# Patient Record
Sex: FEMALE | Race: WHITE | NOT HISPANIC OR LATINO | Employment: UNEMPLOYED | URBAN - METROPOLITAN AREA
[De-identification: names, ages, dates, MRNs, and addresses within clinical notes are randomized per-mention and may not be internally consistent; named-entity substitution may affect disease eponyms.]

---

## 2017-03-22 DIAGNOSIS — Z01.812 PRE-OPERATIVE LABORATORY EXAMINATION: ICD-10-CM

## 2017-03-22 LAB
ANION GAP SERPL CALC-SCNC: 9 MMOL/L (ref 0–11.9)
BUN SERPL-MCNC: 15 MG/DL (ref 8–22)
CALCIUM SERPL-MCNC: 9.8 MG/DL (ref 8.4–10.2)
CHLORIDE SERPL-SCNC: 103 MMOL/L (ref 96–112)
CO2 SERPL-SCNC: 24 MMOL/L (ref 20–33)
CREAT SERPL-MCNC: 0.73 MG/DL (ref 0.5–1.4)
GFR SERPL CREATININE-BSD FRML MDRD: >60 ML/MIN/1.73 M 2
GLUCOSE SERPL-MCNC: 99 MG/DL (ref 65–99)
POTASSIUM SERPL-SCNC: 3.7 MMOL/L (ref 3.6–5.5)
SODIUM SERPL-SCNC: 136 MMOL/L (ref 135–145)

## 2017-03-22 PROCEDURE — 36415 COLL VENOUS BLD VENIPUNCTURE: CPT

## 2017-03-22 PROCEDURE — 80048 BASIC METABOLIC PNL TOTAL CA: CPT

## 2017-03-22 RX ORDER — EPLERENONE 50 MG/1
TABLET, FILM COATED ORAL DAILY
COMMUNITY
End: 2019-11-12

## 2017-03-22 RX ORDER — AMLODIPINE BESYLATE 10 MG/1
10 TABLET ORAL DAILY
COMMUNITY
End: 2019-11-12 | Stop reason: SDUPTHER

## 2017-03-22 RX ORDER — VALSARTAN 320 MG/1
320 TABLET ORAL DAILY
COMMUNITY
End: 2019-11-12 | Stop reason: SDUPTHER

## 2017-03-31 ENCOUNTER — HOSPITAL ENCOUNTER (OUTPATIENT)
Facility: MEDICAL CENTER | Age: 57
End: 2017-03-31
Attending: ORTHOPAEDIC SURGERY | Admitting: ORTHOPAEDIC SURGERY
Payer: OTHER MISCELLANEOUS

## 2017-03-31 VITALS
RESPIRATION RATE: 16 BRPM | BODY MASS INDEX: 28.31 KG/M2 | WEIGHT: 180.78 LBS | DIASTOLIC BLOOD PRESSURE: 91 MMHG | HEART RATE: 91 BPM | TEMPERATURE: 97.9 F | SYSTOLIC BLOOD PRESSURE: 152 MMHG | OXYGEN SATURATION: 95 %

## 2017-03-31 PROBLEM — M67.441 GANGLION OF RIGHT HAND: Status: ACTIVE | Noted: 2017-03-31

## 2017-03-31 LAB — PATHOLOGY CONSULT NOTE: NORMAL

## 2017-03-31 PROCEDURE — 160002 HCHG RECOVERY MINUTES (STAT): Performed by: ORTHOPAEDIC SURGERY

## 2017-03-31 PROCEDURE — 110382 HCHG SHELL REV 271: Performed by: ORTHOPAEDIC SURGERY

## 2017-03-31 PROCEDURE — A4606 OXYGEN PROBE USED W OXIMETER: HCPCS | Performed by: ORTHOPAEDIC SURGERY

## 2017-03-31 PROCEDURE — 501838 HCHG SUTURE GENERAL: Performed by: ORTHOPAEDIC SURGERY

## 2017-03-31 PROCEDURE — 500126 HCHG BOVIE, NEEDLE TIP: Performed by: ORTHOPAEDIC SURGERY

## 2017-03-31 PROCEDURE — 160009 HCHG ANES TIME/MIN: Performed by: ORTHOPAEDIC SURGERY

## 2017-03-31 PROCEDURE — 500881 HCHG PACK, EXTREMITY: Performed by: ORTHOPAEDIC SURGERY

## 2017-03-31 PROCEDURE — 500053 HCHG BANDAGE, ELASTIC 4: Performed by: ORTHOPAEDIC SURGERY

## 2017-03-31 PROCEDURE — 700101 HCHG RX REV CODE 250

## 2017-03-31 PROCEDURE — 160046 HCHG PACU - 1ST 60 MINS PHASE II: Performed by: ORTHOPAEDIC SURGERY

## 2017-03-31 PROCEDURE — 160039 HCHG SURGERY MINUTES - EA ADDL 1 MIN LEVEL 3: Performed by: ORTHOPAEDIC SURGERY

## 2017-03-31 PROCEDURE — 160047 HCHG PACU  - EA ADDL 30 MINS PHASE II: Performed by: ORTHOPAEDIC SURGERY

## 2017-03-31 PROCEDURE — 88304 TISSUE EXAM BY PATHOLOGIST: CPT

## 2017-03-31 PROCEDURE — 500433 HCHG DRESSING, KLING 4': Performed by: ORTHOPAEDIC SURGERY

## 2017-03-31 PROCEDURE — 700111 HCHG RX REV CODE 636 W/ 250 OVERRIDE (IP): Performed by: ORTHOPAEDIC SURGERY

## 2017-03-31 PROCEDURE — A9270 NON-COVERED ITEM OR SERVICE: HCPCS | Performed by: ORTHOPAEDIC SURGERY

## 2017-03-31 PROCEDURE — 700102 HCHG RX REV CODE 250 W/ 637 OVERRIDE(OP): Performed by: ORTHOPAEDIC SURGERY

## 2017-03-31 PROCEDURE — 160025 RECOVERY II MINUTES (STATS): Performed by: ORTHOPAEDIC SURGERY

## 2017-03-31 PROCEDURE — 160048 HCHG OR STATISTICAL LEVEL 1-5: Performed by: ORTHOPAEDIC SURGERY

## 2017-03-31 PROCEDURE — 700111 HCHG RX REV CODE 636 W/ 250 OVERRIDE (IP)

## 2017-03-31 PROCEDURE — 160028 HCHG SURGERY MINUTES - 1ST 30 MINS LEVEL 3: Performed by: ORTHOPAEDIC SURGERY

## 2017-03-31 PROCEDURE — 160035 HCHG PACU - 1ST 60 MINS PHASE I: Performed by: ORTHOPAEDIC SURGERY

## 2017-03-31 PROCEDURE — 502576 HCHG PACK, HAND: Performed by: ORTHOPAEDIC SURGERY

## 2017-03-31 PROCEDURE — 110371 HCHG SHELL REV 272: Performed by: ORTHOPAEDIC SURGERY

## 2017-03-31 RX ORDER — SODIUM CHLORIDE, SODIUM LACTATE, POTASSIUM CHLORIDE, CALCIUM CHLORIDE 600; 310; 30; 20 MG/100ML; MG/100ML; MG/100ML; MG/100ML
1000 INJECTION, SOLUTION INTRAVENOUS
Status: COMPLETED | OUTPATIENT
Start: 2017-03-31 | End: 2017-03-31

## 2017-03-31 RX ORDER — OXYCODONE HYDROCHLORIDE 5 MG/1
2.5 TABLET ORAL
Status: DISCONTINUED | OUTPATIENT
Start: 2017-03-31 | End: 2017-03-31 | Stop reason: HOSPADM

## 2017-03-31 RX ORDER — BUPIVACAINE HYDROCHLORIDE 2.5 MG/ML
INJECTION, SOLUTION EPIDURAL; INFILTRATION; INTRACAUDAL
Status: DISCONTINUED | OUTPATIENT
Start: 2017-03-31 | End: 2017-03-31 | Stop reason: HOSPADM

## 2017-03-31 RX ORDER — HALOPERIDOL 5 MG/ML
1 INJECTION INTRAMUSCULAR EVERY 6 HOURS PRN
Status: DISCONTINUED | OUTPATIENT
Start: 2017-03-31 | End: 2017-03-31 | Stop reason: HOSPADM

## 2017-03-31 RX ORDER — ONDANSETRON 2 MG/ML
4 INJECTION INTRAMUSCULAR; INTRAVENOUS EVERY 4 HOURS PRN
Status: DISCONTINUED | OUTPATIENT
Start: 2017-03-31 | End: 2017-03-31 | Stop reason: HOSPADM

## 2017-03-31 RX ORDER — DIPHENHYDRAMINE HYDROCHLORIDE 50 MG/ML
25 INJECTION INTRAMUSCULAR; INTRAVENOUS EVERY 6 HOURS PRN
Status: DISCONTINUED | OUTPATIENT
Start: 2017-03-31 | End: 2017-03-31 | Stop reason: HOSPADM

## 2017-03-31 RX ORDER — DEXAMETHASONE SODIUM PHOSPHATE 4 MG/ML
4 INJECTION, SOLUTION INTRA-ARTICULAR; INTRALESIONAL; INTRAMUSCULAR; INTRAVENOUS; SOFT TISSUE
Status: DISCONTINUED | OUTPATIENT
Start: 2017-03-31 | End: 2017-03-31 | Stop reason: HOSPADM

## 2017-03-31 RX ADMIN — SODIUM CHLORIDE, SODIUM LACTATE, POTASSIUM CHLORIDE, CALCIUM CHLORIDE 1000 ML: 600; 310; 30; 20 INJECTION, SOLUTION INTRAVENOUS at 12:07

## 2017-03-31 ASSESSMENT — PAIN SCALES - GENERAL
PAINLEVEL_OUTOF10: 0
PAINLEVEL_OUTOF10: 3
PAINLEVEL_OUTOF10: 2
PAINLEVEL_OUTOF10: 0

## 2017-03-31 NOTE — IP AVS SNAPSHOT
Home Care Instructions                                                                                                                Name:Denisse Abel  Medical Record Number:[id number]  CSN: [id number]    YOB: 1960   Age: 56 y.o.  Sex: female  HT:  WT: 82 kg (180 lb 12.4 oz)          Admit Date: 3/31/2017     Discharge Date:   Today's Date: 3/31/2017  Attending Doctor:  Jimmy Atkins M.D.                  Allergies:  Pcn and Sulfa drugs                Discharge Instructions         ACTIVITY: Rest and take it easy for the first 24 hours.  A responsible adult is recommended to remain with you during that time.  It is normal to feel sleepy.  We encourage you to not do anything that requires balance, judgment or coordination.    MILD FLU-LIKE SYMPTOMS ARE NORMAL. YOU MAY EXPERIENCE GENERALIZED MUSCLE ACHES, THROAT IRRITATION, HEADACHE AND/OR SOME NAUSEA.    FOR 24 HOURS DO NOT:  Drive, operate machinery or run household appliances.  Drink beer or alcoholic beverages.   Make important decisions or sign legal documents.    SPECIAL INSTRUCTIONS: Elevate right upper extremity above heart level.     DIET: To avoid nausea, slowly advance diet as tolerated, avoiding spicy or greasy foods for the first day.  Add more substantial food to your diet according to your physician's instructions.  INCREASE FLUIDS AND FIBER TO AVOID CONSTIPATION.    SURGICAL DRESSING/BATHING: May shower with wound uncovered post op day 3.    FOLLOW-UP APPOINTMENT:  A follow-up appointment should be arranged with your doctor in [phone]; call to schedule.    You should CALL YOUR PHYSICIAN if you develop:  Fever greater than 101 degrees F.  Pain not relieved by medication, or persistent nausea or vomiting.  Excessive bleeding (blood soaking through dressing) or unexpected drainage from the wound.  Extreme redness or swelling around the incision site, drainage of pus or foul smelling drainage.  Inability to urinate or empty your bladder  within 8 hours.  Problems with breathing or chest pain.    You should call 911 if you develop problems with breathing or chest pain.  If you are unable to contact your doctor or surgical center, you should go to the nearest emergency room or urgent care center.  Dr Atkins's telephone #: [phone]    If any questions arise, call your doctor.  If your doctor is not available, please feel free to call the Surgical Center at [phone].  The Center is open Monday through Friday from 7AM to 7PM.  You can also call the HEALTH HOTLINE open 24 hours/day, 7 days/week and speak to a nurse at [phone], or toll free at [phone].    A registered nurse may call you a few days after your surgery to see how you are doing after your procedure.    MEDICATIONS: Resume taking daily medication.  Take prescribed pain medication with food.  If no medication is prescribed, you may take non-aspirin pain medication if needed.  PAIN MEDICATION CAN BE VERY CONSTIPATING.  Take a stool softener or laxative such as senokot, pericolace, or milk of magnesia if needed.    Prescription given prior to surgery.  Last pain medication given at __________.    If your physician has prescribed pain medication that includes Acetaminophen (Tylenol), do not take additional Acetaminophen (Tylenol) while taking the prescribed medication.    Depression / Suicide Risk    As you are discharged from this [facility] facility, it is important to learn how to keep safe from harming yourself.    Recognize the warning signs:  · Abrupt changes in personality, positive or negative- including increase in energy   · Giving away possessions  · Change in eating patterns- significant weight changes-  positive or negative  · Change in sleeping patterns- unable to sleep or sleeping all the time   · Unwillingness or inability to communicate  · Depression  · Unusual sadness, discouragement and loneliness  · Talk of wanting to die  · Neglect of personal  appearance   · Rebelliousness- reckless behavior  · Withdrawal from people/activities they love  · Confusion- inability to concentrate     If you or a loved one observes any of these behaviors or has concerns about self-harm, here's what you can do:  · Talk about it- your feelings and reasons for harming yourself  · Remove any means that you might use to hurt yourself (examples: pills, rope, extension cords, firearm)  · Get professional help from the community (Mental Health, Substance Abuse, psychological counseling)  · Do not be alone:Call your Safe Contact- someone whom you trust who will be there for you.  · Call your local CRISIS HOTLINE 169-9473 or 745-761-3463  · Call your local Children's Mobile Crisis Response Team [place] (693) 478-1054 or www.MEETiiN  · Call the toll free National Suicide Prevention Hotlines   · National Suicide Prevention Lifeline 696-099-IRAV (2802)  · [employer] Line Network 800-SUICIDE (912-0494)       Medication List      CONTINUE taking these medications        Instructions    amlodipine 10 MG Tabs   Commonly known as:  NORVASC    Take 10 mg by mouth every day.   Dose:  10 mg       Eplerenone 50 MG Tabs    Take  by mouth every day.       fluticasone 50 MCG/ACT nasal spray   Commonly known as:  FLONASE    Spray 1 Spray in nose every day. Each Nostril   Dose:  1 Spray       valsartan 320 MG tablet   Commonly known as:  DIOVAN    Take 320 mg by mouth every day.   Dose:  320 mg               Medication Information     Above and/or attached are the medications your physician expects you to take upon discharge. Review all of your home medications and newly ordered medications with your doctor and/or pharmacist. Follow medication instructions as directed by your doctor and/or pharmacist. Please keep your medication list with you and share with your physician. Update the information when medications are discontinued, doses are changed, or new medications (including  over-the-counter products) are added; and carry medication information at all times in the event of emergency situations.        Resources     Quit Smoking / Tobacco Use:    I understand the use of any tobacco products increases my chance of suffering from future heart disease or stroke and could cause other illnesses which may shorten my life. Quitting the use of tobacco products is the single most important thing I can do to improve my health. For further information on smoking / tobacco cessation call a Toll Free Quit Line at 1-475.613.1808 (*National [employer] [place]) or 1-503.743.6256 (American Lung Association) or you can access the web based program at www.lungusa.org.    [place] Tobacco Users Help Line:  (154) 872-3164       Toll Free: 1-496.546.2492  Quit Tobacco Program [facility] (680)595-8204    Crisis Hotline:    [place] Crisis Hotline:  0-064-XQKALXI or 1-173.372.1177    [place] Crisis Hotline:    1-959.954.9224 or 967-746-2725    Discharge Survey:   Thank you for choosing [facility]. We hope we did everything we could to make your hospital stay a pleasant one. You may be receiving a survey and we would appreciate your time and participation in answering the questions. Your input is very valuable to us in our efforts to improve our service to our patients and their families.            Signatures     My signature on this form indicates that:    1. I acknowledge receipt and understanding of these Home Care Instruction.  2. My questions regarding this information have been answered to my satisfaction.  3. I have formulated a plan with my discharge nurse to obtain my prescribed medications for home.    __________________________________      __________________________________                   Patient Signature                                 Guardian/Responsible Adult Signature      __________________________________                 __________       ________                        Nurse Signature                                               Date                 Time

## 2017-03-31 NOTE — OR NURSING
1415 patient to stage 2  Patient settled in recliner chair post short ambulation from [facility] - pt dressed with assist by CNA. Dressing CDI to L hand with minimal pain and no nausea. Instructed pt to elevate L hand above heart level; demonstrated understanding.   1420 Instructed patient to have responsible adult x 24 hours; pt states she has several friends that are available and pt states verbal understanding no driving x 24 hours or while on pain medication.   1455 D/Benson to care of family post uneventful stay in PACU 2.

## 2017-03-31 NOTE — OR NURSING
1322 Patient to recovery via cart. Placed on monitors. Awake and alert. Denies any pain at this time.  1334 Patients friend notified of patients status.  1344 Patient resting comfortably.  1410 Patient meets criteria for stage 2.  1415 Report to April SILVIA.

## 2017-03-31 NOTE — DISCHARGE INSTRUCTIONS
ACTIVITY: Rest and take it easy for the first 24 hours.  A responsible adult is recommended to remain with you during that time.  It is normal to feel sleepy.  We encourage you to not do anything that requires balance, judgment or coordination.    MILD FLU-LIKE SYMPTOMS ARE NORMAL. YOU MAY EXPERIENCE GENERALIZED MUSCLE ACHES, THROAT IRRITATION, HEADACHE AND/OR SOME NAUSEA.    FOR 24 HOURS DO NOT:  Drive, operate machinery or run household appliances.  Drink beer or alcoholic beverages.   Make important decisions or sign legal documents.    SPECIAL INSTRUCTIONS: Elevate right upper extremity above heart level.     DIET: To avoid nausea, slowly advance diet as tolerated, avoiding spicy or greasy foods for the first day.  Add more substantial food to your diet according to your physician's instructions.  INCREASE FLUIDS AND FIBER TO AVOID CONSTIPATION.    SURGICAL DRESSING/BATHING: May shower with wound uncovered post op day 3.    FOLLOW-UP APPOINTMENT:  A follow-up appointment should be arranged with your doctor in [phone]; call to schedule.    You should CALL YOUR PHYSICIAN if you develop:  Fever greater than 101 degrees F.  Pain not relieved by medication, or persistent nausea or vomiting.  Excessive bleeding (blood soaking through dressing) or unexpected drainage from the wound.  Extreme redness or swelling around the incision site, drainage of pus or foul smelling drainage.  Inability to urinate or empty your bladder within 8 hours.  Problems with breathing or chest pain.    You should call [phone] if you develop problems with breathing or chest pain.  If you are unable to contact your doctor or surgical center, you should go to the nearest emergency room or urgent care center.  Dr Atkins's telephone #: [phone]    If any questions arise, call your doctor.  If your doctor is not available, please feel free to call the Surgical Center at [phone].  The Center is open Monday through Friday from 7AM to 7PM.  You  can also call the HEALTH HOTLINE open 24 hours/day, 7 days/week and speak to a nurse at [phone], or toll free at [phone].    A registered nurse may call you a few days after your surgery to see how you are doing after your procedure.    MEDICATIONS: Resume taking daily medication.  Take prescribed pain medication with food.  If no medication is prescribed, you may take non-aspirin pain medication if needed.  PAIN MEDICATION CAN BE VERY CONSTIPATING.  Take a stool softener or laxative such as senokot, pericolace, or milk of magnesia if needed.    Prescription given prior to surgery.  Last pain medication given at __________.    If your physician has prescribed pain medication that includes Acetaminophen (Tylenol), do not take additional Acetaminophen (Tylenol) while taking the prescribed medication.    Depression / Suicide Risk    As you are discharged from this [facility] facility, it is important to learn how to keep safe from harming yourself.    Recognize the warning signs:  · Abrupt changes in personality, positive or negative- including increase in energy   · Giving away possessions  · Change in eating patterns- significant weight changes-  positive or negative  · Change in sleeping patterns- unable to sleep or sleeping all the time   · Unwillingness or inability to communicate  · Depression  · Unusual sadness, discouragement and loneliness  · Talk of wanting to die  · Neglect of personal appearance   · Rebelliousness- reckless behavior  · Withdrawal from people/activities they love  · Confusion- inability to concentrate     If you or a loved one observes any of these behaviors or has concerns about self-harm, here's what you can do:  · Talk about it- your feelings and reasons for harming yourself  · Remove any means that you might use to hurt yourself (examples: pills, rope, extension cords, firearm)  · Get professional help from the community (Mental Health, Substance Abuse, psychological  counseling)  · Do not be alone:Call your Safe Contact- someone whom you trust who will be there for you.  · Call your local CRISIS HOTLINE 329-0499 or 616-578-7053  · Call your local Children's Mobile Crisis Response Team [place] (053) 558-1932 or www.[employer]  · Call the toll free National Suicide Prevention Hotlines   · National Suicide Prevention Lifeline 045-446-CGXF (5261)  · National Hope Line Network 800-SUICIDE (094-2355)

## 2017-03-31 NOTE — IP AVS SNAPSHOT
3/31/2017          Denisse Abel  [address] [place] [address]    Dear Denisse Short:    [facility] wants to ensure your discharge home is safe and you or your loved ones have had all your questions answered regarding your care after you leave the hospital.    You may receive a telephone call within two days of your discharge.  This call is to make certain you understand your discharge instructions as well as ensure we provided you with the best care possible during your stay with us.     The call will only last approximately 3-5 minutes and will be done by a nurse.    Once again, we want to ensure your discharge home is safe and that you have a clear understanding of any next steps in your care.  If you have any questions or concerns, please do not hesitate to contact us, we are here for you.  Thank you for choosing [facility] for your healthcare needs.    Sincerely,    Vic Teixeira    [facility]

## 2017-04-01 NOTE — OP REPORT
DATE OF SERVICE:  03/31/2017    PREOPERATIVE DIAGNOSIS:  Right trigger thumb and mass, right hand.    POSTOPERATIVE DIAGNOSIS:  Right trigger thumb and mass, right hand.    OPERATION PERFORMED:  Release, right trigger thumb; excision of mass, right   hand.    SURGEON:  Sandhya Atkins MD    ANESTHESIA:  General.    ANESTHESIOLOGIST:  Alvino Bojorquez MD    SUMMARY:  Under satisfactory general anesthesia, the patient's right arm was   prepped and draped in sterile fashion.  Tourniquet was applied and inflated   after exsanguination at 250 mmHg.  The trigger thumb was approached first with   an incision over the A1 pulley.  Careful dissection to the pulley was   performed to avoid injuring the digital nerves.  The pulley was released   entirely.  The wound was irrigated and injected with 0.5% Marcaine plain and   closed with 3-0 nylon interrupted mattress sutures.  Next, attention was   turned to the mass on the ulnar side of the palm.  An incision over this mass   was performed.  Dissection down revealed relatively typical ganglion cyst   coming from the tendon sheath.  This was removed in its entirety without   damage to the tendon and without rupture of the cyst.  The cyst will be sent   to the lab for pathology.  The wound was injected and irrigated and closed   with 3-0 nylon interrupted mattress sutures.  Dressings were applied and the   patient was returned to the recovery room in good condition.  There were no   complications.       ____________________________________     SANDHYA ATKINS MD    NBY / NTS    DD:  03/31/2017 13:24:04  DT:  03/31/2017 17:06:37    D#:  [id number]  Job#:  [id number]

## 2019-10-22 ENCOUNTER — TELEPHONE (OUTPATIENT)
Dept: SCHEDULING | Facility: IMAGING CENTER | Age: 59
End: 2019-10-22

## 2019-11-12 ENCOUNTER — OFFICE VISIT (OUTPATIENT)
Dept: MEDICAL GROUP | Facility: PHYSICIAN GROUP | Age: 59
End: 2019-11-12
Payer: COMMERCIAL

## 2019-11-12 VITALS
WEIGHT: 187.6 LBS | OXYGEN SATURATION: 94 % | SYSTOLIC BLOOD PRESSURE: 152 MMHG | DIASTOLIC BLOOD PRESSURE: 70 MMHG | RESPIRATION RATE: 16 BRPM | HEIGHT: 66 IN | BODY MASS INDEX: 30.15 KG/M2 | TEMPERATURE: 98.2 F | HEART RATE: 102 BPM

## 2019-11-12 DIAGNOSIS — E66.09 CLASS 1 OBESITY DUE TO EXCESS CALORIES WITHOUT SERIOUS COMORBIDITY WITH BODY MASS INDEX (BMI) OF 30.0 TO 30.9 IN ADULT: ICD-10-CM

## 2019-11-12 DIAGNOSIS — Z12.39 SCREENING FOR MALIGNANT NEOPLASM OF BREAST: ICD-10-CM

## 2019-11-12 DIAGNOSIS — Z12.11 COLON CANCER SCREENING: ICD-10-CM

## 2019-11-12 DIAGNOSIS — I10 HTN (HYPERTENSION), BENIGN: ICD-10-CM

## 2019-11-12 DIAGNOSIS — E78.5 DYSLIPIDEMIA: ICD-10-CM

## 2019-11-12 DIAGNOSIS — Z12.4 CERVICAL CANCER SCREENING: ICD-10-CM

## 2019-11-12 PROBLEM — E66.9 OBESITY: Status: ACTIVE | Noted: 2019-11-12

## 2019-11-12 PROCEDURE — 99204 OFFICE O/P NEW MOD 45 MIN: CPT | Performed by: FAMILY MEDICINE

## 2019-11-12 RX ORDER — HYDROCHLOROTHIAZIDE 25 MG/1
25 TABLET ORAL DAILY
COMMUNITY
End: 2019-11-12 | Stop reason: SDUPTHER

## 2019-11-12 RX ORDER — VALSARTAN 320 MG/1
320 TABLET ORAL DAILY
Qty: 90 TAB | Refills: 0 | Status: SHIPPED | OUTPATIENT
Start: 2019-11-12 | End: 2020-01-30 | Stop reason: SDUPTHER

## 2019-11-12 RX ORDER — HYDROCHLOROTHIAZIDE 25 MG/1
25 TABLET ORAL DAILY
Qty: 90 TAB | Refills: 0 | Status: SHIPPED | OUTPATIENT
Start: 2019-11-12 | End: 2020-01-30 | Stop reason: SDUPTHER

## 2019-11-12 RX ORDER — AMLODIPINE BESYLATE 10 MG/1
10 TABLET ORAL DAILY
Qty: 90 TAB | Refills: 0 | Status: SHIPPED | OUTPATIENT
Start: 2019-11-12 | End: 2020-01-22 | Stop reason: SDUPTHER

## 2019-11-12 ASSESSMENT — PATIENT HEALTH QUESTIONNAIRE - PHQ9: CLINICAL INTERPRETATION OF PHQ2 SCORE: 0

## 2019-11-12 NOTE — ASSESSMENT & PLAN NOTE
This is a chronic problem. The patient has a BMI of 30. Patient is aware of this problem. Lifestyle modification has not been embraced. The patient's diet is improving and eating healthier. The patient is active - she likes to walk daily. Formal exercise regimen is not present.

## 2019-11-12 NOTE — ASSESSMENT & PLAN NOTE
"This is a chronic problem. The patient has a hx of HLD. She is trying to reinforce lifestyle change. Last blood work was done 2017 and everything \"stable\" at the time.   "

## 2019-11-12 NOTE — PROGRESS NOTES
"Subjective:     CC:  Diagnoses of HTN (hypertension), benign, Class 1 obesity due to excess calories without serious comorbidity with body mass index (BMI) of 30.0 to 30.9 in adult, Dyslipidemia, Colon cancer screening, Screening for malignant neoplasm of breast, and Cervical cancer screening were pertinent to this visit.    HISTORY OF THE PRESENT ILLNESS: Patient is a 59 y.o. female. This pleasant patient is here today to establish care and discuss the following problems.   Prior PCP: Dr. Erica Suarez     HTN (Hypertension), Benign  Elevated. Not monitoring BP at home. Currently taking amlodipine 10mg, HCTZ 25mg qday, and valsartan 320mg qday as directed. Denies lightheadedness, vision changes, headache, palpitations or leg swelling.      Obesity  This is a chronic problem. The patient has a BMI of 30. Patient is aware of this problem. Lifestyle modification has not been embraced. The patient's diet is improving and eating healthier. The patient is active - she likes to walk daily. Formal exercise regimen is not present.     Dyslipidemia  This is a chronic problem. The patient has a hx of HLD. She is trying to reinforce lifestyle change. Last blood work was done 2017 and everything \"stable\" at the time.       Allergies: Pcn [penicillins]; Sulfa drugs; and Sulfasalazine    Current Outpatient Medications Ordered in Epic   Medication Sig Dispense Refill   • amLODIPine (NORVASC) 10 MG Tab Take 1 Tab by mouth every day. 90 Tab 0   • valsartan (DIOVAN) 320 MG tablet Take 1 Tab by mouth every day. 90 Tab 0   • hydroCHLOROthiazide (HYDRODIURIL) 25 MG Tab Take 1 Tab by mouth every day. 90 Tab 0   • fluticasone (FLONASE) 50 MCG/ACT nasal spray Spray 1 Spray in nose every day. Each Nostril 1 Bottle 0     No current Epic-ordered facility-administered medications on file.        Past Medical History:   Diagnosis Date   • HLD (hyperlipidemia)    • HTN (hypertension)    • Hypertension    • Obesity        Past Surgical History: " "  Procedure Laterality Date   • TRIGGER FINGER RELEASE Right 3/31/2017    Procedure: TRIGGER FINGER RELEASE;  Surgeon: Jimmy Atkins M.D.;  Location: SURGERY [facility];  Service:    • GANGLION EXCISION Right 3/31/2017    Procedure: GANGLION EXCISION - HAND CYST;  Surgeon: Jimmy Atkins M.D.;  Location: SURGERY [facility];  Service:    • PELVISCOPY  6/5/08    Performed by NATALIYA ARCEO at SURGERY SAME DAY [facility]   • OVARIAN CYSTECTOMY  6/5/08    Performed by NATALIYA ARCEO at SURGERY SAME DAY [facility]   • CYSTOSCOPY  6/5/08    Performed by NATALIYA ARCEO at SURGERY SAME DAY [facility]   • HYSTERECTOMY LAPAROSCOPY  2003   • KNEE ARTHROSCOPY         Social History     Tobacco Use   • Smoking status: Never Smoker   • Smokeless tobacco: Never Used   Substance Use Topics   • Alcohol use: Yes     Alcohol/week: 3.6 oz     Types: 6 Glasses of wine per week   • Drug use: Yes     Types: Marijuana     Comment: thc oil for pain        Social History     Patient does not qualify to have social determinant information on file (likely too young).   Social History Narrative   • Not on file       Family History   Problem Relation Age of Onset   • Diabetes Mother    • Heart Disease Mother    • Stroke Mother    • Diabetes Father        Health Maintenance: Completed    ROS:   Gen: no fevers/chills  Eyes: no changes in vision  ENT: no sore throat, no hearing loss, no bloody nose  Pulm: no sob, no cough  CV: no chest pain, no palpitations  GI: no nausea/vomiting, no diarrhea  : no dysuria  Skin: no rash  Neuro: no headaches, no numbness/tingling  Heme/Lymph: no easy bruising      Objective:     Exam: /70 (BP Location: Left arm, Patient Position: Sitting, BP Cuff Size: Adult)   Pulse (!) 102   Temp 36.8 °C (98.2 °F) (Temporal)   Resp 16   Ht 1.676 m (5' 6\")   Wt 85.1 kg (187 lb 9.6 oz)   SpO2 94%  Body mass index is 30.28 kg/m².    General: Normal appearing. No distress.  HENT: " Normocephalic. Ears normal shape and contour, canals are clear bilaterally, tympanic membranes are benign, nasal mucosa benign, oropharynx is without erythema, edema or exudates.   Eyes: Conjunctiva clear lids without ptosis, pupils equal and reactive to light accommodation.  Neck: Supple without JVD. Thyroid is not enlarged.  Pulmonary: Clear to ausculation.  Normal effort. No rales, ronchi, or wheezing.  Cardiovascular: Regular rate and rhythm without murmur. Radial pulses are intact and equal bilaterally.  Abdomen: Soft, nontender, nondistended. Normal bowel sounds. Liver and spleen are not palpable  Neurologic: Moves all extremities  Lymph: No cervical or supraclavicular lymph nodes are palpable  Skin: Warm and dry.  No obvious lesions.  Musculoskeletal: Normal gait. No extremity cyanosis, clubbing, or edema.  Psych: Normal mood and affect. Alert and oriented x3.     Assessment & Plan:   59 y.o. female with the following -    1. HTN (hypertension), benign  This is a chronic condition, uncontrolled.  The patient has a long-standing history of hypertension currently taking amlodipine 10 mg daily, hydrochlorothiazide 25 mg daily and valsartan 320 mg daily.  Her blood pressure in the office today is 152/70.  Repeat blood pressure was 150/70.  I discussed JNC 8 guidelines with the patient today and assuming that she does not have any CKD and or diabetes her goal blood pressure would be less than 150/90.  At this time I I would like to abstain from making changes to her regimen.  Instead, I would like her to log her blood pressure at home and if blood pressure still not at goal then I would have to make changes to her current regimen.  I will consider going up on hydrochlorothiazide from 25 to 50 mg daily.  In the meantime, I have evaluated for possible secondary causes of hypertension including JOSÉ MIGUEL.  She denies any symptomatology concerning for this diagnosis.  We have gone over her BMI see #2.  I will establish  baseline lab work today.  - Comp Metabolic Panel; Future  - CBC WITHOUT DIFFERENTIAL; Future  - HEMOGLOBIN A1C; Future  - Lipid Profile; Future  - MICROALBUMIN CREAT RATIO URINE; Future  - amLODIPine (NORVASC) 10 MG Tab; Take 1 Tab by mouth every day.  Dispense: 90 Tab; Refill: 0  - valsartan (DIOVAN) 320 MG tablet; Take 1 Tab by mouth every day.  Dispense: 90 Tab; Refill: 0  - hydroCHLOROthiazide (HYDRODIURIL) 25 MG Tab; Take 1 Tab by mouth every day.  Dispense: 90 Tab; Refill: 0    2. Class 1 obesity due to excess calories without serious comorbidity with body mass index (BMI) of 30.0 to 30.9 in adult  This is a chronic condition, unchanged.  The patient is noted to have a BMI of 30.  I explained to her the meaning of this number and she mentions that she has been working hard to lose her weight.  She is to be part of weight watchers which worked really great for her in the past.  She would like to set it as a goal to join weight watchers again and to continue to lose weight.  In the meantime I have reinforced lifestyle change.  I plan on establishing baseline blood work today.  - Comp Metabolic Panel; Future  - CBC WITHOUT DIFFERENTIAL; Future  - HEMOGLOBIN A1C; Future  - Lipid Profile; Future  - MICROALBUMIN CREAT RATIO URINE; Future    3. Dyslipidemia  This is a chronic condition, stable.  The patient has a history of dyslipidemia with blood work most recently checked 2 years ago.  She is not taking any medication at this time and is just on lifestyle modification.  I plan on getting a lipid panel and calculate her ASCVD score in consideration of possibly starting statin therapy.    4. Colon cancer screening  - REFERRAL TO GI FOR COLONOSCOPY    5. Screening for malignant neoplasm of breast  - MA-SCREENING MAMMO BILAT W/TOMOSYNTHESIS W/CAD; Future    6. Cervical cancer screening  - REFERRAL TO GYNECOLOGY      Return in about 6 weeks (around 12/24/2019) for HTN.    Please note that this dictation was created  using voice recognition software. I have made every reasonable attempt to correct obvious errors, but I expect that there are errors of grammar and possibly content that I did not discover before finalizing the note.

## 2019-11-12 NOTE — ASSESSMENT & PLAN NOTE
Elevated. Not monitoring BP at home. Currently taking amlodipine 10mg, HCTZ 25mg qday, and valsartan 320mg qday as directed. Denies lightheadedness, vision changes, headache, palpitations or leg swelling.

## 2020-01-22 DIAGNOSIS — I10 HTN (HYPERTENSION), BENIGN: ICD-10-CM

## 2020-01-23 RX ORDER — AMLODIPINE BESYLATE 10 MG/1
10 TABLET ORAL DAILY
Qty: 90 TAB | Refills: 0 | Status: SHIPPED | OUTPATIENT
Start: 2020-01-23 | End: 2020-01-30 | Stop reason: SDUPTHER

## 2020-01-27 ENCOUNTER — HOSPITAL ENCOUNTER (OUTPATIENT)
Dept: LAB | Facility: MEDICAL CENTER | Age: 60
End: 2020-01-27
Attending: FAMILY MEDICINE
Payer: COMMERCIAL

## 2020-01-27 DIAGNOSIS — I10 HTN (HYPERTENSION), BENIGN: ICD-10-CM

## 2020-01-27 DIAGNOSIS — E66.09 CLASS 1 OBESITY DUE TO EXCESS CALORIES WITHOUT SERIOUS COMORBIDITY WITH BODY MASS INDEX (BMI) OF 30.0 TO 30.9 IN ADULT: ICD-10-CM

## 2020-01-27 LAB
ALBUMIN SERPL BCP-MCNC: 4.6 G/DL (ref 3.2–4.9)
ALBUMIN/GLOB SERPL: 1.1 G/DL
ALP SERPL-CCNC: 137 U/L (ref 30–99)
ALT SERPL-CCNC: 58 U/L (ref 2–50)
ANION GAP SERPL CALC-SCNC: 10 MMOL/L (ref 0–11.9)
AST SERPL-CCNC: 60 U/L (ref 12–45)
BILIRUB SERPL-MCNC: 0.6 MG/DL (ref 0.1–1.5)
BUN SERPL-MCNC: 27 MG/DL (ref 8–22)
CALCIUM SERPL-MCNC: 10 MG/DL (ref 8.5–10.5)
CHLORIDE SERPL-SCNC: 99 MMOL/L (ref 96–112)
CHOLEST SERPL-MCNC: 273 MG/DL (ref 100–199)
CO2 SERPL-SCNC: 25 MMOL/L (ref 20–33)
CREAT SERPL-MCNC: 1.07 MG/DL (ref 0.5–1.4)
CREAT UR-MCNC: 140.5 MG/DL
ERYTHROCYTE [DISTWIDTH] IN BLOOD BY AUTOMATED COUNT: 45.2 FL (ref 35.9–50)
GLOBULIN SER CALC-MCNC: 4.1 G/DL (ref 1.9–3.5)
GLUCOSE SERPL-MCNC: 134 MG/DL (ref 65–99)
HCT VFR BLD AUTO: 39.4 % (ref 37–47)
HDLC SERPL-MCNC: 56 MG/DL
HGB BLD-MCNC: 13 G/DL (ref 12–16)
LDLC SERPL CALC-MCNC: 196 MG/DL
MCH RBC QN AUTO: 30.4 PG (ref 27–33)
MCHC RBC AUTO-ENTMCNC: 33 G/DL (ref 33.6–35)
MCV RBC AUTO: 92.3 FL (ref 81.4–97.8)
MICROALBUMIN UR-MCNC: 2.1 MG/DL
MICROALBUMIN/CREAT UR: 15 MG/G (ref 0–30)
PLATELET # BLD AUTO: 158 K/UL (ref 164–446)
PMV BLD AUTO: 11.4 FL (ref 9–12.9)
POTASSIUM SERPL-SCNC: 3.9 MMOL/L (ref 3.6–5.5)
PROT SERPL-MCNC: 8.7 G/DL (ref 6–8.2)
RBC # BLD AUTO: 4.27 M/UL (ref 4.2–5.4)
SODIUM SERPL-SCNC: 134 MMOL/L (ref 135–145)
TRIGL SERPL-MCNC: 105 MG/DL (ref 0–149)
WBC # BLD AUTO: 7.7 K/UL (ref 4.8–10.8)

## 2020-01-27 PROCEDURE — 80061 LIPID PANEL: CPT

## 2020-01-27 PROCEDURE — 80053 COMPREHEN METABOLIC PANEL: CPT

## 2020-01-27 PROCEDURE — 82043 UR ALBUMIN QUANTITATIVE: CPT

## 2020-01-27 PROCEDURE — 83036 HEMOGLOBIN GLYCOSYLATED A1C: CPT

## 2020-01-27 PROCEDURE — 85027 COMPLETE CBC AUTOMATED: CPT

## 2020-01-27 PROCEDURE — 82570 ASSAY OF URINE CREATININE: CPT

## 2020-01-27 PROCEDURE — 36415 COLL VENOUS BLD VENIPUNCTURE: CPT

## 2020-01-28 LAB
EST. AVERAGE GLUCOSE BLD GHB EST-MCNC: 137 MG/DL
HBA1C MFR BLD: 6.4 % (ref 0–5.6)

## 2020-01-30 ENCOUNTER — OFFICE VISIT (OUTPATIENT)
Dept: MEDICAL GROUP | Facility: PHYSICIAN GROUP | Age: 60
End: 2020-01-30
Payer: COMMERCIAL

## 2020-01-30 VITALS
BODY MASS INDEX: 30.6 KG/M2 | HEIGHT: 66 IN | HEART RATE: 104 BPM | WEIGHT: 190.4 LBS | DIASTOLIC BLOOD PRESSURE: 60 MMHG | RESPIRATION RATE: 16 BRPM | SYSTOLIC BLOOD PRESSURE: 154 MMHG | OXYGEN SATURATION: 94 % | TEMPERATURE: 100.1 F

## 2020-01-30 DIAGNOSIS — E78.5 DYSLIPIDEMIA: ICD-10-CM

## 2020-01-30 DIAGNOSIS — E66.09 CLASS 1 OBESITY DUE TO EXCESS CALORIES WITHOUT SERIOUS COMORBIDITY WITH BODY MASS INDEX (BMI) OF 30.0 TO 30.9 IN ADULT: ICD-10-CM

## 2020-01-30 DIAGNOSIS — I10 HTN (HYPERTENSION), BENIGN: ICD-10-CM

## 2020-01-30 DIAGNOSIS — R73.03 PREDIABETES: ICD-10-CM

## 2020-01-30 DIAGNOSIS — R74.8 ALKALINE PHOSPHATASE ELEVATION: ICD-10-CM

## 2020-01-30 DIAGNOSIS — R74.01 TRANSAMINITIS: ICD-10-CM

## 2020-01-30 PROCEDURE — 99214 OFFICE O/P EST MOD 30 MIN: CPT | Performed by: FAMILY MEDICINE

## 2020-01-30 RX ORDER — AMLODIPINE BESYLATE 10 MG/1
10 TABLET ORAL DAILY
Qty: 90 TAB | Refills: 0 | Status: SHIPPED | OUTPATIENT
Start: 2020-01-30 | End: 2020-04-21 | Stop reason: SDUPTHER

## 2020-01-30 RX ORDER — VALSARTAN 320 MG/1
320 TABLET ORAL DAILY
Qty: 90 TAB | Refills: 0 | Status: SHIPPED | OUTPATIENT
Start: 2020-01-30 | End: 2020-05-18 | Stop reason: SDUPTHER

## 2020-01-30 RX ORDER — HYDROCHLOROTHIAZIDE 50 MG/1
50 TABLET ORAL DAILY
Qty: 90 TAB | Refills: 0 | Status: SHIPPED | OUTPATIENT
Start: 2020-01-30 | End: 2020-05-18 | Stop reason: SDUPTHER

## 2020-01-30 NOTE — ASSESSMENT & PLAN NOTE
This is a chronic problem.  The patient completed blood work 3 days ago. She was found to have elevated cholesterol levels.   She is motivated to pursue lifestyle change.   The 10-year ASCVD risk score (Fabriciokatelin MEYER Jr., et al., 2013) is: 8.7%

## 2020-01-30 NOTE — ASSESSMENT & PLAN NOTE
This is a new problem.  She completed an A1c 3 days ago and was found to be 6.4.  She had no prior knowledge of prediabetes in the past.  She is amenable to reinforcing lifestyle modification and perhaps receiving assistance from a dietitian.

## 2020-01-30 NOTE — ASSESSMENT & PLAN NOTE
Stable. Monitoring BP at home. Currently taking amlodipine 10mg, HCTZ 25mg, and valsartan 320mg as directed. Denies lightheadedness, vision changes, headache, palpitations or leg swelling.

## 2020-01-30 NOTE — PROGRESS NOTES
Subjective:     Chief Complaint   Patient presents with   • Hypertension Follow-up       HPI:   Denisse presents today to discuss the following.    Dyslipidemia  This is a chronic problem.  The patient completed blood work 3 days ago. She was found to have elevated cholesterol levels.   She is motivated to pursue lifestyle change.   The 10-year ASCVD risk score (Fabricio MEYER Jr., et al., 2013) is: 8.7%      HTN (Hypertension), Benign  Stable. Monitoring BP at home. Currently taking amlodipine 10mg, HCTZ 25mg, and valsartan 320mg as directed. Denies lightheadedness, vision changes, headache, palpitations or leg swelling.      Transaminitis  This is a new problem.  The patient completed blood work 3 days ago.  She was noted to have mild elevation of ALT and AST along with alkaline phosphatase of unknown etiology.  There is no prior history of this problem before.  The patient is otherwise asymptomatic.  At baseline, she does drink 3 glasses of wine every weekend.  Denies any history of alcohol abuse.      Prediabetes  This is a new problem.  She completed an A1c 3 days ago and was found to be 6.4.  She had no prior knowledge of prediabetes in the past.  She is amenable to reinforcing lifestyle modification and perhaps receiving assistance from a dietitian.      Past Medical History:   Diagnosis Date   • HLD (hyperlipidemia)    • HTN (hypertension)    • Hypertension    • Obesity        Social History     Tobacco Use   • Smoking status: Never Smoker   • Smokeless tobacco: Never Used   Substance Use Topics   • Alcohol use: Yes     Alcohol/week: 3.6 oz     Types: 6 Glasses of wine per week   • Drug use: Yes     Types: Marijuana     Comment: thc oil for pain        Current Outpatient Medications Ordered in Epic   Medication Sig Dispense Refill   • hydroCHLOROthiazide (HYDRODIURIL) 50 MG Tab Take 1 Tab by mouth every day. 90 Tab 0   • valsartan (DIOVAN) 320 MG tablet Take 1 Tab by mouth every day. 90 Tab 0   • amLODIPine (NORVASC)  "10 MG Tab Take 1 Tab by mouth every day. 90 Tab 0   • fluticasone (FLONASE) 50 MCG/ACT nasal spray Spray 1 Spray in nose every day. Each Nostril 1 Bottle 0     No current Epic-ordered facility-administered medications on file.        Allergies:  Pcn [penicillins]; Sulfa drugs; and Sulfasalazine    Health Maintenance: Completed    ROS:  Gen: no fevers/chills  Eyes: no changes in vision  ENT: no sore throat, no hearing loss, no bloody nose  Pulm: no sob, no cough  CV: no chest pain, no palpitations      Objective:       Exam:  /60 (BP Location: Left arm, Patient Position: Sitting, BP Cuff Size: Adult)   Pulse (!) 104   Temp 37.8 °C (100.1 °F) (Temporal)   Resp 16   Ht 1.676 m (5' 6\")   Wt 86.4 kg (190 lb 6.4 oz)   SpO2 94%   BMI 30.73 kg/m²  Body mass index is 30.73 kg/m².    Constitutional: Alert, no distress, well-groomed.  Skin: Warm, dry, good turgor, no rashes in visible areas.  Eye: Equal, round and reactive, conjunctiva clear, lids normal.  ENMT: Lips without lesions, good dentition, moist mucous membranes.  Neck: Trachea midline, no masses, no thyromegaly.  Respiratory: Unlabored respiratory effort, no cough.  MSK: Normal gait, moves all extremities.  Neuro: Grossly non-focal.   Psych: Alert and oriented x3, normal affect and mood.      Assessment & Plan:     59 y.o. female with the following -     1. Prediabetes  This is a new problem.  A1c was 6.4.  I explained the meaning of this value to the patient.  She will reinforce lifestyle modification.  We will repeat A1c in 3 to 6 months.    2. Transaminitis  3. Alkaline phosphatase elevation  This is a new problem.  The patient was found to have mild elevation of ALT and AST along with alkaline phosphatase.  No prior history of this before.  Will evaluate with further blood work and abdominal ultrasound.  - US-RUQ; Future  - HEPATITIS PANEL ACUTE(4 COMPONENTS); Future  - GAMMA GT (GGT); Future  - IRON/TOTAL IRON BIND; Future  - FERRITIN; Future  - " TRANSFERRIN; Future    4. Dyslipidemia  This is a chronic, worsening condition.  The patient was found to have worsening cholesterol values on lipid panel.  Her ASCVD score is 8%.  She would be a candidate for statin therapy.  However we will hold off right now in light of the fact that we are investigating her transaminitis.  I have reinforced lifestyle modification.    5. Class 1 obesity due to excess calories without serious comorbidity with body mass index (BMI) of 30.0 to 30.9 in adult  This is a chronic, stable condition.  The patient has a history of class I obesity.  She is amenable to reinforcing lifestyle modification.  I will refer to [facility] improvement program.  - REFERRAL TO [facility] IMPROVEMENT PROGRAMS (HIP) Services Requested: Weight Management Program, Registered Dietitian for Medical Nutrition Therapy; Reason for Visit: Overweight/Obesity; Future    6. HTN (hypertension), benign  There is a chronic, unchanged condition.  The patient has a history of hypertension that appears to be resistant.  Blood pressure in the office today is 154/60.  This has been consistently elevated.  As such, we will go up on hydrochlorothiazide from 25 to 50 mg daily.  If her blood pressure is still not at goal we would add a fourth agent, such as spironolactone.  - hydroCHLOROthiazide (HYDRODIURIL) 50 MG Tab; Take 1 Tab by mouth every day.  Dispense: 90 Tab; Refill: 0  - valsartan (DIOVAN) 320 MG tablet; Take 1 Tab by mouth every day.  Dispense: 90 Tab; Refill: 0  - amLODIPine (NORVASC) 10 MG Tab; Take 1 Tab by mouth every day.  Dispense: 90 Tab; Refill: 0      Return in about 3 months (around 4/30/2020) for FU on AST/ALT, .    Please note that this dictation was created using voice recognition software. I have made every reasonable attempt to correct obvious errors, but I expect that there are errors of grammar and possibly content that I did not discover before finalizing the note.

## 2020-01-30 NOTE — ASSESSMENT & PLAN NOTE
This is a new problem.  The patient completed blood work 3 days ago.  She was noted to have mild elevation of ALT and AST along with alkaline phosphatase of unknown etiology.  There is no prior history of this problem before.  The patient is otherwise asymptomatic.  At baseline, she does drink 3 glasses of wine every weekend.  Denies any history of alcohol abuse.

## 2020-04-21 DIAGNOSIS — I10 HTN (HYPERTENSION), BENIGN: ICD-10-CM

## 2020-04-21 RX ORDER — AMLODIPINE BESYLATE 10 MG/1
10 TABLET ORAL DAILY
Qty: 90 TAB | Refills: 0 | Status: SHIPPED | OUTPATIENT
Start: 2020-04-21 | End: 2020-07-15

## 2020-04-28 ENCOUNTER — APPOINTMENT (OUTPATIENT)
Dept: MEDICAL GROUP | Facility: PHYSICIAN GROUP | Age: 60
End: 2020-04-28
Payer: COMMERCIAL

## 2020-05-18 DIAGNOSIS — I10 HTN (HYPERTENSION), BENIGN: ICD-10-CM

## 2020-05-18 RX ORDER — HYDROCHLOROTHIAZIDE 50 MG/1
50 TABLET ORAL DAILY
Qty: 90 TAB | Refills: 0 | Status: SHIPPED | OUTPATIENT
Start: 2020-05-18 | End: 2020-08-17 | Stop reason: SDUPTHER

## 2020-05-18 RX ORDER — VALSARTAN 320 MG/1
320 TABLET ORAL DAILY
Qty: 90 TAB | Refills: 0 | Status: SHIPPED | OUTPATIENT
Start: 2020-05-18 | End: 2020-08-17 | Stop reason: SDUPTHER

## 2020-06-22 ENCOUNTER — APPOINTMENT (OUTPATIENT)
Dept: MEDICAL GROUP | Facility: PHYSICIAN GROUP | Age: 60
End: 2020-06-22
Payer: COMMERCIAL

## 2020-07-15 DIAGNOSIS — I10 HTN (HYPERTENSION), BENIGN: ICD-10-CM

## 2020-07-16 RX ORDER — AMLODIPINE BESYLATE 10 MG/1
TABLET ORAL
Qty: 90 TAB | Refills: 0 | Status: SHIPPED | OUTPATIENT
Start: 2020-07-16 | End: 2020-07-22 | Stop reason: SDUPTHER

## 2020-07-22 DIAGNOSIS — I10 HTN (HYPERTENSION), BENIGN: ICD-10-CM

## 2020-07-23 RX ORDER — AMLODIPINE BESYLATE 10 MG/1
10 TABLET ORAL
Qty: 90 TAB | Refills: 0 | Status: SHIPPED | OUTPATIENT
Start: 2020-07-23 | End: 2020-10-16 | Stop reason: SDUPTHER

## 2020-08-15 ENCOUNTER — PATIENT MESSAGE (OUTPATIENT)
Dept: MEDICAL GROUP | Facility: PHYSICIAN GROUP | Age: 60
End: 2020-08-15

## 2020-08-15 DIAGNOSIS — I10 HTN (HYPERTENSION), BENIGN: ICD-10-CM

## 2020-08-17 RX ORDER — VALSARTAN 320 MG/1
320 TABLET ORAL DAILY
Qty: 90 TAB | Refills: 3 | Status: SHIPPED | OUTPATIENT
Start: 2020-08-17 | End: 2020-11-10 | Stop reason: SDUPTHER

## 2020-08-17 RX ORDER — HYDROCHLOROTHIAZIDE 50 MG/1
50 TABLET ORAL DAILY
Qty: 90 TAB | Refills: 3 | Status: SHIPPED | OUTPATIENT
Start: 2020-08-17 | End: 2021-08-09

## 2020-08-19 ENCOUNTER — HOSPITAL ENCOUNTER (OUTPATIENT)
Dept: LAB | Facility: MEDICAL CENTER | Age: 60
End: 2020-08-19
Attending: FAMILY MEDICINE
Payer: COMMERCIAL

## 2020-08-19 DIAGNOSIS — R74.8 ALKALINE PHOSPHATASE ELEVATION: ICD-10-CM

## 2020-08-19 DIAGNOSIS — R74.01 TRANSAMINITIS: ICD-10-CM

## 2020-08-19 LAB
FERRITIN SERPL-MCNC: 40.2 NG/ML (ref 10–291)
GGT SERPL-CCNC: 744 U/L (ref 7–34)
HAV IGM SERPL QL IA: NORMAL
HBV CORE IGM SER QL: NORMAL
HBV SURFACE AG SER QL: NORMAL
HCV AB SER QL: NORMAL
IRON SATN MFR SERPL: 23 % (ref 15–55)
IRON SERPL-MCNC: 96 UG/DL (ref 40–170)
TIBC SERPL-MCNC: 421 UG/DL (ref 250–450)
TRANSFERRIN SERPL-MCNC: 352 MG/DL (ref 200–370)
UIBC SERPL-MCNC: 325 UG/DL (ref 110–370)

## 2020-08-19 PROCEDURE — 83550 IRON BINDING TEST: CPT

## 2020-08-19 PROCEDURE — 83540 ASSAY OF IRON: CPT

## 2020-08-19 PROCEDURE — 84466 ASSAY OF TRANSFERRIN: CPT

## 2020-08-19 PROCEDURE — 82728 ASSAY OF FERRITIN: CPT

## 2020-08-19 PROCEDURE — 36415 COLL VENOUS BLD VENIPUNCTURE: CPT

## 2020-08-19 PROCEDURE — 82977 ASSAY OF GGT: CPT

## 2020-08-19 PROCEDURE — 80074 ACUTE HEPATITIS PANEL: CPT

## 2020-08-21 ENCOUNTER — TELEMEDICINE (OUTPATIENT)
Dept: MEDICAL GROUP | Facility: PHYSICIAN GROUP | Age: 60
End: 2020-08-21
Payer: COMMERCIAL

## 2020-08-21 VITALS — BODY MASS INDEX: 30.53 KG/M2 | HEIGHT: 66 IN | WEIGHT: 190 LBS

## 2020-08-21 DIAGNOSIS — E66.01 CLASS 2 SEVERE OBESITY DUE TO EXCESS CALORIES WITH SERIOUS COMORBIDITY IN ADULT, UNSPECIFIED BMI (HCC): ICD-10-CM

## 2020-08-21 DIAGNOSIS — R74.8 ALKALINE PHOSPHATASE ELEVATION: ICD-10-CM

## 2020-08-21 DIAGNOSIS — R74.01 TRANSAMINITIS: ICD-10-CM

## 2020-08-21 DIAGNOSIS — I10 HTN (HYPERTENSION), BENIGN: ICD-10-CM

## 2020-08-21 PROCEDURE — 99214 OFFICE O/P EST MOD 30 MIN: CPT | Mod: 95,CR | Performed by: FAMILY MEDICINE

## 2020-08-21 ASSESSMENT — FIBROSIS 4 INDEX: FIB4 SCORE: 3.09

## 2020-08-21 NOTE — PROGRESS NOTES
Virtual Visit: Established Patient   This visit was conducted via [employer] using secure and encrypted videoconferencing technology. The patient was in a private location in the state of [place].    The patient's identity was confirmed and verbal consent was obtained for this virtual visit.    Subjective:   CC:   Chief Complaint   Patient presents with   • Follow-Up     labs        Denisse Able is a 60 y.o. female presenting for evaluation and management of:    #transaminitis  #elevated alkaline phophatase  This is a chronic problem  Patient found to have this  Unknown cause  Hepatitis negative  Iron levels all normal  GGT was elevated  US of liver pending    #HTN  uncontrolled. Monitoring BP at home. Currently taking amlodipine 10mg, HCTZ 50mg, and valsartan 320mg as directed. Checks BP at [facility] and it is 150s/40s. Denies lightheadedness, vision changes, headache, palpitations or leg swelling.    #Obesity  This is a chronic problem. The patient has a BMI of 30. Patient is aware of this problem. Lifestyle modification has not been embraced. The patient's diet is improving. The patient is active and walks daily.            ROS   Denies any recent fevers or chills. No nausea or vomiting. No chest pains or shortness of breath.     Allergies   Allergen Reactions   • Pcn [Penicillins]    • Sulfa Drugs    • Sulfasalazine        Current medicines (including changes today)  Current Outpatient Medications   Medication Sig Dispense Refill   • valsartan (DIOVAN) 320 MG tablet Take 1 Tab by mouth every day. 90 Tab 3   • hydroCHLOROthiazide (HYDRODIURIL) 50 MG Tab Take 1 Tab by mouth every day. 90 Tab 3   • amLODIPine (NORVASC) 10 MG Tab Take 1 Tab by mouth every day. 90 Tab 0   • fluticasone (FLONASE) 50 MCG/ACT nasal spray Spray 1 Spray in nose every day. Each Nostril 1 Bottle 0     No current facility-administered medications for this visit.        Patient Active Problem List    Diagnosis Date Noted   • Prediabetes  "01/30/2020   • Transaminitis 01/30/2020   • Alkaline phosphatase elevation 01/30/2020   • Obesity 11/12/2019   • Ganglion of right hand 03/31/2017   • Dyslipidemia 11/12/2010   • HTN (hypertension), benign 11/05/2009       Family History   Problem Relation Age of Onset   • Diabetes Mother    • Heart Disease Mother    • Stroke Mother    • Diabetes Father        She  has a past medical history of HLD (hyperlipidemia), HTN (hypertension), Hypertension, and Obesity.  She  has a past surgical history that includes pelviscopy (6/5/08); ovarian cystectomy (6/5/08); cystoscopy (6/5/08); hysterectomy laparoscopy (2003); trigger finger release (Right, 3/31/2017); ganglion excision (Right, 3/31/2017); and knee arthroscopy.       Objective:   Ht 1.676 m (5' 6\") Comment: pt reported  Wt 86.2 kg (190 lb) Comment: pt reported  BMI 30.67 kg/m²     Physical Exam:  Constitutional: Alert, no distress, well-groomed.  Skin: No rashes in visible areas.  Eye: Round. Conjunctiva clear, lids normal. No icterus.   ENMT: Lips pink without lesions, good dentition, moist mucous membranes. Phonation normal.  Neck: No masses, no thyromegaly. Moves freely without pain.  Respiratory: Unlabored respiratory effort, no cough or audible wheeze  Psych: Alert and oriented x3, normal affect and mood.       Assessment and Plan:   The following treatment plan was discussed:     1. HTN (hypertension), benign  This is an uncontrolled condition. The patient has resistant HTN with BP in the 150s systolic with 3 BP medications at maximum dose. I'm concerned about adding a 4th agent given that her diastolic is starting to come down. I will like to her to establish with a cardiologist to assist us with controlling her BP. She is amenable to plan. I have also reinforced weight loss.  Emergency room precautions given today.   - REFERRAL TO CARDIOLOGY General Card[facility]    2. Alkaline phosphatase elevation  3. Transaminitis  This is a chronic problem  Currently " investigating. Patient will schedule liver US.     4. Class 2 severe obesity due to excess calories with serious comorbidity in adult, unspecified BMI (HCC)  This is a chronic issue  I reinforced lifestyle change today.       Follow-up: Return in about 6 months (around 2/21/2021).

## 2020-10-16 DIAGNOSIS — I10 HTN (HYPERTENSION), BENIGN: ICD-10-CM

## 2020-10-16 RX ORDER — AMLODIPINE BESYLATE 10 MG/1
10 TABLET ORAL
Qty: 90 TAB | Refills: 0 | Status: SHIPPED | OUTPATIENT
Start: 2020-10-16 | End: 2021-01-13

## 2020-11-10 DIAGNOSIS — I10 HTN (HYPERTENSION), BENIGN: ICD-10-CM

## 2020-11-10 RX ORDER — VALSARTAN 320 MG/1
320 TABLET ORAL DAILY
Qty: 90 TAB | Refills: 3 | Status: SHIPPED | OUTPATIENT
Start: 2020-11-10 | End: 2022-01-31

## 2021-03-15 DIAGNOSIS — Z23 NEED FOR VACCINATION: ICD-10-CM

## 2021-07-20 ENCOUNTER — OFFICE VISIT (OUTPATIENT)
Dept: MEDICAL GROUP | Facility: PHYSICIAN GROUP | Age: 61
End: 2021-07-20
Payer: COMMERCIAL

## 2021-07-20 VITALS
TEMPERATURE: 99.3 F | BODY MASS INDEX: 31.47 KG/M2 | HEIGHT: 66 IN | DIASTOLIC BLOOD PRESSURE: 60 MMHG | OXYGEN SATURATION: 98 % | SYSTOLIC BLOOD PRESSURE: 150 MMHG | WEIGHT: 195.8 LBS | HEART RATE: 106 BPM | RESPIRATION RATE: 15 BRPM

## 2021-07-20 DIAGNOSIS — F43.0 ACUTE STRESS REACTION: ICD-10-CM

## 2021-07-20 DIAGNOSIS — I10 HTN (HYPERTENSION), BENIGN: ICD-10-CM

## 2021-07-20 PROCEDURE — 99214 OFFICE O/P EST MOD 30 MIN: CPT | Performed by: FAMILY MEDICINE

## 2021-07-20 ASSESSMENT — PATIENT HEALTH QUESTIONNAIRE - PHQ9: CLINICAL INTERPRETATION OF PHQ2 SCORE: 0

## 2021-07-20 ASSESSMENT — FIBROSIS 4 INDEX: FIB4 SCORE: 3.14

## 2021-07-20 NOTE — PROGRESS NOTES
"Subjective:     Chief Complaint   Patient presents with   • Paperwork       HPI:   Denisse presents today to discuss the following.    Acute stress reaction  This is a new problem.  The patient endorses severe stress.  Specifically this is work-related stress at [employer].  Unfortunately, she endorses harassment events at work and has required for her to be off work since 7/2/2021.  The problem is being addressed and investigated.  However she is requesting to be off work until everything is completed.  She is requesting to return to work on 9/3/21    HTN (Hypertension), Benign  Worsening due to stress. Monitoring BP at home. Currently taking HCTZ and valsartan and amlodipine as directed. Also taking baby aspirin. Denies lightheadedness, vision changes, headache, palpitations or leg swelling.        Past Medical History:   Diagnosis Date   • HLD (hyperlipidemia)    • HTN (hypertension)    • Hypertension    • Obesity        Current Outpatient Medications Ordered in Epic   Medication Sig Dispense Refill   • amLODIPine (NORVASC) 10 MG Tab TAKE 1 TABLET BY MOUTH EVERY DAY 90 Tab 3   • valsartan (DIOVAN) 320 MG tablet Take 1 Tab by mouth every day. 90 Tab 3   • hydroCHLOROthiazide (HYDRODIURIL) 50 MG Tab Take 1 Tab by mouth every day. 90 Tab 3   • fluticasone (FLONASE) 50 MCG/ACT nasal spray Spray 1 Spray in nose every day. Each Nostril 1 Bottle 0     No current Epic-ordered facility-administered medications on file.       Allergies:  Pcn [penicillins], Sulfa drugs, and Sulfasalazine    Health Maintenance: Completed    ROS:  Gen: no fevers/chills, no changes in weight  Eyes: no changes in vision  Pulm: no sob, no cough  CV: no chest pain, no palpitations  GI: no nausea/vomiting, no diarrhea      Objective:     Exam:  /60 (BP Location: Left arm, Patient Position: Sitting, BP Cuff Size: Adult)   Pulse (!) 106   Temp 37.4 °C (99.3 °F) (Temporal)   Resp 15   Ht 1.676 m (5' 6\")   Wt 88.8 kg (195 lb 12.8 oz)  "  SpO2 98%   BMI 31.60 kg/m²  Body mass index is 31.6 kg/m².        Constitutional: Alert, no distress, well-groomed.  Skin: Warm, dry, good turgor, no rashes in visible areas.  Eye: Equal, round and reactive, conjunctiva clear, lids normal.  ENMT: Lips without lesions, good dentition, moist mucous membranes.  Neck: Trachea midline, no masses, no thyromegaly.  Respiratory: Unlabored respiratory effort, no cough.  MSK: Normal gait, moves all extremities.  Neuro: Grossly non-focal.   Psych: Alert and oriented x3, normal affect and mood.        Assessment & Plan:     61 y.o. female with the following -     1. Acute stress reaction  This is a new problem.  The patient has been experiencing a lot of systemic stress related to her job.  She has been experiencing a lot of unfortunate events and pressure in the workplace which has required her to be off work since 7/2/2021.  She is requesting [facility] paperwork to be off until 9/3/2021 until these work-related issues get resolved.  I did offer her a trial of medication today to help her relieve her stress.  However, she declined.  I will reassess in 4 weeks.  She denies any SI/HI.  Paperwork will be filled out and scanned into the chart.    2. HTN (hypertension), benign  Chronic, worsening condition.  Blood pressure was elevated in the office today likely due to her stress.  I recommend that she continues for regimen continue to monitor blood pressure at home.  Return precautions recommended.  Reassessment in 4 weeks.    Return in about 4 weeks (around 8/17/2021).    Please note that this dictation was created using voice recognition software. I have made every reasonable attempt to correct obvious errors, but I expect that there are errors of grammar and possibly content that I did not discover before finalizing the note.

## 2021-07-20 NOTE — ASSESSMENT & PLAN NOTE
Worsening due to stress. Monitoring BP at home. Currently taking HCTZ and valsartan and amlodipine as directed. Also taking baby aspirin. Denies lightheadedness, vision changes, headache, palpitations or leg swelling.

## 2021-07-20 NOTE — ASSESSMENT & PLAN NOTE
This is a new problem.  The patient endorses severe stress.  Specifically this is work-related stress at [facility] [employer].  Unfortunately, she endorses harassment events at work and has required for her to be off work since 7/2/2021.  The problem is being addressed and investigated.  However she is requesting to be off work until everything is completed.  She is requesting to return to work on 9/3/21

## 2021-08-09 DIAGNOSIS — I10 HTN (HYPERTENSION), BENIGN: ICD-10-CM

## 2021-08-09 RX ORDER — HYDROCHLOROTHIAZIDE 50 MG/1
TABLET ORAL
Qty: 90 TABLET | Refills: 3 | Status: SHIPPED | OUTPATIENT
Start: 2021-08-09 | End: 2022-08-18

## 2021-08-17 ENCOUNTER — APPOINTMENT (OUTPATIENT)
Dept: MEDICAL GROUP | Facility: PHYSICIAN GROUP | Age: 61
End: 2021-08-17
Payer: COMMERCIAL

## 2021-08-23 ENCOUNTER — OFFICE VISIT (OUTPATIENT)
Dept: MEDICAL GROUP | Facility: PHYSICIAN GROUP | Age: 61
End: 2021-08-23
Payer: COMMERCIAL

## 2021-08-23 VITALS
DIASTOLIC BLOOD PRESSURE: 56 MMHG | TEMPERATURE: 99.7 F | OXYGEN SATURATION: 96 % | SYSTOLIC BLOOD PRESSURE: 144 MMHG | HEIGHT: 66 IN | BODY MASS INDEX: 31.15 KG/M2 | WEIGHT: 193.8 LBS | HEART RATE: 92 BPM

## 2021-08-23 DIAGNOSIS — I10 HTN (HYPERTENSION), BENIGN: ICD-10-CM

## 2021-08-23 DIAGNOSIS — F43.0 ACUTE STRESS REACTION: ICD-10-CM

## 2021-08-23 PROCEDURE — 99214 OFFICE O/P EST MOD 30 MIN: CPT | Performed by: FAMILY MEDICINE

## 2021-08-23 RX ORDER — IBUPROFEN 200 MG
200 TABLET ORAL EVERY 6 HOURS PRN
COMMUNITY
End: 2024-01-22

## 2021-08-23 ASSESSMENT — FIBROSIS 4 INDEX: FIB4 SCORE: 3.14

## 2021-08-23 NOTE — PROGRESS NOTES
Subjective:     Chief Complaint   Patient presents with   • Stress     follow up       HPI:   Denisse presents today to discuss the following.    Acute stress reaction  Chronic issue  The patient endorses severe stress.  Specifically this is work-related stress at [facility] [employer].  Unfortunately, she endorses harassment events at work and has required for her to be off work since 7/2/2021. She is due to return to work 9/6/21. She would like to return to work but would like to be off up to one time every 2 weeks on an intermittent leave basis.      HTN (Hypertension), Benign  Stable. Monitoring BP at home. Currently taking HCTZ and amlodipine and valsartan as directed. Also taking baby aspirin. Denies lightheadedness, vision changes, headache, palpitations or leg swelling.        Past Medical History:   Diagnosis Date   • HLD (hyperlipidemia)    • HTN (hypertension)    • Hypertension    • Obesity        Current Outpatient Medications Ordered in Epic   Medication Sig Dispense Refill   • ibuprofen (MOTRIN) 200 MG Tab Take 200 mg by mouth every 6 hours as needed.     • hydroCHLOROthiazide (HYDRODIURIL) 50 MG Tab TAKE 1 TABLET BY MOUTH EVERY DAY 90 tablet 3   • amLODIPine (NORVASC) 10 MG Tab TAKE 1 TABLET BY MOUTH EVERY DAY 90 Tab 3   • valsartan (DIOVAN) 320 MG tablet Take 1 Tab by mouth every day. 90 Tab 3   • fluticasone (FLONASE) 50 MCG/ACT nasal spray Spray 1 Spray in nose every day. Each Nostril 1 Bottle 0     No current Epic-ordered facility-administered medications on file.       Allergies:  Pcn [penicillins], Sulfa drugs, and Sulfasalazine    Health Maintenance: Completed    ROS:  Gen: no fevers/chills, no changes in weight  Eyes: no changes in vision  Pulm: no sob, no cough  CV: no chest pain, no palpitations  GI: no nausea/vomiting, no diarrhea      Objective:     Exam:  /56 (BP Location: Right arm, Patient Position: Sitting, BP Cuff Size: Adult)   Pulse 92   Temp 37.6 °C (99.7 °F) (Temporal)   Ht  "1.676 m (5' 6\")   Wt 87.9 kg (193 lb 12.8 oz)   SpO2 96%   BMI 31.28 kg/m²  Body mass index is 31.28 kg/m².      Constitutional: Alert, no distress, well-groomed.  Skin: Warm, dry, good turgor, no rashes in visible areas.  Eye: Equal, round and reactive, conjunctiva clear, lids normal.  ENMT: Lips without lesions, good dentition, moist mucous membranes.  Neck: Trachea midline, no masses, no thyromegaly.  Respiratory: Unlabored respiratory effort, no cough.  MSK: Normal gait, moves all extremities.  Neuro: Grossly non-focal.   Psych: Alert and oriented x3, normal affect and mood.        Assessment & Plan:     61 y.o. female with the following -     1. Acute stress reaction  This is a chronic condition. Patient endorses stability. Has had recurrent acute stress reaction in the workplace. I requested [facility] paperwork for her to be off since July. She is due to return to work September 6, 2021. She would like to return to work but is requesting intermittent leave up to once every 2 weeks. Via shared decision making I agreed to do this to help her relieve her stress. However, further problems related to her job will have to be addressed by occupational health. She verbalized understanding.    2. HTN (hypertension), benign  Chronic, stable condition. Well-controlled with current regimen at this time. It is also improving with her stress levels coming down.      Return in about 3 months (around 11/23/2021).    Please note that this dictation was created using voice recognition software. I have made every reasonable attempt to correct obvious errors, but I expect that there are errors of grammar and possibly content that I did not discover before finalizing the note.      "

## 2021-08-23 NOTE — ASSESSMENT & PLAN NOTE
Stable. Monitoring BP at home. Currently taking HCTZ and amlodipine and valsartan as directed. Also taking baby aspirin. Denies lightheadedness, vision changes, headache, palpitations or leg swelling.

## 2021-08-23 NOTE — ASSESSMENT & PLAN NOTE
Chronic issue  The patient endorses severe stress.  Specifically this is work-related stress at [facility].  Unfortunately, she endorses harassment events at work and has required for her to be off work since 7/2/2021. She is due to return to work 9/6/21. She would like to return to work but would like to be off up to one time every 2 weeks on an intermittent leave basis.

## 2021-10-21 ENCOUNTER — TELEPHONE (OUTPATIENT)
Dept: MEDICAL GROUP | Facility: PHYSICIAN GROUP | Age: 61
End: 2021-10-21

## 2021-10-21 NOTE — TELEPHONE ENCOUNTER
Phone Number Called: [phone]    Call outcome: Left detailed message for patient. Informed to call back Nurse Elke at [phone] with any additional questions.    Message: Need for updated blood pressure reading.

## 2022-01-04 DIAGNOSIS — I10 HTN (HYPERTENSION), BENIGN: ICD-10-CM

## 2022-01-04 RX ORDER — AMLODIPINE BESYLATE 10 MG/1
TABLET ORAL
Qty: 90 TABLET | Refills: 3 | Status: SHIPPED | OUTPATIENT
Start: 2022-01-04 | End: 2023-01-16

## 2022-12-05 ENCOUNTER — PATIENT MESSAGE (OUTPATIENT)
Dept: HEALTH INFORMATION MANAGEMENT | Facility: OTHER | Age: 62
End: 2022-12-05

## 2023-03-09 DIAGNOSIS — I10 HTN (HYPERTENSION), BENIGN: ICD-10-CM

## 2023-03-09 RX ORDER — AMLODIPINE BESYLATE 10 MG/1
10 TABLET ORAL
Qty: 90 TABLET | Refills: 0 | Status: SHIPPED | OUTPATIENT
Start: 2023-03-09 | End: 2023-06-15

## 2023-03-24 DIAGNOSIS — I10 HTN (HYPERTENSION), BENIGN: ICD-10-CM

## 2023-03-24 RX ORDER — VALSARTAN 320 MG/1
TABLET ORAL
Qty: 30 TABLET | Refills: 0 | Status: SHIPPED | OUTPATIENT
Start: 2023-03-24 | End: 2023-04-25 | Stop reason: SDUPTHER

## 2023-03-24 NOTE — TELEPHONE ENCOUNTER
Received request via: Pharmacy    Was the patient seen in the last year in this department? No  [facility] SEEN 08/23/2021    Does the patient have an active prescription (recently filled or refills available) for medication(s) requested? No    Does the patient have correction Plus and need 100 day supply (blood pressure, diabetes and cholesterol meds only)? Patient does not have SCP

## 2023-05-16 ENCOUNTER — OFFICE VISIT (OUTPATIENT)
Dept: MEDICAL GROUP | Facility: PHYSICIAN GROUP | Age: 63
End: 2023-05-16
Payer: COMMERCIAL

## 2023-05-16 VITALS
BODY MASS INDEX: 24.42 KG/M2 | SYSTOLIC BLOOD PRESSURE: 92 MMHG | HEART RATE: 85 BPM | OXYGEN SATURATION: 100 % | DIASTOLIC BLOOD PRESSURE: 42 MMHG | TEMPERATURE: 98.3 F | WEIGHT: 155.6 LBS | HEIGHT: 67 IN

## 2023-05-16 DIAGNOSIS — Z12.11 SCREENING FOR COLORECTAL CANCER: ICD-10-CM

## 2023-05-16 DIAGNOSIS — Z12.12 SCREENING FOR COLORECTAL CANCER: ICD-10-CM

## 2023-05-16 DIAGNOSIS — Z00.00 WELL WOMAN EXAM (NO GYNECOLOGICAL EXAM): ICD-10-CM

## 2023-05-16 DIAGNOSIS — I10 HTN (HYPERTENSION), BENIGN: ICD-10-CM

## 2023-05-16 PROCEDURE — 3074F SYST BP LT 130 MM HG: CPT | Performed by: FAMILY MEDICINE

## 2023-05-16 PROCEDURE — 3078F DIAST BP <80 MM HG: CPT | Performed by: FAMILY MEDICINE

## 2023-05-16 PROCEDURE — 99214 OFFICE O/P EST MOD 30 MIN: CPT | Performed by: FAMILY MEDICINE

## 2023-05-16 RX ORDER — METHOCARBAMOL 500 MG/1
500 TABLET, FILM COATED ORAL 2 TIMES DAILY PRN
COMMUNITY
Start: 2023-04-16 | End: 2024-01-12

## 2023-05-16 ASSESSMENT — PATIENT HEALTH QUESTIONNAIRE - PHQ9: CLINICAL INTERPRETATION OF PHQ2 SCORE: 0

## 2023-05-16 NOTE — ASSESSMENT & PLAN NOTE
Chronic issue  On valsaratn 320mg, HCTZ 50, amlodipine 10mg daily   Low BP today  Patient has lost significant BP   Today it is 92/42

## 2023-05-16 NOTE — PROGRESS NOTES
"Subjective:     Chief Complaint   Patient presents with    Medication Management       HPI:   Denisse presents today to discuss the following.    HTN (Hypertension), Benign  Chronic issue  On valsaratn 320mg, HCTZ 50, amlodipine 10mg daily   Low BP today  Patient has lost significant BP   Today it is 92/42    Past Medical History:   Diagnosis Date    HLD (hyperlipidemia)     HTN (hypertension)     Hypertension     Obesity        Current Outpatient Medications Ordered in Epic   Medication Sig Dispense Refill    methocarbamol (ROBAXIN) 500 MG Tab Take 500 mg by mouth 2 times a day as needed.      valsartan (DIOVAN) 320 MG tablet Take 1 Tablet by mouth every day. 90 Tablet 1    amLODIPine (NORVASC) 10 MG Tab Take 1 Tablet by mouth every day. 90 Tablet 0    hydroCHLOROthiazide (HYDRODIURIL) 50 MG Tab TAKE 1 TABLET BY MOUTH EVERY DAY 90 Tablet 3    ibuprofen (MOTRIN) 200 MG Tab Take 200 mg by mouth every 6 hours as needed.      fluticasone (FLONASE) 50 MCG/ACT nasal spray Spray 1 Spray in nose every day. Each Nostril 1 Bottle 0     No current Epic-ordered facility-administered medications on file.       Allergies:  Pcn [penicillins], Sulfa drugs, and Sulfasalazine    Health Maintenance: Completed    ROS:  Gen: no fevers/chills, no changes in weight  Eyes: no changes in vision  Pulm: no sob, no cough  CV: no chest pain, no palpitations  GI: no nausea/vomiting, no diarrhea      Objective:     Exam:  BP 92/42 (BP Location: Left arm, Patient Position: Sitting, BP Cuff Size: Adult)   Pulse 85   Temp 36.8 °C (98.3 °F) (Temporal)   Ht 1.702 m (5' 7\")   Wt 70.6 kg (155 lb 9.6 oz)   SpO2 100%   BMI 24.37 kg/m²  Body mass index is 24.37 kg/m².    Constitutional: Alert, no distress, well-groomed.  Skin: Warm, dry, good turgor, no rashes in visible areas.  Eye: Equal, round and reactive, conjunctiva clear, lids normal.  ENMT: Lips without lesions, good dentition, moist mucous membranes.  Neck: Trachea midline, no masses, no " thyromegaly.  Respiratory: Unlabored respiratory effort, no cough.  MSK: Normal gait, moves all extremities.  Neuro: Grossly non-focal.   Psych: Alert and oriented x3, normal affect and mood.        Assessment & Plan:     62 y.o. female with the following -     1. HTN (hypertension), benign  Chronic, tightly controlled.  At this time I recommend discontinuing amlodipine with cautious blood pressure monitoring at home.  Continue with losartan and hydrochlorothiazide.  Return precautions recommended.    2. Well woman exam (no gynecological exam)  - CBC WITHOUT DIFFERENTIAL; Future  - Lipid Profile; Future  - HEMOGLOBIN A1C; Future  - Comp Metabolic Panel; Future    3. Screening for colorectal cancer  - COLOGUARD (FIT DNA)      Return in about 6 months (around 11/16/2023).          Please note that this dictation was created using voice recognition software. I have made every reasonable attempt to correct obvious errors, but I expect that there are errors of grammar and possibly content that I did not discover before finalizing the note.

## 2023-08-16 DIAGNOSIS — I10 HTN (HYPERTENSION), BENIGN: ICD-10-CM

## 2023-08-17 RX ORDER — HYDROCHLOROTHIAZIDE 50 MG/1
TABLET ORAL
Qty: 90 TABLET | Refills: 0 | Status: SHIPPED | OUTPATIENT
Start: 2023-08-17 | End: 2023-11-20 | Stop reason: SDUPTHER

## 2023-08-30 ENCOUNTER — DOCUMENTATION (OUTPATIENT)
Dept: HEALTH INFORMATION MANAGEMENT | Facility: OTHER | Age: 63
End: 2023-08-30
Payer: COMMERCIAL

## 2023-11-20 DIAGNOSIS — I10 HTN (HYPERTENSION), BENIGN: ICD-10-CM

## 2023-11-20 RX ORDER — HYDROCHLOROTHIAZIDE 50 MG/1
50 TABLET ORAL
Qty: 90 TABLET | Refills: 0 | Status: SHIPPED | OUTPATIENT
Start: 2023-11-20 | End: 2024-01-12

## 2024-01-03 ENCOUNTER — HOSPITAL ENCOUNTER (OUTPATIENT)
Dept: LAB | Facility: MEDICAL CENTER | Age: 64
End: 2024-01-03
Attending: FAMILY MEDICINE
Payer: COMMERCIAL

## 2024-01-03 DIAGNOSIS — Z00.00 WELL WOMAN EXAM (NO GYNECOLOGICAL EXAM): ICD-10-CM

## 2024-01-03 LAB
ALBUMIN SERPL BCP-MCNC: 3.4 G/DL (ref 3.2–4.9)
ALBUMIN/GLOB SERPL: 0.8 G/DL
ALP SERPL-CCNC: 130 U/L (ref 30–99)
ALT SERPL-CCNC: 11 U/L (ref 2–50)
ANION GAP SERPL CALC-SCNC: 15 MMOL/L (ref 7–16)
AST SERPL-CCNC: 24 U/L (ref 12–45)
BILIRUB SERPL-MCNC: 1.1 MG/DL (ref 0.1–1.5)
BUN SERPL-MCNC: 48 MG/DL (ref 8–22)
CALCIUM ALBUM COR SERPL-MCNC: 10 MG/DL (ref 8.5–10.5)
CALCIUM SERPL-MCNC: 9.5 MG/DL (ref 8.5–10.5)
CHLORIDE SERPL-SCNC: 90 MMOL/L (ref 96–112)
CHOLEST SERPL-MCNC: 119 MG/DL (ref 100–199)
CO2 SERPL-SCNC: 21 MMOL/L (ref 20–33)
CREAT SERPL-MCNC: 2.03 MG/DL (ref 0.5–1.4)
FASTING STATUS PATIENT QL REPORTED: NORMAL
GFR SERPLBLD CREATININE-BSD FMLA CKD-EPI: 27 ML/MIN/1.73 M 2
GLOBULIN SER CALC-MCNC: 4.2 G/DL (ref 1.9–3.5)
GLUCOSE SERPL-MCNC: 144 MG/DL (ref 65–99)
HDLC SERPL-MCNC: 33 MG/DL
LDLC SERPL CALC-MCNC: 76 MG/DL
POTASSIUM SERPL-SCNC: 2.8 MMOL/L (ref 3.6–5.5)
PROT SERPL-MCNC: 7.6 G/DL (ref 6–8.2)
SODIUM SERPL-SCNC: 126 MMOL/L (ref 135–145)
TRIGL SERPL-MCNC: 50 MG/DL (ref 0–149)

## 2024-01-03 PROCEDURE — 36415 COLL VENOUS BLD VENIPUNCTURE: CPT

## 2024-01-03 PROCEDURE — 80061 LIPID PANEL: CPT

## 2024-01-03 PROCEDURE — 83036 HEMOGLOBIN GLYCOSYLATED A1C: CPT

## 2024-01-03 PROCEDURE — 80053 COMPREHEN METABOLIC PANEL: CPT

## 2024-01-05 LAB — TEST NAME 95000: NORMAL

## 2024-01-11 SDOH — ECONOMIC STABILITY: HOUSING INSECURITY: IN THE LAST 12 MONTHS, HOW MANY PLACES HAVE YOU LIVED?: 1

## 2024-01-11 SDOH — ECONOMIC STABILITY: INCOME INSECURITY: HOW HARD IS IT FOR YOU TO PAY FOR THE VERY BASICS LIKE FOOD, HOUSING, MEDICAL CARE, AND HEATING?: NOT HARD AT ALL

## 2024-01-11 SDOH — ECONOMIC STABILITY: INCOME INSECURITY: IN THE LAST 12 MONTHS, WAS THERE A TIME WHEN YOU WERE NOT ABLE TO PAY THE MORTGAGE OR RENT ON TIME?: NO

## 2024-01-11 SDOH — ECONOMIC STABILITY: TRANSPORTATION INSECURITY
IN THE PAST 12 MONTHS, HAS THE LACK OF TRANSPORTATION KEPT YOU FROM MEDICAL APPOINTMENTS OR FROM GETTING MEDICATIONS?: NO

## 2024-01-11 SDOH — ECONOMIC STABILITY: HOUSING INSECURITY
IN THE LAST 12 MONTHS, WAS THERE A TIME WHEN YOU DID NOT HAVE A STEADY PLACE TO SLEEP OR SLEPT IN A SHELTER (INCLUDING NOW)?: NO

## 2024-01-11 SDOH — ECONOMIC STABILITY: FOOD INSECURITY: WITHIN THE PAST 12 MONTHS, THE FOOD YOU BOUGHT JUST DIDN'T LAST AND YOU DIDN'T HAVE MONEY TO GET MORE.: NEVER TRUE

## 2024-01-11 SDOH — HEALTH STABILITY: PHYSICAL HEALTH: ON AVERAGE, HOW MANY MINUTES DO YOU ENGAGE IN EXERCISE AT THIS LEVEL?: 30 MIN

## 2024-01-11 SDOH — ECONOMIC STABILITY: TRANSPORTATION INSECURITY
IN THE PAST 12 MONTHS, HAS LACK OF TRANSPORTATION KEPT YOU FROM MEETINGS, WORK, OR FROM GETTING THINGS NEEDED FOR DAILY LIVING?: NO

## 2024-01-11 SDOH — ECONOMIC STABILITY: TRANSPORTATION INSECURITY
IN THE PAST 12 MONTHS, HAS LACK OF RELIABLE TRANSPORTATION KEPT YOU FROM MEDICAL APPOINTMENTS, MEETINGS, WORK OR FROM GETTING THINGS NEEDED FOR DAILY LIVING?: NO

## 2024-01-11 SDOH — ECONOMIC STABILITY: FOOD INSECURITY: WITHIN THE PAST 12 MONTHS, YOU WORRIED THAT YOUR FOOD WOULD RUN OUT BEFORE YOU GOT MONEY TO BUY MORE.: NEVER TRUE

## 2024-01-11 SDOH — HEALTH STABILITY: PHYSICAL HEALTH: ON AVERAGE, HOW MANY DAYS PER WEEK DO YOU ENGAGE IN MODERATE TO STRENUOUS EXERCISE (LIKE A BRISK WALK)?: 5 DAYS

## 2024-01-11 SDOH — HEALTH STABILITY: MENTAL HEALTH
STRESS IS WHEN SOMEONE FEELS TENSE, NERVOUS, ANXIOUS, OR CAN'T SLEEP AT NIGHT BECAUSE THEIR MIND IS TROUBLED. HOW STRESSED ARE YOU?: VERY MUCH

## 2024-01-11 ASSESSMENT — SOCIAL DETERMINANTS OF HEALTH (SDOH)
HOW OFTEN DO YOU GET TOGETHER WITH FRIENDS OR RELATIVES?: ONCE A WEEK
DO YOU BELONG TO ANY CLUBS OR ORGANIZATIONS SUCH AS CHURCH GROUPS UNIONS, FRATERNAL OR ATHLETIC GROUPS, OR SCHOOL GROUPS?: NO
IN A TYPICAL WEEK, HOW MANY TIMES DO YOU TALK ON THE PHONE WITH FAMILY, FRIENDS, OR NEIGHBORS?: MORE THAN THREE TIMES A WEEK
WITHIN THE PAST 12 MONTHS, YOU WORRIED THAT YOUR FOOD WOULD RUN OUT BEFORE YOU GOT THE MONEY TO BUY MORE: NEVER TRUE
HOW HARD IS IT FOR YOU TO PAY FOR THE VERY BASICS LIKE FOOD, HOUSING, MEDICAL CARE, AND HEATING?: NOT HARD AT ALL
HOW OFTEN DO YOU ATTENT MEETINGS OF THE CLUB OR ORGANIZATION YOU BELONG TO?: NEVER
HOW OFTEN DO YOU HAVE A DRINK CONTAINING ALCOHOL: NEVER
DO YOU BELONG TO ANY CLUBS OR ORGANIZATIONS SUCH AS CHURCH GROUPS UNIONS, FRATERNAL OR ATHLETIC GROUPS, OR SCHOOL GROUPS?: NO
HOW OFTEN DO YOU GET TOGETHER WITH FRIENDS OR RELATIVES?: ONCE A WEEK
HOW MANY DRINKS CONTAINING ALCOHOL DO YOU HAVE ON A TYPICAL DAY WHEN YOU ARE DRINKING: PATIENT DECLINED
HOW OFTEN DO YOU ATTEND CHURCH OR RELIGIOUS SERVICES?: NEVER
HOW OFTEN DO YOU ATTEND CHURCH OR RELIGIOUS SERVICES?: NEVER
IN A TYPICAL WEEK, HOW MANY TIMES DO YOU TALK ON THE PHONE WITH FAMILY, FRIENDS, OR NEIGHBORS?: MORE THAN THREE TIMES A WEEK
HOW OFTEN DO YOU ATTENT MEETINGS OF THE CLUB OR ORGANIZATION YOU BELONG TO?: NEVER
HOW OFTEN DO YOU HAVE SIX OR MORE DRINKS ON ONE OCCASION: NEVER

## 2024-01-11 ASSESSMENT — LIFESTYLE VARIABLES
HOW MANY STANDARD DRINKS CONTAINING ALCOHOL DO YOU HAVE ON A TYPICAL DAY: PATIENT DECLINED
HOW OFTEN DO YOU HAVE SIX OR MORE DRINKS ON ONE OCCASION: NEVER
AUDIT-C TOTAL SCORE: -1
HOW OFTEN DO YOU HAVE A DRINK CONTAINING ALCOHOL: NEVER
SKIP TO QUESTIONS 9-10: 0

## 2024-01-12 ENCOUNTER — OFFICE VISIT (OUTPATIENT)
Dept: MEDICAL GROUP | Facility: MEDICAL CENTER | Age: 64
End: 2024-01-12
Payer: COMMERCIAL

## 2024-01-12 VITALS
HEIGHT: 67 IN | TEMPERATURE: 98.5 F | DIASTOLIC BLOOD PRESSURE: 44 MMHG | WEIGHT: 149.69 LBS | SYSTOLIC BLOOD PRESSURE: 110 MMHG | BODY MASS INDEX: 23.49 KG/M2 | HEART RATE: 99 BPM | OXYGEN SATURATION: 100 %

## 2024-01-12 DIAGNOSIS — N18.32 STAGE 3B CHRONIC KIDNEY DISEASE: ICD-10-CM

## 2024-01-12 DIAGNOSIS — I10 HTN (HYPERTENSION), BENIGN: ICD-10-CM

## 2024-01-12 DIAGNOSIS — Z78.0 POSTMENOPAUSAL STATUS (AGE-RELATED) (NATURAL): ICD-10-CM

## 2024-01-12 DIAGNOSIS — N95.1 MENOPAUSAL STATE: ICD-10-CM

## 2024-01-12 DIAGNOSIS — R01.1 HEART MURMUR: ICD-10-CM

## 2024-01-12 DIAGNOSIS — Z00.00 PE (PHYSICAL EXAM), ANNUAL: ICD-10-CM

## 2024-01-12 DIAGNOSIS — R60.0 BILATERAL LOWER EXTREMITY EDEMA: ICD-10-CM

## 2024-01-12 DIAGNOSIS — Z12.31 ENCOUNTER FOR SCREENING MAMMOGRAM FOR BREAST CANCER: ICD-10-CM

## 2024-01-12 DIAGNOSIS — M54.16 LUMBAR RADICULOPATHY: ICD-10-CM

## 2024-01-12 DIAGNOSIS — F33.9 RECURRENT MAJOR DEPRESSIVE DISORDER, REMISSION STATUS UNSPECIFIED (HCC): ICD-10-CM

## 2024-01-12 DIAGNOSIS — Z11.4 SCREENING FOR HIV (HUMAN IMMUNODEFICIENCY VIRUS): ICD-10-CM

## 2024-01-12 DIAGNOSIS — Z12.11 SCREEN FOR COLON CANCER: ICD-10-CM

## 2024-01-12 PROBLEM — M47.816 LUMBAR SPONDYLOSIS: Status: ACTIVE | Noted: 2023-03-15

## 2024-01-12 PROCEDURE — 99214 OFFICE O/P EST MOD 30 MIN: CPT | Performed by: NURSE PRACTITIONER

## 2024-01-12 PROCEDURE — 3078F DIAST BP <80 MM HG: CPT | Performed by: NURSE PRACTITIONER

## 2024-01-12 PROCEDURE — 3074F SYST BP LT 130 MM HG: CPT | Performed by: NURSE PRACTITIONER

## 2024-01-12 RX ORDER — TRAMADOL HYDROCHLORIDE 50 MG/1
50 TABLET ORAL EVERY 6 HOURS PRN
COMMUNITY
Start: 2023-11-21 | End: 2024-01-22

## 2024-01-12 RX ORDER — CITALOPRAM HYDROBROMIDE 10 MG/1
10 TABLET ORAL DAILY
Qty: 30 TABLET | Refills: 1 | Status: SHIPPED | OUTPATIENT
Start: 2024-01-12 | End: 2024-02-06

## 2024-01-12 ASSESSMENT — PATIENT HEALTH QUESTIONNAIRE - PHQ9
5. POOR APPETITE OR OVEREATING: 3 - NEARLY EVERY DAY
CLINICAL INTERPRETATION OF PHQ2 SCORE: 4
SUM OF ALL RESPONSES TO PHQ QUESTIONS 1-9: 17

## 2024-01-12 NOTE — ASSESSMENT & PLAN NOTE
New to me. Not taking any medications at this time. Previously on Amlodipine, Valsaratan and HCTZ. /44. Admits to poor appetite. She is staying well hydrated.    Latest Reference Range & Units 01/03/24 11:20   Sodium 135 - 145 mmol/L 126 (L)   Potassium 3.6 - 5.5 mmol/L 2.8 (L)   Chloride 96 - 112 mmol/L 90 (L)   Co2 20 - 33 mmol/L 21   Anion Gap 7.0 - 16.0  15.0   Glucose 65 - 99 mg/dL 144 (H)   Bun 8 - 22 mg/dL 48 (H)   Creatinine 0.50 - 1.40 mg/dL 2.03 (H)   GFR (CKD-EPI) >60 mL/min/1.73 m 2 27 !   (L): Data is abnormally low  (H): Data is abnormally high  !: Data is abnormal

## 2024-01-12 NOTE — ASSESSMENT & PLAN NOTE
Latest Reference Range & Units 01/03/24 11:20   GFR (CKD-EPI) >60 mL/min/1.73 m 2 27 !   !: Data is abnormal  New to me. New problem. Pt reports severe fatigue.

## 2024-01-12 NOTE — PROGRESS NOTES
Chief Complaint   Patient presents with    [facility]     Lost of weight lost of hair,        HISTORY OF PRESENT ILLNESS: Patient is a 63 y.o. female, established patient who presents today to discuss medical problems as listed below:    Health Maintenance:  COMPLETED       Allergies: Pcn [penicillins], Sulfa drugs, and Sulfasalazine    Current Outpatient Medications   Medication Sig Dispense Refill    citalopram (CELEXA) 10 MG tablet Take 1 Tablet by mouth every day. 30 Tablet 1    traMADol (ULTRAM) 50 MG Tab Take 50 mg by mouth every 6 hours as needed for Moderate Pain or Severe Pain.      ibuprofen (MOTRIN) 200 MG Tab Take 200 mg by mouth every 6 hours as needed.      fluticasone (FLONASE) 50 MCG/ACT nasal spray Spray 1 Spray in nose every day. Each Nostril 1 Bottle 0     No current facility-administered medications for this visit.       Allergies, past medical history, past surgical history, family history, social history reviewed and updated.    Review of Systems:     - Constitutional: fatigue/generalized weakness. Negative for fever, chills, unexpected weight change    - HEENT: Negative for headaches, vision changes, hearing changes, ear pain, ear discharge, rhinorrhea, sinus congestion, sore throat, and neck pain.      - Respiratory: Negative for cough, sputum production, chest congestion, dyspnea, wheezing, and crackles.      - Cardiovascular: bilateral lower extremity edema. Negative for chest pain, palpitations, orthopnea, and   - Gastrointestinal: Negative for heartburn, nausea, vomiting, abdominal pain, hematochezia, melena, diarrhea, constipation, and greasy/foul-smelling stools.     - Genitourinary: Negative for dysuria, polyuria, hematuria, pyuria, urinary urgency, and urinary incontinence.    - Musculoskeletal: Negative for myalgias, back pain, and joint pain.     - Skin: Negative for rash, itching, cyanotic skin color change.     - Neurological: Negative for dizziness, tingling, tremors, focal  "sensory deficit, focal weakness and headaches.     - Endo/Heme/Allergies: Does not bruise/bleed easily.     - Psychiatric/Behavioral: depression. Negative for  suicidal/homicidal ideation and memory loss.      All other systems reviewed and are negative    Exam:    /44   Pulse 99   Temp 36.9 °C (98.5 °F) (Temporal)   Ht 1.702 m (5' 7\")   Wt 67.9 kg (149 lb 11.1 oz)   SpO2 100%   BMI 23.45 kg/m²  Body mass index is 23.45 kg/m².    Physical Exam:  Constitutional: Well-developed and well-nourished. Not diaphoretic. No distress.   Skin: Skin is warm and dry. No rash noted.  Head: Atraumatic without lesions.  Eyes: Conjunctivae and extraocular motions are normal. Pupils are equal, round, and reactive to light. No scleral icterus.   Ears:  External ears unremarkable. Tympanic membranes clear and intact.  Nose: Nares patent. Septum midline. Turbinates without erythema nor edema. No discharge.   Mouth/Throat: Dentition is normal. Tongue normal. Oropharynx is clear and moist. Posterior pharynx without erythema or exudates.  Neck: Supple, trachea midline. Normal range of motion. No thyromegaly present. No lymphadenopathy--cervical or supraclavicular.  Cardiovascular: loud prominent murmur, b/l lower extremity swelling. . Regular rate and rhythm, S1 and S2   Chest: Effort normal. Clear to auscultation throughout. No adventitious sounds. No CVA tenderness.  Abdomen: Soft, non tender, and without distention. Active bowel sounds in all four quadrants. No rebound, guarding, masses or HSM.  : Negative for dysuria, polyuria, hematuria, pyuria, urinary urgency, and urinary incontinence.  Extremities: No cyanosis, clubbing, erythema, nor edema. Distal pulses intact and symmetric.   Musculoskeletal: All major joints AROM full in all directions without pain.  Neurological: Alert and oriented x 3. DTRs 2+/3 and symmetric. No cranial nerve deficit. 5/5 myotomes. Sensation intact. Negative Rhomberg.  Psychiatric:  Behavior, " mood, and affect are appropriate.  MA/nursing note and vitals reviewed.    LABS: 1/2024  results reviewed and discussed with the patient, questions answered.    Assessment/Plan:  HTN (Hypertension), Benign  New to me. Not taking any medications at this time. Previously on Amlodipine, Valsaratan and HCTZ. /44. Admits to poor appetite. She is staying well hydrated.    Latest Reference Range & Units 01/03/24 11:20   Sodium 135 - 145 mmol/L 126 (L)   Potassium 3.6 - 5.5 mmol/L 2.8 (L)   Chloride 96 - 112 mmol/L 90 (L)   Co2 20 - 33 mmol/L 21   Anion Gap 7.0 - 16.0  15.0   Glucose 65 - 99 mg/dL 144 (H)   Bun 8 - 22 mg/dL 48 (H)   Creatinine 0.50 - 1.40 mg/dL 2.03 (H)   GFR (CKD-EPI) >60 mL/min/1.73 m 2 27 !   (L): Data is abnormally low  (H): Data is abnormally high  !: Data is abnormal    Stage 3b chronic kidney disease (HCC)   Latest Reference Range & Units 01/03/24 11:20   GFR (CKD-EPI) >60 mL/min/1.73 m 2 27 !   !: Data is abnormal  New to me. New problem. Pt reports severe fatigue.     Lumbar radiculopathy  New to me. Chronic problem. Following with neurosurgery, Dr. Bryson. And seeing their pain management.   Pending to be scheduled for ablation next week.     Recurrent major depressive disorder (HCC)  New to me.  New problem.  This is a chronic intermittent problem for patient.  She admits to feeling down, however denies SI.  Support system and friend who is in town.  [place]  In the past took Prozac, did not work for her.  She is interested in an alternative.    Heart murmur  New to me.  New problem.  This is a chronic problem for patient.  Heart murmur since childhood.  She denies CP or dyspnea.  She does admit to bilateral extremity swelling.  Also history of CKD with current GFR 27.    1. HTN (hypertension), benign  Stable. Pt to f/u with cadiology    2. Stage 3b chronic kidney disease (HCC)  - Referral to Nephrology    3. Lumbar radiculopathy  Stable. Following with  pain management and neurosurgery    4. PE (physical exam), annual  - Referral to Nephrology  - CBC WITHOUT DIFFERENTIAL; Future  - Comp Metabolic Panel; Future  - Lipid Profile; Future  - TSH; Future  - T3 FREE; Future  - HEMOGLOBIN A1C; Future  - FREE THYROXINE; Future  - VITAMIN D,25 HYDROXY (DEFICIENCY); Future  - VITAMIN B12; Future    5. Postmenopausal status (age-related) (natural)  - DS-BONE DENSITY STUDY (DEXA); Future    6. Menopausal state  - DS-BONE DENSITY STUDY (DEXA); Future    7. Screening for HIV (human immunodeficiency virus)  - HIV AG/AB COMBO ASSAY SCREENING; Future    8. Encounter for screening mammogram for breast cancer  - MA-SCREENING MAMMO BILAT W/TOMOSYNTHESIS W/CAD; Future    9. Heart murmur  - EC-ECHOCARDIOGRAM COMPLETE W/O CONT; Future  - REFERRAL TO CARDIOLOGY    10. Recurrent major depressive disorder, remission status unspecified (HCC)  Uncontrolled, stable.  Trial of Celexa.  - citalopram (CELEXA) 10 MG tablet; Take 1 Tablet by mouth every day.  Dispense: 30 Tablet; Refill: 1    11. Screen for colon cancer  - COLOGUARD (FIT DNA)      Discussed with patient possible alternative diagnoses, patient is to take all medications as prescribed.      If symptoms persist FU w/PCP, if symptoms worsen go to emergency room.      If experiencing any side effects from prescribed medications report to the office immediately or go to emergency room.     Reviewed indication, dosage, usage and potential adverse effects of prescribed medications.      Reviewed risks and benefits of treatment plan. Patient verbalizes understanding of all instruction and verbally agrees to plan.     Discussed plan with the patient, and patient agrees to the above.      I personally reviewed prior external notes and test results pertinent to today's visit.      No follow-ups on file. 3-4 wks

## 2024-01-12 NOTE — ASSESSMENT & PLAN NOTE
New to me.  New problem.  This is a chronic problem for patient.  Heart murmur since childhood.  She denies CP or dyspnea.  She does admit to bilateral extremity swelling.  Also history of CKD with current GFR 27.

## 2024-01-12 NOTE — ASSESSMENT & PLAN NOTE
New to me. Chronic problem. Following with neurosurgery, Dr. Bryson. And seeing their pain management.   Pending to be scheduled for ablation next week.

## 2024-01-12 NOTE — ASSESSMENT & PLAN NOTE
New to me.  New problem.  This is a chronic intermittent problem for patient.  She admits to feeling down, however denies SI.  Support system and friend who is in town.  [place]  In the past took Prozac, did not work for her.  She is interested in an alternative.

## 2024-01-17 ENCOUNTER — HOSPITAL ENCOUNTER (OUTPATIENT)
Dept: LAB | Facility: MEDICAL CENTER | Age: 64
End: 2024-01-17
Attending: NURSE PRACTITIONER
Payer: COMMERCIAL

## 2024-01-17 DIAGNOSIS — Z11.4 SCREENING FOR HIV (HUMAN IMMUNODEFICIENCY VIRUS): ICD-10-CM

## 2024-01-17 DIAGNOSIS — Z00.00 PE (PHYSICAL EXAM), ANNUAL: ICD-10-CM

## 2024-01-17 LAB
25(OH)D3 SERPL-MCNC: 32 NG/ML (ref 30–100)
ALBUMIN SERPL BCP-MCNC: 3.3 G/DL (ref 3.2–4.9)
ALBUMIN/GLOB SERPL: 0.8 G/DL
ALP SERPL-CCNC: 123 U/L (ref 30–99)
ALT SERPL-CCNC: 15 U/L (ref 2–50)
ANION GAP SERPL CALC-SCNC: 14 MMOL/L (ref 7–16)
AST SERPL-CCNC: 23 U/L (ref 12–45)
BILIRUB SERPL-MCNC: 1 MG/DL (ref 0.1–1.5)
BUN SERPL-MCNC: 47 MG/DL (ref 8–22)
CALCIUM ALBUM COR SERPL-MCNC: 9.9 MG/DL (ref 8.5–10.5)
CALCIUM SERPL-MCNC: 9.3 MG/DL (ref 8.5–10.5)
CHLORIDE SERPL-SCNC: 96 MMOL/L (ref 96–112)
CHOLEST SERPL-MCNC: 119 MG/DL (ref 100–199)
CO2 SERPL-SCNC: 21 MMOL/L (ref 20–33)
CREAT SERPL-MCNC: 2.22 MG/DL (ref 0.5–1.4)
FASTING STATUS PATIENT QL REPORTED: NORMAL
GFR SERPLBLD CREATININE-BSD FMLA CKD-EPI: 24 ML/MIN/1.73 M 2
GLOBULIN SER CALC-MCNC: 3.9 G/DL (ref 1.9–3.5)
GLUCOSE SERPL-MCNC: 129 MG/DL (ref 65–99)
HDLC SERPL-MCNC: 29 MG/DL
HIV 1+2 AB+HIV1 P24 AG SERPL QL IA: NORMAL
LDLC SERPL CALC-MCNC: 80 MG/DL
POTASSIUM SERPL-SCNC: 3.3 MMOL/L (ref 3.6–5.5)
PROT SERPL-MCNC: 7.2 G/DL (ref 6–8.2)
SODIUM SERPL-SCNC: 131 MMOL/L (ref 135–145)
T3FREE SERPL-MCNC: 1.9 PG/ML (ref 2–4.4)
T4 FREE SERPL-MCNC: 1.18 NG/DL (ref 0.93–1.7)
TRIGL SERPL-MCNC: 52 MG/DL (ref 0–149)
TSH SERPL DL<=0.005 MIU/L-ACNC: 2.05 UIU/ML (ref 0.38–5.33)
VIT B12 SERPL-MCNC: 1849 PG/ML (ref 211–911)

## 2024-01-17 PROCEDURE — 85027 COMPLETE CBC AUTOMATED: CPT

## 2024-01-17 PROCEDURE — 80061 LIPID PANEL: CPT

## 2024-01-17 PROCEDURE — 84481 FREE ASSAY (FT-3): CPT

## 2024-01-17 PROCEDURE — 84443 ASSAY THYROID STIM HORMONE: CPT

## 2024-01-17 PROCEDURE — 82607 VITAMIN B-12: CPT

## 2024-01-17 PROCEDURE — 36415 COLL VENOUS BLD VENIPUNCTURE: CPT

## 2024-01-17 PROCEDURE — 80053 COMPREHEN METABOLIC PANEL: CPT

## 2024-01-17 PROCEDURE — 83036 HEMOGLOBIN GLYCOSYLATED A1C: CPT

## 2024-01-17 PROCEDURE — 84439 ASSAY OF FREE THYROXINE: CPT

## 2024-01-17 PROCEDURE — 87389 HIV-1 AG W/HIV-1&-2 AB AG IA: CPT

## 2024-01-17 PROCEDURE — 82306 VITAMIN D 25 HYDROXY: CPT

## 2024-01-22 ENCOUNTER — APPOINTMENT (OUTPATIENT)
Dept: CARDIOLOGY | Facility: MEDICAL CENTER | Age: 64
DRG: 811 | End: 2024-01-22
Attending: HOSPITALIST
Payer: MEDICARE

## 2024-01-22 ENCOUNTER — APPOINTMENT (OUTPATIENT)
Dept: RADIOLOGY | Facility: MEDICAL CENTER | Age: 64
DRG: 811 | End: 2024-01-22
Attending: EMERGENCY MEDICINE
Payer: MEDICARE

## 2024-01-22 ENCOUNTER — HOSPITAL ENCOUNTER (INPATIENT)
Facility: MEDICAL CENTER | Age: 64
LOS: 5 days | DRG: 811 | End: 2024-01-27
Attending: EMERGENCY MEDICINE | Admitting: HOSPITALIST
Payer: MEDICARE

## 2024-01-22 DIAGNOSIS — K74.60 CIRRHOSIS OF LIVER WITH ASCITES, UNSPECIFIED HEPATIC CIRRHOSIS TYPE (HCC): ICD-10-CM

## 2024-01-22 DIAGNOSIS — F33.9 RECURRENT MAJOR DEPRESSIVE DISORDER, REMISSION STATUS UNSPECIFIED (HCC): ICD-10-CM

## 2024-01-22 DIAGNOSIS — M47.816 LUMBAR SPONDYLOSIS: ICD-10-CM

## 2024-01-22 DIAGNOSIS — E87.1 HYPONATREMIA: ICD-10-CM

## 2024-01-22 DIAGNOSIS — R79.89 TROPONIN I ABOVE REFERENCE RANGE: ICD-10-CM

## 2024-01-22 DIAGNOSIS — E87.6 HYPOKALEMIA: ICD-10-CM

## 2024-01-22 DIAGNOSIS — E66.01 CLASS 2 SEVERE OBESITY DUE TO EXCESS CALORIES WITH SERIOUS COMORBIDITY IN ADULT, UNSPECIFIED BMI (HCC): ICD-10-CM

## 2024-01-22 DIAGNOSIS — E78.5 DYSLIPIDEMIA: ICD-10-CM

## 2024-01-22 DIAGNOSIS — F10.11 HISTORY OF ALCOHOL ABUSE: ICD-10-CM

## 2024-01-22 DIAGNOSIS — I10 HTN (HYPERTENSION), BENIGN: ICD-10-CM

## 2024-01-22 DIAGNOSIS — I50.41 ACUTE COMBINED SYSTOLIC AND DIASTOLIC HEART FAILURE (HCC): ICD-10-CM

## 2024-01-22 DIAGNOSIS — M54.16 LUMBAR RADICULOPATHY: ICD-10-CM

## 2024-01-22 DIAGNOSIS — M54.17 LUMBOSACRAL RADICULOPATHY: Primary | ICD-10-CM

## 2024-01-22 DIAGNOSIS — R79.89 ELEVATED BRAIN NATRIURETIC PEPTIDE (BNP) LEVEL: ICD-10-CM

## 2024-01-22 DIAGNOSIS — D50.9 MICROCYTIC ANEMIA: ICD-10-CM

## 2024-01-22 DIAGNOSIS — N18.32 STAGE 3B CHRONIC KIDNEY DISEASE: ICD-10-CM

## 2024-01-22 DIAGNOSIS — R74.8 ALKALINE PHOSPHATASE ELEVATION: ICD-10-CM

## 2024-01-22 DIAGNOSIS — K21.9 GASTROESOPHAGEAL REFLUX DISEASE WITHOUT ESOPHAGITIS: ICD-10-CM

## 2024-01-22 DIAGNOSIS — R73.03 PREDIABETES: ICD-10-CM

## 2024-01-22 DIAGNOSIS — R74.01 TRANSAMINITIS: ICD-10-CM

## 2024-01-22 DIAGNOSIS — I50.9 NEW ONSET OF CONGESTIVE HEART FAILURE (HCC): ICD-10-CM

## 2024-01-22 DIAGNOSIS — F43.0 ACUTE STRESS REACTION: ICD-10-CM

## 2024-01-22 DIAGNOSIS — D64.9 ANEMIA, UNSPECIFIED TYPE: ICD-10-CM

## 2024-01-22 DIAGNOSIS — R18.8 CIRRHOSIS OF LIVER WITH ASCITES, UNSPECIFIED HEPATIC CIRRHOSIS TYPE (HCC): ICD-10-CM

## 2024-01-22 DIAGNOSIS — R01.1 HEART MURMUR: ICD-10-CM

## 2024-01-22 DIAGNOSIS — M67.441 GANGLION OF RIGHT HAND: ICD-10-CM

## 2024-01-22 PROBLEM — I50.43 ACUTE ON CHRONIC COMBINED SYSTOLIC AND DIASTOLIC HEART FAILURE (HCC): Status: ACTIVE | Noted: 2024-01-22

## 2024-01-22 LAB
ABO + RH BLD: NORMAL
ABO GROUP BLD: NORMAL
ALBUMIN SERPL BCP-MCNC: 3.1 G/DL (ref 3.2–4.9)
ALBUMIN/GLOB SERPL: 0.8 G/DL
ALP SERPL-CCNC: 119 U/L (ref 30–99)
ALT SERPL-CCNC: 7 U/L (ref 2–50)
ANION GAP SERPL CALC-SCNC: 17 MMOL/L (ref 7–16)
ANISOCYTOSIS BLD QL SMEAR: ABNORMAL
APTT PPP: 34 SEC (ref 24.7–36)
AST SERPL-CCNC: 19 U/L (ref 12–45)
BARCODED ABORH UBTYP: 5100
BARCODED PRD CODE UBPRD: NORMAL
BARCODED UNIT NUM UBUNT: NORMAL
BASOPHILS # BLD AUTO: 0.4 % (ref 0–1.8)
BASOPHILS # BLD: 0.03 K/UL (ref 0–0.12)
BILIRUB SERPL-MCNC: 1.2 MG/DL (ref 0.1–1.5)
BLD GP AB SCN SERPL QL: NORMAL
BUN SERPL-MCNC: 29 MG/DL (ref 8–22)
CALCIUM ALBUM COR SERPL-MCNC: 9.8 MG/DL (ref 8.5–10.5)
CALCIUM SERPL-MCNC: 9.1 MG/DL (ref 8.4–10.2)
CHLORIDE SERPL-SCNC: 95 MMOL/L (ref 96–112)
CO2 SERPL-SCNC: 22 MMOL/L (ref 20–33)
COMPONENT R 8504R: NORMAL
CREAT SERPL-MCNC: 1.31 MG/DL (ref 0.5–1.4)
EOSINOPHIL # BLD AUTO: 0.03 K/UL (ref 0–0.51)
EOSINOPHIL NFR BLD: 0.4 % (ref 0–6.9)
ERYTHROCYTE [DISTWIDTH] IN BLOOD BY AUTOMATED COUNT: 44.2 FL (ref 35.9–50)
FERRITIN SERPL-MCNC: 7.7 NG/ML (ref 10–291)
GFR SERPLBLD CREATININE-BSD FMLA CKD-EPI: 45 ML/MIN/1.73 M 2
GLOBULIN SER CALC-MCNC: 3.8 G/DL (ref 1.9–3.5)
GLUCOSE SERPL-MCNC: 121 MG/DL (ref 65–99)
HCT VFR BLD AUTO: 14.6 % (ref 37–47)
HGB BLD-MCNC: 3.9 G/DL (ref 12–16)
HGB BLD-MCNC: 5.2 G/DL (ref 12–16)
HYPOCHROMIA BLD QL SMEAR: ABNORMAL
IMM GRANULOCYTES # BLD AUTO: 0.03 K/UL (ref 0–0.11)
IMM GRANULOCYTES NFR BLD AUTO: 0.4 % (ref 0–0.9)
INR PPP: 1.43 (ref 0.87–1.13)
IRON SATN MFR SERPL: 5 % (ref 15–55)
IRON SERPL-MCNC: 15 UG/DL (ref 40–170)
LIPASE SERPL-CCNC: 33 U/L (ref 11–82)
LV EJECT FRACT  99904: 40
LYMPHOCYTES # BLD AUTO: 1.59 K/UL (ref 1–4.8)
LYMPHOCYTES NFR BLD: 20.2 % (ref 22–41)
MAGNESIUM SERPL-MCNC: 1.8 MG/DL (ref 1.5–2.5)
MCH RBC QN AUTO: 16 PG (ref 27–33)
MCHC RBC AUTO-ENTMCNC: 26.7 G/DL (ref 32.2–35.5)
MCV RBC AUTO: 60.1 FL (ref 81.4–97.8)
MONOCYTES # BLD AUTO: 0.42 K/UL (ref 0–0.85)
MONOCYTES NFR BLD AUTO: 5.3 % (ref 0–13.4)
NEUTROPHILS # BLD AUTO: 5.76 K/UL (ref 1.82–7.42)
NEUTROPHILS NFR BLD: 73.3 % (ref 44–72)
NRBC # BLD AUTO: 0 K/UL
NRBC BLD-RTO: 0 /100 WBC (ref 0–0.2)
NT-PROBNP SERPL IA-MCNC: 5713 PG/ML (ref 0–125)
PHOSPHATE SERPL-MCNC: 3 MG/DL (ref 2.5–4.5)
PLATELET # BLD AUTO: 264 K/UL (ref 164–446)
PLATELET BLD QL SMEAR: NORMAL
PMV BLD AUTO: 9.7 FL (ref 9–12.9)
POLYCHROMASIA BLD QL SMEAR: NORMAL
POTASSIUM SERPL-SCNC: 3 MMOL/L (ref 3.6–5.5)
PRODUCT TYPE UPROD: NORMAL
PROT SERPL-MCNC: 6.9 G/DL (ref 6–8.2)
PROTHROMBIN TIME: 18.2 SEC (ref 12–14.6)
RBC # BLD AUTO: 2.43 M/UL (ref 4.2–5.4)
RBC BLD AUTO: PRESENT
RH BLD: NORMAL
SMUDGE CELLS BLD QL SMEAR: NORMAL
SODIUM SERPL-SCNC: 134 MMOL/L (ref 135–145)
TIBC SERPL-MCNC: 311 UG/DL (ref 250–450)
TROPONIN T SERPL-MCNC: 40 NG/L (ref 6–19)
UIBC SERPL-MCNC: 296 UG/DL (ref 110–370)
UNIT STATUS USTAT: NORMAL
VARIANT LYMPHS BLD QL SMEAR: NORMAL
WBC # BLD AUTO: 7.9 K/UL (ref 4.8–10.8)

## 2024-01-22 PROCEDURE — 30233N1 TRANSFUSION OF NONAUTOLOGOUS RED BLOOD CELLS INTO PERIPHERAL VEIN, PERCUTANEOUS APPROACH: ICD-10-PCS | Performed by: EMERGENCY MEDICINE

## 2024-01-22 PROCEDURE — 36415 COLL VENOUS BLD VENIPUNCTURE: CPT

## 2024-01-22 PROCEDURE — 80053 COMPREHEN METABOLIC PANEL: CPT

## 2024-01-22 PROCEDURE — 700117 HCHG RX CONTRAST REV CODE 255: Performed by: EMERGENCY MEDICINE

## 2024-01-22 PROCEDURE — 85025 COMPLETE CBC W/AUTO DIFF WBC: CPT

## 2024-01-22 PROCEDURE — 93005 ELECTROCARDIOGRAM TRACING: CPT | Performed by: HOSPITALIST

## 2024-01-22 PROCEDURE — 84484 ASSAY OF TROPONIN QUANT: CPT

## 2024-01-22 PROCEDURE — 700111 HCHG RX REV CODE 636 W/ 250 OVERRIDE (IP): Mod: JZ | Performed by: HOSPITALIST

## 2024-01-22 PROCEDURE — 86923 COMPATIBILITY TEST ELECTRIC: CPT

## 2024-01-22 PROCEDURE — 85018 HEMOGLOBIN: CPT

## 2024-01-22 PROCEDURE — 83735 ASSAY OF MAGNESIUM: CPT

## 2024-01-22 PROCEDURE — 85610 PROTHROMBIN TIME: CPT

## 2024-01-22 PROCEDURE — 770020 HCHG ROOM/CARE - TELE (206)

## 2024-01-22 PROCEDURE — 700105 HCHG RX REV CODE 258: Performed by: HOSPITALIST

## 2024-01-22 PROCEDURE — 86900 BLOOD TYPING SEROLOGIC ABO: CPT

## 2024-01-22 PROCEDURE — 96366 THER/PROPH/DIAG IV INF ADDON: CPT

## 2024-01-22 PROCEDURE — 85730 THROMBOPLASTIN TIME PARTIAL: CPT

## 2024-01-22 PROCEDURE — 86850 RBC ANTIBODY SCREEN: CPT

## 2024-01-22 PROCEDURE — 71045 X-RAY EXAM CHEST 1 VIEW: CPT

## 2024-01-22 PROCEDURE — 74177 CT ABD & PELVIS W/CONTRAST: CPT

## 2024-01-22 PROCEDURE — 99223 1ST HOSP IP/OBS HIGH 75: CPT | Performed by: HOSPITALIST

## 2024-01-22 PROCEDURE — 96365 THER/PROPH/DIAG IV INF INIT: CPT

## 2024-01-22 PROCEDURE — 36430 TRANSFUSION BLD/BLD COMPNT: CPT

## 2024-01-22 PROCEDURE — 93306 TTE W/DOPPLER COMPLETE: CPT

## 2024-01-22 PROCEDURE — 96375 TX/PRO/DX INJ NEW DRUG ADDON: CPT

## 2024-01-22 PROCEDURE — 96376 TX/PRO/DX INJ SAME DRUG ADON: CPT

## 2024-01-22 PROCEDURE — 82728 ASSAY OF FERRITIN: CPT

## 2024-01-22 PROCEDURE — 83880 ASSAY OF NATRIURETIC PEPTIDE: CPT

## 2024-01-22 PROCEDURE — 84100 ASSAY OF PHOSPHORUS: CPT

## 2024-01-22 PROCEDURE — 86901 BLOOD TYPING SEROLOGIC RH(D): CPT

## 2024-01-22 PROCEDURE — 94760 N-INVAS EAR/PLS OXIMETRY 1: CPT

## 2024-01-22 PROCEDURE — P9016 RBC LEUKOCYTES REDUCED: HCPCS

## 2024-01-22 PROCEDURE — 83540 ASSAY OF IRON: CPT

## 2024-01-22 PROCEDURE — 83550 IRON BINDING TEST: CPT

## 2024-01-22 PROCEDURE — 99285 EMERGENCY DEPT VISIT HI MDM: CPT

## 2024-01-22 PROCEDURE — C9113 INJ PANTOPRAZOLE SODIUM, VIA: HCPCS | Performed by: HOSPITALIST

## 2024-01-22 PROCEDURE — 93306 TTE W/DOPPLER COMPLETE: CPT | Mod: 26 | Performed by: INTERNAL MEDICINE

## 2024-01-22 PROCEDURE — 700111 HCHG RX REV CODE 636 W/ 250 OVERRIDE (IP): Performed by: EMERGENCY MEDICINE

## 2024-01-22 PROCEDURE — 83690 ASSAY OF LIPASE: CPT

## 2024-01-22 RX ORDER — FUROSEMIDE 10 MG/ML
40 INJECTION INTRAMUSCULAR; INTRAVENOUS ONCE
Status: COMPLETED | OUTPATIENT
Start: 2024-01-22 | End: 2024-01-22

## 2024-01-22 RX ORDER — POLYETHYLENE GLYCOL 3350 17 G/17G
1 POWDER, FOR SOLUTION ORAL
Status: DISCONTINUED | OUTPATIENT
Start: 2024-01-22 | End: 2024-01-27 | Stop reason: HOSPADM

## 2024-01-22 RX ORDER — ACETAMINOPHEN 500 MG
500-1000 TABLET ORAL EVERY 6 HOURS PRN
COMMUNITY

## 2024-01-22 RX ORDER — FUROSEMIDE 10 MG/ML
40 INJECTION INTRAMUSCULAR; INTRAVENOUS
Status: DISCONTINUED | OUTPATIENT
Start: 2024-01-22 | End: 2024-01-27 | Stop reason: HOSPADM

## 2024-01-22 RX ORDER — PANTOPRAZOLE SODIUM 40 MG/10ML
40 INJECTION, POWDER, LYOPHILIZED, FOR SOLUTION INTRAVENOUS 2 TIMES DAILY
Status: DISCONTINUED | OUTPATIENT
Start: 2024-01-22 | End: 2024-01-25

## 2024-01-22 RX ORDER — AMOXICILLIN 250 MG
2 CAPSULE ORAL 2 TIMES DAILY
Status: DISCONTINUED | OUTPATIENT
Start: 2024-01-22 | End: 2024-01-27 | Stop reason: HOSPADM

## 2024-01-22 RX ORDER — SPIRONOLACTONE 25 MG/1
25 TABLET ORAL
Status: DISCONTINUED | OUTPATIENT
Start: 2024-01-22 | End: 2024-01-26

## 2024-01-22 RX ORDER — ONDANSETRON 4 MG/1
4 TABLET, ORALLY DISINTEGRATING ORAL EVERY 4 HOURS PRN
Status: DISCONTINUED | OUTPATIENT
Start: 2024-01-22 | End: 2024-01-27 | Stop reason: HOSPADM

## 2024-01-22 RX ORDER — ONDANSETRON 2 MG/ML
4 INJECTION INTRAMUSCULAR; INTRAVENOUS EVERY 4 HOURS PRN
Status: DISCONTINUED | OUTPATIENT
Start: 2024-01-22 | End: 2024-01-27 | Stop reason: HOSPADM

## 2024-01-22 RX ORDER — ACETAMINOPHEN 325 MG/1
650 TABLET ORAL EVERY 6 HOURS PRN
Status: DISCONTINUED | OUTPATIENT
Start: 2024-01-22 | End: 2024-01-27 | Stop reason: HOSPADM

## 2024-01-22 RX ORDER — ECHINACEA PURPUREA EXTRACT 125 MG
1 TABLET ORAL DAILY
COMMUNITY
End: 2024-02-23

## 2024-01-22 RX ORDER — POTASSIUM CHLORIDE 7.45 MG/ML
10 INJECTION INTRAVENOUS
Status: COMPLETED | OUTPATIENT
Start: 2024-01-22 | End: 2024-01-22

## 2024-01-22 RX ORDER — BISACODYL 10 MG
10 SUPPOSITORY, RECTAL RECTAL
Status: DISCONTINUED | OUTPATIENT
Start: 2024-01-22 | End: 2024-01-27 | Stop reason: HOSPADM

## 2024-01-22 RX ORDER — CITALOPRAM 20 MG/1
10 TABLET ORAL DAILY
Status: DISCONTINUED | OUTPATIENT
Start: 2024-01-22 | End: 2024-01-22

## 2024-01-22 RX ADMIN — POTASSIUM CHLORIDE 10 MEQ: 7.46 INJECTION, SOLUTION INTRAVENOUS at 13:56

## 2024-01-22 RX ADMIN — SODIUM CHLORIDE 250 MG: 9 INJECTION, SOLUTION INTRAVENOUS at 21:44

## 2024-01-22 RX ADMIN — PANTOPRAZOLE SODIUM 40 MG: 40 INJECTION, POWDER, FOR SOLUTION INTRAVENOUS at 17:44

## 2024-01-22 RX ADMIN — POTASSIUM CHLORIDE 10 MEQ: 7.46 INJECTION, SOLUTION INTRAVENOUS at 16:23

## 2024-01-22 RX ADMIN — POTASSIUM CHLORIDE 10 MEQ: 7.46 INJECTION, SOLUTION INTRAVENOUS at 17:20

## 2024-01-22 RX ADMIN — FUROSEMIDE 40 MG: 10 INJECTION, SOLUTION INTRAMUSCULAR; INTRAVENOUS at 13:55

## 2024-01-22 RX ADMIN — IOHEXOL 100 ML: 350 INJECTION, SOLUTION INTRAVENOUS at 13:15

## 2024-01-22 RX ADMIN — POTASSIUM CHLORIDE 10 MEQ: 7.46 INJECTION, SOLUTION INTRAVENOUS at 15:02

## 2024-01-22 ASSESSMENT — ENCOUNTER SYMPTOMS
BRUISES/BLEEDS EASILY: 0
HEMOPTYSIS: 0
DIZZINESS: 1
NERVOUS/ANXIOUS: 0
VOMITING: 0
COUGH: 0
SEIZURES: 0
HALLUCINATIONS: 0
ABDOMINAL PAIN: 0
CHILLS: 0
EYE PAIN: 0
ORTHOPNEA: 1
SORE THROAT: 0
SPUTUM PRODUCTION: 0
SPEECH CHANGE: 0
FEVER: 0
EYE REDNESS: 0
FOCAL WEAKNESS: 0
BLOOD IN STOOL: 0
MYALGIAS: 0
EYE DISCHARGE: 0
DIARRHEA: 0
STRIDOR: 0
FLANK PAIN: 0
LOSS OF CONSCIOUSNESS: 0
PND: 1
SHORTNESS OF BREATH: 1
PALPITATIONS: 0

## 2024-01-22 ASSESSMENT — COGNITIVE AND FUNCTIONAL STATUS - GENERAL
DAILY ACTIVITIY SCORE: 24
CLIMB 3 TO 5 STEPS WITH RAILING: A LITTLE
SUGGESTED CMS G CODE MODIFIER DAILY ACTIVITY: CH
MOBILITY SCORE: 22
MOVING TO AND FROM BED TO CHAIR: A LITTLE
SUGGESTED CMS G CODE MODIFIER MOBILITY: CJ

## 2024-01-22 ASSESSMENT — LIFESTYLE VARIABLES
TOTAL SCORE: 0
HAVE PEOPLE ANNOYED YOU BY CRITICIZING YOUR DRINKING: NO
TOTAL SCORE: 0
HOW MANY TIMES IN THE PAST YEAR HAVE YOU HAD 5 OR MORE DRINKS IN A DAY: 0
EVER HAD A DRINK FIRST THING IN THE MORNING TO STEADY YOUR NERVES TO GET RID OF A HANGOVER: NO
EVER FELT BAD OR GUILTY ABOUT YOUR DRINKING: NO
CONSUMPTION TOTAL: NEGATIVE
HAVE YOU EVER FELT YOU SHOULD CUT DOWN ON YOUR DRINKING: NO
TOTAL SCORE: 0
ALCOHOL_USE: NO
ON A TYPICAL DAY WHEN YOU DRINK ALCOHOL HOW MANY DRINKS DO YOU HAVE: 0
AVERAGE NUMBER OF DAYS PER WEEK YOU HAVE A DRINK CONTAINING ALCOHOL: 0

## 2024-01-22 ASSESSMENT — PATIENT HEALTH QUESTIONNAIRE - PHQ9
2. FEELING DOWN, DEPRESSED, IRRITABLE, OR HOPELESS: NOT AT ALL
1. LITTLE INTEREST OR PLEASURE IN DOING THINGS: NOT AT ALL
SUM OF ALL RESPONSES TO PHQ9 QUESTIONS 1 AND 2: 0

## 2024-01-22 ASSESSMENT — PAIN DESCRIPTION - PAIN TYPE
TYPE: ACUTE PAIN
TYPE: ACUTE PAIN

## 2024-01-22 ASSESSMENT — FIBROSIS 4 INDEX: FIB4 SCORE: 1.77

## 2024-01-22 NOTE — ED NOTES
1st unit of PRBC infusing as ordered.    During the first 30 minutes of infusion no reactions report or noted, continue the blood infusion.    2nd IV potassium infusing and ice pack placed to the RUE due to burning pain from the IV potassium.  Decreased the rate of potassium to 80 ml/hr for comfort as well.

## 2024-01-22 NOTE — H&P
"Hospital Medicine History & Physical Note    Date of Service  1/22/2024    Primary Care Physician  ROBBY Gregory    Consultants  None     Code Status  Full Code    Chief Complaint  Chief Complaint   Patient presents with    Leg Swelling     BLE  Started one month ago    Abnormal Labs     States \"kidneys are really bad\"     History of Presenting Illness  Denisse Abel is a 63 y.o. female with a past medical history of alcohol dependence that stopped 3 months ago who presented 1/22/2024 with progressively worsening generalized weakness, shortness of breath and lower extremity swelling.  Patient reports unintentional weight loss of 30 pounds over the past 8 months that was followed by 20 pound water weight gain over the past 1 month.  She denies having any melena or blood in the stool.     I discussed the plan of care with emergency department physician, the patient and patient friend present at bedside in the emergency room    Review of Systems  Review of Systems   Constitutional:  Positive for malaise/fatigue. Negative for chills and fever.   HENT:  Negative for congestion and sore throat.    Eyes:  Negative for pain, discharge and redness.   Respiratory:  Positive for shortness of breath. Negative for cough, hemoptysis, sputum production and stridor.    Cardiovascular:  Positive for orthopnea, leg swelling and PND. Negative for chest pain and palpitations.   Gastrointestinal:  Negative for abdominal pain, blood in stool, diarrhea and vomiting.   Genitourinary:  Negative for flank pain, hematuria and urgency.   Musculoskeletal:  Negative for myalgias.   Skin:  Negative for rash.   Neurological:  Positive for dizziness. Negative for speech change, focal weakness, seizures and loss of consciousness.   Endo/Heme/Allergies:  Does not bruise/bleed easily.   Psychiatric/Behavioral:  Negative for hallucinations and suicidal ideas. The patient is not nervous/anxious.      Past Medical History   has a past " medical history of HLD (hyperlipidemia), HTN (hypertension), Hypertension, and Obesity.    Surgical History   has a past surgical history that includes pelviscopy (6/5/08); ovarian cystectomy (6/5/08); cystoscopy (6/5/08); hysterectomy laparoscopy (2003); trigger finger release (Right, 3/31/2017); ganglion excision (Right, 3/31/2017); and knee arthroscopy.     Family History  family history includes Diabetes in her father and mother; Heart Disease in her mother; Stroke in her mother.      Social History   reports that she has never smoked. She has never used smokeless tobacco. She reports that she does not currently use alcohol after a past usage of about 3.6 oz of alcohol per week. She reports that she does not currently use drugs after having used the following drugs: Marijuana.    Allergies  Allergies   Allergen Reactions    Pcn [Penicillins] Rash     Pt reports that it was a childhood allergie received body rash     Sulfa Drugs Rash     Pt reports that it was a childhood allergie received body rash     Sulfasalazine      Pt reports that she does not think she has an allergie to this medication      Medications  Prior to Admission Medications   Prescriptions Last Dose Informant Patient Reported? Taking?   citalopram (CELEXA) 10 MG tablet   No No   Sig: Take 1 Tablet by mouth every day.   fluticasone (FLONASE) 50 MCG/ACT nasal spray   No No   Sig: Spray 1 Spray in nose every day. Each Nostril   ibuprofen (MOTRIN) 200 MG Tab   Yes No   Sig: Take 200 mg by mouth every 6 hours as needed.   traMADol (ULTRAM) 50 MG Tab   Yes No   Sig: Take 50 mg by mouth every 6 hours as needed for Moderate Pain or Severe Pain.      Facility-Administered Medications: None     Physical Exam  Temp:  [36.3 °C (97.3 °F)-36.8 °C (98.3 °F)] 36.3 °C (97.3 °F)  Pulse:  [81-85] 81  Resp:  [14-23] 16  BP: (120-136)/(49-76) 124/76  SpO2:  [96 %-100 %] 100 %  Blood Pressure : 136/73   Temperature: 36.3 °C (97.4 °F)   Pulse: 82   Respiration: 17    Pulse Oximetry: 99 %     Physical Exam  Vitals and nursing note reviewed.   Constitutional:       General: She is not in acute distress.     Appearance: Normal appearance.   HENT:      Head: Normocephalic and atraumatic.      Right Ear: External ear normal.      Left Ear: External ear normal.      Nose: Nose normal.      Mouth/Throat:      Mouth: Mucous membranes are moist.   Eyes:      General:         Right eye: No discharge.         Left eye: No discharge.      Extraocular Movements: Extraocular movements intact.      Pupils: Pupils are equal, round, and reactive to light.   Cardiovascular:      Rate and Rhythm: Regular rhythm. Tachycardia present.      Heart sounds:      No friction rub. No gallop.   Pulmonary:      Effort: Pulmonary effort is normal.      Breath sounds: No stridor. Decreased breath sounds present. No wheezing.      Comments: Bilateral basal crepitations   Abdominal:      General: Abdomen is flat. There is no distension.      Tenderness: There is no abdominal tenderness.   Musculoskeletal:         General: Swelling present. No deformity. Normal range of motion.      Cervical back: Normal range of motion and neck supple.      Right lower leg: Edema present.      Left lower leg: Edema present.   Skin:     General: Skin is warm and dry.      Capillary Refill: Capillary refill takes 2 to 3 seconds.      Coloration: Skin is jaundiced and pale.   Neurological:      General: No focal deficit present.      Mental Status: She is alert and oriented to person, place, and time.   Psychiatric:         Mood and Affect: Mood normal.         Behavior: Behavior normal.       Laboratory:  Recent Labs     01/22/24  1137   WBC 7.9   RBC 2.43*   HEMOGLOBIN 3.9*   HEMATOCRIT 14.6*   MCV 60.1*   MCH 16.0*   MCHC 26.7*   RDW 44.2   PLATELETCT 264   MPV 9.7     Recent Labs     01/22/24  1137   SODIUM 134*   POTASSIUM 3.0*   CHLORIDE 95*   CO2 22   GLUCOSE 121*   BUN 29*   CREATININE 1.31   CALCIUM 9.1     Recent Labs      01/22/24  1137   ALTSGPT 7   ASTSGOT 19   ALKPHOSPHAT 119*   TBILIRUBIN 1.2   LIPASE 33   GLUCOSE 121*     Recent Labs     01/22/24  1137   APTT 34.0   INR 1.43*     Recent Labs     01/22/24  1137   NTPROBNP 5713*         Recent Labs     01/22/24  1137   TROPONINT 40*     Imaging:  EC-ECHOCARDIOGRAM COMPLETE W/O CONT   Final Result      CT-ABDOMEN-PELVIS WITH   Final Result      1.  Cirrhotic appearance of the liver.      2.  Splenomegaly with dilated portal vein, recanalization of the umbilical vein and mesenteric varices consistent with portal hypertension.      3.  Moderate to large volume of ascites.      4.  Gallstones.      5.  Atherosclerotic vascular calcification. There are small bilateral pleural effusions with bibasilar atelectasis.      6.  Mild diffuse bowel wall thickening likely related to presence of portal hypertension.      7.  Anasarca.      DX-CHEST-PORTABLE (1 VIEW)   Final Result      1.  Cardiomegaly.      2.  Linear atelectasis within the left lung base.      3.  Minimal bilateral pleural effusion.        Assessment/Plan:  Justification for Admission Status  I anticipate this patient will require at least two midnights for appropriate medical management, necessitating inpatient admission because the patient has acute heart failure secondary to severe anemia.  Will require blood transfusion, intravenous diuretics.    Patient will need a  bed on medical service.  The patient has acute heart failure.    * Acute combined systolic and diastolic heart failure (HCC)- (present on admission)  Assessment & Plan  Likely secondary to untreated chronic anemia  I will start Intravenous diuretics.  Log daily strict input and output  Log daily standing weights.  I will order a follow up BMP to watch renal function, & electrolytes.  Replace electrolytes as needed.  I will check an echocardiography.    Severe anemia requiring blood transfusions anemia- (present on admission)  Assessment & Plan  Presenting  with a very low hemoglobin of 3.9  Likely slow GI bleeding.  I will check occult blood in stool.  Insert to wide pore peripheral IV accesses    I will order intravenous fluids   I will order H&H to be checked every 8 hours  Transfuse for Hb <7   We will check iron studies [ferritin, iron saturation], consider parenteral iron replacement according to results.    I will check CT scan of the abdomen.  Will likely need GI consult to rule out varices.    Liver cirrhosis (HCC)- (present on admission)  Assessment & Plan  Likely related to alcohol.  I will start Lasix and spironolactone.  I will check a CT scan of the abdomen.  Will likely need GI consultation for upper endoscopy to rule out varices.    Elevated brain natriuretic peptide (BNP) level- (present on admission)  Assessment & Plan  Likely secondary to heart failure.  I will check an echocardiography.    Troponin I above reference range- (present on admission)  Assessment & Plan  In the indeterminate range  Denies chest pain.  Troponin elevation is likely secondary to demand ischemia from severe anemia.  I will check an EKG  I will place on continuous cardiac monitoring    I will check an echocardiography   I will trend troponin levels  Stat EKG, troponin for chest pain or worsening shortness of breath     Hypokalemia- (present on admission)  Assessment & Plan  Replacing, checking magnesium    Continue to monitor replace as needed     Hyponatremia- (present on admission)  Assessment & Plan  Hypervolemic hyponatremia  I expect Na to improve with diuretics.     Stage 3b chronic kidney disease (HCC)- (present on admission)  Assessment & Plan  Avoid/minimize nephrotoxins as much as possible, renally dose medications, monitor inputs and outputs       VTE prophylaxis: SCDs/TEDs

## 2024-01-22 NOTE — ED PROVIDER NOTES
"ED Provider Note    CHIEF COMPLAINT  Chief Complaint   Patient presents with    Leg Swelling     BLE  Started one month ago    Abnormal Labs     States \"kidneys are really bad\"       EXTERNAL RECORDS REVIEWED  Outpatient labs & studies BMPs 2021.07, 1/3/2024 2.03, 1/17/2024 2.22    HPI/ROS  LIMITATION TO HISTORY   Select: : None  OUTSIDE HISTORIAN(S):  Friend      Denisse Abel is a 63 y.o. female who presents to the emergency department through triage with her friend for abdominal pain and distention, lower extremity swelling, shortness of breath.  Patient states that her \"kidneys are bad\" and this has been monitored over the last couple of months.  History of alcohol use but quit drinking in November with notable kidney dysfunction.  Also describes a 60 pound unintentional weight loss over the last year, but over the past few weeks has gained 20 of those pounds back in what appears to be swelling or fluid.    Denies any hematemesis.  Denies black or bloody stools.  She does have some tendency for constipation but has had diarrhea lately.  Denies fever or chills.    No chest pain but shortness of breath, exertional dyspnea.  No orthopnea.  Extreme fatigue.    Patient states she has an appointment for new evaluation with nephrology on Wednesday.    PAST MEDICAL HISTORY   has a past medical history of HLD (hyperlipidemia), HTN (hypertension), Hypertension, and Obesity.    SURGICAL HISTORY   has a past surgical history that includes pelviscopy (6/5/08); ovarian cystectomy (6/5/08); cystoscopy (6/5/08); hysterectomy laparoscopy (2003); trigger finger release (Right, 3/31/2017); ganglion excision (Right, 3/31/2017); and knee arthroscopy.    FAMILY HISTORY  Family History   Problem Relation Age of Onset    Diabetes Mother     Heart Disease Mother     Stroke Mother     Diabetes Father        SOCIAL HISTORY  Social History     Tobacco Use    Smoking status: Never    Smokeless tobacco: Never   Vaping Use    Vaping " "Use: Never used   Substance and Sexual Activity    Alcohol use: Not Currently     Alcohol/week: 3.6 oz     Types: 6 Glasses of wine per week     Comment: denies    Drug use: Not Currently     Types: Marijuana     Comment: denies    Sexual activity: Not Currently       CURRENT MEDICATIONS  Home Medications    **Home medications have not yet been reviewed for this encounter**         ALLERGIES  Allergies   Allergen Reactions    Pcn [Penicillins]     Sulfa Drugs     Sulfasalazine        PHYSICAL EXAM  VITAL SIGNS: /73   Pulse 82   Temp 36.3 °C (97.4 °F) (Temporal)   Resp 17   Ht 1.702 m (5' 7\")   Wt 67.9 kg (149 lb 11.1 oz)   SpO2 99%   BMI 23.45 kg/m²    Pulse ox interpretation: I interpret this pulse ox as normal.  Constitutional: Alert in no apparent distress.  Ill-appearing  HENT: Normocephalic, atraumatic. Bilateral external ears normal, Nose normal.  Dry mucous membranes.    Eyes: Pupils are equal and reactive, Conjunctiva normal.   Neck: Normal range of motion, Supple  Lymphatic: No lymphadenopathy noted.   Cardiovascular: Regular rate and rhythm, pansystolic murmurs. Distal pulses intact.  3+ pitting peripheral edema.  Thorax & Lungs: Diminished bilaterally, poor inspiratory effort without wheezes, rales or rhonchi.  No increased work of breathing or retractions.  Abdomen: Softly distended, ascitic.  Generally tender to palpation without guarding or peritonitis.  Rectal: Normal HERBER, brown stool, trace guaiac positive  Skin: Very pale.  Dry.  Musculoskeletal: Good range of motion in all major joints.   Neurologic: Alert and orient x 4.  Speech clear and cohesive  Psychiatric: Affect normal, Judgment normal, Mood normal.       DIAGNOSTIC STUDIES / PROCEDURES    LABS  Results for orders placed or performed during the hospital encounter of 01/22/24   CBC w/ Differential   Result Value Ref Range    WBC 7.9 4.8 - 10.8 K/uL    RBC 2.43 (L) 4.20 - 5.40 M/uL    Hemoglobin 3.9 (LL) 12.0 - 16.0 g/dL    " Hematocrit 14.6 (LL) 37.0 - 47.0 %    MCV 60.1 (L) 81.4 - 97.8 fL    MCH 16.0 (L) 27.0 - 33.0 pg    MCHC 26.7 (L) 32.2 - 35.5 g/dL    RDW 44.2 35.9 - 50.0 fL    Platelet Count 264 164 - 446 K/uL    MPV 9.7 9.0 - 12.9 fL    Neutrophils-Polys 73.30 (H) 44.00 - 72.00 %    Lymphocytes 20.20 (L) 22.00 - 41.00 %    Monocytes 5.30 0.00 - 13.40 %    Eosinophils 0.40 0.00 - 6.90 %    Basophils 0.40 0.00 - 1.80 %    Immature Granulocytes 0.40 0.00 - 0.90 %    Nucleated RBC 0.00 0.00 - 0.20 /100 WBC    Neutrophils (Absolute) 5.76 1.82 - 7.42 K/uL    Lymphs (Absolute) 1.59 1.00 - 4.80 K/uL    Monos (Absolute) 0.42 0.00 - 0.85 K/uL    Eos (Absolute) 0.03 0.00 - 0.51 K/uL    Baso (Absolute) 0.03 0.00 - 0.12 K/uL    Immature Granulocytes (abs) 0.03 0.00 - 0.11 K/uL    NRBC (Absolute) 0.00 K/uL    Hypochromia 3+ (A)     Anisocytosis 2+ (A)    Complete Metabolic Panel (CMP)   Result Value Ref Range    Sodium 134 (L) 135 - 145 mmol/L    Potassium 3.0 (L) 3.6 - 5.5 mmol/L    Chloride 95 (L) 96 - 112 mmol/L    Co2 22 20 - 33 mmol/L    Anion Gap 17.0 (H) 7.0 - 16.0    Glucose 121 (H) 65 - 99 mg/dL    Bun 29 (H) 8 - 22 mg/dL    Creatinine 1.31 0.50 - 1.40 mg/dL    Calcium 9.1 8.4 - 10.2 mg/dL    Correct Calcium 9.8 8.5 - 10.5 mg/dL    AST(SGOT) 19 12 - 45 U/L    ALT(SGPT) 7 2 - 50 U/L    Alkaline Phosphatase 119 (H) 30 - 99 U/L    Total Bilirubin 1.2 0.1 - 1.5 mg/dL    Albumin 3.1 (L) 3.2 - 4.9 g/dL    Total Protein 6.9 6.0 - 8.2 g/dL    Globulin 3.8 (H) 1.9 - 3.5 g/dL    A-G Ratio 0.8 g/dL   proBrain Natriuretic Peptide, NT   Result Value Ref Range    NT-proBNP 5713 (H) 0 - 125 pg/mL   Troponin - STAT Once   Result Value Ref Range    Troponin T 40 (H) 6 - 19 ng/L   Lipase   Result Value Ref Range    Lipase 33 11 - 82 U/L   Magnesium   Result Value Ref Range    Magnesium 1.8 1.5 - 2.5 mg/dL   Phosphorus   Result Value Ref Range    Phosphorus 3.0 2.5 - 4.5 mg/dL   APTT   Result Value Ref Range    APTT 34.0 24.7 - 36.0 sec   PT/INR    Result Value Ref Range    PT 18.2 (H) 12.0 - 14.6 sec    INR 1.43 (H) 0.87 - 1.13   ESTIMATED GFR   Result Value Ref Range    GFR (CKD-EPI) 45 (A) >60 mL/min/1.73 m 2   MORPHOLOGY   Result Value Ref Range    RBC Morphology Present     Polychromia 1+     Smudge Cells Few     Reactive Lymphocytes Moderate    PLATELET ESTIMATE   Result Value Ref Range    Plt Estimation Normal    COD (ADULT)   Result Value Ref Range    ABO Grouping Only O     Rh Grouping Only POS     Antibody Screen-Cod NEG     Component R       R99                 Red Cells, LR       [id number]   selected     01/22/24   13:18      Product Type R99     Dispense Status selected     Unit Number (Barcoded) [id number]     Product Code (Barcoded) W5256H45     Blood Type (Barcoded) 5100     Component R       R99                 Red Cells, LR       [id number]   issued       01/22/24   14:29      Product Type R99     Dispense Status issued     Unit Number (Barcoded) [id number]     Product Code (Barcoded) G3195U18     Blood Type (Barcoded) 5100    ABO Rh Confirm   Result Value Ref Range    ABO Rh Confirm O POS      RADIOLOGY  I have independently interpreted the diagnostic imaging associated with this visit and am waiting the final reading from the radiologist.   My preliminary interpretation is as follows:   Chest x-ray: Pulmonary vascular congestion, edema    Radiologist interpretation:   CT-ABDOMEN-PELVIS WITH   Final Result      1.  Cirrhotic appearance of the liver.      2.  Splenomegaly with dilated portal vein, recanalization of the umbilical vein and mesenteric varices consistent with portal hypertension.      3.  Moderate to large volume of ascites.      4.  Gallstones.      5.  Atherosclerotic vascular calcification. There are small bilateral pleural effusions with bibasilar atelectasis.      6.  Mild diffuse bowel wall thickening likely related to presence of portal hypertension.      7.  Anasarca.      DX-CHEST-PORTABLE (1 VIEW)   Final  Result      1.  Cardiomegaly.      2.  Linear atelectasis within the left lung base.      3.  Minimal bilateral pleural effusion.      EC-ECHOCARDIOGRAM COMPLETE W/O CONT    (Results Pending)         COURSE & MEDICAL DECISION MAKING    ED Observation Status? No; Patient does not meet criteria for ED Observation.     INITIAL ASSESSMENT, COURSE AND PLAN  Care Narrative:   Patient seen evaluated bedside.  Ill-appearing, pale, edematous with distended and ascitic abdomen.  No peritonitis.  Hemodynamically stable without fever, tachycardia.  Add labs, x-ray.    No leukocytosis, left shift or bandemia.  Significant microcytic anemia with MCV 6 0, hemoglobin 3.9, hematocrit 14.6.  No thrombocytopenia.  Clinically dehydrated.  Chemistry with sodium 134, potassium 3.0, chloride 95, glucose 121, BUN 29, creatinine 1.31.  Alk phos is only slightly elevated at 119, normal AST, ALT and total bilirubin.  Normal lipase.  Troponin is indeterminate at 40, BNP 5713, coagulopathic with INR at 1.43.  1 unit packed red blood cells added as well as COPD.    Chest x-ray with cardiomegaly, pleural effusion.    Add CT for history of weight loss, new heart failure, edema with normal liver function despite history of alcohol abuse.    Patient be hospitalized for further evaluation, echocardiogram, treatment and diuresis.  First dose Lasix in the emergency department as well.    ADDITIONAL PROBLEM LIST  Alcohol dependence -none since November 2023    DISPOSITION AND DISCUSSIONS  I have discussed management of the patient with the following physicians and BURT's:    12:40 PM Dr. Torres is aware of the patient and agreeable to hospitalization.    CRITICAL CARE  The very real possibilty of a deterioration of this patient's condition required the highest level of my preparedness for sudden, emergent intervention.  I provided critical care services, which included medication orders, frequent reevaluations of the patient's condition and response to  treatment, ordering and reviewing test results, and discussing the case with various consultants.  The critical care time associated with the care of the patient was 35 minutes. Review chart for interventions. This time is exclusive of any other billable procedures.       FINAL DIAGNOSIS  1. Microcytic anemia    2. Hypokalemia    3. New onset of congestive heart failure (HCC)    4. History of alcohol abuse           Electronically signed by: Norma Reyes D.O., 1/22/2024 12:26 PM

## 2024-01-22 NOTE — ED NOTES
Pt to RM 7A  family at [facility] placed by ED tech  bld drawn, sent to lab pt in NAD

## 2024-01-22 NOTE — ED NOTES
Initial encounter with patient.    Patient assessed and updated with his plan of care.  1st IV potassium infusing as ordered.

## 2024-01-22 NOTE — DISCHARGE PLANNING
ER CM met with pt  and friend Lyssa at bedside. She is AOX4. Very pleasant. She lives alone in a 1 story home. 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 She is having family come from [facility] added them to contact Joshua. PCP GEORGETTE Moreno.  [facility]  Care Transition Team Assessment    Information Source  Orientation Level: Oriented X4  Information Given By: Patient  Informant's Name: Denisse/Lyssa  Who is responsible for making decisions for patient? : Patient         Elopement Risk  Legal Hold: No  Ambulatory or Self Mobile in Wheelchair: No-Not an Elopement Risk    Interdisciplinary Discharge Planning  Primary Care Physician: GEORGETTE Moreno  Lives with - Patient's Self Care Capacity: Alone and Able to Care For Self  Support Systems: Friends / Neighbors, Family Member(s)  Housing / Facility: 1 Story House  Do You Take your Prescribed Medications Regularly: Yes  Mobility Issues: No  Prior Services: None  Durable Medical Equipment: Not Applicable    Discharge Preparedness  What is your plan after discharge?: Home with help  What are your discharge supports?: Other (comment)  Prior Functional Level: Ambulatory, Independent with Activities of Daily Living, Independent with Medication Management    Functional Assesment  Prior Functional Level: Ambulatory, Independent with Activities of Daily Living, Independent with Medication Management    Finances  Prescription Coverage: Yes                     Domestic Abuse  Have you ever been the victim of abuse or violence?: No              Anticipated Discharge Information  Discharge Disposition: Discharged to home/self care (01)

## 2024-01-22 NOTE — ED TRIAGE NOTES
"Chief Complaint   Patient presents with    Leg Swelling     BLE  Started one month ago    Abnormal Labs     States \"kidneys are really bad\"     /73   Pulse 82   Temp 36.3 °C (97.4 °F) (Temporal)   Resp 17   Ht 1.702 m (5' 7\")   Wt 67.9 kg (149 lb 11.1 oz)   SpO2 99%   BMI 23.45 kg/m²     Pt to ED via WC w/ visitor for c/o increasing BLE leg swelling and abnormal labs.  Pt states has felt progressively more ill over the last couple of weeks, has a pending Nephrologist appointment.  Pt appears looking yellow, weak, abd swelling and BLE swelling.  Pt states is not able to stand r/t increasing weakness.    "

## 2024-01-23 LAB
ALBUMIN SERPL BCP-MCNC: 2.8 G/DL (ref 3.2–4.9)
ALBUMIN/GLOB SERPL: 0.8 G/DL
ALP SERPL-CCNC: 108 U/L (ref 30–99)
ALT SERPL-CCNC: 7 U/L (ref 2–50)
ANION GAP SERPL CALC-SCNC: 15 MMOL/L (ref 7–16)
AST SERPL-CCNC: 17 U/L (ref 12–45)
BILIRUB SERPL-MCNC: 2.7 MG/DL (ref 0.1–1.5)
BUN SERPL-MCNC: 26 MG/DL (ref 8–22)
CALCIUM ALBUM COR SERPL-MCNC: 9.4 MG/DL (ref 8.5–10.5)
CALCIUM SERPL-MCNC: 8.4 MG/DL (ref 8.4–10.2)
CHLORIDE SERPL-SCNC: 97 MMOL/L (ref 96–112)
CHOLEST SERPL-MCNC: 106 MG/DL (ref 100–199)
CO2 SERPL-SCNC: 22 MMOL/L (ref 20–33)
CREAT SERPL-MCNC: 1.22 MG/DL (ref 0.5–1.4)
EKG IMPRESSION: NORMAL
ERYTHROCYTE [DISTWIDTH] IN BLOOD BY AUTOMATED COUNT: 59.3 FL (ref 35.9–50)
GFR SERPLBLD CREATININE-BSD FMLA CKD-EPI: 49 ML/MIN/1.73 M 2
GLOBULIN SER CALC-MCNC: 3.3 G/DL (ref 1.9–3.5)
GLUCOSE SERPL-MCNC: 114 MG/DL (ref 65–99)
HCT VFR BLD AUTO: 21.7 % (ref 37–47)
HDLC SERPL-MCNC: 26 MG/DL
HGB BLD-MCNC: 6.5 G/DL (ref 12–16)
HGB BLD-MCNC: 7.5 G/DL (ref 12–16)
HGB BLD-MCNC: 8.1 G/DL (ref 12–16)
LDLC SERPL CALC-MCNC: 65 MG/DL
MAGNESIUM SERPL-MCNC: 1.6 MG/DL (ref 1.5–2.5)
MCH RBC QN AUTO: 19.9 PG (ref 27–33)
MCHC RBC AUTO-ENTMCNC: 30 G/DL (ref 32.2–35.5)
MCV RBC AUTO: 66.6 FL (ref 81.4–97.8)
PLATELET # BLD AUTO: 225 K/UL (ref 164–446)
PMV BLD AUTO: 9.7 FL (ref 9–12.9)
POTASSIUM SERPL-SCNC: 3.2 MMOL/L (ref 3.6–5.5)
PROT SERPL-MCNC: 6.1 G/DL (ref 6–8.2)
RBC # BLD AUTO: 3.26 M/UL (ref 4.2–5.4)
SODIUM SERPL-SCNC: 134 MMOL/L (ref 135–145)
TRIGL SERPL-MCNC: 74 MG/DL (ref 0–149)
TROPONIN T SERPL-MCNC: 40 NG/L (ref 6–19)
WBC # BLD AUTO: 9 K/UL (ref 4.8–10.8)

## 2024-01-23 PROCEDURE — 700111 HCHG RX REV CODE 636 W/ 250 OVERRIDE (IP): Performed by: HOSPITALIST

## 2024-01-23 PROCEDURE — 36430 TRANSFUSION BLD/BLD COMPNT: CPT

## 2024-01-23 PROCEDURE — 700105 HCHG RX REV CODE 258: Performed by: HOSPITALIST

## 2024-01-23 PROCEDURE — A9270 NON-COVERED ITEM OR SERVICE: HCPCS | Mod: JZ | Performed by: INTERNAL MEDICINE

## 2024-01-23 PROCEDURE — A9270 NON-COVERED ITEM OR SERVICE: HCPCS | Performed by: HOSPITALIST

## 2024-01-23 PROCEDURE — 700102 HCHG RX REV CODE 250 W/ 637 OVERRIDE(OP): Performed by: HOSPITALIST

## 2024-01-23 PROCEDURE — 770020 HCHG ROOM/CARE - TELE (206)

## 2024-01-23 PROCEDURE — 700102 HCHG RX REV CODE 250 W/ 637 OVERRIDE(OP): Mod: JZ | Performed by: INTERNAL MEDICINE

## 2024-01-23 PROCEDURE — 85027 COMPLETE CBC AUTOMATED: CPT

## 2024-01-23 PROCEDURE — 94760 N-INVAS EAR/PLS OXIMETRY 1: CPT

## 2024-01-23 PROCEDURE — 84484 ASSAY OF TROPONIN QUANT: CPT

## 2024-01-23 PROCEDURE — 83735 ASSAY OF MAGNESIUM: CPT

## 2024-01-23 PROCEDURE — 36415 COLL VENOUS BLD VENIPUNCTURE: CPT

## 2024-01-23 PROCEDURE — P9016 RBC LEUKOCYTES REDUCED: HCPCS

## 2024-01-23 PROCEDURE — 99232 SBSQ HOSP IP/OBS MODERATE 35: CPT | Performed by: INTERNAL MEDICINE

## 2024-01-23 PROCEDURE — 80053 COMPREHEN METABOLIC PANEL: CPT

## 2024-01-23 PROCEDURE — 86923 COMPATIBILITY TEST ELECTRIC: CPT

## 2024-01-23 PROCEDURE — C9113 INJ PANTOPRAZOLE SODIUM, VIA: HCPCS | Performed by: HOSPITALIST

## 2024-01-23 PROCEDURE — 80061 LIPID PANEL: CPT

## 2024-01-23 PROCEDURE — 700111 HCHG RX REV CODE 636 W/ 250 OVERRIDE (IP): Mod: JZ | Performed by: INTERNAL MEDICINE

## 2024-01-23 PROCEDURE — 93010 ELECTROCARDIOGRAM REPORT: CPT | Performed by: INTERNAL MEDICINE

## 2024-01-23 PROCEDURE — 85018 HEMOGLOBIN: CPT

## 2024-01-23 RX ORDER — MAGNESIUM SULFATE HEPTAHYDRATE 40 MG/ML
2 INJECTION, SOLUTION INTRAVENOUS ONCE
Status: COMPLETED | OUTPATIENT
Start: 2024-01-23 | End: 2024-01-23

## 2024-01-23 RX ORDER — POTASSIUM CHLORIDE 20 MEQ/1
40 TABLET, EXTENDED RELEASE ORAL ONCE
Status: COMPLETED | OUTPATIENT
Start: 2024-01-23 | End: 2024-01-23

## 2024-01-23 RX ORDER — CHOLECALCIFEROL (VITAMIN D3) 125 MCG
5 CAPSULE ORAL NIGHTLY
Status: DISCONTINUED | OUTPATIENT
Start: 2024-01-23 | End: 2024-01-27 | Stop reason: HOSPADM

## 2024-01-23 RX ADMIN — SODIUM CHLORIDE 250 MG: 9 INJECTION, SOLUTION INTRAVENOUS at 09:30

## 2024-01-23 RX ADMIN — PANTOPRAZOLE SODIUM 40 MG: 40 INJECTION, POWDER, FOR SOLUTION INTRAVENOUS at 18:02

## 2024-01-23 RX ADMIN — MAGNESIUM SULFATE HEPTAHYDRATE 2 G: 2 INJECTION, SOLUTION INTRAVENOUS at 13:17

## 2024-01-23 RX ADMIN — Medication 5 MG: at 23:44

## 2024-01-23 RX ADMIN — DOCUSATE SODIUM 50MG AND SENNOSIDES 8.6MG 2 TABLET: 8.6; 5 TABLET, FILM COATED ORAL at 06:02

## 2024-01-23 RX ADMIN — PANTOPRAZOLE SODIUM 40 MG: 40 INJECTION, POWDER, FOR SOLUTION INTRAVENOUS at 06:02

## 2024-01-23 RX ADMIN — SODIUM CHLORIDE 250 MG: 9 INJECTION, SOLUTION INTRAVENOUS at 18:05

## 2024-01-23 RX ADMIN — FUROSEMIDE 40 MG: 10 INJECTION, SOLUTION INTRAMUSCULAR; INTRAVENOUS at 15:49

## 2024-01-23 RX ADMIN — FUROSEMIDE 40 MG: 10 INJECTION, SOLUTION INTRAMUSCULAR; INTRAVENOUS at 09:32

## 2024-01-23 RX ADMIN — POTASSIUM CHLORIDE 40 MEQ: 1500 TABLET, EXTENDED RELEASE ORAL at 13:12

## 2024-01-23 ASSESSMENT — ENCOUNTER SYMPTOMS
SHORTNESS OF BREATH: 0
HEADACHES: 0
VOMITING: 0
ABDOMINAL PAIN: 0
CONSTIPATION: 0
BLURRED VISION: 0
WEAKNESS: 1
FEVER: 0
CHILLS: 0
MYALGIAS: 0
NERVOUS/ANXIOUS: 0
SPEECH CHANGE: 0
DEPRESSION: 0
PHOTOPHOBIA: 0
CLAUDICATION: 0
INSOMNIA: 0
DIZZINESS: 0
HEARTBURN: 0
COUGH: 0
SENSORY CHANGE: 0
DIARRHEA: 0

## 2024-01-23 ASSESSMENT — PAIN DESCRIPTION - PAIN TYPE: TYPE: ACUTE PAIN

## 2024-01-23 NOTE — PROGRESS NOTES
Hospital Medicine Daily Progress Note    Date of Service  1/23/2024    Chief Complaint  Denisse Abel is a 63 y.o. female admitted 1/22/2024 with swelling, weight gain    Hospital Course  Patient is a 63-year-old female with medical history of alcohol dependence that she stopped in November came in on January 22 with worsening weakness, shortness of breath lower extremity swelling.  She is lost 30 pounds in the last 8 months gained 20 pounds over the last month.  2D echocardiogram is consistent with both systolic and diastolic heart failure with an ejection fraction of 40% and diastolic dysfunction grade 2.  Patient is doing well with the diuretics and has required 3 units transfusion to get her hemoglobin from 3.9-7.5.  Potassium still low and require replacement.  I briefly discussed the patient's presentation with on-call Dr. Roque with GI and patient does not require inpatient consultation at this time but will have urgent outpatient follow-up following discharge from the hospital.    Interval Problem Update  1/23 patient states that she does feel markedly better since admission.  She has received 3 units of blood and now has color in her cheeks.  Potassium is slightly low at 3.2 and will be replaced.  Magnesium also 1.6 and will also be replaced.  Discussed briefly with GI and recommend outpatient follow-up no need for inpatient consultation at this time.  Patient with history of alcoholism and new diagnosis of cirrhosis on this hospital stay.    I have discussed this patient's plan of care and discharge plan at IDT rounds today with Case Management, Nursing, Nursing leadership, and other members of the IDT team.    Consultants/Specialty  none    Code Status  Full Code    Disposition  The patient is not medically cleared for discharge to home or a post-acute facility.  Anticipate discharge to: home with close outpatient follow-up    I have placed the appropriate orders for post-discharge needs.    Review  of Systems  Review of Systems   Constitutional:  Negative for chills and fever.   HENT:  Negative for congestion.    Eyes:  Negative for blurred vision and photophobia.   Respiratory:  Negative for cough and shortness of breath.    Cardiovascular:  Positive for leg swelling. Negative for chest pain and claudication.   Gastrointestinal:  Negative for abdominal pain, constipation, diarrhea, heartburn and vomiting.   Genitourinary:  Negative for dysuria and hematuria.   Musculoskeletal:  Negative for joint pain and myalgias.   Skin:  Negative for itching and rash.   Neurological:  Positive for weakness. Negative for dizziness, sensory change, speech change and headaches.   Psychiatric/Behavioral:  Negative for depression. The patient is not nervous/anxious and does not have insomnia.         Physical Exam  Temp:  [36.3 °C (97.3 °F)-37.4 °C (99.3 °F)] 36.7 °C (98.1 °F)  Pulse:  [77-86] 79  Resp:  [14-20] 17  BP: ()/(43-76) 113/51  SpO2:  [91 %-100 %] 91 %    Physical Exam  Vitals and nursing note reviewed.   Constitutional:       General: She is not in acute distress.     Appearance: Normal appearance. She is not ill-appearing.   HENT:      Head: Normocephalic and atraumatic.      Nose: Nose normal.   Cardiovascular:      Rate and Rhythm: Normal rate and regular rhythm.      Heart sounds: Murmur heard.   Pulmonary:      Effort: Pulmonary effort is normal.      Breath sounds: Normal breath sounds.   Abdominal:      General: Bowel sounds are normal. There is no distension.      Palpations: Abdomen is soft.   Musculoskeletal:         General: No swelling or tenderness.      Cervical back: Neck supple.      Right lower leg: No edema.      Left lower leg: No edema.   Skin:     General: Skin is warm and dry.   Neurological:      General: No focal deficit present.      Mental Status: She is alert and oriented to person, place, and time.   Psychiatric:         Mood and Affect: Mood normal.         Fluids    Intake/Output  Summary (Last 24 hours) at 1/23/2024 1420  Last data filed at 1/23/2024 1318  Gross per 24 hour   Intake 1412.17 ml   Output 2450 ml   Net -1037.83 ml       Laboratory  Recent Labs     01/22/24  1137 01/22/24  1829 01/23/24  0214 01/23/24  0953   WBC 7.9  --  9.0  --    RBC 2.43*  --  3.26*  --    HEMOGLOBIN 3.9* 5.2* 6.5* 7.5*   HEMATOCRIT 14.6*  --  21.7*  --    MCV 60.1*  --  66.6*  --    MCH 16.0*  --  19.9*  --    MCHC 26.7*  --  30.0*  --    RDW 44.2  --  59.3*  --    PLATELETCT 264  --  225  --    MPV 9.7  --  9.7  --      Recent Labs     01/22/24  1137 01/23/24  0214   SODIUM 134* 134*   POTASSIUM 3.0* 3.2*   CHLORIDE 95* 97   CO2 22 22   GLUCOSE 121* 114*   BUN 29* 26*   CREATININE 1.31 1.22   CALCIUM 9.1 8.4     Recent Labs     01/22/24  1137   APTT 34.0   INR 1.43*         Recent Labs     01/23/24  0214   TRIGLYCERIDE 74   HDL 26*   LDL 65       Imaging  EC-ECHOCARDIOGRAM COMPLETE W/O CONT   Final Result      CT-ABDOMEN-PELVIS WITH   Final Result      1.  Cirrhotic appearance of the liver.      2.  Splenomegaly with dilated portal vein, recanalization of the umbilical vein and mesenteric varices consistent with portal hypertension.      3.  Moderate to large volume of ascites.      4.  Gallstones.      5.  Atherosclerotic vascular calcification. There are small bilateral pleural effusions with bibasilar atelectasis.      6.  Mild diffuse bowel wall thickening likely related to presence of portal hypertension.      7.  Anasarca.      DX-CHEST-PORTABLE (1 VIEW)   Final Result      1.  Cardiomegaly.      2.  Linear atelectasis within the left lung base.      3.  Minimal bilateral pleural effusion.           Assessment/Plan  * Acute combined systolic and diastolic heart failure (HCC)- (present on admission)  Assessment & Plan  Likely secondary to untreated chronic anemia  Continue Lasix 40 mg IV bid, aldactone 25 mg qd  Log daily strict input and output  Log daily standing weights.  Echo shows EF 40%, Grade  II diastolic dysfunction, Mod AV stenosis      Liver cirrhosis (HCC)- (present on admission)  Assessment & Plan  Likely related to alcohol., new diagnosis  Continue Lasix and spironolactone.  No active bleeding, will need to establish care with [facility] - discussed briefly with Dr Roque and he will arrange office to call patient, no need for inpatient consultation at this time    Severe anemia requiring blood transfusions anemia- (present on admission)  Assessment & Plan  Presenting with a very low hemoglobin of 3.9  Likely slow GI bleeding vs severe iron deficient anemia  IV Iron initiated  S/p 3 units blood transfusion - currently hgb 7.5  Discussed with GI - outpatient follow up unless stability changes.      Elevated brain natriuretic peptide (BNP) level- (present on admission)  Assessment & Plan  Secondary to heart failure    Troponin I above reference range- (present on admission)  Assessment & Plan  No chest pain  Echo shows systolic and diastolic heart failure along with moderate AV stenosis.    Hypokalemia- (present on admission)  Assessment & Plan  Replace K and Mag      Hyponatremia- (present on admission)  Assessment & Plan  improved with diuretics.   134    Stage 3b chronic kidney disease (HCC)- (present on admission)  Assessment & Plan  Avoid/minimize nephrotoxins as much as possible, renally dose medications, monitor inputs and outputs          VTE prophylaxis:   SCDs/TEDs      I have performed a physical exam and reviewed and updated ROS and Plan today (1/23/2024). In review of yesterday's note (1/22/2024), there are no changes except as documented above.

## 2024-01-23 NOTE — ASSESSMENT & PLAN NOTE
Likely related to alcohol., new diagnosis  Continue Lasix and spironolactone.  No active bleeding, will need to establish care with [facility] - discussed briefly with Dr Roque and he will arrange office to call patient, no need for inpatient consultation at this time

## 2024-01-23 NOTE — CARE PLAN
Problem: Pain - Standard  Goal: Alleviation of pain or a reduction in pain to the patient’s comfort goal  Outcome: Progressing     Problem: Knowledge Deficit - Standard  Goal: Patient and family/care givers will demonstrate understanding of plan of care, disease process/condition, diagnostic tests and medications  Outcome: Progressing   The patient is Stable - Low risk of patient condition declining or worsening    Shift Goals  Clinical Goals: hgb checks, diuresis  Patient Goals: comfort, feel better    Progress made toward(s) clinical / shift goals:  updated pt on plan of care, electrolyte replacement. Monitor intake and output.     Patient is not progressing towards the following goals:

## 2024-01-23 NOTE — CARE PLAN
The patient is Watcher - Medium risk of patient condition declining or worsening    Shift Goals  Clinical Goals: Hemoglobin, Diuresis as tolerated  Patient Goals: Comfort    Progress made toward(s) clinical / shift goals:    Problem: Pain - Standard  Goal: Alleviation of pain or a reduction in pain to the patient’s comfort goal  Outcome: Progressing     Problem: Knowledge Deficit - Standard  Goal: Patient and family/care givers will demonstrate understanding of plan of care, disease process/condition, diagnostic tests and medications  Outcome: Progressing     Problem: Fall Risk  Goal: Patient will remain free from falls  Outcome: Progressing       Patient is not progressing towards the following goals:

## 2024-01-23 NOTE — PROGRESS NOTES
Telemetry Shift Summary     Rhythm: SR  Rate: 70s-80s  Measurements: 0.18/0.08/0.38  Ectopy (reported by Monitor Tech): rare PVC/PAC, rare Couplet     Normal Values  Rhythm: Sinus  HR:   Measurements: 0.12-0.20/0.06-0.10/0.30-0.52

## 2024-01-23 NOTE — HOSPITAL COURSE
Patient is a 63-year-old female with medical history of alcohol dependence that she stopped in November came in on January 22 with worsening weakness, shortness of breath lower extremity swelling.  She is lost 30 pounds in the last 8 months gained 20 pounds over the last month.  2D echocardiogram is consistent with both systolic and diastolic heart failure with an ejection fraction of 40% and diastolic dysfunction grade 2.  Patient is doing well with the diuretics and has required 3 units transfusion to get her hemoglobin from 3.9-7.5.  Potassium still low and require replacement.  I briefly discussed the patient's presentation with on-call Dr. Roque with GI and patient does not require inpatient consultation at this time but will have urgent outpatient follow-up following discharge from the hospital.

## 2024-01-23 NOTE — ASSESSMENT & PLAN NOTE
Presenting with a very low hemoglobin of 3.9  Likely severe iron deficient anemia  IV Iron initiated  S/p 3 units blood transfusion - currently hgb 7.0, transfuse another unit today, anticipate hgb >8.0 tomorrow.  Discussed with GI - outpatient follow up unless stability changes.

## 2024-01-23 NOTE — ASSESSMENT & PLAN NOTE
Likely secondary to untreated chronic anemia  Continue Lasix 40 mg IV bid, increase aldactone to 50 mg qd  Log daily strict input and output  Log daily standing weights.  Echo shows EF 40%, Grade II diastolic dysfunction, Mod AV stenosis

## 2024-01-23 NOTE — ED NOTES
Report called to Zofia VEGA.  Pending transfer to [phone] via [facility], portable cardiac monitor, auto bp and pulse after the Echo is completed.

## 2024-01-23 NOTE — PROGRESS NOTES
4 Eyes Skin Assessment Completed by Zofia RN and SILVIA Bolaños.    Head WDL  Ears WDL  Nose WDL  Mouth WDL  Neck WDL  Breast/Chest WDL  Shoulder Blades WDL  Spine WDL  (R) Arm/Elbow/Hand WDL  (L) Arm/Elbow/Hand WDL  Abdomen WDL  Groin WDL  Scrotum/Coccyx/Buttocks Redness and Blanching  (R) Leg Edema  (L) Leg Edema  (R) Heel/Foot/Toe WDL, dry, blanching dorsal big toe  (L) Heel/Foot/Toe WDL, dry    Generalized jaundice, swelling.      Devices In Places Tele Box      Interventions In Place Pillows    Possible Skin Injury No    Pictures Uploaded Into Epic N/A  Wound Consult Placed N/A  RN Wound Prevention Protocol Ordered No

## 2024-01-23 NOTE — PROGRESS NOTES
Pt arrived on unit, placed on tele and VS taken- stable at time. Pt with no complaints at time, water given and food tray delivered. Call light in place, bed alarm on. Skin check complete. Lab up to get Hgb recheck, continue to monitor closely.

## 2024-01-24 LAB
ANION GAP SERPL CALC-SCNC: 12 MMOL/L (ref 7–16)
BUN SERPL-MCNC: 19 MG/DL (ref 8–22)
CALCIUM SERPL-MCNC: 8.5 MG/DL (ref 8.4–10.2)
CHLORIDE SERPL-SCNC: 98 MMOL/L (ref 96–112)
CO2 SERPL-SCNC: 24 MMOL/L (ref 20–33)
CREAT SERPL-MCNC: 1.24 MG/DL (ref 0.5–1.4)
ERYTHROCYTE [DISTWIDTH] IN BLOOD BY AUTOMATED COUNT: 62.4 FL (ref 35.9–50)
GFR SERPLBLD CREATININE-BSD FMLA CKD-EPI: 48 ML/MIN/1.73 M 2
GLUCOSE SERPL-MCNC: 100 MG/DL (ref 65–99)
HCT VFR BLD AUTO: 26.9 % (ref 37–47)
HGB BLD-MCNC: 8 G/DL (ref 12–16)
HGB BLD-MCNC: 8.2 G/DL (ref 12–16)
HGB BLD-MCNC: 8.4 G/DL (ref 12–16)
MCH RBC QN AUTO: 21.1 PG (ref 27–33)
MCHC RBC AUTO-ENTMCNC: 30.5 G/DL (ref 32.2–35.5)
MCV RBC AUTO: 69.2 FL (ref 81.4–97.8)
PLATELET # BLD AUTO: 200 K/UL (ref 164–446)
PMV BLD AUTO: 9.7 FL (ref 9–12.9)
POTASSIUM SERPL-SCNC: 2.8 MMOL/L (ref 3.6–5.5)
RBC # BLD AUTO: 3.89 M/UL (ref 4.2–5.4)
SODIUM SERPL-SCNC: 134 MMOL/L (ref 135–145)
WBC # BLD AUTO: 7 K/UL (ref 4.8–10.8)

## 2024-01-24 PROCEDURE — 700102 HCHG RX REV CODE 250 W/ 637 OVERRIDE(OP): Performed by: HOSPITALIST

## 2024-01-24 PROCEDURE — 700105 HCHG RX REV CODE 258: Performed by: HOSPITALIST

## 2024-01-24 PROCEDURE — 97162 PT EVAL MOD COMPLEX 30 MIN: CPT

## 2024-01-24 PROCEDURE — 85018 HEMOGLOBIN: CPT

## 2024-01-24 PROCEDURE — A9270 NON-COVERED ITEM OR SERVICE: HCPCS | Performed by: HOSPITALIST

## 2024-01-24 PROCEDURE — 99232 SBSQ HOSP IP/OBS MODERATE 35: CPT | Performed by: INTERNAL MEDICINE

## 2024-01-24 PROCEDURE — 770020 HCHG ROOM/CARE - TELE (206)

## 2024-01-24 PROCEDURE — A9270 NON-COVERED ITEM OR SERVICE: HCPCS | Mod: JZ | Performed by: INTERNAL MEDICINE

## 2024-01-24 PROCEDURE — 80048 BASIC METABOLIC PNL TOTAL CA: CPT

## 2024-01-24 PROCEDURE — 85027 COMPLETE CBC AUTOMATED: CPT

## 2024-01-24 PROCEDURE — 36415 COLL VENOUS BLD VENIPUNCTURE: CPT

## 2024-01-24 PROCEDURE — 700111 HCHG RX REV CODE 636 W/ 250 OVERRIDE (IP): Performed by: INTERNAL MEDICINE

## 2024-01-24 PROCEDURE — 97166 OT EVAL MOD COMPLEX 45 MIN: CPT

## 2024-01-24 PROCEDURE — 94760 N-INVAS EAR/PLS OXIMETRY 1: CPT

## 2024-01-24 PROCEDURE — C9113 INJ PANTOPRAZOLE SODIUM, VIA: HCPCS | Performed by: HOSPITALIST

## 2024-01-24 PROCEDURE — 97535 SELF CARE MNGMENT TRAINING: CPT

## 2024-01-24 PROCEDURE — 700102 HCHG RX REV CODE 250 W/ 637 OVERRIDE(OP): Mod: JZ | Performed by: INTERNAL MEDICINE

## 2024-01-24 PROCEDURE — 700111 HCHG RX REV CODE 636 W/ 250 OVERRIDE (IP): Mod: JZ | Performed by: HOSPITALIST

## 2024-01-24 RX ORDER — POTASSIUM CHLORIDE 20 MEQ/1
40 TABLET, EXTENDED RELEASE ORAL ONCE
Status: COMPLETED | OUTPATIENT
Start: 2024-01-24 | End: 2024-01-24

## 2024-01-24 RX ORDER — POTASSIUM CHLORIDE 20 MEQ/1
40 TABLET, EXTENDED RELEASE ORAL 2 TIMES DAILY
Status: DISCONTINUED | OUTPATIENT
Start: 2024-01-24 | End: 2024-01-27 | Stop reason: HOSPADM

## 2024-01-24 RX ADMIN — POTASSIUM CHLORIDE 40 MEQ: 1500 TABLET, EXTENDED RELEASE ORAL at 16:41

## 2024-01-24 RX ADMIN — FUROSEMIDE 40 MG: 10 INJECTION, SOLUTION INTRAMUSCULAR; INTRAVENOUS at 16:40

## 2024-01-24 RX ADMIN — PANTOPRAZOLE SODIUM 40 MG: 40 INJECTION, POWDER, FOR SOLUTION INTRAVENOUS at 05:44

## 2024-01-24 RX ADMIN — Medication 5 MG: at 20:52

## 2024-01-24 RX ADMIN — SODIUM CHLORIDE 250 MG: 9 INJECTION, SOLUTION INTRAVENOUS at 08:08

## 2024-01-24 RX ADMIN — DOCUSATE SODIUM 50MG AND SENNOSIDES 8.6MG 2 TABLET: 8.6; 5 TABLET, FILM COATED ORAL at 16:41

## 2024-01-24 RX ADMIN — POTASSIUM CHLORIDE 40 MEQ: 1500 TABLET, EXTENDED RELEASE ORAL at 08:09

## 2024-01-24 RX ADMIN — POTASSIUM CHLORIDE 40 MEQ: 1500 TABLET, EXTENDED RELEASE ORAL at 06:17

## 2024-01-24 RX ADMIN — PANTOPRAZOLE SODIUM 40 MG: 40 INJECTION, POWDER, FOR SOLUTION INTRAVENOUS at 16:40

## 2024-01-24 ASSESSMENT — ENCOUNTER SYMPTOMS
INSOMNIA: 0
DEPRESSION: 0
CLAUDICATION: 0
DIZZINESS: 0
FEVER: 0
COUGH: 0
MYALGIAS: 0
SPEECH CHANGE: 0
NERVOUS/ANXIOUS: 0
SENSORY CHANGE: 0
PHOTOPHOBIA: 0
SHORTNESS OF BREATH: 0
HEARTBURN: 0
DIARRHEA: 0
CHILLS: 0
ABDOMINAL PAIN: 0
HEADACHES: 0
CONSTIPATION: 0
WEAKNESS: 1
BLURRED VISION: 0
VOMITING: 0

## 2024-01-24 ASSESSMENT — COGNITIVE AND FUNCTIONAL STATUS - GENERAL
DRESSING REGULAR LOWER BODY CLOTHING: A LOT
MOVING FROM LYING ON BACK TO SITTING ON SIDE OF FLAT BED: A LITTLE
MOBILITY SCORE: 15
DAILY ACTIVITIY SCORE: 15
SUGGESTED CMS G CODE MODIFIER MOBILITY: CK
HELP NEEDED FOR BATHING: A LOT
CLIMB 3 TO 5 STEPS WITH RAILING: A LOT
SUGGESTED CMS G CODE MODIFIER DAILY ACTIVITY: CK
STANDING UP FROM CHAIR USING ARMS: A LITTLE
TOILETING: A LOT
DRESSING REGULAR UPPER BODY CLOTHING: A LOT
WALKING IN HOSPITAL ROOM: A LITTLE
PERSONAL GROOMING: A LITTLE
MOVING TO AND FROM BED TO CHAIR: A LOT
TURNING FROM BACK TO SIDE WHILE IN FLAT BAD: A LOT

## 2024-01-24 ASSESSMENT — GAIT ASSESSMENTS
DISTANCE (FEET): 20
ASSISTIVE DEVICE: FRONT WHEEL WALKER
DEVIATION: SHUFFLED GAIT;BRADYKINETIC
GAIT LEVEL OF ASSIST: MINIMAL ASSIST
DISTANCE (FEET): 20

## 2024-01-24 ASSESSMENT — ACTIVITIES OF DAILY LIVING (ADL): TOILETING: INDEPENDENT

## 2024-01-24 ASSESSMENT — PAIN DESCRIPTION - PAIN TYPE: TYPE: ACUTE PAIN

## 2024-01-24 NOTE — CARE PLAN
The patient is Stable - Low risk of patient condition declining or worsening    Shift Goals  Clinical Goals: Monitor Hemoglobin  Patient Goals: Comfort    Progress made toward(s) clinical / shift goals:    Problem: Pain - Standard  Goal: Alleviation of pain or a reduction in pain to the patient’s comfort goal  Outcome: Progressing     Problem: Knowledge Deficit - Standard  Goal: Patient and family/care givers will demonstrate understanding of plan of care, disease process/condition, diagnostic tests and medications  Outcome: Progressing     Problem: Fall Risk  Goal: Patient will remain free from falls  Outcome: Progressing       Patient is not progressing towards the following goals:

## 2024-01-24 NOTE — THERAPY
"Occupational Therapy   Initial Evaluation     Patient Name: Denisse Abel  Age:  63 y.o., Sex:  female  Medical Record #: [id number]  Today's Date: 1/24/2024     Precautions  Precautions: Fall Risk    Assessment  Patient is 63 y.o. female with past medical history of alcohol dependence that stopped 3 months ago who presented 1/22/2024 with progressively worsening generalized weakness, shortness of breath and lower extremity swelling,  Acute combined systolic and diastolic heart failure, Severe anemia requiring blood transfusions, Liver cirrhosis.  Pt is currently limited by decreased functional mobility, activity tolerance, cognition, strength,  balance, and pain which are affecting her ability to complete ADLs/IADLs at baseline. See grid for details. Pt is NOT safe for home alone at this time, and family not coming in a timely manner to provide adequate support.  Pt will benefit from further inpt post acute therapy prior to home. Will continue to follow.       Plan    Occupational Therapy Initial Treatment Plan   Treatment Interventions: Self Care / Activities of Daily Living, Adaptive Equipment, Neuro Re-Education / Balance, Therapeutic Exercises, Therapeutic Activity  Treatment Frequency: 3 Times per Week  Duration: Until Therapy Goals Met    DC Equipment Recommendations: Unable to determine at this time  Discharge Recommendations: Recommend post-acute placement for additional occupational therapy services prior to discharge home     Subjective    \"I don't think I can do any rehab, I have a dog\"     Objective       01/24/24 1032   Prior Living Situation   Prior Services None   Housing / Facility 1 Story House   Steps Into Home 1   Bathroom Set up Walk In Shower   Equipment Owned Raised Toilet Seat Without Arms   Lives with - Patient's Self Care Capacity Alone and Able to Care For Self   Comments Pt reports that she has a cousin coming some time next week to stay with her for a bit   Prior Level of ADL Function "   Self Feeding Independent   Grooming / Hygiene Independent   Bathing Independent   Dressing Independent   Toileting Independent   Prior Level of IADL Function   Medication Management Independent   Laundry Independent   Kitchen Mobility Independent   Finances Independent   Home Management Independent   Shopping Independent   Prior Level Of Mobility Independent Without Device in Community   Driving / Transportation Driving Independent   Occupation (Pre-Hospital Vocational) Retired Due To Age  (reently retired)   Leisure Interests Pets   Comments Has a latge dog at home   Precautions   Precautions Fall Risk   Vitals   O2 Delivery Device None - Room Air   Pain 0 - 10 Group   Location Generalized   Therapist Pain Assessment Prior to Activity;During Activity;Nurse Notified  (requesting heat packs)   Cognition    Cognition / Consciousness X   Level of Consciousness Alert   Safety Awareness Impaired;Impulsive   Comments Pt able to follow commands, oriented to place and reason, however demos poor safety awareness and insight to her limitations.   Active ROM Upper Body   Active ROM Upper Body  WDL   Dominant Hand Right   Strength Upper Body   Upper Body Strength  X   Gross Strength Generalized Weakness, Equal Bilaterally.    Upper Body Muscle Tone   Upper Body Muscle Tone  WDL   Coordination Upper Body   Coordination WDL   Balance Assessment   Sitting Balance (Static) Fair +   Sitting Balance (Dynamic) Fair   Standing Balance (Static) Poor +   Standing Balance (Dynamic) Poor   Weight Shift Sitting Fair   Weight Shift Standing Poor   Comments with FWW   Bed Mobility    Supine to Sit Moderate Assist   ADL Assessment   Upper Body Dressing Moderate Assist   Lower Body Dressing Maximal Assist   Toileting Maximal Assist   Comments Pt was incont of stool prior to OT session, wearing brief.  Able to walk to BR and get on and off toilet, pt educated that she needs to use BR during the day and not purewik.   How much help from another  person does the patient currently need...   Putting on and taking off regular lower body clothing? 2   Bathing (including washing, rinsing, and drying)? 2   Toileting, which includes using a toilet, bedpan, or urinal? 2   Putting on and taking off regular upper body clothing? 2   Taking care of personal grooming such as brushing teeth? 3   Eating meals? 4   6 Clicks Daily Activity Score 15   Functional Mobility   Sit to Stand Minimal Assist   Bed, Chair, Wheelchair Transfer Minimal Assist   Toilet Transfers Minimal Assist   Transfer Method Stand Step   Mobility Esme with FWW   Distance (Feet) 20   # of Times Distance was Traveled 2   Visual Perception   Comments appears WFL   Edema / Skin Assessment   Comments BLE edema noted   Activity Tolerance   Sitting in Chair > 20 min   Sitting Edge of Bed 5 min   Standing 2 min, 3 min   Patient / Family Goals   Patient / Family Goal #1 home   Short Term Goals   Short Term Goal # 1 Pt will dress from seated with SBA in 5 visits   Short Term Goal # 2 Pt will toilet supervised in 5 visits   Short Term Goal # 3 PT will stand 10 min at sink to groom supervised in 5 visits   Education Group   Education Provided Role of Occupational Therapist;Activities of Daily Living   Role of Occupational Therapist Patient Response Patient;Acceptance;Explanation;Verbal Demonstration   ADL Patient Response Patient;Acceptance;Explanation;Verbal Demonstration

## 2024-01-24 NOTE — CARE PLAN
The patient is Stable - Low risk of patient condition declining or worsening    Shift Goals  Clinical Goals: Work with PT/OT, collect occult stool sample  Patient Goals: Rest    Progress made toward(s) clinical / shift goals:    Problem: Mobility  Goal: Patient's capacity to carry out activities will improve  Description: Target End Date:  Prior to discharge or change in level of care    1.  Assess for barriers to mobility/activity  2.  Implement activity per interdisciplinary team recommendations  3.  Target activity level identified and patient/family/caregiver aware of goal  4.  Provide assistive devices  5.  Instruct patient/caregiver on proper use of assistive/adaptive devices  6.  Schedule activities and rest periods to decrease effects of fatigue  7.  Encourage mobilization to extent of ability  8.  Maintain proper body alignment  9.  Provide adequate pain management to allow progressive mobilization  10. Implement pace maker precautions as needed  Outcome: Progressing     Problem: Self Care  Goal: Patient will have the ability to perform ADLs independently or with assistance (bathe, groom, dress, toilet and feed)  Description: Target End Date:  Prior to discharge or change in level of care    Document on ADL flowsheet    1.  Assess the capability and level of deficiency to perform ADLs  2.  Encourage family/care giver involvement  3.  Provide assistive devices  4.  Consider PT/OT evaluations  5.  Maintain support, give positive feedback, encourage self-care allowing extra time and verbal cuing as needed  6.  Avoid doing something for patients they can do themselves, but provide assistance as needed  7.  Assist in anticipating/planning individual needs  8.  Collaborate with Case Management and  to meet discharge needs  Outcome: Progressing

## 2024-01-24 NOTE — PROGRESS NOTES
Telemetry Strip     Strip printed: 1131  Measurements from am strip were as follows:  Rhythm: SR  HR: 79-83  Measurements: 0.20/ 0.08/ 0.40  Ectopy: o PVC, r trig  Psvt up to 189             Normal Values  Rhythm SR  HR Range    Measurements 0.12-0.20 / 0.06-0.10  / 0.30-0.52

## 2024-01-24 NOTE — PROGRESS NOTES
Hospital Medicine Daily Progress Note    Date of Service  1/24/2024    Chief Complaint  Denisse Abel is a 63 y.o. female admitted 1/22/2024 with swelling, weight gain    Hospital Course  Patient is a 63-year-old female with medical history of alcohol dependence that she stopped in November came in on January 22 with worsening weakness, shortness of breath lower extremity swelling.  She is lost 30 pounds in the last 8 months gained 20 pounds over the last month.  2D echocardiogram is consistent with both systolic and diastolic heart failure with an ejection fraction of 40% and diastolic dysfunction grade 2.  Patient is doing well with the diuretics and has required 3 units transfusion to get her hemoglobin from 3.9-7.5.  Potassium still low and require replacement.  I briefly discussed the patient's presentation with on-call Dr. Roque with GI and patient does not require inpatient consultation at this time but will have urgent outpatient follow-up following discharge from the hospital.    Interval Problem Update  1/23 patient states that she does feel markedly better since admission.  She has received 3 units of blood and now has color in her cheeks.  Potassium is slightly low at 3.2 and will be replaced.  Magnesium also 1.6 and will also be replaced.  Discussed briefly with GI and recommend outpatient follow-up no need for inpatient consultation at this time.  Patient with history of alcoholism and new diagnosis of cirrhosis on this hospital stay.  1/24 patient continues to show improvement daily.  Hemoglobin is up to 8.2.  Potassium was also significantly low at 2.8 despite replacement yesterday.  Will give 80 mEq today divided into 2 doses.  Lasix and Aldactone hold parameters have been adjusted.  Her lower extremity edema is continuing to improve.  She was seen by physical and Occupational Therapy earlier today and recommended skilled nursing for placement to continue to work on strengthening.    I have  discussed this patient's plan of care and discharge plan at IDT rounds today with Case Management, Nursing, Nursing leadership, and other members of the IDT team.    Consultants/Specialty  none    Code Status  Full Code    Disposition  The patient is not medically cleared for discharge to home or a post-acute facility.  Anticipate discharge to: skilled nursing facility    I have placed the appropriate orders for post-discharge needs.    Review of Systems  Review of Systems   Constitutional:  Negative for chills and fever.   HENT:  Negative for congestion.    Eyes:  Negative for blurred vision and photophobia.   Respiratory:  Negative for cough and shortness of breath.    Cardiovascular:  Positive for leg swelling. Negative for chest pain and claudication.   Gastrointestinal:  Negative for abdominal pain, constipation, diarrhea, heartburn and vomiting.   Genitourinary:  Negative for dysuria and hematuria.   Musculoskeletal:  Negative for joint pain and myalgias.   Skin:  Negative for itching and rash.   Neurological:  Positive for weakness. Negative for dizziness, sensory change, speech change and headaches.   Psychiatric/Behavioral:  Negative for depression. The patient is not nervous/anxious and does not have insomnia.         Physical Exam  Temp:  [36.5 °C (97.7 °F)-37.2 °C (98.9 °F)] 36.5 °C (97.7 °F)  Pulse:  [73-83] 73  Resp:  [16-18] 18  BP: ()/(48-66) 116/57  SpO2:  [91 %-95 %] 95 %    Physical Exam  Vitals and nursing note reviewed.   Constitutional:       General: She is not in acute distress.     Appearance: Normal appearance. She is not ill-appearing.   HENT:      Head: Normocephalic and atraumatic.      Nose: Nose normal.   Cardiovascular:      Rate and Rhythm: Normal rate and regular rhythm.      Heart sounds: Murmur heard.   Pulmonary:      Effort: Pulmonary effort is normal.      Breath sounds: Normal breath sounds.   Abdominal:      General: Bowel sounds are normal. There is no distension.       Palpations: Abdomen is soft.   Musculoskeletal:         General: No swelling or tenderness.      Cervical back: Neck supple.      Right lower leg: Edema (mild) present.      Left lower leg: Edema (mild) present.   Skin:     General: Skin is warm and dry.   Neurological:      General: No focal deficit present.      Mental Status: She is alert and oriented to person, place, and time.   Psychiatric:         Mood and Affect: Mood normal.         Fluids    Intake/Output Summary (Last 24 hours) at 1/24/2024 1318  Last data filed at 1/24/2024 0900  Gross per 24 hour   Intake 210 ml   Output 500 ml   Net -290 ml         Laboratory  Recent Labs     01/22/24  1137 01/22/24  1829 01/23/24  0214 01/23/24  0953 01/23/24  1806 01/24/24  0209 01/24/24  0957   WBC 7.9  --  9.0  --   --  7.0  --    RBC 2.43*  --  3.26*  --   --  3.89*  --    HEMOGLOBIN 3.9*   < > 6.5*   < > 8.1* 8.2* 8.0*   HEMATOCRIT 14.6*  --  21.7*  --   --  26.9*  --    MCV 60.1*  --  66.6*  --   --  69.2*  --    MCH 16.0*  --  19.9*  --   --  21.1*  --    MCHC 26.7*  --  30.0*  --   --  30.5*  --    RDW 44.2  --  59.3*  --   --  62.4*  --    PLATELETCT 264  --  225  --   --  200  --    MPV 9.7  --  9.7  --   --  9.7  --     < > = values in this interval not displayed.       Recent Labs     01/22/24  1137 01/23/24  0214 01/24/24  0209   SODIUM 134* 134* 134*   POTASSIUM 3.0* 3.2* 2.8*   CHLORIDE 95* 97 98   CO2 22 22 24   GLUCOSE 121* 114* 100*   BUN 29* 26* 19   CREATININE 1.31 1.22 1.24   CALCIUM 9.1 8.4 8.5       Recent Labs     01/22/24  1137   APTT 34.0   INR 1.43*           Recent Labs     01/23/24  0214   TRIGLYCERIDE 74   HDL 26*   LDL 65         Imaging  EC-ECHOCARDIOGRAM COMPLETE W/O CONT   Final Result      CT-ABDOMEN-PELVIS WITH   Final Result      1.  Cirrhotic appearance of the liver.      2.  Splenomegaly with dilated portal vein, recanalization of the umbilical vein and mesenteric varices consistent with portal hypertension.      3.  Moderate to  large volume of ascites.      4.  Gallstones.      5.  Atherosclerotic vascular calcification. There are small bilateral pleural effusions with bibasilar atelectasis.      6.  Mild diffuse bowel wall thickening likely related to presence of portal hypertension.      7.  Anasarca.      DX-CHEST-PORTABLE (1 VIEW)   Final Result      1.  Cardiomegaly.      2.  Linear atelectasis within the left lung base.      3.  Minimal bilateral pleural effusion.           Assessment/Plan  * Acute combined systolic and diastolic heart failure (HCC)- (present on admission)  Assessment & Plan  Likely secondary to untreated chronic anemia  Continue Lasix 40 mg IV bid, aldactone 25 mg qd  Log daily strict input and output  Log daily standing weights.  Echo shows EF 40%, Grade II diastolic dysfunction, Mod AV stenosis      Liver cirrhosis (HCC)- (present on admission)  Assessment & Plan  Likely related to alcohol., new diagnosis  Continue Lasix and spironolactone.  No active bleeding, will need to establish care with [facility] - discussed briefly with Dr Roque and he will arrange office to call patient, no need for inpatient consultation at this time    Severe anemia requiring blood transfusions anemia- (present on admission)  Assessment & Plan  Presenting with a very low hemoglobin of 3.9  Likely severe iron deficient anemia  IV Iron initiated  S/p 3 units blood transfusion - currently hgb 8.2  Discussed with GI - outpatient follow up unless stability changes.      Elevated brain natriuretic peptide (BNP) level- (present on admission)  Assessment & Plan  Secondary to heart failure    Troponin I above reference range- (present on admission)  Assessment & Plan  No chest pain  Echo shows systolic and diastolic heart failure along with moderate AV stenosis.    Hypokalemia- (present on admission)  Assessment & Plan  Replace K        Hyponatremia- (present on admission)  Assessment & Plan  improved with diuretics.   134    Stage 3b chronic kidney  disease (HCC)- (present on admission)  Assessment & Plan  Avoid/minimize nephrotoxins as much as possible, renally dose medications, monitor inputs and outputs          VTE prophylaxis:   SCDs/TEDs      I have performed a physical exam and reviewed and updated ROS and Plan today (1/24/2024). In review of yesterday's note (1/23/2024), there are no changes except as documented above.

## 2024-01-24 NOTE — DISCHARGE PLANNING
Case Management Discharge Planning    Admission Date: 1/22/2024  GMLOS: 3.8  ALOS: 2    6-Clicks ADL Score: 15  6-Clicks Mobility Score: 15  PT and/or OT Eval ordered: Yes  Post-acute Referrals Ordered: Yes  Post-acute Choice Obtained: No  Has referral(s) been sent to post-acute provider:  Yes      Anticipated Discharge Dispo: Discharge Disposition: D/T to SNF with Medicare cert in anticipation of skilled care (03)    DME Needed: No    Action(s) Taken:     RNCM attended IDT rounds and reviewed chart. PT/OT recommending post-acute placement. RNCM placed SNF order per protocol. Referral sent to local SNFs.    PASRR currently in manual review    Escalations Completed: None    Medically Clear: No    Next Steps: f/u with DPA regarding SNF acceptance    Barriers to Discharge: Medical clearance and Pending Placement

## 2024-01-24 NOTE — PROGRESS NOTES
Telemetry Shift Summary     Rhythm: SR  Rate: 60s-80s  Measurements: 0.16/0.08/0.42  Ectopy (reported by Monitor Tech): rare PVC/Couplet     Normal Values  Rhythm: Sinus  HR:   Measurements: 0.12-0.20/0.06-0.10/0.30-0.52

## 2024-01-24 NOTE — THERAPY
Physical Therapy   Initial Evaluation     Patient Name: Denisse Abel  Age:  63 y.o., Sex:  female  Medical Record #: [id number]  Today's Date: 1/24/2024     Precautions  Precautions: Fall Risk    Assessment  Patient is 63 y.o. female with a diagnosis of acute systolic and diastolic heart failure, anemia, liver cirrhosis. Pt presented to the ED with worsening weakness, shortness of breath lower extremity swelling. Pt with history of ETOH- has not been drinking since Nov 2023. Pt is presenting with weakness and impaired balance as well as poor insight and overall safety. Currently do not feel that pt will be able to DC home alone and care for herself, recommended SNF however pt is refusing at this time. Therefore recommend 24/7 assist at home upon DC, if pt is unable to arrange this then will benefit from SNF for further therapy to ensure safe DC home. See below for goals and POC.    Plan    Physical Therapy Initial Treatment Plan   Treatment Plan : Bed Mobility, Gait Training, Neuro Re-Education / Balance, Therapeutic Activities, Therapeutic Exercise, Stair Training  Treatment Frequency: 5 Times per Week  Duration: Until Therapy Goals Met    DC Equipment Recommendations: Unable to determine at this time  Discharge Recommendations: Recommend post-acute placement for additional physical therapy services prior to discharge home        01/24/24 1031   Prior Living Situation   Housing / Facility 1 Story House   Steps Into Home 1   Steps In Home 0   Bathroom Set up Walk In Shower   Equipment Owned Raised Toilet Seat Without Arms   Lives with - Patient's Self Care Capacity Alone and Able to Care For Self   Comments Pt states does not have anyone that can stay with her until her cousin comes for a visit next week. pt reports neighbors are watching her dog   Prior Level of Functional Mobility   Bed Mobility Independent   Transfer Status Independent   Ambulation Independent   Assistive Devices Used None   Stairs  Independent   Cognition    Level of Consciousness Alert   Safety Awareness Impaired;Impulsive   Comments Pt is oriented x4 but poor insight and overall safety.   Active ROM Lower Body    Active ROM Lower Body  WDL   Strength Lower Body   Lower Body Strength  X   Gross Strength Generalized Weakness, Equal Bilaterally   Balance Assessment   Sitting Balance (Static) Fair   Sitting Balance (Dynamic) Fair   Standing Balance (Static) Poor +   Standing Balance (Dynamic) Poor   Weight Shift Sitting Fair   Weight Shift Standing Fair   Comments stdg with FWW   Bed Mobility    Supine to Sit Moderate Assist   Gait Analysis   Gait Level Of Assist Minimal Assist   Assistive Device Front Wheel Walker   Distance (Feet) 20   # of Times Distance was Traveled 2   Deviation Shuffled Gait;Bradykinetic   Functional Mobility   Sit to Stand Minimal Assist   Bed, Chair, Wheelchair Transfer Minimal Assist   Toilet Transfers Minimal Assist   Transfer Method Stand Step  (with FWW)   Activity Tolerance   Sitting in Chair as tolerated   Standing 3-4 min   Short Term Goals    Short Term Goal # 1 Pt will be able to perform bed mobility and sup <> sit Jairo in 6 visits.   Short Term Goal # 2 Pt will be able to perform sit <>Stand and transfer Jairo in 6 visits.   Short Term Goal # 3 Pt will be able to ambulate 150 ft with FWW Jairo in 6 visits.

## 2024-01-25 LAB
ANION GAP SERPL CALC-SCNC: 12 MMOL/L (ref 7–16)
BUN SERPL-MCNC: 18 MG/DL (ref 8–22)
CALCIUM SERPL-MCNC: 8.8 MG/DL (ref 8.4–10.2)
CHLORIDE SERPL-SCNC: 97 MMOL/L (ref 96–112)
CO2 SERPL-SCNC: 25 MMOL/L (ref 20–33)
CREAT SERPL-MCNC: 1.2 MG/DL (ref 0.5–1.4)
ERYTHROCYTE [DISTWIDTH] IN BLOOD BY AUTOMATED COUNT: 64.8 FL (ref 35.9–50)
GFR SERPLBLD CREATININE-BSD FMLA CKD-EPI: 50 ML/MIN/1.73 M 2
GLUCOSE SERPL-MCNC: 107 MG/DL (ref 65–99)
HCT VFR BLD AUTO: 23.7 % (ref 37–47)
HGB BLD-MCNC: 7.1 G/DL (ref 12–16)
MCH RBC QN AUTO: 21 PG (ref 27–33)
MCHC RBC AUTO-ENTMCNC: 30 G/DL (ref 32.2–35.5)
MCV RBC AUTO: 70.1 FL (ref 81.4–97.8)
PLATELET # BLD AUTO: 196 K/UL (ref 164–446)
PMV BLD AUTO: 9.8 FL (ref 9–12.9)
POTASSIUM SERPL-SCNC: 3.4 MMOL/L (ref 3.6–5.5)
RBC # BLD AUTO: 3.38 M/UL (ref 4.2–5.4)
SODIUM SERPL-SCNC: 134 MMOL/L (ref 135–145)
WBC # BLD AUTO: 9.1 K/UL (ref 4.8–10.8)

## 2024-01-25 PROCEDURE — 36415 COLL VENOUS BLD VENIPUNCTURE: CPT

## 2024-01-25 PROCEDURE — A9270 NON-COVERED ITEM OR SERVICE: HCPCS | Performed by: HOSPITALIST

## 2024-01-25 PROCEDURE — 700102 HCHG RX REV CODE 250 W/ 637 OVERRIDE(OP): Performed by: HOSPITALIST

## 2024-01-25 PROCEDURE — 700102 HCHG RX REV CODE 250 W/ 637 OVERRIDE(OP): Performed by: INTERNAL MEDICINE

## 2024-01-25 PROCEDURE — 80048 BASIC METABOLIC PNL TOTAL CA: CPT

## 2024-01-25 PROCEDURE — 97110 THERAPEUTIC EXERCISES: CPT

## 2024-01-25 PROCEDURE — 97116 GAIT TRAINING THERAPY: CPT

## 2024-01-25 PROCEDURE — 99232 SBSQ HOSP IP/OBS MODERATE 35: CPT | Performed by: INTERNAL MEDICINE

## 2024-01-25 PROCEDURE — 700111 HCHG RX REV CODE 636 W/ 250 OVERRIDE (IP): Performed by: HOSPITALIST

## 2024-01-25 PROCEDURE — 770020 HCHG ROOM/CARE - TELE (206)

## 2024-01-25 PROCEDURE — A9270 NON-COVERED ITEM OR SERVICE: HCPCS | Performed by: INTERNAL MEDICINE

## 2024-01-25 PROCEDURE — C9113 INJ PANTOPRAZOLE SODIUM, VIA: HCPCS | Performed by: HOSPITALIST

## 2024-01-25 PROCEDURE — 94760 N-INVAS EAR/PLS OXIMETRY 1: CPT

## 2024-01-25 PROCEDURE — 700111 HCHG RX REV CODE 636 W/ 250 OVERRIDE (IP): Performed by: INTERNAL MEDICINE

## 2024-01-25 PROCEDURE — 85027 COMPLETE CBC AUTOMATED: CPT

## 2024-01-25 PROCEDURE — 97535 SELF CARE MNGMENT TRAINING: CPT

## 2024-01-25 RX ORDER — OMEPRAZOLE 20 MG/1
20 CAPSULE, DELAYED RELEASE ORAL 2 TIMES DAILY
Status: DISCONTINUED | OUTPATIENT
Start: 2024-01-25 | End: 2024-01-27 | Stop reason: HOSPADM

## 2024-01-25 RX ADMIN — OMEPRAZOLE 20 MG: 20 CAPSULE, DELAYED RELEASE ORAL at 17:16

## 2024-01-25 RX ADMIN — OMEPRAZOLE 20 MG: 20 CAPSULE, DELAYED RELEASE ORAL at 07:58

## 2024-01-25 RX ADMIN — PANTOPRAZOLE SODIUM 40 MG: 40 INJECTION, POWDER, FOR SOLUTION INTRAVENOUS at 06:18

## 2024-01-25 RX ADMIN — SPIRONOLACTONE 25 MG: 25 TABLET ORAL at 06:18

## 2024-01-25 RX ADMIN — Medication 5 MG: at 20:41

## 2024-01-25 RX ADMIN — POTASSIUM CHLORIDE 40 MEQ: 1500 TABLET, EXTENDED RELEASE ORAL at 17:08

## 2024-01-25 RX ADMIN — ACETAMINOPHEN 650 MG: 325 TABLET ORAL at 17:08

## 2024-01-25 RX ADMIN — POTASSIUM CHLORIDE 40 MEQ: 1500 TABLET, EXTENDED RELEASE ORAL at 06:18

## 2024-01-25 RX ADMIN — ACETAMINOPHEN 650 MG: 325 TABLET ORAL at 23:08

## 2024-01-25 RX ADMIN — FUROSEMIDE 40 MG: 10 INJECTION, SOLUTION INTRAMUSCULAR; INTRAVENOUS at 06:18

## 2024-01-25 RX ADMIN — ACETAMINOPHEN 650 MG: 325 TABLET ORAL at 02:40

## 2024-01-25 RX ADMIN — FUROSEMIDE 40 MG: 10 INJECTION, SOLUTION INTRAMUSCULAR; INTRAVENOUS at 17:07

## 2024-01-25 RX ADMIN — ACETAMINOPHEN 650 MG: 325 TABLET ORAL at 08:49

## 2024-01-25 ASSESSMENT — ENCOUNTER SYMPTOMS
DIARRHEA: 0
COUGH: 0
FEVER: 0
SPEECH CHANGE: 0
DEPRESSION: 0
HEADACHES: 0
PHOTOPHOBIA: 0
SENSORY CHANGE: 0
MYALGIAS: 0
CLAUDICATION: 0
HEARTBURN: 0
DIZZINESS: 0
NERVOUS/ANXIOUS: 0
SHORTNESS OF BREATH: 0
ABDOMINAL PAIN: 0
CONSTIPATION: 0
WEAKNESS: 1
INSOMNIA: 0
BLURRED VISION: 0
VOMITING: 0
CHILLS: 0

## 2024-01-25 ASSESSMENT — COGNITIVE AND FUNCTIONAL STATUS - GENERAL
SUGGESTED CMS G CODE MODIFIER MOBILITY: CK
DRESSING REGULAR UPPER BODY CLOTHING: A LITTLE
WALKING IN HOSPITAL ROOM: A LITTLE
DRESSING REGULAR LOWER BODY CLOTHING: A LITTLE
PERSONAL GROOMING: A LITTLE
HELP NEEDED FOR BATHING: A LITTLE
MOVING TO AND FROM BED TO CHAIR: A LITTLE
SUGGESTED CMS G CODE MODIFIER DAILY ACTIVITY: CK
TOILETING: A LITTLE
DAILY ACTIVITIY SCORE: 19
STANDING UP FROM CHAIR USING ARMS: A LITTLE
MOVING FROM LYING ON BACK TO SITTING ON SIDE OF FLAT BED: A LITTLE
CLIMB 3 TO 5 STEPS WITH RAILING: A LITTLE
MOBILITY SCORE: 18
TURNING FROM BACK TO SIDE WHILE IN FLAT BAD: A LITTLE

## 2024-01-25 ASSESSMENT — PAIN DESCRIPTION - PAIN TYPE
TYPE: ACUTE PAIN
TYPE: ACUTE PAIN

## 2024-01-25 ASSESSMENT — GAIT ASSESSMENTS
DISTANCE (FEET): 250
DEVIATION: BRADYKINETIC
ASSISTIVE DEVICE: 4 WHEEL WALKER
GAIT LEVEL OF ASSIST: MODIFIED INDEPENDENT

## 2024-01-25 ASSESSMENT — FIBROSIS 4 INDEX: FIB4 SCORE: 2.13

## 2024-01-25 NOTE — DOCUMENTATION QUERY
"                                                                         [facility]                                                                       Query Response Note      PATIENT:               MARLINE ROBLES  ACCT #:                  [id number]  MRN:                     [id number]  :                      1960  ADMIT DATE:       2024 10:58 AM  DISCH DATE:          RESPONDING  PROVIDER #:        [id number]           QUERY TEXT:    Pt has documented demand ischemia noted as ?rule out? or similar terminology such as suspected, probable, etc.     Please clarify status of this condition.       The patient's Clinical Indicators include:  H&P: \"Troponin I above reference range ... Denies chest pain. Troponin elevation is likely secondary to demand ischemia from severe anemia.\"   PN: \"Troponin I above reference range ... No chest pain. Echo shows systolic and diastolic heart failure along with moderate AV stenosis\"    Troponin: 40 () > 40 ()   EKG: \"Sinus rhythm\"    Risk Factors: anemia with hgb 3.9, troponin 40, CHF, abdominal pain, shortness of breath  Treatment: serial troponins, telemetry, EKG, ECHO    Thank You,  Pascale Ruelas RN  Clinical    Connect via [email or web address] or [email or web address]  Options provided:   -- Demand ischemia is ruled in   -- Demand ischemia is ruled out   -- Other explanation, (please specify other explanation)   -- Unable to determine      Query created by: Pascale Ruelas on 2024 9:52 AM    RESPONSE TEXT:    Demand ischemia is ruled out          Electronically signed by:  ALYSHA DANIELSON DO 2024 2:52 PM              "

## 2024-01-25 NOTE — THERAPY
Physical Therapy   Daily Treatment     Patient Name: Denisse Abel  Age:  63 y.o., Sex:  female  Medical Record #: [id number]  Today's Date: 1/25/2024     Precautions  Precautions: Fall Risk    Assessment    Pt seen for PT treatment, demonstrates improvement in mobility with FWW. Pt able to safely ambulate for functional home distances with FWW. Pt issued HEP. Per patient she will have assistance from friends and neighbors until family arrives. Goals for inpatient PT met. No further inpatient PT or post acute PT needs.     Plan    Treatment Plan Status:    Type of Treatment: Bed Mobility, Gait Training, Neuro Re-Education / Balance, Therapeutic Activities, Therapeutic Exercise, Stair Training  Treatment Frequency: 5 Times per Week  Treatment Duration: Until Therapy Goals Met    DC Equipment Recommendations: FWW  Discharge Recommendations: (P) Anticipate that the patient will have no further physical therapy needs after discharge from the hospital      Precautions   Precautions Fall Risk   Cognition    Level of Consciousness Alert   Comments pleasant cooperative and oriented to situation.   Strength Lower Body   Gross Strength Generalized Weakness, Equal Bilaterally   Comments pt agreeable to BLE standing exercise program   Other Treatments   Other Treatments Provided education on fall prevention, use of FWW, pacing, and exercise.   Balance   Sitting Balance (Static) Good   Sitting Balance (Dynamic) Good   Standing Balance (Static) Good   Standing Balance (Dynamic) Fair +   Weight Shift Sitting Good   Weight Shift Standing Fair   Comments w/FWW   Bed Mobility    Comments in recliner pre and post session.   Gait Analysis   Gait Level Of Assist Modified Independent   Assistive Device 4 Wheel Walker   Distance (Feet) 250   # of Times Distance was Traveled 1   Deviation Bradykinetic   Weight Bearing Status No restrictions.   Skilled Intervention Sequencing   Functional Mobility   Sit to Stand Modified Independent    Bed, Chair, Wheelchair Transfer Modified Independent   Toilet Transfers Modified Independent   How much difficulty does the patient currently have...   Turning over in bed (including adjusting bedclothes, sheets and blankets)? 3   Sitting down on and standing up from a chair with arms (e.g., wheelchair, bedside commode, etc.) 3   Moving from lying on back to sitting on the side of the bed? 3   How much help from another person does the patient currently need...   Moving to and from a bed to a chair (including a wheelchair)? 3   Need to walk in a hospital room? 3   Climbing 3-5 steps with a railing? 3   6 clicks Mobility Score 18   Activity Tolerance   Sitting in Chair functional   Sitting Edge of Bed functional   Standing functional   Short Term Goals    Short Term Goal # 1 Pt will be able to perform bed mobility and sup <> sit Jairo in 6 visits.   Goal Outcome # 1 Goal met   Short Term Goal # 2 Pt will be able to perform sit <>Stand and transfer Jairo in 6 visits.   Goal Outcome # 2 Goal met   Short Term Goal # 3 Pt will be able to ambulate 150 ft with FWW Jairo in 6 visits.   Goal Outcome # 3 Goal met   Education Group   Education Provided Role of Physical Therapist;Gait Training;Use of Assistive Device;Exercises - Standing   Role of Physical Therapist Patient Response Patient;Acceptance;Explanation;Verbal Demonstration   Gait Training Patient Response Patient;Acceptance;Explanation;Demonstration;Verbal Demonstration   Use of Assistive Device Patient Response Patient;Acceptance;Explanation;Verbal Demonstration   Exercise - Standing Patient Response Patient;Acceptance;Explanation;Handout;Action Demonstration;Verbal Demonstration   Physical Therapy Treatment Plan   Reason For Discharge Discharge Secondary to Goals Met   Anticipated Discharge Equipment and Recommendations   Discharge Recommendations Anticipate that the patient will have no further physical therapy needs after discharge from the hospital    Interdisciplinary Plan of Care Collaboration   IDT Collaboration with  Nursing;Occupational Therapist   Patient Position at End of Therapy Seated;Chair Alarm On;Call Light within Reach;Tray Table within Reach;Phone within Reach   Collaboration Comments RN updated.   Session Information   Date / Session Number  1/25-2 ( 2/5, DC)

## 2024-01-25 NOTE — PROGRESS NOTES
Hospital Medicine Daily Progress Note    Date of Service  1/25/2024    Chief Complaint  Denisse Abel is a 63 y.o. female admitted 1/22/2024 with swelling, weight gain    Hospital Course  Patient is a 63-year-old female with medical history of alcohol dependence that she stopped in November came in on January 22 with worsening weakness, shortness of breath lower extremity swelling.  She is lost 30 pounds in the last 8 months gained 20 pounds over the last month.  2D echocardiogram is consistent with both systolic and diastolic heart failure with an ejection fraction of 40% and diastolic dysfunction grade 2.  Patient is doing well with the diuretics and has required 3 units transfusion to get her hemoglobin from 3.9-7.5.  Potassium still low and require replacement.  I briefly discussed the patient's presentation with on-call Dr. Roque with GI and patient does not require inpatient consultation at this time but will have urgent outpatient follow-up following discharge from the hospital.    Interval Problem Update  1/23 patient states that she does feel markedly better since admission.  She has received 3 units of blood and now has color in her cheeks.  Potassium is slightly low at 3.2 and will be replaced.  Magnesium also 1.6 and will also be replaced.  Discussed briefly with GI and recommend outpatient follow-up no need for inpatient consultation at this time.  Patient with history of alcoholism and new diagnosis of cirrhosis on this hospital stay.  1/24 patient continues to show improvement daily.  Hemoglobin is up to 8.2.  Potassium was also significantly low at 2.8 despite replacement yesterday.  Will give 80 mEq today divided into 2 doses.  Lasix and Aldactone hold parameters have been adjusted.  Her lower extremity edema is continuing to improve.  She was seen by physical and Occupational Therapy earlier today and recommended skilled nursing for placement to continue to work on strengthening.  1/25 patient  continuing to show improvement and worked well with therapy yesterday initially they are recommending skilled nursing however this is continued to drop weight with diuresis and is ambulatory and is able to shower.  She will get a front wheel walker and she has friends and family coming and starting on Saturday to stay with her for the next few weeks.  As long as she has help around home she should be able to discharge home.  Placed home health order today.    I have discussed this patient's plan of care and discharge plan at IDT rounds today with Case Management, Nursing, Nursing leadership, and other members of the IDT team.    Consultants/Specialty  none    Code Status  Full Code    Disposition  The patient is not medically cleared for discharge to home or a post-acute facility.  Anticipate discharge to: home with organized home healthcare and close outpatient follow-up    I have placed the appropriate orders for post-discharge needs.    Review of Systems  Review of Systems   Constitutional:  Negative for chills and fever.   HENT:  Negative for congestion.    Eyes:  Negative for blurred vision and photophobia.   Respiratory:  Negative for cough and shortness of breath.    Cardiovascular:  Positive for leg swelling (improving). Negative for chest pain and claudication.   Gastrointestinal:  Negative for abdominal pain, constipation, diarrhea, heartburn and vomiting.   Genitourinary:  Negative for dysuria and hematuria.   Musculoskeletal:  Negative for joint pain and myalgias.   Skin:  Negative for itching and rash.   Neurological:  Positive for weakness. Negative for dizziness, sensory change, speech change and headaches.   Psychiatric/Behavioral:  Negative for depression. The patient is not nervous/anxious and does not have insomnia.         Physical Exam  Temp:  [36.3 °C (97.4 °F)-37.2 °C (99 °F)] 36.4 °C (97.6 °F)  Pulse:  [83-93] 87  Resp:  [16-18] 16  BP: (105-126)/(49-65) 113/55  SpO2:  [90 %-98 %] 98  %    Physical Exam  Vitals and nursing note reviewed.   Constitutional:       General: She is not in acute distress.     Appearance: Normal appearance. She is not ill-appearing.   HENT:      Head: Normocephalic and atraumatic.      Nose: Nose normal.   Cardiovascular:      Rate and Rhythm: Normal rate and regular rhythm.      Heart sounds: Murmur heard.   Pulmonary:      Effort: Pulmonary effort is normal.      Breath sounds: Normal breath sounds.   Abdominal:      General: Bowel sounds are normal. There is no distension.      Palpations: Abdomen is soft.   Musculoskeletal:         General: No swelling or tenderness.      Cervical back: Neck supple.      Right lower leg: Edema (mild) present.      Left lower leg: Edema (mild) present.   Skin:     General: Skin is warm and dry.   Neurological:      General: No focal deficit present.      Mental Status: She is alert and oriented to person, place, and time.   Psychiatric:         Mood and Affect: Mood normal.         Fluids    Intake/Output Summary (Last 24 hours) at 1/25/2024 1404  Last data filed at 1/25/2024 1400  Gross per 24 hour   Intake 690 ml   Output --   Net 690 ml         Laboratory  Recent Labs     01/23/24 0214 01/23/24  0953 01/24/24  0209 01/24/24  0957 01/24/24  1830 01/25/24  0059   WBC 9.0  --  7.0  --   --  9.1   RBC 3.26*  --  3.89*  --   --  3.38*   HEMOGLOBIN 6.5*   < > 8.2* 8.0* 8.4* 7.1*   HEMATOCRIT 21.7*  --  26.9*  --   --  23.7*   MCV 66.6*  --  69.2*  --   --  70.1*   MCH 19.9*  --  21.1*  --   --  21.0*   MCHC 30.0*  --  30.5*  --   --  30.0*   RDW 59.3*  --  62.4*  --   --  64.8*   PLATELETCT 225  --  200  --   --  196   MPV 9.7  --  9.7  --   --  9.8    < > = values in this interval not displayed.       Recent Labs     01/23/24  0214 01/24/24  0209 01/25/24  0059   SODIUM 134* 134* 134*   POTASSIUM 3.2* 2.8* 3.4*   CHLORIDE 97 98 97   CO2 22 24 25   GLUCOSE 114* 100* 107*   BUN 26* 19 18   CREATININE 1.22 1.24 1.20   CALCIUM 8.4 8.5 8.8                  Recent Labs     01/23/24  0214   TRIGLYCERIDE 74   HDL 26*   LDL 65         Imaging  EC-ECHOCARDIOGRAM COMPLETE W/O CONT   Final Result      CT-ABDOMEN-PELVIS WITH   Final Result      1.  Cirrhotic appearance of the liver.      2.  Splenomegaly with dilated portal vein, recanalization of the umbilical vein and mesenteric varices consistent with portal hypertension.      3.  Moderate to large volume of ascites.      4.  Gallstones.      5.  Atherosclerotic vascular calcification. There are small bilateral pleural effusions with bibasilar atelectasis.      6.  Mild diffuse bowel wall thickening likely related to presence of portal hypertension.      7.  Anasarca.      DX-CHEST-PORTABLE (1 VIEW)   Final Result      1.  Cardiomegaly.      2.  Linear atelectasis within the left lung base.      3.  Minimal bilateral pleural effusion.           Assessment/Plan  * Acute combined systolic and diastolic heart failure (HCC)- (present on admission)  Assessment & Plan  Likely secondary to untreated chronic anemia  Continue Lasix 40 mg IV bid, aldactone 25 mg qd  Log daily strict input and output  Log daily standing weights.  Echo shows EF 40%, Grade II diastolic dysfunction, Mod AV stenosis      Liver cirrhosis (HCC)- (present on admission)  Assessment & Plan  Likely related to alcohol., new diagnosis  Continue Lasix and spironolactone.  No active bleeding, will need to establish care with [facility] - discussed briefly with Dr Roque and he will arrange office to call patient, no need for inpatient consultation at this time    Severe anemia requiring blood transfusions anemia- (present on admission)  Assessment & Plan  Presenting with a very low hemoglobin of 3.9  Likely severe iron deficient anemia  IV Iron initiated  S/p 3 units blood transfusion - currently hgb 7.1 but doubt accuracy  Discussed with GI - outpatient follow up unless stability changes.      Elevated brain natriuretic peptide (BNP) level- (present on  admission)  Assessment & Plan  Secondary to heart failure    Troponin I above reference range- (present on admission)  Assessment & Plan  No chest pain  Echo shows systolic and diastolic heart failure along with moderate AV stenosis.    Hypokalemia- (present on admission)  Assessment & Plan  Replace K        Hyponatremia- (present on admission)  Assessment & Plan  improved with diuretics.   134    Stage 3b chronic kidney disease (HCC)- (present on admission)  Assessment & Plan  Avoid/minimize nephrotoxins as much as possible, renally dose medications, monitor inputs and outputs          VTE prophylaxis:   SCDs/TEDs      I have performed a physical exam and reviewed and updated ROS and Plan today (1/25/2024). In review of yesterday's note (1/24/2024), there are no changes except as documented above.

## 2024-01-25 NOTE — PROGRESS NOTES
Telemetry Shift Summary     Rhythm: SR   HR Range:73-86  Ectopy: rPVC   Measurements: 0.16/0.10/0.34    Normal Values  Measurements: 0.12- 0.20 / 0.08-0.10 / 0.30-0.52

## 2024-01-25 NOTE — THERAPY
"Occupational Therapy  Discharge      Patient Name: Denisse Abel  Age:  63 y.o., Sex:  female  Medical Record #: [id number]  Today's Date: 1/25/2024     Precautions  Precautions: Fall Risk    Assessment    Pt progressing nicely, appears home with supervision from friends/family would be appropriate.  Needs FWW for home, refuses recommendation for shower chair.  No further OT needs as pt declined need for further OT intervention.     Plan    Reason for Discharge From Therapy: Discharge Secondary to No Likely Benefit (Pt has progressed quickly, denied need for further OT intervention at this time)    DC Equipment Recommendations: Front-Wheel Walker  Discharge Recommendations: Anticipate that the patient will have no further occupational therapy needs after discharge from the hospital    Subjective    \"I think I'll be just fine. You have already taught me so much.\"     Objective       01/25/24 1115   Precautions   Precautions Fall Risk   Cognition    Comments Pt appears more alert today, still witld mildy impaired insight to limitations, but improved from yesterday.   Other Treatments   Other Treatments Provided Met with pt after seeing her walk in [place] with PT and FWW.  She is more steady today and sup with FWW.  Educated on possible need for shower chair at home and for assist from famiy/friends initially.  Pt declined need for shower chair stating that \"I was able to shower before I came in even though I was in worse shape than I am in now.  Pt reminded that even a folding chair could be used temporarily as a precuation.  Pt reports she has friends ready to come stay with her or assist until her cousin comes on Tuesday to come stay with her for a bit   Balance   Sitting Balance (Static) Good   Sitting Balance (Dynamic) Good   Standing Balance (Static) Fair +   Standing Balance (Dynamic) Fair   Weight Shift Sitting Good   Weight Shift Standing Fair   Comments with FWW   Activities of Daily Living   Comments " reports she showered with nursing yesterday and has been to [facility] supervised   How much help from another person does the patient currently need...   Putting on and taking off regular lower body clothing? 3   Bathing (including washing, rinsing, and drying)? 3   Toileting, which includes using a toilet, bedpan, or urinal? 3   Putting on and taking off regular upper body clothing? 3   Taking care of personal grooming such as brushing teeth? 3   Eating meals? 4   6 Clicks Daily Activity Score 19   Functional Mobility   Sit to Stand Standby Assist   Bed, Chair, Wheelchair Transfer Standby Assist   Toilet Transfers Standby Assist  (per report)   Transfer Method Stand Step   Mobility SBA with FWW   Comments observed to walk >100 ft in [place] with FWW during PT   Patient / Family Goals   Patient / Family Goal #1 home   Short Term Goals   Short Term Goal # 1 Pt will dress from seated with SBA in 5 visits   Goal Outcome # 1 Progressing as expected   Short Term Goal # 2 Pt will toilet supervised in 5 visits   Goal Outcome # 2 Goal met   Short Term Goal # 3 PT will stand 10 min at sink to groom supervised in 5 visits   Goal Outcome # 3 Progressing as expected   Education Group   Education Provided Adaptive Equipment   Adaptive Equipment Patient Response Patient;Acceptance;Explanation;Reinforcement Needed   Occupational Therapy Treatment Plan    O.T. Treatment Plan Modify Current Treatment Plan   Reason For Discharge Discharge Secondary to No Likely Benefit  (Pt has progressed quickly, denied need for further OT intervention at this time)

## 2024-01-25 NOTE — CARE PLAN
The patient is Stable - Low risk of patient condition declining or worsening    Shift Goals  Clinical Goals: Up in chair for 3 meals, ambulate to bathroom  Patient Goals: Shower  Family Goals: MORGAN    Progress made toward(s) clinical / shift goals:    Problem: Mobility  Goal: Patient's capacity to carry out activities will improve  Description: Target End Date:  Prior to discharge or change in level of care    1.  Assess for barriers to mobility/activity  2.  Implement activity per interdisciplinary team recommendations  3.  Target activity level identified and patient/family/caregiver aware of goal  4.  Provide assistive devices  5.  Instruct patient/caregiver on proper use of assistive/adaptive devices  6.  Schedule activities and rest periods to decrease effects of fatigue  7.  Encourage mobilization to extent of ability  8.  Maintain proper body alignment  9.  Provide adequate pain management to allow progressive mobilization  10. Implement pace maker precautions as needed  Outcome: Progressing   Pt was able to sit in chair for breakfast and lunch.    Problem: Self Care  Goal: Patient will have the ability to perform ADLs independently or with assistance (bathe, groom, dress, toilet and feed)  Description: Target End Date:  Prior to discharge or change in level of care    Document on ADL flowsheet    1.  Assess the capability and level of deficiency to perform ADLs  2.  Encourage family/care giver involvement  3.  Provide assistive devices  4.  Consider PT/OT evaluations  5.  Maintain support, give positive feedback, encourage self-care allowing extra time and verbal cuing as needed  6.  Avoid doing something for patients they can do themselves, but provide assistance as needed  7.  Assist in anticipating/planning individual needs  8.  Collaborate with Case Management and  to meet discharge needs  Outcome: Progressing   Pt was able to get to the bathroom and perform ADLs with little assistance.

## 2024-01-25 NOTE — CARE PLAN
The patient is Stable - Low risk of patient condition declining or worsening    Shift Goals  Clinical Goals: stool sample, AM labs, hemodynamically stability  Patient Goals: get better  Family Goals: MORGAN    Progress made toward(s) clinical / shift goals:  Pt reports no pain at this time, waiting for stool collection, repeat AM labs, pt is urinating multiple times and ambulates to the bathroom with FWW and staff member, bed alarm on, call light within reach    Patient is not progressing towards the following goals:      Problem: Pain - Standard  Goal: Alleviation of pain or a reduction in pain to the patient’s comfort goal  Outcome: Progressing     Problem: Knowledge Deficit - Standard  Goal: Patient and family/care givers will demonstrate understanding of plan of care, disease process/condition, diagnostic tests and medications  Outcome: Progressing     Problem: Fall Risk  Goal: Patient will remain free from falls  Outcome: Progressing     Problem: Hemodynamics  Goal: Patient's hemodynamics, fluid balance and neurologic status will be stable or improve  Outcome: Progressing

## 2024-01-25 NOTE — PROGRESS NOTES
Monitor Summary:    Rhythm:  SR   Rate Range:  83-95  Ectopy: R/oPVC/PAC    Measurements:  0.18/0.10/0.38  (Measured by monitor tech)

## 2024-01-26 LAB
ABO GROUP BLD: NORMAL
ANION GAP SERPL CALC-SCNC: 12 MMOL/L (ref 7–16)
BARCODED ABORH UBTYP: 5100
BARCODED PRD CODE UBPRD: NORMAL
BARCODED UNIT NUM UBUNT: NORMAL
BLD GP AB SCN SERPL QL: NORMAL
BUN SERPL-MCNC: 18 MG/DL (ref 8–22)
CALCIUM SERPL-MCNC: 8.8 MG/DL (ref 8.4–10.2)
CHLORIDE SERPL-SCNC: 98 MMOL/L (ref 96–112)
CO2 SERPL-SCNC: 24 MMOL/L (ref 20–33)
COMPONENT R 8504R: NORMAL
CREAT SERPL-MCNC: 1.36 MG/DL (ref 0.5–1.4)
ERYTHROCYTE [DISTWIDTH] IN BLOOD BY AUTOMATED COUNT: 67.4 FL (ref 35.9–50)
ERYTHROCYTE [DISTWIDTH] IN BLOOD BY AUTOMATED COUNT: 67.9 FL (ref 35.9–50)
GFR SERPLBLD CREATININE-BSD FMLA CKD-EPI: 43 ML/MIN/1.73 M 2
GLUCOSE SERPL-MCNC: 103 MG/DL (ref 65–99)
HCT VFR BLD AUTO: 23.6 % (ref 37–47)
HCT VFR BLD AUTO: 31.9 % (ref 37–47)
HGB BLD-MCNC: 7 G/DL (ref 12–16)
HGB BLD-MCNC: 9.9 G/DL (ref 12–16)
MCH RBC QN AUTO: 21.3 PG (ref 27–33)
MCH RBC QN AUTO: 23.1 PG (ref 27–33)
MCHC RBC AUTO-ENTMCNC: 29.7 G/DL (ref 32.2–35.5)
MCHC RBC AUTO-ENTMCNC: 31 G/DL (ref 32.2–35.5)
MCV RBC AUTO: 72 FL (ref 81.4–97.8)
MCV RBC AUTO: 74.5 FL (ref 81.4–97.8)
PLATELET # BLD AUTO: 161 K/UL (ref 164–446)
PLATELET # BLD AUTO: 168 K/UL (ref 164–446)
PMV BLD AUTO: 9.1 FL (ref 9–12.9)
PMV BLD AUTO: 9.7 FL (ref 9–12.9)
POTASSIUM SERPL-SCNC: 3.7 MMOL/L (ref 3.6–5.5)
PRODUCT TYPE UPROD: NORMAL
RBC # BLD AUTO: 3.28 M/UL (ref 4.2–5.4)
RBC # BLD AUTO: 4.28 M/UL (ref 4.2–5.4)
RH BLD: NORMAL
SODIUM SERPL-SCNC: 134 MMOL/L (ref 135–145)
UNIT STATUS USTAT: NORMAL
WBC # BLD AUTO: 8.7 K/UL (ref 4.8–10.8)
WBC # BLD AUTO: 9.1 K/UL (ref 4.8–10.8)

## 2024-01-26 PROCEDURE — 700102 HCHG RX REV CODE 250 W/ 637 OVERRIDE(OP): Performed by: INTERNAL MEDICINE

## 2024-01-26 PROCEDURE — 700102 HCHG RX REV CODE 250 W/ 637 OVERRIDE(OP): Performed by: HOSPITALIST

## 2024-01-26 PROCEDURE — 86901 BLOOD TYPING SEROLOGIC RH(D): CPT

## 2024-01-26 PROCEDURE — 86900 BLOOD TYPING SEROLOGIC ABO: CPT

## 2024-01-26 PROCEDURE — 700111 HCHG RX REV CODE 636 W/ 250 OVERRIDE (IP): Performed by: INTERNAL MEDICINE

## 2024-01-26 PROCEDURE — P9016 RBC LEUKOCYTES REDUCED: HCPCS

## 2024-01-26 PROCEDURE — 36430 TRANSFUSION BLD/BLD COMPNT: CPT

## 2024-01-26 PROCEDURE — 86923 COMPATIBILITY TEST ELECTRIC: CPT

## 2024-01-26 PROCEDURE — 94760 N-INVAS EAR/PLS OXIMETRY 1: CPT

## 2024-01-26 PROCEDURE — 80048 BASIC METABOLIC PNL TOTAL CA: CPT

## 2024-01-26 PROCEDURE — 770020 HCHG ROOM/CARE - TELE (206)

## 2024-01-26 PROCEDURE — A9270 NON-COVERED ITEM OR SERVICE: HCPCS | Performed by: HOSPITALIST

## 2024-01-26 PROCEDURE — 85027 COMPLETE CBC AUTOMATED: CPT

## 2024-01-26 PROCEDURE — 36415 COLL VENOUS BLD VENIPUNCTURE: CPT

## 2024-01-26 PROCEDURE — A9270 NON-COVERED ITEM OR SERVICE: HCPCS | Performed by: INTERNAL MEDICINE

## 2024-01-26 PROCEDURE — 86850 RBC ANTIBODY SCREEN: CPT

## 2024-01-26 PROCEDURE — 99232 SBSQ HOSP IP/OBS MODERATE 35: CPT | Performed by: INTERNAL MEDICINE

## 2024-01-26 RX ORDER — TRAMADOL HYDROCHLORIDE 50 MG/1
100 TABLET ORAL EVERY 6 HOURS PRN
Status: DISCONTINUED | OUTPATIENT
Start: 2024-01-26 | End: 2024-01-27 | Stop reason: HOSPADM

## 2024-01-26 RX ORDER — SPIRONOLACTONE 25 MG/1
25 TABLET ORAL ONCE
Status: COMPLETED | OUTPATIENT
Start: 2024-01-26 | End: 2024-01-26

## 2024-01-26 RX ORDER — SPIRONOLACTONE 50 MG/1
50 TABLET, FILM COATED ORAL
Status: DISCONTINUED | OUTPATIENT
Start: 2024-01-27 | End: 2024-01-27 | Stop reason: HOSPADM

## 2024-01-26 RX ORDER — OXYCODONE HYDROCHLORIDE 5 MG/1
5 TABLET ORAL EVERY 4 HOURS PRN
Status: DISCONTINUED | OUTPATIENT
Start: 2024-01-26 | End: 2024-01-27 | Stop reason: HOSPADM

## 2024-01-26 RX ORDER — TRAMADOL HYDROCHLORIDE 50 MG/1
50 TABLET ORAL EVERY 6 HOURS PRN
Status: DISCONTINUED | OUTPATIENT
Start: 2024-01-26 | End: 2024-01-27 | Stop reason: HOSPADM

## 2024-01-26 RX ADMIN — OMEPRAZOLE 20 MG: 20 CAPSULE, DELAYED RELEASE ORAL at 06:39

## 2024-01-26 RX ADMIN — OMEPRAZOLE 20 MG: 20 CAPSULE, DELAYED RELEASE ORAL at 16:32

## 2024-01-26 RX ADMIN — POTASSIUM CHLORIDE 40 MEQ: 1500 TABLET, EXTENDED RELEASE ORAL at 16:33

## 2024-01-26 RX ADMIN — Medication 5 MG: at 21:08

## 2024-01-26 RX ADMIN — FUROSEMIDE 40 MG: 10 INJECTION, SOLUTION INTRAMUSCULAR; INTRAVENOUS at 16:32

## 2024-01-26 RX ADMIN — TRAMADOL HYDROCHLORIDE 100 MG: 50 TABLET ORAL at 21:05

## 2024-01-26 RX ADMIN — POTASSIUM CHLORIDE 40 MEQ: 1500 TABLET, EXTENDED RELEASE ORAL at 06:39

## 2024-01-26 RX ADMIN — FUROSEMIDE 40 MG: 10 INJECTION, SOLUTION INTRAMUSCULAR; INTRAVENOUS at 06:39

## 2024-01-26 RX ADMIN — OXYCODONE 5 MG: 5 TABLET ORAL at 09:41

## 2024-01-26 RX ADMIN — SPIRONOLACTONE 25 MG: 25 TABLET ORAL at 06:39

## 2024-01-26 RX ADMIN — ACETAMINOPHEN 650 MG: 325 TABLET ORAL at 06:14

## 2024-01-26 RX ADMIN — SPIRONOLACTONE 25 MG: 25 TABLET ORAL at 14:58

## 2024-01-26 RX ADMIN — TRAMADOL HYDROCHLORIDE 100 MG: 50 TABLET ORAL at 14:58

## 2024-01-26 ASSESSMENT — ENCOUNTER SYMPTOMS
CLAUDICATION: 0
DIZZINESS: 0
HEARTBURN: 0
SHORTNESS OF BREATH: 0
HEADACHES: 0
CHILLS: 0
DEPRESSION: 0
NERVOUS/ANXIOUS: 0
CONSTIPATION: 0
INSOMNIA: 0
PHOTOPHOBIA: 0
BLURRED VISION: 0
ABDOMINAL PAIN: 0
COUGH: 0
DIARRHEA: 0
WEAKNESS: 1
FEVER: 0
VOMITING: 0
MYALGIAS: 0
BACK PAIN: 1
SENSORY CHANGE: 0
SPEECH CHANGE: 0

## 2024-01-26 ASSESSMENT — PAIN DESCRIPTION - PAIN TYPE
TYPE: ACUTE PAIN

## 2024-01-26 ASSESSMENT — FIBROSIS 4 INDEX: FIB4 SCORE: 2.49

## 2024-01-26 ASSESSMENT — PAIN SCALES - WONG BAKER: WONGBAKER_NUMERICALRESPONSE: DOESN'T HURT AT ALL

## 2024-01-26 NOTE — PROGRESS NOTES
Telemetry Shift Summary     Rhythm: SR   HR Range:86-94  Ectopy: r-fPVC rPAC r-oTRIG rBIG rCOUP  Measurements: 0.18/0.10/0.40    Normal Values  Measurements: 0.12- 0.20 / 0.08-0.10 / 0.30-0.52

## 2024-01-26 NOTE — DISCHARGE PLANNING
Case Management Discharge Planning    Admission Date: 1/22/2024  GMLOS: 3.8  ALOS: 4    6-Clicks ADL Score: 19  6-Clicks Mobility Score: 18      Anticipated Discharge Dispo: Discharge Disposition: D/T to home under [facility] care in anticipation of covered skilled care (06)    DME Needed: No    Action(s) Taken: RNCM attended IDT rounds and reviewed chart. PT/OT now anticipating no further PT/OT needs upon DC. Pt lives alone but family/friends from out-of-town planning to stay with her. Anticipate home with HH services. Pending HH order.    Escalations Completed: None    Medically Clear: No    Next Steps:  f/u with medical team to discuss DC needs and barriers    Barriers to Discharge: Medical clearance

## 2024-01-26 NOTE — CARE PLAN
The patient is Watcher - Medium risk of patient condition declining or worsening    Shift Goals  Clinical Goals: Monitor Hemoglobin; duireses  Patient Goals: Rest; comfort  Family Goals: MORGAN    Progress made toward(s) clinical / shift goals:  Last Hemoglobin 7.1 continue to monitor with AM labs. Continue diuresing with IV lasix.     Problem: Pain - Standard  Goal: Alleviation of pain or a reduction in pain to the patient’s comfort goal  Outcome: Progressing     Problem: Knowledge Deficit - Standard  Goal: Patient and family/care givers will demonstrate understanding of plan of care, disease process/condition, diagnostic tests and medications  Outcome: Progressing     Problem: Fall Risk  Goal: Patient will remain free from falls  Outcome: Progressing       Patient is not progressing towards the following goals:N/A

## 2024-01-26 NOTE — PROGRESS NOTES
Hospital Medicine Daily Progress Note    Date of Service  1/26/2024    Chief Complaint  Denisse Abel is a 63 y.o. female admitted 1/22/2024 with swelling, weight gain    Hospital Course  Patient is a 63-year-old female with medical history of alcohol dependence that she stopped in November came in on January 22 with worsening weakness, shortness of breath lower extremity swelling.  She is lost 30 pounds in the last 8 months gained 20 pounds over the last month.  2D echocardiogram is consistent with both systolic and diastolic heart failure with an ejection fraction of 40% and diastolic dysfunction grade 2.  Patient is doing well with the diuretics and has required 3 units transfusion to get her hemoglobin from 3.9-7.5.  Potassium still low and require replacement.  I briefly discussed the patient's presentation with on-call Dr. Roque with GI and patient does not require inpatient consultation at this time but will have urgent outpatient follow-up following discharge from the hospital.    Interval Problem Update  1/23 patient states that she does feel markedly better since admission.  She has received 3 units of blood and now has color in her cheeks.  Potassium is slightly low at 3.2 and will be replaced.  Magnesium also 1.6 and will also be replaced.  Discussed briefly with GI and recommend outpatient follow-up no need for inpatient consultation at this time.  Patient with history of alcoholism and new diagnosis of cirrhosis on this hospital stay.  1/24 patient continues to show improvement daily.  Hemoglobin is up to 8.2.  Potassium was also significantly low at 2.8 despite replacement yesterday.  Will give 80 mEq today divided into 2 doses.  Lasix and Aldactone hold parameters have been adjusted.  Her lower extremity edema is continuing to improve.  She was seen by physical and Occupational Therapy earlier today and recommended skilled nursing for placement to continue to work on strengthening.  1/25 patient  continuing to show improvement and worked well with therapy yesterday initially they are recommending skilled nursing however this is continued to drop weight with diuresis and is ambulatory and is able to shower.  She will get a front wheel walker and she has friends and family coming and starting on Saturday to stay with her for the next few weeks.  As long as she has help around home she should be able to discharge home.  Placed home health order today.  1/26 patient remains in good spirits but states that she did have a difficult night getting comfortable.  She is in the process of getting scheduled for procedure with Dr. Bryson for her back and should be able to proceed with that next week.  I recommended that she call the clinic and let them know that she is in the hospital but should be medically cleared for the procedure.  Hemoglobin 7.0 this morning that means the 7.1 yesterday was accurate.  I do not believe that she is bleeding but I do believe that it took her a few days to balance out the previous transfusions.  Will give her another unit of blood today and anticipate her hemoglobin to be greater than 8.0 tomorrow.  As long as her hemoglobin is appropriately adjusted with the transfusion anticipate that she should be able to discharge home with home health tomorrow.    I have discussed this patient's plan of care and discharge plan at IDT rounds today with Case Management, Nursing, Nursing leadership, and other members of the IDT team.    Consultants/Specialty  none    Code Status  Full Code    Disposition  The patient is not medically cleared for discharge to home or a post-acute facility.  Anticipate discharge to: home with organized home healthcare and close outpatient follow-up    I have placed the appropriate orders for post-discharge needs.    Review of Systems  Review of Systems   Constitutional:  Negative for chills and fever.   HENT:  Negative for congestion.    Eyes:  Negative for blurred  vision and photophobia.   Respiratory:  Negative for cough and shortness of breath.    Cardiovascular:  Positive for leg swelling (improving). Negative for chest pain and claudication.   Gastrointestinal:  Negative for abdominal pain, constipation, diarrhea, heartburn and vomiting.   Genitourinary:  Negative for dysuria and hematuria.   Musculoskeletal:  Positive for back pain. Negative for joint pain and myalgias.   Skin:  Negative for itching and rash.   Neurological:  Positive for weakness. Negative for dizziness, sensory change, speech change and headaches.   Psychiatric/Behavioral:  Negative for depression. The patient is not nervous/anxious and does not have insomnia.         Physical Exam  Temp:  [36.6 °C (97.9 °F)-37.1 °C (98.8 °F)] 36.9 °C (98.5 °F)  Pulse:  [79-93] 79  Resp:  [16-20] 18  BP: (103-128)/(51-67) 128/67  SpO2:  [93 %-99 %] 98 %    Physical Exam  Vitals and nursing note reviewed.   Constitutional:       General: She is not in acute distress.     Appearance: Normal appearance. She is not ill-appearing.   HENT:      Head: Normocephalic and atraumatic.      Nose: Nose normal.   Cardiovascular:      Rate and Rhythm: Normal rate and regular rhythm.      Heart sounds: Murmur heard.   Pulmonary:      Effort: Pulmonary effort is normal.      Breath sounds: Normal breath sounds.   Abdominal:      General: Bowel sounds are normal. There is no distension.      Palpations: Abdomen is soft.   Musculoskeletal:         General: No swelling or tenderness.      Cervical back: Neck supple.      Right lower leg: Edema (mild) present.      Left lower leg: Edema (mild) present.   Skin:     General: Skin is warm and dry.   Neurological:      General: No focal deficit present.      Mental Status: She is alert and oriented to person, place, and time.   Psychiatric:         Mood and Affect: Mood normal.         Fluids    Intake/Output Summary (Last 24 hours) at 1/26/2024 1336  Last data filed at 1/26/2024 1220  Gross  per 24 hour   Intake 856.67 ml   Output --   Net 856.67 ml         Laboratory  Recent Labs     01/24/24  0209 01/24/24  0957 01/24/24  1830 01/25/24 0059 01/26/24 0139   WBC 7.0  --   --  9.1 8.7   RBC 3.89*  --   --  3.38* 3.28*   HEMOGLOBIN 8.2*   < > 8.4* 7.1* 7.0*   HEMATOCRIT 26.9*  --   --  23.7* 23.6*   MCV 69.2*  --   --  70.1* 72.0*   MCH 21.1*  --   --  21.0* 21.3*   MCHC 30.5*  --   --  30.0* 29.7*   RDW 62.4*  --   --  64.8* 67.4*   PLATELETCT 200  --   --  196 168   MPV 9.7  --   --  9.8 9.7    < > = values in this interval not displayed.       Recent Labs     01/24/24  0209 01/25/24 0059 01/26/24 0139   SODIUM 134* 134* 134*   POTASSIUM 2.8* 3.4* 3.7   CHLORIDE 98 97 98   CO2 24 25 24   GLUCOSE 100* 107* 103*   BUN 19 18 18   CREATININE 1.24 1.20 1.36   CALCIUM 8.5 8.8 8.8                         Imaging  EC-ECHOCARDIOGRAM COMPLETE W/O CONT   Final Result      CT-ABDOMEN-PELVIS WITH   Final Result      1.  Cirrhotic appearance of the liver.      2.  Splenomegaly with dilated portal vein, recanalization of the umbilical vein and mesenteric varices consistent with portal hypertension.      3.  Moderate to large volume of ascites.      4.  Gallstones.      5.  Atherosclerotic vascular calcification. There are small bilateral pleural effusions with bibasilar atelectasis.      6.  Mild diffuse bowel wall thickening likely related to presence of portal hypertension.      7.  Anasarca.      DX-CHEST-PORTABLE (1 VIEW)   Final Result      1.  Cardiomegaly.      2.  Linear atelectasis within the left lung base.      3.  Minimal bilateral pleural effusion.           Assessment/Plan  * Acute combined systolic and diastolic heart failure (HCC)- (present on admission)  Assessment & Plan  Likely secondary to untreated chronic anemia  Continue Lasix 40 mg IV bid, increase aldactone to 50 mg qd  Log daily strict input and output  Log daily standing weights.  Echo shows EF 40%, Grade II diastolic dysfunction, Mod AV  stenosis      Liver cirrhosis (HCC)- (present on admission)  Assessment & Plan  Likely related to alcohol., new diagnosis  Continue Lasix and spironolactone.  No active bleeding, will need to establish care with [facility] - discussed briefly with Dr Roque and he will arrange office to call patient, no need for inpatient consultation at this time    Severe anemia requiring blood transfusions anemia- (present on admission)  Assessment & Plan  Presenting with a very low hemoglobin of 3.9  Likely severe iron deficient anemia  IV Iron initiated  S/p 3 units blood transfusion - currently hgb 7.0, transfuse another unit today, anticipate hgb >8.0 tomorrow.  Discussed with GI - outpatient follow up unless stability changes.      Elevated brain natriuretic peptide (BNP) level- (present on admission)  Assessment & Plan  Secondary to heart failure    Troponin I above reference range- (present on admission)  Assessment & Plan  No chest pain  Echo shows systolic and diastolic heart failure along with moderate AV stenosis.    Hypokalemia- (present on admission)  Assessment & Plan  Replace K        Hyponatremia- (present on admission)  Assessment & Plan  improved with diuretics.   134    Stage 3b chronic kidney disease (HCC)- (present on admission)  Assessment & Plan  Avoid/minimize nephrotoxins as much as possible, renally dose medications, monitor inputs and outputs          VTE prophylaxis:   SCDs/TEDs      I have performed a physical exam and reviewed and updated ROS and Plan today (1/26/2024). In review of yesterday's note (1/25/2024), there are no changes except as documented above.

## 2024-01-26 NOTE — CARE PLAN
The patient is Stable - Low risk of patient condition declining or worsening    Shift Goals  Clinical Goals: Blood transfusion  Patient Goals: Go home  Family Goals: MORGAN    Progress made toward(s) clinical / shift goals:    Problem: Psychosocial  Goal: Patient's ability to verbalize feelings about condition will improve  Description: Target End Date:  Prior to discharge or change in level of care    1.  Discuss coping with medical condition and its effects  2.  Encourage patient participation in care  3.  Encourage acknowledgement of body changes and accompanying emotions  4.  Perform depression screening  Outcome: Progressing     Problem: Mobility  Goal: Patient's capacity to carry out activities will improve  Description: Target End Date:  Prior to discharge or change in level of care    1.  Assess for barriers to mobility/activity  2.  Implement activity per interdisciplinary team recommendations  3.  Target activity level identified and patient/family/caregiver aware of goal  4.  Provide assistive devices  5.  Instruct patient/caregiver on proper use of assistive/adaptive devices  6.  Schedule activities and rest periods to decrease effects of fatigue  7.  Encourage mobilization to extent of ability  8.  Maintain proper body alignment  9.  Provide adequate pain management to allow progressive mobilization  10. Implement pace maker precautions as needed  Outcome: Progressing

## 2024-01-26 NOTE — PROGRESS NOTES
Telemetry Shift Summary    Rhythm SR/ST  HR Range   Ectopy o-fPVC, r-oBig, o-fTrig, rPAC  Measurements 0.16/0.08/0.38        Normal Values  Rhythm SR  HR Range    Measurements 0.12-0.20 / 0.06-0.10  / 0.30-0.52

## 2024-01-27 VITALS
TEMPERATURE: 98.9 F | BODY MASS INDEX: 23.77 KG/M2 | RESPIRATION RATE: 16 BRPM | WEIGHT: 151.46 LBS | DIASTOLIC BLOOD PRESSURE: 67 MMHG | HEART RATE: 81 BPM | OXYGEN SATURATION: 94 % | SYSTOLIC BLOOD PRESSURE: 123 MMHG | HEIGHT: 67 IN

## 2024-01-27 LAB
ANION GAP SERPL CALC-SCNC: 12 MMOL/L (ref 7–16)
BUN SERPL-MCNC: 17 MG/DL (ref 8–22)
CALCIUM SERPL-MCNC: 8.9 MG/DL (ref 8.4–10.2)
CHLORIDE SERPL-SCNC: 95 MMOL/L (ref 96–112)
CO2 SERPL-SCNC: 27 MMOL/L (ref 20–33)
CREAT SERPL-MCNC: 1.16 MG/DL (ref 0.5–1.4)
ERYTHROCYTE [DISTWIDTH] IN BLOOD BY AUTOMATED COUNT: 68 FL (ref 35.9–50)
GFR SERPLBLD CREATININE-BSD FMLA CKD-EPI: 52 ML/MIN/1.73 M 2
GLUCOSE SERPL-MCNC: 84 MG/DL (ref 65–99)
HCT VFR BLD AUTO: 29.4 % (ref 37–47)
HGB BLD-MCNC: 9.1 G/DL (ref 12–16)
MCH RBC QN AUTO: 23 PG (ref 27–33)
MCHC RBC AUTO-ENTMCNC: 31 G/DL (ref 32.2–35.5)
MCV RBC AUTO: 74.4 FL (ref 81.4–97.8)
PLATELET # BLD AUTO: 154 K/UL (ref 164–446)
PMV BLD AUTO: 10.2 FL (ref 9–12.9)
POTASSIUM SERPL-SCNC: 3.8 MMOL/L (ref 3.6–5.5)
RBC # BLD AUTO: 3.95 M/UL (ref 4.2–5.4)
SODIUM SERPL-SCNC: 134 MMOL/L (ref 135–145)
WBC # BLD AUTO: 6.8 K/UL (ref 4.8–10.8)

## 2024-01-27 PROCEDURE — 700102 HCHG RX REV CODE 250 W/ 637 OVERRIDE(OP): Performed by: INTERNAL MEDICINE

## 2024-01-27 PROCEDURE — 700102 HCHG RX REV CODE 250 W/ 637 OVERRIDE(OP): Performed by: HOSPITALIST

## 2024-01-27 PROCEDURE — 36415 COLL VENOUS BLD VENIPUNCTURE: CPT

## 2024-01-27 PROCEDURE — A9270 NON-COVERED ITEM OR SERVICE: HCPCS | Performed by: HOSPITALIST

## 2024-01-27 PROCEDURE — 80048 BASIC METABOLIC PNL TOTAL CA: CPT

## 2024-01-27 PROCEDURE — A9270 NON-COVERED ITEM OR SERVICE: HCPCS | Performed by: INTERNAL MEDICINE

## 2024-01-27 PROCEDURE — 94760 N-INVAS EAR/PLS OXIMETRY 1: CPT

## 2024-01-27 PROCEDURE — 99239 HOSP IP/OBS DSCHRG MGMT >30: CPT | Performed by: INTERNAL MEDICINE

## 2024-01-27 PROCEDURE — 85027 COMPLETE CBC AUTOMATED: CPT

## 2024-01-27 PROCEDURE — 700111 HCHG RX REV CODE 636 W/ 250 OVERRIDE (IP): Performed by: INTERNAL MEDICINE

## 2024-01-27 RX ORDER — SPIRONOLACTONE 50 MG/1
50 TABLET, FILM COATED ORAL DAILY
Qty: 30 TABLET | Refills: 3 | Status: ON HOLD | OUTPATIENT
Start: 2024-01-28 | End: 2024-03-04

## 2024-01-27 RX ORDER — POTASSIUM CHLORIDE 20 MEQ/1
TABLET, EXTENDED RELEASE ORAL
Qty: 42 TABLET | Refills: 0 | Status: SHIPPED | OUTPATIENT
Start: 2024-01-27 | End: 2024-02-09

## 2024-01-27 RX ORDER — OMEPRAZOLE 20 MG/1
20 CAPSULE, DELAYED RELEASE ORAL DAILY
Qty: 30 CAPSULE | Refills: 2 | Status: SHIPPED | OUTPATIENT
Start: 2024-01-27

## 2024-01-27 RX ORDER — FUROSEMIDE 40 MG/1
TABLET ORAL
Qty: 42 TABLET | Refills: 1 | Status: SHIPPED | OUTPATIENT
Start: 2024-01-27 | End: 2024-02-09

## 2024-01-27 RX ADMIN — OXYCODONE 5 MG: 5 TABLET ORAL at 02:31

## 2024-01-27 RX ADMIN — DOCUSATE SODIUM 50MG AND SENNOSIDES 8.6MG 2 TABLET: 8.6; 5 TABLET, FILM COATED ORAL at 05:06

## 2024-01-27 RX ADMIN — POTASSIUM CHLORIDE 40 MEQ: 1500 TABLET, EXTENDED RELEASE ORAL at 05:07

## 2024-01-27 RX ADMIN — OMEPRAZOLE 20 MG: 20 CAPSULE, DELAYED RELEASE ORAL at 05:06

## 2024-01-27 RX ADMIN — FUROSEMIDE 40 MG: 10 INJECTION, SOLUTION INTRAMUSCULAR; INTRAVENOUS at 05:06

## 2024-01-27 RX ADMIN — SPIRONOLACTONE 50 MG: 50 TABLET ORAL at 05:07

## 2024-01-27 RX ADMIN — TRAMADOL HYDROCHLORIDE 100 MG: 50 TABLET ORAL at 05:18

## 2024-01-27 ASSESSMENT — FIBROSIS 4 INDEX: FIB4 SCORE: 2.71

## 2024-01-27 NOTE — CARE PLAN
Problem: Pain - Standard  Goal: Alleviation of pain or a reduction in pain to the patient’s comfort goal  Outcome: Progressing  Note: Medicated as per MAR.     Problem: Fall Risk  Goal: Patient will remain free from falls  Outcome: Progressing  Note: Fall precautions measures in place and understood by the patient.   The patient is Stable - Low risk of patient condition declining or worsening    Shift Goals  Clinical Goals: pain control,monitor H&H,diuresis  Patient Goals: discharge,rest,pain control  Family Goals: MORGAN    Progress made toward(s) clinical / shift goals:  Educated on fall precaution,encouraged pancho of call light,updated on plan of care.All questions answered.Patient verbalized understanding.    Patient is not progressing towards the following goals:

## 2024-01-27 NOTE — PROGRESS NOTES
Telemetry Shift Summary     Rhythm SR  HR Range 75-77  Ectopy rPVC,rPAC  Measurements  .20/.08/.38  Per strip printed 0400     Normal Values  Rhythm SR  HR Range    Measurements 0.12-0.20 / 0.06-0.10  / 0.30-0.52

## 2024-01-27 NOTE — PROGRESS NOTES
Telemetry Shift Summary     Rhythm: SR   HR Range:80-91  Ectopy: fPVC r-fCOUP r-oTRIG fBIG rPAC 4 bts Vtach   Measurements: 0.16/0.08/0.38    Normal Values  Measurements: 0.12- 0.20 / 0.08-0.10 / 0.30-0.52

## 2024-01-27 NOTE — DISCHARGE PLANNING
Case Management Discharge Planning    Admission Date: 1/22/2024  GMLOS: 3.8  ALOS: 5    6-Clicks ADL Score: 19  6-Clicks Mobility Score: 18      Anticipated Discharge Dispo: Discharge Disposition: D/T to home under [facility] care in anticipation of covered skilled care (06)    DME Needed: Yes    DME Ordered: Yes    Action(s) Taken: Choice obtained and Referral(s) sent    Escalations Completed: None    Medically Clear: Yes    Course of Action  **0750  LSW spoke to patient on the phone. Discussed home health order placed by MD. Patient agreeable to home health and provided choice for [facility] (1) and [facility] (2). Discussed front wheel walker. Choice obtained for [facility].

## 2024-01-27 NOTE — FLOWSHEET NOTE
Called and left message with hospital  at [phone] for follow up post discharge @1140. Discharge instructions, medications and follow ups reviewed with patient and patient verbalized understanding. Patient left unit via wheelchair, all belongings and walker with patient.

## 2024-01-27 NOTE — DISCHARGE INSTRUCTIONS
Discharge Instructions    Discharged to home by car with relative. Discharged via wheelchair, hospital escort: Yes.  Special equipment needed: Walker    Be sure to schedule a follow-up appointment with your primary care doctor or any specialists as instructed.     Discharge Plan:   Influenza Vaccine Indication: Patient Refuses    I understand that a diet low in cholesterol, fat, and sodium is recommended for good health. Unless I have been given specific instructions below for another diet, I accept this instruction as my diet prescription.   Other diet:     Special Instructions:   HF Patient Discharge Instructions  Monitor your weight daily, and maintain a weight chart, to track your weight changes.   Activity as tolerated, unless your Doctor has ordered otherwise. Other activity order: .  Follow a low fat, low cholesterol, low salt diet unless instructed otherwise by your Doctor. Read the labels on the back of food products and track your intake of fat, cholesterol and salt.   Fluid Restriction No. If a Fluid Restriction has been ordered by your Doctor, measure fluids with a measuring cup to ensure that you are not exceeding the restriction.   No smoking.  Oxygen No. If your Doctor has ordered that you wear Oxygen at home, it is important to wear it as ordered.  Did you receive an explanation from staff on the importance of taking each of your medications and why it is necessary to stay on the medications the physician/care provider has ordered? Yes  Do you have any questions concerning how to manage your heart failure and what to do should you have any increased signs and symptoms after you go home? No  Do you feel like your heart failure care team involved you in the care treatment plan and allowed you to make decisions regarding your care while in the hospital and addressed any discharge needs you might have? Yes    See the educational handout provided at discharge for more information on monitoring your daily  weight, activity and diet. This also explains more about Heart Failure, symptoms of a flare-up and some of the tests that you have undergone.     Warning Signs of a Flare-Up include:  Swelling in the ankles or lower legs.  Shortness of breath, while at rest, or while doing normal activities.   Shortness of breath at night when in bed, or coughing in bed.   Requiring more pillows to sleep at night, or needing to sit up at night to sleep.  Feeling weak, dizzy or fatigued.     When to call your Doctor:  Call [facility] about questions regarding the discharge instructions you were given [phone].  (Discharge Unit Med/Tele)  Call your Primary Care Physician or Cardiologist if:   You experience any pain radiating to your jaw or neck.  You have any difficulty breathing.  You experience weight gain of 2 lbs in a day or 5 lbs in a week.   You feel any palpitations or irregular heartbeats.  You become dizzy or lose consciousness.   If you have had an angiogram or had a pacemaker or AICD placed, and experience:  Bleeding, drainage or swelling at the surgical / puncture site.  Fever greater than 100.0 F  Shock from internal defibrillator.  Cool and / or numb extremities.    Please access the [facility] My HF Guide/Heart Failure Interactive Workbook:   [email or web address]    -Is this patient being discharged with medication to prevent blood clots?  No    Is patient discharged on Warfarin / Coumadin?   No

## 2024-01-27 NOTE — DISCHARGE SUMMARY
"Discharge Summary    CHIEF COMPLAINT ON ADMISSION  Chief Complaint   Patient presents with    Leg Swelling     BLE  Started one month ago    Abnormal Labs     States \"kidneys are really bad\"       Reason for Admission  Leg Swelling, Shortness of Breath     Admission Date  1/22/2024    CODE STATUS  Full Code    HPI & HOSPITAL COURSE  Patient is a 63-year-old female with medical history of alcohol dependence that she stopped in November came in on January 22 with worsening weakness, shortness of breath lower extremity swelling.  She is lost 30 pounds in the last 8 months gained 20 pounds over the last month.  2D echocardiogram is consistent with both systolic and diastolic heart failure with an ejection fraction of 40% and diastolic dysfunction grade 2.  Patient is doing well with the diuretics and has required 4 units transfusion to get her hemoglobin  above 7.0.  Plan to continue diuresis potassium repleted to be supplemented in addition to the dose adjustment of Aldactone.  Her oxygenation is good and she is maintaining on room air.  She will also need heart failure follow-up renal heart and vascular due to the onset of heart failure.  Patient was seen by physical therapy and Occupational Therapy and initially with her excess weight she was recommended to go to skilled nursing however she continued to diuresis and improved and was able to get around with a front wheel walker assistance.      Therefore, she is discharged in good and stable condition to home with organized home healthcare and close outpatient follow-up.    The patient met 2-midnight criteria for an inpatient stay at the time of discharge.    Discharge Date  1/27/2024    FOLLOW UP ITEMS POST DISCHARGE  PCP, GI, cardiology    DISCHARGE DIAGNOSES  Principal Problem:    Acute combined systolic and diastolic heart failure (HCC) (POA: Yes)  Active Problems:    Stage 3b chronic kidney disease (HCC) (POA: Yes)    Hyponatremia (POA: Yes)    Hypokalemia (POA: " Yes)    Troponin I above reference range (POA: Yes)    Elevated brain natriuretic peptide (BNP) level (POA: Yes)    Severe anemia requiring blood transfusions anemia (POA: Yes)    Liver cirrhosis (HCC) (POA: Yes)  Resolved Problems:    * No resolved hospital problems. *      FOLLOW UP  Future Appointments   Date Time Provider Department Center   2/2/2024  2:40 PM ROBBY Gregory [facility]   3/7/2024 10:45 AM [facility] ECHO 2 ECSM MAYTE Fritz     ROBBY Gregory  [address]  [address]  [place] [address]  [phone]    Schedule an appointment as soon as possible for a visit      [facility]  [address] [facility] [address]  [phone]  Follow up      Augustine Roque D.O.  [address] [place] [id number] [phone]    Follow up in 2 week(s)      [facility]  [address] [place] [id number]  [phone]  Follow up in 2 week(s)        MEDICATIONS ON DISCHARGE     Medication List        START taking these medications        Instructions   furosemide 40 MG Tabs  Start taking on: January 27, 2024  Commonly known as: Lasix   Take 1 Tablet by mouth 2 times a day for 14 days, THEN 1 Tablet every day for 14 days.     omeprazole 20 MG delayed-release capsule  Commonly known as: PriLOSEC   Take 1 Capsule by mouth every day.  Dose: 20 mg     potassium chloride SA 20 MEQ Tbcr  Start taking on: January 27, 2024  Commonly known as: Kdur   Take 1 Tablet by mouth 2 times a day for 14 days, THEN 1 Tablet every day for 14 days.     spironolactone 50 MG Tabs  Start taking on: January 28, 2024  Commonly known as: Aldactone   Take 1 Tablet by mouth every day.  Dose: 50 mg            CONTINUE taking these medications        Instructions   acetaminophen 500 MG Tabs  Commonly known as: Tylenol   Take 500 mg by mouth every 6 hours as needed. Indications: Pain  Dose: 500 mg     citalopram 10 MG tablet  Commonly known as: CeleXA   Take 1  Tablet by mouth every day.  Dose: 10 mg     sodium chloride 0.65 % Soln  Commonly known as: Ocean   Administer 1 Spray into affected nostril(S) every day.  Dose: 1 Spray              Allergies  Allergies   Allergen Reactions    Pcn [Penicillins] Rash     Pt reports that it was a childhood allergie received body rash     Sulfa Drugs Rash     Pt reports that it was a childhood allergie received body rash     Sulfasalazine      Pt reports that she does not think she has an allergie to this medication        DIET  Orders Placed This Encounter   Procedures    Diet Order Diet: Low Fiber(GI Soft)     Standing Status:   Standing     Number of Occurrences:   1     Order Specific Question:   Diet:     Answer:   Low Fiber(GI Soft) [2]       ACTIVITY  As tolerated.  Weight bearing as tolerated    CONSULTATIONS  None    PROCEDURES  None    LABORATORY  Lab Results   Component Value Date    SODIUM 134 (L) 01/27/2024    POTASSIUM 3.8 01/27/2024    CHLORIDE 95 (L) 01/27/2024    CO2 27 01/27/2024    GLUCOSE 84 01/27/2024    BUN 17 01/27/2024    CREATININE 1.16 01/27/2024    CREATININE 0.8 04/16/2009        Lab Results   Component Value Date    WBC 6.8 01/27/2024    HEMOGLOBIN 9.1 (L) 01/27/2024    HEMATOCRIT 29.4 (L) 01/27/2024    PLATELETCT 154 (L) 01/27/2024        Total time of the discharge process exceeds 38 minutes.

## 2024-01-29 ENCOUNTER — PATIENT OUTREACH (OUTPATIENT)
Dept: SCHEDULING | Facility: IMAGING CENTER | Age: 64
End: 2024-01-29
Payer: COMMERCIAL

## 2024-01-31 ENCOUNTER — TELEPHONE (OUTPATIENT)
Dept: HEALTH INFORMATION MANAGEMENT | Facility: OTHER | Age: 64
End: 2024-01-31
Payer: COMMERCIAL

## 2024-02-02 ENCOUNTER — OFFICE VISIT (OUTPATIENT)
Dept: MEDICAL GROUP | Facility: MEDICAL CENTER | Age: 64
End: 2024-02-02
Payer: COMMERCIAL

## 2024-02-02 VITALS
BODY MASS INDEX: 18.39 KG/M2 | OXYGEN SATURATION: 97 % | WEIGHT: 117.2 LBS | HEART RATE: 93 BPM | DIASTOLIC BLOOD PRESSURE: 52 MMHG | TEMPERATURE: 97.8 F | HEIGHT: 67 IN | SYSTOLIC BLOOD PRESSURE: 110 MMHG

## 2024-02-02 DIAGNOSIS — F41.9 ANXIETY AND DEPRESSION: ICD-10-CM

## 2024-02-02 DIAGNOSIS — D63.1 ANEMIA DUE TO STAGE 3A CHRONIC KIDNEY DISEASE: ICD-10-CM

## 2024-02-02 DIAGNOSIS — F32.A ANXIETY AND DEPRESSION: ICD-10-CM

## 2024-02-02 DIAGNOSIS — Z09 HOSPITAL DISCHARGE FOLLOW-UP: ICD-10-CM

## 2024-02-02 DIAGNOSIS — I50.41 ACUTE COMBINED SYSTOLIC AND DIASTOLIC HEART FAILURE (HCC): ICD-10-CM

## 2024-02-02 DIAGNOSIS — N18.31 ANEMIA DUE TO STAGE 3A CHRONIC KIDNEY DISEASE: ICD-10-CM

## 2024-02-02 DIAGNOSIS — N18.32 STAGE 3B CHRONIC KIDNEY DISEASE: ICD-10-CM

## 2024-02-02 DIAGNOSIS — E87.1 HYPONATREMIA: ICD-10-CM

## 2024-02-02 PROBLEM — N18.9 ANEMIA DUE TO CHRONIC KIDNEY DISEASE: Status: ACTIVE | Noted: 2024-02-02

## 2024-02-02 PROBLEM — R73.03 PREDIABETES: Status: RESOLVED | Noted: 2020-01-30 | Resolved: 2024-02-02

## 2024-02-02 PROBLEM — E66.9 OBESITY: Status: RESOLVED | Noted: 2019-11-12 | Resolved: 2024-02-02

## 2024-02-02 PROCEDURE — 3078F DIAST BP <80 MM HG: CPT | Performed by: NURSE PRACTITIONER

## 2024-02-02 PROCEDURE — 99215 OFFICE O/P EST HI 40 MIN: CPT | Performed by: NURSE PRACTITIONER

## 2024-02-02 PROCEDURE — 3074F SYST BP LT 130 MM HG: CPT | Performed by: NURSE PRACTITIONER

## 2024-02-02 ASSESSMENT — FIBROSIS 4 INDEX: FIB4 SCORE: 2.71

## 2024-02-02 NOTE — ASSESSMENT & PLAN NOTE
Chronic condition.  Most recent echo 1/27/2024 with systolic and diastolic heart failure with preserved EF at 40%.  Patient is following with cardiology.  She is scheduled to see cardiology on 2/9/2024, Dr. Xiong.

## 2024-02-02 NOTE — ASSESSMENT & PLAN NOTE
Patient is here for hospital follow-up.  She went to [facility] on 1/22/2024 and states her 1/27/2024.  She was admitted for leg swelling abnormal labs.  History of EtOH which was stopped in November.  20 pound weight gain in 8 months.  Referred to echo was consistent with systolic and diastolic heart failure with preserved EF at 40.  She was transfused 4 units.  She was placed on Lasix and spironolactone with potassium supplementation.  Her hemoglobin has improved and at discharge was at 9.1.  Her potassium is at 134 her GFR was at 52.  Patient is scheduled to follow-up with , [facility] cardiovascular and heart health as well as home health.  She is feeling well today.  She is here with her friend.  She is following up with multiple specialist such as cardiology, nephrology, GI.  She would also like to see a therapist and needs a referral today to help dealing with health changes.

## 2024-02-02 NOTE — ASSESSMENT & PLAN NOTE
Latest Reference Range & Units 01/26/24 14:09 01/27/24 00:46   Hemoglobin 12.0 - 16.0 g/dL 9.9 (L) 9.1 (L)   Hematocrit 37.0 - 47.0 % 31.9 (L) 29.4 (L)   (L): Data is abnormally low  Significantly improved.  She denies shortness of breath or chest pain.  She is actually feeling pretty well today.

## 2024-02-02 NOTE — ASSESSMENT & PLAN NOTE
Latest Reference Range & Units 01/26/24 01:39 01/27/24 00:46   Sodium 135 - 145 mmol/L 134 (L) 134 (L)   (L): Data is abnormally low

## 2024-02-02 NOTE — ASSESSMENT & PLAN NOTE
Latest Reference Range & Units 01/25/24 00:59 01/26/24 01:39 01/27/24 00:46   GFR (CKD-EPI) >60 mL/min/1.73 m 2 50 ! 43 ! 52 !   !: Data is abnormal  Chronic condition.  Patient is set to follow-up with nephrology.

## 2024-02-03 NOTE — ASSESSMENT & PLAN NOTE
New problem.  This is a chronic intermittent problem for patient.  During hospitalization was started on Celexa 10 mg, somewhat helpful however she would like a referral to therapy to help deal with changes in health.  She denies SI.

## 2024-02-03 NOTE — PROGRESS NOTES
Chief Complaint   Patient presents with    Follow-Up     Depression; got out of the hospital last Sunday     Lab Results       HISTORY OF PRESENT ILLNESS: Patient is a 63 y.o. female, established patient who presents today to discuss medical problems as listed below:    Health Maintenance:  COMPLETED       Allergies: Pcn [penicillins], Sulfa drugs, and Sulfasalazine    Current Outpatient Medications   Medication Sig Dispense Refill    furosemide (LASIX) 40 MG Tab Take 1 Tablet by mouth 2 times a day for 14 days, THEN 1 Tablet every day for 14 days. 42 Tablet 1    omeprazole (PRILOSEC) 20 MG delayed-release capsule Take 1 Capsule by mouth every day. 30 Capsule 2    spironolactone (ALDACTONE) 50 MG Tab Take 1 Tablet by mouth every day. 30 Tablet 3    potassium chloride SA (KDUR) 20 MEQ Tab CR Take 1 Tablet by mouth 2 times a day for 14 days, THEN 1 Tablet every day for 14 days. 42 Tablet 0    acetaminophen (TYLENOL) 500 MG Tab Take 500 mg by mouth every 6 hours as needed. Indications: Pain      sodium chloride (OCEAN) 0.65 % Solution Administer 1 Spray into affected nostril(S) every day.      citalopram (CELEXA) 10 MG tablet Take 1 Tablet by mouth every day. 30 Tablet 1     No current facility-administered medications for this visit.       Allergies, past medical history, past surgical history, family history, social history reviewed and updated.    Review of Systems:     - Constitutional: Negative for fever, chills, unexpected weight change, and fatigue/generalized weakness.     - Respiratory: Negative for cough, sputum production, chest congestion, dyspnea, wheezing, and crackles.      - Cardiovascular: Negative for chest pain, palpitations, orthopnea, and bilateral lower extremity edema.     - Gastrointestinal: Negative for heartburn, nausea, vomiting, abdominal pain, hematochezia, melena, diarrhea, constipation, and greasy/foul-smelling stools.      - Endo/Heme/Allergies: Does not bruise/bleed easily.     -  "Psychiatric/Behavioral: Negative for depression, suicidal/homicidal ideation and memory loss.      All other systems reviewed and are negative    Exam:    /52 (BP Location: Left arm, Patient Position: Sitting, BP Cuff Size: Adult)   Pulse 93   Temp 36.6 °C (97.8 °F) (Temporal)   Ht 1.702 m (5' 7\")   Wt 53.2 kg (117 lb 3.2 oz) Comment: pt took weight this morning  SpO2 97%   BMI 18.36 kg/m²  Body mass index is 18.36 kg/m².    Physical Exam:  Constitutional: Well-developed and well-nourished. Not diaphoretic. No distress.   Cardiovascular: Regular rate and rhythm, S1 and S2 without murmur, rubs, or gallops.    Chest: Effort normal. Clear to auscultation throughout. No adventitious sounds. Neurological: Alert and oriented x 3.   Psychiatric:  Behavior, mood, and affect are appropriate.  MA/nursing note and vitals reviewed.    LABS, hospital records, echo, labs, hospital notes: 1/24  results reviewed and discussed with the patient, questions answered.    My total time spent caring for the patient on the day of the encounter was 40 minutes.   This does not include time spent on separately billable procedures/tests.     Assessment/Plan:  Hospital discharge follow-up  Patient is here for hospital follow-up.  She went to [facility] on 1/22/2024 and states her 1/27/2024.  She was admitted for leg swelling abnormal labs.  History of EtOH which was stopped in November.  20 pound weight gain in 8 months.  Referred to echo was consistent with systolic and diastolic heart failure with preserved EF at 40.  She was transfused 4 units.  She was placed on Lasix and spironolactone with potassium supplementation.  Her hemoglobin has improved and at discharge was at 9.1.  Her potassium is at 134 her GFR was at 52.  Patient is scheduled to follow-up with , [facility] and heart health as well as home health.  She is feeling well today.  She is here with her friend.  She is following up with multiple " specialist such as cardiology, nephrology, GI.  She would also like to see a therapist and needs a referral today to help dealing with health changes.    Stage 3b chronic kidney disease (HCC)   Latest Reference Range & Units 01/25/24 00:59 01/26/24 01:39 01/27/24 00:46   GFR (CKD-EPI) >60 mL/min/1.73 m 2 50 ! 43 ! 52 !   !: Data is abnormal  Chronic condition.  Patient is set to follow-up with nephrology.    Hyponatremia   Latest Reference Range & Units 01/26/24 01:39 01/27/24 00:46   Sodium 135 - 145 mmol/L 134 (L) 134 (L)   (L): Data is abnormally low    Anemia due to chronic kidney disease   Latest Reference Range & Units 01/26/24 14:09 01/27/24 00:46   Hemoglobin 12.0 - 16.0 g/dL 9.9 (L) 9.1 (L)   Hematocrit 37.0 - 47.0 % 31.9 (L) 29.4 (L)   (L): Data is abnormally low  Significantly improved.  She denies shortness of breath or chest pain.  She is actually feeling pretty well today.    Acute combined systolic and diastolic heart failure (HCC)  Chronic condition.  Most recent echo 1/27/2024 with systolic and diastolic heart failure with preserved EF at 40%.  Patient is following with cardiology.  She is scheduled to see cardiology on 2/9/2024, Dr. Xiong.    Anxiety and depression  New problem.  This is a chronic intermittent problem for patient.  During hospitalization was started on Celexa 10 mg, somewhat helpful however she would like a referral to therapy to help deal with changes in health.  She denies SI.    1. Hospital discharge follow-up  Thorough review of hospital records, notes, labs, imaging.  Findings were discussed with patient and her friend during this appointment.  All questions answered at this time.    2. Stage 3b chronic kidney disease (HCC)  Stable.  Patient will be following up with nephrology and referral was placed.  - Referral to Nephrology    3. Hyponatremia  Will be following closely in labs.    4. Anemia due to stage 3a chronic kidney disease (HCC)  Improved and stable.  Will be following  closely and labs.  - CBC WITHOUT DIFFERENTIAL; Future  - Comp Metabolic Panel; Future  - FERRITIN; Future  - FOLATE; Future  - VITAMIN B12; Future  - IRON/TOTAL IRON BIND; Future  - Referral to Gastroenterology    5. Acute combined systolic and diastolic heart failure (HCC)  Stable at this time.  Patient is following with cardiology.    6. Anxiety and depression  Uncontrolled, stable.  Continue Celexa.  Patient will be establishing with therapy.  - Referral to Psychology      Discussed with patient possible alternative diagnoses, patient is to take all medications as prescribed.      If symptoms persist FU w/PCP, if symptoms worsen go to emergency room.      If experiencing any side effects from prescribed medications report to the office immediately or go to emergency room.     Reviewed indication, dosage, usage and potential adverse effects of prescribed medications.      Reviewed risks and benefits of treatment plan. Patient verbalizes understanding of all instruction and verbally agrees to plan.     Discussed plan with the patient, and patient agrees to the above.      I personally reviewed prior external notes and test results pertinent to today's visit.      No follow-ups on file. 4 mos

## 2024-02-04 DIAGNOSIS — F33.9 RECURRENT MAJOR DEPRESSIVE DISORDER, REMISSION STATUS UNSPECIFIED (HCC): ICD-10-CM

## 2024-02-05 NOTE — TELEPHONE ENCOUNTER
Received request via: Pharmacy    Was the patient seen in the last year in this department? Yes    Does the patient have an active prescription (recently filled or refills available) for medication(s) requested? No    [facility]    Does the patient have intermediate Plus and need 100 day supply (blood pressure, diabetes and cholesterol meds only)? Patient does not have SCP      Pharmacy comment: REQUEST FOR 90 DAYS PRESCRIPTION. DX Code Needed.

## 2024-02-06 RX ORDER — CITALOPRAM HYDROBROMIDE 10 MG/1
10 TABLET ORAL DAILY
Qty: 90 TABLET | Refills: 1 | Status: SHIPPED | OUTPATIENT
Start: 2024-02-06 | End: 2024-02-09

## 2024-02-09 ENCOUNTER — OFFICE VISIT (OUTPATIENT)
Dept: CARDIOLOGY | Facility: MEDICAL CENTER | Age: 64
End: 2024-02-09
Attending: INTERNAL MEDICINE
Payer: COMMERCIAL

## 2024-02-09 VITALS
HEIGHT: 67 IN | BODY MASS INDEX: 16.98 KG/M2 | RESPIRATION RATE: 14 BRPM | SYSTOLIC BLOOD PRESSURE: 110 MMHG | DIASTOLIC BLOOD PRESSURE: 58 MMHG | WEIGHT: 108.2 LBS | HEART RATE: 109 BPM | OXYGEN SATURATION: 98 %

## 2024-02-09 DIAGNOSIS — R06.09 DYSPNEA ON EXERTION: ICD-10-CM

## 2024-02-09 DIAGNOSIS — K74.60 HEPATIC CIRRHOSIS, UNSPECIFIED HEPATIC CIRRHOSIS TYPE, UNSPECIFIED WHETHER ASCITES PRESENT (HCC): ICD-10-CM

## 2024-02-09 DIAGNOSIS — Z79.899 HIGH RISK MEDICATION USE: ICD-10-CM

## 2024-02-09 DIAGNOSIS — F10.20 ETOHISM (HCC): ICD-10-CM

## 2024-02-09 DIAGNOSIS — I35.0 NONRHEUMATIC AORTIC VALVE STENOSIS: ICD-10-CM

## 2024-02-09 DIAGNOSIS — I51.89 LEFT VENTRICULAR SYSTOLIC DYSFUNCTION, NYHA CLASS 3: ICD-10-CM

## 2024-02-09 DIAGNOSIS — I50.20 ACC/AHA STAGE C SYSTOLIC HEART FAILURE (HCC): ICD-10-CM

## 2024-02-09 DIAGNOSIS — N18.31 STAGE 3A CHRONIC KIDNEY DISEASE: ICD-10-CM

## 2024-02-09 PROCEDURE — 99214 OFFICE O/P EST MOD 30 MIN: CPT | Performed by: INTERNAL MEDICINE

## 2024-02-09 RX ORDER — EMPAGLIFLOZIN 10 MG/1
10 TABLET, FILM COATED ORAL DAILY
Qty: 90 TABLET | Refills: 4 | Status: SHIPPED | OUTPATIENT
Start: 2024-02-09

## 2024-02-09 RX ORDER — BISOPROLOL FUMARATE 5 MG/1
5 TABLET, FILM COATED ORAL DAILY
Qty: 30 TABLET | Refills: 11 | Status: SHIPPED | OUTPATIENT
Start: 2024-02-09

## 2024-02-09 RX ORDER — SACUBITRIL AND VALSARTAN 24; 26 MG/1; MG/1
1 TABLET, FILM COATED ORAL 2 TIMES DAILY
Qty: 60 TABLET | Refills: 11 | Status: SHIPPED | OUTPATIENT
Start: 2024-02-09 | End: 2024-03-28

## 2024-02-09 RX ORDER — TRAMADOL HYDROCHLORIDE 50 MG/1
50 TABLET ORAL EVERY 8 HOURS PRN
COMMUNITY
Start: 2024-02-06

## 2024-02-09 ASSESSMENT — ENCOUNTER SYMPTOMS
BLURRED VISION: 0
FALLS: 0
SHORTNESS OF BREATH: 1
SENSORY CHANGE: 0
MEMORY LOSS: 0
MYALGIAS: 0
ABDOMINAL PAIN: 0
PALPITATIONS: 0
BRUISES/BLEEDS EASILY: 0
HEADACHES: 0
COUGH: 0
FEVER: 0
DIAPHORESIS: 0
DIZZINESS: 0
DEPRESSION: 0
DOUBLE VISION: 0

## 2024-02-09 ASSESSMENT — 6 MINUTE WALK TEST (6MWT): TOTAL DISTANCE WALKED (METERS): 300

## 2024-02-09 ASSESSMENT — MINNESOTA LIVING WITH HEART FAILURE QUESTIONNAIRE (MLHF)
DIFFICULTY WORKING TO EARN A LIVING: 0
HAVING TO SIT OR LIE DOWN DURING THE DAY: 5
DIFFICULTY GOING AWAY FROM HOME: 5
MAKING YOU STAY IN A HOSPITAL: 0
COSTING YOU MONEY FOR MEDICAL CARE: 0
WORKING AROUND THE HOUSE OR YARD DIFFICULT: 5
MAKING YOU FEEL DEPRESSED: 4
MAKING YOU SHORT OF BREATH: 4
SWELLING IN ANKLES OR LEGS: 5
FEELING LIKE A BURDEN TO FAMILY AND FRIENDS: 0
EATING LESS FOODS YOU LIKE: 5
DIFFICULTY WITH SEXUAL ACTIVITIES: 0
DIFFICULTY SOCIALIZING WITH FAMILY OR FRIENDS: 5
WALKING ABOUT OR CLIMBING STAIRS DIFFICULT: 5
GIVING YOU SIDE EFFECTS FROM TREATMENTS: 0
DIFFICULTY TO CONCENTRATE OR REMEMBERING THINGS: 4
TOTAL_SCORE: 66
DIFFICULTY SLEEPING WELL AT NIGHT: 5
MAKING YOU WORRY: 2
DIFFICULTY WITH RECREATIONAL PASTIMES, SPORTS, HOBBIES: 5
LOSS OF SELF CONTROL IN YOUR LIFE: 2
TIRED, FATIGUED OR LOW ON ENERGY: 5

## 2024-02-09 ASSESSMENT — FIBROSIS 4 INDEX: FIB4 SCORE: 2.71

## 2024-02-09 NOTE — PROGRESS NOTES
Chief Complaint   Patient presents with    New Patient     Dx: Acute combined systolic and diastolic heart failure (HCC)       Subjective     Denisse Abel is a 63 y.o. female who presents today for cardiac care and evaluation in the heart failure clinic because of recent hospitalization due to heart failure exacerbation. The diagnosis of HF was made in 01/2024. It was presumed to be non-ischemic in nature. The left ventricular systolic function was documented to be at 40%. Patient was diuresed in the hospital and discharged home with guidelines directed medical therapy. She does have hx of ETOH.    LVEF of 40 % with global hypokinesis. No significant valvular disease. I have independently interpreted and reviewed echocardiogram's actual images.     Patient was able to complete 300 m during his 6 minute walk test. her O2 saturation at baseline was 99% and at the end of the test, the O2 saturation was 98%. she reported 5 level of dyspnea on Kylah scale.    I have personally interpreted EKG today with patient, there is no evidence of acute coronary syndrome, no evidence of prior infarct, normal MD and QT interval, no significant conduction disease. Sinus rhythm.    I have independently interpreted and reviewed blood tests results with patient in clinic which shows LDL level of 65, triglycerides level of 74, GFR of 52, NT pro BNP of 5713, K of 3.8.    Cirrhosis seen on CT.      Past Medical History:   Diagnosis Date    HLD (hyperlipidemia)     HTN (hypertension)     Hypertension     Obesity      Past Surgical History:   Procedure Laterality Date    TRIGGER FINGER RELEASE Right 3/31/2017    Procedure: TRIGGER FINGER RELEASE;  Surgeon: Jimmy Atkins M.D.;  Location: SURGERY [facility];  Service:     GANGLION EXCISION Right 3/31/2017    Procedure: GANGLION EXCISION - HAND CYST;  Surgeon: Jimmy Atkins M.D.;  Location: SURGERY [facility];  Service:     PELVISCOPY  6/5/08    Performed by MARIELOS  NATALIYA ALCAZAR at SURGERY [facility] DAY [facility]    OVARIAN CYSTECTOMY  6/5/08    Performed by NATALIYA ARCEO at SURGERY [facility] DAY [facility]S    CYSTOSCOPY  6/5/08    Performed by NATALIYA ARCEO at SURGERY [facility] DAY [facility]    HYSTERECTOMY LAPAROSCOPY  2003    KNEE ARTHROSCOPY       Family History   Problem Relation Age of Onset    Diabetes Mother     Heart Disease Mother     Stroke Mother     Diabetes Father      Social History     Socioeconomic History    Marital status: Single     Spouse name: Not on file    Number of children: Not on file    Years of education: Not on file    Highest education level: Master's degree (e.g., MA, MS, Monica, MEd, MSW, SUMAYA)   Occupational History    Not on file   Tobacco Use    Smoking status: Never    Smokeless tobacco: Never   Vaping Use    Vaping Use: Never used   Substance and Sexual Activity    Alcohol use: Not Currently     Alcohol/week: 3.6 oz     Types: 6 Glasses of wine per week     Comment: denies    Drug use: Not Currently     Types: Marijuana     Comment: denies    Sexual activity: Not Currently   Other Topics Concern    Not on file   Social History Narrative    Not on file     Social Determinants of Health     Financial Resource Strain: Low Risk  (1/11/2024)    Overall Financial Resource Strain (CARDIA)     Difficulty of Paying Living Expenses: Not hard at all   Food Insecurity: No Food Insecurity (1/11/2024)    Hunger Vital Sign     Worried About Running Out of Food in the Last Year: Never true     Ran Out of Food in the Last Year: Never true   Transportation Needs: No Transportation Needs (1/11/2024)    PRAPARE - Transportation     Lack of Transportation (Medical): No     Lack of Transportation (Non-Medical): No   Physical Activity: Sufficiently Active (1/11/2024)    Exercise Vital Sign     Days of Exercise per Week: 5 days     Minutes of Exercise per Session: 30 min   Stress: Stress Concern Present (1/11/2024)    [employer] -  Occupational Stress Questionnaire     Feeling of Stress : Very much   Social Connections: Socially Isolated (1/11/2024)    Social Connection and Isolation Panel [NHANES]     Frequency of Communication with Friends and Family: More than three times a week     Frequency of Social Gatherings with Friends and Family: Once a week     Attends Mu-ism Services: Never     Active Member of Clubs or Organizations: No     Attends Club or Organization Meetings: Never     Marital Status:    Intimate Partner Violence: Not on file   Housing Stability: Low Risk  (1/11/2024)    Housing Stability Vital Sign     Unable to Pay for Housing in the Last Year: No     Number of Places Lived in the Last Year: 1     Unstable Housing in the Last Year: No     Allergies   Allergen Reactions    Pcn [Penicillins] Rash     Pt reports that it was a childhood allergie received body rash     Sulfa Drugs Rash     Pt reports that it was a childhood allergie received body rash     Sulfasalazine      Pt reports that she does not think she has an allergie to this medication      Outpatient Encounter Medications as of 2/9/2024   Medication Sig Dispense Refill    traMADol (ULTRAM) 50 MG Tab       sacubitril-valsartan (ENTRESTO) 24-26 MG Tab Take 1 Tablet by mouth 2 times a day. 60 Tablet 11    Empagliflozin (JARDIANCE) 10 MG Tab tablet Take 1 Tablet by mouth every day. 90 Tablet 4    bisoprolol (ZEBETA) 5 MG Tab Take 1 Tablet by mouth every day. 30 Tablet 11    omeprazole (PRILOSEC) 20 MG delayed-release capsule Take 1 Capsule by mouth every day. 30 Capsule 2    spironolactone (ALDACTONE) 50 MG Tab Take 1 Tablet by mouth every day. 30 Tablet 3    acetaminophen (TYLENOL) 500 MG Tab Take 500 mg by mouth every 6 hours as needed. Indications: Pain      sodium chloride (OCEAN) 0.65 % Solution Administer 1 Spray into affected nostril(S) every day.      [DISCONTINUED] citalopram (CELEXA) 10 MG tablet TAKE 1 TABLET BY MOUTH EVERY DAY (Patient not taking:  "Reported on 2/9/2024) 90 Tablet 1    [DISCONTINUED] furosemide (LASIX) 40 MG Tab Take 1 Tablet by mouth 2 times a day for 14 days, THEN 1 Tablet every day for 14 days. 42 Tablet 1    [DISCONTINUED] potassium chloride SA (KDUR) 20 MEQ Tab CR Take 1 Tablet by mouth 2 times a day for 14 days, THEN 1 Tablet every day for 14 days. 42 Tablet 0     No facility-administered encounter medications on file as of 2/9/2024.     Review of Systems   Constitutional:  Negative for diaphoresis and fever.   HENT:  Negative for nosebleeds.    Eyes:  Negative for blurred vision and double vision.   Respiratory:  Positive for shortness of breath. Negative for cough.    Cardiovascular:  Negative for chest pain and palpitations.   Gastrointestinal:  Negative for abdominal pain.   Genitourinary:  Negative for dysuria and frequency.   Musculoskeletal:  Negative for falls and myalgias.   Skin:  Negative for rash.   Neurological:  Negative for dizziness, sensory change and headaches.   Endo/Heme/Allergies:  Does not bruise/bleed easily.   Psychiatric/Behavioral:  Negative for depression and memory loss.               Objective     /58 (BP Location: Left arm, Patient Position: Sitting, BP Cuff Size: Adult)   Pulse (!) 109   Resp 14   Ht 1.702 m (5' 7\")   Wt 49.1 kg (108 lb 3.2 oz)   SpO2 98%   BMI 16.95 kg/m²     Physical Exam  Vitals and nursing note reviewed.   Constitutional:       General: She is not in acute distress.     Appearance: She is not diaphoretic.   HENT:      Head: Normocephalic and atraumatic.      Right Ear: External ear normal.      Left Ear: External ear normal.      Nose: No congestion or rhinorrhea.   Eyes:      General:         Right eye: No discharge.         Left eye: No discharge.   Neck:      Thyroid: No thyromegaly.      Vascular: No JVD.   Cardiovascular:      Rate and Rhythm: Normal rate and regular rhythm.      Pulses: Normal pulses.   Pulmonary:      Effort: No respiratory distress.   Abdominal:      " General: There is no distension.      Tenderness: There is no abdominal tenderness.   Musculoskeletal:         General: No swelling or tenderness.      Right lower leg: No edema.      Left lower leg: No edema.   Skin:     General: Skin is warm and dry.   Neurological:      Mental Status: She is alert and oriented to person, place, and time.      Cranial Nerves: No cranial nerve deficit.   Psychiatric:         Behavior: Behavior normal.                Assessment & Plan     1. ACC/AHA stage C systolic heart failure (HCC)  CT-CTA HEART W/3D IMAGE    sacubitril-valsartan (ENTRESTO) 24-26 MG Tab    Empagliflozin (JARDIANCE) 10 MG Tab tablet    bisoprolol (ZEBETA) 5 MG Tab    EC-ECHOCARDIOGRAM COMPLETE W/O CONT    Referral to Cardiac Rehab    REFERRAL TO CARDIOLOGY - HEART FAILURE NURSING EDUCATION      2. Left ventricular systolic dysfunction, NYHA class 3  CT-CTA HEART W/3D IMAGE    sacubitril-valsartan (ENTRESTO) 24-26 MG Tab    Empagliflozin (JARDIANCE) 10 MG Tab tablet    bisoprolol (ZEBETA) 5 MG Tab    EC-ECHOCARDIOGRAM COMPLETE W/O CONT    Referral to Cardiac Rehab    REFERRAL TO CARDIOLOGY - HEART FAILURE NURSING EDUCATION      3. Dyspnea on exertion  CT-CTA HEART W/3D IMAGE    sacubitril-valsartan (ENTRESTO) 24-26 MG Tab    bisoprolol (ZEBETA) 5 MG Tab    EC-ECHOCARDIOGRAM COMPLETE W/O CONT    Referral to Cardiac Rehab    REFERRAL TO CARDIOLOGY - HEART FAILURE NURSING EDUCATION    Basic Metabolic Panel    proBrain Natriuretic Peptide, NT      4. High risk medication use        5. Stage 3a chronic kidney disease (HCC)  Empagliflozin (JARDIANCE) 10 MG Tab tablet      6. ETOHism (HCC)        7. Hepatic cirrhosis, unspecified hepatic cirrhosis type, unspecified whether ascites present (HCC)        8. Nonrheumatic aortic valve stenosis            Medical Decision Making: Today's Assessment/Status/Plan:   Dilated Cardiomyopathy:  Chronically illed condition which requires ongoing close monitoring and treatment to  improve survival rate along with decreasing risk of clinical decompensation sudden cardiac death and hospitalization.    Today, based on physical examination findings, patient is euvolemic. No JVD, lungs are clear to auscultation, no pitting edema in bilateral lower extremities, no ascites.     Dry weight is 108 lbs.    Based on the overall clinical history and profile, patient is a good candidate for Entresto therapy (with superiority over ACE-I therapy in terms of survival benefits and prevention of future hospitalization).  Will start Entresto therapy at 24/26 mg po bid.    Based on recent data on SGLT2 and heart failure with reduced ejection fraction, patient will be benefited from Jardiance 10 mg p.o. once a day for further reduction in mortality and hospitalization with absolute risk reduction of 5.2%.  Therefore, I will start patient on Jardiance 10 mg p.o. once a day.  Risks and benefits were explained to patient and patient has agreed to proceed.    Will start Bisoprolol 5 mg daily.    Aldactone antagonist with Spironolactone 50 mg daily.    No indication for ICD.    No more ETOH advised.    Ischemic work up with coronary CTA.     Of note, during the care of this patient, I spent a significant amount of time explaining the nature of the disease process, reviewing all possible imaging studies, blood test results to patient.  The overall care of this patient require a higher level of care than usual due to the multiple comorbidities along with ongoing issues of congestive heart failure that put this patient at risk for sudden cardiac death, increased mortality, and hospitalization.  This patient also requires close monitoring with at least monthly blood test to monitor renal function and electrolytes due to the ongoing dynamic changes of medical therapy titration protocol to ensure optimal benefits for the overall care of this patient.         German Xiong M.D.

## 2024-02-16 ENCOUNTER — PATIENT MESSAGE (OUTPATIENT)
Dept: HEALTH INFORMATION MANAGEMENT | Facility: OTHER | Age: 64
End: 2024-02-16

## 2024-02-22 ENCOUNTER — APPOINTMENT (OUTPATIENT)
Dept: CARDIOLOGY | Facility: MEDICAL CENTER | Age: 64
End: 2024-02-22
Attending: INTERNAL MEDICINE
Payer: MEDICARE

## 2024-02-23 ENCOUNTER — ANESTHESIA EVENT (OUTPATIENT)
Dept: SURGERY | Facility: MEDICAL CENTER | Age: 64
DRG: 853 | End: 2024-02-23
Payer: MEDICARE

## 2024-02-23 ENCOUNTER — APPOINTMENT (OUTPATIENT)
Dept: RADIOLOGY | Facility: MEDICAL CENTER | Age: 64
DRG: 853 | End: 2024-02-23
Attending: EMERGENCY MEDICINE
Payer: MEDICARE

## 2024-02-23 ENCOUNTER — HOSPITAL ENCOUNTER (INPATIENT)
Facility: MEDICAL CENTER | Age: 64
LOS: 10 days | DRG: 853 | End: 2024-03-04
Attending: EMERGENCY MEDICINE | Admitting: HOSPITALIST
Payer: MEDICARE

## 2024-02-23 ENCOUNTER — ANESTHESIA (OUTPATIENT)
Dept: SURGERY | Facility: MEDICAL CENTER | Age: 64
DRG: 853 | End: 2024-02-23
Payer: MEDICARE

## 2024-02-23 DIAGNOSIS — E87.1 HYPONATREMIA: ICD-10-CM

## 2024-02-23 DIAGNOSIS — R06.02 SHORTNESS OF BREATH: ICD-10-CM

## 2024-02-23 DIAGNOSIS — F41.9 ANXIETY AND DEPRESSION: ICD-10-CM

## 2024-02-23 DIAGNOSIS — R79.89 ELEVATED BRAIN NATRIURETIC PEPTIDE (BNP) LEVEL: ICD-10-CM

## 2024-02-23 DIAGNOSIS — K74.60 CIRRHOSIS OF LIVER WITH ASCITES, UNSPECIFIED HEPATIC CIRRHOSIS TYPE (HCC): ICD-10-CM

## 2024-02-23 DIAGNOSIS — K26.5 DUODENAL ULCER PERFORATION (HCC): ICD-10-CM

## 2024-02-23 DIAGNOSIS — F32.A ANXIETY AND DEPRESSION: ICD-10-CM

## 2024-02-23 DIAGNOSIS — R11.2 NAUSEA AND VOMITING, UNSPECIFIED VOMITING TYPE: ICD-10-CM

## 2024-02-23 DIAGNOSIS — N17.9 ACUTE RENAL FAILURE, UNSPECIFIED ACUTE RENAL FAILURE TYPE (HCC): ICD-10-CM

## 2024-02-23 DIAGNOSIS — E86.0 DEHYDRATION: ICD-10-CM

## 2024-02-23 DIAGNOSIS — E87.29 INCREASED ANION GAP METABOLIC ACIDOSIS: ICD-10-CM

## 2024-02-23 DIAGNOSIS — K26.5 PERFORATED DUODENAL ULCER (HCC): ICD-10-CM

## 2024-02-23 DIAGNOSIS — R10.84 GENERALIZED ABDOMINAL PAIN: ICD-10-CM

## 2024-02-23 DIAGNOSIS — R65.21 SEPTIC SHOCK (HCC): ICD-10-CM

## 2024-02-23 DIAGNOSIS — A41.9 SEPTIC SHOCK (HCC): ICD-10-CM

## 2024-02-23 DIAGNOSIS — R18.8 CIRRHOSIS OF LIVER WITH ASCITES, UNSPECIFIED HEPATIC CIRRHOSIS TYPE (HCC): ICD-10-CM

## 2024-02-23 DIAGNOSIS — E43 SEVERE PROTEIN-CALORIE MALNUTRITION (HCC): ICD-10-CM

## 2024-02-23 DIAGNOSIS — R19.8 PERFORATED VISCUS: ICD-10-CM

## 2024-02-23 PROBLEM — E87.20 METABOLIC ACIDOSIS: Status: ACTIVE | Noted: 2024-02-23

## 2024-02-23 PROBLEM — R10.0 ACUTE ABDOMEN: Status: ACTIVE | Noted: 2024-02-23

## 2024-02-23 PROBLEM — N18.9 ACUTE ON CHRONIC KIDNEY FAILURE (HCC): Status: ACTIVE | Noted: 2024-02-23

## 2024-02-23 LAB
ALBUMIN SERPL BCP-MCNC: 2.7 G/DL (ref 3.2–4.9)
ALBUMIN SERPL BCP-MCNC: 3.1 G/DL (ref 3.2–4.9)
ALBUMIN/GLOB SERPL: 0.6 G/DL
ALBUMIN/GLOB SERPL: 0.6 G/DL
ALP SERPL-CCNC: 149 U/L (ref 30–99)
ALP SERPL-CCNC: 176 U/L (ref 30–99)
ALT SERPL-CCNC: 7 U/L (ref 2–50)
ALT SERPL-CCNC: <5 U/L (ref 2–50)
ANION GAP SERPL CALC-SCNC: 14 MMOL/L (ref 7–16)
ANION GAP SERPL CALC-SCNC: 21 MMOL/L (ref 7–16)
APPEARANCE UR: ABNORMAL
APTT PPP: 33.6 SEC (ref 24.7–36)
AST SERPL-CCNC: 11 U/L (ref 12–45)
AST SERPL-CCNC: 21 U/L (ref 12–45)
BACTERIA #/AREA URNS HPF: ABNORMAL /HPF
BASOPHILS # BLD AUTO: 0.2 % (ref 0–1.8)
BASOPHILS # BLD: 0.03 K/UL (ref 0–0.12)
BILIRUB SERPL-MCNC: 1.6 MG/DL (ref 0.1–1.5)
BILIRUB SERPL-MCNC: 2.2 MG/DL (ref 0.1–1.5)
BILIRUB UR QL STRIP.AUTO: ABNORMAL
BUN SERPL-MCNC: 69 MG/DL (ref 8–22)
BUN SERPL-MCNC: 73 MG/DL (ref 8–22)
CALCIUM ALBUM COR SERPL-MCNC: 10.3 MG/DL (ref 8.5–10.5)
CALCIUM ALBUM COR SERPL-MCNC: 11.2 MG/DL (ref 8.5–10.5)
CALCIUM SERPL-MCNC: 10.5 MG/DL (ref 8.4–10.2)
CALCIUM SERPL-MCNC: 9.3 MG/DL (ref 8.4–10.2)
CAOX CRY #/AREA URNS HPF: ABNORMAL /HPF
CHLORIDE SERPL-SCNC: 80 MMOL/L (ref 96–112)
CHLORIDE SERPL-SCNC: 89 MMOL/L (ref 96–112)
CO2 SERPL-SCNC: 18 MMOL/L (ref 20–33)
CO2 SERPL-SCNC: 20 MMOL/L (ref 20–33)
COLOR UR: YELLOW
CORTIS SERPL-MCNC: 69.6 UG/DL (ref 0–23)
CREAT SERPL-MCNC: 1.93 MG/DL (ref 0.5–1.4)
CREAT SERPL-MCNC: 2.18 MG/DL (ref 0.5–1.4)
CRP SERPL HS-MCNC: 39.04 MG/DL (ref 0–0.75)
EOSINOPHIL # BLD AUTO: 0 K/UL (ref 0–0.51)
EOSINOPHIL NFR BLD: 0 % (ref 0–6.9)
EPI CELLS #/AREA URNS HPF: ABNORMAL /HPF
ERYTHROCYTE [DISTWIDTH] IN BLOOD BY AUTOMATED COUNT: 83.4 FL (ref 35.9–50)
GFR SERPLBLD CREATININE-BSD FMLA CKD-EPI: 24 ML/MIN/1.73 M 2
GFR SERPLBLD CREATININE-BSD FMLA CKD-EPI: 28 ML/MIN/1.73 M 2
GLOBULIN SER CALC-MCNC: 4.2 G/DL (ref 1.9–3.5)
GLOBULIN SER CALC-MCNC: 5.4 G/DL (ref 1.9–3.5)
GLUCOSE SERPL-MCNC: 123 MG/DL (ref 65–99)
GLUCOSE SERPL-MCNC: 130 MG/DL (ref 65–99)
GLUCOSE UR STRIP.AUTO-MCNC: NEGATIVE MG/DL
GRAN CASTS #/AREA URNS LPF: ABNORMAL /LPF
HCT VFR BLD AUTO: 42 % (ref 37–47)
HGB BLD-MCNC: 14 G/DL (ref 12–16)
HYALINE CASTS #/AREA URNS LPF: ABNORMAL /LPF
IMM GRANULOCYTES # BLD AUTO: 0.09 K/UL (ref 0–0.11)
IMM GRANULOCYTES NFR BLD AUTO: 0.5 % (ref 0–0.9)
INR PPP: 1.36 (ref 0.87–1.13)
KETONES UR STRIP.AUTO-MCNC: ABNORMAL MG/DL
LACTATE SERPL-SCNC: 1.7 MMOL/L (ref 0.5–2)
LACTATE SERPL-SCNC: 4.1 MMOL/L (ref 0.5–2)
LEUKOCYTE ESTERASE UR QL STRIP.AUTO: NEGATIVE
LIPASE SERPL-CCNC: 16 U/L (ref 11–82)
LYMPHOCYTES # BLD AUTO: 0.76 K/UL (ref 1–4.8)
LYMPHOCYTES NFR BLD: 3.9 % (ref 22–41)
MAGNESIUM SERPL-MCNC: 1.8 MG/DL (ref 1.5–2.5)
MAGNESIUM SERPL-MCNC: 2 MG/DL (ref 1.5–2.5)
MCH RBC QN AUTO: 27.5 PG (ref 27–33)
MCHC RBC AUTO-ENTMCNC: 33.3 G/DL (ref 32.2–35.5)
MCV RBC AUTO: 82.4 FL (ref 81.4–97.8)
MICRO URNS: ABNORMAL
MONOCYTES # BLD AUTO: 0.86 K/UL (ref 0–0.85)
MONOCYTES NFR BLD AUTO: 4.5 % (ref 0–13.4)
NEUTROPHILS # BLD AUTO: 17.58 K/UL (ref 1.82–7.42)
NEUTROPHILS NFR BLD: 90.9 % (ref 44–72)
NITRITE UR QL STRIP.AUTO: NEGATIVE
NRBC # BLD AUTO: 0 K/UL
NRBC BLD-RTO: 0 /100 WBC (ref 0–0.2)
NT-PROBNP SERPL IA-MCNC: 2900 PG/ML (ref 0–125)
PH UR STRIP.AUTO: 5 [PH] (ref 5–8)
PHOSPHATE SERPL-MCNC: 6.1 MG/DL (ref 2.5–4.5)
PLATELET # BLD AUTO: 317 K/UL (ref 164–446)
POTASSIUM SERPL-SCNC: 4.9 MMOL/L (ref 3.6–5.5)
POTASSIUM SERPL-SCNC: 5.4 MMOL/L (ref 3.6–5.5)
PROCALCITONIN SERPL-MCNC: 7.58 NG/ML
PROT SERPL-MCNC: 6.9 G/DL (ref 6–8.2)
PROT SERPL-MCNC: 8.5 G/DL (ref 6–8.2)
PROT UR QL STRIP: 30 MG/DL
PROTHROMBIN TIME: 17.3 SEC (ref 12–14.6)
RBC # BLD AUTO: 5.1 M/UL (ref 4.2–5.4)
RBC # URNS HPF: ABNORMAL /HPF
RBC UR QL AUTO: ABNORMAL
SODIUM SERPL-SCNC: 119 MMOL/L (ref 135–145)
SODIUM SERPL-SCNC: 123 MMOL/L (ref 135–145)
SP GR UR STRIP.AUTO: >=1.03
TRANS CELLS URNS QL MICRO: ABNORMAL /HPF
UNIDENT CRYS URNS QL MICRO: ABNORMAL /HPF
WAXY CAST  1837: ABNORMAL /LPF
WBC # BLD AUTO: 19.3 K/UL (ref 4.8–10.8)
WBC #/AREA URNS HPF: ABNORMAL /HPF

## 2024-02-23 PROCEDURE — 160048 HCHG OR STATISTICAL LEVEL 1-5: Performed by: SURGERY

## 2024-02-23 PROCEDURE — 87040 BLOOD CULTURE FOR BACTERIA: CPT

## 2024-02-23 PROCEDURE — 160009 HCHG ANES TIME/MIN: Performed by: SURGERY

## 2024-02-23 PROCEDURE — 96376 TX/PRO/DX INJ SAME DRUG ADON: CPT

## 2024-02-23 PROCEDURE — 700111 HCHG RX REV CODE 636 W/ 250 OVERRIDE (IP): Mod: JZ | Performed by: STUDENT IN AN ORGANIZED HEALTH CARE EDUCATION/TRAINING PROGRAM

## 2024-02-23 PROCEDURE — 99291 CRITICAL CARE FIRST HOUR: CPT

## 2024-02-23 PROCEDURE — 85730 THROMBOPLASTIN TIME PARTIAL: CPT

## 2024-02-23 PROCEDURE — 85025 COMPLETE CBC W/AUTO DIFF WBC: CPT

## 2024-02-23 PROCEDURE — 700105 HCHG RX REV CODE 258: Performed by: INTERNAL MEDICINE

## 2024-02-23 PROCEDURE — 160028 HCHG SURGERY MINUTES - 1ST 30 MINS LEVEL 3: Performed by: SURGERY

## 2024-02-23 PROCEDURE — 110371 HCHG SHELL REV 272: Performed by: SURGERY

## 2024-02-23 PROCEDURE — 83880 ASSAY OF NATRIURETIC PEPTIDE: CPT

## 2024-02-23 PROCEDURE — 160035 HCHG PACU - 1ST 60 MINS PHASE I: Performed by: SURGERY

## 2024-02-23 PROCEDURE — 71045 X-RAY EXAM CHEST 1 VIEW: CPT

## 2024-02-23 PROCEDURE — 80053 COMPREHEN METABOLIC PANEL: CPT

## 2024-02-23 PROCEDURE — 84145 PROCALCITONIN (PCT): CPT

## 2024-02-23 PROCEDURE — 160036 HCHG PACU - EA ADDL 30 MINS PHASE I: Performed by: SURGERY

## 2024-02-23 PROCEDURE — 700105 HCHG RX REV CODE 258: Performed by: STUDENT IN AN ORGANIZED HEALTH CARE EDUCATION/TRAINING PROGRAM

## 2024-02-23 PROCEDURE — 83605 ASSAY OF LACTIC ACID: CPT | Mod: 91

## 2024-02-23 PROCEDURE — 85610 PROTHROMBIN TIME: CPT

## 2024-02-23 PROCEDURE — 36415 COLL VENOUS BLD VENIPUNCTURE: CPT

## 2024-02-23 PROCEDURE — 99291 CRITICAL CARE FIRST HOUR: CPT | Performed by: HOSPITALIST

## 2024-02-23 PROCEDURE — 99291 CRITICAL CARE FIRST HOUR: CPT | Performed by: INTERNAL MEDICINE

## 2024-02-23 PROCEDURE — 0DU947Z SUPPLEMENT DUODENUM WITH AUTOLOGOUS TISSUE SUBSTITUTE, PERCUTANEOUS ENDOSCOPIC APPROACH: ICD-10-PCS | Performed by: SURGERY

## 2024-02-23 PROCEDURE — 94760 N-INVAS EAR/PLS OXIMETRY 1: CPT

## 2024-02-23 PROCEDURE — 700105 HCHG RX REV CODE 258: Performed by: EMERGENCY MEDICINE

## 2024-02-23 PROCEDURE — 700111 HCHG RX REV CODE 636 W/ 250 OVERRIDE (IP): Mod: JZ | Performed by: HOSPITALIST

## 2024-02-23 PROCEDURE — 700111 HCHG RX REV CODE 636 W/ 250 OVERRIDE (IP): Performed by: STUDENT IN AN ORGANIZED HEALTH CARE EDUCATION/TRAINING PROGRAM

## 2024-02-23 PROCEDURE — 160002 HCHG RECOVERY MINUTES (STAT): Performed by: SURGERY

## 2024-02-23 PROCEDURE — 96375 TX/PRO/DX INJ NEW DRUG ADDON: CPT

## 2024-02-23 PROCEDURE — 83735 ASSAY OF MAGNESIUM: CPT | Mod: 91

## 2024-02-23 PROCEDURE — 700101 HCHG RX REV CODE 250: Performed by: STUDENT IN AN ORGANIZED HEALTH CARE EDUCATION/TRAINING PROGRAM

## 2024-02-23 PROCEDURE — 160039 HCHG SURGERY MINUTES - EA ADDL 1 MIN LEVEL 3: Performed by: SURGERY

## 2024-02-23 PROCEDURE — 86140 C-REACTIVE PROTEIN: CPT

## 2024-02-23 PROCEDURE — 700105 HCHG RX REV CODE 258: Performed by: HOSPITALIST

## 2024-02-23 PROCEDURE — 82533 TOTAL CORTISOL: CPT

## 2024-02-23 PROCEDURE — 81001 URINALYSIS AUTO W/SCOPE: CPT

## 2024-02-23 PROCEDURE — 84100 ASSAY OF PHOSPHORUS: CPT

## 2024-02-23 PROCEDURE — 83690 ASSAY OF LIPASE: CPT

## 2024-02-23 PROCEDURE — 96374 THER/PROPH/DIAG INJ IV PUSH: CPT

## 2024-02-23 PROCEDURE — 700111 HCHG RX REV CODE 636 W/ 250 OVERRIDE (IP): Mod: JZ | Performed by: EMERGENCY MEDICINE

## 2024-02-23 PROCEDURE — 87086 URINE CULTURE/COLONY COUNT: CPT

## 2024-02-23 PROCEDURE — 74176 CT ABD & PELVIS W/O CONTRAST: CPT

## 2024-02-23 PROCEDURE — 770022 HCHG ROOM/CARE - ICU (200)

## 2024-02-23 RX ORDER — MORPHINE SULFATE 4 MG/ML
2 INJECTION INTRAVENOUS ONCE
Status: COMPLETED | OUTPATIENT
Start: 2024-02-23 | End: 2024-02-23

## 2024-02-23 RX ORDER — ONDANSETRON 2 MG/ML
4 INJECTION INTRAMUSCULAR; INTRAVENOUS ONCE
Status: COMPLETED | OUTPATIENT
Start: 2024-02-23 | End: 2024-02-23

## 2024-02-23 RX ORDER — SODIUM CHLORIDE 9 MG/ML
INJECTION, SOLUTION INTRAVENOUS
Status: DISCONTINUED | OUTPATIENT
Start: 2024-02-23 | End: 2024-02-23 | Stop reason: SURG

## 2024-02-23 RX ORDER — HYDROMORPHONE HYDROCHLORIDE 1 MG/ML
0.4 INJECTION, SOLUTION INTRAMUSCULAR; INTRAVENOUS; SUBCUTANEOUS
Status: DISCONTINUED | OUTPATIENT
Start: 2024-02-23 | End: 2024-02-23 | Stop reason: HOSPADM

## 2024-02-23 RX ORDER — METRONIDAZOLE 500 MG/100ML
500 INJECTION, SOLUTION INTRAVENOUS EVERY 8 HOURS
Status: DISCONTINUED | OUTPATIENT
Start: 2024-02-23 | End: 2024-02-23

## 2024-02-23 RX ORDER — MORPHINE SULFATE 4 MG/ML
4 INJECTION INTRAVENOUS ONCE
Status: COMPLETED | OUTPATIENT
Start: 2024-02-23 | End: 2024-02-23

## 2024-02-23 RX ORDER — ONDANSETRON 2 MG/ML
4 INJECTION INTRAMUSCULAR; INTRAVENOUS EVERY 4 HOURS PRN
Status: CANCELLED | OUTPATIENT
Start: 2024-02-23

## 2024-02-23 RX ORDER — LIDOCAINE HYDROCHLORIDE 20 MG/ML
INJECTION, SOLUTION EPIDURAL; INFILTRATION; INTRACAUDAL; PERINEURAL PRN
Status: DISCONTINUED | OUTPATIENT
Start: 2024-02-23 | End: 2024-02-23 | Stop reason: SURG

## 2024-02-23 RX ORDER — HYDROMORPHONE HYDROCHLORIDE 1 MG/ML
0.2 INJECTION, SOLUTION INTRAMUSCULAR; INTRAVENOUS; SUBCUTANEOUS
Status: DISCONTINUED | OUTPATIENT
Start: 2024-02-23 | End: 2024-02-23 | Stop reason: HOSPADM

## 2024-02-23 RX ORDER — OXYCODONE HCL 5 MG/5 ML
5 SOLUTION, ORAL ORAL
Status: DISCONTINUED | OUTPATIENT
Start: 2024-02-23 | End: 2024-02-23 | Stop reason: HOSPADM

## 2024-02-23 RX ORDER — ONDANSETRON 2 MG/ML
4 INJECTION INTRAMUSCULAR; INTRAVENOUS
Status: DISCONTINUED | OUTPATIENT
Start: 2024-02-23 | End: 2024-02-23 | Stop reason: HOSPADM

## 2024-02-23 RX ORDER — ROCURONIUM BROMIDE 10 MG/ML
INJECTION, SOLUTION INTRAVENOUS PRN
Status: DISCONTINUED | OUTPATIENT
Start: 2024-02-23 | End: 2024-02-23 | Stop reason: SURG

## 2024-02-23 RX ORDER — SODIUM CHLORIDE, SODIUM LACTATE, POTASSIUM CHLORIDE, CALCIUM CHLORIDE 600; 310; 30; 20 MG/100ML; MG/100ML; MG/100ML; MG/100ML
INJECTION, SOLUTION INTRAVENOUS CONTINUOUS
Status: DISCONTINUED | OUTPATIENT
Start: 2024-02-23 | End: 2024-02-23 | Stop reason: HOSPADM

## 2024-02-23 RX ORDER — SODIUM CHLORIDE 9 MG/ML
INJECTION, SOLUTION INTRAVENOUS ONCE
Status: DISCONTINUED | OUTPATIENT
Start: 2024-02-23 | End: 2024-02-23 | Stop reason: HOSPADM

## 2024-02-23 RX ORDER — HALOPERIDOL 5 MG/ML
1 INJECTION INTRAMUSCULAR
Status: DISCONTINUED | OUTPATIENT
Start: 2024-02-23 | End: 2024-02-23 | Stop reason: HOSPADM

## 2024-02-23 RX ORDER — SODIUM CHLORIDE, SODIUM LACTATE, POTASSIUM CHLORIDE, AND CALCIUM CHLORIDE .6; .31; .03; .02 G/100ML; G/100ML; G/100ML; G/100ML
500 INJECTION, SOLUTION INTRAVENOUS
Status: CANCELLED | OUTPATIENT
Start: 2024-02-23

## 2024-02-23 RX ORDER — SODIUM CHLORIDE, SODIUM LACTATE, POTASSIUM CHLORIDE, CALCIUM CHLORIDE 600; 310; 30; 20 MG/100ML; MG/100ML; MG/100ML; MG/100ML
INJECTION, SOLUTION INTRAVENOUS CONTINUOUS
Status: DISCONTINUED | OUTPATIENT
Start: 2024-02-23 | End: 2024-02-25

## 2024-02-23 RX ORDER — ONDANSETRON 4 MG/1
4 TABLET, ORALLY DISINTEGRATING ORAL EVERY 4 HOURS PRN
Status: CANCELLED | OUTPATIENT
Start: 2024-02-23

## 2024-02-23 RX ORDER — DIPHENHYDRAMINE HYDROCHLORIDE 50 MG/ML
12.5 INJECTION INTRAMUSCULAR; INTRAVENOUS
Status: DISCONTINUED | OUTPATIENT
Start: 2024-02-23 | End: 2024-02-23 | Stop reason: HOSPADM

## 2024-02-23 RX ORDER — HYDROMORPHONE HYDROCHLORIDE 1 MG/ML
0.1 INJECTION, SOLUTION INTRAMUSCULAR; INTRAVENOUS; SUBCUTANEOUS
Status: DISCONTINUED | OUTPATIENT
Start: 2024-02-23 | End: 2024-02-23 | Stop reason: HOSPADM

## 2024-02-23 RX ORDER — OXYCODONE HCL 5 MG/5 ML
10 SOLUTION, ORAL ORAL
Status: DISCONTINUED | OUTPATIENT
Start: 2024-02-23 | End: 2024-02-23 | Stop reason: HOSPADM

## 2024-02-23 RX ORDER — PHENYLEPHRINE HYDROCHLORIDE 10 MG/ML
INJECTION, SOLUTION INTRAMUSCULAR; INTRAVENOUS; SUBCUTANEOUS PRN
Status: DISCONTINUED | OUTPATIENT
Start: 2024-02-23 | End: 2024-02-23 | Stop reason: SURG

## 2024-02-23 RX ORDER — ONDANSETRON 2 MG/ML
INJECTION INTRAMUSCULAR; INTRAVENOUS PRN
Status: DISCONTINUED | OUTPATIENT
Start: 2024-02-23 | End: 2024-02-23 | Stop reason: SURG

## 2024-02-23 RX ORDER — HYDRALAZINE HYDROCHLORIDE 20 MG/ML
5 INJECTION INTRAMUSCULAR; INTRAVENOUS
Status: DISCONTINUED | OUTPATIENT
Start: 2024-02-23 | End: 2024-02-23 | Stop reason: HOSPADM

## 2024-02-23 RX ORDER — EPHEDRINE SULFATE 50 MG/ML
5 INJECTION, SOLUTION INTRAVENOUS
Status: DISCONTINUED | OUTPATIENT
Start: 2024-02-23 | End: 2024-02-23 | Stop reason: HOSPADM

## 2024-02-23 RX ORDER — NOREPINEPHRINE BITARTRATE 0.03 MG/ML
0-1 INJECTION, SOLUTION INTRAVENOUS CONTINUOUS
Status: CANCELLED | OUTPATIENT
Start: 2024-02-23

## 2024-02-23 RX ORDER — SODIUM CHLORIDE, SODIUM LACTATE, POTASSIUM CHLORIDE, CALCIUM CHLORIDE 600; 310; 30; 20 MG/100ML; MG/100ML; MG/100ML; MG/100ML
INJECTION, SOLUTION INTRAVENOUS CONTINUOUS
Status: CANCELLED | OUTPATIENT
Start: 2024-02-23

## 2024-02-23 RX ORDER — SODIUM CHLORIDE, SODIUM LACTATE, POTASSIUM CHLORIDE, AND CALCIUM CHLORIDE .6; .31; .03; .02 G/100ML; G/100ML; G/100ML; G/100ML
500 INJECTION, SOLUTION INTRAVENOUS
Status: COMPLETED | OUTPATIENT
Start: 2024-02-23 | End: 2024-03-01

## 2024-02-23 RX ORDER — MIDAZOLAM HYDROCHLORIDE 1 MG/ML
INJECTION INTRAMUSCULAR; INTRAVENOUS PRN
Status: DISCONTINUED | OUTPATIENT
Start: 2024-02-23 | End: 2024-02-23 | Stop reason: SURG

## 2024-02-23 RX ORDER — METRONIDAZOLE 500 MG/100ML
500 INJECTION, SOLUTION INTRAVENOUS EVERY 12 HOURS
Status: DISCONTINUED | OUTPATIENT
Start: 2024-02-23 | End: 2024-02-28

## 2024-02-23 RX ORDER — SODIUM CHLORIDE, SODIUM LACTATE, POTASSIUM CHLORIDE, AND CALCIUM CHLORIDE .6; .31; .03; .02 G/100ML; G/100ML; G/100ML; G/100ML
30 INJECTION, SOLUTION INTRAVENOUS ONCE
Status: COMPLETED | OUTPATIENT
Start: 2024-02-23 | End: 2024-02-23

## 2024-02-23 RX ORDER — SODIUM CHLORIDE 9 MG/ML
INJECTION, SOLUTION INTRAVENOUS CONTINUOUS
Status: DISCONTINUED | OUTPATIENT
Start: 2024-02-23 | End: 2024-02-23

## 2024-02-23 RX ORDER — DEXAMETHASONE SODIUM PHOSPHATE 4 MG/ML
INJECTION, SOLUTION INTRA-ARTICULAR; INTRALESIONAL; INTRAMUSCULAR; INTRAVENOUS; SOFT TISSUE PRN
Status: DISCONTINUED | OUTPATIENT
Start: 2024-02-23 | End: 2024-02-23 | Stop reason: SURG

## 2024-02-23 RX ADMIN — MORPHINE SULFATE 2 MG: 4 INJECTION, SOLUTION INTRAMUSCULAR; INTRAVENOUS at 10:10

## 2024-02-23 RX ADMIN — PROPOFOL 50 MG: 10 INJECTION, EMULSION INTRAVENOUS at 13:32

## 2024-02-23 RX ADMIN — MIDAZOLAM HYDROCHLORIDE 2 MG: 1 INJECTION, SOLUTION INTRAMUSCULAR; INTRAVENOUS at 13:28

## 2024-02-23 RX ADMIN — SODIUM CHLORIDE, POTASSIUM CHLORIDE, SODIUM LACTATE AND CALCIUM CHLORIDE: 600; 310; 30; 20 INJECTION, SOLUTION INTRAVENOUS at 16:47

## 2024-02-23 RX ADMIN — DEXAMETHASONE SODIUM PHOSPHATE 4 MG: 4 INJECTION INTRA-ARTICULAR; INTRALESIONAL; INTRAMUSCULAR; INTRAVENOUS; SOFT TISSUE at 13:43

## 2024-02-23 RX ADMIN — FENTANYL CITRATE 25 MCG: 50 INJECTION, SOLUTION INTRAMUSCULAR; INTRAVENOUS at 15:20

## 2024-02-23 RX ADMIN — CEFTRIAXONE SODIUM 2000 MG: 2 INJECTION, POWDER, FOR SOLUTION INTRAMUSCULAR; INTRAVENOUS at 13:38

## 2024-02-23 RX ADMIN — FENTANYL CITRATE 50 MCG: 50 INJECTION, SOLUTION INTRAMUSCULAR; INTRAVENOUS at 20:26

## 2024-02-23 RX ADMIN — FENTANYL CITRATE 50 MCG: 50 INJECTION, SOLUTION INTRAMUSCULAR; INTRAVENOUS at 13:50

## 2024-02-23 RX ADMIN — LIDOCAINE HYDROCHLORIDE 20 MG: 20 INJECTION, SOLUTION EPIDURAL; INFILTRATION; INTRACAUDAL at 13:32

## 2024-02-23 RX ADMIN — FENTANYL CITRATE 75 MCG: 50 INJECTION, SOLUTION INTRAMUSCULAR; INTRAVENOUS at 13:32

## 2024-02-23 RX ADMIN — ONDANSETRON 4 MG: 2 INJECTION INTRAMUSCULAR; INTRAVENOUS at 10:10

## 2024-02-23 RX ADMIN — METRONIDAZOLE 500 MG: 5 INJECTION, SOLUTION INTRAVENOUS at 13:27

## 2024-02-23 RX ADMIN — PHENYLEPHRINE HYDROCHLORIDE 100 MCG: 10 INJECTION INTRAVENOUS at 13:39

## 2024-02-23 RX ADMIN — SODIUM CHLORIDE, POTASSIUM CHLORIDE, SODIUM LACTATE AND CALCIUM CHLORIDE 1497 ML: 600; 310; 30; 20 INJECTION, SOLUTION INTRAVENOUS at 12:15

## 2024-02-23 RX ADMIN — SODIUM CHLORIDE 1000 ML: 9 INJECTION, SOLUTION INTRAVENOUS at 10:08

## 2024-02-23 RX ADMIN — SODIUM CHLORIDE: 9 INJECTION, SOLUTION INTRAVENOUS at 13:27

## 2024-02-23 RX ADMIN — ONDANSETRON 4 MG: 2 INJECTION INTRAMUSCULAR; INTRAVENOUS at 14:28

## 2024-02-23 RX ADMIN — METRONIDAZOLE 500 MG: 5 INJECTION, SOLUTION INTRAVENOUS at 12:30

## 2024-02-23 RX ADMIN — ROCURONIUM BROMIDE 50 MG: 50 INJECTION, SOLUTION INTRAVENOUS at 13:32

## 2024-02-23 RX ADMIN — CEFTRIAXONE SODIUM 2000 MG: 2 INJECTION, POWDER, FOR SOLUTION INTRAMUSCULAR; INTRAVENOUS at 13:23

## 2024-02-23 RX ADMIN — SUGAMMADEX 200 MG: 100 INJECTION, SOLUTION INTRAVENOUS at 14:30

## 2024-02-23 RX ADMIN — MORPHINE SULFATE 4 MG: 4 INJECTION, SOLUTION INTRAMUSCULAR; INTRAVENOUS at 11:36

## 2024-02-23 RX ADMIN — ROCURONIUM BROMIDE 20 MG: 50 INJECTION, SOLUTION INTRAVENOUS at 14:07

## 2024-02-23 RX ADMIN — PHENYLEPHRINE HYDROCHLORIDE 0.3 MCG/KG/MIN: 10 INJECTION INTRAVENOUS at 13:30

## 2024-02-23 ASSESSMENT — PAIN DESCRIPTION - PAIN TYPE
TYPE: ACUTE PAIN
TYPE: SURGICAL PAIN
TYPE: ACUTE PAIN;SURGICAL PAIN
TYPE: SURGICAL PAIN
TYPE: SURGICAL PAIN
TYPE: ACUTE PAIN

## 2024-02-23 ASSESSMENT — ENCOUNTER SYMPTOMS
VOMITING: 1
MYALGIAS: 0
EYES NEGATIVE: 1
NAUSEA: 1
BRUISES/BLEEDS EASILY: 0
COUGH: 0
CONSTIPATION: 0
HEMOPTYSIS: 0
SORE THROAT: 0
BLOOD IN STOOL: 0
WEIGHT LOSS: 1
WHEEZING: 0
SHORTNESS OF BREATH: 0
DIZZINESS: 0
CHILLS: 0
MUSCULOSKELETAL NEGATIVE: 1
PALPITATIONS: 0
FOCAL WEAKNESS: 0
FEVER: 0
ABDOMINAL PAIN: 1
LOSS OF CONSCIOUSNESS: 0
RESPIRATORY NEGATIVE: 1
DIARRHEA: 0
BLURRED VISION: 0
DOUBLE VISION: 0
NERVOUS/ANXIOUS: 1
SEIZURES: 0
HEARTBURN: 0
HEADACHES: 1
CARDIOVASCULAR NEGATIVE: 1
DIAPHORESIS: 0

## 2024-02-23 ASSESSMENT — COGNITIVE AND FUNCTIONAL STATUS - GENERAL
MOVING TO AND FROM BED TO CHAIR: A LITTLE
SUGGESTED CMS G CODE MODIFIER DAILY ACTIVITY: CJ
STANDING UP FROM CHAIR USING ARMS: A LITTLE
DRESSING REGULAR UPPER BODY CLOTHING: A LITTLE
SUGGESTED CMS G CODE MODIFIER MOBILITY: CK
TURNING FROM BACK TO SIDE WHILE IN FLAT BAD: A LITTLE
TOILETING: A LITTLE
WALKING IN HOSPITAL ROOM: A LITTLE
CLIMB 3 TO 5 STEPS WITH RAILING: A LITTLE
HELP NEEDED FOR BATHING: A LITTLE
MOVING FROM LYING ON BACK TO SITTING ON SIDE OF FLAT BED: A LITTLE
MOBILITY SCORE: 18
DRESSING REGULAR LOWER BODY CLOTHING: A LITTLE
DAILY ACTIVITIY SCORE: 20

## 2024-02-23 ASSESSMENT — PATIENT HEALTH QUESTIONNAIRE - PHQ9
SUM OF ALL RESPONSES TO PHQ9 QUESTIONS 1 AND 2: 0
2. FEELING DOWN, DEPRESSED, IRRITABLE, OR HOPELESS: NOT AT ALL
1. LITTLE INTEREST OR PLEASURE IN DOING THINGS: NOT AT ALL

## 2024-02-23 ASSESSMENT — FIBROSIS 4 INDEX
FIB4 SCORE: 1.63
FIB4 SCORE: 2.71

## 2024-02-23 ASSESSMENT — VISUAL ACUITY: OU: 1

## 2024-02-23 ASSESSMENT — PAIN SCALES - GENERAL: PAIN_LEVEL: 6

## 2024-02-23 NOTE — ANESTHESIA TIME REPORT
Anesthesia Start and Stop Event Times       Date Time Event    2/23/2024 1243 Ready for Procedure     1327 Anesthesia Start     1448 Anesthesia Stop          Responsible Staff  02/23/24      Name Role Begin End    Alessandro Nesbitt D.O. Anesth 1327 1448          Overtime Reason:  no overtime (within assigned shift)    Comments:

## 2024-02-23 NOTE — OR NURSING
1444- Pt to pacu via bed with side rails up. VSS, respirations spontaneous and unlabored.  Lap sites x3 to mid abd with dermabond, all cdi.  NICKI to L abd with bloody drainage noted. NG tube to L nare, clamped.  1459- VSS.  Pt having trouble clearing secretions, oral suctioning performed.  Lungs clear, diminished at bases.  1313- Report to Angle VEGA.

## 2024-02-23 NOTE — CONSULTS
Pulmonary Consultation    Date of consult: 2/23/2024    Referring Physician  Milly Chen M.D.    Reason for Consultation  Acute abdoman to OR than post op ICU    History of Presenting Illness  63 y.o. female who presented 2/23/2024 with both systolic and diastolic heart failure with an ejection fraction of 40% and diastolic dysfunction grade 2, previous hx of alcohol dependence, CKD  CT abdoman showed perforated bowel  Taken to OR by Dr. Shaikh and found to have perforated duodenal ulcer 1 cm and repaired with a sharlene patch with drain placement  50 ml blood loss  Radial art line in place  Extubated    Code Status  Full Code    Review of Systems  ROS  Pt awake  Denies pain but quite lethargic recovering from  anesthesia      Past Medical History   has a past medical history of HLD (hyperlipidemia), HTN (hypertension), Hypertension, and Obesity.    Surgical History   has a past surgical history that includes pelviscopy (6/5/08); ovarian cystectomy (6/5/08); cystoscopy (6/5/08); hysterectomy laparoscopy (2003); trigger finger release (Right, 3/31/2017); ganglion excision (Right, 3/31/2017); and knee arthroscopy.    Family History  family history includes Diabetes in her father and mother; Heart Disease in her mother; Stroke in her mother.    Social History   reports that she has never smoked. She has never used smokeless tobacco. She reports that she does not currently use alcohol after a past usage of about 3.6 oz of alcohol per week. She reports that she does not currently use drugs after having used the following drugs: Marijuana.    Medications  Home Medications       Reviewed by Nuzhat Medina R.N. (Registered Nurse) on 02/23/24 at 1427  Med List Status: Complete     Medication Last Dose Status   acetaminophen (TYLENOL) 500 MG Tab FEW DAYS AGO Active   bisoprolol (ZEBETA) 5 MG Tab 2/21/2024 Active   cefTRIAXone (Rocephin) 2,000 mg in  mL IVPB  Active   dexamethasone (Decadron) injection  Active    Empagliflozin (JARDIANCE) 10 MG Tab tablet 2/21/2024 Active   fentaNYL (Sublimaze) injection  Active   lidocaine (Xylocaine) 1 % injection 0.5 mL  Active   lidocaine PF (Xylocaine-MPF) 2 % injection PF  Active   LR (Bolus) infusion 500 mL  Active   metroNIDAZOLE (Flagyl) IVPB 500 mg  Active   midazolam (Versed) injection  Active   NS infusion  Active   NS infusion  Active   omeprazole (PRILOSEC) 20 MG delayed-release capsule 2/22/2024 Active   phenylephrine (Flex-Synephrine) 40 mg in  mL infusion  Active   phenylephrine (Flex-Synephrine) injection  Active   propofol (DIPRIVAN) injection  Active   rocuronium (Zemuron) injection  Active   sacubitril-valsartan (ENTRESTO) 24-26 MG Tab 2/21/2024 Active   spironolactone (ALDACTONE) 50 MG Tab 2/21/2024 Active   traMADol (ULTRAM) 50 MG Tab 2/21/2024 Active                  Current Facility-Administered Medications   Medication Dose Route Frequency Provider Last Rate Last Admin    LR (Bolus) infusion 500 mL  500 mL Intravenous Once PRN Jocelyn Lloyd D.O.        metroNIDAZOLE (Flagyl) IVPB 500 mg  500 mg Intravenous Q12HRS Milly Chen M.D. 100 mL/hr at 02/23/24 1230 500 mg at 02/23/24 1327    cefTRIAXone (Rocephin) 2,000 mg in  mL IVPB  2,000 mg Intravenous Daily-1400 Milly Chen M.D. 200 mL/hr at 02/23/24 1323 2,000 mg at 02/23/24 1338    lactated ringers infusion   Intravenous Continuous Jabari Arteaga M.D.           Allergies  Allergies   Allergen Reactions    Pcn [Penicillins] Rash     Pt reports that it was a childhood allergie received body rash     Sulfa Drugs Rash     Pt reports that it was a childhood allergie received body rash        Vital Signs last 24 hours  Temp:  [36.1 °C (97 °F)-37.2 °C (99 °F)] 36.2 °C (97.2 °F)  Pulse:  [68-91] 76  Resp:  [13-24] 18  BP: ()/(45-60) 104/56  SpO2:  [94 %-100 %] 94 %    Physical Exam  Physical Exam  Constitutional:       Appearance: She is ill-appearing.   HENT:      Head: Normocephalic and  atraumatic.      Mouth/Throat:      Mouth: Mucous membranes are dry.   Eyes:      Extraocular Movements: Extraocular movements intact.      Pupils: Pupils are equal, round, and reactive to light.   Cardiovascular:      Rate and Rhythm: Normal rate.      Heart sounds: Murmur heard.   Abdominal:      Comments: Surgical dressing  Drain - serousanginous  Soft abdoman   Musculoskeletal:      Right lower leg: No edema.      Left lower leg: No edema.   Skin:     General: Skin is warm and dry.   Neurological:      General: No focal deficit present.      Mental Status: She is oriented to person, place, and time.         Fluids    Intake/Output Summary (Last 24 hours) at 2/23/2024 1634  Last data filed at 2/23/2024 1507  Gross per 24 hour   Intake 500 ml   Output 190 ml   Net 310 ml       Laboratory  Recent Results (from the past 48 hour(s))   CBC WITH DIFFERENTIAL    Collection Time: 02/23/24 10:05 AM   Result Value Ref Range    WBC 19.3 (H) 4.8 - 10.8 K/uL    RBC 5.10 4.20 - 5.40 M/uL    Hemoglobin 14.0 12.0 - 16.0 g/dL    Hematocrit 42.0 37.0 - 47.0 %    MCV 82.4 81.4 - 97.8 fL    MCH 27.5 27.0 - 33.0 pg    MCHC 33.3 32.2 - 35.5 g/dL    RDW 83.4 (H) 35.9 - 50.0 fL    Platelet Count 317 164 - 446 K/uL    Neutrophils-Polys 90.90 (H) 44.00 - 72.00 %    Lymphocytes 3.90 (L) 22.00 - 41.00 %    Monocytes 4.50 0.00 - 13.40 %    Eosinophils 0.00 0.00 - 6.90 %    Basophils 0.20 0.00 - 1.80 %    Immature Granulocytes 0.50 0.00 - 0.90 %    Nucleated RBC 0.00 0.00 - 0.20 /100 WBC    Neutrophils (Absolute) 17.58 (H) 1.82 - 7.42 K/uL    Lymphs (Absolute) 0.76 (L) 1.00 - 4.80 K/uL    Monos (Absolute) 0.86 (H) 0.00 - 0.85 K/uL    Eos (Absolute) 0.00 0.00 - 0.51 K/uL    Baso (Absolute) 0.03 0.00 - 0.12 K/uL    Immature Granulocytes (abs) 0.09 0.00 - 0.11 K/uL    NRBC (Absolute) 0.00 K/uL   COMP METABOLIC PANEL    Collection Time: 02/23/24 10:05 AM   Result Value Ref Range    Sodium 119 (LL) 135 - 145 mmol/L    Potassium 5.4 3.6 - 5.5  mmol/L    Chloride 80 (L) 96 - 112 mmol/L    Co2 18 (L) 20 - 33 mmol/L    Anion Gap 21.0 (H) 7.0 - 16.0    Glucose 130 (H) 65 - 99 mg/dL    Bun 73 (H) 8 - 22 mg/dL    Creatinine 2.18 (H) 0.50 - 1.40 mg/dL    Calcium 10.5 (H) 8.4 - 10.2 mg/dL    Correct Calcium 11.2 (H) 8.5 - 10.5 mg/dL    AST(SGOT) 21 12 - 45 U/L    ALT(SGPT) 7 2 - 50 U/L    Alkaline Phosphatase 176 (H) 30 - 99 U/L    Total Bilirubin 2.2 (H) 0.1 - 1.5 mg/dL    Albumin 3.1 (L) 3.2 - 4.9 g/dL    Total Protein 8.5 (H) 6.0 - 8.2 g/dL    Globulin 5.4 (H) 1.9 - 3.5 g/dL    A-G Ratio 0.6 g/dL   LIPASE    Collection Time: 02/23/24 10:05 AM   Result Value Ref Range    Lipase 16 11 - 82 U/L   proBrain Natriuretic Peptide, NT    Collection Time: 02/23/24 10:05 AM   Result Value Ref Range    NT-proBNP 2900 (H) 0 - 125 pg/mL   APTT    Collection Time: 02/23/24 10:05 AM   Result Value Ref Range    APTT 33.6 24.7 - 36.0 sec   PROTHROMBIN TIME (INR)    Collection Time: 02/23/24 10:05 AM   Result Value Ref Range    PT 17.3 (H) 12.0 - 14.6 sec    INR 1.36 (H) 0.87 - 1.13   ESTIMATED GFR    Collection Time: 02/23/24 10:05 AM   Result Value Ref Range    GFR (CKD-EPI) 24 (A) >60 mL/min/1.73 m 2   LACTIC ACID    Collection Time: 02/23/24 10:05 AM   Result Value Ref Range    Lactic Acid 4.1 (HH) 0.5 - 2.0 mmol/L   PROCALCITONIN    Collection Time: 02/23/24 10:05 AM   Result Value Ref Range    Procalcitonin 7.58 (H) <0.25 ng/mL   Magnesium    Collection Time: 02/23/24 10:05 AM   Result Value Ref Range    Magnesium 2.0 1.5 - 2.5 mg/dL   C Reactive Protein Quantitative (Non-Cardiac)    Collection Time: 02/23/24 10:05 AM   Result Value Ref Range    Stat C-Reactive Protein 39.04 (H) 0.00 - 0.75 mg/dL       Imaging  Free air under right diaphragm  No I or e    Assessment/Plan  Duodenal ulcer perforation (HCC)- (present on admission)  Assessment & Plan  S/p Man patch repair 2/23  Drain in place  Hemodynamically stable  Repeat lytes, lactic post op  IV fluid  NG to low  intermittent suction  To remain npo  Ceftriaxone and metronidazole      Metabolic acidosis- (present on admission)  Assessment & Plan  Due to acute on chronic kidney dz and also elevated lactic from shock when she presented with acute abdoman  Recheck labs    Acute on chronic kidney failure (HCC)- (present on admission)  Assessment & Plan  Creatinine now 2.18  Likely due to ATN from presentation of show with acute abdoman now with nl bp  Keep MAP  > 65 mm Hg and systolic > 100 mmhg  IV fluids    Anemia due to chronic kidney disease- (present on admission)  Assessment & Plan  Known hx  Monitor  No signs of bleeding    Hyponatremia- (present on admission)  Assessment & Plan  Low na and low cl  Assume acute due to hypovolemia when she presented today as was not able to take po  Last Na was 134 in 1/2024  As acute no concern for correction  Mental state intact      Acute combined systolic and diastolic heart failure (HCC)- (present on admission)  Assessment & Plan  Known hx  EF 40%  Currently appears euvolemic   Hold cardiac meds        Discussed patient condition and risk of morbidity and/or mortality with Charge nurse / hot rounds and Dr. cavanaugh .    The patient remains critically ill.  Additional critical care time = 31 minutes in directly providing and coordinating critical care and extensive data review.  No time overlap and excludes procedures.

## 2024-02-23 NOTE — ED PROVIDER NOTES
ER Provider Note    Scribed for Dr. Jocelyn Lloyd D.O. by Sarah Parekh. 2/23/2024  9:44 AM    Primary Care Provider: ROBBY Gregory    CHIEF COMPLAINT  Chief Complaint   Patient presents with    Abdominal Pain     crampy    N/V     X 2 days       EXTERNAL RECORDS REVIEWED  Outpatient Notes: The patient was seen by cardiology on 2/9/24 for heart failure. The patient was admitted on 1/22/24 for CHF.     HPI/ROS  LIMITATION TO HISTORY   Select: : None    OUTSIDE HISTORIAN(S):  Family Denisse Abel is a 63 y.o. female who presents to the ED for severe generalized abdominal pain onset 2 days ago. She notes her abdominal pain is worse with even minimal touch. She reports associated vomiting and abdominal distension which both started 2 days ago. She denies any fever or diarrhea. She states her last bowel movement was this morning and was small but baseline for her. The patient states she was here one month ago and admitted in the ICU for 6 days for CHF.  She complains of shortness of breath today as well but denies any chest pain.    PAST MEDICAL HISTORY  Past Medical History:   Diagnosis Date    HLD (hyperlipidemia)     HTN (hypertension)     Hypertension     Obesity        SURGICAL HISTORY  Past Surgical History:   Procedure Laterality Date    TRIGGER FINGER RELEASE Right 3/31/2017    Procedure: TRIGGER FINGER RELEASE;  Surgeon: Jimmy Atkins M.D.;  Location: SURGERY [facility];  Service:     GANGLION EXCISION Right 3/31/2017    Procedure: GANGLION EXCISION - HAND CYST;  Surgeon: Jimmy Atkins M.D.;  Location: SURGERY [facility];  Service:     PELVISCOPY  6/5/08    Performed by NATALIYA ARCEO at SURGERY SAME DAY [facility]    OVARIAN CYSTECTOMY  6/5/08    Performed by NATALIYA ARCEO at SURGERY SAME DAY [facility]    CYSTOSCOPY  6/5/08    Performed by NATALIYA ARCEO at SURGERY SAME DAY [facility]    HYSTERECTOMY LAPAROSCOPY  2003    KNEE ARTHROSCOPY         [facility]  "HISTORY  Family History   Problem Relation Age of Onset    Diabetes Mother     Heart Disease Mother     Stroke Mother     Diabetes Father        SOCIAL HISTORY   reports that she has never smoked. She has never used smokeless tobacco. She reports that she does not currently use alcohol after a past usage of about 3.6 oz of alcohol per week. She reports that she does not currently use drugs after having used the following drugs: Marijuana.    CURRENT MEDICATIONS  Previous Medications    ACETAMINOPHEN (TYLENOL) 500 MG TAB    Take 500 mg by mouth every 6 hours as needed. Indications: Pain    BISOPROLOL (ZEBETA) 5 MG TAB    Take 1 Tablet by mouth every day.    EMPAGLIFLOZIN (JARDIANCE) 10 MG TAB TABLET    Take 1 Tablet by mouth every day.    OMEPRAZOLE (PRILOSEC) 20 MG DELAYED-RELEASE CAPSULE    Take 1 Capsule by mouth every day.    SACUBITRIL-VALSARTAN (ENTRESTO) 24-26 MG TAB    Take 1 Tablet by mouth 2 times a day.    SODIUM CHLORIDE (OCEAN) 0.65 % SOLUTION    Administer 1 Spray into affected nostril(S) every day.    SPIRONOLACTONE (ALDACTONE) 50 MG TAB    Take 1 Tablet by mouth every day.    TRAMADOL (ULTRAM) 50 MG TAB           ALLERGIES  Pcn [penicillins], Sulfa drugs, and Sulfasalazine    PHYSICAL EXAM  BP (!) 88/60   Pulse 81   Temp 36.1 °C (97 °F) (Temporal)   Resp 16   Ht 1.702 m (5' 7\")   Wt 49.9 kg (110 lb 0.2 oz)   SpO2 98%   BMI 17.23 kg/m²   Constitutional: Very thin, cachetic, ill appearing, elderly female in severe distress  HENT: Normocephalic, oropharynx dry   Cardiovascular: Normal heart rate and Regular rhythm. No murmur  Thorax & Lungs: Mild respiratory distress, no rhonchi, wheezing or rales. diminished in bases  Abdomen: abdominal distension with tympanic bowel sounds and diffuse tenderness with intentional guarding, rebound, no masses.  Skin: Dry,  Sallow appearing, cool  Extremities: Peripheral pulses 4/4, No tenderness.  1+ bilateral ankle edema  Neurologic: Alert & oriented x 3, Normal " motor function, Normal sensory function.  Psychiatric: Affect tearful and somewhat anxious    DIAGNOSTIC STUDIES & PROCEDURES    Labs:   Results for orders placed or performed during the hospital encounter of 02/23/24   CBC WITH DIFFERENTIAL   Result Value Ref Range    WBC 19.3 (H) 4.8 - 10.8 K/uL    RBC 5.10 4.20 - 5.40 M/uL    Hemoglobin 14.0 12.0 - 16.0 g/dL    Hematocrit 42.0 37.0 - 47.0 %    MCV 82.4 81.4 - 97.8 fL    MCH 27.5 27.0 - 33.0 pg    MCHC 33.3 32.2 - 35.5 g/dL    RDW 83.4 (H) 35.9 - 50.0 fL    Platelet Count 317 164 - 446 K/uL    Neutrophils-Polys 90.90 (H) 44.00 - 72.00 %    Lymphocytes 3.90 (L) 22.00 - 41.00 %    Monocytes 4.50 0.00 - 13.40 %    Eosinophils 0.00 0.00 - 6.90 %    Basophils 0.20 0.00 - 1.80 %    Immature Granulocytes 0.50 0.00 - 0.90 %    Nucleated RBC 0.00 0.00 - 0.20 /100 WBC    Neutrophils (Absolute) 17.58 (H) 1.82 - 7.42 K/uL    Lymphs (Absolute) 0.76 (L) 1.00 - 4.80 K/uL    Monos (Absolute) 0.86 (H) 0.00 - 0.85 K/uL    Eos (Absolute) 0.00 0.00 - 0.51 K/uL    Baso (Absolute) 0.03 0.00 - 0.12 K/uL    Immature Granulocytes (abs) 0.09 0.00 - 0.11 K/uL    NRBC (Absolute) 0.00 K/uL   COMP METABOLIC PANEL   Result Value Ref Range    Sodium 119 (LL) 135 - 145 mmol/L    Potassium 5.4 3.6 - 5.5 mmol/L    Chloride 80 (L) 96 - 112 mmol/L    Co2 18 (L) 20 - 33 mmol/L    Anion Gap 21.0 (H) 7.0 - 16.0    Glucose 130 (H) 65 - 99 mg/dL    Bun 73 (H) 8 - 22 mg/dL    Creatinine 2.18 (H) 0.50 - 1.40 mg/dL    Calcium 10.5 (H) 8.4 - 10.2 mg/dL    Correct Calcium 11.2 (H) 8.5 - 10.5 mg/dL    AST(SGOT) 21 12 - 45 U/L    ALT(SGPT) 7 2 - 50 U/L    Alkaline Phosphatase 176 (H) 30 - 99 U/L    Total Bilirubin 2.2 (H) 0.1 - 1.5 mg/dL    Albumin 3.1 (L) 3.2 - 4.9 g/dL    Total Protein 8.5 (H) 6.0 - 8.2 g/dL    Globulin 5.4 (H) 1.9 - 3.5 g/dL    A-G Ratio 0.6 g/dL   LIPASE   Result Value Ref Range    Lipase 16 11 - 82 U/L   proBrain Natriuretic Peptide, NT   Result Value Ref Range    NT-proBNP 2900 (H) 0 - 125  pg/mL   APTT   Result Value Ref Range    APTT 33.6 24.7 - 36.0 sec   PROTHROMBIN TIME (INR)   Result Value Ref Range    PT 17.3 (H) 12.0 - 14.6 sec    INR 1.36 (H) 0.87 - 1.13   ESTIMATED GFR   Result Value Ref Range    GFR (CKD-EPI) 24 (A) >60 mL/min/1.73 m 2   LACTIC ACID   Result Value Ref Range    Lactic Acid 4.1 (HH) 0.5 - 2.0 mmol/L   PROCALCITONIN   Result Value Ref Range    Procalcitonin 7.58 (H) <0.25 ng/mL   Magnesium   Result Value Ref Range    Magnesium 2.0 1.5 - 2.5 mg/dL   C Reactive Protein Quantitative (Non-Cardiac)   Result Value Ref Range    Stat C-Reactive Protein 39.04 (H) 0.00 - 0.75 mg/dL      All labs reviewed by me.    Radiology:   The attending Emergency Physician has independently interpreted the diagnostic imaging associated with this visit and is awaiting the final reading from the radiologist, which will be displayed below.    Preliminary interpretation is a follows: Free air under the diaphragm.  Radiologist interpretation:    DX-CHEST-LIMITED (1 VIEW)   Final Result      1.  Free intraperitoneal air consistent with perforation of hollow viscus      2.  Findings were discussed with JUAN PABLO SCHWARTZ on 2/23/2024 11:42 AM.      CT-RENAL COLIC EVALUATION(A/P W/O)   Final Result      1.  Free intraperitoneal air present consistent with perforation of a hollow viscus      2.  Specific site of perforation is not evident on CT      3.  Cirrhosis with portal hypertension      4.  Moderate amount of ascites      5.  Cholelithiasis            6.  Findings were discussed with JUAN PABLO SCHWARTZ on 2/23/2024 11:45 AM.           COURSE & MEDICAL DECISION MAKING    ED Observation Status? No; Patient does not meet criteria for ED Observation.     INITIAL ASSESSMENT AND PLAN  Care Narrative:       9:44 AM - Patient seen and evaluated at bedside for abdominal pain. She will be treated with Zofran 4 mg and Morphine 4 mg. Discussed plan of care, including labs, imaging and . Patient agrees to plan of care.  Ordered CT-Abdomen-Pelvis with, APTT, INR, CBC with diff, CMP, Lipase, PNP and UA Culture to evaluate. Differential diagnoses include but are not limited to: perforation, bowel obstruction.    Patient's laboratories were grossly abnormal with an elevated white blood cell count of 19.3 with 90% neutrophils and 17% absolute neutrophils.  Her sodium is extremely low at 119, potassium 5.4, anion gap is 21 with glucose 130 her BUN and creatinine are newly elevated at 73 and 2.18.  Alk phos is elevated at 176 BNP is 2900 and her lactic acid is 4.1.  Sepsis protocol was initiated and patient was given appropriate fluid bolus.    11:13 AM - Per RN, the patient is having increased pain.  Patient will be treated with Morphine 4 mg. Canceled CT with contrast. Ordered for CT-Renal Colic without contrast.    11:22 AM - Ordered for Dx-chest.     11:41 AM I discussed the patient's case and the above findings with Dr. Amezcua (surgery) who will consult.    11:43 AM - I discussed the patient's case and the above findings with Dr. Chen (hospitalist) who will consult on the patient for hospitalization.    11:50 AM I discussed the patient's case and the above findings with Dr. Amezcua (surgery) who will take her to the OR.  He requested fluid resuscitation and IV antibiotics preoperatively.  I contacted pharmacy to discuss options for intra-abdominal peritonitis even though the patient is allergic to penicillin, they recommended Rocephin and Flagyl.    11:56 AM - The patient was seen at bedside. I updated her on the plan for surgery. Patient verbalizes understanding and agreement to this plan of care.     HYDRATION: Based on the patient's presentation of possible sepsis the patient was given IV fluids. IV Hydration was used because oral hydration was not adequate alone. Upon recheck following hydration, the patient was improved.    CRITICAL CARE  The very real possibilty of a deterioration of this patient's condition required the highest  level of my preparedness for sudden, emergent intervention.  I provided critical care services, which included medication orders, frequent reevaluations of the patient's condition and response to treatment, ordering and reviewing test results, and discussing the case with various consultants.  The critical care time associated with the care of the patient was 45 minutes. Review chart for interventions. This time is exclusive of any other billable procedures.     ADDITIONAL PROBLEM LIST AND DISPOSITION  Congestive heart failure               DISPOSITION AND DISCUSSIONS  I have discussed management of the patient with the following physicians and BURT's: Dr. Chen (hospitalist)    Discussion of management with other [facility] or appropriate source(s): Pharmacy regarding antibiotics.      DISPOSITION:  Patient will be hospitalized by Dr. Chen (hospitalist) in critical condition.    FINAL IMPRESSION   1. Hyponatremia    2. Shortness of breath    3. Generalized abdominal pain    4. Acute renal failure, unspecified acute renal failure type (HCC)    5. Dehydration    6. Nausea and vomiting, unspecified vomiting type    7. Increased anion gap metabolic acidosis    8. Elevated brain natriuretic peptide (BNP) level    9. Septic shock (HCC)    10. Perforated viscus         Sarah MENDEZ (Scribe), am scribing for, and in the presence of, Jocelyn Lloyd D.O..    Electronically signed by: Sarah Parekh (Gladisibe), 2/23/2024    Jocelyn MENDEZ D.O. personally performed the services described in this documentation, as scribed by Sarah Parekh in my presence, and it is both accurate and complete.    The note accurately reflects work and decisions made by me.  Jocelyn Lloyd D.O.  2/23/2024  1:34 PM

## 2024-02-23 NOTE — CONSULTS
CONSULT NOTE  02/23/24  Consulting Physician: Dr. Chen    PATIENT ID  Name:             Denisse Abel   YOB: 1960  Age:                 63 y.o.  female   MRN:               [id number]    CHIEF COMPLAINT:        Abdominal pain    HISTORY OF PRESENT ILLNESS:  This 63-year-old female presents to the hospital with acute onset abdominal pain.  She has been in the hospital several times recently including significant weight loss this readmission she was found to have new peritoneum and general surgery was consulted for management.    REVIEW OF SYSTEMS:   Constitutional: Denies unintended weight change, night sweats, fatigue  Eyes:   Denies eye pain, redness, discharge, vision changes  Ears/Nose/Throat/Mouth: Denies hearing changes, ear pain, nasal congestion, sore throat, dysphagia  Cardiovascular:  Denies Chest pain, shortness of breath, orthopnea, palpitations, claudication  Respiratory:  Denies cough, sputum, wheezing, dyspnea  Gastrointestinal/Hepatic:  Denies dysphagia, melena, jaundice, hematochezia, changing heartburn  Genitourinary:  Denies dysuria, increased frequency, hematuria, urgency  Musculoskeletal/Rheum: Denies changing arthralgias, myalgias, joint swelling, joint stiffness  Skin/Breast: Denies changing skin lesions, pruritis, nipple discharge, hair changes  Neurological: Denies weakness, numbness, paresthesia, syncope, dizziness  Pyschiatric: Denies acute depression, anxiety, insomnia, personality changes, delusions  Endocrine: Denies temperature intolerance, polydipsia, polyuria  Heme/Oncology/Lymph Nodes: Denies easy bruising, bleeding, lymphadenopathy  All other systems were reviewed and are negative                 Past Medical History:   Past Medical History:   Diagnosis Date    HLD (hyperlipidemia)     HTN (hypertension)     Hypertension     Obesity        Past Surgical History:  Past Surgical History:   Procedure Laterality Date    TRIGGER FINGER RELEASE Right  3/31/2017    Procedure: TRIGGER FINGER RELEASE;  Surgeon: Jimmy Atkins M.D.;  Location: SURGERY [facility];  Service:     GANGLION EXCISION Right 3/31/2017    Procedure: GANGLION EXCISION - HAND CYST;  Surgeon: Jimmy Atkins M.D.;  Location: SURGERY [facility] ER;  Service:     PELVISCOPY  6/5/08    Performed by NATALIYA ARCEO at SURGERY [facility]    OVARIAN CYSTECTOMY  6/5/08    Performed by NATALIYA ARCEO at [facility]ERY [facility]    CYSTOSCOPY  6/5/08    Performed by NATALIYA ARCEO at [facility]ERY [facility]    HYSTERECTOMY LAPAROSCOPY  2003    KNEE ARTHROSCOPY         Current Outpatient Medications:  No current facility-administered medications on file prior to encounter.     Current Outpatient Medications on File Prior to Encounter   Medication Sig Dispense Refill    omeprazole (PRILOSEC) 20 MG delayed-release capsule Take 1 Capsule by mouth every day. 30 Capsule 2    traMADol (ULTRAM) 50 MG Tab Take 50 mg by mouth every 8 hours as needed. Indications: Pain      sacubitril-valsartan (ENTRESTO) 24-26 MG Tab Take 1 Tablet by mouth 2 times a day. 60 Tablet 11    Empagliflozin (JARDIANCE) 10 MG Tab tablet Take 1 Tablet by mouth every day. 90 Tablet 4    bisoprolol (ZEBETA) 5 MG Tab Take 1 Tablet by mouth every day. 30 Tablet 11    spironolactone (ALDACTONE) 50 MG Tab Take 1 Tablet by mouth every day. 30 Tablet 3    acetaminophen (TYLENOL) 500 MG Tab Take 500-1,000 mg by mouth every 6 hours as needed. Indications: Pain         Current Inpatient Medications:   Current Facility-Administered Medications   Medication Last Admin    NS infusion Stopped at 02/23/24 1155    [MAR Hold] LR (Bolus) infusion 1,497 mL      [MAR Hold] LR (Bolus) infusion 500 mL      [MAR Hold] metroNIDAZOLE (Flagyl) IVPB 500 mg      [MAR Hold] cefTRIAXone (Rocephin) 2,000 mg in  mL IVPB         Medication Allergy/Sensitivities:  Allergies   Allergen Reactions    Pcn [Penicillins] Rash     Pt  reports that it was a childhood allergie received body rash     Sulfa Drugs Rash     Pt reports that it was a childhood allergie received body rash        Family History:  Family History   Problem Relation Age of Onset    Diabetes Mother     Heart Disease Mother     Stroke Mother     Diabetes Father      Denies family history of bleeding disorders or reactions to anesthesia    Social History:  PCP: ROBBY Gregory  Social History     Tobacco Use   Smoking Status Never   Smokeless Tobacco Never     Social History     Substance and Sexual Activity   Alcohol Use Not Currently    Alcohol/week: 3.6 oz    Types: 6 Glasses of wine per week    Comment: denies     Social History     Substance and Sexual Activity   Drug Use Not Currently    Types: Marijuana    Comment: denies       PHYSICAL EXAM:  Weight/BMI: Body mass index is 17.23 kg/m².  Vitals:    02/23/24 1112 02/23/24 1140 02/23/24 1151 02/23/24 1206   BP: 98/45  99/53 102/59   Pulse: 72 74 75 75   Resp: 17 16 14 (!) 24   Temp:       TempSrc:       SpO2: 96% 97% 97% 97%   Weight:       Height:         Oxygen Therapy:  Pulse Oximetry: 97 %, O2 Delivery Device: None - Room Air    Constitutional: Well developed, Well nourished, No acute distress, Non-toxic appearance.    HENMT: Normocephalic, Atraumatic, Bilateral external ears normal, Oropharynx moist mucous membranes, No oral exudates, Nose normal.  No thyromegaly.   Eyes: PERRLA, EOMI, Conjunctiva normal, No discharge.   Neck: Normal range of motion, No cervical tenderness, Supple, No stridor, no JVD.  Cardiovascular: Normal heart rate, Normal rhythm, No murmurs, No rubs, No gallops.   Extremites with intact distal pulses, no cyanosis, clubbing or edema.   Lungs:  Respiratory effort is normal. Normal breath sounds, breath sounds clear to auscultation bilaterally,  no rales, no rhonchi, no wheezing.   Abdomen: Diffuse peritonitis  Skin: Warm, Dry, No erythema, No rash, no induration or crepitus.         Neurologic: Alert & oriented x 3, Normal motor function, Normal sensory function, No focal deficits noted, cranial nerves II through XII are normal.  Psychiatric: Affect normal, Judgment normal, Mood normal.     LAB DATA REVIEWED:  Recent Results (from the past 24 hour(s))   CBC WITH DIFFERENTIAL    Collection Time: 02/23/24 10:05 AM   Result Value Ref Range    WBC 19.3 (H) 4.8 - 10.8 K/uL    RBC 5.10 4.20 - 5.40 M/uL    Hemoglobin 14.0 12.0 - 16.0 g/dL    Hematocrit 42.0 37.0 - 47.0 %    MCV 82.4 81.4 - 97.8 fL    MCH 27.5 27.0 - 33.0 pg    MCHC 33.3 32.2 - 35.5 g/dL    RDW 83.4 (H) 35.9 - 50.0 fL    Platelet Count 317 164 - 446 K/uL    Neutrophils-Polys 90.90 (H) 44.00 - 72.00 %    Lymphocytes 3.90 (L) 22.00 - 41.00 %    Monocytes 4.50 0.00 - 13.40 %    Eosinophils 0.00 0.00 - 6.90 %    Basophils 0.20 0.00 - 1.80 %    Immature Granulocytes 0.50 0.00 - 0.90 %    Nucleated RBC 0.00 0.00 - 0.20 /100 WBC    Neutrophils (Absolute) 17.58 (H) 1.82 - 7.42 K/uL    Lymphs (Absolute) 0.76 (L) 1.00 - 4.80 K/uL    Monos (Absolute) 0.86 (H) 0.00 - 0.85 K/uL    Eos (Absolute) 0.00 0.00 - 0.51 K/uL    Baso (Absolute) 0.03 0.00 - 0.12 K/uL    Immature Granulocytes (abs) 0.09 0.00 - 0.11 K/uL    NRBC (Absolute) 0.00 K/uL   COMP METABOLIC PANEL    Collection Time: 02/23/24 10:05 AM   Result Value Ref Range    Sodium 119 (LL) 135 - 145 mmol/L    Potassium 5.4 3.6 - 5.5 mmol/L    Chloride 80 (L) 96 - 112 mmol/L    Co2 18 (L) 20 - 33 mmol/L    Anion Gap 21.0 (H) 7.0 - 16.0    Glucose 130 (H) 65 - 99 mg/dL    Bun 73 (H) 8 - 22 mg/dL    Creatinine 2.18 (H) 0.50 - 1.40 mg/dL    Calcium 10.5 (H) 8.4 - 10.2 mg/dL    Correct Calcium 11.2 (H) 8.5 - 10.5 mg/dL    AST(SGOT) 21 12 - 45 U/L    ALT(SGPT) 7 2 - 50 U/L    Alkaline Phosphatase 176 (H) 30 - 99 U/L    Total Bilirubin 2.2 (H) 0.1 - 1.5 mg/dL    Albumin 3.1 (L) 3.2 - 4.9 g/dL    Total Protein 8.5 (H) 6.0 - 8.2 g/dL    Globulin 5.4 (H) 1.9 - 3.5 g/dL    A-G Ratio 0.6 g/dL   LIPASE     Collection Time: 02/23/24 10:05 AM   Result Value Ref Range    Lipase 16 11 - 82 U/L   proBrain Natriuretic Peptide, NT    Collection Time: 02/23/24 10:05 AM   Result Value Ref Range    NT-proBNP 2900 (H) 0 - 125 pg/mL   APTT    Collection Time: 02/23/24 10:05 AM   Result Value Ref Range    APTT 33.6 24.7 - 36.0 sec   PROTHROMBIN TIME (INR)    Collection Time: 02/23/24 10:05 AM   Result Value Ref Range    PT 17.3 (H) 12.0 - 14.6 sec    INR 1.36 (H) 0.87 - 1.13   ESTIMATED GFR    Collection Time: 02/23/24 10:05 AM   Result Value Ref Range    GFR (CKD-EPI) 24 (A) >60 mL/min/1.73 m 2   LACTIC ACID    Collection Time: 02/23/24 10:05 AM   Result Value Ref Range    Lactic Acid 4.1 (HH) 0.5 - 2.0 mmol/L   PROCALCITONIN    Collection Time: 02/23/24 10:05 AM   Result Value Ref Range    Procalcitonin 7.58 (H) <0.25 ng/mL   Magnesium    Collection Time: 02/23/24 10:05 AM   Result Value Ref Range    Magnesium 2.0 1.5 - 2.5 mg/dL       IMAGING:   Images Independently Reviewed   DX-CHEST-LIMITED (1 VIEW)   Final Result      1.  Free intraperitoneal air consistent with perforation of hollow viscus      2.  Findings were discussed with JUAN PABLO SCHWARTZ on 2/23/2024 11:42 AM.      CT-RENAL COLIC EVALUATION(A/P W/O)   Final Result      1.  Free intraperitoneal air present consistent with perforation of a hollow viscus      2.  Specific site of perforation is not evident on CT      3.  Cirrhosis with portal hypertension      4.  Moderate amount of ascites      5.  Cholelithiasis            6.  Findings were discussed with JUAN PABLO SCHWARTZ on 2/23/2024 11:45 AM.          ASSESSMENT/PLAN     63-year-old female with hollow viscus perforation.  Plan for diagnostic laparoscopy possible exploratory laparotomy other procedures as indicated.  Risks benefits and alternatives of this discussed extensively the patient understands and wishes to proceed.    Neel Amezcua MD  [facility]

## 2024-02-23 NOTE — ANESTHESIA POSTPROCEDURE EVALUATION
Patient: Denisse Abel    Procedure Summary       Date: 02/23/24 Room / Location:  OR  / SURGERY [facility]    Anesthesia Start: 1327 Anesthesia Stop: 1448    Procedure: LAPAROSCOPY, KIM'S PATCH (Abdomen) Diagnosis: (Perforated duodenal ulcer)    Surgeons: Neel Amezcua M.D. Responsible Provider: Alessandro Nesbitt D.O.    Anesthesia Type: general ASA Status: 4 - Emergent            Final Anesthesia Type: general  Last vitals  BP   Blood Pressure : 112/57    Temp   37.2 °C (99 °F)    Pulse   83   Resp   16    SpO2   94 %      Anesthesia Post Evaluation    Patient location during evaluation: PACU  Patient participation: complete - patient participated  Level of consciousness: awake and alert  Pain score: 6    Airway patency: patent  Anesthetic complications: no  Cardiovascular status: hemodynamically stable  Respiratory status: acceptable  Hydration status: euvolemic    PONV: none          There were no known notable events for this encounter.     Nurse Pain Score: 7 (NPRS)

## 2024-02-23 NOTE — ED NOTES
Pt  and friend aware of pending OR at 1300  today. States npo since yesterday, report to bethany morrissey over the phone. Await pharmacy approval for administration of same

## 2024-02-23 NOTE — ANESTHESIA PREPROCEDURE EVALUATION
Case: [id number] Date/Time: 02/23/24 1245    Procedure: LAPAROSCOPY, DIAGNOSTIC, POSSIBLE OPEN    Location: [facility] OR 01 / SURGERY [facility]    Surgeons: Neel Amezcua M.D.            Relevant Problems   CARDIAC   (positive) HTN (hypertension), benign   (positive) Heart murmur         (positive) Acute on chronic kidney failure (HCC)   (positive) Liver cirrhosis (HCC)   (positive) Stage 3b chronic kidney disease (HCC)       Physical Exam    Airway   Mallampati: II  TM distance: >3 FB  Neck ROM: full       Cardiovascular - normal exam  Rhythm: regular  Rate: normal  (-) murmur     Dental - normal exam           Pulmonary - normal exam  Breath sounds clear to auscultation     Abdominal    Neurological - normal exam                   Anesthesia Plan    ASA 4- EMERGENT   ASA physical status 4 criteria: sepsisASA physical status emergent criteria: acute peritonitis    Plan - general       Airway plan will be ETT          Induction: intravenous    Postoperative Plan: Postoperative administration of opioids is intended.    Pertinent diagnostic labs and testing reviewed    Informed Consent:    Anesthetic plan and risks discussed with patient.    Use of blood products discussed with: patient whom consented to blood products.

## 2024-02-23 NOTE — H&P
Hospital Medicine History & Physical Note    Date of Service  2/23/2024    Primary Care Physician  GAYATHRI Gregory.    Consultants  critical care and general surgery    Specialist Names: Dr. Amezcua of surgery, Dr. Santo of critical care    Code Status  Prior    Chief Complaint  Chief Complaint   Patient presents with    Abdominal Pain     crampy    N/V     X 2 days       History of Presenting Illness  Denisse Abel is a 63 y.o. female who presented 2/23/2024 with abdominal pain that been ongoing for about 48 hours.  She says 48 hours ago she started to have abdominal pain that was initially in the right lower quadrant.  The abdominal pain has intensified over time has become 10 out of 10 and its diffuse across the entire abdomen.  She has now developed nausea vomiting has become extremely dehydrated and at this point has not had any bowel movements.  Patient's physical exam reveals an acute abdomen with severe pain throughout the entire abdomen.  Patient's imaging studies reveal a intra-abdominal vesicle rupture with free air.  Source at this point is unidentified.  Patient will need at this point exploratory laparotomy and surgery has been made aware.  Patient is going to surgery immediately.  Dr. Amezcua at this point will be taken the patient to surgery.    Patient is critically ill.   The patient is with acute abdomen and hypotension  The vital organ system that is effected is the: Abdomen circulatory  If untreated there is a high chance of deterioration into: death   The critical care that I am providing today is: Acute surgical intervention and fluid resuscitation and pressor support  The critical care that has been undertaken is medically complex.   There has been no overlap in critical care time.   Critical care time not including procedures, no overlap: 45 minutes     I discussed the plan of care with patient, family, bedside RN, , and emergency room physician Dr. Jocelyn Davis and  surgeon Dr. Amezcua .    Review of Systems  Review of Systems   Constitutional:  Positive for weight loss. Negative for chills, diaphoresis and fever.        Loss of appetite, patient has not been able to eat for the past 48 hours   HENT: Negative.  Negative for hearing loss, nosebleeds and sore throat.    Eyes: Negative.  Negative for blurred vision and double vision.   Respiratory: Negative.  Negative for cough, hemoptysis, shortness of breath and wheezing.    Cardiovascular: Negative.  Negative for chest pain, palpitations and leg swelling.   Gastrointestinal:  Positive for abdominal pain, nausea and vomiting. Negative for blood in stool, constipation, diarrhea and heartburn.   Genitourinary: Negative.  Negative for dysuria, frequency, hematuria and urgency.   Musculoskeletal: Negative.  Negative for joint pain and myalgias.   Skin: Negative.  Negative for itching and rash.   Neurological:  Positive for headaches. Negative for dizziness, focal weakness, seizures and loss of consciousness.   Endo/Heme/Allergies: Negative.  Does not bruise/bleed easily.   Psychiatric/Behavioral:  Negative for suicidal ideas. The patient is nervous/anxious.    All other systems reviewed and are negative.      Past Medical History   has a past medical history of HLD (hyperlipidemia), HTN (hypertension), Hypertension, and Obesity.    Surgical History   has a past surgical history that includes pelviscopy (6/5/08); ovarian cystectomy (6/5/08); cystoscopy (6/5/08); hysterectomy laparoscopy (2003); trigger finger release (Right, 3/31/2017); ganglion excision (Right, 3/31/2017); and knee arthroscopy.     Family History  family history includes Diabetes in her father and mother; Heart Disease in her mother; Stroke in her mother.   Family history reviewed with patient. There is no family history that is pertinent to the chief complaint.     Social History   reports that she has never smoked. She has never used smokeless tobacco. She reports  that she does not currently use alcohol after a past usage of about 3.6 oz of alcohol per week. She reports that she does not currently use drugs after having used the following drugs: Marijuana.    Allergies  Allergies   Allergen Reactions    Pcn [Penicillins] Rash     Pt reports that it was a childhood allergie received body rash     Sulfa Drugs Rash     Pt reports that it was a childhood allergie received body rash        Medications  Prior to Admission Medications   Prescriptions Last Dose Informant Patient Reported? Taking?   Empagliflozin (JARDIANCE) 10 MG Tab tablet 2/21/2024 at [facility] Patient, Patient's Home Pharmacy No No   Sig: Take 1 Tablet by mouth every day.   acetaminophen (TYLENOL) 500 MG Tab FEW DAYS AGO at [facility] Patient, Patient's Home Pharmacy Yes No   Sig: Take 500-1,000 mg by mouth every 6 hours as needed. Indications: Pain   bisoprolol (ZEBETA) 5 MG Tab 2/21/2024 at [facility] Patient, Patient's Home Pharmacy No No   Sig: Take 1 Tablet by mouth every day.   omeprazole (PRILOSEC) 20 MG delayed-release capsule 2/22/2024 at [facility] Patient, Patient's Home Pharmacy No Yes   Sig: Take 1 Capsule by mouth every day.   sacubitril-valsartan (ENTRESTO) 24-26 MG Tab 2/21/2024 at [facility] Patient, Patient's Home Pharmacy No No   Sig: Take 1 Tablet by mouth 2 times a day.   spironolactone (ALDACTONE) 50 MG Tab 2/21/2024 at [facility] Patient, Patient's Home Pharmacy No No   Sig: Take 1 Tablet by mouth every day.   traMADol (ULTRAM) 50 MG Tab 2/21/2024 at [facility] Patient, Patient's Home Pharmacy Yes No   Sig: Take 50 mg by mouth every 8 hours as needed. Indications: Pain      Facility-Administered Medications: None       Physical Exam  Temp:  [36.1 °C (97 °F)] 36.1 °C (97 °F)  Pulse:  [68-81] 75  Resp:  [13-24] 24  BP: ()/(45-60) 102/59  SpO2:  [96 %-99 %] 97 %  Blood Pressure : 102/59   Temperature: 36.1 °C (97 °F)   Pulse: 75   Respiration: (!) 24   Pulse Oximetry: 97 %       Physical Exam  Vitals and nursing note reviewed. Exam  conducted with a chaperone present.   Constitutional:       General: She is awake.      Appearance: Normal appearance. She is well-developed, well-groomed and normal weight. She is toxic-appearing.   HENT:      Head: Normocephalic and atraumatic.      Jaw: There is normal jaw occlusion. No trismus.      Salivary Glands: Right salivary gland is not tender. Left salivary gland is not tender.      Right Ear: External ear normal.      Left Ear: External ear normal.      Mouth/Throat:      Mouth: Mucous membranes are moist.      Pharynx: Oropharynx is clear.   Eyes:      General: Lids are normal. Vision grossly intact.      Extraocular Movements: Extraocular movements intact.      Conjunctiva/sclera: Conjunctivae normal.      Right eye: Right conjunctiva is not injected. No exudate.     Left eye: Left conjunctiva is not injected. No exudate.     Pupils: Pupils are equal, round, and reactive to light.   Neck:      Thyroid: No thyroid mass.      Vascular: No hepatojugular reflux or JVD.      Trachea: No abnormal tracheal secretions or tracheal deviation.   Cardiovascular:      Rate and Rhythm: Normal rate and regular rhythm. Occasional Extrasystoles are present.     Pulses: Normal pulses.      Heart sounds: Normal heart sounds. No murmur heard.     No friction rub.   Pulmonary:      Effort: Pulmonary effort is normal.      Breath sounds: Examination of the right-lower field reveals decreased breath sounds. Examination of the left-lower field reveals decreased breath sounds. Decreased breath sounds present. No wheezing or rhonchi.   Abdominal:      General: Abdomen is flat. Bowel sounds are absent. There is distension.      Palpations: Abdomen is soft.      Tenderness: There is generalized abdominal tenderness. There is no right CVA tenderness or left CVA tenderness.      Hernia: No hernia is present.   Musculoskeletal:      Cervical back: Full passive range of motion without pain, normal range of motion and neck supple. No  rigidity. No muscular tenderness.      Right lower leg: No edema.      Left lower leg: No edema.   Lymphadenopathy:      Head:      Right side of head: No submental adenopathy.      Left side of head: No submental adenopathy.      Cervical:      Right cervical: No superficial cervical adenopathy.     Left cervical: No superficial cervical adenopathy.      Upper Body:      Right upper body: No supraclavicular adenopathy.      Left upper body: No supraclavicular adenopathy.   Skin:     General: Skin is warm and dry.      Capillary Refill: Capillary refill takes less than 2 seconds.      Coloration: Skin is not cyanotic or pale.      Findings: No abrasion or bruising.   Neurological:      General: No focal deficit present.      Mental Status: She is alert and oriented to person, place, and time. Mental status is at baseline.      GCS: GCS eye subscore is 4. GCS verbal subscore is 5. GCS motor subscore is 6.      Cranial Nerves: No cranial nerve deficit.      Sensory: No sensory deficit.      Motor: Motor function is intact.      Deep Tendon Reflexes:      Reflex Scores:       Tricep reflexes are 2+ on the right side and 2+ on the left side.       Bicep reflexes are 2+ on the right side and 2+ on the left side.       Brachioradialis reflexes are 2+ on the right side and 2+ on the left side.       Patellar reflexes are 2+ on the right side and 2+ on the left side.       Achilles reflexes are 2+ on the right side and 2+ on the left side.  Psychiatric:         Attention and Perception: Attention and perception normal.         Mood and Affect: Mood is anxious. Affect is tearful.         Speech: Speech normal.         Behavior: Behavior normal. Behavior is cooperative.         Thought Content: Thought content normal.         Cognition and Memory: Cognition and memory normal.         Judgment: Judgment normal.         Laboratory:  Recent Labs     02/23/24  1005   WBC 19.3*   RBC 5.10   HEMOGLOBIN 14.0   HEMATOCRIT 42.0   MCV  "82.4   MCH 27.5   MCHC 33.3   RDW 83.4*   PLATELETCT 317     Recent Labs     02/23/24  1005   SODIUM 119*   POTASSIUM 5.4   CHLORIDE 80*   CO2 18*   GLUCOSE 130*   BUN 73*   CREATININE 2.18*   CALCIUM 10.5*     Recent Labs     02/23/24  1005   ALTSGPT 7   ASTSGOT 21   ALKPHOSPHAT 176*   TBILIRUBIN 2.2*   LIPASE 16   GLUCOSE 130*     Recent Labs     02/23/24  1005   APTT 33.6   INR 1.36*     Recent Labs     02/23/24  1005   NTPROBNP 2900*         No results for input(s): \"TROPONINT\" in the last 72 hours.    Imaging:  DX-CHEST-LIMITED (1 VIEW)   Final Result      1.  Free intraperitoneal air consistent with perforation of hollow viscus      2.  Findings were discussed with JUAN PABLO SCHWARTZ on 2/23/2024 11:42 AM.      CT-RENAL COLIC EVALUATION(A/P W/O)   Final Result      1.  Free intraperitoneal air present consistent with perforation of a hollow viscus      2.  Specific site of perforation is not evident on CT      3.  Cirrhosis with portal hypertension      4.  Moderate amount of ascites      5.  Cholelithiasis            6.  Findings were discussed with JUAN PABLO SCHWARTZ on 2/23/2024 11:45 AM.          X-Ray:  I have personally reviewed the images and compared with prior images.  EKG:  I have personally reviewed the images and compared with prior images.    Assessment/Plan:  Justification for Admission Status  I anticipate this patient will require at least two midnights for appropriate medical management, necessitating inpatient admission because patient is septic with a perforated intra-abdominal viscus.  Patient will require at least 48 hours of inpatient management including surgery immediately, fluid resuscitation and blood pressure support and antibiotic management.    Patient will need a ICU bed on MEDICAL service .  The need is secondary to perforated intra-abdominal viscus with acute abdomen.    * Acute abdomen- (present on admission)  Assessment & Plan    Discussed with critical care Dr. Santo of patient comes " in with 48 hours of abdominal pain.  Patient's abdomen at this point tender throughout.  Patient's abdomen is distended  CT done which shows free intraperitoneal air consistent with perforation of hollow viscus.  Source is unidentified  Patient is septic because of the acute abdomen  Surgery consulted  Patient going to surgery immediately  Patient will be admitted to the ICU afterwards  Fluid resuscitation with lactated Ringer's at 100 mL/h  Initiate pressor support as needed to keep systolic blood pressure above 95 MAP above 60  Pain management with morphine.  Case discussed with critical care Dr. Santo      Metabolic acidosis  Assessment & Plan  Secondary to sepsis patient has metabolic acidosis  Continue to monitor bicarb levels and acid-base status.  Give fluid resuscitation  If necessary give bicarb    Acute on chronic kidney failure (HCC)  Assessment & Plan  Monitor renal functions  Avoid nephrotoxic medications  Give fluid resuscitation      Sepsis (HCC)  Assessment & Plan  This is Sepsis Present on admission  SIRS criteria identified on my evaluation include: Fever, with temperature greater than 100.9 deg F, Tachycardia, with heart rate greater than 90 BPM, and Leukocytosis, with WBC greater than 12,000  Clinical indicators of end organ dysfunction include Lactic Acid greater than 2  Source is perforated intra-abdominal viscus  Sepsis protocol initiated  Crystalloid Fluid Administration: Fluid resuscitation ordered per standard protocol - 30 mL/kg per current or ideal body weight  IV antibiotics as appropriate for source of sepsis  Reassessment: I have reassessed the patient's hemodynamic status    Liver cirrhosis (HCC)- (present on admission)  Assessment & Plan  Chronic liver cirrhosis nonalcoholic  Continue to monitor liver functions  Monitor fluid status  Hold spironolactone for now    Hyponatremia- (present on admission)  Assessment & Plan  Hyponatremia with a sodium 131  Give fluid resuscitation  monitor sodium levels    Acute combined systolic and diastolic heart failure (HCC)- (present on admission)  Assessment & Plan  For now hold diuretic and anything orally  Give fluid resuscitation with acute abdomen and hypotension  Monitor intake and output  Daily weights  If necessary obtain echocardiogram  Patient sees cardiologist Dr. Xiong    HTN (hypertension), benign- (present on admission)  Assessment & Plan  Optimize blood pressure management  As outpatient patient was on Entresto and bisoprolol, these will be held with hypotension  Give pressor support as needed to keep systolic blood pressure above 95    Anemia due to chronic kidney disease- (present on admission)  Assessment & Plan  Monitor H&H if drops below 7 or 21 transfuse  Currently not in transfusion range  Check iron panel    Heart murmur- (present on admission)  Assessment & Plan  Chronic and stable    Stage 3b chronic kidney disease (HCC)- (present on admission)  Assessment & Plan  Monitor renal functions avoid nephrotoxic medications    Dyslipidemia- (present on admission)  Assessment & Plan  Hold statin  Fasting lipid panel        VTE prophylaxis: SCDs/TEDs

## 2024-02-23 NOTE — ASSESSMENT & PLAN NOTE
Discussed with critical care Dr. Santo of patient comes in with 48 hours of abdominal pain.  Patient's abdomen at this point tender throughout.  Patient's abdomen is distended  CT done which shows free intraperitoneal air consistent with perforation of hollow viscus.  Source is unidentified  Patient is septic because of the acute abdomen  Surgery consulted  Patient going to surgery immediately  Patient will be admitted to the ICU afterwards  Fluid resuscitation with lactated Ringer's at 100 mL/h  Initiate pressor support as needed to keep systolic blood pressure above 95 MAP above 60  Pain management with morphine.  Case discussed with critical care Dr. Santo

## 2024-02-23 NOTE — ASSESSMENT & PLAN NOTE
This is Sepsis Present on admission. SIRS criteria identified on my evaluation include: Fever, with temperature greater than 100.9 deg F, Tachycardia, with heart rate greater than 90 BPM, and Leukocytosis, with WBC greater than 12,000. Clinical indicators of end organ dysfunction include Lactic Acid greater than 2. Source is perforated intra-abdominal viscus. Sepsis protocol initiated. Crystalloid Fluid Administration: Fluid resuscitation ordered per standard protocol - 30 mL/kg per current or ideal body weight. Continue Ceftriaxone and Flagyl for 1 more day.  Currently afebrile and hemodynamically stable.

## 2024-02-23 NOTE — ASSESSMENT & PLAN NOTE
Secondary to sepsis patient has metabolic acidosis  Continue to monitor bicarb levels and acid-base status.  Give fluid resuscitation  If necessary give bicarb

## 2024-02-23 NOTE — ED NOTES
Transport here for pt - lr bolus continues, only able to obtain first set of blood cultures prior to same. Pre op rn aware. Friend and pt belongings to pre op

## 2024-02-23 NOTE — ASSESSMENT & PLAN NOTE
Echocardiogram from 1/22/2024 showed severely dilated left ventricle, LVEF of about 40 to 45%, grade 2 diastolic dysfunction, severely dilated left atrium. Continue Entresto, spironolactone and bisoprolol. Follows Dr. Xiong. Recommend to continue outpatient Cardiology follow up

## 2024-02-23 NOTE — OR NURSING
1510: Report received from Marely VEGA. Pt awake, verbally responsive. Left nare NGT to LWS noted with no out put at this time. FC patent and draining scant amount of yellow urine. NICKI drain noted to left  flank with bloody output. Dressing c/d/I. Lap sites noted with dermabond, no drainage.     1520: Pt c/o pain 6/10, medicated per prn orders. Pt denies nausea.      1535: Pt stable on RA. States pain is tolerable. VSS.    1545: Meets criteria to transfer to floor.

## 2024-02-23 NOTE — ASSESSMENT & PLAN NOTE
Patient has nonalcoholic liver disease.  Patient has an appointment with GI  ([facility]) on April 10, 2024. Briefly discussed with GI, they will try to get her in earlier.  Monitor

## 2024-02-23 NOTE — ANESTHESIA PROCEDURE NOTES
Arterial Line    Performed by: Alessandro Nesbitt D.O.  Authorized by: Alessandro Nesbitt D.O.    Start Time:  2/23/2024 1:35 PM  End Time:  2/23/2024 1:40 PM  Localization: ultrasound guidance and surface landmarks    Patient Location:  OR  Indication: continuous blood pressure monitoring        Catheter Size:  20 G  Seldinger Technique?: Yes    Laterality:  Left  Site:  Radial artery  Line Secured:  Antimicrobial disc, tape and transparent dressing  Events: patient tolerated procedure well with no complications

## 2024-02-23 NOTE — OP REPORT
Operative Report    Date: 2/23/2024    Surgeon: Neel Amezcua M.D.     Assistant: None    Pre-operative Diagnosis: Perforated hollow viscus    Post-operative Diagnosis: Duodenal ulcer perforation measuring approximate 1 cm    Procedure: Diagnostic laparoscopy, laparoscopic Man patch    ASA Classification: IV.E    Indications: This is a 63 y.o. female who presented with symptoms of free air here for exploration.    The indications for a surgical assistant in this surgery were indicated due to complexity of the procedure. Their role included aiding in incision, retraction, holding devices including camera for laparoscopic procedure, and closure of the wound.      Findings: Perforated anterior duodenal ulcer in D1.  Man patch with drain placement    Wound Classification: Class IV, IV, Dirty or Infected. Infection present at the time of surgery (PATOS)..    Procedure in detail: The patient was seen and examined in the preoperative holding area.  The risks benefits and alternatives of the procedure were discussed with the patient who wished to proceed with the procedure as described.  The patient was transferred to the operating room placed in supine position and all pressure points were properly padded.  General endotracheal anesthesia was induced and preoperative antibiotics were given per SCIP protocol.  Patient's abdomen was prepped with ChloraPrep and draped in the normal sterile fashion.  A timeout was performed confirming correct patient, correct procedure, and that all necessary equipment was in the room.      We began the procedure by performing a 5 mm Optiview access abdominal cavity in the left periumbilical area 5 mm Optiview port was used to access the abdominal cavity    Was achieved and maintained at 50 mL mercury carbon oxide throughout the entirety of the case under direct visualization a 5 mm left upper quadrant and a 5 mm and 12 mm right upper quadrant port were placed.  We began by  irrigating the entirety of the abdomen approximate 2 L of normal saline there was purulent material and bile throughout the entirety abdominal cavity mostly in the left and right upper quadrants.  We examined the entirety of the stomach and found this to be without diagnostic abnormality and observe the anesthesiologist placed an NG tube into the stomach itself which was then placed to suction.  We then examined the duodenum and found approximate 1 cm ulcer in D1 just past the pyloric channel.  We placed 3 separate 2-0 silk sutures across the opening and placed a tongue of omentum over this and secured those in place.  We then again examined the area confirm hemostasis and placed a 19 Malagasy Maicol drain through the left upper quadrant 5 mm port and secured this to the skin with a nylon suture.  We then released pneumoperitoneum remove the ports and closed using 4-0 Monocryl placed Dermabond over the wounds.    The patient was awakened from general anesthetic, and was taken to the recovery room in critical condition    Sponge and needle counts were correct at the end of the case.     Specimen: none    EBL: 50mL    Dispo: Critical, extubated, to PACU    Neel Amezcua M.D.  [facility]  [phone]

## 2024-02-23 NOTE — ASSESSMENT & PLAN NOTE
Patient was on Entresto, bisoprolol, and Spironolactone (50 mg) prior to admission, which were held due to low blood pressure. Entresto was resumed on 2/27/2024. Bisoprolol and Spironolactone (25 mg) resumed 2/28/2024. Monitor closely

## 2024-02-23 NOTE — ANESTHESIA PROCEDURE NOTES
Airway    Date/Time: 2/23/2024 1:33 PM    Performed by: Alessandro Nesbitt D.O.  Authorized by: Alessandro Nesbitt D.O.    Location:  OR  Urgency:  Elective  Difficult Airway: No    Indications for Airway Management:  Anesthesia      Spontaneous Ventilation: absent    Sedation Level:  Deep  Preoxygenated: Yes    Patient Position:  Sniffing  Mask Difficulty Assessment:  2 - vent by mask + OA or adjuvant +/- NMBA  Final Airway Type:  Endotracheal airway  Final Endotracheal Airway:  ETT  Cuffed: Yes    Technique Used for Successful ETT Placement:  Direct laryngoscopy  Devices/Methods Used in Placement:  Cricoid pressure    Insertion Site:  Oral  Blade Type:  Caery  Laryngoscope Blade/Videolaryngoscope Blade Size:  3  ETT Size (mm):  7.0  Measured from:  Teeth  ETT to Teeth (cm):  22  Placement Verified by: auscultation and capnometry    Cormack-Lehane Classification:  Grade IIa - partial view of glottis  Number of Attempts at Approach:  1

## 2024-02-23 NOTE — ED NOTES
Orders recvd and reviewed with pt. Saline lock with blood draw. Aware of need for urine spec as well as npo. Hot provided as well as med for pain. Call light in reach

## 2024-02-23 NOTE — OR NURSING
Pt allergies and NPO status verified, home meds reviewed. Belongings secured. Pt verbalizes understanding of the pain scale, expected course of stay, and plan of care.  Surgical site verified with pt.  IV access assessed, patent.  Sequentials placed on legs.  Attempting to obtain 2nd set of blood cultures now.

## 2024-02-24 PROBLEM — E87.20 METABOLIC ACIDOSIS: Status: RESOLVED | Noted: 2024-02-23 | Resolved: 2024-02-24

## 2024-02-24 LAB
ANION GAP SERPL CALC-SCNC: 13 MMOL/L (ref 7–16)
BUN SERPL-MCNC: 67 MG/DL (ref 8–22)
CALCIUM SERPL-MCNC: 9.2 MG/DL (ref 8.4–10.2)
CHLORIDE SERPL-SCNC: 90 MMOL/L (ref 96–112)
CO2 SERPL-SCNC: 20 MMOL/L (ref 20–33)
CREAT SERPL-MCNC: 1.72 MG/DL (ref 0.5–1.4)
GFR SERPLBLD CREATININE-BSD FMLA CKD-EPI: 32 ML/MIN/1.73 M 2
GLUCOSE SERPL-MCNC: 126 MG/DL (ref 65–99)
POTASSIUM SERPL-SCNC: 4.7 MMOL/L (ref 3.6–5.5)
SODIUM SERPL-SCNC: 123 MMOL/L (ref 135–145)

## 2024-02-24 PROCEDURE — 700105 HCHG RX REV CODE 258: Performed by: INTERNAL MEDICINE

## 2024-02-24 PROCEDURE — 770020 HCHG ROOM/CARE - TELE (206)

## 2024-02-24 PROCEDURE — 700111 HCHG RX REV CODE 636 W/ 250 OVERRIDE (IP): Mod: JZ | Performed by: HOSPITALIST

## 2024-02-24 PROCEDURE — 80048 BASIC METABOLIC PNL TOTAL CA: CPT

## 2024-02-24 PROCEDURE — 94760 N-INVAS EAR/PLS OXIMETRY 1: CPT

## 2024-02-24 PROCEDURE — 700105 HCHG RX REV CODE 258: Performed by: HOSPITALIST

## 2024-02-24 PROCEDURE — 700111 HCHG RX REV CODE 636 W/ 250 OVERRIDE (IP): Mod: JZ | Performed by: INTERNAL MEDICINE

## 2024-02-24 PROCEDURE — 99232 SBSQ HOSP IP/OBS MODERATE 35: CPT | Performed by: INTERNAL MEDICINE

## 2024-02-24 RX ORDER — HYDROMORPHONE HYDROCHLORIDE 1 MG/ML
1 INJECTION, SOLUTION INTRAMUSCULAR; INTRAVENOUS; SUBCUTANEOUS
Status: DISCONTINUED | OUTPATIENT
Start: 2024-02-24 | End: 2024-02-25

## 2024-02-24 RX ORDER — HYDROMORPHONE HYDROCHLORIDE 1 MG/ML
0.5 INJECTION, SOLUTION INTRAMUSCULAR; INTRAVENOUS; SUBCUTANEOUS
Status: DISCONTINUED | OUTPATIENT
Start: 2024-02-24 | End: 2024-02-24

## 2024-02-24 RX ADMIN — HYDROMORPHONE HYDROCHLORIDE 0.5 MG: 1 INJECTION, SOLUTION INTRAMUSCULAR; INTRAVENOUS; SUBCUTANEOUS at 18:31

## 2024-02-24 RX ADMIN — HYDROMORPHONE HYDROCHLORIDE 0.5 MG: 1 INJECTION, SOLUTION INTRAMUSCULAR; INTRAVENOUS; SUBCUTANEOUS at 13:42

## 2024-02-24 RX ADMIN — CEFTRIAXONE SODIUM 2000 MG: 2 INJECTION, POWDER, FOR SOLUTION INTRAMUSCULAR; INTRAVENOUS at 13:49

## 2024-02-24 RX ADMIN — FENTANYL CITRATE 50 MCG: 50 INJECTION, SOLUTION INTRAMUSCULAR; INTRAVENOUS at 01:18

## 2024-02-24 RX ADMIN — METRONIDAZOLE 500 MG: 5 INJECTION, SOLUTION INTRAVENOUS at 05:35

## 2024-02-24 RX ADMIN — HYDROMORPHONE HYDROCHLORIDE 0.5 MG: 1 INJECTION, SOLUTION INTRAMUSCULAR; INTRAVENOUS; SUBCUTANEOUS at 16:18

## 2024-02-24 RX ADMIN — HYDROMORPHONE HYDROCHLORIDE 0.5 MG: 1 INJECTION, SOLUTION INTRAMUSCULAR; INTRAVENOUS; SUBCUTANEOUS at 08:37

## 2024-02-24 RX ADMIN — METRONIDAZOLE 500 MG: 5 INJECTION, SOLUTION INTRAVENOUS at 17:10

## 2024-02-24 RX ADMIN — FENTANYL CITRATE 50 MCG: 50 INJECTION, SOLUTION INTRAMUSCULAR; INTRAVENOUS at 05:41

## 2024-02-24 RX ADMIN — SODIUM CHLORIDE, POTASSIUM CHLORIDE, SODIUM LACTATE AND CALCIUM CHLORIDE: 600; 310; 30; 20 INJECTION, SOLUTION INTRAVENOUS at 16:33

## 2024-02-24 RX ADMIN — SODIUM CHLORIDE, POTASSIUM CHLORIDE, SODIUM LACTATE AND CALCIUM CHLORIDE: 600; 310; 30; 20 INJECTION, SOLUTION INTRAVENOUS at 11:18

## 2024-02-24 RX ADMIN — HYDROMORPHONE HYDROCHLORIDE 1 MG: 1 INJECTION, SOLUTION INTRAMUSCULAR; INTRAVENOUS; SUBCUTANEOUS at 20:27

## 2024-02-24 ASSESSMENT — PAIN DESCRIPTION - PAIN TYPE
TYPE: ACUTE PAIN

## 2024-02-24 NOTE — PROGRESS NOTES
12-hour chart check complete.    Monitor Summary  Rhythm: SR  Rate: 78-84  Ectopy: rPAC, rPVC  Measurements: 0.16/0.08/0.36

## 2024-02-24 NOTE — ASSESSMENT & PLAN NOTE
Due to acute on chronic kidney dz and also elevated lactic from shock when she presented with acute abdoman  Recheck labs

## 2024-02-24 NOTE — PROGRESS NOTES
12-hour chart check complete.    Monitor Summary  Rhythm: SR  Rate: 80s  Ectopy: none  Measurements: 0.18/0.08/0.36

## 2024-02-24 NOTE — PROGRESS NOTES
"Surgical Progress Note:    POD# 1  S/P laparoscopic sharlene patch for perforated ulcer.     Some pain with movement, otherwise no complaints.    PE:  /50   Pulse 88   Temp 36.8 °C (98.3 °F) (Temporal)   Resp 14   Ht 1.702 m (5' 7\")   Wt 48.7 kg (107 lb 5.8 oz)   SpO2 97%   BMI 16.82 kg/m²     I/O:   Intake/Output Summary (Last 24 hours) at 2/24/2024 1446  Last data filed at 2/24/2024 1400  Gross per 24 hour   Intake 3723.95 ml   Output 1215 ml   Net 2508.95 ml     NAD  RRR  CTAB  Soft, NT, ND, incisions c/d/I, NICKI s/s      Labs:  Recent Labs     02/23/24  1005   WBC 19.3*   RBC 5.10   HEMOGLOBIN 14.0   HEMATOCRIT 42.0   MCV 82.4   MCH 27.5   RDW 83.4*   PLATELETCT 317   NEUTSPOLYS 90.90*   LYMPHOCYTES 3.90*   MONOCYTES 4.50   EOSINOPHILS 0.00   BASOPHILS 0.20     Recent Labs     02/23/24  1005 02/23/24  1641 02/24/24  0402   SODIUM 119* 123* 123*   POTASSIUM 5.4 4.9 4.7   CHLORIDE 80* 89* 90*   CO2 18* 20 20   GLUCOSE 130* 123* 126*   BUN 73* 69* 67*         A/P: POD# 1  S/P laparoscopic sharlene patch for perforated ulcer.  - doing well  - continue NG, NPO  - contrast study in 48h, if no extravasation anticipate starting PO diet then  - will follow    Kaila Kwon M.D.  [facility]  [phone]        "

## 2024-02-24 NOTE — DIETARY
"Nutrition services: Day 1 of admit.  Denisse Abel is a 63 y.o. female with admitting DX of abdominal pain   Consult received for MST score of 2 and BMI < 19   Principal Problem:    Acute abdomen (POA: Yes)  Active Problems:    HTN (hypertension), benign (POA: Yes)    Dyslipidemia (POA: Yes)    Stage 3b chronic kidney disease (HCC) (POA: Yes)    Heart murmur (POA: Yes)    Acute combined systolic and diastolic heart failure (HCC) (POA: Yes)    Hyponatremia (POA: Yes)    Liver cirrhosis (HCC) (POA: Yes)    Anemia due to chronic kidney disease (POA: Yes)    Sepsis (HCC) (POA: Unknown)    Acute on chronic kidney failure (HCC) (POA: Yes)    Duodenal ulcer perforation (HCC) (POA: Yes)  Resolved Problems:    Metabolic acidosis (POA: Yes)     RD met with pt at bedside. Pt states she has unintentionally lost 80 lb in about a year related to stress/anxiety. Pt reports losing her job and dog as recent major stressors. Pt states \"I have no desire to eat\". RD provided nutrition education regarding high calorie nutrition therapy to promote wt gain. Pt encouraged to consume liquid calories, frequent meals/snacks, and calorically dense food items. Pt agrees to discuss behavioral health resources with .   RD obtained permission to conduct a Nutrition Focused Physical Exam. Severe muscle wasting of dorsal hand, temples, and acromion regions. Severe subcutaneous fat loss of upper arm, buccal, and orbital regions.   RD has discussed this case with intensivist, RN, attending surgeons, and .     Assessment:  Height: 170.2 cm (5' 7\")  Weight: 48.7 kg (107 lb 5.8 oz) via bed scale   Body mass index is 16.82 kg/m²., BMI classification: underweight   Diet/Intake: NPO day 2    Evaluation:   Procedures during hospitalization: laparoscopy with dx of Duodenal ulcer perforation   Labs: sodium 123, chloride 90, GFR 32, alk phos 149, albumin 2.7, phos 6.1  Medications:Rocephin bolus, Flagyl, LR infusion " (continuous)  Home supplements: pt denies  GI: LBM 2/23/24 per flowsheet, N/V upon admit per MD note   Wt hx: records indicate severe rapid wt loss of 20 kg (29%) over 1 month.   Wt Readings from Last 10 Encounters:   02/23/24 48.7 kg (107 lb 5.8 oz)   02/09/24 49.1 kg (108 lb 3.2 oz)   02/02/24 53.2 kg (117 lb 3.2 oz)   01/27/24 68.7 kg (151 lb 7.3 oz)   01/12/24 67.9 kg (149 lb 11.1 oz)   05/16/23 70.6 kg (155 lb 9.6 oz)   08/23/21 87.9 kg (193 lb 12.8 oz)   07/20/21 88.8 kg (195 lb 12.8 oz)   08/21/20 86.2 kg (190 lb)   01/30/20 86.4 kg (190 lb 6.4 oz)       Malnutrition Risk: Pt meets ASPEN criteria for severe malnutrition in the context of chronic disease / illness.   Severe malnutrition related to CKD / Cirrhosis as evidenced by severe muscle wasting (deltoids, interosseous, temporalis), severe subcutaneous fat loss (biceps/triceps, buccal, and orbital fat pads), and severe wt loss of 29% over 1 month.     Recommendations/Plan:  Diet advancement as medically appropriate, pt is day 3 with no nutrition   Consider short term TPN / PPN if enteral nutrition is not feasible (Surgeon and Intensivist aware)  Social Work consult to discuss behavioral health resources. RD recommends referral to [facility] outpatient services  Monitor weight, prevent weight loss  Monitor for refeeding once nutrition is started   Consider evaluation to rule out any underlying malignancy that could potentially contribute to the rapid, severe wt loss (intensivist aware)   Nutrition rep will see patient once appropriate for ongoing meal and snack preferences.     RD following

## 2024-02-24 NOTE — PROGRESS NOTES
Pt brought to unit from PACU    Gtts none    Vitals VSS     4 Eyes Skin Assessment Completed by SILVIA Lopes and SILVIA Hnutley.    Head WDL  Ears WDL  Nose WDL  Mouth WDL  Neck WDL  Breast/Chest WDL  Shoulder Blades WDL  Spine WDL  (R) Arm/Elbow/Hand WDL  (L) Arm/Elbow/Hand WDL  Abdomen WDL  Groin WDL  Scrotum/Coccyx/Buttocks Redness and Blanching  (R) Leg WDL  (L) Leg WDL  (R) Heel/Foot/Toe WDL  (L) Heel/Foot/Toe WDL          Devices In Places ECG, Blood Pressure Cuff, Pulse Ox, Arterial Line, SCD's, and OG/NG      Interventions In Place Sacral Mepilex, TAP System, and Low Air Loss Mattress    Possible Skin Injury No    Pictures Uploaded Into Epic N/A  Wound Consult Placed N/A  RN Wound Prevention Protocol Ordered No

## 2024-02-24 NOTE — PROGRESS NOTES
"ICU Progress Note    Date of consult: 2/23/2024    Date of Admission: 2/23/24    Chief Complaint:  Chief Complaint   Patient presents with    Abdominal Pain     crampy    N/V     X 2 days     HPI:   From Dr. Santo's note: \"63 y.o. female who presented 2/23/2024 with both systolic and diastolic heart failure with an ejection fraction of 40% and diastolic dysfunction grade 2, previous hx of alcohol dependence, CKD  CT abdoman showed perforated bowel  Taken to OR by Dr. Shaikh and found to have perforated duodenal ulcer 1 cm and repaired with a sharlene patch with drain placement  50 ml blood loss  Radial art line in place  Extubated \"    Hospital Course:    24h events: Stable overnight. A&Ox4. Mild abdominal pain. No significant events. Not passing flatulence yet.   Tubes, Lines, Drains: Radial art line  Drips: LR at 65/hr  Nutrition: NPO  Notable lab trends: Na 123, creat improving, BNP 2900  Imaging: reviewed   Tmax: afebrile  ProCal: 7.58 on 2/23  Antibiotics: Rocephin and Flagyl (2/23 - )   Cultures: 2/23 blood and urine cultures NGTD  I/O: +2.5 L since admit; 395 UOP  PPX: SCDs  BM regimen: MiraLAX, senna-docusate, milk magnesia, bisacodyl  Last BM: prior      Code Status  Full Code    Review of Systems  Review of Systems   All other systems reviewed and are negative.       Medications  Home Medications       Reviewed by Nuzhat Medina R.N. (Registered Nurse) on 02/23/24 at 1427  Med List Status: Complete     Medication Last Dose Status   acetaminophen (TYLENOL) 500 MG Tab FEW DAYS AGO Active   bisoprolol (ZEBETA) 5 MG Tab 2/21/2024 Active   cefTRIAXone (Rocephin) 2,000 mg in  mL IVPB  Active   dexamethasone (Decadron) injection  Active   Empagliflozin (JARDIANCE) 10 MG Tab tablet 2/21/2024 Active   fentaNYL (Sublimaze) injection  Active   lidocaine (Xylocaine) 1 % injection 0.5 mL  Active   lidocaine PF (Xylocaine-MPF) 2 % injection PF  Active   LR (Bolus) infusion 500 mL  Active   metroNIDAZOLE (Flagyl) " IVPB 500 mg  Active   midazolam (Versed) injection  Active   NS infusion  Active   NS infusion  Active   omeprazole (PRILOSEC) 20 MG delayed-release capsule 2/22/2024 Active   phenylephrine (Flex-Synephrine) 40 mg in  mL infusion  Active   phenylephrine (Flex-Synephrine) injection  Active   propofol (DIPRIVAN) injection  Active   rocuronium (Zemuron) injection  Active   sacubitril-valsartan (ENTRESTO) 24-26 MG Tab 2/21/2024 Active   spironolactone (ALDACTONE) 50 MG Tab 2/21/2024 Active   traMADol (ULTRAM) 50 MG Tab 2/21/2024 Active                  Current Facility-Administered Medications   Medication Dose Route Frequency Provider Last Rate Last Admin    LR (Bolus) infusion 500 mL  500 mL Intravenous Once PRN Jocelyn Lloyd D.O.        metroNIDAZOLE (Flagyl) IVPB 500 mg  500 mg Intravenous Q12HRS Milly Chen M.D.   Stopped at 02/24/24 0635    cefTRIAXone (Rocephin) 2,000 mg in  mL IVPB  2,000 mg Intravenous Daily-1400 Milly Chen M.D.   Infusion Ended Prior to Shift at 02/23/24 1353    lactated ringers infusion   Intravenous Continuous Jabari Arteaga M.D. 100 mL/hr at 02/23/24 1647 New Bag at 02/23/24 1647    fentaNYL (Sublimaze) injection 50 mcg  50 mcg Intravenous Q HOUR PRN Milly Chen M.D.   50 mcg at 02/24/24 0541       Allergies  Allergies   Allergen Reactions    Pcn [Penicillins] Rash     Pt reports that it was a childhood allergie received body rash     Sulfa Drugs Rash     Pt reports that it was a childhood allergie received body rash        Vital Signs last 24 hours  Temp:  [36.1 °C (97 °F)-37.2 °C (99 °F)] 36.6 °C (97.9 °F)  Pulse:  [68-97] 89  Resp:  [10-24] 12  BP: ()/(45-92) 94/54  SpO2:  [91 %-100 %] 94 %    Physical Exam   Physical Exam  Vitals reviewed. Exam conducted with a chaperone present.   Constitutional:       General: She is not in acute distress.     Appearance: She is underweight. She is ill-appearing. She is not toxic-appearing or diaphoretic.   HENT:       Head: Normocephalic and atraumatic.      Mouth/Throat:      Mouth: Mucous membranes are dry.   Eyes:      General:         Right eye: No discharge.         Left eye: No discharge.      Extraocular Movements: Extraocular movements intact.      Pupils: Pupils are equal, round, and reactive to light.   Cardiovascular:      Rate and Rhythm: Normal rate.      Heart sounds: Murmur heard.   Pulmonary:      Effort: Pulmonary effort is normal.      Breath sounds: Normal breath sounds.   Abdominal:      General: Abdomen is flat. There is no distension.      Tenderness: There is abdominal tenderness. There is no guarding.      Comments: Surgical dressing  Drain - serousanginous  Soft abdoman   Musculoskeletal:      Right lower leg: No edema.      Left lower leg: No edema.   Skin:     General: Skin is warm and dry.   Neurological:      General: No focal deficit present.      Mental Status: She is alert and oriented to person, place, and time.   Psychiatric:         Behavior: Behavior normal.         Fluids    Intake/Output Summary (Last 24 hours) at 2/24/2024 0724  Last data filed at 2/24/2024 0600  Gross per 24 hour   Intake 3265.28 ml   Output 770 ml   Net 2495.28 ml       Laboratory  Recent Results (from the past 48 hour(s))   CBC WITH DIFFERENTIAL    Collection Time: 02/23/24 10:05 AM   Result Value Ref Range    WBC 19.3 (H) 4.8 - 10.8 K/uL    RBC 5.10 4.20 - 5.40 M/uL    Hemoglobin 14.0 12.0 - 16.0 g/dL    Hematocrit 42.0 37.0 - 47.0 %    MCV 82.4 81.4 - 97.8 fL    MCH 27.5 27.0 - 33.0 pg    MCHC 33.3 32.2 - 35.5 g/dL    RDW 83.4 (H) 35.9 - 50.0 fL    Platelet Count 317 164 - 446 K/uL    Neutrophils-Polys 90.90 (H) 44.00 - 72.00 %    Lymphocytes 3.90 (L) 22.00 - 41.00 %    Monocytes 4.50 0.00 - 13.40 %    Eosinophils 0.00 0.00 - 6.90 %    Basophils 0.20 0.00 - 1.80 %    Immature Granulocytes 0.50 0.00 - 0.90 %    Nucleated RBC 0.00 0.00 - 0.20 /100 WBC    Neutrophils (Absolute) 17.58 (H) 1.82 - 7.42 K/uL    Lymphs  (Absolute) 0.76 (L) 1.00 - 4.80 K/uL    Monos (Absolute) 0.86 (H) 0.00 - 0.85 K/uL    Eos (Absolute) 0.00 0.00 - 0.51 K/uL    Baso (Absolute) 0.03 0.00 - 0.12 K/uL    Immature Granulocytes (abs) 0.09 0.00 - 0.11 K/uL    NRBC (Absolute) 0.00 K/uL   COMP METABOLIC PANEL    Collection Time: 02/23/24 10:05 AM   Result Value Ref Range    Sodium 119 (LL) 135 - 145 mmol/L    Potassium 5.4 3.6 - 5.5 mmol/L    Chloride 80 (L) 96 - 112 mmol/L    Co2 18 (L) 20 - 33 mmol/L    Anion Gap 21.0 (H) 7.0 - 16.0    Glucose 130 (H) 65 - 99 mg/dL    Bun 73 (H) 8 - 22 mg/dL    Creatinine 2.18 (H) 0.50 - 1.40 mg/dL    Calcium 10.5 (H) 8.4 - 10.2 mg/dL    Correct Calcium 11.2 (H) 8.5 - 10.5 mg/dL    AST(SGOT) 21 12 - 45 U/L    ALT(SGPT) 7 2 - 50 U/L    Alkaline Phosphatase 176 (H) 30 - 99 U/L    Total Bilirubin 2.2 (H) 0.1 - 1.5 mg/dL    Albumin 3.1 (L) 3.2 - 4.9 g/dL    Total Protein 8.5 (H) 6.0 - 8.2 g/dL    Globulin 5.4 (H) 1.9 - 3.5 g/dL    A-G Ratio 0.6 g/dL   LIPASE    Collection Time: 02/23/24 10:05 AM   Result Value Ref Range    Lipase 16 11 - 82 U/L   proBrain Natriuretic Peptide, NT    Collection Time: 02/23/24 10:05 AM   Result Value Ref Range    NT-proBNP 2900 (H) 0 - 125 pg/mL   APTT    Collection Time: 02/23/24 10:05 AM   Result Value Ref Range    APTT 33.6 24.7 - 36.0 sec   PROTHROMBIN TIME (INR)    Collection Time: 02/23/24 10:05 AM   Result Value Ref Range    PT 17.3 (H) 12.0 - 14.6 sec    INR 1.36 (H) 0.87 - 1.13   ESTIMATED GFR    Collection Time: 02/23/24 10:05 AM   Result Value Ref Range    GFR (CKD-EPI) 24 (A) >60 mL/min/1.73 m 2   LACTIC ACID    Collection Time: 02/23/24 10:05 AM   Result Value Ref Range    Lactic Acid 4.1 (HH) 0.5 - 2.0 mmol/L   PROCALCITONIN    Collection Time: 02/23/24 10:05 AM   Result Value Ref Range    Procalcitonin 7.58 (H) <0.25 ng/mL   Cortisol    Collection Time: 02/23/24 10:05 AM   Result Value Ref Range    Cortisol 69.6 (H) 0.0 - 23.0 ug/dL   Magnesium    Collection Time: 02/23/24 10:05  AM   Result Value Ref Range    Magnesium 2.0 1.5 - 2.5 mg/dL   C Reactive Protein Quantitative (Non-Cardiac)    Collection Time: 02/23/24 10:05 AM   Result Value Ref Range    Stat C-Reactive Protein 39.04 (H) 0.00 - 0.75 mg/dL   Comp Metabolic Panel    Collection Time: 02/23/24  4:41 PM   Result Value Ref Range    Sodium 123 (L) 135 - 145 mmol/L    Potassium 4.9 3.6 - 5.5 mmol/L    Chloride 89 (L) 96 - 112 mmol/L    Co2 20 20 - 33 mmol/L    Anion Gap 14.0 7.0 - 16.0    Glucose 123 (H) 65 - 99 mg/dL    Bun 69 (H) 8 - 22 mg/dL    Creatinine 1.93 (H) 0.50 - 1.40 mg/dL    Calcium 9.3 8.4 - 10.2 mg/dL    Correct Calcium 10.3 8.5 - 10.5 mg/dL    AST(SGOT) 11 (L) 12 - 45 U/L    ALT(SGPT) <5 2 - 50 U/L    Alkaline Phosphatase 149 (H) 30 - 99 U/L    Total Bilirubin 1.6 (H) 0.1 - 1.5 mg/dL    Albumin 2.7 (L) 3.2 - 4.9 g/dL    Total Protein 6.9 6.0 - 8.2 g/dL    Globulin 4.2 (H) 1.9 - 3.5 g/dL    A-G Ratio 0.6 g/dL   MAGNESIUM    Collection Time: 02/23/24  4:41 PM   Result Value Ref Range    Magnesium 1.8 1.5 - 2.5 mg/dL   PHOSPHORUS    Collection Time: 02/23/24  4:41 PM   Result Value Ref Range    Phosphorus 6.1 (H) 2.5 - 4.5 mg/dL   ESTIMATED GFR    Collection Time: 02/23/24  4:41 PM   Result Value Ref Range    GFR (CKD-EPI) 28 (A) >60 mL/min/1.73 m 2   Lactic Acid    Collection Time: 02/23/24  5:01 PM   Result Value Ref Range    Lactic Acid 1.7 0.5 - 2.0 mmol/L   URINALYSIS CULTURE, IF INDICATED    Collection Time: 02/23/24  5:05 PM    Specimen: Urine, Clean Catch   Result Value Ref Range    Color Yellow     Character Hazy (A)     Specific Gravity >=1.030 <1.035    Ph 5.0 5.0 - 8.0    Glucose Negative Negative mg/dL    Ketones Trace (A) Negative mg/dL    Protein 30 (A) Negative mg/dL    Bilirubin Small (A) Negative    Nitrite Negative Negative    Leukocyte Esterase Negative Negative    Occult Blood Small (A) Negative    Micro Urine Req Microscopic    URINE MICROSCOPIC (W/UA)    Collection Time: 02/23/24  5:05 PM   Result  Value Ref Range    WBC 2-5 /hpf    RBC 5-10 (A) /hpf    Bacteria Few (A) None /hpf    Epithelial Cells Few Few /hpf    Trans Epithelial Cells Few /hpf    Urine Crystals Mod Amorphous /hpf    Ca Oxalate Crystal Few /hpf    Hyaline Cast 6-10 (A) /lpf    Granular Casts 3-5 (A) /lpf    Waxy Cast 0-2 (A) /lpf   BASIC METABOLIC PANEL    Collection Time: 02/24/24  4:02 AM   Result Value Ref Range    Sodium 123 (L) 135 - 145 mmol/L    Potassium 4.7 3.6 - 5.5 mmol/L    Chloride 90 (L) 96 - 112 mmol/L    Co2 20 20 - 33 mmol/L    Glucose 126 (H) 65 - 99 mg/dL    Bun 67 (H) 8 - 22 mg/dL    Creatinine 1.72 (H) 0.50 - 1.40 mg/dL    Calcium 9.2 8.4 - 10.2 mg/dL    Anion Gap 13.0 7.0 - 16.0   ESTIMATED GFR    Collection Time: 02/24/24  4:02 AM   Result Value Ref Range    GFR (CKD-EPI) 32 (A) >60 mL/min/1.73 m 2       Imaging  Free air under right diaphragm  No I or e    Assessment/Plan  ICU problem list:  #POD#1 perforated duodenal ulcer repair  # ANDERS on CKD-prerenal versus ATN  #CHF with EF 40% and diastolic dysfunction   # Anemia of chronic disease  # Chronic alcoholic cirrhosis with portal hypertension  # Chronic hyponatremia secondary to cirrhosis  # History of hyperlipidemia  # History of hypertension  # Malnourished/adult failure to thrive-BMI 16.8        Duodenal ulcer perforation (HCC)- (present on admission)  Assessment & Plan  S/p Man patch repair 2/23  Drain in place  Hemodynamically stable  IV fluid  NG to low intermittent suction  To remain npo  Ceftriaxone and metronidazole - PCN allergy noted  Surgery following      Acute on chronic kidney failure (HCC)- (present on admission)  Assessment & Plan  Creatinine improving  Likely due to ATN from presentation of show with acute abdoman now with nl bp  Keep MAP  > 65 mm Hg and systolic > 100 mmhg  -avoid nephrotoxins  -monitor electrolytes  -monitor UOP      Anemia due to chronic kidney disease- (present on admission)  Assessment & Plan  Known hx  No signs of  bleeding  Monitor  Transfuse for Hgb <7 or active bleeding      Liver cirrhosis (HCC)- (present on admission)  Assessment & Plan  Resume home Aldactone when appropriate    Hyponatremia- (present on admission)  Assessment & Plan  Low na and low cl  Assume acute due to hypovolemia when she presented today as was not able to take po  Last Na was 134 in 1/2024  As acute no concern for correction  Mental state intact  Monitor; manage conservatively       Acute combined systolic and diastolic heart failure (HCC)- (present on admission)  Assessment & Plan  Known hx  EF 40%  Currently appears euvolemic   Resume home medications when appropriate    HTN (hypertension), benign- (present on admission)  Assessment & Plan  Resume home medication when appropriate        Remove art line and sorto.  Diet orders per surgery  Pain control adjusted to dilaudid for longer-acting       Disposition: Appropriate for downgrade to med-tele    __________  Romie Haas, DO  Pulmonary and Critical Care Medicine  [facility]    Please note that this dictation was created using voice recognition software. The accuracy of the dictation is limited to the abilities of the software. I have made every reasonable attempt to correct obvious errors, but I expect that there are errors of grammar and possibly content that I did not discover before finalizing the note.

## 2024-02-24 NOTE — ASSESSMENT & PLAN NOTE
Low na and low cl  Assume acute due to hypovolemia when she presented today as was not able to take po  Last Na was 134 in 1/2024  As acute no concern for correction  Mental state intact  Monitor; manage conservatively

## 2024-02-24 NOTE — ASSESSMENT & PLAN NOTE
S/p Man patch repair 2/23  Drain in place  Hemodynamically stable  IV fluid  NG to low intermittent suction  To remain npo  Ceftriaxone and metronidazole - PCN allergy noted  Surgery following

## 2024-02-24 NOTE — CARE PLAN
The patient is Stable - Low risk of patient condition declining or worsening    Shift Goals  Clinical Goals: Pain management, pt comfort, monitor surgical site, monitor drain output, monitor VS  Patient Goals: Rest, comfort, go home and get better  Family Goals: MORGAN    Progress made toward(s) clinical / shift goals:    Problem: Pain - Standard  Goal: Alleviation of pain or a reduction in pain to the patient’s comfort goal  Outcome: Progressing     Problem: Fall Risk  Goal: Patient will remain free from falls  Outcome: Progressing     Problem: Hemodynamics  Goal: Patient's hemodynamics, fluid balance and neurologic status will be stable or improve  Outcome: Progressing     Problem: Fluid Volume  Goal: Fluid volume balance will be maintained  Outcome: Progressing     Problem: Urinary - Renal Perfusion  Goal: Ability to achieve and maintain adequate renal perfusion and functioning will improve  Outcome: Progressing     Problem: Respiratory  Goal: Patient will achieve/maintain optimum respiratory ventilation and gas exchange  Outcome: Progressing     Problem: Physical Regulation  Goal: Diagnostic test results will improve  Outcome: Progressing     Problem: Knowledge Deficit - Standard  Goal: Patient and family/care givers will demonstrate understanding of plan of care, disease process/condition, diagnostic tests and medications  Outcome: Progressing       Patient is not progressing towards the following goals:

## 2024-02-25 PROBLEM — E43 SEVERE PROTEIN-CALORIE MALNUTRITION (HCC): Status: ACTIVE | Noted: 2024-02-25

## 2024-02-25 PROBLEM — K26.5 PERFORATED DUODENAL ULCER (HCC): Status: RESOLVED | Noted: 2024-02-23 | Resolved: 2024-02-25

## 2024-02-25 PROBLEM — K26.5 PERFORATED DUODENAL ULCER (HCC): Status: ACTIVE | Noted: 2024-02-23

## 2024-02-25 LAB
ANION GAP SERPL CALC-SCNC: 13 MMOL/L (ref 7–16)
BACTERIA UR CULT: NORMAL
BUN SERPL-MCNC: 56 MG/DL (ref 8–22)
CALCIUM SERPL-MCNC: 9.9 MG/DL (ref 8.4–10.2)
CHLORIDE SERPL-SCNC: 94 MMOL/L (ref 96–112)
CO2 SERPL-SCNC: 21 MMOL/L (ref 20–33)
CREAT SERPL-MCNC: 1.53 MG/DL (ref 0.5–1.4)
GFR SERPLBLD CREATININE-BSD FMLA CKD-EPI: 37 ML/MIN/1.73 M 2
GLUCOSE SERPL-MCNC: 104 MG/DL (ref 65–99)
POTASSIUM SERPL-SCNC: 4.4 MMOL/L (ref 3.6–5.5)
SIGNIFICANT IND 70042: NORMAL
SITE SITE: NORMAL
SODIUM SERPL-SCNC: 128 MMOL/L (ref 135–145)
SOURCE SOURCE: NORMAL

## 2024-02-25 PROCEDURE — 700105 HCHG RX REV CODE 258: Performed by: HOSPITALIST

## 2024-02-25 PROCEDURE — 700111 HCHG RX REV CODE 636 W/ 250 OVERRIDE (IP): Mod: JZ | Performed by: INTERNAL MEDICINE

## 2024-02-25 PROCEDURE — 36415 COLL VENOUS BLD VENIPUNCTURE: CPT

## 2024-02-25 PROCEDURE — 99233 SBSQ HOSP IP/OBS HIGH 50: CPT | Performed by: INTERNAL MEDICINE

## 2024-02-25 PROCEDURE — 700111 HCHG RX REV CODE 636 W/ 250 OVERRIDE (IP): Mod: JZ | Performed by: HOSPITALIST

## 2024-02-25 PROCEDURE — 700105 HCHG RX REV CODE 258: Performed by: INTERNAL MEDICINE

## 2024-02-25 PROCEDURE — 770020 HCHG ROOM/CARE - TELE (206)

## 2024-02-25 PROCEDURE — 80048 BASIC METABOLIC PNL TOTAL CA: CPT

## 2024-02-25 PROCEDURE — 94760 N-INVAS EAR/PLS OXIMETRY 1: CPT

## 2024-02-25 RX ORDER — HYDROMORPHONE HYDROCHLORIDE 1 MG/ML
1 INJECTION, SOLUTION INTRAMUSCULAR; INTRAVENOUS; SUBCUTANEOUS
Status: DISCONTINUED | OUTPATIENT
Start: 2024-02-25 | End: 2024-02-29

## 2024-02-25 RX ORDER — SODIUM CHLORIDE 9 MG/ML
INJECTION, SOLUTION INTRAVENOUS CONTINUOUS
Status: DISCONTINUED | OUTPATIENT
Start: 2024-02-25 | End: 2024-02-28

## 2024-02-25 RX ORDER — HYDROMORPHONE HYDROCHLORIDE 1 MG/ML
0.5 INJECTION, SOLUTION INTRAMUSCULAR; INTRAVENOUS; SUBCUTANEOUS
Status: DISCONTINUED | OUTPATIENT
Start: 2024-02-25 | End: 2024-02-29

## 2024-02-25 RX ADMIN — SODIUM CHLORIDE: 9 INJECTION, SOLUTION INTRAVENOUS at 23:15

## 2024-02-25 RX ADMIN — HYDROMORPHONE HYDROCHLORIDE 1 MG: 1 INJECTION, SOLUTION INTRAMUSCULAR; INTRAVENOUS; SUBCUTANEOUS at 12:09

## 2024-02-25 RX ADMIN — METRONIDAZOLE 500 MG: 5 INJECTION, SOLUTION INTRAVENOUS at 05:05

## 2024-02-25 RX ADMIN — METRONIDAZOLE 500 MG: 5 INJECTION, SOLUTION INTRAVENOUS at 17:17

## 2024-02-25 RX ADMIN — HYDROMORPHONE HYDROCHLORIDE 1 MG: 1 INJECTION, SOLUTION INTRAMUSCULAR; INTRAVENOUS; SUBCUTANEOUS at 15:33

## 2024-02-25 RX ADMIN — HYDROMORPHONE HYDROCHLORIDE 1 MG: 1 INJECTION, SOLUTION INTRAMUSCULAR; INTRAVENOUS; SUBCUTANEOUS at 08:13

## 2024-02-25 RX ADMIN — SODIUM CHLORIDE: 9 INJECTION, SOLUTION INTRAVENOUS at 14:26

## 2024-02-25 RX ADMIN — CEFTRIAXONE SODIUM 2000 MG: 2 INJECTION, POWDER, FOR SOLUTION INTRAMUSCULAR; INTRAVENOUS at 13:42

## 2024-02-25 RX ADMIN — HYDROMORPHONE HYDROCHLORIDE 1 MG: 1 INJECTION, SOLUTION INTRAMUSCULAR; INTRAVENOUS; SUBCUTANEOUS at 03:56

## 2024-02-25 RX ADMIN — HYDROMORPHONE HYDROCHLORIDE 1 MG: 1 INJECTION, SOLUTION INTRAMUSCULAR; INTRAVENOUS; SUBCUTANEOUS at 20:03

## 2024-02-25 RX ADMIN — HYDROMORPHONE HYDROCHLORIDE 0.5 MG: 1 INJECTION, SOLUTION INTRAMUSCULAR; INTRAVENOUS; SUBCUTANEOUS at 23:11

## 2024-02-25 RX ADMIN — SODIUM CHLORIDE, POTASSIUM CHLORIDE, SODIUM LACTATE AND CALCIUM CHLORIDE: 600; 310; 30; 20 INJECTION, SOLUTION INTRAVENOUS at 06:09

## 2024-02-25 ASSESSMENT — PATIENT HEALTH QUESTIONNAIRE - PHQ9
1. LITTLE INTEREST OR PLEASURE IN DOING THINGS: NOT AT ALL
SUM OF ALL RESPONSES TO PHQ9 QUESTIONS 1 AND 2: 0
2. FEELING DOWN, DEPRESSED, IRRITABLE, OR HOPELESS: NOT AT ALL

## 2024-02-25 ASSESSMENT — COGNITIVE AND FUNCTIONAL STATUS - GENERAL
MOVING FROM LYING ON BACK TO SITTING ON SIDE OF FLAT BED: A LITTLE
MOBILITY SCORE: 19
WALKING IN HOSPITAL ROOM: A LITTLE
DRESSING REGULAR UPPER BODY CLOTHING: A LITTLE
SUGGESTED CMS G CODE MODIFIER MOBILITY: CK
MOVING TO AND FROM BED TO CHAIR: A LITTLE
STANDING UP FROM CHAIR USING ARMS: A LITTLE
TOILETING: A LITTLE
DAILY ACTIVITIY SCORE: 20
DRESSING REGULAR LOWER BODY CLOTHING: A LITTLE
HELP NEEDED FOR BATHING: A LITTLE
CLIMB 3 TO 5 STEPS WITH RAILING: A LITTLE
SUGGESTED CMS G CODE MODIFIER DAILY ACTIVITY: CJ

## 2024-02-25 ASSESSMENT — ENCOUNTER SYMPTOMS
NERVOUS/ANXIOUS: 1
ABDOMINAL PAIN: 1
WEIGHT LOSS: 1
WEAKNESS: 1
VOMITING: 0
SHORTNESS OF BREATH: 0
NAUSEA: 0

## 2024-02-25 ASSESSMENT — PAIN DESCRIPTION - PAIN TYPE
TYPE: ACUTE PAIN
TYPE: SURGICAL PAIN
TYPE: ACUTE PAIN

## 2024-02-25 ASSESSMENT — FIBROSIS 4 INDEX: FIB4 SCORE: 1.06

## 2024-02-25 NOTE — CARE PLAN
The patient is Stable - Low risk of patient condition declining or worsening    Shift Goals  Clinical Goals: Pt pain will be tolerable AEB pt being able to sleep at least 5 hours overnight or state pain is 4/10 or less.  Patient Goals: MORGAN  Family Goals: MORGAN    Progress made toward(s) clinical / shift goals:  Pt pain tolerable with PRN doses of dilaudid. Pt only received 2 doses overnight. Pt was able to sleep 5 hours

## 2024-02-25 NOTE — PROGRESS NOTES
"Surgical Progress Note:    POD# 2  S/P laparoscopic sharlene patch for perforated ulcer.     No acute events. Pain better.    PE:  /58   Pulse 86   Temp 36.5 °C (97.7 °F) (Oral)   Resp 18   Ht 1.702 m (5' 7\")   Wt 50.7 kg (111 lb 12.4 oz)   SpO2 96%   BMI 17.51 kg/m²     I/O:   Intake/Output Summary (Last 24 hours) at 2/25/2024 0754  Last data filed at 2/25/2024 0628  Gross per 24 hour   Intake 1299.94 ml   Output 1195 ml   Net 104.94 ml       A/P: POD# 2  S/P laparoscopic sharlene patch for perforated ulcer.   - plan UGI tomorrow - ordered    Kaila Kwon M.D.  [facility]  [phone]        "

## 2024-02-25 NOTE — PROGRESS NOTES
4 Eyes Skin Assessment Completed by Maxwell RN and SILVIA Reveles.    Head WDL  Ears WDL  Nose Redness and Blanching L nare NG tube insertion site  Mouth WDL  Neck WDL  Breast/Chest WDL  Shoulder Blades WDL  Spine WDL  (R) Arm/Elbow/Hand Bruising  (L) Arm/Elbow/Hand Bruising  Abdomen Incision NICKI to LLQ plus 3 lap sites to abdomen  Groin WDL  Scrotum/Coccyx/Buttocks Redness and Blanching  (R) Leg WDL  (L) Leg WDL  (R) Heel/Foot/Toe WDL  (L) Heel/Foot/Toe WDL          Devices In Places Tele Box, Pulse Ox, and OG/NG      Interventions In Place Sacral Mepilex and Pillows    Possible Skin Injury No    Pictures Uploaded Into Epic N/A  Wound Consult Placed N/A  RN Wound Prevention Protocol Ordered No

## 2024-02-25 NOTE — PROGRESS NOTES
Assumed care of pt at 1915. Received bedside report from Day RN. Pt resting in bed with NG tube to left nare to intermittent low suction with yellow output. Incisions to abdomen CDI. NICKI to LLQ with serosanguinous drainage, intact. Pt ambulated to bathroom standby with FWW. Pain rated at a 6/10 to abdomen. Informed Dr. Chen that pt was still experiencing pain post pain medication. New orders received, pt given pain medication. Informed pt that due to having an NG tube she should try to keep her head elevated to 30 degrees and above due to risk of aspiration.Pt refused stating it was too painful but was willing to keep her head to 20 degrees and above. Discussed plan for the night including managing pain and monitoring drain output. No other needs at this time, call light in reach, bed in lowest position.

## 2024-02-25 NOTE — PROGRESS NOTES
Telemetry Shift Summary     Rhythm: SR  HR: 72-86  Ectopy: r PVC, r PAC    Measurements: 0.16/0.08/0.36    Normal Values  Rhythm: SR  HR:   Measurements: 0.12-0.20/0.08-0.10/0.30-0.52

## 2024-02-25 NOTE — CARE PLAN
The patient is Stable - Low risk of patient condition declining or worsening    Shift Goals  Clinical Goals: pain control, bed bath,  Patient Goals: shower and get tube out ttomorrow  Family Goals: MORGAN    Progress made toward(s) clinical / shift goals:      Patient is not progressing towards the following goals:      Problem: Pain - Standard  Goal: Alleviation of pain or a reduction in pain to the patient’s comfort goal  Outcome: Progressing  Flowsheets (Taken 2/25/2024 0813)  Pain Rating Scale (NPRS): 5  Note: Pain managed with medication per MAR       Problem: Knowledge Deficit - Standard  Goal: Patient and family/care givers will demonstrate understanding of plan of care, disease process/condition, diagnostic tests and medications  Outcome: Progressing  Note: NPO until upper GI series exam tomorrow. NGT in place to low, int suction     Problem: Skin Integrity  Goal: Skin integrity is maintained or improved  Outcome: Progressing

## 2024-02-25 NOTE — PROGRESS NOTES
Hospital Medicine Daily Progress Note    Date of Service  2/25/2024    Chief Complaint  Denisse Abel is a 63 y.o. female admitted 2/23/2024 with acute abdomen and sepsis due to perforated duodenal ulcer she was taken to the OR immediately and underwent repair of the duodenal ulcer.    Hospital Course  No notes on file    Interval Problem Update  Patient still has NG tube, abdominal pain.  She states abdominal pain better than when she came in, she is aware of the care plan    I have discussed this patient's plan of care and discharge plan at IDT rounds today with Case Management, Nursing, Nursing leadership, and other members of the IDT team.    Consultants/Specialty  critical care and general surgery    Code Status  Full Code    Disposition  The patient is not medically cleared for discharge to home or a post-acute facility.  Anticipate discharge to: skilled nursing facility    I have placed the appropriate orders for post-discharge needs.    Review of Systems  Review of Systems   Constitutional:  Positive for weight loss.   Respiratory:  Negative for shortness of breath.    Cardiovascular:  Negative for chest pain.   Gastrointestinal:  Positive for abdominal pain. Negative for nausea and vomiting.   Neurological:  Positive for weakness (Patient think she will need to go to subacute rehab she does not feel safe being home alone).   Psychiatric/Behavioral:  The patient is nervous/anxious.         Physical Exam  Temp:  [36.4 °C (97.5 °F)-37 °C (98.6 °F)] 36.4 °C (97.5 °F)  Pulse:  [79-89] 83  Resp:  [14-40] 18  BP: (106-121)/(47-58) 106/57  SpO2:  [95 %-98 %] 97 %    Physical Exam  Vitals and nursing note reviewed.   Constitutional:       Comments: Very thin, almost cachectic with muscle wasting   HENT:      Head: Normocephalic and atraumatic.      Nose:      Comments: NG tube     Mouth/Throat:      Mouth: Mucous membranes are moist.   Eyes:      Extraocular Movements: Extraocular movements intact.       Pupils: Pupils are equal, round, and reactive to light.   Cardiovascular:      Rate and Rhythm: Normal rate and regular rhythm.   Pulmonary:      Effort: Pulmonary effort is normal.      Breath sounds: Normal breath sounds.   Abdominal:      General: Abdomen is flat. There is no distension.      Palpations: Abdomen is soft.      Tenderness: There is abdominal tenderness.   Musculoskeletal:      Right lower leg: No edema.      Left lower leg: No edema.      Comments: Atrophy   Skin:     Comments: Skin loose   Neurological:      General: No focal deficit present.      Mental Status: She is alert and oriented to person, place, and time.      Cranial Nerves: No cranial nerve deficit.   Psychiatric:      Comments: Somewhat anxious         Fluids    Intake/Output Summary (Last 24 hours) at 2/25/2024 1348  Last data filed at 2/25/2024 0815  Gross per 24 hour   Intake 841.27 ml   Output 810 ml   Net 31.27 ml       Laboratory  Recent Labs     02/23/24  1005   WBC 19.3*   RBC 5.10   HEMOGLOBIN 14.0   HEMATOCRIT 42.0   MCV 82.4   MCH 27.5   MCHC 33.3   RDW 83.4*   PLATELETCT 317     Recent Labs     02/23/24  1641 02/24/24  0402 02/25/24  0512   SODIUM 123* 123* 128*   POTASSIUM 4.9 4.7 4.4   CHLORIDE 89* 90* 94*   CO2 20 20 21   GLUCOSE 123* 126* 104*   BUN 69* 67* 56*   CREATININE 1.93* 1.72* 1.53*   CALCIUM 9.3 9.2 9.9     Recent Labs     02/23/24  1005   APTT 33.6   INR 1.36*               Imaging  DX-CHEST-LIMITED (1 VIEW)   Final Result      1.  Free intraperitoneal air consistent with perforation of hollow viscus      2.  Findings were discussed with JUAN PABLO SCHWARTZ on 2/23/2024 11:42 AM.      CT-RENAL COLIC EVALUATION(A/P W/O)   Final Result      1.  Free intraperitoneal air present consistent with perforation of a hollow viscus      2.  Specific site of perforation is not evident on CT      3.  Cirrhosis with portal hypertension      4.  Moderate amount of ascites      5.  Cholelithiasis            6.  Findings were  discussed with JUAN PABLO SCHWARTZ on 2/23/2024 11:45 AM.           Assessment/Plan  Severe protein-calorie malnutrition (HCC)- (present on admission)  Assessment & Plan  Patient has lost 80 pounds in the last year, she attributes this due to stress and poor appetite  Initiate diet as soon as safe, recommending supplements as well    Acute on chronic kidney failure (HCC)- (present on admission)  Assessment & Plan  Monitor renal functions  Avoid nephrotoxic medications  Continue fluids      Sepsis (HCC)  Assessment & Plan  This is Sepsis Present on admission  SIRS criteria identified on my evaluation include: Fever, with temperature greater than 100.9 deg F, Tachycardia, with heart rate greater than 90 BPM, and Leukocytosis, with WBC greater than 12,000  Clinical indicators of end organ dysfunction include Lactic Acid greater than 2  Source is perforated intra-abdominal viscus  Sepsis protocol initiated  Crystalloid Fluid Administration: Fluid resuscitation ordered per standard protocol - 30 mL/kg per current or ideal body weight  IV antibiotics as appropriate for source of sepsis  Reassessment: I have reassessed the patient's hemodynamic status    Anemia due to chronic kidney disease- (present on admission)  Assessment & Plan  Monitor H&H if drops below 7 or 21 transfuse  Currently not in transfusion range  Check iron panel    Liver cirrhosis (HCC)- (present on admission)  Assessment & Plan  Chronic liver cirrhosis nonalcoholic  Continue to monitor liver functions  Monitor fluid status  Hold spironolactone for now    Hyponatremia- (present on admission)  Assessment & Plan  Hyponatremia with a sodium 131, worsening, now improving, change IV fluids to normal saline, cortisol normal  Give fluid resuscitation monitor sodium levels    Acute combined systolic and diastolic heart failure (HCC)- (present on admission)  Assessment & Plan  For now hold diuretic and anything orally  Give fluid resuscitation with acute abdomen and  hypotension  Monitor intake and output  Daily weights  If necessary obtain echocardiogram  Patient sees cardiologist Dr. Xiong    Heart murmur- (present on admission)  Assessment & Plan  Chronic and stable    Stage 3b chronic kidney disease (HCC)- (present on admission)  Assessment & Plan  Monitor renal functions avoid nephrotoxic medications    Dyslipidemia- (present on admission)  Assessment & Plan  Hold statin  Fasting lipid panel    HTN (hypertension), benign- (present on admission)  Assessment & Plan  Optimize blood pressure management  As outpatient patient was on Entresto and bisoprolol, these will be held with hypotension  Give pressor support as needed to keep systolic blood pressure above 95         VTE prophylaxis:   SCDs/TEDs      I have performed a physical exam and reviewed and updated ROS and Plan today (2/25/2024). In review of yesterday's note (2/24/2024), there are no changes except as documented above.

## 2024-02-25 NOTE — CARE PLAN
The patient is Stable - Low risk of patient condition declining or worsening    Shift Goals  Clinical Goals: pain management, monitor drain output, monitor surgical incision  Patient Goals: MORGAN  Family Goals: MORGAN    Progress made toward(s) clinical / shift goals:  Ambulate. Reduced nausea  Problem: Pain - Standard  Goal: Alleviation of pain or a reduction in pain to the patient’s comfort goal  Outcome: Progressing     Problem: Fall Risk  Goal: Patient will remain free from falls  Outcome: Progressing     Problem: Hemodynamics  Goal: Patient's hemodynamics, fluid balance and neurologic status will be stable or improve  Outcome: Progressing     Problem: Fluid Volume  Goal: Fluid volume balance will be maintained  Outcome: Progressing     Problem: Urinary - Renal Perfusion  Goal: Ability to achieve and maintain adequate renal perfusion and functioning will improve  Outcome: Progressing     Problem: Respiratory  Goal: Patient will achieve/maintain optimum respiratory ventilation and gas exchange  Outcome: Progressing     Problem: Mechanical Ventilation  Goal: Safe management of artificial airway and ventilation  Outcome: Progressing  Goal: Successful weaning off mechanical ventilator, spontaneously maintains adequate gas exchange  Outcome: Progressing  Goal: Patient will be able to express needs and understand communication  Outcome: Progressing     Problem: Physical Regulation  Goal: Diagnostic test results will improve  Outcome: Progressing  Goal: Signs and symptoms of infection will decrease  Outcome: Progressing     Problem: Knowledge Deficit - Standard  Goal: Patient and family/care givers will demonstrate understanding of plan of care, disease process/condition, diagnostic tests and medications  Outcome: Progressing     Problem: Skin Integrity  Goal: Skin integrity is maintained or improved  Outcome: Progressing       Patient is not progressing towards the following goals:

## 2024-02-25 NOTE — ASSESSMENT & PLAN NOTE
Patient attributes weight loss to stress and poor appetite.  Likely had abdominal pain from gastric ulcer.  Continue to advance diet as tolerated, and nutritional supplements

## 2024-02-26 ENCOUNTER — APPOINTMENT (OUTPATIENT)
Dept: RADIOLOGY | Facility: MEDICAL CENTER | Age: 64
DRG: 853 | End: 2024-02-26
Attending: SURGERY
Payer: MEDICARE

## 2024-02-26 LAB
ANION GAP SERPL CALC-SCNC: 13 MMOL/L (ref 7–16)
BASOPHILS # BLD AUTO: 0 % (ref 0–1.8)
BASOPHILS # BLD: 0 K/UL (ref 0–0.12)
BUN SERPL-MCNC: 39 MG/DL (ref 8–22)
CALCIUM SERPL-MCNC: 9.4 MG/DL (ref 8.4–10.2)
CHLORIDE SERPL-SCNC: 100 MMOL/L (ref 96–112)
CO2 SERPL-SCNC: 21 MMOL/L (ref 20–33)
CREAT SERPL-MCNC: 1.01 MG/DL (ref 0.5–1.4)
EOSINOPHIL # BLD AUTO: 0.02 K/UL (ref 0–0.51)
EOSINOPHIL NFR BLD: 0.2 % (ref 0–6.9)
ERYTHROCYTE [DISTWIDTH] IN BLOOD BY AUTOMATED COUNT: 85.1 FL (ref 35.9–50)
GFR SERPLBLD CREATININE-BSD FMLA CKD-EPI: 62 ML/MIN/1.73 M 2
GLUCOSE SERPL-MCNC: 80 MG/DL (ref 65–99)
HCT VFR BLD AUTO: 31.1 % (ref 37–47)
HGB BLD-MCNC: 10 G/DL (ref 12–16)
IMM GRANULOCYTES # BLD AUTO: 0.04 K/UL (ref 0–0.11)
IMM GRANULOCYTES NFR BLD AUTO: 0.5 % (ref 0–0.9)
LYMPHOCYTES # BLD AUTO: 1 K/UL (ref 1–4.8)
LYMPHOCYTES NFR BLD: 12.3 % (ref 22–41)
MCH RBC QN AUTO: 27.9 PG (ref 27–33)
MCHC RBC AUTO-ENTMCNC: 32.2 G/DL (ref 32.2–35.5)
MCV RBC AUTO: 86.6 FL (ref 81.4–97.8)
MONOCYTES # BLD AUTO: 0.77 K/UL (ref 0–0.85)
MONOCYTES NFR BLD AUTO: 9.5 % (ref 0–13.4)
NEUTROPHILS # BLD AUTO: 6.31 K/UL (ref 1.82–7.42)
NEUTROPHILS NFR BLD: 77.5 % (ref 44–72)
NRBC # BLD AUTO: 0 K/UL
NRBC BLD-RTO: 0 /100 WBC (ref 0–0.2)
PLATELET # BLD AUTO: 218 K/UL (ref 164–446)
PMV BLD AUTO: 9.9 FL (ref 9–12.9)
POTASSIUM SERPL-SCNC: 3.9 MMOL/L (ref 3.6–5.5)
RBC # BLD AUTO: 3.59 M/UL (ref 4.2–5.4)
SODIUM SERPL-SCNC: 134 MMOL/L (ref 135–145)
WBC # BLD AUTO: 8.1 K/UL (ref 4.8–10.8)

## 2024-02-26 PROCEDURE — 700111 HCHG RX REV CODE 636 W/ 250 OVERRIDE (IP): Mod: JZ | Performed by: HOSPITALIST

## 2024-02-26 PROCEDURE — 80048 BASIC METABOLIC PNL TOTAL CA: CPT

## 2024-02-26 PROCEDURE — 700105 HCHG RX REV CODE 258: Performed by: INTERNAL MEDICINE

## 2024-02-26 PROCEDURE — 74240 X-RAY XM UPR GI TRC 1CNTRST: CPT

## 2024-02-26 PROCEDURE — 700105 HCHG RX REV CODE 258: Performed by: HOSPITALIST

## 2024-02-26 PROCEDURE — 99233 SBSQ HOSP IP/OBS HIGH 50: CPT | Performed by: INTERNAL MEDICINE

## 2024-02-26 PROCEDURE — 770020 HCHG ROOM/CARE - TELE (206)

## 2024-02-26 PROCEDURE — 700111 HCHG RX REV CODE 636 W/ 250 OVERRIDE (IP): Mod: JZ | Performed by: INTERNAL MEDICINE

## 2024-02-26 PROCEDURE — 85025 COMPLETE CBC W/AUTO DIFF WBC: CPT

## 2024-02-26 PROCEDURE — 36415 COLL VENOUS BLD VENIPUNCTURE: CPT

## 2024-02-26 PROCEDURE — 94760 N-INVAS EAR/PLS OXIMETRY 1: CPT

## 2024-02-26 PROCEDURE — 700117 HCHG RX CONTRAST REV CODE 255: Performed by: SURGERY

## 2024-02-26 RX ORDER — ONDANSETRON 2 MG/ML
4 INJECTION INTRAMUSCULAR; INTRAVENOUS EVERY 4 HOURS PRN
Status: DISCONTINUED | OUTPATIENT
Start: 2024-02-26 | End: 2024-03-04 | Stop reason: HOSPADM

## 2024-02-26 RX ADMIN — HYDROMORPHONE HYDROCHLORIDE 1 MG: 1 INJECTION, SOLUTION INTRAMUSCULAR; INTRAVENOUS; SUBCUTANEOUS at 16:03

## 2024-02-26 RX ADMIN — IOHEXOL 175 ML: 300 INJECTION, SOLUTION INTRAVENOUS at 12:15

## 2024-02-26 RX ADMIN — HYDROMORPHONE HYDROCHLORIDE 1 MG: 1 INJECTION, SOLUTION INTRAMUSCULAR; INTRAVENOUS; SUBCUTANEOUS at 09:17

## 2024-02-26 RX ADMIN — SODIUM CHLORIDE: 9 INJECTION, SOLUTION INTRAVENOUS at 08:24

## 2024-02-26 RX ADMIN — METRONIDAZOLE 500 MG: 5 INJECTION, SOLUTION INTRAVENOUS at 16:53

## 2024-02-26 RX ADMIN — SODIUM CHLORIDE: 9 INJECTION, SOLUTION INTRAVENOUS at 16:54

## 2024-02-26 RX ADMIN — HYDROMORPHONE HYDROCHLORIDE 1 MG: 1 INJECTION, SOLUTION INTRAMUSCULAR; INTRAVENOUS; SUBCUTANEOUS at 20:19

## 2024-02-26 RX ADMIN — CEFTRIAXONE SODIUM 2000 MG: 2 INJECTION, POWDER, FOR SOLUTION INTRAMUSCULAR; INTRAVENOUS at 13:13

## 2024-02-26 RX ADMIN — METRONIDAZOLE 500 MG: 5 INJECTION, SOLUTION INTRAVENOUS at 05:21

## 2024-02-26 RX ADMIN — HYDROMORPHONE HYDROCHLORIDE 1 MG: 1 INJECTION, SOLUTION INTRAMUSCULAR; INTRAVENOUS; SUBCUTANEOUS at 06:09

## 2024-02-26 ASSESSMENT — ENCOUNTER SYMPTOMS
VOMITING: 0
NAUSEA: 1
WEIGHT LOSS: 1
NERVOUS/ANXIOUS: 0
ABDOMINAL PAIN: 1
SHORTNESS OF BREATH: 0
WEAKNESS: 1

## 2024-02-26 ASSESSMENT — FIBROSIS 4 INDEX: FIB4 SCORE: 1.55

## 2024-02-26 ASSESSMENT — PAIN DESCRIPTION - PAIN TYPE
TYPE: ACUTE PAIN

## 2024-02-26 NOTE — PROGRESS NOTES
Telemetry shift summary:  Rhythm: SR, ST     HR Range: 90's to 100's      Measurements from strip printed at 02:02:42   IA: 0.16   QRS 0.08   QT 0.32     Ectopies (As per Telemetry Tech report) Rare to occasional PVC; rare PAC.

## 2024-02-26 NOTE — CARE PLAN
The patient is Watcher - Medium risk of patient condition declining or worsening    Shift Goals  Clinical Goals: IVF, NG to LIS, upper GI study  Patient Goals: pain control  Family Goals: No family present    Progress made toward(s) clinical / shift goals:    Problem: Fluid Volume  Goal: Fluid volume balance will be maintained  Outcome: Progressing- continuous IVF, started on clears. NG dced     Problem: Knowledge Deficit - Standard  Goal: Patient and family/care givers will demonstrate understanding of plan of care, disease process/condition, diagnostic tests and medications  Outcome: Progressing- updated//educated on POC

## 2024-02-26 NOTE — PROGRESS NOTES
Bedside report received from Anushka RN and assumed Pt's cares. Call light within reach. Safety measures in place.

## 2024-02-26 NOTE — PROGRESS NOTES
OVERNIGHT HOSPITALIST:    Paged by nursing Staff.  Patient is complaining of nausea, she has NG tube in place, total output overnight was approximately 500 mL.  Patient has a schedule upper GI series today.  Will need to closely monitor.

## 2024-02-26 NOTE — PROGRESS NOTES
Telemetry Shift Summary     Rhythm: SR  Rate: 84-96  Measurements: .16/.10/.36  Ectopy (reported by Monitor Tech): r-o PVC     Normal Values  Rhythm: Sinus  HR:   Measurements: 0.12-0.20/0.06-0.10/0.30-0.52

## 2024-02-26 NOTE — PROGRESS NOTES
Charge Nurse Rounding Note    Bedside rounding completed to address quality of care and overall patient experience.    Patient Satisfaction addressed including staff responsiveness. Patient/family are aware of the POC and any questions answered. Thorough safety education completed including use of call light prior to all mobility throughout the entirety of the hospital stay.     Patient/family aware of time of next Hourly Round.    No further questions/concerns currently.     Additional Notes: NA

## 2024-02-26 NOTE — CARE PLAN
The patient is Stable - Low risk of patient condition declining or worsening    Shift Goals  Clinical Goals: Pain management. Minitor NGT and NICKI drain output  Patient Goals: Pain management  Family Goals: No family present    Progress made toward(s) clinical / shift goals:      Patient is not progressing towards the following goals:      Problem: Pain - Standard  Goal: Alleviation of pain or a reduction in pain to the patient’s comfort goal  Outcome: Not Progressing  Note: Pt still c/o abd pain rated up to 7/10. Dilaudid Iv has been given every 3 hrs as requested by Pt        Problem: Fall Risk  Goal: Patient will remain free from falls  Outcome: Progressing     Problem: Hemodynamics  Goal: Patient's hemodynamics, fluid balance and neurologic status will be stable or improve  Outcome: Progressing     Problem: Fluid Volume  Goal: Fluid volume balance will be maintained  Outcome: Progressing     Problem: Urinary - Renal Perfusion  Goal: Ability to achieve and maintain adequate renal perfusion and functioning will improve  Outcome: Progressing     Problem: Respiratory  Goal: Patient will achieve/maintain optimum respiratory ventilation and gas exchange  Outcome: Progressing     Problem: Physical Regulation  Goal: Diagnostic test results will improve  Outcome: Progressing  Goal: Signs and symptoms of infection will decrease  Outcome: Progressing     Problem: Knowledge Deficit - Standard  Goal: Patient and family/care givers will demonstrate understanding of plan of care, disease process/condition, diagnostic tests and medications  Outcome: Progressing     Problem: Skin Integrity  Goal: Skin integrity is maintained or improved  Outcome: Progressing

## 2024-02-27 ENCOUNTER — TELEPHONE (OUTPATIENT)
Dept: MEDICAL GROUP | Facility: MEDICAL CENTER | Age: 64
End: 2024-02-27
Payer: COMMERCIAL

## 2024-02-27 LAB
ANION GAP SERPL CALC-SCNC: 10 MMOL/L (ref 7–16)
BUN SERPL-MCNC: 26 MG/DL (ref 8–22)
CALCIUM SERPL-MCNC: 9.1 MG/DL (ref 8.4–10.2)
CHLORIDE SERPL-SCNC: 101 MMOL/L (ref 96–112)
CO2 SERPL-SCNC: 24 MMOL/L (ref 20–33)
CREAT SERPL-MCNC: 0.83 MG/DL (ref 0.5–1.4)
GFR SERPLBLD CREATININE-BSD FMLA CKD-EPI: 78 ML/MIN/1.73 M 2
GLUCOSE SERPL-MCNC: 114 MG/DL (ref 65–99)
POTASSIUM SERPL-SCNC: 4 MMOL/L (ref 3.6–5.5)
SODIUM SERPL-SCNC: 135 MMOL/L (ref 135–145)

## 2024-02-27 PROCEDURE — 700111 HCHG RX REV CODE 636 W/ 250 OVERRIDE (IP): Mod: JZ | Performed by: INTERNAL MEDICINE

## 2024-02-27 PROCEDURE — 700102 HCHG RX REV CODE 250 W/ 637 OVERRIDE(OP): Performed by: INTERNAL MEDICINE

## 2024-02-27 PROCEDURE — 99232 SBSQ HOSP IP/OBS MODERATE 35: CPT | Performed by: INTERNAL MEDICINE

## 2024-02-27 PROCEDURE — 97535 SELF CARE MNGMENT TRAINING: CPT

## 2024-02-27 PROCEDURE — A9270 NON-COVERED ITEM OR SERVICE: HCPCS | Performed by: INTERNAL MEDICINE

## 2024-02-27 PROCEDURE — 97166 OT EVAL MOD COMPLEX 45 MIN: CPT

## 2024-02-27 PROCEDURE — 700111 HCHG RX REV CODE 636 W/ 250 OVERRIDE (IP): Mod: JZ | Performed by: HOSPITALIST

## 2024-02-27 PROCEDURE — 80048 BASIC METABOLIC PNL TOTAL CA: CPT

## 2024-02-27 PROCEDURE — 97165 OT EVAL LOW COMPLEX 30 MIN: CPT

## 2024-02-27 PROCEDURE — 770020 HCHG ROOM/CARE - TELE (206)

## 2024-02-27 PROCEDURE — 36415 COLL VENOUS BLD VENIPUNCTURE: CPT

## 2024-02-27 PROCEDURE — 700105 HCHG RX REV CODE 258: Performed by: INTERNAL MEDICINE

## 2024-02-27 PROCEDURE — 94760 N-INVAS EAR/PLS OXIMETRY 1: CPT

## 2024-02-27 PROCEDURE — 700105 HCHG RX REV CODE 258: Performed by: HOSPITALIST

## 2024-02-27 RX ORDER — SODIUM CHLORIDE, SODIUM LACTATE, POTASSIUM CHLORIDE, CALCIUM CHLORIDE 600; 310; 30; 20 MG/100ML; MG/100ML; MG/100ML; MG/100ML
INJECTION, SOLUTION INTRAVENOUS CONTINUOUS
Status: DISCONTINUED | OUTPATIENT
Start: 2024-02-27 | End: 2024-02-29

## 2024-02-27 RX ADMIN — ONDANSETRON 4 MG: 2 INJECTION INTRAMUSCULAR; INTRAVENOUS at 04:49

## 2024-02-27 RX ADMIN — CEFTRIAXONE SODIUM 2000 MG: 2 INJECTION, POWDER, FOR SOLUTION INTRAMUSCULAR; INTRAVENOUS at 16:49

## 2024-02-27 RX ADMIN — METRONIDAZOLE 500 MG: 5 INJECTION, SOLUTION INTRAVENOUS at 04:31

## 2024-02-27 RX ADMIN — HYDROMORPHONE HYDROCHLORIDE 1 MG: 1 INJECTION, SOLUTION INTRAMUSCULAR; INTRAVENOUS; SUBCUTANEOUS at 04:29

## 2024-02-27 RX ADMIN — SODIUM CHLORIDE, POTASSIUM CHLORIDE, SODIUM LACTATE AND CALCIUM CHLORIDE: 600; 310; 30; 20 INJECTION, SOLUTION INTRAVENOUS at 17:46

## 2024-02-27 RX ADMIN — HYDROMORPHONE HYDROCHLORIDE 1 MG: 1 INJECTION, SOLUTION INTRAMUSCULAR; INTRAVENOUS; SUBCUTANEOUS at 19:39

## 2024-02-27 RX ADMIN — METRONIDAZOLE 500 MG: 5 INJECTION, SOLUTION INTRAVENOUS at 17:46

## 2024-02-27 RX ADMIN — HYDROMORPHONE HYDROCHLORIDE 1 MG: 1 INJECTION, SOLUTION INTRAMUSCULAR; INTRAVENOUS; SUBCUTANEOUS at 22:27

## 2024-02-27 RX ADMIN — HYDROMORPHONE HYDROCHLORIDE 0.5 MG: 1 INJECTION, SOLUTION INTRAMUSCULAR; INTRAVENOUS; SUBCUTANEOUS at 00:53

## 2024-02-27 RX ADMIN — CEFTRIAXONE SODIUM 2000 MG: 2 INJECTION, POWDER, FOR SOLUTION INTRAMUSCULAR; INTRAVENOUS at 13:41

## 2024-02-27 RX ADMIN — SACUBITRIL AND VALSARTAN 1 TABLET: 24; 26 TABLET, FILM COATED ORAL at 17:47

## 2024-02-27 RX ADMIN — HYDROMORPHONE HYDROCHLORIDE 0.5 MG: 1 INJECTION, SOLUTION INTRAMUSCULAR; INTRAVENOUS; SUBCUTANEOUS at 09:12

## 2024-02-27 ASSESSMENT — COGNITIVE AND FUNCTIONAL STATUS - GENERAL
TOILETING: A LITTLE
DRESSING REGULAR UPPER BODY CLOTHING: A LITTLE
HELP NEEDED FOR BATHING: A LITTLE
SUGGESTED CMS G CODE MODIFIER DAILY ACTIVITY: CJ
DRESSING REGULAR LOWER BODY CLOTHING: A LITTLE
DAILY ACTIVITIY SCORE: 20

## 2024-02-27 ASSESSMENT — PAIN DESCRIPTION - PAIN TYPE
TYPE: ACUTE PAIN
TYPE: ACUTE PAIN
TYPE: ACUTE PAIN;SURGICAL PAIN

## 2024-02-27 ASSESSMENT — ENCOUNTER SYMPTOMS
PSYCHIATRIC NEGATIVE: 1
RESPIRATORY NEGATIVE: 1
NEUROLOGICAL NEGATIVE: 1
CARDIOVASCULAR NEGATIVE: 1
EYES NEGATIVE: 1
ABDOMINAL PAIN: 1
MYALGIAS: 1

## 2024-02-27 ASSESSMENT — ACTIVITIES OF DAILY LIVING (ADL): TOILETING: INDEPENDENT

## 2024-02-27 ASSESSMENT — FIBROSIS 4 INDEX: FIB4 SCORE: 1.55

## 2024-02-27 ASSESSMENT — GAIT ASSESSMENTS: DISTANCE (FEET): 15

## 2024-02-27 NOTE — PROGRESS NOTES
Telemetry Shift Summary     Rhythm: SR/ST  HR:   Ectopy: rPVC  Measurements: 0.14/0.08/0.34       Normal Values  Rhythm: SR  HR:   Measurements: 0.12-0.20 / 0.04-0.10 / 0.30-0.52

## 2024-02-27 NOTE — CARE PLAN
The patient is Watcher - Medium risk of patient condition declining or worsening    Shift Goals  Clinical Goals: pain mgt, tolerate diet  Patient Goals: pain control, rest  Family Goals: belinda    Progress made toward(s) clinical / shift goals:    Problem: Pain - Standard  Goal: Alleviation of pain or a reduction in pain to the patient’s comfort goal  Description: Target End Date:  Prior to discharge or change in level of care    Document on Vitals flowsheet    1.  Document pain using the appropriate pain scale per order or unit policy  2.  Educate and implement non-pharmacologic comfort measures (i.e. relaxation, distraction, massage, cold/heat therapy, etc.)  3.  Pain management medications as ordered  4.  Reassess pain after pain med administration per policy  5.  If opiods administered assess patient's response to pain medication is appropriate per POSS sedation scale  6.  Follow pain management plan developed in collaboration with patient and interdisciplinary team (including palliative care or pain specialists if applicable)  Outcome: Progressing  Note: Pt reports relief with current pain medication     Problem: Fall Risk  Goal: Patient will remain free from falls  Description: Target End Date:  Prior to discharge or change in level of care    Document interventions on the Doss Gavin Fall Risk Assessment    1.  Assess for fall risk factors  2.  Implement fall precautions  Outcome: Progressing  Note: High fall risk precautions in place     Problem: Skin Integrity  Goal: Skin integrity is maintained or improved  Description: Target End Date:  Prior to discharge or change in level of care    Document interventions on Skin Risk/John flowsheet groups and corresponding LDA    1.  Assess and monitor skin integrity, appearance and/or temperature  2.  Assess risk factors for impaired skin integrity and/or pressures ulcers  3.  Implement precautions to protect skin integrity in collaboration with interdisciplinary  team  4.  Implement pressure ulcer prevention protocol if at risk for skin breakdown  5.  Confirm wound care consult if at risk for skin breakdown  6.  Ensure patient use of pressure relieving devices  (Low air loss bed, waffle overlay, heel protectors, ROHO cushion, etc)  Outcome: Progressing  Note: Lap sites intact.       Patient is not progressing towards the following goals:

## 2024-02-27 NOTE — PROGRESS NOTES
"Ms. Denisse Abel is a 63 y.o. female who presented to the hospital with abdominal pain and was found to have a duodenal ulcer perforation. Now s/p lap sharlene patch repair 2/23/2024    S: pain improved. Tolerating a liquid diet.     O:  /63   Pulse 98   Temp 37.1 °C (98.7 °F) (Temporal)   Resp 18   Ht 1.702 m (5' 7\")   Wt 52.1 kg (114 lb 13.8 oz)   SpO2 97%   BMI 17.99 kg/m²     GEN: awake, alert oriented  CV: RRR, brisk cap refill on hands  RESP: no labored breathing  ABD: soft, appropriately tender, drain with serous output    Recent Labs     02/26/24  0250   WBC 8.1   RBC 3.59*   HEMOGLOBIN 10.0*   HEMATOCRIT 31.1*   MCV 86.6   MCH 27.9   RDW 85.1*   PLATELETCT 218   MPV 9.9   NEUTSPOLYS 77.50*   LYMPHOCYTES 12.30*   MONOCYTES 9.50   EOSINOPHILS 0.20   BASOPHILS 0.00     Recent Labs     02/25/24  0512 02/26/24  0250 02/27/24  0311   SODIUM 128* 134* 135   POTASSIUM 4.4 3.9 4.0   CHLORIDE 94* 100 101   CO2 21 21 24   GLUCOSE 104* 80 114*   BUN 56* 39* 26*     INR   Date Value Ref Range Status   02/23/2024 1.36 (H) 0.87 - 1.13 Final     Comment:     Reference range:  INR - Non-therapeutic Reference Range: 0.87-1.13  INR - Therapeutic Reference Range: 2.0-4.0         Imaging:    A/P:   Ms. Denisse Abel is a 63 y.o. female who presented to the hospital with abdominal pain and was found to have a duodenal ulcer perforation. Now s/p lap sharlene patch repair 2/23/2024    UGI without leak yesterday. Tolerating liquids without pain, change in drain output, or bump in WSC count. GI soft diet today. Potential home tomorrow. Replace drain output with LR to prevent dehydration. Plan for drain removal prior to discharge tomorrow.    Toni Jay MD  Thoracic & General Surgeon  [facility]  [phone]      "

## 2024-02-27 NOTE — PROGRESS NOTES
Hospital Medicine Daily Progress Note    Date of Service  2/26/2024    Chief Complaint  Denisse Abel is a 63 y.o. female admitted 2/23/2024 with acute abdomen and sepsis due to perforated duodenal ulcer she was taken to the OR immediately and underwent repair of the duodenal ulcer.    Hospital Course  No notes on file    Interval Problem Update  Nausea overnight no vomiting, dark bile out from NGT  Subsequent UGI w/o leak  Per Surgeon (Pierre) OK to DC NGT and start clears    I have discussed this patient's plan of care and discharge plan at IDT rounds today with Case Management, Nursing, Nursing leadership, and other members of the IDT team.    Consultants/Specialty  critical care and general surgery    Code Status  Full Code    Disposition  The patient is not medically cleared for discharge to home or a post-acute facility.  Anticipate discharge to: skilled nursing facility    I have placed the appropriate orders for post-discharge needs.    Review of Systems  Review of Systems   Constitutional:  Positive for weight loss.   Respiratory:  Negative for shortness of breath.    Cardiovascular:  Negative for chest pain.   Gastrointestinal:  Positive for abdominal pain and nausea. Negative for vomiting.   Neurological:  Positive for weakness.   Psychiatric/Behavioral:  The patient is not nervous/anxious.         Physical Exam  Temp:  [36.4 °C (97.5 °F)-37 °C (98.6 °F)] 36.6 °C (97.8 °F)  Pulse:  [] 97  Resp:  [12-17] 16  BP: (106-123)/(48-72) 123/72  SpO2:  [93 %-99 %] 99 %    Physical Exam  Vitals and nursing note reviewed.   Constitutional:       Comments: Very thin, almost cachectic with muscle wasting   HENT:      Head: Normocephalic and atraumatic.      Nose:      Comments: NG tube     Mouth/Throat:      Mouth: Mucous membranes are moist.   Eyes:      Extraocular Movements: Extraocular movements intact.      Pupils: Pupils are equal, round, and reactive to light.   Cardiovascular:      Rate and Rhythm:  Normal rate and regular rhythm.   Pulmonary:      Effort: Pulmonary effort is normal.      Breath sounds: Normal breath sounds.   Abdominal:      General: Abdomen is flat. There is no distension.      Palpations: Abdomen is soft.      Tenderness: There is abdominal tenderness.   Musculoskeletal:      Right lower leg: No edema.      Left lower leg: No edema.      Comments: Atrophy   Skin:     Comments: Skin loose   Neurological:      General: No focal deficit present.      Mental Status: She is alert and oriented to person, place, and time.      Cranial Nerves: No cranial nerve deficit.   Psychiatric:      Comments: More calm today         Fluids    Intake/Output Summary (Last 24 hours) at 2/26/2024 1726  Last data filed at 2/26/2024 1320  Gross per 24 hour   Intake 0 ml   Output 3050 ml   Net -3050 ml       Laboratory  Recent Labs     02/26/24  0250   WBC 8.1   RBC 3.59*   HEMOGLOBIN 10.0*   HEMATOCRIT 31.1*   MCV 86.6   MCH 27.9   MCHC 32.2   RDW 85.1*   PLATELETCT 218   MPV 9.9     Recent Labs     02/24/24  0402 02/25/24  0512 02/26/24  0250   SODIUM 123* 128* 134*   POTASSIUM 4.7 4.4 3.9   CHLORIDE 90* 94* 100   CO2 20 21 21   GLUCOSE 126* 104* 80   BUN 67* 56* 39*   CREATININE 1.72* 1.53* 1.01   CALCIUM 9.2 9.9 9.4                     Imaging  DX-UPPER GI-SERIES WITH KUB   Final Result      No evidence of contrast leak from the surgical repair site.      DX-CHEST-LIMITED (1 VIEW)   Final Result      1.  Free intraperitoneal air consistent with perforation of hollow viscus      2.  Findings were discussed with JUAN PABLO SCHWARTZ on 2/23/2024 11:42 AM.      CT-RENAL COLIC EVALUATION(A/P W/O)   Final Result      1.  Free intraperitoneal air present consistent with perforation of a hollow viscus      2.  Specific site of perforation is not evident on CT      3.  Cirrhosis with portal hypertension      4.  Moderate amount of ascites      5.  Cholelithiasis            6.  Findings were discussed with JUAN PABLO SCHWARTZ on  2/23/2024 11:45 AM.           Assessment/Plan  Severe protein-calorie malnutrition (HCC)- (present on admission)  Assessment & Plan  Patient has lost 80 pounds in the last year, she attributes this due to stress and poor appetite  Initiate diet as soon as safe, recommending supplements as well  Advance diet as soon as possible    Duodenal ulcer perforation (HCC)- (present on admission)  Assessment & Plan  S/p Exlaparoscopy and repair  No leak  Dc NGT  Start clears    Acute on chronic kidney failure (HCC)- (present on admission)  Assessment & Plan  Monitor renal functions  Avoid nephrotoxic medications  resolved      Sepsis (HCC)  Assessment & Plan  This is Sepsis Present on admission  SIRS criteria identified on my evaluation include: Fever, with temperature greater than 100.9 deg F, Tachycardia, with heart rate greater than 90 BPM, and Leukocytosis, with WBC greater than 12,000  Clinical indicators of end organ dysfunction include Lactic Acid greater than 2  Source is perforated intra-abdominal viscus  Sepsis protocol initiated  Crystalloid Fluid Administration: Fluid resuscitation ordered per standard protocol - 30 mL/kg per current or ideal body weight  IV antibiotics as appropriate for source of sepsis  Reassessment: I have reassessed the patient's hemodynamic status    Anemia due to chronic kidney disease- (present on admission)  Assessment & Plan  Monitor H&H if drops below 7 or 21 transfuse  Currently not in transfusion range  Iron low with low saturation and  normal IBC  Start PO supplement QOD at DC    Liver cirrhosis (HCC)- (present on admission)  Assessment & Plan  Chronic liver cirrhosis nonalcoholic  Continue to monitor liver functions  Monitor fluid status  Hold spironolactone for now    Hyponatremia- (present on admission)  Assessment & Plan  Hyponatremia with a sodium 131, improved after worsening    Acute combined systolic and diastolic heart failure (HCC)- (present on admission)  Assessment &  Plan  Monitor intake and output  Daily weights  If necessary obtain echocardiogram  Patient sees cardiologist Dr. Xiong    Heart murmur  Assessment & Plan  Chronic and stable    Stage 3b chronic kidney disease (HCC)- (present on admission)  Assessment & Plan  Monitor renal functions avoid nephrotoxic medications    Dyslipidemia  Assessment & Plan  Hold statin  Fasting lipid panel    HTN (hypertension), benign- (present on admission)  Assessment & Plan  Optimize blood pressure management  As outpatient patient was on Entresto and bisoprolol, these will be held with hypotension  Give pressor support as needed to keep systolic blood pressure above 95         VTE prophylaxis:   SCDs/TEDs      I have performed a physical exam and reviewed and updated ROS and Plan today (2/26/2024). In review of yesterday's note (2/25/2024), there are no changes except as documented above.

## 2024-02-27 NOTE — ASSESSMENT & PLAN NOTE
CT renal colic on 2/23/2024 showed free intraperitoneal air consistent with perforation of a hollow viscus.  General surgery were consulted.  Underwent diagnostic laparoscopy with laparoscopic Man patch on 2/23/2024.  Drain was placed.  NG tube was placed.  UGI from 2/26/2024 showed no evidence of leak.

## 2024-02-27 NOTE — THERAPY
Occupational Therapy   Initial Evaluation     Patient Name: Denisse Abel  Age:  63 y.o., Sex:  female  Medical Record #: [id number]  Today's Date: 2/27/2024     Precautions  Precautions: Fall Risk    Assessment  Patient is 63 y.o. female presented w/ abdominal pain and found to have a duodenal ulcer perforation.  S/P repair of duodenal ulcer 2/26/24.  Pt lives alone in a [facility] in [place], [place].  Cousin will be visiting from out of state to assist as needed.  Friends in the area are available to assist on a limited basis.  PLOF Mod I to Indep for ADL's, transfers and ambulation w/out a device.  Therapist reviewed environmental/safety awareness, fall precautions, AE/DME, ADL's and transfers.    Plan    Occupational Therapy Initial Treatment Plan   Treatment Interventions: Self Care / Activities of Daily Living, Adaptive Equipment, Neuro Re-Education / Balance, Therapeutic Exercises, Therapeutic Activity  Treatment Frequency: 3 Times per Week  Duration: Until Therapy Goals Met    DC Equipment Recommendations: Tub / Shower Seat  Discharge Recommendations: Recommend home health for continued occupational therapy services     Subjective    Pt was alert and cooperative w/ tx.     Objective       02/27/24 0930    Services   Is patient using  services for this encounter? No   Initial Contact Note    Initial Contact Note Order Received and Verified, Occupational Therapy Evaluation in Progress with Full Report to Follow.   Prior Living Situation   Prior Services None   Housing / Facility 1 Story House   Steps Into Home 1   Steps In Home 0   Bathroom Set up Walk In Shower   Equipment Owned Front-Wheel Walker;Raised Toilet Seat Without Arms   Lives with - Patient's Self Care Capacity Alone and Able to Care For Self   Comments Pt lives alone in a [facility] in [place], [place].  Cousin will be visiting from out of state to assist as needed.  Friends in the area are available to assist on a limited basis.  PLOF Mod I to Indep  for ADL's, transfers and ambulation w/out a device.   Prior Level of ADL Function   Self Feeding Independent   Grooming / Hygiene Independent   Bathing Independent   Dressing Independent   Toileting Independent   Prior Level of IADL Function   Medication Management Independent   Laundry Independent   Kitchen Mobility Independent   Finances Independent   Home Management Independent   Shopping Independent   Prior Level Of Mobility Independent Without Device in Community;Independent With Steps in Community;Independent Without Device in Home;Independent With Steps in Home   Driving / Transportation Driving Independent   Occupation (Pre-Hospital Vocational) Retired Due To Age   Precautions   Precautions Fall Risk   Pain   Intervention Rest;Repositioned   Pain 0 - 10 Group   Location Abdomen   Location Orientation Right   Comfort Goal Comfort with Movement;Perform Activity   Therapist Pain Assessment Prior to Activity;Post Activity Pain Same as Prior to Activity;Nurse Notified;4   Non Verbal Descriptors   Non Verbal Scale  Calm;Unlabored Breathing   Cognition    Level of Consciousness Alert   Passive ROM Upper Body   Passive ROM Upper Body WDL   Active ROM Upper Body   Active ROM Upper Body  WDL   Dominant Hand Right   Strength Upper Body   Upper Body Strength  X   Gross Strength Generalized Weakness, Equal Bilaterally.    Upper Body Muscle Tone   Upper Body Muscle Tone  WDL   Coordination Upper Body   Coordination WDL   Balance Assessment   Sitting Balance (Static) Good   Sitting Balance (Dynamic) Good   Standing Balance (Static) Fair +   Standing Balance (Dynamic) Fair   Weight Shift Sitting Good   Weight Shift Standing Fair   Comments OOB FWW   Bed Mobility    Supine to Sit Contact Guard Assist   Sit to Supine Contact Guard Assist   Scooting Minimal Assist   ADL Assessment   Upper Body Dressing Minimal Assist   Lower Body Dressing Minimal Assist   Toileting Supervision   How much help from another person does the  patient currently need...   Putting on and taking off regular lower body clothing? 3   Bathing (including washing, rinsing, and drying)? 3   Toileting, which includes using a toilet, bedpan, or urinal? 3   Putting on and taking off regular upper body clothing? 3   Taking care of personal grooming such as brushing teeth? 4   Eating meals? 4   6 Clicks Daily Activity Score 20   Functional Mobility   Sit to Stand Standby Assist   Bed, Chair, Wheelchair Transfer Standby Assist   Toilet Transfers Contact Guard Assist   Transfer Method Stand Step   Mobility SBA FWW   Distance (Feet) 15   # of Times Distance was Traveled 2   Edema / Skin Assessment   Edema / Skin  Not Assessed   Short Term Goals   Short Term Goal # 1 Mod I bed mobility (to/from supine and EOB sitting)   Short Term Goal # 2 Sup for LB clothing management via AE/DME PRN   Short Term Goal # 3 Mod I UB clothing management   Short Term Goal # 4 Mod I toilet transfer via DME   Occupational Therapy Initial Treatment Plan    Treatment Interventions Self Care / Activities of Daily Living;Adaptive Equipment;Neuro Re-Education / Balance;Therapeutic Exercises;Therapeutic Activity   Treatment Frequency 3 Times per Week   Duration Until Therapy Goals Met   Problem List   Problem List Decreased Active Daily Living Skills;Decreased Upper Extremity Strength Right;Decreased Upper Extremity Strength Left;Decreased Functional Mobility;Decreased Activity Tolerance;Safety Awareness Deficits / Cognition;Impaired Postural Control / Balance   Anticipated Discharge Equipment and Recommendations   DC Equipment Recommendations Tub / Shower Seat   Discharge Recommendations Recommend home health for continued occupational therapy services

## 2024-02-27 NOTE — CARE PLAN
The patient is Stable - Low risk of patient condition declining or worsening    Shift Goals  Clinical Goals: Pain management, monitor NICKI drain output, PT/OT.  Patient Goals: Pain management.  Family Goals: belinda    Progress made toward(s) clinical / shift goals:    Problem: Pain - Standard  Goal: Alleviation of pain or a reduction in pain to the patient’s comfort goal  Outcome: Progressing     Problem: Fall Risk  Goal: Patient will remain free from falls  Outcome: Progressing  Note: Patient calls appropriately.     Problem: Knowledge Deficit - Standard  Goal: Patient and family/care givers will demonstrate understanding of plan of care, disease process/condition, diagnostic tests and medications  Outcome: Progressing  Note: Discussed POC and discharge planning.        Patient is not progressing towards the following goals:      Problem: Fluid Volume  Goal: Fluid volume balance will be maintained  Outcome: Not Progressing  Note: Large amount of output from NICKI drain. Surgery recommending replace with LR. Awaiting orders.

## 2024-02-27 NOTE — DIETARY
Nutrition Update:    Day 4 of admit.  Denisse Abel is a 63 y.o. female with admitting DX of Acute abdomen [R10.0].  Patient being followed to optimize nutrition.    Per surgeon's note, pt w/ duodenal ulcer perforation. Now s/p lap sharlene patch repair on 2/23/2024     Current Diet: clear liquids ordered 2/26    No documented PO intake. Plan is to advance diet to low fiber today and possible D/C tomorrow.     Per RD's note on 2/24, there is concern for possible refeeding. Pt may benefit from order for phos/mag.     Problem: Nutritional:  Goal: Achieve adequate nutritional intake  Description: Diet advanced beyond clears. Patient will consume >50% of meals  Outcome: progressing    RD following.

## 2024-02-27 NOTE — PROGRESS NOTES
Telemetry Shift Summary     Rhythm SR/ST  HR Range   Ectopy rPAC/PVC  Measurements .12/.07/.32  Per strip printed 0400     Normal Values  Rhythm SR  HR Range    Measurements 0.12-0.20 / 0.06-0.10  / 0.30-0.52

## 2024-02-27 NOTE — TELEPHONE ENCOUNTER
[facility] form faxed back at [phone]    Confirmation received   Scanned to pt chart

## 2024-02-27 NOTE — DISCHARGE PLANNING
Case Management Discharge Planning    Admission Date: 2/23/2024  GMLOS: 5.1  ALOS: 4    6-Clicks ADL Score: 20  6-Clicks Mobility Score: 19      Anticipated Discharge Dispo: Discharge Disposition: D/T to home under HHA care in anticipation of covered skilled care (06)  Discharge Address: [id number] [place] [address] [address] [place] [address]    DME Needed: No    Action(s) Taken: DC Assessment Complete (See below) and Choice obtained    Escalations Completed: None    Medically Clear: No    Barriers to Discharge: Medical clearance and Pending PT Evaluation    Course of Action  **2783  LSW spoke to patient at bedside. Introduced self and role. Confirmed facesheet information. Patient reports she lives alone. Has local friends for support. Per patient request, LSW adding friend, Brenda, to patient's emergency contacts. Patient reports PCP is Josh. She owns a FWW and uses it as needed. Does not use oxygen at home. LSW discussed PT/OT has been ordered for her. Patient is agreeable to whatever recommendation PT/OT reflects: home health v. SNF v. Rehab. LSW obtained home health choice ([facility]) as patient reports she does not feel she is at her baseline. LSW following. Pending PT/OT evaluations. Choice form faxed to [facility].     Care Transition Team Assessment    Information Source  Orientation Level: Oriented X4  Information Given By: Patient  Who is responsible for making decisions for patient? : Patient    Readmission Evaluation  Is this a readmission?: No    Elopement Risk  Legal Hold: No  Ambulatory or Self Mobile in Wheelchair: Yes  Disoriented: No  Psychiatric Symptoms: None  History of Wandering: No  Elopement this Admit: No  Vocalizing Wanting to Leave: No  Displays Behaviors, Body Language Wanting to Leave: No-Not at Risk for Elopement  Elopement Risk: Not at Risk for Elopement    Interdisciplinary Discharge Planning  Patient or legal guardian wants to designate a caregiver: No    Discharge Preparedness  What is your plan after  discharge?: Home health care  What are your discharge supports?: Other (comment) (Friend)  Prior Functional Level: Independent with Activities of Daily Living, Independent with Medication Management, Uses Walker    Functional Assesment  Prior Functional Level: Independent with Activities of Daily Living, Independent with Medication Management, Uses Walker    Finances  Financial Barriers to Discharge: No  Prescription Coverage: Yes    Vision / Hearing Impairment  Vision Impairment : Yes  Right Eye Vision: Wears Glasses  Left Eye Vision: Wears Glasses  Hearing Impairment : No    Advance Directive  Advance Directive?: None    Domestic Abuse  Have you ever been the victim of abuse or violence?: No  Physical Abuse or Sexual Abuse: No  Verbal Abuse or Emotional Abuse: No  Possible Abuse/Neglect Reported to:: Not Applicable    Psychological Assessment  History of Substance Abuse: None  History of Psychiatric Problems: No    Discharge Risks or Barriers  Discharge risks or barriers?: No    Anticipated Discharge Information  Discharge Disposition: D/T to home under HHA care in anticipation of covered skilled care (06)  Discharge Address: [id number] [place] [address] [address] [place] [address]

## 2024-02-27 NOTE — THERAPY
Physical Therapy Contact Note    Patient Name: Denisse Abel  Age:  63 y.o., Sex:  female  Medical Record #: [id number]  Today's Date: 2/27/2024    Discussed missed therapy with MD       02/27/24 1105   Treatment Variance   Reason For Missed Therapy Non-Medical - Patient Refused   Interdisciplinary Plan of Care Collaboration   IDT Collaboration with  Physician   Collaboration Comments PT attempted x 2, pt declined both attempts. Hospitalist aware, Pt educated on need to assess mobility to assist with discharge needs. Pt states she plans on returning home with assistance from friends. Pt to try again tomorrow or later in afternoon.   Session Information   Date / Session Number  2/27 Refused x 2 ( needs eval )

## 2024-02-28 PROBLEM — N18.9 ACUTE ON CHRONIC KIDNEY FAILURE (HCC): Status: RESOLVED | Noted: 2024-02-23 | Resolved: 2024-02-28

## 2024-02-28 PROBLEM — A41.9 SEPSIS (HCC): Status: RESOLVED | Noted: 2024-02-23 | Resolved: 2024-02-28

## 2024-02-28 PROBLEM — K26.5 DUODENAL ULCER PERFORATION (HCC): Status: RESOLVED | Noted: 2024-02-23 | Resolved: 2024-02-28

## 2024-02-28 PROBLEM — N17.9 ACUTE ON CHRONIC KIDNEY FAILURE (HCC): Status: RESOLVED | Noted: 2024-02-23 | Resolved: 2024-02-28

## 2024-02-28 PROBLEM — E87.1 HYPONATREMIA: Status: RESOLVED | Noted: 2024-01-22 | Resolved: 2024-02-28

## 2024-02-28 LAB
ALBUMIN SERPL BCP-MCNC: 2.3 G/DL (ref 3.2–4.9)
ALBUMIN/GLOB SERPL: 0.7 G/DL
ALP SERPL-CCNC: 97 U/L (ref 30–99)
ALT SERPL-CCNC: <5 U/L (ref 2–50)
ANION GAP SERPL CALC-SCNC: 9 MMOL/L (ref 7–16)
AST SERPL-CCNC: 11 U/L (ref 12–45)
BACTERIA BLD CULT: NORMAL
BACTERIA BLD CULT: NORMAL
BILIRUB SERPL-MCNC: 0.8 MG/DL (ref 0.1–1.5)
BUN SERPL-MCNC: 18 MG/DL (ref 8–22)
CALCIUM ALBUM COR SERPL-MCNC: 10.5 MG/DL (ref 8.5–10.5)
CALCIUM SERPL-MCNC: 9.1 MG/DL (ref 8.4–10.2)
CHLORIDE SERPL-SCNC: 99 MMOL/L (ref 96–112)
CO2 SERPL-SCNC: 24 MMOL/L (ref 20–33)
CREAT SERPL-MCNC: 0.79 MG/DL (ref 0.5–1.4)
ERYTHROCYTE [DISTWIDTH] IN BLOOD BY AUTOMATED COUNT: 83.4 FL (ref 35.9–50)
GFR SERPLBLD CREATININE-BSD FMLA CKD-EPI: 83 ML/MIN/1.73 M 2
GLOBULIN SER CALC-MCNC: 3.5 G/DL (ref 1.9–3.5)
GLUCOSE SERPL-MCNC: 119 MG/DL (ref 65–99)
HCT VFR BLD AUTO: 35.1 % (ref 37–47)
HGB BLD-MCNC: 11 G/DL (ref 12–16)
MAGNESIUM SERPL-MCNC: 1.4 MG/DL (ref 1.5–2.5)
MCH RBC QN AUTO: 27 PG (ref 27–33)
MCHC RBC AUTO-ENTMCNC: 31.3 G/DL (ref 32.2–35.5)
MCV RBC AUTO: 86.2 FL (ref 81.4–97.8)
PHOSPHATE SERPL-MCNC: 2.1 MG/DL (ref 2.5–4.5)
PLATELET # BLD AUTO: 189 K/UL (ref 164–446)
PMV BLD AUTO: 9.8 FL (ref 9–12.9)
POTASSIUM SERPL-SCNC: 4.1 MMOL/L (ref 3.6–5.5)
PROT SERPL-MCNC: 5.8 G/DL (ref 6–8.2)
RBC # BLD AUTO: 4.07 M/UL (ref 4.2–5.4)
SIGNIFICANT IND 70042: NORMAL
SIGNIFICANT IND 70042: NORMAL
SITE SITE: NORMAL
SITE SITE: NORMAL
SODIUM SERPL-SCNC: 132 MMOL/L (ref 135–145)
SOURCE SOURCE: NORMAL
SOURCE SOURCE: NORMAL
WBC # BLD AUTO: 9.1 K/UL (ref 4.8–10.8)

## 2024-02-28 PROCEDURE — 84100 ASSAY OF PHOSPHORUS: CPT

## 2024-02-28 PROCEDURE — 700102 HCHG RX REV CODE 250 W/ 637 OVERRIDE(OP): Performed by: INTERNAL MEDICINE

## 2024-02-28 PROCEDURE — 94760 N-INVAS EAR/PLS OXIMETRY 1: CPT

## 2024-02-28 PROCEDURE — 700111 HCHG RX REV CODE 636 W/ 250 OVERRIDE (IP): Mod: JZ | Performed by: INTERNAL MEDICINE

## 2024-02-28 PROCEDURE — 700105 HCHG RX REV CODE 258: Performed by: INTERNAL MEDICINE

## 2024-02-28 PROCEDURE — 83735 ASSAY OF MAGNESIUM: CPT

## 2024-02-28 PROCEDURE — 36415 COLL VENOUS BLD VENIPUNCTURE: CPT

## 2024-02-28 PROCEDURE — 85027 COMPLETE CBC AUTOMATED: CPT

## 2024-02-28 PROCEDURE — 97602 WOUND(S) CARE NON-SELECTIVE: CPT

## 2024-02-28 PROCEDURE — 99232 SBSQ HOSP IP/OBS MODERATE 35: CPT | Performed by: INTERNAL MEDICINE

## 2024-02-28 PROCEDURE — 80053 COMPREHEN METABOLIC PANEL: CPT

## 2024-02-28 PROCEDURE — 770020 HCHG ROOM/CARE - TELE (206)

## 2024-02-28 PROCEDURE — A9270 NON-COVERED ITEM OR SERVICE: HCPCS | Performed by: INTERNAL MEDICINE

## 2024-02-28 PROCEDURE — 700111 HCHG RX REV CODE 636 W/ 250 OVERRIDE (IP): Mod: JZ | Performed by: HOSPITALIST

## 2024-02-28 RX ORDER — SPIRONOLACTONE 25 MG/1
25 TABLET ORAL
Status: DISCONTINUED | OUTPATIENT
Start: 2024-02-28 | End: 2024-03-04 | Stop reason: HOSPADM

## 2024-02-28 RX ORDER — BISOPROLOL FUMARATE 5 MG/1
5 TABLET, FILM COATED ORAL
Status: DISCONTINUED | OUTPATIENT
Start: 2024-02-28 | End: 2024-03-04 | Stop reason: HOSPADM

## 2024-02-28 RX ORDER — SPIRONOLACTONE 25 MG/1
25 TABLET ORAL DAILY
Qty: 30 TABLET | Refills: 3 | Status: CANCELLED | OUTPATIENT
Start: 2024-02-28

## 2024-02-28 RX ORDER — MAGNESIUM SULFATE HEPTAHYDRATE 40 MG/ML
2 INJECTION, SOLUTION INTRAVENOUS ONCE
Status: COMPLETED | OUTPATIENT
Start: 2024-02-28 | End: 2024-02-28

## 2024-02-28 RX ORDER — METRONIDAZOLE 500 MG/1
500 TABLET ORAL EVERY 12 HOURS
Qty: 1 TABLET | Refills: 0 | Status: COMPLETED | OUTPATIENT
Start: 2024-02-28 | End: 2024-02-28

## 2024-02-28 RX ADMIN — BISOPROLOL FUMARATE 5 MG: 5 TABLET ORAL at 09:08

## 2024-02-28 RX ADMIN — DIBASIC SODIUM PHOSPHATE, MONOBASIC POTASSIUM PHOSPHATE AND MONOBASIC SODIUM PHOSPHATE 250 MG: 852; 155; 130 TABLET ORAL at 23:19

## 2024-02-28 RX ADMIN — SACUBITRIL AND VALSARTAN 1 TABLET: 24; 26 TABLET, FILM COATED ORAL at 06:02

## 2024-02-28 RX ADMIN — MAGNESIUM SULFATE HEPTAHYDRATE 2 G: 2 INJECTION, SOLUTION INTRAVENOUS at 12:54

## 2024-02-28 RX ADMIN — SODIUM CHLORIDE, POTASSIUM CHLORIDE, SODIUM LACTATE AND CALCIUM CHLORIDE: 600; 310; 30; 20 INJECTION, SOLUTION INTRAVENOUS at 23:22

## 2024-02-28 RX ADMIN — SODIUM CHLORIDE, POTASSIUM CHLORIDE, SODIUM LACTATE AND CALCIUM CHLORIDE: 600; 310; 30; 20 INJECTION, SOLUTION INTRAVENOUS at 06:07

## 2024-02-28 RX ADMIN — DIBASIC SODIUM PHOSPHATE, MONOBASIC POTASSIUM PHOSPHATE AND MONOBASIC SODIUM PHOSPHATE 250 MG: 852; 155; 130 TABLET ORAL at 17:10

## 2024-02-28 RX ADMIN — HYDROMORPHONE HYDROCHLORIDE 0.5 MG: 1 INJECTION, SOLUTION INTRAMUSCULAR; INTRAVENOUS; SUBCUTANEOUS at 21:45

## 2024-02-28 RX ADMIN — SACUBITRIL AND VALSARTAN 1 TABLET: 24; 26 TABLET, FILM COATED ORAL at 17:10

## 2024-02-28 RX ADMIN — DIBASIC SODIUM PHOSPHATE, MONOBASIC POTASSIUM PHOSPHATE AND MONOBASIC SODIUM PHOSPHATE 250 MG: 852; 155; 130 TABLET ORAL at 12:50

## 2024-02-28 RX ADMIN — METRONIDAZOLE 500 MG: 5 INJECTION, SOLUTION INTRAVENOUS at 06:13

## 2024-02-28 RX ADMIN — SPIRONOLACTONE 25 MG: 25 TABLET ORAL at 09:08

## 2024-02-28 RX ADMIN — METRONIDAZOLE 500 MG: 500 TABLET ORAL at 17:10

## 2024-02-28 ASSESSMENT — PAIN DESCRIPTION - PAIN TYPE: TYPE: ACUTE PAIN

## 2024-02-28 ASSESSMENT — COGNITIVE AND FUNCTIONAL STATUS - GENERAL
STANDING UP FROM CHAIR USING ARMS: A LITTLE
DRESSING REGULAR LOWER BODY CLOTHING: A LITTLE
SUGGESTED CMS G CODE MODIFIER DAILY ACTIVITY: CJ
TURNING FROM BACK TO SIDE WHILE IN FLAT BAD: A LITTLE
CLIMB 3 TO 5 STEPS WITH RAILING: A LITTLE
DRESSING REGULAR UPPER BODY CLOTHING: A LITTLE
TOILETING: A LITTLE
MOVING TO AND FROM BED TO CHAIR: A LITTLE
HELP NEEDED FOR BATHING: A LITTLE
DAILY ACTIVITIY SCORE: 20

## 2024-02-28 ASSESSMENT — ENCOUNTER SYMPTOMS
NEUROLOGICAL NEGATIVE: 1
RESPIRATORY NEGATIVE: 1
ABDOMINAL PAIN: 1
CARDIOVASCULAR NEGATIVE: 1
EYES NEGATIVE: 1
PSYCHIATRIC NEGATIVE: 1
MYALGIAS: 1

## 2024-02-28 ASSESSMENT — FIBROSIS 4 INDEX: FIB4 SCORE: 1.78

## 2024-02-28 NOTE — DISCHARGE SUMMARY
Discharge Summary    CHIEF COMPLAINT ON ADMISSION  Chief Complaint   Patient presents with    Abdominal Pain     crampy    N/V     X 2 days       Reason for Admission  N/V; Abdominal Pain     Admission Date  2/23/2024    CODE STATUS  Full Code    HPI & HOSPITAL COURSE  Denisse Abel is a 63 y.o. female admitted 2/23/2024 with 48 hours of worsening abdominal pain, nausea, and vomiting.  He has a history of combined systolic/diastolic heart failure, CKD, hypertension, dyslipidemia, prior history of alcohol dependence, liver cirrhosis.     In the ER, WBCs were 19.3, sodium is 119, lactic acid was 4.1.  CT renal colic on 2/23/2024 showed free intraperitoneal air consistent with perforation of a hollow viscus.  General surgery were consulted.  Underwent diagnostic laparoscopy with laparoscopic Man patch on 2/23/2024.  Drain was placed.  NG tube was placed, and removed on 2/27/2024. UGI from 2/26/2024 showed no evidence of leak.    Entresto was resumed on 2/27/2024.  Patient underwent CT abdomen pelvis with contrast on 3/1/2023.  No abscess was seen, she has anasarca and findings consistent with hepatocellular disease. Ceftriaxone and Flagyl were started due to low-grade temperature and elevated WBCs. Blood cultures have been negative.  WBCs have improved. Patient is to continue Augmentin and Flagyl for 3 days.    Patient is tolerating a GI soft diet. On Entresto, Bisoprolol, Spironolactone. Drain has significant output, likely due to ascites.  PT/OT have been following.     Drain was removed today prior to discharge. I briefly talked to GI about patient's newly diagnosed cirrhosis.  Patient has an appointment with [facility] on 4/10/2024, GI will try to get her in earlier.    Therefore, she is discharged in fair and stable condition to home with close outpatient follow-up.      Discharge Date  3/4/2024    FOLLOW UP ITEMS POST DISCHARGE  General Surgery and Primary Care physician    DISCHARGE DIAGNOSES  Principal  Problem (Resolved):    Perforated duodenal ulcer (HCC) (POA: Yes)  Active Problems:    Liver cirrhosis (HCC) (POA: Yes)    HTN (hypertension), benign (POA: Yes)    Acute combined systolic and diastolic heart failure (HCC) (POA: Yes)    Stage 3b chronic kidney disease (HCC) (POA: Yes)    Anemia due to chronic kidney disease (POA: Yes)    Severe protein-calorie malnutrition (HCC) (POA: Yes)  Resolved Problems:    Sepsis (HCC) (POA: Unknown)    Duodenal ulcer perforation (HCC) (POA: Yes)    Metabolic acidosis (POA: Yes)    Hyponatremia (POA: Yes)    Acute on chronic kidney failure (HCC) (POA: Yes)      FOLLOW UP  Future Appointments   Date Time Provider Department Center   2/28/2024 11:15 AM Fadi Najjar, M.D. NEPH [facility]   3/7/2024 10:45 AM [facility] ECHO 2 ECS MAYTE Fritz   3/12/2024  3:00 PM ROBBY Helms CARCB None   8/9/2024 10:15 AM [facility] ECHO 1 [facility] MAYTE Fritz     [address] [address]  [phone]          MEDICATIONS ON DISCHARGE     Medication List        START taking these medications        Instructions   ciprofloxacin 500 MG Tabs  Commonly known as: Cipro   Take 1 Tablet by mouth 2 times a day for 3 days.  Dose: 500 mg     famotidine 20 MG Tabs  Commonly known as: Pepcid   Take 1 Tablet by mouth 2 times a day.  Dose: 20 mg     * metroNIDAZOLE 500 MG Tabs  Commonly known as: Flagyl   Take 1 Tablet by mouth every 12 hours for 3 days.  Dose: 500 mg     * metroNIDAZOLE 500 MG Tabs  Commonly known as: Flagyl   Take 1 Tablet by mouth every 12 hours for 3 days.  Dose: 500 mg           * This list has 2 medication(s) that are the same as other medications prescribed for you. Read the directions carefully, and ask your doctor or other care provider to review them with you.                CHANGE how you take these medications        Instructions   spironolactone 25 MG Tabs  Start taking on: March 5, 2024  What changed:   medication strength  how much to  take  Commonly known as: Aldactone   Take 1 Tablet by mouth every day.  Dose: 25 mg            CONTINUE taking these medications        Instructions   acetaminophen 500 MG Tabs  Commonly known as: Tylenol   Take 500-1,000 mg by mouth every 6 hours as needed. Indications: Pain  Dose: 500-1,000 mg     bisoprolol 5 MG Tabs  Commonly known as: Zebeta   Take 1 Tablet by mouth every day.  Dose: 5 mg     Entresto 24-26 MG Tabs  Generic drug: sacubitril-valsartan   Take 1 Tablet by mouth 2 times a day.  Dose: 1 Tablet     Jardiance 10 MG Tabs tablet  Generic drug: Empagliflozin   Take 1 Tablet by mouth every day.  Dose: 10 mg     omeprazole 20 MG delayed-release capsule  Commonly known as: PriLOSEC   Take 1 Capsule by mouth every day.  Dose: 20 mg     traMADol 50 MG Tabs  Commonly known as: Ultram   Take 50 mg by mouth every 8 hours as needed. Indications: Pain  Dose: 50 mg              Allergies  Allergies   Allergen Reactions    Pcn [Penicillins] Rash     Pt reports that it was a childhood allergie received body rash     Sulfa Drugs Rash     Pt reports that it was a childhood allergie received body rash        DIET  Orders Placed This Encounter   Procedures    Diet Order Diet: Low Fiber(GI Soft)     Standing Status:   Standing     Number of Occurrences:   1     Order Specific Question:   Diet:     Answer:   Low Fiber(GI Soft) [2]       ACTIVITY  As tolerated.      CONSULTATIONS  General Surgery    PROCEDURES  Diagnostic laparoscopy with laparoscopic Man patch     LABORATORY  Lab Results   Component Value Date    SODIUM 125 (L) 03/04/2024    POTASSIUM 3.4 (L) 03/04/2024    CHLORIDE 92 (L) 03/04/2024    CO2 23 03/04/2024    GLUCOSE 97 03/04/2024    BUN 23 (H) 03/04/2024    CREATININE 1.06 03/04/2024    CREATININE 0.8 04/16/2009        Lab Results   Component Value Date    WBC 10.1 03/04/2024    HEMOGLOBIN 9.1 (L) 03/04/2024    HEMATOCRIT 26.9 (L) 03/04/2024    PLATELETCT 244 03/04/2024        Total time of the discharge  process exceeds 37 minutes.

## 2024-02-28 NOTE — PROGRESS NOTES
Telemetry Shift Summary    Rhythm SR/ST  HR Range   Ectopy r-f PVC  Measurements 0.16/0.08/0.34        Normal Values  Rhythm SR  HR Range    Measurements 0.12-0.20 / 0.06-0.10  / 0.30-0.52

## 2024-02-28 NOTE — DOCUMENTATION QUERY
"                                                                         [facility]                                                                       Query Response Note      PATIENT:               MARLINE ROBLES  ACCT #:                  [id number]  MRN:                     [id number]  :                      1960  ADMIT DATE:       2024 9:24 AM  DISCH DATE:          RESPONDING  PROVIDER #:        [id number]           QUERY TEXT:    The diagnosis of sepsis has been documented in medical record.     Please clarify the status of sepsis by providing SIRS criteria and sepsis-related organ dysfunction.      Sepsis - real or suspected infection plus 2 or more SIRS criteria + organ dysfunction related to sepsis    Temp <96.8 or >101  HR >90  RR >20  WBC <4,000 or > 12,000 or >10% bandemia    The patient's Clinical Indicators include:  62yo with dx of hyponatremia, acidosis, acute abdomen, alcoholic cirrhosis of liver     H&P \"SIRS criteria identified on my evaluation include: Fever, with temperature greater than 100.9 deg F, Tachycardia, with heart rate greater than 90BPM, and leukocytosis, with WBC greater than 12,000\"     Epic Nurse VS temp 97 - 97.3   Epic Nurse VS RR 13-24   Epic Nurse VS HR 68- 91     WBC 19.3    Risk factors: age, acute abdomen, CKD3b, alcoholic cirrhosis of liver, HTN  Treatments: antibiotics, surgical intervention, labwork, monitoring    Contact me with questions.     Thank you,  Luz Toure, YUKOP, CDI  [email or web address]  Options provided:   -- Sepsis exists, (provide SIRS criteria plus sepsis-related organ dysfunction)   -- Sepsis does not exist - amended documentation in the medical record and updated problem list   -- Other explanation, Please specify      Query created by: Luz Toure on 2024 7:17 AM    RESPONSE TEXT:    Sepsis exists - Resolved, was present on admission. Fever, tachycardia, leukocytosis          Electronically signed by:  " MAURA BENEDICT MD 2/28/2024 8:33 AM

## 2024-02-28 NOTE — DISCHARGE INSTRUCTIONS
Please follow up with outpatient General Surgery in 1- 2 weeks    Spironolactone was reduced to 25 mg daily due to low blood pressure. Please monitor and record your blood pressure and report the readings to your primary care physician within 2 to 3 days so that any adjustments can be made if needed.

## 2024-02-28 NOTE — DISCHARGE PLANNING
1318  Per LSW request  Agency/Facility Name: [facility]  Sent Referral per at:  1318 pm    1507  Agency/Facility Name: [facility]  Outcome: DPA received voicemail from Ty with admissions today @1438 pm. Per Ty can accept Pt. Per Ty Pt would have to close a workmen's comp in order for INS to pay for it.  LSW notified via Teams.

## 2024-02-28 NOTE — CARE PLAN
The patient is Stable - Low risk of patient condition declining or worsening    Shift Goals  Clinical Goals: Pain management, monitor NICKI drain output  Patient Goals: Pain management  Family Goals: MORGAN    Progress made toward(s) clinical / shift goals:    Problem: Pain - Standard  Goal: Alleviation of pain or a reduction in pain to the patient’s comfort goal  Outcome: Not Met  Flowsheets (Taken 2/28/2024 0216)  OB Pain Intervention: Medication - See MAR  Pain Rating Scale (NPRS): 7  Note: Patient still has significant abdominal pain.      Problem: Skin Integrity  Goal: Skin integrity is maintained or improved  Outcome: Not Met  Note: Wound on coccyx is not healing, appears to have opened. Wound consult placed.        Patient is not progressing towards the following goals:      Problem: Pain - Standard  Goal: Alleviation of pain or a reduction in pain to the patient’s comfort goal  Outcome: Not Met  Flowsheets (Taken 2/28/2024 0216)  OB Pain Intervention: Medication - See MAR  Pain Rating Scale (NPRS): 7  Note: Patient still has significant abdominal pain.      Problem: Skin Integrity  Goal: Skin integrity is maintained or improved  Outcome: Not Met  Note: Wound on coccyx is not healing, appears to have opened. Wound consult placed.

## 2024-02-28 NOTE — DISCHARGE PLANNING
PM&R referral from Dr. Rivera. Surgical debility. Spoke with Denisse about post acute options. Denisse indicated she was interested in skilled nursing either [facility] or [facility]. Denisse is not wanting to participate in therapy 3 hours a day 5 days a week. Reinforces 3 half hours cessions in the morning and 3 in the afternoon. Discussed length of stay 10 to 14 days. Denisse in not interested in a [facility] level of care. CM notified. No physiatry consult ordered per protocol

## 2024-02-28 NOTE — PROGRESS NOTES
Hospital Medicine Daily Progress Note    Date of Service  2/28/2024    Chief Complaint  Denisse Abel is a 63 y.o. female admitted 2/23/2024 with 48 hours of worsening abdominal pain, nausea, and vomiting.  He has a history of combined systolic/diastolic heart failure, CKD, hypertension, dyslipidemia, prior history of alcohol dependence, liver cirrhosis.    Hospital Course  In the ER, WBCs were 19.3, sodium is 119, lactic acid was 4.1.  CT renal colic on 2/23/2024 showed free intraperitoneal air consistent with perforation of a hollow viscus.  General surgery were consulted.  Underwent diagnostic laparoscopy with laparoscopic Man patch on 2/23/2024.  Drain was placed.  NG tube was placed.  UGI from 2/26/2024 showed no evidence of leak.    Entresto was resumed on 2/27/2024.     Interval Problem Update  Tolerating liquid diet. Complains of abdominal pian and weakness. PT/OT ordered. Home Bisoprolol resumed, home Spironolactone was resumed at lower dose (25 mg) due to soft BP.     Discussed with General Surgery, plan for drain removal and home discharge on 2/29/2023.    I have discussed this patient's plan of care and discharge plan at IDT rounds today with Case Management, Nursing, Nursing leadership, and other members of the IDT team.    Consultants/Specialty  general surgery    Code Status  Full Code    Disposition  The patient is not medically cleared for discharge to home or a post-acute facility.  Anticipate discharge to: home with close outpatient follow-up    I have placed the appropriate orders for post-discharge needs.    Review of Systems  Review of Systems   Constitutional:  Positive for malaise/fatigue.   HENT: Negative.     Eyes: Negative.    Respiratory: Negative.     Cardiovascular: Negative.    Gastrointestinal:  Positive for abdominal pain.   Musculoskeletal:  Positive for myalgias.   Skin: Negative.    Neurological: Negative.    Endo/Heme/Allergies: Negative.    Psychiatric/Behavioral:  Negative.          Physical Exam  Temp:  [36.8 °C (98.3 °F)-37.3 °C (99.1 °F)] 36.8 °C (98.3 °F)  Pulse:  [] 94  Resp:  [17-18] 18  BP: ()/(52-64) 102/57  SpO2:  [95 %-99 %] 99 %    Physical Exam  Constitutional:       Appearance: She is ill-appearing.   HENT:      Head: Normocephalic.      Nose: Nose normal.      Mouth/Throat:      Mouth: Mucous membranes are moist.   Eyes:      Pupils: Pupils are equal, round, and reactive to light.   Cardiovascular:      Rate and Rhythm: Normal rate.   Pulmonary:      Effort: Pulmonary effort is normal.   Abdominal:      Comments: Surgical drain present serosanguinous fluid   Musculoskeletal:      Right lower leg: No edema.      Left lower leg: No edema.   Skin:     General: Skin is warm.   Neurological:      Mental Status: She is alert and oriented to person, place, and time.         Fluids    Intake/Output Summary (Last 24 hours) at 2/28/2024 1527  Last data filed at 2/28/2024 1200  Gross per 24 hour   Intake 1125.03 ml   Output 1290 ml   Net -164.97 ml       Laboratory  Recent Labs     02/26/24  0250 02/28/24  0104   WBC 8.1 9.1   RBC 3.59* 4.07*   HEMOGLOBIN 10.0* 11.0*   HEMATOCRIT 31.1* 35.1*   MCV 86.6 86.2   MCH 27.9 27.0   MCHC 32.2 31.3*   RDW 85.1* 83.4*   PLATELETCT 218 189   MPV 9.9 9.8     Recent Labs     02/26/24  0250 02/27/24  0311 02/28/24  0104   SODIUM 134* 135 132*   POTASSIUM 3.9 4.0 4.1   CHLORIDE 100 101 99   CO2 21 24 24   GLUCOSE 80 114* 119*   BUN 39* 26* 18   CREATININE 1.01 0.83 0.79   CALCIUM 9.4 9.1 9.1                   Imaging  DX-UPPER GI-SERIES WITH KUB   Final Result      No evidence of contrast leak from the surgical repair site.      DX-CHEST-LIMITED (1 VIEW)   Final Result      1.  Free intraperitoneal air consistent with perforation of hollow viscus      2.  Findings were discussed with JUAN PABLO SCHWARTZ on 2/23/2024 11:42 AM.      CT-RENAL COLIC EVALUATION(A/P W/O)   Final Result      1.  Free intraperitoneal air present consistent  with perforation of a hollow viscus      2.  Specific site of perforation is not evident on CT      3.  Cirrhosis with portal hypertension      4.  Moderate amount of ascites      5.  Cholelithiasis            6.  Findings were discussed with JUAN PABLO SCHWARTZ on 2/23/2024 11:45 AM.           Assessment/Plan    Duodenal ulcer perforation (HCC)- (present on admission)  Assessment & Plan  CT renal colic on 2/23/2024 showed free intraperitoneal air consistent with perforation of a hollow viscus.  General surgery were consulted.  Underwent diagnostic laparoscopy with laparoscopic Man patch on 2/23/2024.  Drain was placed.  NG tube was placed.  UGI from 2/26/2024 showed no evidence of leak.    Acute combined systolic and diastolic heart failure (HCC)- (present on admission)  Assessment & Plan  Continue Entresto, spironolactone and bisoprolol. Follows Dr. Xiong. Recommend to continue outpatient Cardiology follow up    HTN (hypertension), benign- (present on admission)  Assessment & Plan  Patient was on Entresto, bisoprolol, and Spironolactone (50 mg) prior to admission, which were held due to low blood pressure. Entresto was resumed on 2/27/2024. Bisoprolol and Spironolactone (25 mg) resumed today. Monitor closely    Heart murmur  Assessment & Plan  Chronic and stable    Dyslipidemia  Assessment & Plan  Hold statin  Fasting lipid panel    Severe protein-calorie malnutrition (HCC)- (present on admission)  Assessment & Plan  Patient attributes weight loss to stress and poor appetite.  Likely had abdominal pain from gastric ulcer.  Continue to advance diet as tolerated, and nutritional supplements    Anemia due to chronic kidney disease- (present on admission)  Assessment & Plan  Hemoglobin is stable.  S/p surgery.  No signs of acute bleeding.    Liver cirrhosis (HCC)- (present on admission)  Assessment & Plan  Patient has nonalcoholic liver disease.  Monitor    Stage 3b chronic kidney disease (HCC)- (present on  admission)  Assessment & Plan  Creatinine has normalized.  Avoid nephrotoxins, monitor closely         VTE prophylaxis: SCDs

## 2024-02-28 NOTE — CARE PLAN
The patient is Stable - Low risk of patient condition declining or worsening    Shift Goals  Clinical Goals: monitor NICKI drain output, manage pain  Patient Goals: Rest  Family Goals: [place]    Progress made toward(s) clinical / shift goals:    Problem: Pain - Standard  Goal: Alleviation of pain or a reduction in pain to the patient’s comfort goal  Outcome: Progressing     Problem: Fall Risk  Goal: Patient will remain free from falls  Outcome: Progressing     Problem: Hemodynamics  Goal: Patient's hemodynamics, fluid balance and neurologic status will be stable or improve  Outcome: Progressing     Problem: Fluid Volume  Goal: Fluid volume balance will be maintained  Outcome: Progressing

## 2024-02-28 NOTE — PROGRESS NOTES
Telemetry Shift Summary    Rhythm SR  HR Range 87-95  Ectopy R PVC  Measurements 0.14/0.08/0.34      Normal Values  Rhythm SR  HR Range:   Measurements: 0.12-0.20/0.06-0.10/0.30-0.52    [FreeTextEntry1] : HPI: Patient is a 58 year old female with HTN, obesity, BOGDAN, Breast Ca presents for f/u visit to discuss her labs and evaluate her renal function.\par \par Patient reports lots of social issues but denies depression.\par She follows pulmonology, endocrinology(hx of Hashimoto thyroiditis).\par \par Today she reports that most recent labs obtained were significant for drop in eGFR to 30 ml/min after which her diuretics were discontinued by her PCP.   She was started on a trial of traimeterene-HCTZ. She says that every time she is placed on a an attempt to offload LE edema it results in drop in her eGFR. SHe is supposed to get a follow up labs on Dec 8 to see the effect of this change. \par \par Patient also has an appointment scheduled w/ vascular in mid Dec to r/o DVT b/l LE and help her w/ her possible lymphedema. Continues to complaints of chronic back pain in cervical and lumbar area, edema b/l lower Extemities. Denies dysuria, gross hematuria, SOB, CP, cough, expectoration, fever, chills, palpitation, syncope, urgency, hesitancy. \par \par No h/o chronic NSAID use or renal diseases in the family.\par \par ROS: All other systems were reviewed and are negative, except as per HPI.\par \par

## 2024-02-28 NOTE — PROGRESS NOTES
Hospital Medicine Daily Progress Note    Date of Service  2/27/2024    Chief Complaint  Denisse Abel is a 63 y.o. female admitted 2/23/2024 with 48 hours of worsening abdominal pain, nausea, and vomiting.  He has a history of combined systolic/diastolic heart failure, CKD, hypertension, dyslipidemia, prior history of alcohol dependence, liver cirrhosis.    Hospital Course  In the ER, WBCs were 19.3, sodium is 119, lactic acid was 4.1.  CT renal colic on 2/23/2024 showed free intraperitoneal air consistent with perforation of a hollow viscus.  General surgery were consulted.  Underwent diagnostic laparoscopy with laparoscopic Man patch on 2/23/2024.  Drain was placed.  NG tube was placed.  UGI from 2/26/2024 showed no evidence of leak.    Interval Problem Update  Tolerating liquid diet.    Plan for home discharge on 2/28/2023.    I have discussed this patient's plan of care and discharge plan at IDT rounds today with Case Management, Nursing, Nursing leadership, and other members of the IDT team.    Consultants/Specialty  general surgery    Code Status  Full Code    Disposition  The patient is not medically cleared for discharge to home or a post-acute facility.  Anticipate discharge to: home with close outpatient follow-up    I have placed the appropriate orders for post-discharge needs.    Review of Systems  Review of Systems   Constitutional:  Positive for malaise/fatigue.   HENT: Negative.     Eyes: Negative.    Respiratory: Negative.     Cardiovascular: Negative.    Gastrointestinal:  Positive for abdominal pain.   Musculoskeletal:  Positive for myalgias.   Skin: Negative.    Neurological: Negative.    Endo/Heme/Allergies: Negative.    Psychiatric/Behavioral: Negative.          Physical Exam  Temp:  [36.2 °C (97.2 °F)-37.1 °C (98.7 °F)] 36.2 °C (97.2 °F)  Pulse:  [] 88  Resp:  [16-18] 16  BP: (102-126)/(56-72) 105/63  SpO2:  [94 %-100 %] 100 %    Physical Exam  Constitutional:       Appearance:  She is ill-appearing.   HENT:      Head: Normocephalic.      Nose: Nose normal.      Mouth/Throat:      Mouth: Mucous membranes are moist.   Eyes:      Pupils: Pupils are equal, round, and reactive to light.   Cardiovascular:      Rate and Rhythm: Normal rate.   Pulmonary:      Effort: Pulmonary effort is normal.   Abdominal:      Comments: Surgical drain present serosanguinous fluid   Musculoskeletal:      Right lower leg: No edema.      Left lower leg: No edema.   Skin:     General: Skin is warm.   Neurological:      Mental Status: She is alert and oriented to person, place, and time.         Fluids    Intake/Output Summary (Last 24 hours) at 2/27/2024 1626  Last data filed at 2/27/2024 1533  Gross per 24 hour   Intake 320 ml   Output 1575 ml   Net -1255 ml       Laboratory  Recent Labs     02/26/24  0250   WBC 8.1   RBC 3.59*   HEMOGLOBIN 10.0*   HEMATOCRIT 31.1*   MCV 86.6   MCH 27.9   MCHC 32.2   RDW 85.1*   PLATELETCT 218   MPV 9.9     Recent Labs     02/25/24  0512 02/26/24  0250 02/27/24  0311   SODIUM 128* 134* 135   POTASSIUM 4.4 3.9 4.0   CHLORIDE 94* 100 101   CO2 21 21 24   GLUCOSE 104* 80 114*   BUN 56* 39* 26*   CREATININE 1.53* 1.01 0.83   CALCIUM 9.9 9.4 9.1                   Imaging  DX-UPPER GI-SERIES WITH KUB   Final Result      No evidence of contrast leak from the surgical repair site.      DX-CHEST-LIMITED (1 VIEW)   Final Result      1.  Free intraperitoneal air consistent with perforation of hollow viscus      2.  Findings were discussed with JUAN PABLO SCHWARTZ on 2/23/2024 11:42 AM.      CT-RENAL COLIC EVALUATION(A/P W/O)   Final Result      1.  Free intraperitoneal air present consistent with perforation of a hollow viscus      2.  Specific site of perforation is not evident on CT      3.  Cirrhosis with portal hypertension      4.  Moderate amount of ascites      5.  Cholelithiasis            6.  Findings were discussed with JUAN PABLO SCHWARTZ on 2/23/2024 11:45 AM.            Assessment/Plan  Duodenal ulcer perforation (HCC)- (present on admission)  Assessment & Plan  CT renal colic on 2/23/2024 showed free intraperitoneal air consistent with perforation of a hollow viscus.  General surgery were consulted.  Underwent diagnostic laparoscopy with laparoscopic Man patch on 2/23/2024.  Drain was placed.  NG tube was placed.  UGI from 2/26/2024 showed no evidence of leak.    Sepsis (HCC)  Assessment & Plan  This is Sepsis Present on admission. SIRS criteria identified on my evaluation include: Fever, with temperature greater than 100.9 deg F, Tachycardia, with heart rate greater than 90 BPM, and Leukocytosis, with WBC greater than 12,000. Clinical indicators of end organ dysfunction include Lactic Acid greater than 2. Source is perforated intra-abdominal viscus. Sepsis protocol initiated. Crystalloid Fluid Administration: Fluid resuscitation ordered per standard protocol - 30 mL/kg per current or ideal body weight. Continue Ceftriaxone and Flagyl for 1 more day.  Currently afebrile and hemodynamically stable.    Acute combined systolic and diastolic heart failure (HCC)- (present on admission)  Assessment & Plan  On Entresto, spironolactone and bisoprolol at home.  Follows Dr. Xiong.  Will resume Entresto.  Resume bisoprolol as tolerated.    HTN (hypertension), benign- (present on admission)  Assessment & Plan  Patient was on Entresto and bisoprolol prior to admission, held due to low blood pressure.  Resume as tolerated    Heart murmur  Assessment & Plan  Chronic and stable    Dyslipidemia  Assessment & Plan  Hold statin  Fasting lipid panel    Severe protein-calorie malnutrition (HCC)- (present on admission)  Assessment & Plan  Patient attributes weight loss to stress and poor appetite.  Likely had abdominal pain from gastric ulcer.  Continue to advance diet as tolerated, and nutritional supplements    Acute on chronic kidney failure (HCC)- (present on admission)  Assessment &  Plan  Creatinine normalized.  Monitor    Anemia due to chronic kidney disease- (present on admission)  Assessment & Plan  Hemoglobin is stable.  S/p surgery.  No signs of acute bleeding.    Liver cirrhosis (HCC)- (present on admission)  Assessment & Plan  Patient has nonalcoholic liver disease.  Monitor    Hyponatremia- (present on admission)  Assessment & Plan  Sodium is 135 today.  Monitor    Stage 3b chronic kidney disease (HCC)- (present on admission)  Assessment & Plan  Creatinine has normalized.  Avoid nephrotoxins, monitor closely         VTE prophylaxis: SCDs

## 2024-02-28 NOTE — PROGRESS NOTES
"Ms. Denisse Abel is a 63 y.o. female who presented to the hospital with abdominal pain and was found to have a duodenal ulcer perforation. Now s/p lap sharlene patch repair 2/23/2024    S: pain improved. Tolerating a diet. Has not ambulated. Not nauseated.     O:  /57   Pulse 94   Temp 36.8 °C (98.3 °F) (Oral)   Resp 18   Ht 1.702 m (5' 7\")   Wt 51.5 kg (113 lb 8.6 oz)   SpO2 99%   BMI 17.78 kg/m²     GEN: awake, alert oriented  CV: RRR, brisk cap refill on hands  RESP: no labored breathing  ABD: soft, appropriately tender, drain with serous output, incisions without erythema or drainage.    Recent Labs     02/26/24  0250 02/28/24  0104   WBC 8.1 9.1   RBC 3.59* 4.07*   HEMOGLOBIN 10.0* 11.0*   HEMATOCRIT 31.1* 35.1*   MCV 86.6 86.2   MCH 27.9 27.0   RDW 85.1* 83.4*   PLATELETCT 218 189   MPV 9.9 9.8   NEUTSPOLYS 77.50*  --    LYMPHOCYTES 12.30*  --    MONOCYTES 9.50  --    EOSINOPHILS 0.20  --    BASOPHILS 0.00  --      Recent Labs     02/26/24  0250 02/27/24  0311 02/28/24  0104   SODIUM 134* 135 132*   POTASSIUM 3.9 4.0 4.1   CHLORIDE 100 101 99   CO2 21 24 24   GLUCOSE 80 114* 119*   BUN 39* 26* 18     INR   Date Value Ref Range Status   02/23/2024 1.36 (H) 0.87 - 1.13 Final     Comment:     Reference range:  INR - Non-therapeutic Reference Range: 0.87-1.13  INR - Therapeutic Reference Range: 2.0-4.0         Imaging:    A/P:   Ms. Denisse Abel is a 63 y.o. female who presented to the hospital with abdominal pain and was found to have a duodenal ulcer perforation. Now s/p lap sharlene patch repair 2/23/2024    UGI without leak. Tolerating a diet without pain, change in drain output, or bump in WBC count. GI soft diet today. Potential home tomorrow. Replace drain output with LR to prevent dehydration. Plan for drain removal prior to discharge. Patient does not feel steady and feels weak. Plan for PT today and possible discharge tomorrow.    Toni Jay MD  Thoracic & General " Surgeon  [facility]  [phone]

## 2024-02-28 NOTE — DISCHARGE PLANNING
Case Management Discharge Planning    Admission Date: 2/23/2024  GMLOS: 5.1  ALOS: 5    6-Clicks ADL Score: 20  6-Clicks Mobility Score: 19      Anticipated Discharge Dispo: Discharge Disposition: D/T to home under HHA care in anticipation of covered skilled care (06)  Discharge Address: [address] [place] [address] [address] [place] [address]    Escalations Completed: None    Medically Clear: No    Next Steps: SW received update from MD that pt is requesting placement now, as her friends won't be able to help her at home anymore. PT pending re-evaluation. Pt might not meet SNF requirements for therapies. SW requested DPA to send blanket local SNF referrals, although pt's preferences are [facility].     Barriers to Discharge: Medical clearance and Pending Placement    Is the patient up for discharge tomorrow: Yes    Is transport arranged for discharge disposition: No

## 2024-02-29 LAB
ANION GAP SERPL CALC-SCNC: 10 MMOL/L (ref 7–16)
BUN SERPL-MCNC: 15 MG/DL (ref 8–22)
CALCIUM SERPL-MCNC: 8.4 MG/DL (ref 8.4–10.2)
CHLORIDE SERPL-SCNC: 95 MMOL/L (ref 96–112)
CO2 SERPL-SCNC: 23 MMOL/L (ref 20–33)
CREAT SERPL-MCNC: 0.65 MG/DL (ref 0.5–1.4)
ERYTHROCYTE [DISTWIDTH] IN BLOOD BY AUTOMATED COUNT: 79.8 FL (ref 35.9–50)
GFR SERPLBLD CREATININE-BSD FMLA CKD-EPI: 97 ML/MIN/1.73 M 2
GLUCOSE SERPL-MCNC: 113 MG/DL (ref 65–99)
HCT VFR BLD AUTO: 33.1 % (ref 37–47)
HGB BLD-MCNC: 10.9 G/DL (ref 12–16)
MAGNESIUM SERPL-MCNC: 1.9 MG/DL (ref 1.5–2.5)
MCH RBC QN AUTO: 27.7 PG (ref 27–33)
MCHC RBC AUTO-ENTMCNC: 32.9 G/DL (ref 32.2–35.5)
MCV RBC AUTO: 84.2 FL (ref 81.4–97.8)
PLATELET # BLD AUTO: 209 K/UL (ref 164–446)
PMV BLD AUTO: 9.9 FL (ref 9–12.9)
POTASSIUM SERPL-SCNC: 3.9 MMOL/L (ref 3.6–5.5)
RBC # BLD AUTO: 3.93 M/UL (ref 4.2–5.4)
SODIUM SERPL-SCNC: 128 MMOL/L (ref 135–145)
WBC # BLD AUTO: 12.1 K/UL (ref 4.8–10.8)

## 2024-02-29 PROCEDURE — A9270 NON-COVERED ITEM OR SERVICE: HCPCS | Performed by: INTERNAL MEDICINE

## 2024-02-29 PROCEDURE — 700105 HCHG RX REV CODE 258: Performed by: INTERNAL MEDICINE

## 2024-02-29 PROCEDURE — 700102 HCHG RX REV CODE 250 W/ 637 OVERRIDE(OP): Performed by: INTERNAL MEDICINE

## 2024-02-29 PROCEDURE — 770020 HCHG ROOM/CARE - TELE (206)

## 2024-02-29 PROCEDURE — 83735 ASSAY OF MAGNESIUM: CPT

## 2024-02-29 PROCEDURE — 36415 COLL VENOUS BLD VENIPUNCTURE: CPT

## 2024-02-29 PROCEDURE — 700111 HCHG RX REV CODE 636 W/ 250 OVERRIDE (IP): Mod: JZ | Performed by: INTERNAL MEDICINE

## 2024-02-29 PROCEDURE — 80048 BASIC METABOLIC PNL TOTAL CA: CPT

## 2024-02-29 PROCEDURE — 99232 SBSQ HOSP IP/OBS MODERATE 35: CPT | Performed by: INTERNAL MEDICINE

## 2024-02-29 PROCEDURE — 94760 N-INVAS EAR/PLS OXIMETRY 1: CPT

## 2024-02-29 PROCEDURE — 85027 COMPLETE CBC AUTOMATED: CPT

## 2024-02-29 RX ORDER — OXYCODONE HYDROCHLORIDE 5 MG/1
5 TABLET ORAL EVERY 4 HOURS PRN
Status: DISCONTINUED | OUTPATIENT
Start: 2024-02-29 | End: 2024-02-29

## 2024-02-29 RX ORDER — OXYCODONE HYDROCHLORIDE 5 MG/1
5 TABLET ORAL EVERY 4 HOURS PRN
Status: DISCONTINUED | OUTPATIENT
Start: 2024-02-29 | End: 2024-03-04 | Stop reason: HOSPADM

## 2024-02-29 RX ORDER — FAMOTIDINE 20 MG/1
20 TABLET, FILM COATED ORAL 2 TIMES DAILY
Status: DISCONTINUED | OUTPATIENT
Start: 2024-02-29 | End: 2024-03-04 | Stop reason: HOSPADM

## 2024-02-29 RX ORDER — HYDROMORPHONE HYDROCHLORIDE 1 MG/ML
1 INJECTION, SOLUTION INTRAMUSCULAR; INTRAVENOUS; SUBCUTANEOUS
Status: DISCONTINUED | OUTPATIENT
Start: 2024-02-29 | End: 2024-03-03

## 2024-02-29 RX ORDER — HYDROMORPHONE HYDROCHLORIDE 1 MG/ML
0.5 INJECTION, SOLUTION INTRAMUSCULAR; INTRAVENOUS; SUBCUTANEOUS
Status: DISCONTINUED | OUTPATIENT
Start: 2024-02-29 | End: 2024-03-03

## 2024-02-29 RX ADMIN — SODIUM CHLORIDE, POTASSIUM CHLORIDE, SODIUM LACTATE AND CALCIUM CHLORIDE: 600; 310; 30; 20 INJECTION, SOLUTION INTRAVENOUS at 12:02

## 2024-02-29 RX ADMIN — DIBASIC SODIUM PHOSPHATE, MONOBASIC POTASSIUM PHOSPHATE AND MONOBASIC SODIUM PHOSPHATE 250 MG: 852; 155; 130 TABLET ORAL at 17:15

## 2024-02-29 RX ADMIN — DIBASIC SODIUM PHOSPHATE, MONOBASIC POTASSIUM PHOSPHATE AND MONOBASIC SODIUM PHOSPHATE 250 MG: 852; 155; 130 TABLET ORAL at 11:40

## 2024-02-29 RX ADMIN — HYDROMORPHONE HYDROCHLORIDE 1 MG: 1 INJECTION, SOLUTION INTRAMUSCULAR; INTRAVENOUS; SUBCUTANEOUS at 03:28

## 2024-02-29 RX ADMIN — FAMOTIDINE 20 MG: 20 TABLET, FILM COATED ORAL at 17:15

## 2024-02-29 RX ADMIN — HYDROMORPHONE HYDROCHLORIDE 1 MG: 1 INJECTION, SOLUTION INTRAMUSCULAR; INTRAVENOUS; SUBCUTANEOUS at 15:38

## 2024-02-29 RX ADMIN — DIBASIC SODIUM PHOSPHATE, MONOBASIC POTASSIUM PHOSPHATE AND MONOBASIC SODIUM PHOSPHATE 250 MG: 852; 155; 130 TABLET ORAL at 04:44

## 2024-02-29 RX ADMIN — OXYCODONE HYDROCHLORIDE 5 MG: 5 TABLET ORAL at 22:50

## 2024-02-29 ASSESSMENT — COGNITIVE AND FUNCTIONAL STATUS - GENERAL
CLIMB 3 TO 5 STEPS WITH RAILING: A LITTLE
MOVING FROM LYING ON BACK TO SITTING ON SIDE OF FLAT BED: A LITTLE
EATING MEALS: A LITTLE
SUGGESTED CMS G CODE MODIFIER DAILY ACTIVITY: CK
STANDING UP FROM CHAIR USING ARMS: A LITTLE
DRESSING REGULAR UPPER BODY CLOTHING: A LITTLE
DAILY ACTIVITIY SCORE: 18
TURNING FROM BACK TO SIDE WHILE IN FLAT BAD: A LITTLE
MOVING TO AND FROM BED TO CHAIR: A LITTLE
HELP NEEDED FOR BATHING: A LITTLE
WALKING IN HOSPITAL ROOM: A LITTLE
SUGGESTED CMS G CODE MODIFIER MOBILITY: CK
TOILETING: A LITTLE
MOBILITY SCORE: 18
DRESSING REGULAR LOWER BODY CLOTHING: A LITTLE
PERSONAL GROOMING: A LITTLE

## 2024-02-29 ASSESSMENT — ENCOUNTER SYMPTOMS
MYALGIAS: 1
RESPIRATORY NEGATIVE: 1
CARDIOVASCULAR NEGATIVE: 1
NEUROLOGICAL NEGATIVE: 1
EYES NEGATIVE: 1
ABDOMINAL PAIN: 1
PSYCHIATRIC NEGATIVE: 1

## 2024-02-29 ASSESSMENT — FIBROSIS 4 INDEX: FIB4 SCORE: 1.61

## 2024-02-29 ASSESSMENT — PAIN DESCRIPTION - PAIN TYPE
TYPE: ACUTE PAIN
TYPE: ACUTE PAIN;SURGICAL PAIN
TYPE: ACUTE PAIN;SURGICAL PAIN

## 2024-02-29 NOTE — CARE PLAN
The patient is Watcher - Medium risk of patient condition declining or worsening    Shift Goals  Clinical Goals: Stable VS, monitor NICKI drain  Patient Goals: Pain Control, Rest  Family Goals: UT    Progress made toward(s) clinical / shift goals:    Problem: Communication  Goal: The ability to communicate needs accurately and effectively will improve  Description: Target End Date:  End of day 1    1.  Assess ability to communicate and understand  2.  Provide augmentative or alternative methods of communication devices  3.  Use /language line as appropriate  4.  Collaborate with Speech Therapy as needed  Outcome: Progressing  Note: Plan of Care discussed, all questions answered. Pt effectively communicates needs to staff.      Problem: Discharge Barriers/Planning  Goal: Patient's continuum of care needs are met  Description: Target End Date:  Prior to discharge or change in level of care    1.  Identify potential discharge barriers on admission and throughout hospitalization  2.  Collaborate with Case Management, , Clinical Educators, Navigators and others on the transitional care team to meet discharge needs  3.  Involve patient/family/caregivers in setting and prioritizing goals for hospitalization and discharge  4.  Ensure Flu vaccinations are addressed  5.  Inquire if patient is interested in the Meds to Bed program  6.  Ensure patient and family/caregiver are able to demonstrate use of equipment as prescribed  7.  Ensure patient and family/caregiver can verbalize understanding of patient education  8.  Explain discharge instructions and medication reconciliation to patient and family/caregiver  Outcome: Progressing  Note: CM actively working on placement. Pt still needs to work with PT. Drain output is high-discussed in rounds with Dr. Rivera.        Patient is not progressing towards the following goals:

## 2024-02-29 NOTE — PROGRESS NOTES
Pt BP soft-see flowsheet. Rechecked with pediatric cuff. Pt asymptomatic. Pt receiving IVF maintenance fluids at 83 ml/hr, IV patent. Dr. Rivera notified via voalte at 1322.

## 2024-02-29 NOTE — PROGRESS NOTES
Bedside report received from off going RN: Adriana, assumed care of patient.     Fall Risk Score: MODERATE RISK  Fall risk interventions in place: Provide patient/family education based on risk assessment, Educate patient/family to call staff for assistance when getting out of bed, Place fall precaution signage outside patient door, and Utilize bed/chair fall alarm  Bed type: Regular (John Score less than 17 interventions in place)  Patient on cardiac monitor: Yes  IVF/IV medications: Maintenance fluids-See MAR  Oxygen: Room Air  Bedside sitter: Not Applicable   Isolation: Not applicable

## 2024-02-29 NOTE — DIETARY
Nutrition Update:    Day 6 of admit.  Denisse Abel is a 63 y.o. female with admitting DX of Acute abdomen [R10.0].  Patient being followed to optimize nutrition.    Current Diet: low fiber (GI soft)    Diet advanced. PO fair at 25-50%. Ensure supplements added TID w/ meals.     Problem: Nutritional:  Goal: Achieve adequate nutritional intake  Description: Patient will consume >50% of meals  Outcome: progressing    RD following.

## 2024-02-29 NOTE — CARE PLAN
The patient is Watcher - Medium risk of patient condition declining or worsening    Shift Goals  Clinical Goals: monitor NICKI drain output, manage pain  Patient Goals: rest  Family Goals: UT    Progress made toward(s) clinical / shift goals:  pt. BP low, but has been low.  Pt. Reported pain this evening well controlled after pain meds administered.  Pt. Able to go to and from bathroom with some assistance pt. Is weak.      Problem: Pain - Standard  Goal: Alleviation of pain or a reduction in pain to the patient’s comfort goal  Outcome: Progressing     Problem: Fall Risk  Goal: Patient will remain free from falls  Outcome: Progressing     Problem: Hemodynamics  Goal: Patient's hemodynamics, fluid balance and neurologic status will be stable or improve  Outcome: Progressing     Problem: Fluid Volume  Goal: Fluid volume balance will be maintained  Outcome: Progressing     Problem: Urinary - Renal Perfusion  Goal: Ability to achieve and maintain adequate renal perfusion and functioning will improve  Outcome: Progressing     Problem: Respiratory  Goal: Patient will achieve/maintain optimum respiratory ventilation and gas exchange  Outcome: Progressing     Problem: Physical Regulation  Goal: Diagnostic test results will improve  Outcome: Progressing  Goal: Signs and symptoms of infection will decrease  Outcome: Progressing     Problem: Knowledge Deficit - Standard  Goal: Patient and family/care givers will demonstrate understanding of plan of care, disease process/condition, diagnostic tests and medications  Outcome: Progressing     Problem: Skin Integrity  Goal: Skin integrity is maintained or improved  Outcome: Progressing

## 2024-02-29 NOTE — PROGRESS NOTES
Telemetry Summary     Rhythm: SR  Rate: 68-83  Ectopy: n/a  Measurements: .14/.08/.40    Normal Values   Rhythm: SR  HR Range:   Measurement: 0.12-0.20/0.06-0.10/0.30-0.52

## 2024-02-29 NOTE — DISCHARGE PLANNING
Case Management Discharge Planning    Admission Date: 2/23/2024  GMLOS: 5.1  ALOS: 6    6-Clicks ADL Score: 18  6-Clicks Mobility Score: 18      Anticipated Discharge Dispo: Discharge Disposition: D/T to home under A care in anticipation of covered skilled care (06)  Discharge Address: [id number] [place] [address] [address] [place] [address]    DME Needed: No    Action(s) Taken: OTHER  This RN CM talked to patient and daughter at bedside and discussed that patient has an open workmens comp case on file. Updated patient and daughter that the patient needs to close workmens comp case for patient to be accepted at [facility].      1145 rounded back with patient and she has started the process of closing workmens comp. Patient was given a number to call [facility] [phone] will call [facility] to give number to confirm information.     1159 This RN CM called admissions to update that patient is working on closing her workmen's comp case. Attempted to call and leave VM unable to leave message. Will call back later today.    1444: called back admissions unable to reach admissions but left VM with call back information.     Escalations Completed: None    Medically Clear: No    Next Steps: follow up with patient and [facility] after workmens comp case is closed    Barriers to Discharge: Medical clearance and Pending Placement    Is the patient up for discharge tomorrow: No

## 2024-02-29 NOTE — WOUND TEAM
[facility]  Inpatient Services  Wound and Skin Care Brief Evaluation    Admission Date: 2/23/2024     Last order of IP CONSULT TO WOUND CARE was found on 2/28/2024 from Hospital Encounter on 2/23/2024     HPI, PMH, SH: Reviewed    Chief Complaint   Patient presents with    Abdominal Pain     crampy    N/V     X 2 days     Diagnosis: Acute abdomen [R10.0]    Unit where seen by Wound Team: 3302/02     Wound consult placed regarding sacrum. Chart and images reviewed. This discussed with bedside SILVIA Dodd. This clinician in to assess patient. Patient pleasant and agreeable. She turned self to R side. Assessed skin and applied sacral offloading drsg. There is redness over coccyx that has pain with palpation, but it blanches. Non-selectively debrided with Moist warm washcloth.   Discussed with pt need to move self to try to prevent a pressure injury and that the area that has pain is at a greater risk. She stated understanding.     No pressure injuries or advanced wound care needs identified. Wound consult completed. No further follow up unless indicated and consulted.     Wound 02/27/24 Sacrum red/pink blanching painful tissue (Active)   Date First Assessed/Time First Assessed: 02/27/24 2330   Location: Sacrum  Wound Description (Comments): red/pink blanching painful tissue      Assessments 2/28/2024  2:00 PM   Wound Image     Site Assessment Red;Pink;Painful   Periwound Assessment Intact   Margins Undefined edges   Closure Open to air   Drainage Amount None   Treatments Cleansed   Wound Cleansing Soap and Water   Periwound Protectant Not Applicable   Dressing Status Intact   Dressing Changed New   Dressing Cleansing/Solutions Not Applicable   Dressing Options Offloading Dressing - Sacral   Dressing Change/Treatment Frequency Every 72 hrs, and As Needed   NEXT Dressing Change/Treatment Date 03/02/24   NEXT Weekly Photo (Inpatient Only) 03/06/24   Wound Team Following Not following   Non-staged Wound  Description Not applicable       PREVENTATIVE INTERVENTIONS:    Q shift John - performed per nursing policy  Q shift pressure point assessments - performed per nursing policy    Surface/Positioning  Standard/trauma mattress - Currently in Place  Reposition q 2 hours - Currently in Place    Offloading/Redistribution  Sacral offloading dressing (Silicone dressing) - Applied this Visit      Containment/Moisture Prevention    Dri-juan pad - Currently in Place  bedpan - Currently in Place

## 2024-03-01 ENCOUNTER — APPOINTMENT (OUTPATIENT)
Dept: RADIOLOGY | Facility: MEDICAL CENTER | Age: 64
DRG: 853 | End: 2024-03-01
Attending: INTERNAL MEDICINE
Payer: MEDICARE

## 2024-03-01 LAB
ALBUMIN SERPL BCP-MCNC: 1.9 G/DL (ref 3.2–4.9)
ALBUMIN/GLOB SERPL: 0.6 G/DL
ALP SERPL-CCNC: 112 U/L (ref 30–99)
ALT SERPL-CCNC: <5 U/L (ref 2–50)
ANION GAP SERPL CALC-SCNC: 10 MMOL/L (ref 7–16)
APPEARANCE UR: ABNORMAL
AST SERPL-CCNC: 22 U/L (ref 12–45)
BACTERIA #/AREA URNS HPF: ABNORMAL /HPF
BILIRUB SERPL-MCNC: 0.7 MG/DL (ref 0.1–1.5)
BILIRUB UR QL STRIP.AUTO: ABNORMAL
BUN SERPL-MCNC: 17 MG/DL (ref 8–22)
CALCIUM ALBUM COR SERPL-MCNC: 9.6 MG/DL (ref 8.5–10.5)
CALCIUM SERPL-MCNC: 7.9 MG/DL (ref 8.4–10.2)
CHLORIDE SERPL-SCNC: 93 MMOL/L (ref 96–112)
CO2 SERPL-SCNC: 21 MMOL/L (ref 20–33)
COLOR UR: ABNORMAL
CREAT SERPL-MCNC: 0.76 MG/DL (ref 0.5–1.4)
EPI CELLS #/AREA URNS HPF: ABNORMAL /HPF
ERYTHROCYTE [DISTWIDTH] IN BLOOD BY AUTOMATED COUNT: 81.4 FL (ref 35.9–50)
GFR SERPLBLD CREATININE-BSD FMLA CKD-EPI: 87 ML/MIN/1.73 M 2
GLOBULIN SER CALC-MCNC: 3.2 G/DL (ref 1.9–3.5)
GLUCOSE SERPL-MCNC: 118 MG/DL (ref 65–99)
GLUCOSE UR STRIP.AUTO-MCNC: 250 MG/DL
GRAN CASTS #/AREA URNS LPF: ABNORMAL /LPF
HCT VFR BLD AUTO: 32.6 % (ref 37–47)
HGB BLD-MCNC: 10.5 G/DL (ref 12–16)
HYALINE CASTS #/AREA URNS LPF: ABNORMAL /LPF
KETONES UR STRIP.AUTO-MCNC: NEGATIVE MG/DL
LACTATE SERPL-SCNC: 1.6 MMOL/L (ref 0.5–2)
LACTATE SERPL-SCNC: 2.9 MMOL/L (ref 0.5–2)
LACTATE SERPL-SCNC: 3 MMOL/L (ref 0.5–2)
LEUKOCYTE ESTERASE UR QL STRIP.AUTO: ABNORMAL
MCH RBC QN AUTO: 27.6 PG (ref 27–33)
MCHC RBC AUTO-ENTMCNC: 32.2 G/DL (ref 32.2–35.5)
MCV RBC AUTO: 85.6 FL (ref 81.4–97.8)
MICRO URNS: ABNORMAL
NITRITE UR QL STRIP.AUTO: NEGATIVE
PH UR STRIP.AUTO: 5.5 [PH] (ref 5–8)
PLATELET # BLD AUTO: 177 K/UL (ref 164–446)
PMV BLD AUTO: 10.1 FL (ref 9–12.9)
POTASSIUM SERPL-SCNC: 4.6 MMOL/L (ref 3.6–5.5)
PROT SERPL-MCNC: 5.1 G/DL (ref 6–8.2)
PROT UR QL STRIP: NEGATIVE MG/DL
RBC # BLD AUTO: 3.81 M/UL (ref 4.2–5.4)
RBC # URNS HPF: ABNORMAL /HPF
RBC UR QL AUTO: NEGATIVE
SODIUM SERPL-SCNC: 124 MMOL/L (ref 135–145)
SP GR UR STRIP.AUTO: 1.02
WBC # BLD AUTO: 14 K/UL (ref 4.8–10.8)
WBC #/AREA URNS HPF: ABNORMAL /HPF

## 2024-03-01 PROCEDURE — 99233 SBSQ HOSP IP/OBS HIGH 50: CPT | Performed by: INTERNAL MEDICINE

## 2024-03-01 PROCEDURE — 97535 SELF CARE MNGMENT TRAINING: CPT

## 2024-03-01 PROCEDURE — 85027 COMPLETE CBC AUTOMATED: CPT

## 2024-03-01 PROCEDURE — 74177 CT ABD & PELVIS W/CONTRAST: CPT

## 2024-03-01 PROCEDURE — 700105 HCHG RX REV CODE 258: Performed by: INTERNAL MEDICINE

## 2024-03-01 PROCEDURE — 700102 HCHG RX REV CODE 250 W/ 637 OVERRIDE(OP): Performed by: INTERNAL MEDICINE

## 2024-03-01 PROCEDURE — 87040 BLOOD CULTURE FOR BACTERIA: CPT

## 2024-03-01 PROCEDURE — 700111 HCHG RX REV CODE 636 W/ 250 OVERRIDE (IP): Mod: JZ | Performed by: INTERNAL MEDICINE

## 2024-03-01 PROCEDURE — 36415 COLL VENOUS BLD VENIPUNCTURE: CPT

## 2024-03-01 PROCEDURE — 770020 HCHG ROOM/CARE - TELE (206)

## 2024-03-01 PROCEDURE — A9270 NON-COVERED ITEM OR SERVICE: HCPCS | Performed by: INTERNAL MEDICINE

## 2024-03-01 PROCEDURE — 71045 X-RAY EXAM CHEST 1 VIEW: CPT

## 2024-03-01 PROCEDURE — 700117 HCHG RX CONTRAST REV CODE 255: Performed by: INTERNAL MEDICINE

## 2024-03-01 PROCEDURE — 81001 URINALYSIS AUTO W/SCOPE: CPT

## 2024-03-01 PROCEDURE — 80053 COMPREHEN METABOLIC PANEL: CPT

## 2024-03-01 PROCEDURE — 700105 HCHG RX REV CODE 258: Performed by: EMERGENCY MEDICINE

## 2024-03-01 PROCEDURE — 97163 PT EVAL HIGH COMPLEX 45 MIN: CPT

## 2024-03-01 PROCEDURE — 94760 N-INVAS EAR/PLS OXIMETRY 1: CPT

## 2024-03-01 PROCEDURE — 83605 ASSAY OF LACTIC ACID: CPT | Mod: 91

## 2024-03-01 PROCEDURE — 87086 URINE CULTURE/COLONY COUNT: CPT

## 2024-03-01 RX ORDER — METRONIDAZOLE 500 MG/100ML
500 INJECTION, SOLUTION INTRAVENOUS EVERY 12 HOURS
Status: DISCONTINUED | OUTPATIENT
Start: 2024-03-01 | End: 2024-03-02

## 2024-03-01 RX ORDER — SODIUM CHLORIDE 9 MG/ML
INJECTION, SOLUTION INTRAVENOUS CONTINUOUS
Status: DISCONTINUED | OUTPATIENT
Start: 2024-03-01 | End: 2024-03-02

## 2024-03-01 RX ORDER — SODIUM CHLORIDE 9 MG/ML
1000 INJECTION, SOLUTION INTRAVENOUS ONCE
Status: COMPLETED | OUTPATIENT
Start: 2024-03-01 | End: 2024-03-01

## 2024-03-01 RX ADMIN — OXYCODONE HYDROCHLORIDE 5 MG: 5 TABLET ORAL at 18:45

## 2024-03-01 RX ADMIN — IOHEXOL 75 ML: 350 INJECTION, SOLUTION INTRAVENOUS at 14:24

## 2024-03-01 RX ADMIN — FAMOTIDINE 20 MG: 20 TABLET, FILM COATED ORAL at 05:22

## 2024-03-01 RX ADMIN — IOHEXOL 25 ML: 240 INJECTION, SOLUTION INTRATHECAL; INTRAVASCULAR; INTRAVENOUS; ORAL at 14:24

## 2024-03-01 RX ADMIN — DIBASIC SODIUM PHOSPHATE, MONOBASIC POTASSIUM PHOSPHATE AND MONOBASIC SODIUM PHOSPHATE 250 MG: 852; 155; 130 TABLET ORAL at 05:22

## 2024-03-01 RX ADMIN — HYDROMORPHONE HYDROCHLORIDE 1 MG: 1 INJECTION, SOLUTION INTRAMUSCULAR; INTRAVENOUS; SUBCUTANEOUS at 20:46

## 2024-03-01 RX ADMIN — FAMOTIDINE 20 MG: 20 TABLET, FILM COATED ORAL at 17:49

## 2024-03-01 RX ADMIN — SODIUM CHLORIDE, POTASSIUM CHLORIDE, SODIUM LACTATE AND CALCIUM CHLORIDE 500 ML: 600; 310; 30; 20 INJECTION, SOLUTION INTRAVENOUS at 04:41

## 2024-03-01 RX ADMIN — METRONIDAZOLE 500 MG: 5 INJECTION, SOLUTION INTRAVENOUS at 17:48

## 2024-03-01 RX ADMIN — DIBASIC SODIUM PHOSPHATE, MONOBASIC POTASSIUM PHOSPHATE AND MONOBASIC SODIUM PHOSPHATE 250 MG: 852; 155; 130 TABLET ORAL at 11:19

## 2024-03-01 RX ADMIN — SODIUM CHLORIDE: 9 INJECTION, SOLUTION INTRAVENOUS at 13:29

## 2024-03-01 RX ADMIN — SODIUM CHLORIDE 1000 ML: 9 INJECTION, SOLUTION INTRAVENOUS at 12:26

## 2024-03-01 RX ADMIN — DIBASIC SODIUM PHOSPHATE, MONOBASIC POTASSIUM PHOSPHATE AND MONOBASIC SODIUM PHOSPHATE 250 MG: 852; 155; 130 TABLET ORAL at 17:49

## 2024-03-01 RX ADMIN — OXYCODONE HYDROCHLORIDE 5 MG: 5 TABLET ORAL at 23:21

## 2024-03-01 RX ADMIN — OXYCODONE HYDROCHLORIDE 5 MG: 5 TABLET ORAL at 03:01

## 2024-03-01 RX ADMIN — CEFTRIAXONE SODIUM 2000 MG: 2 INJECTION, POWDER, FOR SOLUTION INTRAMUSCULAR; INTRAVENOUS at 13:30

## 2024-03-01 RX ADMIN — DIBASIC SODIUM PHOSPHATE, MONOBASIC POTASSIUM PHOSPHATE AND MONOBASIC SODIUM PHOSPHATE 250 MG: 852; 155; 130 TABLET ORAL at 23:22

## 2024-03-01 RX ADMIN — DIBASIC SODIUM PHOSPHATE, MONOBASIC POTASSIUM PHOSPHATE AND MONOBASIC SODIUM PHOSPHATE 250 MG: 852; 155; 130 TABLET ORAL at 00:26

## 2024-03-01 RX ADMIN — SACUBITRIL AND VALSARTAN 1 TABLET: 24; 26 TABLET, FILM COATED ORAL at 17:49

## 2024-03-01 ASSESSMENT — COGNITIVE AND FUNCTIONAL STATUS - GENERAL
SUGGESTED CMS G CODE MODIFIER MOBILITY: CK
MOVING FROM LYING ON BACK TO SITTING ON SIDE OF FLAT BED: A LITTLE
DRESSING REGULAR UPPER BODY CLOTHING: A LITTLE
SUGGESTED CMS G CODE MODIFIER DAILY ACTIVITY: CK
CLIMB 3 TO 5 STEPS WITH RAILING: A LITTLE
TURNING FROM BACK TO SIDE WHILE IN FLAT BAD: A LITTLE
MOBILITY SCORE: 18
WALKING IN HOSPITAL ROOM: A LITTLE
MOVING TO AND FROM BED TO CHAIR: A LITTLE
DAILY ACTIVITIY SCORE: 19
DRESSING REGULAR LOWER BODY CLOTHING: A LITTLE
TOILETING: A LITTLE
HELP NEEDED FOR BATHING: A LITTLE
STANDING UP FROM CHAIR USING ARMS: A LITTLE
PERSONAL GROOMING: A LITTLE

## 2024-03-01 ASSESSMENT — GAIT ASSESSMENTS
ASSISTIVE DEVICE: FRONT WHEEL WALKER
DEVIATION: BRADYKINETIC;DECREASED HEEL STRIKE;DECREASED TOE OFF
GAIT LEVEL OF ASSIST: SUPERVISED
DISTANCE (FEET): 120

## 2024-03-01 ASSESSMENT — ENCOUNTER SYMPTOMS
MYALGIAS: 1
EYES NEGATIVE: 1
NEUROLOGICAL NEGATIVE: 1
CARDIOVASCULAR NEGATIVE: 1
RESPIRATORY NEGATIVE: 1
PSYCHIATRIC NEGATIVE: 1
ABDOMINAL PAIN: 1

## 2024-03-01 ASSESSMENT — PAIN DESCRIPTION - PAIN TYPE
TYPE: ACUTE PAIN

## 2024-03-01 ASSESSMENT — FIBROSIS 4 INDEX: FIB4 SCORE: 3.81

## 2024-03-01 NOTE — PROGRESS NOTES
Telemetry Summary     Rhythm: SR-ST  Rate:   Ectopy: r-oPVC, r-oPAC  Measurements: .16/.08/.40    Normal Values   Rhythm: SR  HR Range:   Measurement: 0.12-0.20/0.06-0.10/0.30-0.52

## 2024-03-01 NOTE — THERAPY
Occupational Therapy  Daily Treatment     Patient Name: Denisse Abel  Age:  63 y.o., Sex:  female  Medical Record #: [id number]  Today's Date: 3/1/2024     Precautions  Precautions: (P) Fall Risk  Comments: (P) NICKI drain    Assessment    OT tx emphasis on bed mobility, ADL's, transfers and FWW - see notes below for details.  Therapist reviewed environmental/safety awareness, fall precautions, AE/DME, ADL's and transfers.    Plan    Treatment Plan Status: (P) Continue Current Treatment Plan  Type of Treatment: Self Care / Activities of Daily Living, Adaptive Equipment, Neuro Re-Education / Balance, Therapeutic Exercises, Therapeutic Activity  Treatment Frequency: 3 Times per Week  Treatment Duration: Until Therapy Goals Met    DC Equipment Recommendations: (P) Unable to determine at this time  Discharge Recommendations: (P) Recommend post-acute placement for additional occupational therapy services prior to discharge home    Subjective    Pt was alert and cooperative w/ tx.     Objective       03/01/24 1041    Services   Is patient using  services for this encounter? No   Precautions   Precautions Fall Risk   Comments NICKI drain   Pain   Intervention Declines   Non Verbal Descriptors   Non Verbal Scale  Calm;Unlabored Breathing   Cognition    Cognition / Consciousness WDL   Level of Consciousness Alert   Balance   Sitting Balance (Static) Good   Sitting Balance (Dynamic) Good   Standing Balance (Static) Fair +   Standing Balance (Dynamic) Fair   Weight Shift Sitting Good   Weight Shift Standing Fair   Bed Mobility    Supine to Sit Supervised   Sit to Supine Supervised   Activities of Daily Living   Grooming Standby Assist;Standing   Upper Body Dressing Minimal Assist   Lower Body Dressing Minimal Assist   Toileting Supervision   How much help from another person does the patient currently need...   Putting on and taking off regular lower body clothing? 3   Bathing (including washing, rinsing,  and drying)? 3   Toileting, which includes using a toilet, bedpan, or urinal? 3   Putting on and taking off regular upper body clothing? 3   Taking care of personal grooming such as brushing teeth? 3   Eating meals? 4   6 Clicks Daily Activity Score 19   Functional Mobility   Sit to Stand Standby Assist   Bed, Chair, Wheelchair Transfer Standby Assist   Toilet Transfers Standby Assist   Transfer Method Stand Step   Mobility SBA FWW   Short Term Goals   Short Term Goal # 1 Mod I bed mobility (to/from supine and EOB sitting)   Goal Outcome # 1 Progressing as expected   Short Term Goal # 2 Sup for LB clothing management via AE/DME PRN   Goal Outcome # 2 Progressing as expected   Short Term Goal # 3 Mod I UB clothing management   Goal Outcome # 3 Progressing as expected   Short Term Goal # 4 Mod I toilet transfer via DME   Goal Outcome # 4 Progressing as expected   Occupational Therapy Treatment Plan    O.T. Treatment Plan Continue Current Treatment Plan   Anticipated Discharge Equipment and Recommendations   DC Equipment Recommendations Unable to determine at this time   Discharge Recommendations Recommend post-acute placement for additional occupational therapy services prior to discharge home

## 2024-03-01 NOTE — PROGRESS NOTES
Hospital Medicine Daily Progress Note    Date of Service  3/1/2024    Chief Complaint  Denisse Abel is a 63 y.o. female admitted 2/23/2024 with 48 hours of worsening abdominal pain, nausea, and vomiting.  He has a history of combined systolic/diastolic heart failure, CKD, hypertension, dyslipidemia, prior history of alcohol dependence, liver cirrhosis.    Hospital Course  In the ER, WBCs were 19.3, sodium is 119, lactic acid was 4.1.  CT renal colic on 2/23/2024 showed free intraperitoneal air consistent with perforation of a hollow viscus.  General surgery were consulted.  Underwent diagnostic laparoscopy with laparoscopic Man patch on 2/23/2024.  Drain was placed.  NG tube was placed.  UGI from 2/26/2024 showed no evidence of leak.    Entresto was resumed on 2/27/2024.     Interval Problem Update  Drain still has significant output. Evaluated by Dr. Amezcua today. WBC are increasing, temp is 99.1, sodium is 124. CT abdomen and pelvis with contrast ordered. NS IVF added.  Lactic acid, blood cultures, urine cultures, chest x-ray ordered.  Fluid bolus given.  Ceftriaxone and Flagyl empirically started. Tolerating GI Soft diet. PT/OT ordered.      I have discussed this patient's plan of care and discharge plan at IDT rounds today with Case Management, Nursing, Nursing leadership, and other members of the IDT team.    Consultants/Specialty  general surgery    Code Status  Full Code    Disposition  The patient is not medically cleared for discharge to home or a post-acute facility.      I have placed the appropriate orders for post-discharge needs.    Review of Systems  Review of Systems   Constitutional:  Positive for malaise/fatigue.   HENT: Negative.     Eyes: Negative.    Respiratory: Negative.     Cardiovascular: Negative.    Gastrointestinal:  Positive for abdominal pain.        Abdominal pain has improved.    Musculoskeletal:  Positive for myalgias.   Skin: Negative.    Neurological: Negative.     Endo/Heme/Allergies: Negative.    Psychiatric/Behavioral: Negative.          Physical Exam  Temp:  [36.9 °C (98.5 °F)-37.9 °C (100.2 °F)] 37.3 °C (99.1 °F)  Pulse:  [79-89] 82  Resp:  [14-18] 18  BP: ()/(41-60) 90/44  SpO2:  [95 %-99 %] 99 %    Physical Exam  Constitutional:       Appearance: She is ill-appearing.   HENT:      Head: Normocephalic.      Nose: Nose normal.      Mouth/Throat:      Mouth: Mucous membranes are moist.   Eyes:      Pupils: Pupils are equal, round, and reactive to light.   Cardiovascular:      Rate and Rhythm: Normal rate.   Pulmonary:      Effort: Pulmonary effort is normal.   Abdominal:      Comments: Surgical drain present serosanguinous fluid   Musculoskeletal:      Right lower leg: No edema.      Left lower leg: No edema.   Skin:     General: Skin is warm.   Neurological:      Mental Status: She is alert and oriented to person, place, and time.         Fluids    Intake/Output Summary (Last 24 hours) at 3/1/2024 1204  Last data filed at 3/1/2024 1105  Gross per 24 hour   Intake 60 ml   Output 1705 ml   Net -1645 ml       Laboratory  Recent Labs     02/28/24  0104 02/29/24  0042 03/01/24  0030   WBC 9.1 12.1* 14.0*   RBC 4.07* 3.93* 3.81*   HEMOGLOBIN 11.0* 10.9* 10.5*   HEMATOCRIT 35.1* 33.1* 32.6*   MCV 86.2 84.2 85.6   MCH 27.0 27.7 27.6   MCHC 31.3* 32.9 32.2   RDW 83.4* 79.8* 81.4*   PLATELETCT 189 209 177   MPV 9.8 9.9 10.1     Recent Labs     02/28/24  0104 02/29/24  0042 03/01/24  0030   SODIUM 132* 128* 124*   POTASSIUM 4.1 3.9 4.6   CHLORIDE 99 95* 93*   CO2 24 23 21   GLUCOSE 119* 113* 118*   BUN 18 15 17   CREATININE 0.79 0.65 0.76   CALCIUM 9.1 8.4 7.9*                   Imaging  DX-UPPER GI-SERIES WITH KUB   Final Result      No evidence of contrast leak from the surgical repair site.      DX-CHEST-LIMITED (1 VIEW)   Final Result      1.  Free intraperitoneal air consistent with perforation of hollow viscus      2.  Findings were discussed with JUAN PABLO SCHWARTZ on  2/23/2024 11:42 AM.      CT-RENAL COLIC EVALUATION(A/P W/O)   Final Result      1.  Free intraperitoneal air present consistent with perforation of a hollow viscus      2.  Specific site of perforation is not evident on CT      3.  Cirrhosis with portal hypertension      4.  Moderate amount of ascites      5.  Cholelithiasis            6.  Findings were discussed with JUAN PABLO SCHWARTZ on 2/23/2024 11:45 AM.           Assessment/Plan    Duodenal ulcer perforation (HCC)- (present on admission)  Assessment & Plan  CT renal colic on 2/23/2024 showed free intraperitoneal air consistent with perforation of a hollow viscus.  S/P diagnostic laparoscopy with laparoscopic Man patch on 2/23/2024. Drain was placed, still having significant output. UGI from 2/26/2024 showed no evidence of leak. Increasing WBC, temp of 99.1, low BP. CT abdomen and pelvis ordered. Lactic acid, and infectious work up ordered. Ceftriaxone and Flagyl empirically started. General Surgery following.     Acute combined systolic and diastolic heart failure (HCC)- (present on admission)  Assessment & Plan  Continue Entresto, spironolactone and bisoprolol. Follows Dr. Xiong. Recommend to continue outpatient Cardiology follow up    HTN (hypertension), benign- (present on admission)  Assessment & Plan  Patient was on Entresto, bisoprolol, and Spironolactone (50 mg) prior to admission, which were held due to low blood pressure. Entresto was resumed on 2/27/2024. Bisoprolol and Spironolactone (25 mg) resumed 2/28/2024. Monitor closely    Heart murmur  Assessment & Plan  Chronic and stable    Severe protein-calorie malnutrition (HCC)- (present on admission)  Assessment & Plan  Patient attributes weight loss to stress and poor appetite.  Likely had abdominal pain from gastric ulcer.  Continue to advance diet as tolerated, and nutritional supplements    Anemia due to chronic kidney disease- (present on admission)  Assessment & Plan  Hemoglobin is stable.  S/p  surgery.  No signs of acute bleeding.    Liver cirrhosis (HCC)- (present on admission)  Assessment & Plan  Patient has nonalcoholic liver disease.  Monitor    Stage 3b chronic kidney disease (HCC)- (present on admission)  Assessment & Plan  Creatinine has normalized.  Avoid nephrotoxins, monitor closely         VTE prophylaxis: SCDs

## 2024-03-01 NOTE — CARE PLAN
The patient is Watcher - Medium risk of patient condition declining or worsening    Shift Goals  Clinical Goals: Monitor output from NICKI drain, Remove drain with sutures, Discharge to SNF  Patient Goals: Discharge, Talk to case management about insurance  Family Goals: UT    Progress made toward(s) clinical / shift goals:    Problem: Fluid Volume  Goal: Fluid volume balance will be maintained  Outcome: Progressing  Note: Fluid bolus and continuous IVF.      Problem: Knowledge Deficit - Standard  Goal: Patient and family/care givers will demonstrate understanding of plan of care, disease process/condition, diagnostic tests and medications  Outcome: Progressing  Note: Discussed POC.     Problem: Discharge Barriers/Planning  Goal: Patient's continuum of care needs are met  Outcome: Progressing  Note: Was evaluated by PT with recs to SNF, CM advised acceptance to [facility]       Patient is not progressing towards the following goals:      Problem: Hemodynamics  Goal: Patient's hemodynamics, fluid balance and neurologic status will be stable or improve  Outcome: Not Progressing  Note: Increase in WBC, BP hypotensive.

## 2024-03-01 NOTE — PROGRESS NOTES
Messaged provider at 0401 about pt. Low BP, elevated temp and WBC.  Asked provider to add tylenol to MAR and to see if he wanted to check a lactic on the pt.  No new orders yet. RN hung LR bolus for pt. Will recheck BP after bolus is complete.  Pt. Is not symptomatic from the low BP at this time.  Axox4.

## 2024-03-01 NOTE — PROGRESS NOTES
Hospital Medicine Daily Progress Note    Date of Service  2/29/2024    Chief Complaint  Denisse Abel is a 63 y.o. female admitted 2/23/2024 with 48 hours of worsening abdominal pain, nausea, and vomiting.  He has a history of combined systolic/diastolic heart failure, CKD, hypertension, dyslipidemia, prior history of alcohol dependence, liver cirrhosis.    Hospital Course  In the ER, WBCs were 19.3, sodium is 119, lactic acid was 4.1.  CT renal colic on 2/23/2024 showed free intraperitoneal air consistent with perforation of a hollow viscus.  General surgery were consulted.  Underwent diagnostic laparoscopy with laparoscopic Man patch on 2/23/2024.  Drain was placed.  NG tube was placed.  UGI from 2/26/2024 showed no evidence of leak.    Entresto was resumed on 2/27/2024.     Interval Problem Update  Tolerating liquid diet.  Abdominal pain is improving.  Blood pressure is stable on bisoprolol, Entresto, and spironolactone at low-dose.  PT/OT ordered.  Drain still has significant output.    I have discussed this patient's plan of care and discharge plan at IDT rounds today with Case Management, Nursing, Nursing leadership, and other members of the IDT team.    Consultants/Specialty  general surgery    Code Status  Full Code    Disposition  The patient is not medically cleared for discharge to home or a post-acute facility.      I have placed the appropriate orders for post-discharge needs.    Review of Systems  Review of Systems   Constitutional:  Positive for malaise/fatigue.   HENT: Negative.     Eyes: Negative.    Respiratory: Negative.     Cardiovascular: Negative.    Gastrointestinal:  Positive for abdominal pain.        Abdominal pain has improved.    Musculoskeletal:  Positive for myalgias.   Skin: Negative.    Neurological: Negative.    Endo/Heme/Allergies: Negative.    Psychiatric/Behavioral: Negative.          Physical Exam  Temp:  [36.7 °C (98.1 °F)-37.1 °C (98.7 °F)] 37.1 °C (98.7 °F)  Pulse:   [73-96] 89  Resp:  [16-18] 18  BP: ()/(47-59) 101/50  SpO2:  [96 %-98 %] 96 %    Physical Exam  Constitutional:       Appearance: She is ill-appearing.   HENT:      Head: Normocephalic.      Nose: Nose normal.      Mouth/Throat:      Mouth: Mucous membranes are moist.   Eyes:      Pupils: Pupils are equal, round, and reactive to light.   Cardiovascular:      Rate and Rhythm: Normal rate.   Pulmonary:      Effort: Pulmonary effort is normal.   Abdominal:      Comments: Surgical drain present serosanguinous fluid   Musculoskeletal:      Right lower leg: No edema.      Left lower leg: No edema.   Skin:     General: Skin is warm.   Neurological:      Mental Status: She is alert and oriented to person, place, and time.         Fluids    Intake/Output Summary (Last 24 hours) at 2/29/2024 1611  Last data filed at 2/29/2024 1500  Gross per 24 hour   Intake 120 ml   Output 1765 ml   Net -1645 ml       Laboratory  Recent Labs     02/28/24  0104 02/29/24  0042   WBC 9.1 12.1*   RBC 4.07* 3.93*   HEMOGLOBIN 11.0* 10.9*   HEMATOCRIT 35.1* 33.1*   MCV 86.2 84.2   MCH 27.0 27.7   MCHC 31.3* 32.9   RDW 83.4* 79.8*   PLATELETCT 189 209   MPV 9.8 9.9     Recent Labs     02/27/24  0311 02/28/24  0104 02/29/24  0042   SODIUM 135 132* 128*   POTASSIUM 4.0 4.1 3.9   CHLORIDE 101 99 95*   CO2 24 24 23   GLUCOSE 114* 119* 113*   BUN 26* 18 15   CREATININE 0.83 0.79 0.65   CALCIUM 9.1 9.1 8.4                   Imaging  DX-UPPER GI-SERIES WITH KUB   Final Result      No evidence of contrast leak from the surgical repair site.      DX-CHEST-LIMITED (1 VIEW)   Final Result      1.  Free intraperitoneal air consistent with perforation of hollow viscus      2.  Findings were discussed with JUAN PABLO SCHWARTZ on 2/23/2024 11:42 AM.      CT-RENAL COLIC EVALUATION(A/P W/O)   Final Result      1.  Free intraperitoneal air present consistent with perforation of a hollow viscus      2.  Specific site of perforation is not evident on CT      3.   Cirrhosis with portal hypertension      4.  Moderate amount of ascites      5.  Cholelithiasis            6.  Findings were discussed with JUAN PABLO SCHWARTZ on 2/23/2024 11:45 AM.           Assessment/Plan  Duodenal ulcer perforation (HCC)- (present on admission)  Assessment & Plan  CT renal colic on 2/23/2024 showed free intraperitoneal air consistent with perforation of a hollow viscus.  General surgery were consulted.  Underwent diagnostic laparoscopy with laparoscopic Man patch on 2/23/2024.  Drain was placed.  NG tube was placed.  UGI from 2/26/2024 showed no evidence of leak.    Acute combined systolic and diastolic heart failure (HCC)- (present on admission)  Assessment & Plan  Continue Entresto, spironolactone and bisoprolol. Follows Dr. Xiong. Recommend to continue outpatient Cardiology follow up    HTN (hypertension), benign- (present on admission)  Assessment & Plan  Patient was on Entresto, bisoprolol, and Spironolactone (50 mg) prior to admission, which were held due to low blood pressure. Entresto was resumed on 2/27/2024. Bisoprolol and Spironolactone (25 mg) resumed 2/28/2024. Monitor closely    Heart murmur  Assessment & Plan  Chronic and stable    Severe protein-calorie malnutrition (HCC)- (present on admission)  Assessment & Plan  Patient attributes weight loss to stress and poor appetite.  Likely had abdominal pain from gastric ulcer.  Continue to advance diet as tolerated, and nutritional supplements    Anemia due to chronic kidney disease- (present on admission)  Assessment & Plan  Hemoglobin is stable.  S/p surgery.  No signs of acute bleeding.    Liver cirrhosis (HCC)- (present on admission)  Assessment & Plan  Patient has nonalcoholic liver disease.  Monitor    Stage 3b chronic kidney disease (HCC)- (present on admission)  Assessment & Plan  Creatinine has normalized.  Avoid nephrotoxins, monitor closely         VTE prophylaxis: SCDs

## 2024-03-01 NOTE — PROGRESS NOTES
Telemetry Summary     Rhythm: Afib  Rate: 66-90  Ectopy: fPVC,fBig, oTrig, fPVC, rCoup  Measurements: -/.08/-    Normal Values   Rhythm: SR  HR Range:   Measurement: 0.12-0.20/0.06-0.10/0.30-0.52

## 2024-03-01 NOTE — PROGRESS NOTES
"       S: Denisse Abel  is a 63 y.o.  female admitted for post duodenal ulcer status post laparoscopic repair.  Doing well no evidence of leak on upper GI.      O:  /49   Pulse 79   Temp 36.9 °C (98.5 °F) (Oral)   Resp 14   Ht 1.702 m (5' 7\")   Wt 52.3 kg (115 lb 4.8 oz)   SpO2 95%   Intake/Output                               02/28/24 0700 - 02/29/24 0659 02/29/24 0700 - 03/01/24 0659 03/01/24 0700 - 03/02/24 0659     3573-2928 5923-2416 Total 3474-4187 3222-2176 Total 6527-0285 3902-8198 Total                    Intake    P.O.  --  -- --  120  -- 120  --  -- --    P.O. -- -- -- 120 -- 120 -- -- --    I.V.  1125  -- 1125  --  -- --  --  -- --    Volume (mL) (lactated ringers infusion) 1125 -- 1125 -- -- -- -- -- --    Total Intake 1125 -- 1125 120 -- 120 -- -- --       Output    Urine  --  -- --  --  -- --  --  -- --    Number of Times Voided 5 x 3 x 8 x 3 x 1 x 4 x -- -- --    Drains  700  775 1475  790  885 1675  --  -- --    Output (mL) (Closed/Suction Drain 1 Left LLQ 19 Fr.)   -- -- --    Stool  --  -- --  --  -- --  --  -- --    Number of Times Stooled 1 x 1 x 2 x 1 x -- 1 x -- -- --    Total Output   -- -- --       Net I/O     425 -545 -350 -670 -925 -2985 -- -- --          Recent Labs     02/28/24  0104 02/29/24  0042 03/01/24  0030   SODIUM 132* 128* 124*   POTASSIUM 4.1 3.9 4.6   CHLORIDE 99 95* 93*   CO2 24 23 21   GLUCOSE 119* 113* 118*   BUN 18 15 17   CREATININE 0.79 0.65 0.76   CALCIUM 9.1 8.4 7.9*     Recent Labs     02/28/24  0104 02/29/24  0042 03/01/24  0030   WBC 9.1 12.1* 14.0*   RBC 4.07* 3.93* 3.81*   HEMOGLOBIN 11.0* 10.9* 10.5*   HEMATOCRIT 35.1* 33.1* 32.6*   MCV 86.2 84.2 85.6   MCH 27.0 27.7 27.6   MCHC 31.3* 32.9 32.2   RDW 83.4* 79.8* 81.4*   PLATELETCT 189 209 177   MPV 9.8 9.9 10.1       Alert and Oriented x3, No Acute Distress  Normal Respiratory Effort  Abdomen soft, appropriately tender  Incisions/Bandages " clean/dry/intact  Extremities warm and well perfused    A/P:  Awaiting placement.  Increasing white count recommend CT scan with oral and IV contrast.  Will follow-up on results.    Neel Amezcua MD  [facility]

## 2024-03-01 NOTE — DISCHARGE PLANNING
Case Management Discharge Planning    Admission Date: 2/23/2024  GMLOS: 5.1  ALOS: 7    6-Clicks ADL Score: 18  6-Clicks Mobility Score: 18      Anticipated Discharge Dispo: Discharge Disposition: D/T to home under [facility] care in anticipation of covered skilled care (06)  Discharge Address: [id number] [place] [address] [address] [place] [address]    DME Needed: No    Action(s) Taken: Acceptance Received    This RN CM called [facility] and was unable to reach admissions left a VM with contact information. Patient had other pending acceptance for SNF. [facility] will accept patient with [facility]SP open and can accept NICKI drain. Updated MD and Bedside Rn during IDT rounds, patient is still having high output thru NICKI drain and is not medically cleared for discharge. Patient has been seen by PT and per therapist will be recommending SNF on discharge. Waiting for Therapy note and medical clearance.       Escalations Completed: None    Medically Clear: No    Next Steps:Follow up with medical team to discuss barriers and D/C plan.    Barriers to Discharge: Medical clearance    Is the patient up for discharge tomorrow: No

## 2024-03-01 NOTE — CARE PLAN
The patient is Watcher - Medium risk of patient condition declining or worsening    Shift Goals  Clinical Goals: monitor BP, roopa drain, manage pain  Patient Goals: manage pain, d'c tomorrow  Family Goals: UT    Progress made toward(s) clinical / shift goals:  drain output still high.  BP this evening soft but in the 100s.  Pain level well controlled.      Problem: Pain - Standard  Goal: Alleviation of pain or a reduction in pain to the patient’s comfort goal  Outcome: Progressing     Problem: Fall Risk  Goal: Patient will remain free from falls  Outcome: Progressing     Problem: Hemodynamics  Goal: Patient's hemodynamics, fluid balance and neurologic status will be stable or improve  Outcome: Progressing     Problem: Fluid Volume  Goal: Fluid volume balance will be maintained  Outcome: Progressing     Problem: Urinary - Renal Perfusion  Goal: Ability to achieve and maintain adequate renal perfusion and functioning will improve  Outcome: Progressing     Problem: Respiratory  Goal: Patient will achieve/maintain optimum respiratory ventilation and gas exchange  Outcome: Progressing     Problem: Physical Regulation  Goal: Diagnostic test results will improve  Outcome: Progressing  Goal: Signs and symptoms of infection will decrease  Outcome: Progressing     Problem: Knowledge Deficit - Standard  Goal: Patient and family/care givers will demonstrate understanding of plan of care, disease process/condition, diagnostic tests and medications  Outcome: Progressing     Problem: Skin Integrity  Goal: Skin integrity is maintained or improved  Outcome: Progressing     Problem: Communication  Goal: The ability to communicate needs accurately and effectively will improve  Outcome: Progressing     Problem: Discharge Barriers/Planning  Goal: Patient's continuum of care needs are met  Outcome: Progressing

## 2024-03-01 NOTE — THERAPY
Physical Therapy   Initial Evaluation     Patient Name: Denisse Abel  Age:  63 y.o., Sex:  female  Medical Record #: [id number]  Today's Date: 3/1/2024          Assessment  Patient is a 63 y.o. female  admitted 2/23/2024 with acute abdomen pain and sepsis due to perforated duodenal ulcer she was taken to the OR immediately and underwent repair of the duodenal ulcer on 3/23/24.   Pt currently presenting with muscle weakness, decreased activity tolerance, decreased standing balance which are affecting her ability to perform mobility and ADL's at her prior functional level . Pt will benefit from continued PT while in house as well as post acute PT prior to home alone.    Plan    Physical Therapy Initial Treatment Plan   Treatment Plan : (P) Bed Mobility, Gait Training, Manual Therapy, Neuro Re-Education / Balance, Self Care / Home Evaluation, Therapeutic Activities, Therapeutic Exercise, Stair Training  Treatment Frequency: (P) 3 Times per Week  Duration: (P) Until Therapy Goals Met    DC Equipment Recommendations: (P) Unable to determine at this time  Discharge Recommendations: (P) Recommend post-acute placement for additional physical therapy services prior to discharge home       Initial Contact Note    Initial Contact Note Order Received and Verified, Physical Therapy Evaluation in Progress with Full Report to Follow.   Pain 0 - 10 Group   Location Abdomen   Therapist Pain Assessment Post Activity Pain Same as Prior to Activity;Nurse Notified;3   Prior Living Situation   Prior Services None;Home-Independent   Housing / Facility 1 Story House   Steps Into Home 1   Bathroom Set up Walk In Shower   Equipment Owned Front-Wheel Walker;Raised Toilet Seat With Arms   Lives with - Patient's Self Care Capacity Alone and Able to Care For Self   Comments Pt resides alone,  family is not going to be able to assist.   Prior Level of Functional Mobility   Bed Mobility Independent   Transfer Status Independent   Ambulation  "Independent   Ambulation Distance community   Assistive Devices Used None  (intermittent use of FWW in home. ' in the morning\")   Stairs Independent   Cognition    Level of Consciousness Alert   Comments pleasant and cooperative, states she was \"solomon out of it\" last time PT checked on her.   Strength Upper Body   Gross Strength Generalized Weakness, Equal Bilaterally.    Strength Lower Body   Gross Strength Generalized Weakness, Equal Bilaterally   Comments decreased muscle bulk, decreased muscle endurance and decreased muscle power   Coordination Lower Body    Coordination Lower Body  WDL   Balance Assessment   Sitting Balance (Static) Good   Sitting Balance (Dynamic) Good   Standing Balance (Static) Fair +   Standing Balance (Dynamic) Fair   Weight Shift Sitting Good   Weight Shift Standing Fair   Comments w/FWW   Bed Mobility    Supine to Sit Supervised   Sit to Supine Supervised   Scooting Supervised   Gait Analysis   Gait Level Of Assist Supervised   Assistive Device Front Wheel Walker   Distance (Feet) 120   # of Times Distance was Traveled 2   Deviation Bradykinetic;Decreased Heel Strike;Decreased Toe Off  (fatigues.)   Level of Assist with Stairs   (NT)   Weight Bearing Status No restrictions.   Comments RN notified  NICKI full. Emptied prior to ambulation   Functional Mobility   Sit to Stand Standby Assist   Bed, Chair, Wheelchair Transfer Standby Assist   Toilet Transfers Standby Assist   Transfer Method Stand Step   Comments increased effort for sit to stand due to LE weakness.   6 Clicks Assessment - How much HELP from another person do you currently need... (If the patient hasn't done an activity recently, how much help from another person do you think he/she would need if he/she tried?)   Turning from your back to your side while in a flat bed without using bed rails? 3   Moving from lying on your back to sitting on the side of a flat bed without using bed rails? 3   Moving to and from a bed to a chair " (including a wheelchair)? 3   Standing up from a chair using your arms (e.g., wheelchair, or bedside chair)? 3   Walking in hospital room? 3   Climbing 3-5 steps with a railing? 3   6 clicks Mobility Score 18   Short Term Goals    Short Term Goal # 1 Pt will be able to perform bed mobility and sup <> sit Mod I in 6 visits.   Short Term Goal # 2 Pt will be able to perform sit <>Stand and transfer candelaria in 6 visits.   Short Term Goal # 3 Pt will be able to ambulate 300 ft with FWW Candelaria in 6 visits.   Education Group   Role of Physical Therapist Patient Response Patient;Acceptance;Explanation;Verbal Demonstration   Gait Training Patient Response Patient;Acceptance;Explanation;Demonstration;Verbal Demonstration   Use of Assistive Device Patient Response Patient;Acceptance;Explanation;Verbal Demonstration   Exercise - Standing Patient Response Patient;Acceptance;Explanation;Handout;Action Demonstration;Verbal Demonstration   Physical Therapy Initial Treatment Plan    Treatment Plan  Bed Mobility;Gait Training;Manual Therapy;Neuro Re-Education / Balance;Self Care / Home Evaluation;Therapeutic Activities;Therapeutic Exercise;Stair Training   Treatment Frequency 3 Times per Week   Duration Until Therapy Goals Met   Problem List    Problems Pain;Impaired Bed Mobility;Impaired Transfers;Functional Strength Deficit;Impaired Balance;Decreased Activity Tolerance   Anticipated Discharge Equipment and Recommendations   DC Equipment Recommendations Unable to determine at this time   Discharge Recommendations Recommend post-acute placement for additional physical therapy services prior to discharge home   Interdisciplinary Plan of Care Collaboration   IDT Collaboration with  Nursing;Occupational Therapist   Patient Position at End of Therapy Edge of Bed;Call Light within Reach;Tray Table within Reach;Phone within Reach   Collaboration Comments RN updated on findings and NICKI drain.   Session Information   Date / Session Number  3/1-1 (  1/3, 3/7)

## 2024-03-01 NOTE — PROGRESS NOTES
Telemetry Shift Monitor Summary:     Rhythm: SR  Rate: 70-79  Ectopy: R PAC, R PVC  Measurements: 0.14 / 0.08 / 0. 36

## 2024-03-02 LAB
ERYTHROCYTE [DISTWIDTH] IN BLOOD BY AUTOMATED COUNT: 79.8 FL (ref 35.9–50)
HCT VFR BLD AUTO: 30.9 % (ref 37–47)
HGB BLD-MCNC: 10.2 G/DL (ref 12–16)
LACTATE SERPL-SCNC: 1.2 MMOL/L (ref 0.5–2)
MCH RBC QN AUTO: 28.2 PG (ref 27–33)
MCHC RBC AUTO-ENTMCNC: 33 G/DL (ref 32.2–35.5)
MCV RBC AUTO: 85.4 FL (ref 81.4–97.8)
PLATELET # BLD AUTO: 209 K/UL (ref 164–446)
PMV BLD AUTO: 10.4 FL (ref 9–12.9)
RBC # BLD AUTO: 3.62 M/UL (ref 4.2–5.4)
WBC # BLD AUTO: 15.6 K/UL (ref 4.8–10.8)

## 2024-03-02 PROCEDURE — A9270 NON-COVERED ITEM OR SERVICE: HCPCS | Performed by: INTERNAL MEDICINE

## 2024-03-02 PROCEDURE — 83605 ASSAY OF LACTIC ACID: CPT

## 2024-03-02 PROCEDURE — 36415 COLL VENOUS BLD VENIPUNCTURE: CPT

## 2024-03-02 PROCEDURE — 700111 HCHG RX REV CODE 636 W/ 250 OVERRIDE (IP): Mod: JZ | Performed by: INTERNAL MEDICINE

## 2024-03-02 PROCEDURE — 94760 N-INVAS EAR/PLS OXIMETRY 1: CPT

## 2024-03-02 PROCEDURE — 700105 HCHG RX REV CODE 258: Performed by: INTERNAL MEDICINE

## 2024-03-02 PROCEDURE — 85027 COMPLETE CBC AUTOMATED: CPT

## 2024-03-02 PROCEDURE — 700102 HCHG RX REV CODE 250 W/ 637 OVERRIDE(OP): Performed by: INTERNAL MEDICINE

## 2024-03-02 PROCEDURE — 770020 HCHG ROOM/CARE - TELE (206)

## 2024-03-02 PROCEDURE — 99233 SBSQ HOSP IP/OBS HIGH 50: CPT | Performed by: INTERNAL MEDICINE

## 2024-03-02 RX ORDER — METRONIDAZOLE 500 MG/1
500 TABLET ORAL EVERY 12 HOURS
Status: DISCONTINUED | OUTPATIENT
Start: 2024-03-02 | End: 2024-03-04

## 2024-03-02 RX ORDER — CHOLECALCIFEROL (VITAMIN D3) 125 MCG
5 CAPSULE ORAL NIGHTLY
Status: DISCONTINUED | OUTPATIENT
Start: 2024-03-02 | End: 2024-03-04 | Stop reason: HOSPADM

## 2024-03-02 RX ADMIN — DIBASIC SODIUM PHOSPHATE, MONOBASIC POTASSIUM PHOSPHATE AND MONOBASIC SODIUM PHOSPHATE 250 MG: 852; 155; 130 TABLET ORAL at 23:57

## 2024-03-02 RX ADMIN — METRONIDAZOLE 500 MG: 500 TABLET ORAL at 17:58

## 2024-03-02 RX ADMIN — HYDROMORPHONE HYDROCHLORIDE 1 MG: 1 INJECTION, SOLUTION INTRAMUSCULAR; INTRAVENOUS; SUBCUTANEOUS at 20:36

## 2024-03-02 RX ADMIN — Medication 5 MG: at 23:58

## 2024-03-02 RX ADMIN — FAMOTIDINE 20 MG: 20 TABLET, FILM COATED ORAL at 04:54

## 2024-03-02 RX ADMIN — SACUBITRIL AND VALSARTAN 1 TABLET: 24; 26 TABLET, FILM COATED ORAL at 17:58

## 2024-03-02 RX ADMIN — DIBASIC SODIUM PHOSPHATE, MONOBASIC POTASSIUM PHOSPHATE AND MONOBASIC SODIUM PHOSPHATE 250 MG: 852; 155; 130 TABLET ORAL at 04:54

## 2024-03-02 RX ADMIN — SODIUM CHLORIDE: 9 INJECTION, SOLUTION INTRAVENOUS at 04:49

## 2024-03-02 RX ADMIN — DIBASIC SODIUM PHOSPHATE, MONOBASIC POTASSIUM PHOSPHATE AND MONOBASIC SODIUM PHOSPHATE 250 MG: 852; 155; 130 TABLET ORAL at 17:58

## 2024-03-02 RX ADMIN — METRONIDAZOLE 500 MG: 5 INJECTION, SOLUTION INTRAVENOUS at 05:22

## 2024-03-02 RX ADMIN — CEFTRIAXONE SODIUM 2000 MG: 2 INJECTION, POWDER, FOR SOLUTION INTRAMUSCULAR; INTRAVENOUS at 04:50

## 2024-03-02 RX ADMIN — BISOPROLOL FUMARATE 5 MG: 5 TABLET ORAL at 05:20

## 2024-03-02 RX ADMIN — OXYCODONE HYDROCHLORIDE 5 MG: 5 TABLET ORAL at 04:54

## 2024-03-02 RX ADMIN — SACUBITRIL AND VALSARTAN 1 TABLET: 24; 26 TABLET, FILM COATED ORAL at 05:20

## 2024-03-02 RX ADMIN — OXYCODONE HYDROCHLORIDE 5 MG: 5 TABLET ORAL at 23:57

## 2024-03-02 RX ADMIN — DIBASIC SODIUM PHOSPHATE, MONOBASIC POTASSIUM PHOSPHATE AND MONOBASIC SODIUM PHOSPHATE 250 MG: 852; 155; 130 TABLET ORAL at 12:25

## 2024-03-02 RX ADMIN — SPIRONOLACTONE 25 MG: 25 TABLET ORAL at 05:19

## 2024-03-02 RX ADMIN — FAMOTIDINE 20 MG: 20 TABLET, FILM COATED ORAL at 17:58

## 2024-03-02 ASSESSMENT — ENCOUNTER SYMPTOMS
ABDOMINAL PAIN: 1
NEUROLOGICAL NEGATIVE: 1
MYALGIAS: 1
RESPIRATORY NEGATIVE: 1
EYES NEGATIVE: 1
PSYCHIATRIC NEGATIVE: 1
CARDIOVASCULAR NEGATIVE: 1

## 2024-03-02 ASSESSMENT — PATIENT HEALTH QUESTIONNAIRE - PHQ9
1. LITTLE INTEREST OR PLEASURE IN DOING THINGS: NOT AT ALL
2. FEELING DOWN, DEPRESSED, IRRITABLE, OR HOPELESS: NOT AT ALL
SUM OF ALL RESPONSES TO PHQ9 QUESTIONS 1 AND 2: 0

## 2024-03-02 ASSESSMENT — PAIN DESCRIPTION - PAIN TYPE
TYPE: ACUTE PAIN
TYPE: ACUTE PAIN

## 2024-03-02 NOTE — PROGRESS NOTES
Telemetry Shift Summary    Rhythm SR/ST  HR Range   Ectopy r-PVC  Measurements 0.16/0.10/0.36        Normal Values  Rhythm SR  HR Range    Measurements 0.12-0.20 / 0.06-0.10  / 0.30-0.52

## 2024-03-02 NOTE — CARE PLAN
Problem: Fall Risk  Goal: Patient will remain free from falls  Outcome: Progressing  Note: Fall precaution measures in place     Problem: Skin Integrity  Goal: Skin integrity is maintained or improved  Outcome: Progressing   The patient is Stable - Low risk of patient condition declining or worsening    Shift Goals  Clinical Goals: Lactic acid,IV abx,NICKI drain  Patient Goals: pain control  Family Goals: UT    Progress made toward(s) clinical / shift goals:  Educated on fall prevention measures,encouraged use of call light,updated on plan of care    Patient is not progressing towards the following goals:

## 2024-03-02 NOTE — CARE PLAN
The patient is Stable - Low risk of patient condition declining or worsening    Shift Goals  Clinical Goals: monitor NICKI drain output, abx  Patient Goals: shower  Family Goals: UT    Progress made toward(s) clinical / shift goals:  Pt's WBC count being monitored for sign of infection. Pt's NICKI output 625mL since this shift's start. Pt has adequate food intake this shift, pt reports this is the most she's ate this admission. IV fluids discontinued. Pt able to perform & tolerate ADLs. Fall precautions in place. Pt verbalizes understanding of POC.     Problem: Communication  Goal: The ability to communicate needs accurately and effectively will improve  Outcome: Progressing     Problem: Fall Risk  Goal: Patient will remain free from falls  Outcome: Progressing     Problem: Fluid Volume  Goal: Fluid volume balance will be maintained  Outcome: Progressing       Patient is not progressing towards the following goals:

## 2024-03-02 NOTE — PROGRESS NOTES
Telemetry Shift Summary     Rhythm SR  HR Range 86-94  Ectopy rPAC,rPVC  Measurements  .16/.08/.36  Per strip printed 0400     Normal Values  Rhythm SR  HR Range    Measurements 0.12-0.20 / 0.06-0.10  / 0.30-0.52

## 2024-03-02 NOTE — PROGRESS NOTES
"       S: Denisse Abel  is a 63 y.o.  female admitted for post duodenal ulcer status post laparoscopic repair.  Doing well no evidence of leak on upper GI.      O:  /57   Pulse 86   Temp 37.1 °C (98.8 °F) (Oral)   Resp 18   Ht 1.702 m (5' 7\")   Wt 52.3 kg (115 lb 4.8 oz)   SpO2 98%   Intake/Output                               02/29/24 0700 - 03/01/24 0659 03/01/24 0700 - 03/02/24 0659 03/02/24 0700 - 03/03/24 0659     4982-7047 6453-7123 Total 1375-7458 6588-5099 Total 4752-8727 7478-3203 Total                    Intake    P.O.  120  -- 120  --  -- --  --  -- --    P.O. 120 -- 120 -- -- -- -- -- --    Total Intake 120 -- 120 -- -- -- -- -- --       Output    Urine  --  -- --  --  -- --  --  -- --    Number of Times Voided 3 x 1 x 4 x -- 3 x 3 x 1 x -- 1 x    Drains  790  1005 1795  820  600 1420  530  -- 530    Output (mL) (Closed/Suction Drain 1 Left LLQ 19 Fr.) 790 1005 1795  530 -- 530    Stool  --  -- --  --  -- --  --  -- --    Number of Times Stooled 1 x -- 1 x 2 x -- 2 x -- -- --    Total Output 790 1005 1795  530 -- 530       Net I/O     -670 -1005 -1675 -820 -600 -1420 -530 -- -530          Recent Labs     02/29/24  0042 03/01/24  0030   SODIUM 128* 124*   POTASSIUM 3.9 4.6   CHLORIDE 95* 93*   CO2 23 21   GLUCOSE 113* 118*   BUN 15 17   CREATININE 0.65 0.76   CALCIUM 8.4 7.9*     Recent Labs     02/29/24  0042 03/01/24  0030 03/02/24  0042   WBC 12.1* 14.0* 15.6*   RBC 3.93* 3.81* 3.62*   HEMOGLOBIN 10.9* 10.5* 10.2*   HEMATOCRIT 33.1* 32.6* 30.9*   MCV 84.2 85.6 85.4   MCH 27.7 27.6 28.2   MCHC 32.9 32.2 33.0   RDW 79.8* 81.4* 79.8*   PLATELETCT 209 177 209   MPV 9.9 10.1 10.4       Alert and Oriented x3, No Acute Distress  Normal Respiratory Effort  Abdomen soft, appropriately tender  Incisions/Bandages clean/dry/intact  Extremities warm and well perfused    A/P:  Awaiting placement.  CT without intra-abdominal cause of leukocytosis.  We will remove the drain when " she is DCed.  Plan for topical lidocaine and a horizontal mattress suture to ensure decrease chance of ascitic leak given her underlying liver pathology    Neel Amezcua MD  [facility]

## 2024-03-02 NOTE — PROGRESS NOTES
Hospital Medicine Daily Progress Note    Date of Service  3/2/2024    Chief Complaint  Denisse Abel is a 63 y.o. female admitted 2/23/2024 with 48 hours of worsening abdominal pain, nausea, and vomiting.  He has a history of combined systolic/diastolic heart failure, CKD, hypertension, dyslipidemia, prior history of alcohol dependence, liver cirrhosis.    Hospital Course  In the ER, WBCs were 19.3, sodium is 119, lactic acid was 4.1.  CT renal colic on 2/23/2024 showed free intraperitoneal air consistent with perforation of a hollow viscus.  General surgery were consulted.  Underwent diagnostic laparoscopy with laparoscopic Man patch on 2/23/2024.  Drain was placed.  NG tube was placed.  UGI from 2/26/2024 showed no evidence of leak.    Entresto was resumed on 2/27/2024.  Patient underwent CT abdomen pelvis with contrast on 3/1/2023.  No abscess was seen.  She has anasarca and findings consistent with hepatocellular disease.  Ceftriaxone and Flagyl were started due to low-grade temperature and elevated WBCs.    Interval Problem Update  CT abdomen and pelvis from 3/1/2024 showed no evidence of abscess.  She has findings consistent with hepatocellular disease.  Afebrile and hemodynamically stable.  Drain has significant output, likely due to ascites.  Blood cultures from 3/1/2024 pending. Evaluated by Dr. Amezcua today.  Tolerating GI soft diet.  PT/OT following.    I have discussed this patient's plan of care and discharge plan at IDT rounds today with Case Management, Nursing, Nursing leadership, and other members of the IDT team.    Consultants/Specialty  general surgery    Code Status  Full Code    Disposition  The patient is not medically cleared for discharge to home or a post-acute facility.  Anticipate discharge to: home with close outpatient follow-up    I have placed the appropriate orders for post-discharge needs.    Review of Systems  Review of Systems   Constitutional:  Positive for  malaise/fatigue.   HENT: Negative.     Eyes: Negative.    Respiratory: Negative.     Cardiovascular: Negative.    Gastrointestinal:  Positive for abdominal pain.        Abdominal pain has improved.    Musculoskeletal:  Positive for myalgias.   Skin: Negative.    Neurological: Negative.    Endo/Heme/Allergies: Negative.    Psychiatric/Behavioral: Negative.          Physical Exam  Temp:  [37.1 °C (98.8 °F)-37.7 °C (99.9 °F)] 37.1 °C (98.8 °F)  Pulse:  [86-94] 92  Resp:  [16-18] 17  BP: ()/(49-57) 99/50  SpO2:  [94 %-100 %] 100 %    Physical Exam  Constitutional:       Appearance: She is ill-appearing.   HENT:      Head: Normocephalic.      Nose: Nose normal.      Mouth/Throat:      Mouth: Mucous membranes are moist.   Eyes:      Pupils: Pupils are equal, round, and reactive to light.   Cardiovascular:      Rate and Rhythm: Normal rate.   Pulmonary:      Effort: Pulmonary effort is normal.   Abdominal:      Comments: Surgical drain present serosanguinous fluid   Musculoskeletal:      Right lower leg: No edema.      Left lower leg: No edema.   Skin:     General: Skin is warm.   Neurological:      Mental Status: She is alert and oriented to person, place, and time.         Fluids    Intake/Output Summary (Last 24 hours) at 3/2/2024 1644  Last data filed at 3/2/2024 1418  Gross per 24 hour   Intake 1619.77 ml   Output 1685 ml   Net -65.23 ml       Laboratory  Recent Labs     02/29/24  0042 03/01/24  0030 03/02/24  0042   WBC 12.1* 14.0* 15.6*   RBC 3.93* 3.81* 3.62*   HEMOGLOBIN 10.9* 10.5* 10.2*   HEMATOCRIT 33.1* 32.6* 30.9*   MCV 84.2 85.6 85.4   MCH 27.7 27.6 28.2   MCHC 32.9 32.2 33.0   RDW 79.8* 81.4* 79.8*   PLATELETCT 209 177 209   MPV 9.9 10.1 10.4     Recent Labs     02/29/24  0042 03/01/24  0030   SODIUM 128* 124*   POTASSIUM 3.9 4.6   CHLORIDE 95* 93*   CO2 23 21   GLUCOSE 113* 118*   BUN 15 17   CREATININE 0.65 0.76   CALCIUM 8.4 7.9*                   Imaging  CT-ABDOMEN-PELVIS WITH   Final Result       1.  Free fluid and free air within the intraperitoneal cavity decreased since previous examination.      2.  Upper abdomen surgical drain. No abscess identified.      3.  Anasarca.      4.  Diffuse mesenteric and retroperitoneal edema. Cannot exclude mesenteric inflammatory changes.      5.  No evidence of bowel obstruction.      6.  Findings consistent with hepatocellular disease and mild splenomegaly.      7.  Cholelithiasis.      8.  Bibasilar pleural fluid and atelectasis. Small pericardial effusion.      DX-CHEST-PORTABLE (1 VIEW)   Final Result      No evidence of acute cardiopulmonary process.      DX-UPPER GI-SERIES WITH KUB   Final Result      No evidence of contrast leak from the surgical repair site.      DX-CHEST-LIMITED (1 VIEW)   Final Result      1.  Free intraperitoneal air consistent with perforation of hollow viscus      2.  Findings were discussed with JUAN PABLO SCHWARTZ on 2/23/2024 11:42 AM.      CT-RENAL COLIC EVALUATION(A/P W/O)   Final Result      1.  Free intraperitoneal air present consistent with perforation of a hollow viscus      2.  Specific site of perforation is not evident on CT      3.  Cirrhosis with portal hypertension      4.  Moderate amount of ascites      5.  Cholelithiasis            6.  Findings were discussed with JUAN PABLO SCHWARTZ on 2/23/2024 11:45 AM.           Assessment/Plan    Duodenal ulcer perforation (HCC)- (present on admission)  Assessment & Plan  CT renal colic on 2/23/2024 showed free intraperitoneal air consistent with perforation of a hollow viscus.  S/P diagnostic laparoscopy with laparoscopic Man patch on 2/23/2024. Drain was placed, still having significant output. UGI from 2/26/2024 showed no evidence of leak.     Patient had increasing WBC, temp of 99.1, low BP on 3/1/2024. Lactic acid was elevated (3.0). Ceftriaxone and Flagyl empirically started. CT abdomen and pelvis from 3/1/2024 showed no signs of abscess.  General Surgery following. Lactic acid  improved from 3.0 -> 2.9 -> 1.6 -> 1.2    Acute combined systolic and diastolic heart failure (HCC)- (present on admission)  Assessment & Plan  Continue Entresto, spironolactone and bisoprolol. Follows Dr. Xiong. Recommend to continue outpatient Cardiology follow up    HTN (hypertension), benign- (present on admission)  Assessment & Plan  Patient was on Entresto, bisoprolol, and Spironolactone (50 mg) prior to admission, which were held due to low blood pressure. Entresto was resumed on 2/27/2024. Bisoprolol and Spironolactone (25 mg) resumed 2/28/2024. Monitor closely    Heart murmur  Assessment & Plan  Chronic and stable    Severe protein-calorie malnutrition (HCC)- (present on admission)  Assessment & Plan  Patient attributes weight loss to stress and poor appetite.  Likely had abdominal pain from gastric ulcer.  Continue to advance diet as tolerated, and nutritional supplements    Anemia due to chronic kidney disease- (present on admission)  Assessment & Plan  Hemoglobin is stable.  S/p surgery.  No signs of acute bleeding.    Liver cirrhosis (HCC)- (present on admission)  Assessment & Plan  Patient has nonalcoholic liver disease.  Patient has an appointment with [facility] in April 2024    Stage 3b chronic kidney disease (HCC)- (present on admission)  Assessment & Plan  Creatinine has normalized.  Avoid nephrotoxins, monitor closely         VTE prophylaxis: SCDs

## 2024-03-03 LAB
ALBUMIN SERPL BCP-MCNC: 2.1 G/DL (ref 3.2–4.9)
ALBUMIN/GLOB SERPL: 0.7 G/DL
ALP SERPL-CCNC: 131 U/L (ref 30–99)
ALT SERPL-CCNC: <5 U/L (ref 2–50)
ANION GAP SERPL CALC-SCNC: 11 MMOL/L (ref 7–16)
AST SERPL-CCNC: 15 U/L (ref 12–45)
BACTERIA UR CULT: NORMAL
BILIRUB SERPL-MCNC: 0.6 MG/DL (ref 0.1–1.5)
BUN SERPL-MCNC: 21 MG/DL (ref 8–22)
CALCIUM ALBUM COR SERPL-MCNC: 9.2 MG/DL (ref 8.5–10.5)
CALCIUM SERPL-MCNC: 7.7 MG/DL (ref 8.4–10.2)
CHLORIDE SERPL-SCNC: 93 MMOL/L (ref 96–112)
CO2 SERPL-SCNC: 20 MMOL/L (ref 20–33)
CREAT SERPL-MCNC: 1.09 MG/DL (ref 0.5–1.4)
GFR SERPLBLD CREATININE-BSD FMLA CKD-EPI: 56 ML/MIN/1.73 M 2
GLOBULIN SER CALC-MCNC: 3.2 G/DL (ref 1.9–3.5)
GLUCOSE SERPL-MCNC: 87 MG/DL (ref 65–99)
PHOSPHATE SERPL-MCNC: 4.7 MG/DL (ref 2.5–4.5)
POTASSIUM SERPL-SCNC: 3.6 MMOL/L (ref 3.6–5.5)
PROT SERPL-MCNC: 5.3 G/DL (ref 6–8.2)
SIGNIFICANT IND 70042: NORMAL
SITE SITE: NORMAL
SODIUM SERPL-SCNC: 124 MMOL/L (ref 135–145)
SOURCE SOURCE: NORMAL

## 2024-03-03 PROCEDURE — A9270 NON-COVERED ITEM OR SERVICE: HCPCS | Performed by: INTERNAL MEDICINE

## 2024-03-03 PROCEDURE — 700111 HCHG RX REV CODE 636 W/ 250 OVERRIDE (IP): Mod: JZ | Performed by: INTERNAL MEDICINE

## 2024-03-03 PROCEDURE — 36415 COLL VENOUS BLD VENIPUNCTURE: CPT

## 2024-03-03 PROCEDURE — 99233 SBSQ HOSP IP/OBS HIGH 50: CPT | Performed by: INTERNAL MEDICINE

## 2024-03-03 PROCEDURE — 80053 COMPREHEN METABOLIC PANEL: CPT

## 2024-03-03 PROCEDURE — 770020 HCHG ROOM/CARE - TELE (206)

## 2024-03-03 PROCEDURE — 94760 N-INVAS EAR/PLS OXIMETRY 1: CPT

## 2024-03-03 PROCEDURE — 700102 HCHG RX REV CODE 250 W/ 637 OVERRIDE(OP): Performed by: INTERNAL MEDICINE

## 2024-03-03 PROCEDURE — 700105 HCHG RX REV CODE 258: Performed by: INTERNAL MEDICINE

## 2024-03-03 PROCEDURE — 84100 ASSAY OF PHOSPHORUS: CPT

## 2024-03-03 RX ORDER — HYDROMORPHONE HYDROCHLORIDE 1 MG/ML
1 INJECTION, SOLUTION INTRAMUSCULAR; INTRAVENOUS; SUBCUTANEOUS EVERY 6 HOURS PRN
Status: DISCONTINUED | OUTPATIENT
Start: 2024-03-03 | End: 2024-03-04 | Stop reason: HOSPADM

## 2024-03-03 RX ORDER — HYDROMORPHONE HYDROCHLORIDE 1 MG/ML
0.5 INJECTION, SOLUTION INTRAMUSCULAR; INTRAVENOUS; SUBCUTANEOUS EVERY 6 HOURS PRN
Status: DISCONTINUED | OUTPATIENT
Start: 2024-03-03 | End: 2024-03-04 | Stop reason: HOSPADM

## 2024-03-03 RX ADMIN — METRONIDAZOLE 500 MG: 500 TABLET ORAL at 04:35

## 2024-03-03 RX ADMIN — BISOPROLOL FUMARATE 5 MG: 5 TABLET ORAL at 04:39

## 2024-03-03 RX ADMIN — CEFTRIAXONE SODIUM 2000 MG: 2 INJECTION, POWDER, FOR SOLUTION INTRAMUSCULAR; INTRAVENOUS at 04:33

## 2024-03-03 RX ADMIN — METRONIDAZOLE 500 MG: 500 TABLET ORAL at 17:35

## 2024-03-03 RX ADMIN — DIBASIC SODIUM PHOSPHATE, MONOBASIC POTASSIUM PHOSPHATE AND MONOBASIC SODIUM PHOSPHATE 250 MG: 852; 155; 130 TABLET ORAL at 04:35

## 2024-03-03 RX ADMIN — SPIRONOLACTONE 25 MG: 25 TABLET ORAL at 04:38

## 2024-03-03 RX ADMIN — SACUBITRIL AND VALSARTAN 1 TABLET: 24; 26 TABLET, FILM COATED ORAL at 04:39

## 2024-03-03 RX ADMIN — OXYCODONE HYDROCHLORIDE 5 MG: 5 TABLET ORAL at 20:08

## 2024-03-03 RX ADMIN — FAMOTIDINE 20 MG: 20 TABLET, FILM COATED ORAL at 17:35

## 2024-03-03 RX ADMIN — FAMOTIDINE 20 MG: 20 TABLET, FILM COATED ORAL at 04:35

## 2024-03-03 RX ADMIN — Medication 5 MG: at 20:07

## 2024-03-03 ASSESSMENT — ENCOUNTER SYMPTOMS
MYALGIAS: 1
ABDOMINAL PAIN: 1
CARDIOVASCULAR NEGATIVE: 1
EYES NEGATIVE: 1
RESPIRATORY NEGATIVE: 1
NEUROLOGICAL NEGATIVE: 1
PSYCHIATRIC NEGATIVE: 1

## 2024-03-03 ASSESSMENT — PAIN DESCRIPTION - PAIN TYPE
TYPE: ACUTE PAIN
TYPE: ACUTE PAIN

## 2024-03-03 ASSESSMENT — LIFESTYLE VARIABLES
TOTAL SCORE: 0
CONSUMPTION TOTAL: NEGATIVE
HAVE PEOPLE ANNOYED YOU BY CRITICIZING YOUR DRINKING: NO
HOW MANY TIMES IN THE PAST YEAR HAVE YOU HAD 5 OR MORE DRINKS IN A DAY: 0
HAVE YOU EVER FELT YOU SHOULD CUT DOWN ON YOUR DRINKING: NO
EVER FELT BAD OR GUILTY ABOUT YOUR DRINKING: NO
AVERAGE NUMBER OF DAYS PER WEEK YOU HAVE A DRINK CONTAINING ALCOHOL: 0
TOTAL SCORE: 0
TOTAL SCORE: 0
EVER HAD A DRINK FIRST THING IN THE MORNING TO STEADY YOUR NERVES TO GET RID OF A HANGOVER: NO
ALCOHOL_USE: NO
ON A TYPICAL DAY WHEN YOU DRINK ALCOHOL HOW MANY DRINKS DO YOU HAVE: 0

## 2024-03-03 ASSESSMENT — PATIENT HEALTH QUESTIONNAIRE - PHQ9
2. FEELING DOWN, DEPRESSED, IRRITABLE, OR HOPELESS: NOT AT ALL
SUM OF ALL RESPONSES TO PHQ9 QUESTIONS 1 AND 2: 0
1. LITTLE INTEREST OR PLEASURE IN DOING THINGS: NOT AT ALL

## 2024-03-03 NOTE — DISCHARGE PLANNING
Per rounds- Anticipating medical clearance tomorrow (3/4) to [facility]. RNCM has left voicemail for Nena of [facility] to arrange transport time. Waiting for call back.

## 2024-03-03 NOTE — CARE PLAN
Problem: Pain - Standard  Goal: Alleviation of pain or a reduction in pain to the patient’s comfort goal  Outcome: Progressing     Problem: Fall Risk  Goal: Patient will remain free from falls  Outcome: Progressing  Note: Fall precaution measures in place   The patient is Stable - Low risk of patient condition declining or worsening    Shift Goals  Clinical Goals: NICKI drain output,IV ABX,pain control  Patient Goals: rest  Family Goals: UT    Progress made toward(s) clinical / shift goals:  Educated on fall prevention measures,encouraged use of call light,updated on plan of care.Medicated as per MAR.    Patient is not progressing towards the following goals:

## 2024-03-03 NOTE — PROGRESS NOTES
"       S: Denisse Abel  is a 63 y.o.  female admitted for post duodenal ulcer status post laparoscopic repair.  Doing well no evidence of leak on upper GI.      O:  BP (!) 81/48   Pulse 92   Temp 36.9 °C (98.4 °F) (Oral)   Resp 16   Ht 1.702 m (5' 7\")   Wt 52.3 kg (115 lb 4.8 oz)   SpO2 98%   Intake/Output                               03/01/24 0700 - 03/02/24 0659 03/02/24 0700 - 03/03/24 0659 03/03/24 0700 - 03/04/24 0659     1621-7962 1811-3657 Total 3246-6991 4775-8191 Total 6859-5863 2051-2500 Total                    Intake    P.O.  --  -- --  120  -- 120  120  -- 120    P.O. -- -- -- 120 -- 120 120 -- 120    I.V.  --  -- --  1499.8  -- 1499.8  --  -- --    Volume (mL) (NS infusion) -- -- -- 1499.8 -- 1499.8 -- -- --    Total Intake -- -- -- 1619.8 -- 1619.8 120 -- 120       Output    Urine  --  -- --  --  -- --  --  -- --    Number of Times Voided -- 3 x 3 x 1 x 2 x 3 x -- -- --    Drains  820  600 1420  660  520 1180  240  -- 240    Output (mL) (Closed/Suction Drain 1 Left LLQ 19 Fr.)   240 -- 240    Stool  --  -- --  --  -- --  --  -- --    Number of Times Stooled 2 x -- 2 x 1 x -- 1 x 2 x -- 2 x    Total Output   240 -- 240       Net I/O     -820 -600 -1420 959.8 -520 439.8 -120 -- -120          Recent Labs     03/01/24  0030 03/03/24  0021   SODIUM 124* 124*   POTASSIUM 4.6 3.6   CHLORIDE 93* 93*   CO2 21 20   GLUCOSE 118* 87   BUN 17 21   CREATININE 0.76 1.09   CALCIUM 7.9* 7.7*     Recent Labs     03/01/24  0030 03/02/24  0042   WBC 14.0* 15.6*   RBC 3.81* 3.62*   HEMOGLOBIN 10.5* 10.2*   HEMATOCRIT 32.6* 30.9*   MCV 85.6 85.4   MCH 27.6 28.2   MCHC 32.2 33.0   RDW 81.4* 79.8*   PLATELETCT 177 209   MPV 10.1 10.4       Alert and Oriented x3, No Acute Distress  Normal Respiratory Effort  Abdomen soft, appropriately tender  Incisions/Bandages clean/dry/intact  Extremities warm and well perfused    A/P:  Awaiting placement.  CT without intra-abdominal " cause of leukocytosis.  We will remove the drain when she is DCed.  Plan for topical lidocaine and a horizontal mattress suture to ensure decrease chance of ascitic leak given her underlying liver pathology. Plan is DC in [place] Amezcua MD  [facility]

## 2024-03-03 NOTE — PROGRESS NOTES
Hospital Medicine Daily Progress Note    Date of Service  3/3/2024    Chief Complaint  Denisse Abel is a 63 y.o. female admitted 2/23/2024 with 48 hours of worsening abdominal pain, nausea, and vomiting.  He has a history of combined systolic/diastolic heart failure, CKD, hypertension, dyslipidemia, prior history of alcohol dependence, liver cirrhosis.    Hospital Course  In the ER, WBCs were 19.3, sodium was 119, lactic acid was 4.1.  CT renal colic on 2/23/2024 showed free intraperitoneal air consistent with perforation of a hollow viscus.  General surgery were consulted.  Underwent diagnostic laparoscopy with laparoscopic Man patch on 2/23/2024.  Drain was placed.  NG tube was placed.  UGI from 2/26/2024 showed no evidence of leak.    Entresto was resumed on 2/27/2024.  Patient underwent CT abdomen pelvis with contrast on 3/1/2023.  No abscess was seen, she has anasarca and findings consistent with hepatocellular disease. Ceftriaxone and Flagyl were started due to low-grade temperature and elevated WBCs.    Interval Problem Update  Afebrile, blood pressure is low. On Entresto, Bisoprolol, Spironolactone. Drain has significant output, likely due to ascites.  Blood cultures from 3/1/2024 have been negative so far. General Surgery following. Tolerating GI soft diet.  PT/OT following.     Briefly discussed with GI, patient has an appointment with [facility] on 4/10/2024, GI will try to get her in earlier.     I have discussed this patient's plan of care and discharge plan at IDT rounds today with Case Management, Nursing, Nursing leadership, and other members of the IDT team.    Consultants/Specialty  general surgery    Code Status  Full Code    Disposition  The patient is not medically cleared for discharge to home or a post-acute facility.  Anticipate discharge to: home with close outpatient follow-up    I have placed the appropriate orders for post-discharge needs.    Review of Systems  Review of Systems    Constitutional:  Positive for malaise/fatigue.   HENT: Negative.     Eyes: Negative.    Respiratory: Negative.     Cardiovascular: Negative.    Gastrointestinal:  Positive for abdominal pain.        Abdominal pain has improved.    Musculoskeletal:  Positive for myalgias.   Skin: Negative.    Neurological: Negative.    Endo/Heme/Allergies: Negative.    Psychiatric/Behavioral: Negative.          Physical Exam  Temp:  [36.4 °C (97.6 °F)-37.1 °C (98.8 °F)] 36.9 °C (98.5 °F)  Pulse:  [81-92] 81  Resp:  [16-17] 16  BP: ()/(44-61) 87/54  SpO2:  [92 %-100 %] 100 %    Physical Exam  Constitutional:       Appearance: She is ill-appearing.   HENT:      Head: Normocephalic.      Nose: Nose normal.      Mouth/Throat:      Mouth: Mucous membranes are moist.   Eyes:      Pupils: Pupils are equal, round, and reactive to light.   Cardiovascular:      Rate and Rhythm: Normal rate.   Pulmonary:      Effort: Pulmonary effort is normal.   Abdominal:      Comments: Surgical drain present with clear fluid. Ascites   Musculoskeletal:      Right lower leg: No edema.      Left lower leg: No edema.   Skin:     General: Skin is warm.   Neurological:      Mental Status: She is alert and oriented to person, place, and time.         Fluids    Intake/Output Summary (Last 24 hours) at 3/3/2024 1202  Last data filed at 3/3/2024 0916  Gross per 24 hour   Intake 1739.77 ml   Output 800 ml   Net 939.77 ml       Laboratory  Recent Labs     03/01/24  0030 03/02/24  0042   WBC 14.0* 15.6*   RBC 3.81* 3.62*   HEMOGLOBIN 10.5* 10.2*   HEMATOCRIT 32.6* 30.9*   MCV 85.6 85.4   MCH 27.6 28.2   MCHC 32.2 33.0   RDW 81.4* 79.8*   PLATELETCT 177 209   MPV 10.1 10.4     Recent Labs     03/01/24  0030 03/03/24  0021   SODIUM 124* 124*   POTASSIUM 4.6 3.6   CHLORIDE 93* 93*   CO2 21 20   GLUCOSE 118* 87   BUN 17 21   CREATININE 0.76 1.09   CALCIUM 7.9* 7.7*                   Imaging  CT-ABDOMEN-PELVIS WITH   Final Result      1.  Free fluid and free air  within the intraperitoneal cavity decreased since previous examination.      2.  Upper abdomen surgical drain. No abscess identified.      3.  Anasarca.      4.  Diffuse mesenteric and retroperitoneal edema. Cannot exclude mesenteric inflammatory changes.      5.  No evidence of bowel obstruction.      6.  Findings consistent with hepatocellular disease and mild splenomegaly.      7.  Cholelithiasis.      8.  Bibasilar pleural fluid and atelectasis. Small pericardial effusion.      DX-CHEST-PORTABLE (1 VIEW)   Final Result      No evidence of acute cardiopulmonary process.      DX-UPPER GI-SERIES WITH KUB   Final Result      No evidence of contrast leak from the surgical repair site.      DX-CHEST-LIMITED (1 VIEW)   Final Result      1.  Free intraperitoneal air consistent with perforation of hollow viscus      2.  Findings were discussed with JUAN PABLO SCHWARTZ on 2/23/2024 11:42 AM.      CT-RENAL COLIC EVALUATION(A/P W/O)   Final Result      1.  Free intraperitoneal air present consistent with perforation of a hollow viscus      2.  Specific site of perforation is not evident on CT      3.  Cirrhosis with portal hypertension      4.  Moderate amount of ascites      5.  Cholelithiasis            6.  Findings were discussed with JUAN PABLO SCHWARTZ on 2/23/2024 11:45 AM.           Assessment/Plan    Duodenal ulcer perforation (HCC)- (present on admission)  Assessment & Plan  CT renal colic on 2/23/2024 showed free intraperitoneal air consistent with perforation of a hollow viscus.  S/P diagnostic laparoscopy with laparoscopic Man patch on 2/23/2024. Drain was placed, still having significant output. UGI from 2/26/2024 showed no evidence of leak.     Patient had increasing WBC, temp of 99.1, low BP on 3/1/2024. Lactic acid was elevated (3.0). Ceftriaxone and Flagyl empirically started. CT abdomen and pelvis from 3/1/2024 showed no signs of abscess.  General Surgery following. Lactic acid improved from 3.0 -> 2.9 -> 1.6 ->  1.2    Acute combined systolic and diastolic heart failure (HCC)- (present on admission)  Assessment & Plan  Continue Entresto, spironolactone and bisoprolol. Follows Dr. Xiong. Recommend to continue outpatient Cardiology follow up    Liver cirrhosis (HCC)- (present on admission)  Assessment & Plan  Patient has nonalcoholic liver disease.  Patient has an appointment with GI  ([facility]) on April 10, 2024. Briefly discussed with GI, they will try to get her in earlier.  Monitor    HTN (hypertension), benign- (present on admission)  Assessment & Plan  Patient was on Entresto, bisoprolol, and Spironolactone (50 mg) prior to admission, which were held due to low blood pressure. Entresto was resumed on 2/27/2024. Bisoprolol and Spironolactone (25 mg) resumed 2/28/2024. Monitor closely    Severe protein-calorie malnutrition (HCC)- (present on admission)  Assessment & Plan  Patient attributes weight loss to stress and poor appetite.  Likely had abdominal pain from gastric ulcer.  Continue to advance diet as tolerated, and nutritional supplements    Anemia due to chronic kidney disease- (present on admission)  Assessment & Plan  Hemoglobin is stable.  S/p surgery.  No signs of acute bleeding.    Liver cirrhosis (HCC)- (present on admission)  Assessment & Plan  Patient has nonalcoholic liver disease.  Patient has an appointment with [facility] in April 2024. Sodium remains low (124)    Stage 3b chronic kidney disease (HCC)- (present on admission)  Assessment & Plan  Creatinine has normalized.  Avoid nephrotoxins, monitor closely    Heart murmur  Assessment & Plan  Chronic and stable     VTE prophylaxis: SCDs

## 2024-03-03 NOTE — PROGRESS NOTES
Telemetry Shift Summary    Rhythm SR/ST  HR Range 80s-100s  Ectopy rare PVC  Measurements 0.18/0.08/0.36        Normal Values  Rhythm SR  HR Range    Measurements 0.12-0.20 / 0.06-0.10  / 0.30-0.52

## 2024-03-03 NOTE — PROGRESS NOTES
Telemetry Shift Summary     Rhythm SR ST  HR Range   Ectopy rPVC,rPAC  Measurements  .16/.08/.38  Per strip printed 0400     Normal Values  Rhythm SR  HR Range    Measurements 0.12-0.20 / 0.06-0.10  / 0.30-0.52

## 2024-03-04 VITALS
TEMPERATURE: 98.2 F | HEIGHT: 67 IN | HEART RATE: 82 BPM | OXYGEN SATURATION: 100 % | BODY MASS INDEX: 18.1 KG/M2 | DIASTOLIC BLOOD PRESSURE: 52 MMHG | WEIGHT: 115.3 LBS | SYSTOLIC BLOOD PRESSURE: 94 MMHG | RESPIRATION RATE: 16 BRPM

## 2024-03-04 LAB
ALBUMIN SERPL BCP-MCNC: 1.9 G/DL (ref 3.2–4.9)
ALBUMIN/GLOB SERPL: 0.7 G/DL
ALP SERPL-CCNC: 124 U/L (ref 30–99)
ALT SERPL-CCNC: <5 U/L (ref 2–50)
ANION GAP SERPL CALC-SCNC: 10 MMOL/L (ref 7–16)
AST SERPL-CCNC: 14 U/L (ref 12–45)
BILIRUB SERPL-MCNC: 0.4 MG/DL (ref 0.1–1.5)
BUN SERPL-MCNC: 23 MG/DL (ref 8–22)
CALCIUM ALBUM COR SERPL-MCNC: 9.1 MG/DL (ref 8.5–10.5)
CALCIUM SERPL-MCNC: 7.4 MG/DL (ref 8.4–10.2)
CHLORIDE SERPL-SCNC: 92 MMOL/L (ref 96–112)
CO2 SERPL-SCNC: 23 MMOL/L (ref 20–33)
CREAT SERPL-MCNC: 1.06 MG/DL (ref 0.5–1.4)
EKG IMPRESSION: NORMAL
ERYTHROCYTE [DISTWIDTH] IN BLOOD BY AUTOMATED COUNT: 76.2 FL (ref 35.9–50)
GFR SERPLBLD CREATININE-BSD FMLA CKD-EPI: 58 ML/MIN/1.73 M 2
GLOBULIN SER CALC-MCNC: 2.7 G/DL (ref 1.9–3.5)
GLUCOSE SERPL-MCNC: 97 MG/DL (ref 65–99)
HCT VFR BLD AUTO: 26.9 % (ref 37–47)
HGB BLD-MCNC: 9.1 G/DL (ref 12–16)
MCH RBC QN AUTO: 28.3 PG (ref 27–33)
MCHC RBC AUTO-ENTMCNC: 33.8 G/DL (ref 32.2–35.5)
MCV RBC AUTO: 83.8 FL (ref 81.4–97.8)
PLATELET # BLD AUTO: 244 K/UL (ref 164–446)
PMV BLD AUTO: 10.5 FL (ref 9–12.9)
POTASSIUM SERPL-SCNC: 3.4 MMOL/L (ref 3.6–5.5)
PROT SERPL-MCNC: 4.6 G/DL (ref 6–8.2)
RBC # BLD AUTO: 3.21 M/UL (ref 4.2–5.4)
SODIUM SERPL-SCNC: 125 MMOL/L (ref 135–145)
WBC # BLD AUTO: 10.1 K/UL (ref 4.8–10.8)

## 2024-03-04 PROCEDURE — 80053 COMPREHEN METABOLIC PANEL: CPT

## 2024-03-04 PROCEDURE — 94760 N-INVAS EAR/PLS OXIMETRY 1: CPT

## 2024-03-04 PROCEDURE — 97535 SELF CARE MNGMENT TRAINING: CPT

## 2024-03-04 PROCEDURE — 700101 HCHG RX REV CODE 250: Performed by: SURGERY

## 2024-03-04 PROCEDURE — 99239 HOSP IP/OBS DSCHRG MGMT >30: CPT | Performed by: INTERNAL MEDICINE

## 2024-03-04 PROCEDURE — 700102 HCHG RX REV CODE 250 W/ 637 OVERRIDE(OP): Performed by: INTERNAL MEDICINE

## 2024-03-04 PROCEDURE — A9270 NON-COVERED ITEM OR SERVICE: HCPCS | Performed by: INTERNAL MEDICINE

## 2024-03-04 PROCEDURE — 700105 HCHG RX REV CODE 258: Performed by: INTERNAL MEDICINE

## 2024-03-04 PROCEDURE — 700111 HCHG RX REV CODE 636 W/ 250 OVERRIDE (IP): Mod: JZ | Performed by: INTERNAL MEDICINE

## 2024-03-04 PROCEDURE — 93010 ELECTROCARDIOGRAM REPORT: CPT | Performed by: INTERNAL MEDICINE

## 2024-03-04 PROCEDURE — 85027 COMPLETE CBC AUTOMATED: CPT

## 2024-03-04 PROCEDURE — 36415 COLL VENOUS BLD VENIPUNCTURE: CPT

## 2024-03-04 PROCEDURE — 93005 ELECTROCARDIOGRAM TRACING: CPT | Performed by: INTERNAL MEDICINE

## 2024-03-04 RX ORDER — SPIRONOLACTONE 25 MG/1
25 TABLET ORAL DAILY
Qty: 30 TABLET | Refills: 0 | Status: SHIPPED | OUTPATIENT
Start: 2024-03-05 | End: 2024-03-28

## 2024-03-04 RX ORDER — METRONIDAZOLE 500 MG/1
500 TABLET ORAL EVERY 12 HOURS
Qty: 6 TABLET | Refills: 0 | Status: ACTIVE | OUTPATIENT
Start: 2024-03-04 | End: 2024-03-07

## 2024-03-04 RX ORDER — AMOXICILLIN AND CLAVULANATE POTASSIUM 875; 125 MG/1; MG/1
1 TABLET, FILM COATED ORAL 2 TIMES DAILY
Qty: 6 TABLET | Refills: 0 | Status: ACTIVE | OUTPATIENT
Start: 2024-03-04 | End: 2024-03-07

## 2024-03-04 RX ORDER — CIPROFLOXACIN 500 MG/1
500 TABLET, FILM COATED ORAL 2 TIMES DAILY
Qty: 6 TABLET | Refills: 0 | Status: ACTIVE | OUTPATIENT
Start: 2024-03-04 | End: 2024-03-04

## 2024-03-04 RX ORDER — POTASSIUM CHLORIDE 7.45 MG/ML
10 INJECTION INTRAVENOUS
Status: DISCONTINUED | OUTPATIENT
Start: 2024-03-04 | End: 2024-03-04

## 2024-03-04 RX ORDER — LIDOCAINE 50 MG/G
OINTMENT TOPICAL ONCE
Status: DISCONTINUED | OUTPATIENT
Start: 2024-03-04 | End: 2024-03-04

## 2024-03-04 RX ORDER — AMOXICILLIN AND CLAVULANATE POTASSIUM 875; 125 MG/1; MG/1
1 TABLET, FILM COATED ORAL ONCE
Status: COMPLETED | OUTPATIENT
Start: 2024-03-04 | End: 2024-03-04

## 2024-03-04 RX ORDER — FAMOTIDINE 20 MG/1
20 TABLET, FILM COATED ORAL 2 TIMES DAILY
Status: SHIPPED
Start: 2024-03-04

## 2024-03-04 RX ORDER — POTASSIUM CHLORIDE 20 MEQ/1
40 TABLET, EXTENDED RELEASE ORAL ONCE
Status: COMPLETED | OUTPATIENT
Start: 2024-03-04 | End: 2024-03-04

## 2024-03-04 RX ORDER — METRONIDAZOLE 500 MG/1
500 TABLET ORAL EVERY 12 HOURS
Qty: 6 TABLET | Refills: 0 | Status: SHIPPED | OUTPATIENT
Start: 2024-03-04 | End: 2024-03-04

## 2024-03-04 RX ADMIN — METRONIDAZOLE 500 MG: 500 TABLET ORAL at 04:53

## 2024-03-04 RX ADMIN — LIDOCAINE HYDROCHLORIDE 20 ML: 10 INJECTION, SOLUTION INFILTRATION; PERINEURAL at 13:12

## 2024-03-04 RX ADMIN — FAMOTIDINE 20 MG: 20 TABLET, FILM COATED ORAL at 04:53

## 2024-03-04 RX ADMIN — POTASSIUM CHLORIDE 40 MEQ: 1500 TABLET, EXTENDED RELEASE ORAL at 14:35

## 2024-03-04 RX ADMIN — AMOXICILLIN AND CLAVULANATE POTASSIUM 1 TABLET: 875; 125 TABLET, FILM COATED ORAL at 13:12

## 2024-03-04 RX ADMIN — CEFTRIAXONE SODIUM 2000 MG: 2 INJECTION, POWDER, FOR SOLUTION INTRAMUSCULAR; INTRAVENOUS at 04:52

## 2024-03-04 ASSESSMENT — COGNITIVE AND FUNCTIONAL STATUS - GENERAL
SUGGESTED CMS G CODE MODIFIER DAILY ACTIVITY: CJ
DRESSING REGULAR LOWER BODY CLOTHING: A LITTLE
HELP NEEDED FOR BATHING: A LITTLE
TOILETING: A LITTLE
DAILY ACTIVITIY SCORE: 20
PERSONAL GROOMING: A LITTLE

## 2024-03-04 ASSESSMENT — PAIN DESCRIPTION - PAIN TYPE: TYPE: ACUTE PAIN

## 2024-03-04 ASSESSMENT — GAIT ASSESSMENTS: DISTANCE (FEET): 100

## 2024-03-04 NOTE — CARE PLAN
Problem: Pain - Standard  Goal: Alleviation of pain or a reduction in pain to the patient’s comfort goal  Outcome: Progressing     Problem: Fall Risk  Goal: Patient will remain free from falls  Outcome: Progressing  Note: Fall precaution measures in place   The patient is Stable - Low risk of patient condition declining or worsening    Shift Goals  Clinical Goals: pain control,Abx,monitor NICKI drain,WBC  Patient Goals: rest,discharge  Family Goals: UT    Progress made toward(s) clinical / shift goals:  Educated on fall prevention measures,encouraged use of call light,call light within reached,updated on plan of care.    Patient is not progressing towards the following goals:

## 2024-03-04 NOTE — CARE PLAN
The patient is Stable - Low risk of patient condition declining or worsening    Shift Goals  Clinical Goals: monitor WBC, NICKI drain, abx  Patient Goals: rest  Family Goals: UT    Progress made toward(s) clinical / shift goals:  POC is to transfer pt to [facility] tomorrow. Dr. Amezcua requests to be notified of discharge orders for NICKI removal. NICKI draining 320mL this shift. Fall precautions in place. Pt verbalizes understanding of POC.       Problem: Fall Risk  Goal: Patient will remain free from falls  Outcome: Progressing     Problem: Hemodynamics  Goal: Patient's hemodynamics, fluid balance and neurologic status will be stable or improve  Outcome: Progressing       Patient is not progressing towards the following goals:

## 2024-03-04 NOTE — DIETARY
Nutrition Update:    Day 10 of admit.  Denisse Abel is a 63 y.o. female with admitting DX of Acute abdomen [R10.0].  Patient being followed to optimize nutrition.    Current Diet: low fiber (GI soft)    Recorded PO % of most meals    Pt most likely to D/C today    Problem: Nutritional:  Goal: Achieve adequate nutritional intake  Description: Patient will consume >50% of meals  Outcome: met    RD following.

## 2024-03-04 NOTE — PROGRESS NOTES
Report received from Santiago; pt sitting up at edge of bed.      Morning assessment done about 0825 as she was finishing breakfast.  NICKI putting out serous fluid; lap stabs open to air with dermabond.      Johanna with case management says that [facility] has a bed and planning for 1630.  Reached out to surgeon about this discharge as plan to pull the drain prior to discharge; ordered lidocaine for this procedure.

## 2024-03-04 NOTE — DISCHARGE PLANNING
0855  Agency/Facility Name: [facility]  Spoke To: Henry  Outcome: Jonathan Figueroa received voicemail over the weekend Pt. Jonathan Figueroa can transport for 12 pm or 1630pm today. DPA said for 12 pm but will contact [facility] back after speaking with CM.  SILVIA BAUGH notified via Teams.    0910  Agency/Facility Name: [facility]  Spoke To: Martha  Outcome: DPA inquired about switching transport time to 1630 pm. Jonathan Thomas is able to switch Pt to 1630 pm pickup time.  RN CM notified via Teams.    9086  Agency/Facility Name: [facility]  Spoke To: Henry  Outcome: Jonathan Figueroa asked for Pt social and if Pt was on oxygen.  RN CM notified.

## 2024-03-04 NOTE — PROGRESS NOTES
Telemetry Shift Summary    Rhythm SR/ST  HR Range   Ectopy R-O PVC, R PAC  Measurements  0.16/0.08/0.38    Normal Values  Rhythm SR  HR Range   Measurements 0.12-0.20 / 0.06-0.10 / 0.30-0.52

## 2024-03-04 NOTE — PROGRESS NOTES
Telemetry Shift Summary     Rhythm SR  HR Range 81-83  Ectopy rPVC,rPAC  Measurements .18/.08/.38   Per strip printed 0400     Normal Values  Rhythm SR  HR Range    Measurements 0.12-0.20 / 0.06-0.10  / 0.30-0.52

## 2024-03-04 NOTE — DISCHARGE PLANNING
Case Management Discharge Planning    Admission Date: 2/23/2024  GMLOS: 5.1  ALOS: 10    6-Clicks ADL Score: 19  6-Clicks Mobility Score: 18      Anticipated Discharge Dispo: Discharge Disposition: D/T to SNF with Medicare cert in anticipation of skilled care (03)  Discharge Address: [id number] [place] [address] [address] [place] [address]    DME Needed: No    Action(s) Taken:     Per DPA, [facility] can accept pt today at 1200 or 1630. RNCM requested 1630 as pt needs to get NICKI drain removed prior to DC.  DPA confirmed transport to [facility] at 1630. Bedside RN and MD notified via Voalte.    COBRA placed in pt's chart, bedside RN aware.    Escalations Completed: None    Medically Clear: Yes    Next Steps: DC to [facility] SNF today at 1630 via [facility] transport    Barriers to Discharge: None

## 2024-03-04 NOTE — PROGRESS NOTES
"       S: Denisse Abel  is a 63 y.o.  female admitted for post duodenal ulcer status post laparoscopic repair.  Doing well no evidence of leak on upper GI.      O:  BP (!) 79/51   Pulse 97   Temp 36.9 °C (98.5 °F) (Oral)   Resp 16   Ht 1.702 m (5' 7\")   Wt 52.3 kg (115 lb 4.8 oz)   SpO2 98%   Intake/Output                               03/02/24 0700 - 03/03/24 0659 03/03/24 0700 - 03/04/24 0659 03/04/24 0700 - 03/05/24 0659     3596-8018 3193-3715 Total 6533-9378 5741-0229 Total 0686-3475 7450-7657 Total                    Intake    P.O.  120  -- 120  240  460 700  120  -- 120    P.O. 120 -- 120 240 460 700 120 -- 120    I.V.  1499.8  -- 1499.8  --  -- --  --  -- --    Volume (mL) (NS infusion) 1499.8 -- 1499.8 -- -- -- -- -- --    Total Intake 1619.8 -- 1619.8 240 460 700 120 -- 120       Output    Urine  --  -- --  --  350 350  --  -- --    Number of Times Voided 1 x 2 x 3 x -- 4 x 4 x -- -- --    Urine Void (mL) -- -- -- -- 350 350 -- -- --    Drains  660  520 1180  320  480 800  --  -- --    Output (mL) (Closed/Suction Drain 1 Left LLQ 19 Fr.)  320 480 800 -- -- --    Stool  --  -- --  --  -- --  --  -- --    Number of Times Stooled 1 x -- 1 x 3 x -- 3 x -- -- --    Total Output   -- -- --       Net I/O     959.8 -520 439.8 -80 -370 -450 120 -- 120          Recent Labs     03/03/24  0021 03/04/24  0045   SODIUM 124* 125*   POTASSIUM 3.6 3.4*   CHLORIDE 93* 92*   CO2 20 23   GLUCOSE 87 97   BUN 21 23*   CREATININE 1.09 1.06   CALCIUM 7.7* 7.4*     Recent Labs     03/02/24  0042 03/04/24  0045   WBC 15.6* 10.1   RBC 3.62* 3.21*   HEMOGLOBIN 10.2* 9.1*   HEMATOCRIT 30.9* 26.9*   MCV 85.4 83.8   MCH 28.2 28.3   MCHC 33.0 33.8   RDW 79.8* 76.2*   PLATELETCT 209 244   MPV 10.4 10.5       Alert and Oriented x3, No Acute Distress  Normal Respiratory Effort  Abdomen soft, appropriately tender  Incisions/Bandages clean/dry/intact  Extremities warm and well " perfused    A/P:  Drain removed today  Diet as tolerated  PPI  Completion of abx  F/u with Dr. Amezcua in 1 week        Jorge Espinoza MD  [facility]

## 2024-03-04 NOTE — THERAPY
Occupational Therapy  Daily Treatment     Patient Name: Denisse Abel  Age:  63 y.o., Sex:  female  Medical Record #: [id number]  Today's Date: 3/4/2024     Precautions  Precautions: (P) Fall Risk  Comments: (P) NICKI drain    Assessment    OT tx emphasis on ADL's, transfers and FWW - see notes below for details.  Therapist reviewed environmental/safety awareness, fall precautions, AE/DME, ADL's and transfers.    Plan    Treatment Plan Status: (P) Continue Current Treatment Plan  Type of Treatment: Self Care / Activities of Daily Living, Adaptive Equipment, Neuro Re-Education / Balance, Therapeutic Exercises, Therapeutic Activity  Treatment Frequency: 3 Times per Week  Treatment Duration: Until Therapy Goals Met    DC Equipment Recommendations: Unable to determine at this time  Discharge Recommendations: (P) Recommend post-acute placement for additional occupational therapy services prior to discharge home    Subjective    Pt was alert and cooperative w/ tx.     Objective       03/04/24 0750    Services   Is patient using  services for this encounter? No   Precautions   Precautions Fall Risk   Comments NICKI drain   Pain   Intervention Declines   Non Verbal Descriptors   Non Verbal Scale  Calm;Unlabored Breathing   Cognition    Cognition / Consciousness WDL   Level of Consciousness Alert   Balance   Sitting Balance (Static) Good   Sitting Balance (Dynamic) Good   Standing Balance (Static) Fair +   Standing Balance (Dynamic) Fair   Weight Shift Sitting Good   Weight Shift Standing Fair   Comments OOB FWW   Bed Mobility    Supine to Sit Modified Independent   Sit to Supine Modified Independent   Activities of Daily Living   Grooming Standby Assist;Standing   Upper Body Dressing Modified Independent   Lower Body Dressing Minimal Assist   Toileting Supervision   How much help from another person does the patient currently need...   Putting on and taking off regular lower body clothing? 3   Bathing  (including washing, rinsing, and drying)? 3   Toileting, which includes using a toilet, bedpan, or urinal? 3   Putting on and taking off regular upper body clothing? 4   Taking care of personal grooming such as brushing teeth? 3   Eating meals? 4   6 Clicks Daily Activity Score 20   Functional Mobility   Sit to Stand Supervised   Bed, Chair, Wheelchair Transfer Supervised   Toilet Transfers Supervised   Transfer Method Stand Step   Mobility Sup FWW   Distance (Feet) 100   # of Times Distance was Traveled 2   Short Term Goals   Short Term Goal # 1 Mod I bed mobility (to/from supine and EOB sitting)   Goal Outcome # 1 Goal met   Short Term Goal # 2 Sup for LB clothing management via AE/DME PRN   Goal Outcome # 2 Progressing as expected   Short Term Goal # 3 Mod I UB clothing management   Goal Outcome # 3 Goal met   Short Term Goal # 4 Mod I toilet transfer via DME   Goal Outcome # 4 Progressing as expected   Occupational Therapy Treatment Plan    O.T. Treatment Plan Continue Current Treatment Plan   Anticipated Discharge Equipment and Recommendations   Discharge Recommendations Recommend post-acute placement for additional occupational therapy services prior to discharge home

## 2024-03-05 ENCOUNTER — PATIENT OUTREACH (OUTPATIENT)
Dept: MEDICAL GROUP | Facility: MEDICAL CENTER | Age: 64
End: 2024-03-05
Payer: COMMERCIAL

## 2024-03-05 NOTE — PROGRESS NOTES
Encounter opened in error. Pt discharged to SNF: [facility]. Pt was scheduled for TCM visit upon discharge, despite dc'ing to [facility]. RN called pt to clarify and per pt, ok to cancel appt with pcp for tcm d/t being at SNF. Pt states she tested positive for covid today at [facility], but is otherwise starting to feel better, stating that she is primarily at SNF for PT reasons. Pt will call back to schedule appt with pcp [facility] and will contact office if any needs arise.

## 2024-03-05 NOTE — PROGRESS NOTES
Discharging Patient home per physician order.  Discharged with medical transport to [facility] at 1720.  Demonstrated understanding of discharge instructions, follow up appointments, home medications, prescriptions sent to [facility], and nursing care instructions for bp management with her diuretics and entrestro.  Ambulating with assistance, voiding without difficulty, pain well controlled, tolerating oral medications, oxygen saturation greater than 90% , tolerating diet.   Educational handouts given and discussed.  Verbalized understanding of discharge instructions and educational handouts.  All questions answered.  Belongings with patient at time of discharge. Pt was sent home with belongings on her lap.

## 2024-03-06 LAB
BACTERIA BLD CULT: NORMAL
BACTERIA BLD CULT: NORMAL
SIGNIFICANT IND 70042: NORMAL
SIGNIFICANT IND 70042: NORMAL
SITE SITE: NORMAL
SITE SITE: NORMAL
SOURCE SOURCE: NORMAL
SOURCE SOURCE: NORMAL

## 2024-03-07 ENCOUNTER — APPOINTMENT (OUTPATIENT)
Dept: CARDIOLOGY | Facility: MEDICAL CENTER | Age: 64
End: 2024-03-07
Attending: NURSE PRACTITIONER
Payer: COMMERCIAL

## 2024-03-07 ENCOUNTER — HOME HEALTH ADMISSION (OUTPATIENT)
Dept: HOME HEALTH SERVICES | Facility: HOME HEALTHCARE | Age: 64
End: 2024-03-07
Payer: MEDICARE

## 2024-03-08 DIAGNOSIS — K21.9 GASTROESOPHAGEAL REFLUX DISEASE WITHOUT ESOPHAGITIS: ICD-10-CM

## 2024-03-08 DIAGNOSIS — I51.89 LEFT VENTRICULAR SYSTOLIC DYSFUNCTION, NYHA CLASS 3: ICD-10-CM

## 2024-03-08 DIAGNOSIS — N18.31 STAGE 3A CHRONIC KIDNEY DISEASE: ICD-10-CM

## 2024-03-08 DIAGNOSIS — I50.20 ACC/AHA STAGE C SYSTOLIC HEART FAILURE (HCC): ICD-10-CM

## 2024-03-08 DIAGNOSIS — R06.09 DYSPNEA ON EXERTION: ICD-10-CM

## 2024-03-08 RX ORDER — SACUBITRIL AND VALSARTAN 24; 26 MG/1; MG/1
1 TABLET, FILM COATED ORAL 2 TIMES DAILY
Qty: 60 TABLET | Refills: 11 | OUTPATIENT
Start: 2024-03-08

## 2024-03-08 RX ORDER — OMEPRAZOLE 20 MG/1
20 CAPSULE, DELAYED RELEASE ORAL DAILY
Qty: 30 CAPSULE | Refills: 2 | OUTPATIENT
Start: 2024-03-08

## 2024-03-08 RX ORDER — EMPAGLIFLOZIN 10 MG/1
10 TABLET, FILM COATED ORAL DAILY
Qty: 90 TABLET | Refills: 4 | OUTPATIENT
Start: 2024-03-08

## 2024-03-08 NOTE — TELEPHONE ENCOUNTER
TT  Received request via: Patient    Was the patient seen in the last year in this department? Yes 2/9/24    Does the patient have an active prescription (recently filled or refills available) for medication(s) requested? Yes.    Pharmacy Name: [id number] - [place], [place] - [address] [[id number]]     Does the patient have snf Plus and need 100 day supply (blood pressure, diabetes and cholesterol meds only)? Patient does not have SCP    Thank you  Michelle GUPTA

## 2024-03-11 ENCOUNTER — APPOINTMENT (OUTPATIENT)
Dept: MEDICAL GROUP | Facility: MEDICAL CENTER | Age: 64
End: 2024-03-11
Payer: COMMERCIAL

## 2024-03-12 ENCOUNTER — APPOINTMENT (OUTPATIENT)
Dept: CARDIOLOGY | Facility: MEDICAL CENTER | Age: 64
End: 2024-03-12
Attending: NURSE PRACTITIONER
Payer: MEDICARE

## 2024-03-28 ENCOUNTER — TELEMEDICINE (OUTPATIENT)
Dept: CARDIOLOGY | Facility: MEDICAL CENTER | Age: 64
End: 2024-03-28
Attending: INTERNAL MEDICINE
Payer: COMMERCIAL

## 2024-03-28 VITALS — SYSTOLIC BLOOD PRESSURE: 115 MMHG | HEART RATE: 75 BPM | DIASTOLIC BLOOD PRESSURE: 75 MMHG

## 2024-03-28 DIAGNOSIS — N18.31 STAGE 3A CHRONIC KIDNEY DISEASE: ICD-10-CM

## 2024-03-28 DIAGNOSIS — I50.20 ACC/AHA STAGE C SYSTOLIC HEART FAILURE (HCC): ICD-10-CM

## 2024-03-28 DIAGNOSIS — Z79.899 HIGH RISK MEDICATION USE: ICD-10-CM

## 2024-03-28 DIAGNOSIS — I51.89 LEFT VENTRICULAR SYSTOLIC DYSFUNCTION, NYHA CLASS 3: ICD-10-CM

## 2024-03-28 DIAGNOSIS — K74.60 HEPATIC CIRRHOSIS, UNSPECIFIED HEPATIC CIRRHOSIS TYPE, UNSPECIFIED WHETHER ASCITES PRESENT (HCC): ICD-10-CM

## 2024-03-28 DIAGNOSIS — I35.0 NONRHEUMATIC AORTIC VALVE STENOSIS: ICD-10-CM

## 2024-03-28 DIAGNOSIS — F10.20 ETOHISM (HCC): ICD-10-CM

## 2024-03-28 DIAGNOSIS — R06.09 DYSPNEA ON EXERTION: ICD-10-CM

## 2024-03-28 RX ORDER — SACUBITRIL AND VALSARTAN 49; 51 MG/1; MG/1
1 TABLET, FILM COATED ORAL 2 TIMES DAILY
Qty: 60 TABLET | Refills: 11 | Status: SHIPPED | OUTPATIENT
Start: 2024-03-28

## 2024-03-28 RX ORDER — SPIRONOLACTONE 50 MG/1
50 TABLET, FILM COATED ORAL DAILY
Qty: 90 TABLET | Refills: 3 | Status: SHIPPED | OUTPATIENT
Start: 2024-03-28

## 2024-03-28 ASSESSMENT — ENCOUNTER SYMPTOMS
FEVER: 0
BRUISES/BLEEDS EASILY: 0
MYALGIAS: 0
DIZZINESS: 0
DIAPHORESIS: 0
SENSORY CHANGE: 0
HEADACHES: 0
FALLS: 0
SHORTNESS OF BREATH: 1
ABDOMINAL PAIN: 0
DOUBLE VISION: 0
BLURRED VISION: 0
PALPITATIONS: 0
DEPRESSION: 0
COUGH: 0
MEMORY LOSS: 0

## 2024-03-28 NOTE — PROGRESS NOTES
Chief Complaint   Patient presents with    Follow-Up     Dx: ACC/AHA stage C systolic heart failure (HCC)     This evaluation was conducted via Zoom using secure and encrypted videoconferencing technology. The patient was in their home in the [facility].    The patient's identity was confirmed and verbal consent was obtained for this virtual visit.      Subjective     Denisse Abel is a 63 y.o. female who presents today for cardiac care and evaluation in the heart failure clinic because of recent hospitalization due to heart failure exacerbation. The diagnosis of HF was made in 01/2024. It was presumed to be non-ischemic in nature. The left ventricular systolic function was documented to be at 40%. Patient was diuresed in the hospital and discharged home with guidelines directed medical therapy. She does have hx of ETOH.    LVEF of 40 % with global hypokinesis. No significant valvular disease. I have independently interpreted and reviewed echocardiogram's actual images.     Patient was able to complete 300 m during his 6 minute walk test. her O2 saturation at baseline was 99% and at the end of the test, the O2 saturation was 98%. she reported 5 level of dyspnea on Kylah scale.    I have personally interpreted EKG today with patient, there is no evidence of acute coronary syndrome, no evidence of prior infarct, normal WV and QT interval, no significant conduction disease. Sinus rhythm.    I have independently interpreted and reviewed blood tests results with patient in clinic which shows LDL level of 65, triglycerides level of 74, GFR of 58, NT pro BNP of 5713, K of 3.4.    Cirrhosis seen on CT.      Past Medical History:   Diagnosis Date    Acute combined systolic and diastolic heart failure (HCC) 01/22/2024    HLD (hyperlipidemia)     HTN (hypertension)     Hypertension     Obesity      Past Surgical History:   Procedure Laterality Date    [place] LAP,DIAGNOSTIC ABDOMEN  2/23/2024    Procedure: LAPAROSCOPY,  KIM'S PATCH;  Surgeon: Neel Amezcua M.D.;  Location: SURGERY [facility];  Service: General    TRIGGER FINGER RELEASE Right 3/31/2017    Procedure: TRIGGER FINGER RELEASE;  Surgeon: Jimmy Atkins M.D.;  Location: SURGERY [facility];  Service:     GANGLION EXCISION Right 3/31/2017    Procedure: GANGLION EXCISION - HAND CYST;  Surgeon: Jimmy Atkins M.D.;  Location: SURGERY [facility];  Service:     PELVISCOPY  6/5/08    Performed by NATALIYA ARCEO at SURGERY SAME DAY [facility] ORS    OVARIAN CYSTECTOMY  6/5/08    Performed by NATALIYA ARCEO at SURGERY SAME DAY [facility] ORS    CYSTOSCOPY  6/5/08    Performed by NATALIYA ARCEO at SURGERY SAME DAY [facility] ORS    HYSTERECTOMY LAPAROSCOPY  2003    KNEE ARTHROSCOPY       Family History   Problem Relation Age of Onset    Diabetes Mother     Heart Disease Mother     Stroke Mother     Diabetes Father      Social History     Socioeconomic History    Marital status: Single     Spouse name: Not on file    Number of children: Not on file    Years of education: Not on file    Highest education level: Master's degree (e.g., MA, MS, Monica, MEd, MSW, SUMAYA)   Occupational History    Not on file   Tobacco Use    Smoking status: Never    Smokeless tobacco: Never   Vaping Use    Vaping Use: Never used   Substance and Sexual Activity    Alcohol use: Not Currently     Alcohol/week: 3.6 oz     Types: 6 Glasses of wine per week     Comment: denies    Drug use: Not Currently     Types: Marijuana     Comment: denies    Sexual activity: Not Currently   Other Topics Concern    Not on file   Social History Narrative    Not on file     Social Determinants of Health     Financial Resource Strain: Low Risk  (1/11/2024)    Overall Financial Resource Strain (CARDIA)     Difficulty of Paying Living Expenses: Not hard at all   Food Insecurity: No Food Insecurity (1/11/2024)    Hunger Vital Sign     Worried About Running Out of Food in the Last Year: Never true     Ran Out  of Food in the Last Year: Never true   Transportation Needs: No Transportation Needs (1/11/2024)    PRAPARE - Transportation     Lack of Transportation (Medical): No     Lack of Transportation (Non-Medical): No   Physical Activity: Sufficiently Active (1/11/2024)    Exercise Vital Sign     Days of Exercise per Week: 5 days     Minutes of Exercise per Session: 30 min   Stress: Stress Concern Present (1/11/2024)    [employer] [place] [employer] - Occupational Stress Questionnaire     Feeling of Stress : Very much   Social Connections: Socially Isolated (1/11/2024)    Social Connection and Isolation Panel [NHANES]     Frequency of Communication with Friends and Family: More than three times a week     Frequency of Social Gatherings with Friends and Family: Once a week     Attends Yazidism Services: Never     Active Member of Clubs or Organizations: No     Attends Club or Organization Meetings: Never     Marital Status:    Intimate Partner Violence: Not on file   Housing Stability: Low Risk  (1/11/2024)    Housing Stability Vital Sign     Unable to Pay for Housing in the Last Year: No     Number of Places Lived in the Last Year: 1     Unstable Housing in the Last Year: No     Allergies   Allergen Reactions    Sulfa Drugs Rash     Pt reports that it was a childhood allergie received body rash      Outpatient Encounter Medications as of 3/28/2024   Medication Sig Dispense Refill    spironolactone (ALDACTONE) 50 MG Tab Take 1 Tablet by mouth every day. 90 Tablet 3    sacubitril-valsartan (ENTRESTO) 49-51 MG Tab Take 1 Tablet by mouth 2 times a day. 60 Tablet 11    famotidine (PEPCID) 20 MG Tab Take 1 Tablet by mouth 2 times a day.      traMADol (ULTRAM) 50 MG Tab Take 50 mg by mouth every 8 hours as needed. Indications: Pain      Empagliflozin (JARDIANCE) 10 MG Tab tablet Take 1 Tablet by mouth every day. 90 Tablet 4    bisoprolol (ZEBETA) 5 MG Tab Take 1 Tablet by mouth every day. 30 Tablet 11     omeprazole (PRILOSEC) 20 MG delayed-release capsule Take 1 Capsule by mouth every day. 30 Capsule 2    acetaminophen (TYLENOL) 500 MG Tab Take 500-1,000 mg by mouth every 6 hours as needed. Indications: Pain      [DISCONTINUED] spironolactone (ALDACTONE) 25 MG Tab Take 1 Tablet by mouth every day. 30 Tablet 0    [DISCONTINUED] sacubitril-valsartan (ENTRESTO) 24-26 MG Tab Take 1 Tablet by mouth 2 times a day. 60 Tablet 11     No facility-administered encounter medications on file as of 3/28/2024.     Review of Systems   Constitutional:  Negative for diaphoresis and fever.   HENT:  Negative for nosebleeds.    Eyes:  Negative for blurred vision and double vision.   Respiratory:  Positive for shortness of breath. Negative for cough.    Cardiovascular:  Negative for chest pain and palpitations.   Gastrointestinal:  Negative for abdominal pain.   Genitourinary:  Negative for dysuria and frequency.   Musculoskeletal:  Negative for falls and myalgias.   Skin:  Negative for rash.   Neurological:  Negative for dizziness, sensory change and headaches.   Endo/Heme/Allergies:  Does not bruise/bleed easily.   Psychiatric/Behavioral:  Negative for depression and memory loss.               Objective     /75 (BP Location: Left arm, Patient Position: Sitting, BP Cuff Size: Adult)   Pulse 75     Physical Exam  Vitals and nursing note reviewed.   Constitutional:       General: She is not in acute distress.     Appearance: She is not diaphoretic.   HENT:      Head: Normocephalic and atraumatic.      Right Ear: External ear normal.      Left Ear: External ear normal.      Nose: No congestion or rhinorrhea.   Eyes:      General:         Right eye: No discharge.         Left eye: No discharge.   Neck:      Thyroid: No thyromegaly.      Vascular: No JVD.   Cardiovascular:      Rate and Rhythm: Normal rate and regular rhythm.      Pulses: Normal pulses.   Pulmonary:      Effort: No respiratory distress.   Abdominal:      General:  There is no distension.      Tenderness: There is no abdominal tenderness.   Musculoskeletal:         General: No swelling or tenderness.      Right lower leg: No edema.      Left lower leg: No edema.   Skin:     General: Skin is warm and dry.   Neurological:      Mental Status: She is alert and oriented to person, place, and time.      Cranial Nerves: No cranial nerve deficit.   Psychiatric:         Behavior: Behavior normal.                Assessment & Plan     1. ACC/AHA stage C systolic heart failure (HCC)  spironolactone (ALDACTONE) 50 MG Tab    sacubitril-valsartan (ENTRESTO) 49-51 MG Tab      2. Left ventricular systolic dysfunction, NYHA class 3  spironolactone (ALDACTONE) 50 MG Tab    sacubitril-valsartan (ENTRESTO) 49-51 MG Tab      3. Stage 3a chronic kidney disease (HCC)  Basic Metabolic Panel      4. ETOHism (HCC)        5. Hepatic cirrhosis, unspecified hepatic cirrhosis type, unspecified whether ascites present (HCC)        6. Nonrheumatic aortic valve stenosis        7. Dyspnea on exertion  spironolactone (ALDACTONE) 50 MG Tab    sacubitril-valsartan (ENTRESTO) 49-51 MG Tab    proBrain Natriuretic Peptide, NT      8. High risk medication use  Basic Metabolic Panel            Medical Decision Making: Today's Assessment/Status/Plan:   Dilated Cardiomyopathy:  Chronically illed condition which requires ongoing close monitoring and treatment to improve survival rate along with decreasing risk of clinical decompensation sudden cardiac death and hospitalization.    Today, based on physical examination findings, patient is euvolemic. No JVD, lungs are clear to auscultation, no pitting edema in bilateral lower extremities, no ascites.     Dry weight is 115 lbs.    Based on the overall clinical history and profile, patient is a good candidate for Entresto therapy (with superiority over ACE-I therapy in terms of survival benefits and prevention of future hospitalization).  Will increase Entresto therapy to  49/51 mg po bid.    Based on recent data on SGLT2 and heart failure with reduced ejection fraction, patient will be benefited from Jardiance 10 mg p.o. once a day for further reduction in mortality and hospitalization with absolute risk reduction of 5.2%.  Therefore, I will start patient on Jardiance 10 mg p.o. once a day.  Risks and benefits were explained to patient and patient has agreed to proceed.    Will start Bisoprolol 5 mg daily.    Aldactone antagonist with Spironolactone 50 mg daily.    No indication for ICD.    No more ETOH advised.    Ischemic work up with coronary CTA.     Of note, during the care of this patient, I spent a significant amount of time explaining the nature of the disease process, reviewing all possible imaging studies, blood test results to patient.  The overall care of this patient require a higher level of care than usual due to the multiple comorbidities along with ongoing issues of congestive heart failure that put this patient at risk for sudden cardiac death, increased mortality, and hospitalization.  This patient also requires close monitoring with at least monthly blood test to monitor renal function and electrolytes due to the ongoing dynamic changes of medical therapy titration protocol to ensure optimal benefits for the overall care of this patient.     This visit encounter signifies the visit complexity inherent to evaluation and management associated with medical care services that serve as the continuing focal point for all needed health care services and/or with medical care services that are part of ongoing care related to this patient's single, serious condition, complex cardiac condition.      German Xiong M.D.

## 2024-04-02 ENCOUNTER — APPOINTMENT (OUTPATIENT)
Dept: MEDICAL GROUP | Facility: MEDICAL CENTER | Age: 64
End: 2024-04-02

## 2024-04-04 ENCOUNTER — DOCUMENTATION (OUTPATIENT)
Dept: CARDIOLOGY | Facility: MEDICAL CENTER | Age: 64
End: 2024-04-04

## 2024-04-04 NOTE — PROGRESS NOTES
This patient has been flagged through our echocardiogram surveillance with the following echocardiogram results:    Moderate Aortic Stenosis    Cardiologist: Dr. Xiong    Current Plan of Care for Structural Heart Disease: Added to surveillance tracking list    Next Echo date needed by: 1/22/2025    Next Follow up appointment needed by: 3/28/2025

## 2024-04-19 ENCOUNTER — TELEPHONE (OUTPATIENT)
Dept: HEALTH INFORMATION MANAGEMENT | Facility: OTHER | Age: 64
End: 2024-04-19

## 2024-04-22 ENCOUNTER — TELEPHONE (OUTPATIENT)
Dept: CARDIOLOGY | Facility: MEDICAL CENTER | Age: 64
End: 2024-04-22

## 2024-04-22 NOTE — LETTER
PROCEDURE/SURGERY CLEARANCE FORM      Encounter Date: 4/22/2024    Patient: Denisse Abel  YOB: 1960    CARDIOLOGIST:  German Xiong M.D.    REFERRING DOCTOR:  No ref. provider found    The above patient is cleared to have the following procedure/surgery: General Anesthesia for thermal destruction of intraosseous basivertebral nerve L3,L4, and L5 surgery                                            PROCEDURE/SURGERY CLEARANCE FORM    Date: 4/23/2024   Patient Name: Denisse Abel    Dear Surgeon or Proceduralist,      Thank you for your request for cardiac stratification of our mutual patient Denisse Abel 1960. We have reviewed their [facility] records; and to the best of our understanding this patient has not had stenting, ablation, cardiothoracic surgery or hospitalization for cardiovascular reasons in the past 6 months.  Denisse Abel has been seen within the past 18 months and is considered to have non-modifiable cardiac risk for this low-risk procedure/surgery. They may proceed from a cardiovascular standpoint and may hold their antiplatelet/anticoagulation as briefly as possible. Please have patient resume this medication when hemodynamically stable to do so.     Aspirin or Prasugrel   - hold 7 days prior to procedure/surgery, resume when hemodynamically stable      Clopidrogrel or Ticagrelor  - hold 7 days for all neurological procedures, hold 5 days prior to all other procedure/surgery,  resume when hemodynamically stable     Warfarin - hold 7 days for all neurological procedures, hold 5 days prior to all other procedure/surgery and coordinate with [facility] ([phone]) INR testing and dose management.      Pradaxa/Xarelto/Eliquis/Savesya - hold 1 day prior to procedure for low bleeding risk procedure, 2 days for high bleeding risk procedure, or consider holding 3 days or longer for patients with reduced kidney function (CrCl <30mL/min) or  spinal/cranial surgeries/procedures.      If they have a mechanical heart valve, please coordinate with [facility] ([phone]) the proper management of their anticoagulant in the periprocedural or perioperative period.      Some patients have higher risk for cardiovascular complications or holding medication. If our patient has had prior complications of holding antiplatelet or anticoagulants in the past and we have seen them after these events, we have addressed these concerns with the patient. They are at an unknown degree of increased risk for recurrent complication.  You may hold anticoagulation/antiplatelets for the procedure or surgery if the benefits of the procedure or surgery outweigh this nonmodifiable risk.      If Denisse Abel 1960 has new symptoms of heart failure decompensation, unstable arrythmia, or angina please reach out and we will assess the patient.      If you have other patient-specific concerns, please feel free to reach out to the patient's cardiologist directly at [phone].     Thank you,       [facility]

## 2024-04-22 NOTE — TELEPHONE ENCOUNTER
Last OV: 3/8/2024  Proposed Surgery: General Anesthesia for thermal destruction of intraosseous basivertebral nerve L3,L4, and L5 surgery   Surgery Date: TBD   Requesting Office Name: [facility]  Fax Number: [phone]  Preference of Location (default is surgery center unless specified by Cardiologist or [facility])  Prior Clearance Addressed: No      Anticoags/Antiplatelets: Other none   Outstanding Cardiac Imaging : Yes  Echo.   Clearance to provider to review  Stent, Cardiac Devices, or Catheterization: No  Ablation, TAVR/Valve (including open heart), Cardioversion: No  Recent Cardiac Hospitalization: Yes  Date:  01/22/2024            When: Hospitalized in the last 3 months. Forward to provider to review.   History (cardiac history):   Past Medical History:   Diagnosis Date    Acute combined systolic and diastolic heart failure (HCC) 01/22/2024    HLD (hyperlipidemia)     HTN (hypertension)     Hypertension     Obesity              Surgical Clearance Letter Sent: No Provider to advise.   **Scan clearance request letter into [facility].**

## 2024-05-01 ENCOUNTER — TELEPHONE (OUTPATIENT)
Dept: CARDIOLOGY | Facility: MEDICAL CENTER | Age: 64
End: 2024-05-01
Payer: MEDICARE

## 2024-05-01 NOTE — TELEPHONE ENCOUNTER
Spoke with patient in regards to her blood work that was order on her last visit. Patient stated that she will have labs done prior to her appointment.

## 2024-05-09 ENCOUNTER — HOSPITAL ENCOUNTER (OUTPATIENT)
Dept: LAB | Facility: MEDICAL CENTER | Age: 64
End: 2024-05-09
Attending: INTERNAL MEDICINE
Payer: MEDICARE

## 2024-05-09 DIAGNOSIS — Z79.899 HIGH RISK MEDICATION USE: ICD-10-CM

## 2024-05-09 DIAGNOSIS — N18.31 STAGE 3A CHRONIC KIDNEY DISEASE: ICD-10-CM

## 2024-05-09 DIAGNOSIS — R06.09 DYSPNEA ON EXERTION: ICD-10-CM

## 2024-05-09 LAB
ANION GAP SERPL CALC-SCNC: 16 MMOL/L (ref 7–16)
BUN SERPL-MCNC: 54 MG/DL (ref 8–22)
CALCIUM SERPL-MCNC: 9.8 MG/DL (ref 8.5–10.5)
CHLORIDE SERPL-SCNC: 95 MMOL/L (ref 96–112)
CO2 SERPL-SCNC: 18 MMOL/L (ref 20–33)
CREAT SERPL-MCNC: 1.61 MG/DL (ref 0.5–1.4)
GFR SERPLBLD CREATININE-BSD FMLA CKD-EPI: 35 ML/MIN/1.73 M 2
GLUCOSE SERPL-MCNC: 116 MG/DL (ref 65–99)
NT-PROBNP SERPL IA-MCNC: 1315 PG/ML (ref 0–125)
POTASSIUM SERPL-SCNC: 4.5 MMOL/L (ref 3.6–5.5)
SODIUM SERPL-SCNC: 129 MMOL/L (ref 135–145)
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
## 2024-05-21 ENCOUNTER — OFFICE VISIT (OUTPATIENT)
Dept: CARDIOLOGY | Facility: MEDICAL CENTER | Age: 64
End: 2024-05-21
Attending: INTERNAL MEDICINE
Payer: MEDICARE

## 2024-05-21 VITALS
HEART RATE: 68 BPM | HEIGHT: 67 IN | OXYGEN SATURATION: 100 % | RESPIRATION RATE: 14 BRPM | SYSTOLIC BLOOD PRESSURE: 82 MMHG | DIASTOLIC BLOOD PRESSURE: 60 MMHG | BODY MASS INDEX: 19.65 KG/M2 | WEIGHT: 125.2 LBS

## 2024-05-21 DIAGNOSIS — K74.60 HEPATIC CIRRHOSIS, UNSPECIFIED HEPATIC CIRRHOSIS TYPE, UNSPECIFIED WHETHER ASCITES PRESENT (HCC): ICD-10-CM

## 2024-05-21 DIAGNOSIS — I50.20 ACC/AHA STAGE C SYSTOLIC HEART FAILURE (HCC): ICD-10-CM

## 2024-05-21 DIAGNOSIS — I35.0 NONRHEUMATIC AORTIC VALVE STENOSIS: ICD-10-CM

## 2024-05-21 DIAGNOSIS — R06.09 DYSPNEA ON EXERTION: ICD-10-CM

## 2024-05-21 DIAGNOSIS — I35.0 MODERATE AORTIC STENOSIS: ICD-10-CM

## 2024-05-21 DIAGNOSIS — F10.20 ETOHISM (HCC): ICD-10-CM

## 2024-05-21 DIAGNOSIS — I51.89 LEFT VENTRICULAR SYSTOLIC DYSFUNCTION, NYHA CLASS 3: ICD-10-CM

## 2024-05-21 DIAGNOSIS — N18.31 STAGE 3A CHRONIC KIDNEY DISEASE: ICD-10-CM

## 2024-05-21 DIAGNOSIS — Z79.899 HIGH RISK MEDICATION USE: ICD-10-CM

## 2024-05-21 PROCEDURE — G2211 COMPLEX E/M VISIT ADD ON: HCPCS | Performed by: INTERNAL MEDICINE

## 2024-05-21 PROCEDURE — 3078F DIAST BP <80 MM HG: CPT | Performed by: INTERNAL MEDICINE

## 2024-05-21 PROCEDURE — 99214 OFFICE O/P EST MOD 30 MIN: CPT | Performed by: INTERNAL MEDICINE

## 2024-05-21 PROCEDURE — 3074F SYST BP LT 130 MM HG: CPT | Performed by: INTERNAL MEDICINE

## 2024-05-21 ASSESSMENT — ENCOUNTER SYMPTOMS
DIAPHORESIS: 0
PALPITATIONS: 0
DIZZINESS: 0
BLURRED VISION: 0
FALLS: 0
DOUBLE VISION: 0
SHORTNESS OF BREATH: 1
HEADACHES: 0
ABDOMINAL PAIN: 0
DEPRESSION: 0
COUGH: 0
MYALGIAS: 0
MEMORY LOSS: 0
BRUISES/BLEEDS EASILY: 0
FEVER: 0
SENSORY CHANGE: 0

## 2024-05-21 ASSESSMENT — FIBROSIS 4 INDEX: FIB4 SCORE: 1.76

## 2024-05-21 NOTE — PROGRESS NOTES
Chief Complaint   Patient presents with    Follow-Up     ACC/AHA stage C systolic heart failure (HCC)       Subjective     Denisse Abel is a 63 y.o. female who presents today for cardiac care and evaluation in the heart failure clinic because of recent hospitalization due to heart failure exacerbation. The diagnosis of HF was made in 01/2024. It was presumed to be non-ischemic in nature. The left ventricular systolic function was documented to be at 40%. Patient was diuresed in the hospital and discharged home with guidelines directed medical therapy. She does have hx of ETOH.    LVEF of 40 % with global hypokinesis. No significant valvular disease. I have independently interpreted and reviewed echocardiogram's actual images.     Patient was able to complete 300 m during his 6 minute walk test. her O2 saturation at baseline was 99% and at the end of the test, the O2 saturation was 98%. she reported 5 level of dyspnea on Kylah scale.    I have personally interpreted EKG today with patient, there is no evidence of acute coronary syndrome, no evidence of prior infarct, normal IA and QT interval, no significant conduction disease. Sinus rhythm.    I have independently interpreted and reviewed blood tests results with patient in clinic which shows LDL level of 65, triglycerides level of 74, GFR of 58, NT pro BNP of 5713, K of 3.4.    Cirrhosis seen on CT.      Past Medical History:   Diagnosis Date    Acute combined systolic and diastolic heart failure (HCC) 01/22/2024    HLD (hyperlipidemia)     HTN (hypertension)     Hypertension     Obesity      Past Surgical History:   Procedure Laterality Date    IA LAP,DIAGNOSTIC ABDOMEN  2/23/2024    Procedure: LAPAROSCOPY, KIM'S PATCH;  Surgeon: Neel Amezcua M.D.;  Location: SURGERY [facility];  Service: General    TRIGGER FINGER RELEASE Right 3/31/2017    Procedure: TRIGGER FINGER RELEASE;  Surgeon: Jimmy Atkins M.D.;  Location: [facility] ORS;   Service:     GANGLION EXCISION Right 3/31/2017    Procedure: GANGLION EXCISION - HAND CYST;  Surgeon: Jimmy Atkins M.D.;  Location: SURGERY [facility];  Service:     PELVISCOPY  6/5/08    Performed by NATALIYA ARCEO at SURGERY SAME DAY [facility]    OVARIAN CYSTECTOMY  6/5/08    Performed by NATALIYA ARCEO at SURGERY SAME DAY [facility]    CYSTOSCOPY  6/5/08    Performed by NATALIYA ARCEO at SURGERY SAME DAY [facility]    HYSTERECTOMY LAPAROSCOPY  2003    KNEE ARTHROSCOPY       Family History   Problem Relation Age of Onset    Diabetes Mother     Heart Disease Mother     Stroke Mother     Diabetes Father      Social History     Socioeconomic History    Marital status: Single     Spouse name: Not on file    Number of children: Not on file    Years of education: Not on file    Highest education level: Master's degree (e.g., MA, MS, Monica, MEd, MSW, SUMAYA)   Occupational History    Not on file   Tobacco Use    Smoking status: Never    Smokeless tobacco: Never   Vaping Use    Vaping status: Never Used   Substance and Sexual Activity    Alcohol use: Not Currently     Alcohol/week: 3.6 oz     Types: 6 Glasses of wine per week     Comment: denies    Drug use: Not Currently     Types: Marijuana     Comment: denies    Sexual activity: Not Currently   Other Topics Concern    Not on file   Social History Narrative    Not on file     Social Determinants of Health     Financial Resource Strain: Low Risk  (1/11/2024)    Overall Financial Resource Strain (CARDIA)     Difficulty of Paying Living Expenses: Not hard at all   Food Insecurity: No Food Insecurity (1/11/2024)    Hunger Vital Sign     Worried About Running Out of Food in the Last Year: Never true     Ran Out of Food in the Last Year: Never true   Transportation Needs: No Transportation Needs (1/11/2024)    PRAPARE - Transportation     Lack of Transportation (Medical): No     Lack of Transportation (Non-Medical): No   Physical Activity: Sufficiently  Active (1/11/2024)    Exercise Vital Sign     Days of Exercise per Week: 5 days     Minutes of Exercise per Session: 30 min   Stress: Stress Concern Present (1/11/2024)    [employer] - Occupational Stress Questionnaire     Feeling of Stress : Very much   Social Connections: Socially Isolated (1/11/2024)    Social Connection and Isolation Panel [NHANES]     Frequency of Communication with Friends and Family: More than three times a week     Frequency of Social Gatherings with Friends and Family: Once a week     Attends Denominational Services: Never     Active Member of Clubs or Organizations: No     Attends Club or Organization Meetings: Never     Marital Status:    Intimate Partner Violence: Not on file   Housing Stability: Low Risk  (1/11/2024)    Housing Stability Vital Sign     Unable to Pay for Housing in the Last Year: No     Number of Places Lived in the Last Year: 1     Unstable Housing in the Last Year: No     Allergies   Allergen Reactions    Pcn [Penicillins] Rash     Pt reports that it was a childhood allergie received body rash   Childhood allergy. Augmentin challenged 3/4/24 and tolerated.     Outpatient Encounter Medications as of 5/21/2024   Medication Sig Dispense Refill    spironolactone (ALDACTONE) 50 MG Tab Take 1 Tablet by mouth every day. 90 Tablet 3    sacubitril-valsartan (ENTRESTO) 49-51 MG Tab Take 1 Tablet by mouth 2 times a day. 60 Tablet 11    famotidine (PEPCID) 20 MG Tab Take 1 Tablet by mouth 2 times a day.      traMADol (ULTRAM) 50 MG Tab Take 50 mg by mouth every 8 hours as needed. Indications: Pain      Empagliflozin (JARDIANCE) 10 MG Tab tablet Take 1 Tablet by mouth every day. 90 Tablet 4    bisoprolol (ZEBETA) 5 MG Tab Take 1 Tablet by mouth every day. 30 Tablet 11    omeprazole (PRILOSEC) 20 MG delayed-release capsule Take 1 Capsule by mouth every day. 30 Capsule 2    acetaminophen (TYLENOL) 500 MG Tab Take 500-1,000 mg by mouth every 6 hours as  "needed. Indications: Pain       No facility-administered encounter medications on file as of 5/21/2024.     Review of Systems   Constitutional:  Negative for diaphoresis and fever.   HENT:  Negative for nosebleeds.    Eyes:  Negative for blurred vision and double vision.   Respiratory:  Positive for shortness of breath. Negative for cough.    Cardiovascular:  Positive for leg swelling. Negative for chest pain and palpitations.   Gastrointestinal:  Negative for abdominal pain.   Genitourinary:  Negative for dysuria and frequency.   Musculoskeletal:  Negative for falls and myalgias.   Skin:  Negative for rash.   Neurological:  Negative for dizziness, sensory change and headaches.   Endo/Heme/Allergies:  Does not bruise/bleed easily.   Psychiatric/Behavioral:  Negative for depression and memory loss.               Objective     BP (!) 82/60 (BP Location: Right arm, Patient Position: Sitting, BP Cuff Size: Adult)   Pulse 68   Resp 14   Ht 1.702 m (5' 7\")   Wt 56.8 kg (125 lb 3.2 oz)   SpO2 100%   BMI 19.61 kg/m²     Physical Exam  Vitals and nursing note reviewed.   Constitutional:       General: She is not in acute distress.     Appearance: She is not diaphoretic.   HENT:      Head: Normocephalic and atraumatic.      Right Ear: External ear normal.      Left Ear: External ear normal.      Nose: No congestion or rhinorrhea.   Eyes:      General:         Right eye: No discharge.         Left eye: No discharge.   Neck:      Thyroid: No thyromegaly.      Vascular: No JVD.   Cardiovascular:      Rate and Rhythm: Normal rate and regular rhythm.      Pulses: Normal pulses.      Heart sounds: Murmur heard.   Pulmonary:      Effort: No respiratory distress.   Abdominal:      General: There is no distension.      Tenderness: There is no abdominal tenderness.   Musculoskeletal:         General: No swelling or tenderness.      Right lower leg: Edema present.      Left lower leg: Edema present.   Skin:     General: Skin is " warm and dry.   Neurological:      Mental Status: She is alert and oriented to person, place, and time.      Cranial Nerves: No cranial nerve deficit.   Psychiatric:         Behavior: Behavior normal.                Assessment & Plan     1. ACC/AHA stage C systolic heart failure (HCC)  Basic Metabolic Panel    proBrain Natriuretic Peptide, NT      2. Left ventricular systolic dysfunction, NYHA class 3  Basic Metabolic Panel    proBrain Natriuretic Peptide, NT      3. Stage 3a chronic kidney disease        4. ETOHism (HCC)        5. Hepatic cirrhosis, unspecified hepatic cirrhosis type, unspecified whether ascites present (HCC)        6. Nonrheumatic aortic valve stenosis        7. Dyspnea on exertion  proBrain Natriuretic Peptide, NT      8. High risk medication use  Basic Metabolic Panel      9. Moderate aortic stenosis                Medical Decision Making: Today's Assessment/Status/Plan:   Dilated Cardiomyopathy:  Chronically illed condition which requires ongoing close monitoring and treatment to improve survival rate along with decreasing risk of clinical decompensation sudden cardiac death and hospitalization.    Today, based on physical examination findings, patient is euvolemic. No JVD, lungs are clear to auscultation, no pitting edema in bilateral lower extremities, no ascites.     Dry weight is 115 lbs.    Based on the overall clinical history and profile, patient is a good candidate for Entresto therapy (with superiority over ACE-I therapy in terms of survival benefits and prevention of future hospitalization).  Will increase Entresto therapy to 49/51 mg po bid.    Based on recent data on SGLT2 and heart failure with reduced ejection fraction, patient will be benefited from Jardiance 10 mg p.o. once a day for further reduction in mortality and hospitalization with absolute risk reduction of 5.2%.  Therefore, I will start patient on Jardiance 10 mg p.o. once a day.  Risks and benefits were explained to  patient and patient has agreed to proceed.    Will start Bisoprolol 5 mg daily.    Aldactone antagonist with Spironolactone 50 mg daily.    No indication for ICD.    No more ETOH advised.    HER BP IS LOW BUT SHE IS COMPETENTLY ASYMPTOMATIC. SHE PREFERS TO KEEP HER MEDICATIONS THE SAME.    Ischemic work up with coronary CTA.     Periodic echocardiogram to monitor aortic stenosis.    Of note, during the care of this patient, I spent a significant amount of time explaining the nature of the disease process, reviewing all possible imaging studies, blood test results to patient.  The overall care of this patient require a higher level of care than usual due to the multiple comorbidities along with ongoing issues of congestive heart failure that put this patient at risk for sudden cardiac death, increased mortality, and hospitalization.  This patient also requires close monitoring with at least monthly blood test to monitor renal function and electrolytes due to the ongoing dynamic changes of medical therapy titration protocol to ensure optimal benefits for the overall care of this patient.     This visit encounter signifies the visit complexity inherent to evaluation and management associated with medical care services that serve as the continuing focal point for all needed health care services and/or with medical care services that are part of ongoing care related to this patient's single, serious condition, complex cardiac condition.      German Xiong M.D.

## 2024-05-23 ENCOUNTER — OFFICE VISIT (OUTPATIENT)
Dept: MEDICAL GROUP | Facility: MEDICAL CENTER | Age: 64
End: 2024-05-23
Payer: MEDICARE

## 2024-05-23 VITALS
TEMPERATURE: 97 F | DIASTOLIC BLOOD PRESSURE: 42 MMHG | SYSTOLIC BLOOD PRESSURE: 88 MMHG | WEIGHT: 121 LBS | BODY MASS INDEX: 18.99 KG/M2 | OXYGEN SATURATION: 96 % | HEART RATE: 64 BPM | HEIGHT: 67 IN

## 2024-05-23 DIAGNOSIS — Z12.31 ENCOUNTER FOR SCREENING MAMMOGRAM FOR BREAST CANCER: ICD-10-CM

## 2024-05-23 DIAGNOSIS — I51.89 LEFT VENTRICULAR SYSTOLIC DYSFUNCTION, NYHA CLASS 3: ICD-10-CM

## 2024-05-23 DIAGNOSIS — I50.20 ACC/AHA STAGE C SYSTOLIC HEART FAILURE (HCC): ICD-10-CM

## 2024-05-23 DIAGNOSIS — M54.16 LUMBAR RADICULOPATHY: ICD-10-CM

## 2024-05-23 DIAGNOSIS — R06.09 DYSPNEA ON EXERTION: ICD-10-CM

## 2024-05-23 DIAGNOSIS — N18.32 STAGE 3B CHRONIC KIDNEY DISEASE: ICD-10-CM

## 2024-05-23 PROCEDURE — 3078F DIAST BP <80 MM HG: CPT | Performed by: PHYSICIAN ASSISTANT

## 2024-05-23 PROCEDURE — 3074F SYST BP LT 130 MM HG: CPT | Performed by: PHYSICIAN ASSISTANT

## 2024-05-23 PROCEDURE — 99215 OFFICE O/P EST HI 40 MIN: CPT | Performed by: PHYSICIAN ASSISTANT

## 2024-05-23 RX ORDER — SACUBITRIL AND VALSARTAN 49; 51 MG/1; MG/1
1 TABLET, FILM COATED ORAL DAILY
Qty: 30 TABLET | Refills: 2 | Status: SHIPPED | OUTPATIENT
Start: 2024-05-23

## 2024-05-23 RX ORDER — SPIRONOLACTONE 25 MG/1
25 TABLET ORAL DAILY
Qty: 30 TABLET | Refills: 3 | Status: SHIPPED | OUTPATIENT
Start: 2024-05-23 | End: 2024-05-23 | Stop reason: SDUPTHER

## 2024-05-23 RX ORDER — SPIRONOLACTONE 25 MG/1
25 TABLET ORAL DAILY
Qty: 90 TABLET | Refills: 3 | Status: SHIPPED | OUTPATIENT
Start: 2024-05-23

## 2024-05-23 RX ORDER — HYDROCODONE BITARTRATE AND ACETAMINOPHEN 5; 325 MG/1; MG/1
1 TABLET ORAL 2 TIMES DAILY PRN
COMMUNITY
Start: 2024-04-22

## 2024-05-23 RX ORDER — FUROSEMIDE 20 MG/1
20 TABLET ORAL DAILY
COMMUNITY
Start: 2024-04-09 | End: 2024-05-23

## 2024-05-23 ASSESSMENT — FIBROSIS 4 INDEX: FIB4 SCORE: 1.76

## 2024-05-23 NOTE — PROGRESS NOTES
Subjective:     History of Present Illness  The patient presents to the office to establish care.    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 Despite the absence of sepsis, she experienced a significant weight loss of 90 pounds. 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 In 09/2023, she began feeling unwell, necessitating hospitalization and subsequent weight loss of 90 pounds. Currently, she is under the care of a cardiologist, who informed her that she may require lifelong medication due to her cardiac condition. She is currently on multiple medications, including a diuretic due to a 50-pound fluid overload, spironolactone 50 mg, bisoprolol, Jardiance, and Entresto, the latter of which she takes twice daily. She reports no adverse effects from her blood pressure medications. She attributes her fatigue to her medications, which she takes postprandially in the morning. She is scheduled for a colostomy and endoscopy with Dr. Liang on 06/14/2024.    The patient is scheduled for a Intracept back procedure with Dr. Valenzuela, a neurosurgeon, this summer to address crushed discs in her lower back. She reports an improvement in her condition. She has discontinued tramadol due to its ineffectiveness. She is currently prescribed hydrocodone 5/325 mg at night.   She quit drinking on 11/06/2023. She has been single for nearly 20 years. She was  for 12 years twice.      Current medicines (including changes today)  Current Outpatient Medications   Medication Sig Dispense Refill    HYDROcodone-acetaminophen (NORCO) 5-325 MG Tab per tablet Take 1 Tablet by mouth 2 times a day as needed.      sacubitril-valsartan (ENTRESTO) 49-51 MG Tab Take 1 Tablet by mouth every day. 30 Tablet 2     "spironolactone (ALDACTONE) 25 MG Tab Take 1 Tablet by mouth every day. 90 Tablet 3    famotidine (PEPCID) 20 MG Tab Take 1 Tablet by mouth 2 times a day.      Empagliflozin (JARDIANCE) 10 MG Tab tablet Take 1 Tablet by mouth every day. 90 Tablet 4    bisoprolol (ZEBETA) 5 MG Tab Take 1 Tablet by mouth every day. 30 Tablet 11    omeprazole (PRILOSEC) 20 MG delayed-release capsule Take 1 Capsule by mouth every day. 30 Capsule 2     No current facility-administered medications for this visit.     She  has a past medical history of Acute combined systolic and diastolic heart failure (HCC) (01/22/2024), HLD (hyperlipidemia), HTN (hypertension), Hypertension, and Obesity.    ROS   No chest pain, no shortness of breath, no abdominal pain  Positive ROS as per HPI.  All other systems reviewed and are negative.     Objective:     BP (!) 88/42   Pulse 64   Temp 36.1 °C (97 °F) (Temporal)   Ht 1.702 m (5' 7\")   Wt 54.9 kg (121 lb)   SpO2 96%  Body mass index is 18.95 kg/m².   Physical Exam  A heart murmur is noted.  Constitutional: Alert, no distress.  Skin: Warm, dry, good turgor, no rashes in visible areas.  Eye: Equal, round and reactive, conjunctiva clear, lids normal.  ENMT: Lips without lesions, good dentition, oropharynx clear.  Neck: Trachea midline, no masses, no thyromegaly. No cervical or supraclavicular lymphadenopathy  Respiratory: Unlabored respiratory effort, lungs clear to auscultation, no wheezes, no ronchi.  Cardiovascular: Normal S1, S2, no murmur, no edema.  Abdomen: Soft, non-tender, no masses, no hepatosplenomegaly.  Psych: Alert and oriented x3, normal affect and mood.      Results  Laboratory Studies  Sodium level was 35.        Assessment and Plan:   The following treatment plan was discussed    Assessment & Plan  1. Chronic low back pain accompanied by lumbar radiculopathy.  The patient is advised to persist with her care under the supervision of Dr. Valenzuela's office. Additionally, she is to " continue taking hydrocodone as needed, twice daily. The potential addiction and sedative effects of hydrocodone have been discussed with the patient, which she receives from her neurosurgeon.    2. Stage 3b chronic kidney disease.  The patient is to continue her cardiology care. She was recently prescribed Jardiance 10 mg daily. She is to continue her Entresto regimen.    3. Stage C systolic heart failure accompanied by left ventricular systolic dysfunction.  The patient presents with hypotension today, which she reports as asymptomatic. She recently consulted her cardiologist, who noted similar blood pressure readings but did not modify meds. Upon reviewing the patient's lab results, she has been hyponatremic. Her BNP has been monitored by the cardiology department and is showing signs of improvement. However, all her electrolytes are abnormal, likely related to cardiometabolic dysfunction. This condition needs to be closely monitored. She is to continue her Entresto, Jardiance 10 mg, and Zebeta 5 mg regimen. Her spironolactone dosage will be reduced from 50 mg to 25 mg due to concerns about potential falls due to dizziness associated with low blood pressure. A follow-up appointment with the cardiology department is recommended in 1 month.    Follow-up  The patient is scheduled for a follow-up visit in 1 month.      ORDERS:  1. Lumbar radiculopathy      2. Stage 3b chronic kidney disease    - spironolactone (ALDACTONE) 25 MG Tab; Take 1 Tablet by mouth every day.  Dispense: 90 Tablet; Refill: 3    3. ACC/AHA stage C systolic heart failure (HCC)    - sacubitril-valsartan (ENTRESTO) 49-51 MG Tab; Take 1 Tablet by mouth every day.  Dispense: 30 Tablet; Refill: 2  - spironolactone (ALDACTONE) 25 MG Tab; Take 1 Tablet by mouth every day.  Dispense: 90 Tablet; Refill: 3    4. Left ventricular systolic dysfunction, NYHA class 3    - sacubitril-valsartan (ENTRESTO) 49-51 MG Tab; Take 1 Tablet by mouth every day.  Dispense:  30 Tablet; Refill: 2    5. Dyspnea on exertion    - sacubitril-valsartan (ENTRESTO) 49-51 MG Tab; Take 1 Tablet by mouth every day.  Dispense: 30 Tablet; Refill: 2    6. Encounter for screening mammogram for breast cancer    - MA-SCREENING MAMMO BILAT W/TOMOSYNTHESIS W/CAD; Future          Follow Up: 4 weeks    Please note that this dictation was created using voice recognition software. I have made every reasonable attempt to correct obvious errors, but I expect that there are errors of grammar and possibly content that I did not discover before finalizing the note.        My total time spent caring for the patient on the day of the encounter was 40 minutes.   This does not include time spent on separately billable procedures/tests.   Attestation      Verbal consent was acquired by the patient to use [employer] ambient listening note generation during this visit Yes

## 2024-05-24 ENCOUNTER — PATIENT MESSAGE (OUTPATIENT)
Dept: HEALTH INFORMATION MANAGEMENT | Facility: OTHER | Age: 64
End: 2024-05-24

## 2024-06-05 ENCOUNTER — APPOINTMENT (OUTPATIENT)
Dept: RADIOLOGY | Facility: MEDICAL CENTER | Age: 64
End: 2024-06-05
Attending: PHYSICIAN ASSISTANT
Payer: MEDICARE

## 2024-06-05 DIAGNOSIS — Z12.31 ENCOUNTER FOR SCREENING MAMMOGRAM FOR BREAST CANCER: ICD-10-CM

## 2024-06-05 PROCEDURE — 77063 BREAST TOMOSYNTHESIS BI: CPT

## 2024-06-06 ENCOUNTER — HOSPITAL ENCOUNTER (OUTPATIENT)
Dept: RADIOLOGY | Facility: MEDICAL CENTER | Age: 64
End: 2024-06-06
Payer: MEDICARE

## 2024-07-12 ENCOUNTER — HOSPITAL ENCOUNTER (OUTPATIENT)
Dept: LAB | Facility: MEDICAL CENTER | Age: 64
End: 2024-07-12
Attending: ANESTHESIOLOGY
Payer: MEDICARE

## 2024-07-12 LAB
ANION GAP SERPL CALC-SCNC: 14 MMOL/L (ref 7–16)
APPEARANCE UR: CLEAR
APTT PPP: 33.8 SEC (ref 24.7–36)
BACTERIA #/AREA URNS HPF: NEGATIVE /HPF
BASOPHILS # BLD AUTO: 1 % (ref 0–1.8)
BASOPHILS # BLD: 0.06 K/UL (ref 0–0.12)
BILIRUB UR QL STRIP.AUTO: NEGATIVE
BUN SERPL-MCNC: 48 MG/DL (ref 8–22)
CALCIUM SERPL-MCNC: 10.1 MG/DL (ref 8.5–10.5)
CHLORIDE SERPL-SCNC: 98 MMOL/L (ref 96–112)
CO2 SERPL-SCNC: 18 MMOL/L (ref 20–33)
COLOR UR: YELLOW
CREAT SERPL-MCNC: 1.36 MG/DL (ref 0.5–1.4)
EOSINOPHIL # BLD AUTO: 0.19 K/UL (ref 0–0.51)
EOSINOPHIL NFR BLD: 3.1 % (ref 0–6.9)
EPI CELLS #/AREA URNS HPF: NEGATIVE /HPF
ERYTHROCYTE [DISTWIDTH] IN BLOOD BY AUTOMATED COUNT: 44.4 FL (ref 35.9–50)
GFR SERPLBLD CREATININE-BSD FMLA CKD-EPI: 43 ML/MIN/1.73 M 2
GLUCOSE SERPL-MCNC: 102 MG/DL (ref 65–99)
GLUCOSE UR STRIP.AUTO-MCNC: NEGATIVE MG/DL
HCT VFR BLD AUTO: 22 % (ref 37–47)
HGB BLD-MCNC: 7.1 G/DL (ref 12–16)
HYALINE CASTS #/AREA URNS LPF: NORMAL /LPF
IMM GRANULOCYTES # BLD AUTO: 0.02 K/UL (ref 0–0.11)
IMM GRANULOCYTES NFR BLD AUTO: 0.3 % (ref 0–0.9)
INR PPP: 1.35 (ref 0.87–1.13)
KETONES UR STRIP.AUTO-MCNC: NEGATIVE MG/DL
LEUKOCYTE ESTERASE UR QL STRIP.AUTO: ABNORMAL
LYMPHOCYTES # BLD AUTO: 1.31 K/UL (ref 1–4.8)
LYMPHOCYTES NFR BLD: 21.7 % (ref 22–41)
MCH RBC QN AUTO: 27 PG (ref 27–33)
MCHC RBC AUTO-ENTMCNC: 32.3 G/DL (ref 32.2–35.5)
MCV RBC AUTO: 83.7 FL (ref 81.4–97.8)
MICRO URNS: ABNORMAL
MONOCYTES # BLD AUTO: 0.45 K/UL (ref 0–0.85)
MONOCYTES NFR BLD AUTO: 7.4 % (ref 0–13.4)
NEUTROPHILS # BLD AUTO: 4.02 K/UL (ref 1.82–7.42)
NEUTROPHILS NFR BLD: 66.5 % (ref 44–72)
NITRITE UR QL STRIP.AUTO: NEGATIVE
NRBC # BLD AUTO: 0 K/UL
NRBC BLD-RTO: 0 /100 WBC (ref 0–0.2)
PH UR STRIP.AUTO: 6 [PH] (ref 5–8)
PLATELET # BLD AUTO: 288 K/UL (ref 164–446)
PMV BLD AUTO: 10.5 FL (ref 9–12.9)
POTASSIUM SERPL-SCNC: 5.2 MMOL/L (ref 3.6–5.5)
PROT UR QL STRIP: NEGATIVE MG/DL
PROTHROMBIN TIME: 16.8 SEC (ref 12–14.6)
RBC # BLD AUTO: 2.63 M/UL (ref 4.2–5.4)
RBC # URNS HPF: NORMAL /HPF
RBC UR QL AUTO: NEGATIVE
SODIUM SERPL-SCNC: 130 MMOL/L (ref 135–145)
SP GR UR STRIP.AUTO: 1.02
UROBILINOGEN UR STRIP.AUTO-MCNC: 0.2 MG/DL
WBC # BLD AUTO: 6.1 K/UL (ref 4.8–10.8)
WBC #/AREA URNS HPF: NORMAL /HPF

## 2024-07-12 PROCEDURE — 85610 PROTHROMBIN TIME: CPT

## 2024-07-12 PROCEDURE — 85730 THROMBOPLASTIN TIME PARTIAL: CPT

## 2024-07-12 PROCEDURE — 36415 COLL VENOUS BLD VENIPUNCTURE: CPT

## 2024-07-12 PROCEDURE — 81001 URINALYSIS AUTO W/SCOPE: CPT

## 2024-07-12 PROCEDURE — 80048 BASIC METABOLIC PNL TOTAL CA: CPT

## 2024-07-12 PROCEDURE — 85025 COMPLETE CBC W/AUTO DIFF WBC: CPT

## 2024-07-19 ENCOUNTER — APPOINTMENT (OUTPATIENT)
Dept: RADIOLOGY | Facility: MEDICAL CENTER | Age: 64
End: 2024-07-19
Attending: NURSE PRACTITIONER
Payer: MEDICARE

## 2024-07-25 PROBLEM — K31.89 PORTAL HYPERTENSIVE GASTROPATHY (HCC): Status: ACTIVE | Noted: 2024-07-25

## 2024-07-25 PROBLEM — K76.6 PORTAL HYPERTENSIVE GASTROPATHY (HCC): Status: ACTIVE | Noted: 2024-07-25

## 2024-07-25 PROBLEM — F10.29: Status: ACTIVE | Noted: 2024-07-25

## 2024-07-25 PROBLEM — I85.00 ESOPHAGEAL VARICES DETERMINED BY ENDOSCOPY (HCC): Status: ACTIVE | Noted: 2024-07-25

## 2024-07-30 ENCOUNTER — OFFICE VISIT (OUTPATIENT)
Dept: MEDICAL GROUP | Facility: MEDICAL CENTER | Age: 64
End: 2024-07-30
Payer: MEDICARE

## 2024-07-30 VITALS
HEART RATE: 68 BPM | HEIGHT: 64 IN | RESPIRATION RATE: 16 BRPM | WEIGHT: 129 LBS | BODY MASS INDEX: 22.02 KG/M2 | SYSTOLIC BLOOD PRESSURE: 98 MMHG | OXYGEN SATURATION: 98 % | TEMPERATURE: 97.9 F | DIASTOLIC BLOOD PRESSURE: 56 MMHG

## 2024-07-30 DIAGNOSIS — K76.9 LIVER DISEASE: ICD-10-CM

## 2024-07-30 DIAGNOSIS — D64.9 ANEMIA, UNSPECIFIED TYPE: ICD-10-CM

## 2024-07-30 DIAGNOSIS — I85.00 ESOPHAGEAL VARICES WITHOUT BLEEDING, UNSPECIFIED ESOPHAGEAL VARICES TYPE (HCC): ICD-10-CM

## 2024-07-30 DIAGNOSIS — K21.9 GASTROESOPHAGEAL REFLUX DISEASE WITHOUT ESOPHAGITIS: ICD-10-CM

## 2024-07-30 DIAGNOSIS — R18.8 OTHER ASCITES: ICD-10-CM

## 2024-07-30 DIAGNOSIS — I51.89 LEFT VENTRICULAR SYSTOLIC DYSFUNCTION, NYHA CLASS 3: ICD-10-CM

## 2024-07-30 RX ORDER — CARVEDILOL 3.12 MG/1
3.12 TABLET ORAL DAILY
COMMUNITY
Start: 2024-06-28

## 2024-07-30 RX ORDER — SPIRONOLACTONE 50 MG/1
50 TABLET, FILM COATED ORAL DAILY
Qty: 30 TABLET | Refills: 3 | Status: SHIPPED | OUTPATIENT
Start: 2024-07-30

## 2024-07-30 RX ORDER — FUROSEMIDE 40 MG/1
40 TABLET ORAL DAILY
Qty: 30 TABLET | Refills: 3 | Status: SHIPPED | OUTPATIENT
Start: 2024-07-30

## 2024-07-30 RX ORDER — POTASSIUM CHLORIDE 750 MG/1
10 TABLET, EXTENDED RELEASE ORAL DAILY
Qty: 30 TABLET | Refills: 3 | Status: SHIPPED | OUTPATIENT
Start: 2024-07-30

## 2024-07-30 RX ORDER — FUROSEMIDE 20 MG/1
20 TABLET ORAL DAILY
COMMUNITY
Start: 2024-07-05 | End: 2024-07-30

## 2024-07-30 RX ORDER — OMEPRAZOLE 40 MG/1
40 CAPSULE, DELAYED RELEASE ORAL DAILY
Qty: 30 CAPSULE | Refills: 3 | Status: SHIPPED | OUTPATIENT
Start: 2024-07-30

## 2024-07-30 ASSESSMENT — FIBROSIS 4 INDEX: FIB4 SCORE: 1.512422837537893316

## 2024-08-08 DIAGNOSIS — R18.8 OTHER ASCITES: ICD-10-CM

## 2024-08-08 DIAGNOSIS — K76.9 LIVER DISEASE: ICD-10-CM

## 2024-08-08 DIAGNOSIS — I85.00 ESOPHAGEAL VARICES WITHOUT BLEEDING, UNSPECIFIED ESOPHAGEAL VARICES TYPE (HCC): ICD-10-CM

## 2024-08-08 NOTE — PROGRESS NOTES
I put in urgent referral to [facility] as patient likely need diagnostic and therapeutic paracentesis

## 2024-08-09 ENCOUNTER — HOSPITAL ENCOUNTER (OUTPATIENT)
Dept: CARDIOLOGY | Facility: MEDICAL CENTER | Age: 64
End: 2024-08-09
Attending: INTERNAL MEDICINE
Payer: MEDICARE

## 2024-08-09 DIAGNOSIS — I51.89 LEFT VENTRICULAR SYSTOLIC DYSFUNCTION, NYHA CLASS 3: ICD-10-CM

## 2024-08-09 DIAGNOSIS — I50.20 ACC/AHA STAGE C SYSTOLIC HEART FAILURE (HCC): ICD-10-CM

## 2024-08-09 DIAGNOSIS — R06.09 DYSPNEA ON EXERTION: ICD-10-CM

## 2024-08-09 PROCEDURE — 93306 TTE W/DOPPLER COMPLETE: CPT

## 2024-08-12 ENCOUNTER — HOSPITAL ENCOUNTER (OUTPATIENT)
Dept: LAB | Facility: MEDICAL CENTER | Age: 64
End: 2024-08-12
Attending: PHYSICIAN ASSISTANT
Payer: MEDICARE

## 2024-08-12 ENCOUNTER — DOCUMENTATION (OUTPATIENT)
Dept: CARDIOLOGY | Facility: MEDICAL CENTER | Age: 64
End: 2024-08-12

## 2024-08-12 ENCOUNTER — HOSPITAL ENCOUNTER (OUTPATIENT)
Dept: LAB | Facility: MEDICAL CENTER | Age: 64
End: 2024-08-12
Attending: INTERNAL MEDICINE
Payer: MEDICARE

## 2024-08-12 DIAGNOSIS — I51.89 LEFT VENTRICULAR SYSTOLIC DYSFUNCTION, NYHA CLASS 3: ICD-10-CM

## 2024-08-12 DIAGNOSIS — D64.9 ANEMIA, UNSPECIFIED TYPE: ICD-10-CM

## 2024-08-12 DIAGNOSIS — R06.09 DYSPNEA ON EXERTION: ICD-10-CM

## 2024-08-12 DIAGNOSIS — I50.20 ACC/AHA STAGE C SYSTOLIC HEART FAILURE (HCC): ICD-10-CM

## 2024-08-12 LAB
BASOPHILS # BLD AUTO: 1.2 % (ref 0–1.8)
BASOPHILS # BLD: 0.06 K/UL (ref 0–0.12)
EOSINOPHIL # BLD AUTO: 0.17 K/UL (ref 0–0.51)
EOSINOPHIL NFR BLD: 3.3 % (ref 0–6.9)
ERYTHROCYTE [DISTWIDTH] IN BLOOD BY AUTOMATED COUNT: 42.6 FL (ref 35.9–50)
FERRITIN SERPL-MCNC: 51.3 NG/ML (ref 10–291)
HCT VFR BLD AUTO: 21.9 % (ref 37–47)
HGB BLD-MCNC: 7.1 G/DL (ref 12–16)
IMM GRANULOCYTES # BLD AUTO: 0.02 K/UL (ref 0–0.11)
IMM GRANULOCYTES NFR BLD AUTO: 0.4 % (ref 0–0.9)
IRON SATN MFR SERPL: 7 % (ref 15–55)
IRON SERPL-MCNC: 23 UG/DL (ref 40–170)
LV EJECT FRACT  99904: 60
LV EJECT FRACT MOD 2C 99903: 67.02
LV EJECT FRACT MOD 4C 99902: 63.85
LV EJECT FRACT MOD BP 99901: 66.12
LYMPHOCYTES # BLD AUTO: 1.36 K/UL (ref 1–4.8)
LYMPHOCYTES NFR BLD: 26.7 % (ref 22–41)
MCH RBC QN AUTO: 25.2 PG (ref 27–33)
MCHC RBC AUTO-ENTMCNC: 32.4 G/DL (ref 32.2–35.5)
MCV RBC AUTO: 77.7 FL (ref 81.4–97.8)
MONOCYTES # BLD AUTO: 0.33 K/UL (ref 0–0.85)
MONOCYTES NFR BLD AUTO: 6.5 % (ref 0–13.4)
NEUTROPHILS # BLD AUTO: 3.16 K/UL (ref 1.82–7.42)
NEUTROPHILS NFR BLD: 61.9 % (ref 44–72)
NRBC # BLD AUTO: 0 K/UL
NRBC BLD-RTO: 0 /100 WBC (ref 0–0.2)
NT-PROBNP SERPL IA-MCNC: 310 PG/ML (ref 0–125)
PLATELET # BLD AUTO: 228 K/UL (ref 164–446)
PMV BLD AUTO: 12.7 FL (ref 9–12.9)
RBC # BLD AUTO: 2.82 M/UL (ref 4.2–5.4)
TIBC SERPL-MCNC: 338 UG/DL (ref 250–450)
UIBC SERPL-MCNC: 315 UG/DL (ref 110–370)
WBC # BLD AUTO: 5.1 K/UL (ref 4.8–10.8)

## 2024-08-12 PROCEDURE — 83540 ASSAY OF IRON: CPT

## 2024-08-12 PROCEDURE — 83550 IRON BINDING TEST: CPT

## 2024-08-12 PROCEDURE — 85025 COMPLETE CBC W/AUTO DIFF WBC: CPT

## 2024-08-12 PROCEDURE — 36415 COLL VENOUS BLD VENIPUNCTURE: CPT

## 2024-08-12 PROCEDURE — 83880 ASSAY OF NATRIURETIC PEPTIDE: CPT

## 2024-08-12 PROCEDURE — 93306 TTE W/DOPPLER COMPLETE: CPT | Mod: 26 | Performed by: INTERNAL MEDICINE

## 2024-08-12 PROCEDURE — 82728 ASSAY OF FERRITIN: CPT

## 2024-08-12 NOTE — RESULT ENCOUNTER NOTE
Dear Ling,    Can you please let Denisse Abel know that result is not entirely normal and I will see patient as SOONER than scheduled to discuss?    Thank you,  [place].

## 2024-08-12 NOTE — PROGRESS NOTES
This patient has been flagged through our echocardiogram surveillance with the following echocardiogram results:    Moderate-Severe Aortic Stenosis    Cardiologist: Dr. Xiong    Current Plan of Care for Structural Heart Disease: Added to surveillance tracking list    Next Echo date needed by: 8/9/2025    Next Follow up appointment needed by: 5/21/2024 (scheduled 11/22/2024 with Dr. Xiong)

## 2024-08-13 ENCOUNTER — TELEPHONE (OUTPATIENT)
Dept: CARDIOLOGY | Facility: MEDICAL CENTER | Age: 64
End: 2024-08-13
Payer: MEDICARE

## 2024-08-13 NOTE — TELEPHONE ENCOUNTER
Message  Received: Yesterday  German ToDIXIE R.N.  Dear Ling,    Can you please let Denissewhitney Short Colten know that result is not entirely normal and I will see patient as SOONER than scheduled to discuss?    Thank you,  [place].          EC-ECHOCARDIOGRAM COMPLETE W/O CONT  -----------------------------------------------------------------------------------    Called pt and informed her. Transferred pt to scheduling to make sooner appt.

## 2024-08-16 ENCOUNTER — HOSPITAL ENCOUNTER (OUTPATIENT)
Dept: RADIOLOGY | Facility: MEDICAL CENTER | Age: 64
End: 2024-08-16
Attending: PHYSICIAN ASSISTANT
Payer: MEDICARE

## 2024-08-16 DIAGNOSIS — I51.89 LEFT VENTRICULAR SYSTOLIC DYSFUNCTION, NYHA CLASS 3: ICD-10-CM

## 2024-08-16 DIAGNOSIS — K21.9 GASTROESOPHAGEAL REFLUX DISEASE WITHOUT ESOPHAGITIS: ICD-10-CM

## 2024-08-16 DIAGNOSIS — I85.00 ESOPHAGEAL VARICES WITHOUT BLEEDING, UNSPECIFIED ESOPHAGEAL VARICES TYPE (HCC): ICD-10-CM

## 2024-08-16 DIAGNOSIS — K76.9 LIVER DISEASE: ICD-10-CM

## 2024-08-16 DIAGNOSIS — R18.8 OTHER ASCITES: ICD-10-CM

## 2024-08-16 PROCEDURE — 74177 CT ABD & PELVIS W/CONTRAST: CPT

## 2024-08-16 PROCEDURE — 700117 HCHG RX CONTRAST REV CODE 255: Performed by: PHYSICIAN ASSISTANT

## 2024-08-16 RX ADMIN — IOHEXOL 100 ML: 350 INJECTION, SOLUTION INTRAVENOUS at 13:55

## 2024-08-20 ENCOUNTER — OFFICE VISIT (OUTPATIENT)
Dept: CARDIOLOGY | Facility: MEDICAL CENTER | Age: 64
End: 2024-08-20
Attending: INTERNAL MEDICINE
Payer: MEDICARE

## 2024-08-20 ENCOUNTER — TELEPHONE (OUTPATIENT)
Dept: CARDIOLOGY | Facility: MEDICAL CENTER | Age: 64
End: 2024-08-20

## 2024-08-20 VITALS
WEIGHT: 135.8 LBS | SYSTOLIC BLOOD PRESSURE: 88 MMHG | BODY MASS INDEX: 23.18 KG/M2 | HEART RATE: 74 BPM | HEIGHT: 64 IN | DIASTOLIC BLOOD PRESSURE: 40 MMHG | RESPIRATION RATE: 16 BRPM | OXYGEN SATURATION: 100 %

## 2024-08-20 DIAGNOSIS — R06.09 DYSPNEA ON EXERTION: ICD-10-CM

## 2024-08-20 DIAGNOSIS — Z79.899 HIGH RISK MEDICATION USE: ICD-10-CM

## 2024-08-20 DIAGNOSIS — I35.0 MODERATE AORTIC STENOSIS: ICD-10-CM

## 2024-08-20 DIAGNOSIS — I50.20 ACC/AHA STAGE C SYSTOLIC HEART FAILURE (HCC): ICD-10-CM

## 2024-08-20 DIAGNOSIS — I51.89 LEFT VENTRICULAR SYSTOLIC DYSFUNCTION, NYHA CLASS 3: ICD-10-CM

## 2024-08-20 DIAGNOSIS — F10.20 ETOHISM (HCC): ICD-10-CM

## 2024-08-20 DIAGNOSIS — N18.31 STAGE 3A CHRONIC KIDNEY DISEASE: ICD-10-CM

## 2024-08-20 DIAGNOSIS — K74.60 HEPATIC CIRRHOSIS, UNSPECIFIED HEPATIC CIRRHOSIS TYPE, UNSPECIFIED WHETHER ASCITES PRESENT (HCC): ICD-10-CM

## 2024-08-20 PROCEDURE — 99215 OFFICE O/P EST HI 40 MIN: CPT | Performed by: INTERNAL MEDICINE

## 2024-08-20 PROCEDURE — 99213 OFFICE O/P EST LOW 20 MIN: CPT | Performed by: INTERNAL MEDICINE

## 2024-08-20 PROCEDURE — G2211 COMPLEX E/M VISIT ADD ON: HCPCS | Performed by: INTERNAL MEDICINE

## 2024-08-20 PROCEDURE — 3078F DIAST BP <80 MM HG: CPT | Performed by: INTERNAL MEDICINE

## 2024-08-20 PROCEDURE — 3074F SYST BP LT 130 MM HG: CPT | Performed by: INTERNAL MEDICINE

## 2024-08-20 PROCEDURE — 99212 OFFICE O/P EST SF 10 MIN: CPT | Performed by: INTERNAL MEDICINE

## 2024-08-20 ASSESSMENT — MINNESOTA LIVING WITH HEART FAILURE QUESTIONNAIRE (MLHF)
GIVING YOU SIDE EFFECTS FROM TREATMENTS: 0
DIFFICULTY TO CONCENTRATE OR REMEMBERING THINGS: 0
FEELING LIKE A BURDEN TO FAMILY AND FRIENDS: 0
DIFFICULTY WITH RECREATIONAL PASTIMES, SPORTS, HOBBIES: 1
SWELLING IN ANKLES OR LEGS: 3
WORKING AROUND THE HOUSE OR YARD DIFFICULT: 1
EATING LESS FOODS YOU LIKE: 0
LOSS OF SELF CONTROL IN YOUR LIFE: 0
MAKING YOU FEEL DEPRESSED: 0
HAVING TO SIT OR LIE DOWN DURING THE DAY: 3
TIRED, FATIGUED OR LOW ON ENERGY: 4
DIFFICULTY GOING AWAY FROM HOME: 1
MAKING YOU SHORT OF BREATH: 0
WALKING ABOUT OR CLIMBING STAIRS DIFFICULT: 1
DIFFICULTY WITH SEXUAL ACTIVITIES: 0
MAKING YOU STAY IN A HOSPITAL: 0
TOTAL_SCORE: 16
MAKING YOU WORRY: 0
DIFFICULTY SOCIALIZING WITH FAMILY OR FRIENDS: 1
DIFFICULTY SLEEPING WELL AT NIGHT: 1
DIFFICULTY WORKING TO EARN A LIVING: 0
COSTING YOU MONEY FOR MEDICAL CARE: 0

## 2024-08-20 ASSESSMENT — ENCOUNTER SYMPTOMS
MYALGIAS: 0
ABDOMINAL PAIN: 0
PALPITATIONS: 0
DIAPHORESIS: 0
BRUISES/BLEEDS EASILY: 0
SHORTNESS OF BREATH: 1
DIZZINESS: 0
DOUBLE VISION: 0
FALLS: 0
BLURRED VISION: 0
MEMORY LOSS: 0
DEPRESSION: 0
COUGH: 0
FEVER: 0
HEADACHES: 0
SENSORY CHANGE: 0

## 2024-08-20 ASSESSMENT — FIBROSIS 4 INDEX: FIB4 SCORE: 1.91

## 2024-08-20 NOTE — PROGRESS NOTES
Chief Complaint   Patient presents with    Congestive Heart Failure     F/V Dx: ACC/AHA stage C systolic heart failure (HCC)     Subjective     Denisse Abel is a 64 y.o. female who presents today for cardiac care and evaluation in the heart failure clinic because of recent hospitalization due to heart failure exacerbation. The diagnosis of HF was made in 01/2024. It was presumed to be non-ischemic in nature. The left ventricular systolic function was documented to be at 40%. Patient was diuresed in the hospital and discharged home with guidelines directed medical therapy. She does have hx of ETOH.    05/2024 LVEF of 40 % with global hypokinesis. No significant valvular disease. I have independently interpreted and reviewed echocardiogram's actual images.     08/2024 LVEF of 60% with global hypokinesis. Moderate to severe aortic stenosis disease. I have independently interpreted and reviewed echocardiogram's actual images.     Patient was able to complete 300 m during his 6 minute walk test. her O2 saturation at baseline was 99% and at the end of the test, the O2 saturation was 98%. she reported 5 level of dyspnea on Kylah scale.    I have personally interpreted EKG today with patient, there is no evidence of acute coronary syndrome, no evidence of prior infarct, normal MA and QT interval, no significant conduction disease. Sinus rhythm.    I have independently interpreted and reviewed blood tests results with patient in clinic which shows LDL level of 65, triglycerides level of 74, GFR of 58, NT pro BNP of 5713, K of 3.4.    Cirrhosis seen on CT.      Past Medical History:   Diagnosis Date    Acute combined systolic and diastolic heart failure (HCC) 01/22/2024    HLD (hyperlipidemia)     HTN (hypertension)     Hypertension     Obesity      Past Surgical History:   Procedure Laterality Date    MA LAP,DIAGNOSTIC ABDOMEN  2/23/2024    Procedure: LAPAROSCOPY, KIM'S PATCH;  Surgeon: Neel Amezcua M.D.;   Location: SURGERY [facility];  Service: General    TRIGGER FINGER RELEASE Right 3/31/2017    Procedure: TRIGGER FINGER RELEASE;  Surgeon: Jimmy Atkins M.D.;  Location: SURGERY [facility];  Service:     GANGLION EXCISION Right 3/31/2017    Procedure: GANGLION EXCISION - HAND CYST;  Surgeon: Jimmy Atkins M.D.;  Location: SURGERY [facility];  Service:     PELVISCOPY  6/5/08    Performed by NATALIYA ARCEO at SURGERY SAME DAY [facility] ORS    OVARIAN CYSTECTOMY  6/5/08    Performed by NATALIYA ARCEO at SURGERY SAME DAY [facility] ORS    CYSTOSCOPY  6/5/08    Performed by NATALIYA ARCEO at SURGERY SAME DAY [facility] ORS    HYSTERECTOMY LAPAROSCOPY  2003    KNEE ARTHROSCOPY       Family History   Problem Relation Age of Onset    Diabetes Mother     Heart Disease Mother     Stroke Mother     Diabetes Father      Social History     Socioeconomic History    Marital status: Single     Spouse name: Not on file    Number of children: Not on file    Years of education: Not on file    Highest education level: Master's degree (e.g., MA, MS, Monica, MEd, MSW, SUMAYA)   Occupational History    Not on file   Tobacco Use    Smoking status: Never    Smokeless tobacco: Never   Vaping Use    Vaping status: Never Used   Substance and Sexual Activity    Alcohol use: Not Currently     Alcohol/week: 3.6 oz     Types: 6 Glasses of wine per week     Comment: denies    Drug use: Not Currently     Types: Marijuana     Comment: denies    Sexual activity: Not Currently   Other Topics Concern    Not on file   Social History Narrative    Not on file     Social Determinants of Health     Financial Resource Strain: Low Risk  (1/11/2024)    Overall Financial Resource Strain (CARDIA)     Difficulty of Paying Living Expenses: Not hard at all   Food Insecurity: No Food Insecurity (1/11/2024)    Hunger Vital Sign     Worried About Running Out of Food in the Last Year: Never true     Ran Out of Food in the Last Year: Never true    Transportation Needs: No Transportation Needs (1/11/2024)    PRAPARE - Transportation     Lack of Transportation (Medical): No     Lack of Transportation (Non-Medical): No   Physical Activity: Sufficiently Active (1/11/2024)    Exercise Vital Sign     Days of Exercise per Week: 5 days     Minutes of Exercise per Session: 30 min   Stress: Stress Concern Present (1/11/2024)    [employer] - Occupational Stress Questionnaire     Feeling of Stress : Very much   Social Connections: Socially Isolated (1/11/2024)    Social Connection and Isolation Panel [NHANES]     Frequency of Communication with Friends and Family: More than three times a week     Frequency of Social Gatherings with Friends and Family: Once a week     Attends Jehovah's witness Services: Never     Active Member of Clubs or Organizations: No     Attends Club or Organization Meetings: Never     Marital Status:    Intimate Partner Violence: Not on file   Housing Stability: Low Risk  (1/11/2024)    Housing Stability Vital Sign     Unable to Pay for Housing in the Last Year: No     Number of Places Lived in the Last Year: 1     Unstable Housing in the Last Year: No     Allergies   Allergen Reactions    Pcn [Penicillins] Rash     Pt reports that it was a childhood allergie received body rash   Childhood allergy. Augmentin challenged 3/4/24 and tolerated.     Outpatient Encounter Medications as of 8/20/2024   Medication Sig Dispense Refill    spironolactone (ALDACTONE) 50 MG Tab Take 1 Tablet by mouth every day. 30 Tablet 3    furosemide (LASIX) 40 MG Tab Take 1 Tablet by mouth every day. 30 Tablet 3    potassium chloride SA (K-DUR) 10 MEQ Tab CR Take 1 Tablet by mouth every day. 30 Tablet 3    omeprazole (PRILOSEC) 40 MG delayed-release capsule Take 1 Capsule by mouth every day. 30 Capsule 3    HYDROcodone-acetaminophen (NORCO) 5-325 MG Tab per tablet Take 1 Tablet by mouth 2 times a day as needed.      sacubitril-valsartan  "(ENTRESTO) 49-51 MG Tab Take 1 Tablet by mouth every day. 30 Tablet 2    famotidine (PEPCID) 20 MG Tab Take 1 Tablet by mouth 2 times a day.      Empagliflozin (JARDIANCE) 10 MG Tab tablet Take 1 Tablet by mouth every day. 90 Tablet 4    [DISCONTINUED] carvedilol (COREG) 3.125 MG Tab Take 3.125 mg by mouth every day.       No facility-administered encounter medications on file as of 8/20/2024.     Review of Systems   Constitutional:  Negative for diaphoresis and fever.   HENT:  Negative for nosebleeds.    Eyes:  Negative for blurred vision and double vision.   Respiratory:  Positive for shortness of breath. Negative for cough.    Cardiovascular:  Positive for leg swelling. Negative for chest pain and palpitations.   Gastrointestinal:  Negative for abdominal pain.   Genitourinary:  Negative for dysuria and frequency.   Musculoskeletal:  Negative for falls and myalgias.   Skin:  Negative for rash.   Neurological:  Negative for dizziness, sensory change and headaches.   Endo/Heme/Allergies:  Does not bruise/bleed easily.   Psychiatric/Behavioral:  Negative for depression and memory loss.               Objective     BP (!) 88/40 (BP Location: Left arm, Patient Position: Sitting, BP Cuff Size: Adult)   Pulse 74   Resp 16   Ht 1.626 m (5' 4.02\")   Wt 61.6 kg (135 lb 12.8 oz)   SpO2 100%   BMI 23.30 kg/m²     Physical Exam  Vitals and nursing note reviewed.   Constitutional:       General: She is not in acute distress.     Appearance: She is not diaphoretic.   HENT:      Head: Normocephalic and atraumatic.      Right Ear: External ear normal.      Left Ear: External ear normal.      Nose: No congestion or rhinorrhea.   Eyes:      General:         Right eye: No discharge.         Left eye: No discharge.   Neck:      Thyroid: No thyromegaly.      Vascular: No JVD.   Cardiovascular:      Rate and Rhythm: Normal rate and regular rhythm.      Pulses: Normal pulses.      Heart sounds: Murmur heard.   Pulmonary:      " Effort: No respiratory distress.   Abdominal:      General: There is distension.      Tenderness: There is no abdominal tenderness.   Musculoskeletal:         General: No swelling or tenderness.      Right lower leg: No edema.      Left lower leg: No edema.   Skin:     General: Skin is warm and dry.   Neurological:      Mental Status: She is alert and oriented to person, place, and time.      Cranial Nerves: No cranial nerve deficit.   Psychiatric:         Behavior: Behavior normal.                Assessment & Plan     1. ACC/AHA stage C systolic heart failure (HCC) normaliztion of LVEF        2. Left ventricular systolic dysfunction, NYHA class 3        3. Stage 3a chronic kidney disease        4. ETOHism (HCC)        5. Hepatic cirrhosis, unspecified hepatic cirrhosis type, unspecified whether ascites present (HCC)        6. Dyspnea on exertion        7. High risk medication use        8. Moderate aortic stenosis                  Medical Decision Making: Today's Assessment/Status/Plan:   Dilated Cardiomyopathy:  Chronically illed condition which requires ongoing close monitoring and treatment to improve survival rate along with decreasing risk of clinical decompensation sudden cardiac death and hospitalization.    Today, based on physical examination findings, patient is euvolemic. No JVD, lungs are clear to auscultation, no pitting edema in bilateral lower extremities, no ascites.     Dry weight is 135 lbs. Gained 20 lbs. Fluid retention most localized in the abdominal area. Will get paracentesis with GI / IR.    Based on the overall clinical history and profile, patient is a good candidate for Entresto therapy (with superiority over ACE-I therapy in terms of survival benefits and prevention of future hospitalization).  Will continue Entresto therapy to 49/51 mg po bid.    Based on recent data on SGLT2 and heart failure with reduced ejection fraction, patient will be benefited from Jardiance 10 mg p.o. once a day  for further reduction in mortality and hospitalization with absolute risk reduction of 5.2%.  Therefore, I will continue patient on Jardiance 10 mg p.o. once a day.  Risks and benefits were explained to patient and patient has agreed to proceed.    Will stop carvedilol due to low blood pressures.    Aldactone antagonist with Spironolactone 50 mg daily.    No indication for ICD.    No more ETOH advised.    Will need to further investigate the aortic valve stenosis.  Risks and benefits of transesophageal echocardiogram were explained to patient.  Patient showed good understanding.  Risks including esophageal perforation, death, bleeding, infection were clearly conveyed to patient.  Patient is willing to accept the risks and proceed with procedure.    Of note, during the care of this patient, I spent a significant amount of time explaining the nature of the disease process, reviewing all possible imaging studies, blood test results to patient.  The overall care of this patient require a higher level of care than usual due to the multiple comorbidities along with ongoing issues of congestive heart failure that put this patient at risk for sudden cardiac death, increased mortality, and hospitalization.  This patient also requires close monitoring with at least monthly blood test to monitor renal function and electrolytes due to the ongoing dynamic changes of medical therapy titration protocol to ensure optimal benefits for the overall care of this patient.     This visit encounter signifies the visit complexity inherent to evaluation and management associated with medical care services that serve as the continuing focal point for all needed health care services and/or with medical care services that are part of ongoing care related to this patient's single, serious condition, complex cardiac condition.      German Xiong M.D.

## 2024-08-21 NOTE — TELEPHONE ENCOUNTER
Patient is scheduled for a SEFERINO with anesthesia on 09- with Dr. Xiong. Patient has been instructed to check in at 12:00 pm for 2:00 procedure. Hold Jardiance for 4 days prior. Patient has been advised they will need a  home and with them after since they are sedated. Message sent to authorizations. Sent [employer] message to pt with instructions.  GILDA sent to To.

## 2024-08-22 ENCOUNTER — HOSPITAL ENCOUNTER (OUTPATIENT)
Dept: LAB | Facility: MEDICAL CENTER | Age: 64
End: 2024-08-22
Attending: INTERNAL MEDICINE
Payer: MEDICARE

## 2024-08-22 ENCOUNTER — APPOINTMENT (OUTPATIENT)
Dept: ADMISSIONS | Facility: MEDICAL CENTER | Age: 64
End: 2024-08-22
Attending: INTERNAL MEDICINE
Payer: MEDICARE

## 2024-08-22 LAB
ALBUMIN SERPL BCP-MCNC: 4.3 G/DL (ref 3.2–4.9)
ALBUMIN/GLOB SERPL: 1.2 G/DL
ALP SERPL-CCNC: 116 U/L (ref 30–99)
ALT SERPL-CCNC: 8 U/L (ref 2–50)
ANION GAP SERPL CALC-SCNC: 13 MMOL/L (ref 7–16)
AST SERPL-CCNC: 17 U/L (ref 12–45)
BILIRUB SERPL-MCNC: 0.3 MG/DL (ref 0.1–1.5)
BUN SERPL-MCNC: 65 MG/DL (ref 8–22)
CALCIUM ALBUM COR SERPL-MCNC: 10.1 MG/DL (ref 8.5–10.5)
CALCIUM SERPL-MCNC: 10.3 MG/DL (ref 8.5–10.5)
CHLORIDE SERPL-SCNC: 102 MMOL/L (ref 96–112)
CO2 SERPL-SCNC: 16 MMOL/L (ref 20–33)
CREAT SERPL-MCNC: 1.56 MG/DL (ref 0.5–1.4)
GFR SERPLBLD CREATININE-BSD FMLA CKD-EPI: 36 ML/MIN/1.73 M 2
GLOBULIN SER CALC-MCNC: 3.5 G/DL (ref 1.9–3.5)
GLUCOSE SERPL-MCNC: 103 MG/DL (ref 65–99)
INR PPP: 1.36 (ref 0.87–1.13)
POTASSIUM SERPL-SCNC: 4.8 MMOL/L (ref 3.6–5.5)
PROT SERPL-MCNC: 7.8 G/DL (ref 6–8.2)
PROTHROMBIN TIME: 17 SEC (ref 12–14.6)
SODIUM SERPL-SCNC: 131 MMOL/L (ref 135–145)

## 2024-08-22 PROCEDURE — 80053 COMPREHEN METABOLIC PANEL: CPT

## 2024-08-22 PROCEDURE — 82105 ALPHA-FETOPROTEIN SERUM: CPT

## 2024-08-22 PROCEDURE — 85610 PROTHROMBIN TIME: CPT

## 2024-08-22 PROCEDURE — 36415 COLL VENOUS BLD VENIPUNCTURE: CPT

## 2024-08-23 ENCOUNTER — APPOINTMENT (OUTPATIENT)
Dept: RADIOLOGY | Facility: MEDICAL CENTER | Age: 64
End: 2024-08-23
Attending: NURSE PRACTITIONER
Payer: MEDICARE

## 2024-08-23 DIAGNOSIS — N95.1 MENOPAUSAL STATE: ICD-10-CM

## 2024-08-23 DIAGNOSIS — Z78.0 POSTMENOPAUSAL STATUS (AGE-RELATED) (NATURAL): ICD-10-CM

## 2024-08-23 PROCEDURE — 77080 DXA BONE DENSITY AXIAL: CPT

## 2024-08-24 LAB — AFP-TM SERPL-MCNC: 2 NG/ML (ref 0–9)

## 2024-08-28 ENCOUNTER — PRE-ADMISSION TESTING (OUTPATIENT)
Dept: ADMISSIONS | Facility: MEDICAL CENTER | Age: 64
End: 2024-08-28
Attending: INTERNAL MEDICINE
Payer: MEDICARE

## 2024-08-28 RX ORDER — VITAMIN B COMPLEX
1000 TABLET ORAL DAILY
COMMUNITY

## 2024-08-28 RX ORDER — THIAMINE HCL 50 MG
100 TABLET ORAL DAILY
COMMUNITY

## 2024-09-04 ENCOUNTER — HOSPITAL ENCOUNTER (OUTPATIENT)
Dept: RADIOLOGY | Facility: MEDICAL CENTER | Age: 64
End: 2024-09-04
Attending: INTERNAL MEDICINE
Payer: MEDICARE

## 2024-09-04 DIAGNOSIS — K70.31 ALCOHOLIC CIRRHOSIS OF LIVER WITH ASCITES (HCC): ICD-10-CM

## 2024-09-04 PROCEDURE — 49083 ABD PARACENTESIS W/IMAGING: CPT

## 2024-09-04 NOTE — PROGRESS NOTES
US guided therapeutic paracentesis done by Stephani Ochoa NP;(no H&P required as this is a NON SEDATION procedure) LLQ access site; dressing CDI; 2000mL obtained and discarded. Pt tolerated the procedure well; pt hemodynamically stable pre/intra/post procedure. Patient provided with all appropriate education for procedure. Pt d/c home.

## 2024-09-06 ENCOUNTER — APPOINTMENT (OUTPATIENT)
Dept: CARDIOLOGY | Facility: MEDICAL CENTER | Age: 64
End: 2024-09-06
Attending: INTERNAL MEDICINE
Payer: MEDICARE

## 2024-09-06 ENCOUNTER — ANESTHESIA EVENT (OUTPATIENT)
Dept: CARDIOLOGY | Facility: MEDICAL CENTER | Age: 64
End: 2024-09-06
Payer: MEDICARE

## 2024-09-06 ENCOUNTER — ANESTHESIA (OUTPATIENT)
Dept: CARDIOLOGY | Facility: MEDICAL CENTER | Age: 64
End: 2024-09-06
Payer: MEDICARE

## 2024-09-06 ENCOUNTER — HOSPITAL ENCOUNTER (OUTPATIENT)
Facility: MEDICAL CENTER | Age: 64
End: 2024-09-06
Attending: INTERNAL MEDICINE | Admitting: INTERNAL MEDICINE
Payer: MEDICARE

## 2024-09-06 VITALS
WEIGHT: 124.78 LBS | SYSTOLIC BLOOD PRESSURE: 123 MMHG | OXYGEN SATURATION: 100 % | DIASTOLIC BLOOD PRESSURE: 67 MMHG | TEMPERATURE: 97.4 F | RESPIRATION RATE: 18 BRPM | HEIGHT: 66 IN | BODY MASS INDEX: 20.05 KG/M2 | HEART RATE: 79 BPM

## 2024-09-06 DIAGNOSIS — I35.0 MODERATE AORTIC STENOSIS: ICD-10-CM

## 2024-09-06 PROCEDURE — 160002 HCHG RECOVERY MINUTES (STAT)

## 2024-09-06 PROCEDURE — 160035 HCHG PACU - 1ST 60 MINS PHASE I

## 2024-09-06 PROCEDURE — 700111 HCHG RX REV CODE 636 W/ 250 OVERRIDE (IP): Performed by: ANESTHESIOLOGY

## 2024-09-06 PROCEDURE — 93312 ECHO TRANSESOPHAGEAL: CPT | Mod: 26 | Performed by: INTERNAL MEDICINE

## 2024-09-06 PROCEDURE — 93312 ECHO TRANSESOPHAGEAL: CPT

## 2024-09-06 PROCEDURE — 700105 HCHG RX REV CODE 258: Performed by: INTERNAL MEDICINE

## 2024-09-06 RX ORDER — EPHEDRINE SULFATE 50 MG/ML
5 INJECTION, SOLUTION INTRAVENOUS
Status: DISCONTINUED | OUTPATIENT
Start: 2024-09-06 | End: 2024-09-06 | Stop reason: HOSPADM

## 2024-09-06 RX ORDER — DIPHENHYDRAMINE HYDROCHLORIDE 50 MG/ML
12.5 INJECTION INTRAMUSCULAR; INTRAVENOUS
Status: DISCONTINUED | OUTPATIENT
Start: 2024-09-06 | End: 2024-09-06 | Stop reason: HOSPADM

## 2024-09-06 RX ORDER — SODIUM CHLORIDE, SODIUM LACTATE, POTASSIUM CHLORIDE, CALCIUM CHLORIDE 600; 310; 30; 20 MG/100ML; MG/100ML; MG/100ML; MG/100ML
INJECTION, SOLUTION INTRAVENOUS CONTINUOUS
Status: DISCONTINUED | OUTPATIENT
Start: 2024-09-06 | End: 2024-09-06 | Stop reason: HOSPADM

## 2024-09-06 RX ORDER — HALOPERIDOL 5 MG/ML
1 INJECTION INTRAMUSCULAR
Status: DISCONTINUED | OUTPATIENT
Start: 2024-09-06 | End: 2024-09-06 | Stop reason: HOSPADM

## 2024-09-06 RX ORDER — HYDRALAZINE HYDROCHLORIDE 20 MG/ML
5 INJECTION INTRAMUSCULAR; INTRAVENOUS
Status: DISCONTINUED | OUTPATIENT
Start: 2024-09-06 | End: 2024-09-06 | Stop reason: HOSPADM

## 2024-09-06 RX ORDER — METOPROLOL TARTRATE 1 MG/ML
1 INJECTION, SOLUTION INTRAVENOUS
Status: DISCONTINUED | OUTPATIENT
Start: 2024-09-06 | End: 2024-09-06 | Stop reason: HOSPADM

## 2024-09-06 RX ORDER — ONDANSETRON 2 MG/ML
4 INJECTION INTRAMUSCULAR; INTRAVENOUS
Status: DISCONTINUED | OUTPATIENT
Start: 2024-09-06 | End: 2024-09-06 | Stop reason: HOSPADM

## 2024-09-06 RX ORDER — ALBUTEROL SULFATE 5 MG/ML
2.5 SOLUTION RESPIRATORY (INHALATION)
Status: DISCONTINUED | OUTPATIENT
Start: 2024-09-06 | End: 2024-09-06 | Stop reason: HOSPADM

## 2024-09-06 RX ADMIN — SODIUM CHLORIDE, POTASSIUM CHLORIDE, SODIUM LACTATE AND CALCIUM CHLORIDE: 600; 310; 30; 20 INJECTION, SOLUTION INTRAVENOUS at 14:11

## 2024-09-06 RX ADMIN — PROPOFOL 50 MG: 10 INJECTION, EMULSION INTRAVENOUS at 14:14

## 2024-09-06 ASSESSMENT — PAIN SCALES - GENERAL: PAIN_LEVEL: 0

## 2024-09-06 ASSESSMENT — FIBROSIS 4 INDEX: FIB4 SCORE: 1.74

## 2024-09-06 NOTE — ANESTHESIA PREPROCEDURE EVALUATION
Date/Time: 09/06/24 1400    Scheduled providers: German Xiong M.D.; Jose E Nesbitt M.D.    Procedure: EC-SEFERINO W/O CONT    Diagnosis: Moderate aortic stenosis [I35.0]    Location: [facility] - Echocardiology [facility]            Relevant Problems   CARDIAC   (positive) Esophageal varices without bleeding (HCC)   (positive) HTN (hypertension), benign   (positive) Heart murmur      GI   (positive) Gastroesophageal reflux disease without esophagitis         (positive) Liver cirrhosis (HCC)   (positive) Liver disease   (positive) Stage 3b chronic kidney disease       Physical Exam    Airway   Mallampati: II  TM distance: >3 FB  Neck ROM: full       Cardiovascular - normal exam  Rhythm: regular  Rate: normal  (-) murmur     Dental - normal exam           Pulmonary - normal exam  Breath sounds clear to auscultation     Abdominal    Neurological - normal exam                   Anesthesia Plan    ASA 3   ASA physical status 3 criteria: other (comment)    Plan - general       Airway plan will be natural airway    (AS    40 mm gradient)      Induction: intravenous    Postoperative Plan: Postoperative administration of opioids is intended.    Pertinent diagnostic labs and testing reviewed    Informed Consent:    Anesthetic plan and risks discussed with patient.    Use of blood products discussed with: patient whom consented to blood products.

## 2024-09-06 NOTE — OR NURSING
1435 - Patient arrival to PPU after SEFERINO. Pt awake and alert. Assessed patient with Apurva VEGA.  VSS. Denies pain at this time. Educated patient on plan of care. All pt belonging present on [facility].   1446 - Tolerating oral intake.  1445 - updated patient's friend Stacie  1509 - Discharge instructions given; discussed diet, activity, medications, dressing care, follow up care, and worsening symptoms. Pt verbalized understanding and questions answered.  1520 - Pt's VSS; denies N/V; patient up to bathroom, denies discomfort. D/c orders received. All lines and monitors discontinued. IV dc'd. Patient states ready to d/c home. No prescriptions given. Pt taken down via wheelchair with RN, in stable condition to meet friend Stacie with all belongings present.

## 2024-09-06 NOTE — ANESTHESIA TIME REPORT
Anesthesia Start and Stop Event Times       Date Time Event    9/6/2024 1411 Anesthesia Start     1436 Anesthesia Stop          Responsible Staff  09/06/24      Name Role Begin End    Jose E Nesbitt M.D. Anesth 1411 1436          Overtime Reason:  no overtime (within assigned shift)    Comments:

## 2024-09-06 NOTE — DISCHARGE INSTRUCTIONS
What to Expect Post Anesthesia    Rest and take it easy for the first 24 hours.  A responsible adult is recommended to remain with you during that time.  It is normal to feel sleepy.  We encourage you to not do anything that requires balance, judgment or coordination.    FOR 24 HOURS DO NOT:  Drive, operate machinery or run household appliances.  Drink beer or alcoholic beverages.  Make important decisions or sign legal documents.    To avoid nausea, slowly advance diet as tolerated, avoiding spicy or greasy foods for the first day.  Add more substantial food to your diet according to your provider's instructions. INCREASE FLUIDS AND FIBER TO AVOID CONSTIPATION.    MILD FLU-LIKE SYMPTOMS ARE NORMAL.  YOU MAY EXPERIENCE GENERALIZED MUSCLE ACHES, THROAT IRRITATION, HEADACHE AND/OR SOME NAUSEA.    SEFERINO - TRANSESOPHAGEAL ECHOCARDIOGRAM  1. Don't drive or drink alcohol for 24 hours. The medication you received will make you too drowsy.  2. If you begin to vomit bloody material, or develop black or bloody stools, call your doctor as soon as possible.  3. If you have any neck, chest, abdominal pain or temp of 100 degrees, call your doctor.    Get help right away if:  You have discomfort in your chest.  You are dizzy or you feel faint.  You have trouble breathing or you are short of breath.  Your speech is slurred.  You have trouble moving an arm or leg on one side of your body.  Your fingers or toes turn cold or blue.  This information is not intended to replace advice given to you by your health care provider. Make sure you discuss any questions you have with your health care provider.

## 2024-09-06 NOTE — PROCEDURES
DOS: 9/6/2024    Procedure performed: Transesophageal echocardiogram.    Patient was brought to procedure room 3.    Risks and benefits of transesophageal echocardiogram were explained to patient.  Patient showed good understanding.  Risks including esophageal perforation, death, bleeding, infection were clearly conveyed to patient.  Patient is willing to accept the risks and proceed with procedure.    Anesthesia service was used for sedation process.    Indication: to assess for aortic stenosis.    Complication: none    Diagnosis: Moderate aortic valve stenosis. Aortic valve area traced from planimetry is 1.5 cm2. Trace aortic insufficiency. Normal left atrial appendage. No thrombus detected in the left atrial appendage.        Complications: none.      Electronically Signed by: German Xiong M.D.    9/6/2024  3:02 PM

## 2024-09-06 NOTE — ANESTHESIA POSTPROCEDURE EVALUATION
Patient: Denisse Abel    Procedure Summary       Date: 09/06/24 Room / Location: [facility] - Echocardiology [facility]    Anesthesia Start: 1411 Anesthesia Stop: 1436    Procedure: EC-SEFERINO W/O CONT Diagnosis: Moderate aortic stenosis    Scheduled Providers: German Xiong M.D.; Jose E Nesbitt M.D. Responsible Provider: Jose E Nesbitt M.D.    Anesthesia Type: general ASA Status: 3            Final Anesthesia Type: general  Last vitals  BP   Blood Pressure : 113/51    Temp   36.4 °C (97.6 °F)    Pulse   75   Resp   18    SpO2   100 %      Anesthesia Post Evaluation    Patient location during evaluation: PACU  Patient participation: complete - patient participated  Level of consciousness: awake and alert  Pain score: 0    Airway patency: patent  Anesthetic complications: no  Cardiovascular status: hemodynamically stable  Respiratory status: acceptable  Hydration status: euvolemic    PONV: none          No notable events documented.     Nurse Pain Score: 1 (NPRS)

## 2024-09-09 ENCOUNTER — HOSPITAL ENCOUNTER (OUTPATIENT)
Dept: LAB | Facility: MEDICAL CENTER | Age: 64
End: 2024-09-09
Attending: INTERNAL MEDICINE
Payer: MEDICARE

## 2024-09-09 LAB
ALBUMIN SERPL BCP-MCNC: 3.8 G/DL (ref 3.2–4.9)
ALBUMIN/GLOB SERPL: 1.2 G/DL
ALP SERPL-CCNC: 110 U/L (ref 30–99)
ALT SERPL-CCNC: 9 U/L (ref 2–50)
ANION GAP SERPL CALC-SCNC: 12 MMOL/L (ref 7–16)
AST SERPL-CCNC: 14 U/L (ref 12–45)
BILIRUB SERPL-MCNC: 0.4 MG/DL (ref 0.1–1.5)
BUN SERPL-MCNC: 57 MG/DL (ref 8–22)
CALCIUM ALBUM COR SERPL-MCNC: 9.8 MG/DL (ref 8.5–10.5)
CALCIUM SERPL-MCNC: 9.6 MG/DL (ref 8.5–10.5)
CHLORIDE SERPL-SCNC: 99 MMOL/L (ref 96–112)
CO2 SERPL-SCNC: 16 MMOL/L (ref 20–33)
CREAT SERPL-MCNC: 1.63 MG/DL (ref 0.5–1.4)
GFR SERPLBLD CREATININE-BSD FMLA CKD-EPI: 35 ML/MIN/1.73 M 2
GLOBULIN SER CALC-MCNC: 3.3 G/DL (ref 1.9–3.5)
GLUCOSE SERPL-MCNC: 120 MG/DL (ref 65–99)
POTASSIUM SERPL-SCNC: 5.5 MMOL/L (ref 3.6–5.5)
PROT SERPL-MCNC: 7.1 G/DL (ref 6–8.2)
SODIUM SERPL-SCNC: 127 MMOL/L (ref 135–145)

## 2024-09-09 PROCEDURE — 36415 COLL VENOUS BLD VENIPUNCTURE: CPT

## 2024-09-09 PROCEDURE — 80053 COMPREHEN METABOLIC PANEL: CPT

## 2024-09-17 ENCOUNTER — OFFICE VISIT (OUTPATIENT)
Dept: MEDICAL GROUP | Facility: MEDICAL CENTER | Age: 64
End: 2024-09-17
Payer: MEDICARE

## 2024-09-17 VITALS
HEART RATE: 78 BPM | OXYGEN SATURATION: 100 % | DIASTOLIC BLOOD PRESSURE: 42 MMHG | SYSTOLIC BLOOD PRESSURE: 82 MMHG | BODY MASS INDEX: 19.77 KG/M2 | WEIGHT: 123 LBS | TEMPERATURE: 98 F | HEIGHT: 66 IN

## 2024-09-17 DIAGNOSIS — I35.0 NONRHEUMATIC AORTIC VALVE STENOSIS: ICD-10-CM

## 2024-09-17 DIAGNOSIS — K70.31 ALCOHOLIC CIRRHOSIS OF LIVER WITH ASCITES (HCC): ICD-10-CM

## 2024-09-17 DIAGNOSIS — I50.20 ACC/AHA STAGE C SYSTOLIC HEART FAILURE (HCC): ICD-10-CM

## 2024-09-17 DIAGNOSIS — R18.8 OTHER ASCITES: ICD-10-CM

## 2024-09-17 DIAGNOSIS — D64.9 ANEMIA, UNSPECIFIED TYPE: ICD-10-CM

## 2024-09-17 DIAGNOSIS — N18.32 ANEMIA DUE TO STAGE 3B CHRONIC KIDNEY DISEASE: ICD-10-CM

## 2024-09-17 DIAGNOSIS — I95.9 HYPOTENSION, UNSPECIFIED HYPOTENSION TYPE: ICD-10-CM

## 2024-09-17 DIAGNOSIS — N18.32 STAGE 3B CHRONIC KIDNEY DISEASE: ICD-10-CM

## 2024-09-17 DIAGNOSIS — D63.1 ANEMIA DUE TO STAGE 3B CHRONIC KIDNEY DISEASE: ICD-10-CM

## 2024-09-17 PROCEDURE — 99214 OFFICE O/P EST MOD 30 MIN: CPT | Performed by: PHYSICIAN ASSISTANT

## 2024-09-17 PROCEDURE — G2211 COMPLEX E/M VISIT ADD ON: HCPCS | Performed by: PHYSICIAN ASSISTANT

## 2024-09-17 PROCEDURE — 3078F DIAST BP <80 MM HG: CPT | Performed by: PHYSICIAN ASSISTANT

## 2024-09-17 PROCEDURE — 3074F SYST BP LT 130 MM HG: CPT | Performed by: PHYSICIAN ASSISTANT

## 2024-09-17 ASSESSMENT — FIBROSIS 4 INDEX: FIB4 SCORE: 1.35

## 2024-09-17 NOTE — PROGRESS NOTES
Subjective:     History of Present Illness  The patient presents for evaluation of multiple medical concerns.    She expresses concern about her belly button, which she believes is a hernia. She describes it as occasionally protruding and then receding. She is considering another tap procedure. She had at least 2 pints of fluid removed previously.    She reports no issues with her breathing, and her lungs have consistently been clear.    She mentions frequent urination and low sodium levels.    She is worried about her blood pressure being so low. She had a CBC done 2 to 3 weeks ago.    She was informed that she does not require a blood transfusion, but rather an iron transfusion due to extremely low iron levels. Despite leaving two voicemails for the GI department, she has not received a response.    She had a Transesophageal Echocardiogram (SEFERINO) done, which indicated moderate stenosis. Her cardiologist, Dr. Phoenix Carter, did not discuss the results with her. She will see him in 12/2024.    She has an upcoming ultrasound of the liver scheduled for Thursday.    [place] and wants to know if she should get a flu shot.      Current medicines (including changes today)  Current Outpatient Medications   Medication Sig Dispense Refill    vitamin D3 (CHOLECALCIFEROL) 1000 Unit (25 mcg) Tab Take 1,000 Units by mouth every day.       STOP ALL VITAMINS AND SUPPLEMENTS 7 DAYS PRIOR TO YOUR PROCEDURE ON 9/6/24      thiamine (VITAMIN B-1) 50 MG Tab Take 100 mg by mouth every day.       STOP ALL VITAMINS AND SUPPLEMENTS 7 DAYS PRIOR TO YOUR PROCEDURE ON 9/6/24      spironolactone (ALDACTONE) 50 MG Tab Take 1 Tablet by mouth every day. (Patient taking differently: Take 40 mg by mouth every day.       HOLD THIS MED DAY OF PROCEDURE 9/6/24 FOR YOUR COMFORT) 30 Tablet 3    furosemide (LASIX) 40 MG Tab Take 1 Tablet by mouth every day. (Patient taking differently: Take 40 mg by mouth every day.       HOLD THIS MED DAY OF  "PROCEDURE 9/6/24 FOR YOUR COMFORT) 30 Tablet 3    potassium chloride SA (K-DUR) 10 MEQ Tab CR Take 1 Tablet by mouth every day. (Patient taking differently: Take 10 mEq by mouth every day.       MAY CONTINUE TO TAKE PRIOR TO YOUR PROCEDURE ON 9/6/24) 30 Tablet 3    omeprazole (PRILOSEC) 40 MG delayed-release capsule Take 1 Capsule by mouth every day. (Patient taking differently: Take 40 mg by mouth every day.       MAY CONTINUE TO TAKE PRIOR TO YOUR PROCEDURE ON 9/6/24) 30 Capsule 3    HYDROcodone-acetaminophen (NORCO) 5-325 MG Tab per tablet Take 1 Tablet by mouth 2 times a day as needed.       MAY CONTINUE TO TAKE PRIOR TO YOUR PROCEDURE ON 9/6/24      sacubitril-valsartan (ENTRESTO) 49-51 MG Tab Take 1 Tablet by mouth every day. (Patient taking differently: Take 1 Tablet by mouth every day.       HOLD THIS MEDICATION 24 HOURS PRIOR TO YOUR PROCEDURE ON 9/6/24) 30 Tablet 2    famotidine (PEPCID) 20 MG Tab Take 1 Tablet by mouth 2 times a day.      Empagliflozin (JARDIANCE) 10 MG Tab tablet Take 1 Tablet by mouth every day. (Patient taking differently: Take 10 mg by mouth every day.       HOLD 4 DAYS PRIOR TO PROCEDURE PER M.D. ORDERS TO PT.) 90 Tablet 4     No current facility-administered medications for this visit.     She  has a past medical history of Acute combined systolic and diastolic heart failure (HCC) (01/22/2024), HLD (hyperlipidemia), HTN (hypertension), Hypertension, and Obesity.    ROS   No chest pain, no shortness of breath, no abdominal pain  Positive ROS as per HPI.  All other systems reviewed and are negative.     Objective:     BP (!) 82/42   Pulse 78   Temp 36.7 °C (98 °F) (Temporal)   Ht 1.676 m (5' 6\")   Wt 55.8 kg (123 lb)   SpO2 100%  Body mass index is 19.85 kg/m².   Physical Exam    Constitutional: Alert, no distress.  Skin: Warm, dry, good turgor, no rashes in visible areas.  Eye: Equal, round and reactive, conjunctiva clear, lids normal.  ENMT: Lips without lesions, good " dentition, oropharynx clear.  Neck: Trachea midline, no masses, no thyromegaly. No cervical or supraclavicular lymphadenopathy  Respiratory: Unlabored respiratory effort, lungs clear to auscultation, no wheezes, no ronchi.  Cardiovascular: Normal S1, S2, no murmur, no edema.  Abdomen: Soft, non-tender, no masses, no hepatosplenomegaly.  Psych: Alert and oriented x3, normal affect and mood.      Results  Laboratory Studies  Iron level is at 7.1. GFR is at 35, BUN is 27, creatinine is not specified. Alpha-fetoprotein serum marker is low.    Imaging  SEFERINO report indicates moderate stenosis.        Assessment and Plan:   The following treatment plan was discussed    Assessment & Plan  1. Cirrhosis with ascites.  She has end-stage liver disease and underwent therapeutic thoracentesis performed by Dr. Anna from . The report is available for review. Although she may require further therapeutic interventions if ascites increases, she currently feels well.     2. History of anemia due to chronic kidney disease.  Her iron level is currently at 7.1. Arrangements will be made with the infusion center for iron dextran administration as per pharmacy protocol. The order was faxed over today at 4:15 PM.    3. Anemia of chronic disease.  Her iron level is currently at 7.1. Arrangements will be made with the infusion center for iron dextran administration as per pharmacy protocol. The order was faxed over today at 4:15 PM.    4. Alcoholic cirrhosis of the liver.  She has abstained from alcohol for over 6 months. She is advised to maintain her abstinence and continue her follow-ups with GI.    5. Hypotension.  She is under the care of Dr. Carter from cardiology and her carvedilol was discontinued. She is to continue with Entresto, Jardiance, furosemide, spironolactone, and potassium as prescribed by cardiology.    6. Healthcare management.  While continued participation in her medical management is offered, it is believed she would  benefit from seeing internal medicine/vascular Therefore, a referral to Dr. Michael Bloch, given his expertise in both vascular medicine and internal medicine, has been placed today.    () Today's E/M visit is associated with medical care services that serve as the continuing focal point for all needed health care services and/or with medical care services that are part of ongoing care related to a patient's single, serious condition, or a complex condition: This includes furnishing services to patients on an ongoing basis that result in care that is personalized to the patient. The services result in a comprehensive, longitudinal, and continuous relationship with the patient and involve delivery of team-based care that is accessible, coordinated with other practitioners and providers, and integrated with the broader health care landscape.       ORDERS:  1. Anemia, unspecified type    2. Nonrheumatic aortic valve stenosis    - Referral to establish with PCP    3. Stage 3b chronic kidney disease    - Referral to establish with PCP    4. ACC/AHA stage C systolic heart failure (HCC)    - Referral to establish with PCP    5. Other ascites    - Referral to establish with PCP    6. Alcoholic cirrhosis of liver with ascites (HCC)    - Referral to establish with PCP    7. Hypotension, unspecified hypotension type    - Referral to establish with PCP    8. Anemia due to stage 3b chronic kidney disease    - Referral to establish with PCP          Follow Up: 4 weeks    Please note that this dictation was created using voice recognition software. I have made every reasonable attempt to correct obvious errors, but I expect that there are errors of grammar and possibly content that I did not discover before finalizing the note.      Attestation      Verbal consent was acquired by the patient to use [employer] ambient listening note generation during this visit Yes

## 2024-09-19 ENCOUNTER — HOSPITAL ENCOUNTER (OUTPATIENT)
Dept: RADIOLOGY | Facility: MEDICAL CENTER | Age: 64
End: 2024-09-19
Attending: INTERNAL MEDICINE
Payer: MEDICARE

## 2024-09-19 DIAGNOSIS — K70.31 ALCOHOLIC CIRRHOSIS OF LIVER WITH ASCITES (HCC): ICD-10-CM

## 2024-09-19 PROCEDURE — 76700 US EXAM ABDOM COMPLETE: CPT

## 2024-09-27 RX ORDER — DIPHENHYDRAMINE HYDROCHLORIDE 50 MG/ML
50 INJECTION INTRAMUSCULAR; INTRAVENOUS PRN
OUTPATIENT
Start: 2024-09-28

## 2024-09-27 RX ORDER — METHYLPREDNISOLONE SODIUM SUCCINATE 125 MG/2ML
125 INJECTION, POWDER, LYOPHILIZED, FOR SOLUTION INTRAMUSCULAR; INTRAVENOUS PRN
OUTPATIENT
Start: 2024-09-28

## 2024-09-27 RX ORDER — EPINEPHRINE 1 MG/ML(1)
0.5 AMPUL (ML) INJECTION PRN
OUTPATIENT
Start: 2024-09-28

## 2024-09-27 RX ORDER — SODIUM CHLORIDE 9 MG/ML
INJECTION, SOLUTION INTRAVENOUS CONTINUOUS
OUTPATIENT
Start: 2024-09-28

## 2024-10-01 ENCOUNTER — HOSPITAL ENCOUNTER (OUTPATIENT)
Dept: LAB | Facility: MEDICAL CENTER | Age: 64
End: 2024-10-01
Attending: INTERNAL MEDICINE
Payer: MEDICARE

## 2024-10-01 ENCOUNTER — HOSPITAL ENCOUNTER (OUTPATIENT)
Dept: LAB | Facility: MEDICAL CENTER | Age: 64
End: 2024-10-01
Attending: PHYSICIAN ASSISTANT
Payer: MEDICARE

## 2024-10-01 DIAGNOSIS — D64.9 ANEMIA, UNSPECIFIED TYPE: ICD-10-CM

## 2024-10-01 DIAGNOSIS — N18.32 STAGE 3B CHRONIC KIDNEY DISEASE: ICD-10-CM

## 2024-10-01 DIAGNOSIS — R18.8 OTHER ASCITES: ICD-10-CM

## 2024-10-01 LAB
ALBUMIN SERPL BCP-MCNC: 3.9 G/DL (ref 3.2–4.9)
ALBUMIN/GLOB SERPL: 1.1 G/DL
ALP SERPL-CCNC: 108 U/L (ref 30–99)
ALT SERPL-CCNC: 6 U/L (ref 2–50)
ANION GAP SERPL CALC-SCNC: 11 MMOL/L (ref 7–16)
AST SERPL-CCNC: 17 U/L (ref 12–45)
BASOPHILS # BLD AUTO: 0.6 % (ref 0–1.8)
BASOPHILS # BLD: 0.03 K/UL (ref 0–0.12)
BILIRUB SERPL-MCNC: 0.4 MG/DL (ref 0.1–1.5)
BUN SERPL-MCNC: 69 MG/DL (ref 8–22)
CALCIUM ALBUM COR SERPL-MCNC: 10.1 MG/DL (ref 8.5–10.5)
CALCIUM SERPL-MCNC: 10 MG/DL (ref 8.5–10.5)
CHLORIDE SERPL-SCNC: 101 MMOL/L (ref 96–112)
CO2 SERPL-SCNC: 17 MMOL/L (ref 20–33)
CREAT SERPL-MCNC: 1.81 MG/DL (ref 0.5–1.4)
EOSINOPHIL # BLD AUTO: 0.17 K/UL (ref 0–0.51)
EOSINOPHIL NFR BLD: 3.3 % (ref 0–6.9)
ERYTHROCYTE [DISTWIDTH] IN BLOOD BY AUTOMATED COUNT: 44.7 FL (ref 35.9–50)
GFR SERPLBLD CREATININE-BSD FMLA CKD-EPI: 30 ML/MIN/1.73 M 2
GLOBULIN SER CALC-MCNC: 3.4 G/DL (ref 1.9–3.5)
GLUCOSE SERPL-MCNC: 110 MG/DL (ref 65–99)
HCT VFR BLD AUTO: 20.2 % (ref 37–47)
HGB BLD-MCNC: 6.3 G/DL (ref 12–16)
IMM GRANULOCYTES # BLD AUTO: 0.01 K/UL (ref 0–0.11)
IMM GRANULOCYTES NFR BLD AUTO: 0.2 % (ref 0–0.9)
LYMPHOCYTES # BLD AUTO: 1.26 K/UL (ref 1–4.8)
LYMPHOCYTES NFR BLD: 24.6 % (ref 22–41)
MCH RBC QN AUTO: 22.9 PG (ref 27–33)
MCHC RBC AUTO-ENTMCNC: 31.2 G/DL (ref 32.2–35.5)
MCV RBC AUTO: 73.5 FL (ref 81.4–97.8)
MONOCYTES # BLD AUTO: 0.38 K/UL (ref 0–0.85)
MONOCYTES NFR BLD AUTO: 7.4 % (ref 0–13.4)
NEUTROPHILS # BLD AUTO: 3.28 K/UL (ref 1.82–7.42)
NEUTROPHILS NFR BLD: 63.9 % (ref 44–72)
NRBC # BLD AUTO: 0 K/UL
NRBC BLD-RTO: 0 /100 WBC (ref 0–0.2)
PLATELET # BLD AUTO: 226 K/UL (ref 164–446)
PMV BLD AUTO: 11.9 FL (ref 9–12.9)
POTASSIUM SERPL-SCNC: 5.4 MMOL/L (ref 3.6–5.5)
PROT SERPL-MCNC: 7.3 G/DL (ref 6–8.2)
RBC # BLD AUTO: 2.75 M/UL (ref 4.2–5.4)
SODIUM SERPL-SCNC: 129 MMOL/L (ref 135–145)
WBC # BLD AUTO: 5.1 K/UL (ref 4.8–10.8)

## 2024-10-01 PROCEDURE — 80053 COMPREHEN METABOLIC PANEL: CPT

## 2024-10-01 PROCEDURE — 36415 COLL VENOUS BLD VENIPUNCTURE: CPT

## 2024-10-01 PROCEDURE — 85025 COMPLETE CBC W/AUTO DIFF WBC: CPT

## 2024-10-03 ENCOUNTER — PHARMACY VISIT (OUTPATIENT)
Dept: PHARMACY | Facility: MEDICAL CENTER | Age: 64
End: 2024-10-03
Payer: COMMERCIAL

## 2024-10-03 ENCOUNTER — APPOINTMENT (OUTPATIENT)
Dept: PHARMACY | Facility: MEDICAL CENTER | Age: 64
End: 2024-10-03
Payer: MEDICARE

## 2024-10-03 PROCEDURE — RXMED WILLOW AMBULATORY MEDICATION CHARGE: Performed by: INTERNAL MEDICINE

## 2024-10-03 RX ORDER — PNEUMOCOCCAL 20-VALENT CONJUGATE VACCINE 2.2; 2.2; 2.2; 2.2; 2.2; 2.2; 2.2; 2.2; 2.2; 2.2; 2.2; 2.2; 2.2; 2.2; 2.2; 2.2; 4.4; 2.2; 2.2; 2.2 UG/.5ML; UG/.5ML; UG/.5ML; UG/.5ML; UG/.5ML; UG/.5ML; UG/.5ML; UG/.5ML; UG/.5ML; UG/.5ML; UG/.5ML; UG/.5ML; UG/.5ML; UG/.5ML; UG/.5ML; UG/.5ML; UG/.5ML; UG/.5ML; UG/.5ML; UG/.5ML
0.5 INJECTION, SUSPENSION INTRAMUSCULAR
Qty: 0.5 ML | Refills: 0 | OUTPATIENT
Start: 2024-10-03

## 2024-10-03 RX ORDER — INFLUENZA A VIRUS A/VICTORIA/4897/2022 IVR-238 (H1N1) ANTIGEN (FORMALDEHYDE INACTIVATED), INFLUENZA A VIRUS A/CALIFORNIA/122/2022 SAN-022 (H3N2) ANTIGEN (FORMALDEHYDE INACTIVATED), AND INFLUENZA B VIRUS B/MICHIGAN/01/2021 ANTIGEN (FORMALDEHYDE INACTIVATED) 60; 60; 60 UG/.5ML; UG/.5ML; UG/.5ML
0.5 INJECTION, SUSPENSION INTRAMUSCULAR
Qty: 0.5 ML | Refills: 0 | Status: SHIPPED | OUTPATIENT
Start: 2024-10-03 | End: 2024-10-08 | Stop reason: SDUPTHER

## 2024-10-08 ENCOUNTER — PHARMACY VISIT (OUTPATIENT)
Dept: PHARMACY | Facility: MEDICAL CENTER | Age: 64
End: 2024-10-08
Payer: COMMERCIAL

## 2024-10-08 PROCEDURE — RXMED WILLOW AMBULATORY MEDICATION CHARGE: Performed by: INTERNAL MEDICINE

## 2024-10-08 RX ORDER — COVID-19 VACCINE, MRNA 0.04 MG/.418ML
0.3 INJECTION, SUSPENSION INTRAMUSCULAR
Qty: 0.3 ML | Refills: 0 | OUTPATIENT
Start: 2024-10-08

## 2024-10-08 RX ORDER — INFLUENZA A VIRUS A/VICTORIA/4897/2022 IVR-238 (H1N1) ANTIGEN (FORMALDEHYDE INACTIVATED), INFLUENZA A VIRUS A/CALIFORNIA/122/2022 SAN-022 (H3N2) ANTIGEN (FORMALDEHYDE INACTIVATED), AND INFLUENZA B VIRUS B/MICHIGAN/01/2021 ANTIGEN (FORMALDEHYDE INACTIVATED) 60; 60; 60 UG/.5ML; UG/.5ML; UG/.5ML
0.5 INJECTION, SUSPENSION INTRAMUSCULAR
Qty: 0.5 ML | Refills: 0 | OUTPATIENT
Start: 2024-10-08

## 2024-10-15 ENCOUNTER — OUTPATIENT INFUSION SERVICES (OUTPATIENT)
Dept: ONCOLOGY | Facility: MEDICAL CENTER | Age: 64
End: 2024-10-15
Attending: PHYSICIAN ASSISTANT
Payer: MEDICARE

## 2024-10-15 VITALS
HEIGHT: 67 IN | BODY MASS INDEX: 19.52 KG/M2 | WEIGHT: 124.34 LBS | SYSTOLIC BLOOD PRESSURE: 98 MMHG | RESPIRATION RATE: 16 BRPM | OXYGEN SATURATION: 100 % | HEART RATE: 89 BPM | TEMPERATURE: 98.4 F | DIASTOLIC BLOOD PRESSURE: 43 MMHG

## 2024-10-15 DIAGNOSIS — D63.1 ANEMIA DUE TO STAGE 3B CHRONIC KIDNEY DISEASE: ICD-10-CM

## 2024-10-15 DIAGNOSIS — N18.32 ANEMIA DUE TO STAGE 3B CHRONIC KIDNEY DISEASE: ICD-10-CM

## 2024-10-15 LAB
FERRITIN SERPL-MCNC: 24.1 NG/ML (ref 10–291)
IRON SATN MFR SERPL: 6 % (ref 15–55)
IRON SERPL-MCNC: 20 UG/DL (ref 40–170)
TIBC SERPL-MCNC: 352 UG/DL (ref 250–450)
UIBC SERPL-MCNC: 332 UG/DL (ref 110–370)

## 2024-10-15 PROCEDURE — 83550 IRON BINDING TEST: CPT

## 2024-10-15 PROCEDURE — 36415 COLL VENOUS BLD VENIPUNCTURE: CPT

## 2024-10-15 PROCEDURE — 82728 ASSAY OF FERRITIN: CPT

## 2024-10-15 PROCEDURE — 83540 ASSAY OF IRON: CPT

## 2024-10-15 RX ORDER — METHYLPREDNISOLONE SODIUM SUCCINATE 125 MG/2ML
125 INJECTION, POWDER, LYOPHILIZED, FOR SOLUTION INTRAMUSCULAR; INTRAVENOUS PRN
Status: CANCELLED | OUTPATIENT
Start: 2024-10-15

## 2024-10-15 RX ORDER — METHYLPREDNISOLONE SODIUM SUCCINATE 125 MG/2ML
125 INJECTION, POWDER, LYOPHILIZED, FOR SOLUTION INTRAMUSCULAR; INTRAVENOUS PRN
Status: DISCONTINUED | OUTPATIENT
Start: 2024-10-15 | End: 2024-10-15

## 2024-10-15 RX ORDER — EPINEPHRINE 1 MG/ML(1)
0.5 AMPUL (ML) INJECTION PRN
OUTPATIENT
Start: 2024-10-15

## 2024-10-15 RX ORDER — METHYLPREDNISOLONE SODIUM SUCCINATE 125 MG/2ML
125 INJECTION, POWDER, LYOPHILIZED, FOR SOLUTION INTRAMUSCULAR; INTRAVENOUS PRN
OUTPATIENT
Start: 2024-10-15

## 2024-10-15 RX ORDER — DIPHENHYDRAMINE HYDROCHLORIDE 50 MG/ML
50 INJECTION INTRAMUSCULAR; INTRAVENOUS PRN
OUTPATIENT
Start: 2024-10-15

## 2024-10-15 RX ORDER — SODIUM CHLORIDE 9 MG/ML
INJECTION, SOLUTION INTRAVENOUS CONTINUOUS
OUTPATIENT
Start: 2024-10-15

## 2024-10-15 ASSESSMENT — FIBROSIS 4 INDEX: FIB4 SCORE: 2.03

## 2024-10-17 ENCOUNTER — DOCUMENTATION (OUTPATIENT)
Dept: CARDIOLOGY | Facility: MEDICAL CENTER | Age: 64
End: 2024-10-17
Payer: MEDICARE

## 2024-11-04 DIAGNOSIS — R18.8 OTHER ASCITES: ICD-10-CM

## 2024-11-04 DIAGNOSIS — I51.89 LEFT VENTRICULAR SYSTOLIC DYSFUNCTION, NYHA CLASS 3: ICD-10-CM

## 2024-11-04 RX ORDER — FUROSEMIDE 40 MG/1
40 TABLET ORAL DAILY
Qty: 30 TABLET | Refills: 3 | Status: CANCELLED | OUTPATIENT
Start: 2024-11-04

## 2024-11-05 ENCOUNTER — APPOINTMENT (OUTPATIENT)
Dept: ADMISSIONS | Facility: MEDICAL CENTER | Age: 64
End: 2024-11-05
Attending: ANESTHESIOLOGY
Payer: MEDICARE

## 2024-11-05 ENCOUNTER — OUTPATIENT INFUSION SERVICES (OUTPATIENT)
Dept: ONCOLOGY | Facility: MEDICAL CENTER | Age: 64
End: 2024-11-05
Attending: PHYSICIAN ASSISTANT
Payer: MEDICARE

## 2024-11-05 ENCOUNTER — HOSPITAL ENCOUNTER (OUTPATIENT)
Facility: MEDICAL CENTER | Age: 64
DRG: 056 | End: 2024-11-05
Attending: ANESTHESIOLOGY | Admitting: ANESTHESIOLOGY
Payer: MEDICARE

## 2024-11-05 VITALS
HEIGHT: 67 IN | OXYGEN SATURATION: 100 % | SYSTOLIC BLOOD PRESSURE: 84 MMHG | WEIGHT: 124.78 LBS | HEART RATE: 65 BPM | TEMPERATURE: 97.5 F | RESPIRATION RATE: 16 BRPM | BODY MASS INDEX: 19.58 KG/M2 | DIASTOLIC BLOOD PRESSURE: 42 MMHG

## 2024-11-05 DIAGNOSIS — N18.32 ANEMIA DUE TO STAGE 3B CHRONIC KIDNEY DISEASE: ICD-10-CM

## 2024-11-05 DIAGNOSIS — D63.1 ANEMIA DUE TO STAGE 3B CHRONIC KIDNEY DISEASE: ICD-10-CM

## 2024-11-05 DIAGNOSIS — Z01.812 PRE-OPERATIVE LABORATORY EXAMINATION: ICD-10-CM

## 2024-11-05 PROCEDURE — 700105 HCHG RX REV CODE 258: Performed by: PHYSICIAN ASSISTANT

## 2024-11-05 PROCEDURE — 700111 HCHG RX REV CODE 636 W/ 250 OVERRIDE (IP): Mod: JZ,JG | Performed by: PHYSICIAN ASSISTANT

## 2024-11-05 PROCEDURE — 96365 THER/PROPH/DIAG IV INF INIT: CPT

## 2024-11-05 RX ORDER — METHYLPREDNISOLONE SODIUM SUCCINATE 125 MG/2ML
125 INJECTION, POWDER, LYOPHILIZED, FOR SOLUTION INTRAMUSCULAR; INTRAVENOUS PRN
Status: CANCELLED | OUTPATIENT
Start: 2024-11-05

## 2024-11-05 RX ORDER — SODIUM CHLORIDE 9 MG/ML
INJECTION, SOLUTION INTRAVENOUS CONTINUOUS
Status: CANCELLED | OUTPATIENT
Start: 2024-11-05

## 2024-11-05 RX ORDER — EPINEPHRINE 1 MG/ML(1)
0.5 AMPUL (ML) INJECTION PRN
Status: CANCELLED | OUTPATIENT
Start: 2024-11-05

## 2024-11-05 RX ORDER — DIPHENHYDRAMINE HYDROCHLORIDE 50 MG/ML
50 INJECTION INTRAMUSCULAR; INTRAVENOUS PRN
Status: CANCELLED | OUTPATIENT
Start: 2024-11-05

## 2024-11-05 RX ADMIN — SODIUM CHLORIDE 1000 MG: 9 INJECTION, SOLUTION INTRAVENOUS at 12:43

## 2024-11-05 ASSESSMENT — FIBROSIS 4 INDEX: FIB4 SCORE: 2.03

## 2024-11-05 NOTE — PROGRESS NOTES
Denisse presented to infusion center for iron dextran. Shantel comp drug info reviewed, printout provided and questions answered. Pt hypotensive, unable to get blood pressure reading after several attempts. Pt reports  BP 84/42 this AM after pain medication, diuretics. Pt denies dizziness, CP, light headedness, fatigue. Charge nurse consulted. POC and recent lab results reviewed.    PIV started in L FA, brisk blood return observed.    Iron Dextran started at 75 ml/hr per orders for 5 mins, paused for 10 mins with no s/s of adverse reaction. Remainder of infusion run over remainder of approx 1 hour with no s/s of adverse reaction.     PIV flushed and removed, gauze dressing placed. No further appts needed at this time. Left on foot to self care.

## 2024-11-07 ENCOUNTER — PRE-ADMISSION TESTING (OUTPATIENT)
Dept: ADMISSIONS | Facility: MEDICAL CENTER | Age: 64
End: 2024-11-07
Attending: ANESTHESIOLOGY
Payer: MEDICARE

## 2024-11-07 NOTE — PREPROCEDURE INSTRUCTIONS
PreAdmit Telephone Appointment: Reviewed the Preparing for your procedure handout with patient over the phone. Patient instructed per pharmacy guidelines regarding taking, holding or contacting provider for instructions on regularly prescribed medications before surgery.    Confirmed with patient where to check in morning of surgery. Handouts/Brochure/Video emailed to patient.    Labs, EKG and CXR per MD to be done on 11/20/24 at [facility].

## 2024-11-12 ENCOUNTER — PATIENT OUTREACH (OUTPATIENT)
Dept: HEALTH INFORMATION MANAGEMENT | Facility: OTHER | Age: 64
End: 2024-11-12
Payer: MEDICARE

## 2024-11-12 ENCOUNTER — PATIENT MESSAGE (OUTPATIENT)
Dept: HEALTH INFORMATION MANAGEMENT | Facility: OTHER | Age: 64
End: 2024-11-12

## 2024-11-12 DIAGNOSIS — I10 HTN (HYPERTENSION), BENIGN: ICD-10-CM

## 2024-11-13 ENCOUNTER — HOSPITAL ENCOUNTER (OUTPATIENT)
Dept: LAB | Facility: MEDICAL CENTER | Age: 64
End: 2024-11-13
Attending: INTERNAL MEDICINE
Payer: MEDICARE

## 2024-11-13 LAB
ANION GAP SERPL CALC-SCNC: 11 MMOL/L (ref 7–16)
BUN SERPL-MCNC: 63 MG/DL (ref 8–22)
CALCIUM SERPL-MCNC: 10.5 MG/DL (ref 8.5–10.5)
CHLORIDE SERPL-SCNC: 107 MMOL/L (ref 96–112)
CO2 SERPL-SCNC: 17 MMOL/L (ref 20–33)
CREAT SERPL-MCNC: 2.17 MG/DL (ref 0.5–1.4)
GFR SERPLBLD CREATININE-BSD FMLA CKD-EPI: 24 ML/MIN/1.73 M 2
GLUCOSE SERPL-MCNC: 113 MG/DL (ref 65–99)
POTASSIUM SERPL-SCNC: 6.3 MMOL/L (ref 3.6–5.5)
SODIUM SERPL-SCNC: 135 MMOL/L (ref 135–145)

## 2024-11-13 PROCEDURE — 80048 BASIC METABOLIC PNL TOTAL CA: CPT

## 2024-11-13 PROCEDURE — 36415 COLL VENOUS BLD VENIPUNCTURE: CPT

## 2024-11-14 ENCOUNTER — OFFICE VISIT (OUTPATIENT)
Dept: CARDIOLOGY | Facility: MEDICAL CENTER | Age: 64
End: 2024-11-14
Attending: INTERNAL MEDICINE
Payer: MEDICARE

## 2024-11-14 VITALS
WEIGHT: 127 LBS | OXYGEN SATURATION: 100 % | RESPIRATION RATE: 16 BRPM | HEART RATE: 85 BPM | HEIGHT: 67 IN | BODY MASS INDEX: 19.93 KG/M2 | SYSTOLIC BLOOD PRESSURE: 94 MMHG | DIASTOLIC BLOOD PRESSURE: 40 MMHG

## 2024-11-14 DIAGNOSIS — Z79.899 HIGH RISK MEDICATION USE: ICD-10-CM

## 2024-11-14 DIAGNOSIS — R06.09 DYSPNEA ON EXERTION: ICD-10-CM

## 2024-11-14 DIAGNOSIS — I51.89 LEFT VENTRICULAR SYSTOLIC DYSFUNCTION, NYHA CLASS 3: ICD-10-CM

## 2024-11-14 DIAGNOSIS — D64.9 ANEMIA, UNSPECIFIED TYPE: ICD-10-CM

## 2024-11-14 DIAGNOSIS — F10.20 ETOHISM (HCC): ICD-10-CM

## 2024-11-14 DIAGNOSIS — E87.5 HYPERKALEMIA: ICD-10-CM

## 2024-11-14 DIAGNOSIS — N18.4 CKD (CHRONIC KIDNEY DISEASE) STAGE 4, GFR 15-29 ML/MIN (HCC): ICD-10-CM

## 2024-11-14 DIAGNOSIS — I50.20 ACC/AHA STAGE C SYSTOLIC HEART FAILURE (HCC): ICD-10-CM

## 2024-11-14 DIAGNOSIS — I35.0 MODERATE AORTIC STENOSIS: ICD-10-CM

## 2024-11-14 DIAGNOSIS — K74.60 HEPATIC CIRRHOSIS, UNSPECIFIED HEPATIC CIRRHOSIS TYPE, UNSPECIFIED WHETHER ASCITES PRESENT (HCC): ICD-10-CM

## 2024-11-14 PROCEDURE — 99215 OFFICE O/P EST HI 40 MIN: CPT | Performed by: INTERNAL MEDICINE

## 2024-11-14 PROCEDURE — G2211 COMPLEX E/M VISIT ADD ON: HCPCS | Performed by: INTERNAL MEDICINE

## 2024-11-14 PROCEDURE — 99213 OFFICE O/P EST LOW 20 MIN: CPT | Performed by: INTERNAL MEDICINE

## 2024-11-14 RX ORDER — FLUTICASONE PROPIONATE 50 MCG
1 SPRAY, SUSPENSION (ML) NASAL PRN
COMMUNITY
End: 2024-11-27

## 2024-11-14 ASSESSMENT — ENCOUNTER SYMPTOMS
DIAPHORESIS: 0
FALLS: 0
SENSORY CHANGE: 0
SHORTNESS OF BREATH: 1
PALPITATIONS: 0
MYALGIAS: 0
DEPRESSION: 0
BRUISES/BLEEDS EASILY: 0
BLURRED VISION: 0
COUGH: 0
FEVER: 0
ABDOMINAL PAIN: 0
HEADACHES: 0
DOUBLE VISION: 0
MEMORY LOSS: 0
DIZZINESS: 0

## 2024-11-14 ASSESSMENT — MINNESOTA LIVING WITH HEART FAILURE QUESTIONNAIRE (MLHF)
DIFFICULTY SOCIALIZING WITH FAMILY OR FRIENDS: 0
DIFFICULTY SLEEPING WELL AT NIGHT: 0
MAKING YOU SHORT OF BREATH: 0
GIVING YOU SIDE EFFECTS FROM TREATMENTS: 0
COSTING YOU MONEY FOR MEDICAL CARE: 3
TOTAL_SCORE: 3
MAKING YOU FEEL DEPRESSED: 0
DIFFICULTY TO CONCENTRATE OR REMEMBERING THINGS: 0
MAKING YOU WORRY: 0
DIFFICULTY WITH RECREATIONAL PASTIMES, SPORTS, HOBBIES: 0
LOSS OF SELF CONTROL IN YOUR LIFE: 0
HAVING TO SIT OR LIE DOWN DURING THE DAY: 0
TIRED, FATIGUED OR LOW ON ENERGY: 0
FEELING LIKE A BURDEN TO FAMILY AND FRIENDS: 0
EATING LESS FOODS YOU LIKE: 0
WORKING AROUND THE HOUSE OR YARD DIFFICULT: 0
SWELLING IN ANKLES OR LEGS: 0
WALKING ABOUT OR CLIMBING STAIRS DIFFICULT: 0
DIFFICULTY GOING AWAY FROM HOME: 0
MAKING YOU STAY IN A HOSPITAL: 0
DIFFICULTY WORKING TO EARN A LIVING: 0
DIFFICULTY WITH SEXUAL ACTIVITIES: 0

## 2024-11-14 ASSESSMENT — FIBROSIS 4 INDEX: FIB4 SCORE: 2.03

## 2024-11-14 NOTE — PROGRESS NOTES
Chief Complaint   Patient presents with    Follow-Up     Dx: ACC/AHA stage C systolic heart failure (HCC) normaliztion of LVEF        Hypertension     Subjective     Denisse Abel is a 64 y.o. female who presents today for cardiac care and evaluation in the heart failure clinic because of recent hospitalization due to heart failure exacerbation. The diagnosis of HF was made in 01/2024. It was presumed to be non-ischemic in nature. The left ventricular systolic function was documented to be at 40%. Patient was diuresed in the hospital and discharged home with guidelines directed medical therapy. She does have hx of ETOH.    05/2024 LVEF of 40 % with global hypokinesis. No significant valvular disease. I have independently interpreted and reviewed echocardiogram's actual images.     08/2024 LVEF of 60% with global hypokinesis. Moderate to severe aortic stenosis disease. I have independently interpreted and reviewed echocardiogram's actual images.     09/2024 Moderate aortic valve stenosis. Aortic valve area traced from planimetry is 1.5 cm2. Trace aortic insufficiency. I have independently interpreted and reviewed echocardiogram's actual images.     Patient was able to complete 300 m during his 6 minute walk test. her O2 saturation at baseline was 99% and at the end of the test, the O2 saturation was 98%. she reported 5 level of dyspnea on Kylah scale.    I have personally interpreted EKG today with patient, there is no evidence of acute coronary syndrome, no evidence of prior infarct, normal OK and QT interval, no significant conduction disease. Sinus rhythm.    I have independently interpreted and reviewed blood tests results with patient in clinic which shows LDL level of 65, triglycerides level of 74, GFR of 24 down from 58, NT pro BNP of 310 down from 5713, K of 6.3.    Cirrhosis seen on CT.      Past Medical History:   Diagnosis Date    Acute combined systolic and diastolic heart failure (HCC) 01/22/2024     HLD (hyperlipidemia)     HTN (hypertension)     Hypertension     Hypotension     Obesity      Past Surgical History:   Procedure Laterality Date    CA LAP,DIAGNOSTIC ABDOMEN  2/23/2024    Procedure: LAPAROSCOPY, KIM'S PATCH;  Surgeon: Neel Amezcua M.D.;  Location: SURGERY [facility];  Service: General    TRIGGER FINGER RELEASE Right 3/31/2017    Procedure: TRIGGER FINGER RELEASE;  Surgeon: Jimmy Atkins M.D.;  Location: SURGERY [facility];  Service:     GANGLION EXCISION Right 3/31/2017    Procedure: GANGLION EXCISION - HAND CYST;  Surgeon: Jimmy Atkins M.D.;  Location: SURGERY [facility];  Service:     PELVISCOPY  6/5/08    Performed by NATALIYA ARCEO at SURGERY SAME DAY [facility] ORS    OVARIAN CYSTECTOMY  6/5/08    Performed by NATALIYA ARCEO at SURGERY SAME DAY [facility] ORS    CYSTOSCOPY  6/5/08    Performed by NATALIYA ARCEO at SURGERY SAME DAY [facility] ORS    HYSTERECTOMY LAPAROSCOPY  2003    KNEE ARTHROSCOPY       Family History   Problem Relation Age of Onset    Diabetes Mother     Heart Disease Mother     Stroke Mother     Diabetes Father      Social History     Socioeconomic History    Marital status: Single     Spouse name: Not on file    Number of children: Not on file    Years of education: Not on file    Highest education level: Master's degree (e.g., MA, MS, Monica, MEd, MSW, SUMAYA)   Occupational History    Not on file   Tobacco Use    Smoking status: Never    Smokeless tobacco: Never   Vaping Use    Vaping status: Never Used   Substance and Sexual Activity    Alcohol use: Not Currently     Alcohol/week: 3.6 oz     Comment: denies    Drug use: Not Currently     Types: Marijuana     Comment: denies    Sexual activity: Not Currently   Other Topics Concern    Not on file   Social History Narrative    Not on file     Social Drivers of Health     Financial Resource Strain: Low Risk  (1/11/2024)    Overall Financial Resource Strain (CARDIA)     Difficulty of Paying Living  Expenses: Not hard at all   Food Insecurity: No Food Insecurity (1/11/2024)    Hunger Vital Sign     Worried About Running Out of Food in the Last Year: Never true     Ran Out of Food in the Last Year: Never true   Transportation Needs: No Transportation Needs (1/11/2024)    PRAPARE - Transportation     Lack of Transportation (Medical): No     Lack of Transportation (Non-Medical): No   Physical Activity: Sufficiently Active (1/11/2024)    Exercise Vital Sign     Days of Exercise per Week: 5 days     Minutes of Exercise per Session: 30 min   Stress: Stress Concern Present (1/11/2024)    [employer] - Occupational Stress Questionnaire     Feeling of Stress : Very much   Social Connections: Socially Isolated (1/11/2024)    Social Connection and Isolation Panel [NHANES]     Frequency of Communication with Friends and Family: More than three times a week     Frequency of Social Gatherings with Friends and Family: Once a week     Attends Gnosticist Services: Never     Active Member of Clubs or Organizations: No     Attends Club or Organization Meetings: Never     Marital Status:    Intimate Partner Violence: Not on file   Housing Stability: Low Risk  (1/11/2024)    Housing Stability Vital Sign     Unable to Pay for Housing in the Last Year: No     Number of Places Lived in the Last Year: 1     Unstable Housing in the Last Year: No     Allergies   Allergen Reactions    Pcn [Penicillins] Rash     Pt reports that it was a childhood allergie received body rash   Childhood allergy. Augmentin challenged 3/4/24 and tolerated.     Outpatient Encounter Medications as of 11/14/2024   Medication Sig Dispense Refill    fluticasone (FLONASE) 50 MCG/ACT nasal spray Administer 1 Spray into affected nostril(S) as needed.      vitamin D3 (CHOLECALCIFEROL) 1000 Unit (25 mcg) Tab Take 1,000 Units by mouth every day.       STOP ALL VITAMINS AND SUPPLEMENTS 7 DAYS PRIOR TO YOUR PROCEDURE ON 12/3/24       thiamine (VITAMIN B-1) 50 MG Tab Take 100 mg by mouth every day. STOP ALL VITAMINS AND SUPPLEMENTS 7 DAYS PRIOR TO YOUR PROCEDURE ON 12/3/24      spironolactone (ALDACTONE) 50 MG Tab Take 1 Tablet by mouth every day. (Patient taking differently: Take 75 mg by mouth every day. HOLD THIS MED DAY OF QSBRGTLUQ23/3/24   FOR YOUR COMFORT) 30 Tablet 3    furosemide (LASIX) 40 MG Tab Take 1 Tablet by mouth every day. (Patient taking differently: Take 40 mg by mouth every day. HOLD THIS MED 24 hrs 12/3/24) 30 Tablet 3    omeprazole (PRILOSEC) 40 MG delayed-release capsule Take 1 Capsule by mouth every day. (Patient taking differently: Take 40 mg by mouth every day. MAY CONTINUE TO TAKE PRIOR TO YOUR PROCEDURE ON 12/3/24) 30 Capsule 3    HYDROcodone-acetaminophen (NORCO) 5-325 MG Tab per tablet Take 1 Tablet by mouth 2 times a day as needed. MAY CONTINUE TO TAKE PRIOR TO YOUR PROCEDURE ON 12/3/24      famotidine (PEPCID) 20 MG Tab Take 1 Tablet by mouth 2 times a day. (Patient taking differently: Take 20 mg by mouth as needed. MEDICATION INSTRUCTIONS FOR SURGERY/PROCEDURE SCHEDULED FOR 12/3/24   CONTINUE TAKING MED PRIOR TO SURGERY)      Empagliflozin (JARDIANCE) 10 MG Tab tablet Take 1 Tablet by mouth every day. (Patient taking differently: Take 10 mg by mouth every day.       HOLD 4 DAYS PRIOR TO PROCEDURE PER M.D. ORDERS TO PT.) 90 Tablet 4    [DISCONTINUED] influenza vaccine High-Dose (FLUZONE HIGH-DOSE) 0.5 ML Suspension Prefilled Syringe injection Inject 0.5 mL into the shoulder, thigh, or buttocks. 0.5 mL 0    [DISCONTINUED] COVID-19 mRNA Vac-Yarely,Pfizer, (COMIRNATY) 30 MCG/0.3ML Suspension Prefilled Syringe injection Inject 0.3 mL into the shoulder, thigh, or buttocks. 0.3 mL 0    [DISCONTINUED] pneumococcal 20-Holly Conj Vacc (PREVNAR 20) 0.5 ML Suspension Prefilled Syringe syringe Inject 0.5 mL into the shoulder, thigh, or buttocks. 0.5 mL 0    [DISCONTINUED] potassium chloride SA (K-DUR) 10 MEQ Tab CR Take 1 Tablet  "by mouth every day. (Patient taking differently: Take 10 mEq by mouth every day.       MAY CONTINUE TO TAKE PRIOR TO YOUR PROCEDURE ON 12/3/24) 30 Tablet 3    [DISCONTINUED] sacubitril-valsartan (ENTRESTO) 49-51 MG Tab Take 1 Tablet by mouth every day. (Patient taking differently: Take 1 Tablet by mouth every day.       HOLD THIS MEDICATION 24 HOURS PRIOR TO YOUR PROCEDURE ON 12/3/24) 30 Tablet 2     No facility-administered encounter medications on file as of 11/14/2024.     Review of Systems   Constitutional:  Negative for diaphoresis and fever.   HENT:  Negative for nosebleeds.    Eyes:  Negative for blurred vision and double vision.   Respiratory:  Positive for shortness of breath. Negative for cough.    Cardiovascular:  Positive for leg swelling. Negative for chest pain and palpitations.   Gastrointestinal:  Negative for abdominal pain.   Genitourinary:  Negative for dysuria and frequency.   Musculoskeletal:  Negative for falls and myalgias.   Skin:  Negative for rash.   Neurological:  Negative for dizziness, sensory change and headaches.   Endo/Heme/Allergies:  Does not bruise/bleed easily.   Psychiatric/Behavioral:  Negative for depression and memory loss.               Objective     BP 94/40 (BP Location: Left arm, Patient Position: Sitting, BP Cuff Size: Adult)   Pulse 85   Resp 16   Ht 1.702 m (5' 7\")   Wt 57.6 kg (127 lb)   SpO2 100%   BMI 19.89 kg/m²     Physical Exam  Vitals and nursing note reviewed.   Constitutional:       General: She is not in acute distress.     Appearance: She is not diaphoretic.   HENT:      Head: Normocephalic and atraumatic.      Right Ear: External ear normal.      Left Ear: External ear normal.      Nose: No congestion or rhinorrhea.   Eyes:      General:         Right eye: No discharge.         Left eye: No discharge.   Neck:      Thyroid: No thyromegaly.      Vascular: No JVD.   Cardiovascular:      Rate and Rhythm: Normal rate and regular rhythm.      Pulses: Normal " pulses.      Heart sounds: Murmur heard.   Pulmonary:      Effort: No respiratory distress.   Abdominal:      General: There is distension.      Tenderness: There is no abdominal tenderness.   Musculoskeletal:         General: No swelling or tenderness.      Right lower leg: No edema.      Left lower leg: No edema.   Skin:     General: Skin is warm and dry.   Neurological:      Mental Status: She is alert and oriented to person, place, and time.      Cranial Nerves: No cranial nerve deficit.   Psychiatric:         Behavior: Behavior normal.                Assessment & Plan     1. ACC/AHA stage C systolic heart failure (HCC) normaliztion of LVEF        2. Left ventricular systolic dysfunction, NYHA class 3        3. Hyperkalemia  EKG      4. ETOHism (HCC)        5. CKD (chronic kidney disease) stage 4, GFR 15-29 ml/min (HCC)  Referral to Nephrology      6. Hepatic cirrhosis, unspecified hepatic cirrhosis type, unspecified whether ascites present (HCC)        7. Moderate aortic stenosis        8. Dyspnea on exertion  proBrain Natriuretic Peptide, NT      9. High risk medication use  Basic Metabolic Panel      10. Anemia, unspecified type          Medical Decision Making: Today's Assessment/Status/Plan:   Dilated Cardiomyopathy:  Chronically illed condition which requires ongoing close monitoring and treatment to improve survival rate along with decreasing risk of clinical decompensation sudden cardiac death and hospitalization.    Today, based on physical examination findings, patient is euvolemic. No JVD, lungs are clear to auscultation, no pitting edema in bilateral lower extremities, no ascites.     Dry weight is 127 lbs.  Fluid retention most localized in the abdominal area. Will get paracentesis with GI / IR.    Renal function is worsening along with hyperkalemia, will stop Entresto    Will refer to nephrology.    Based on recent data on SGLT2 and heart failure with reduced ejection fraction, patient will be  benefited from Jardiance 10 mg p.o. once a day for further reduction in mortality and hospitalization with absolute risk reduction of 5.2%.  Therefore, I will continue patient on Jardiance 10 mg p.o. once a day.  Risks and benefits were explained to patient and patient has agreed to proceed.     Of note, Jardiance no longer has restriction on GFR.    We stopped carvedilol due to low blood pressures.    Aldactone antagonist with Spironolactone 50 mg daily. Will stop Potassium pill due to hyperkalemia.    No indication for ICD.    No more ETOH advised.    No intervention for moderate AS at this time. Continue to clinically monitor.    Of note, during the care of this patient, I spent a significant amount of time explaining the nature of the disease process, reviewing all possible imaging studies, blood test results to patient.  The overall care of this patient require a higher level of care than usual due to the multiple comorbidities along with ongoing issues of congestive heart failure that put this patient at risk for sudden cardiac death, increased mortality, and hospitalization.  This patient also requires close monitoring with at least monthly blood test to monitor renal function and electrolytes due to the ongoing dynamic changes of medical therapy titration protocol to ensure optimal benefits for the overall care of this patient.     This visit encounter signifies the visit complexity inherent to evaluation and management associated with medical care services that serve as the continuing focal point for all needed health care services and/or with medical care services that are part of ongoing care related to this patient's single, serious condition, complex cardiac condition.      German Xiong M.D.

## 2024-11-18 ENCOUNTER — HOSPITAL ENCOUNTER (OUTPATIENT)
Dept: RADIOLOGY | Facility: MEDICAL CENTER | Age: 64
End: 2024-11-18
Attending: ANESTHESIOLOGY
Payer: MEDICARE

## 2024-11-18 PROCEDURE — 71046 X-RAY EXAM CHEST 2 VIEWS: CPT

## 2024-11-19 NOTE — PROGRESS NOTES
"Pt declined PCM services, stating she is a \"good advocate\" for herself. Pt appreciates knowing this service is available if she decides she wants to utilize it.  "

## 2024-11-20 ENCOUNTER — PRE-ADMISSION TESTING (OUTPATIENT)
Dept: ADMISSIONS | Facility: MEDICAL CENTER | Age: 64
End: 2024-11-20
Attending: ANESTHESIOLOGY
Payer: MEDICARE

## 2024-11-20 DIAGNOSIS — Z01.811 PRE-OPERATIVE RESPIRATORY EXAMINATION: ICD-10-CM

## 2024-11-20 DIAGNOSIS — Z01.812 PRE-OPERATIVE LABORATORY EXAMINATION: ICD-10-CM

## 2024-11-20 DIAGNOSIS — Z01.810 PRE-OPERATIVE CARDIOVASCULAR EXAMINATION: ICD-10-CM

## 2024-11-20 LAB
ALBUMIN SERPL BCP-MCNC: 4.4 G/DL (ref 3.2–4.9)
ALBUMIN/GLOB SERPL: 1.3 G/DL
ALP SERPL-CCNC: 126 U/L (ref 30–99)
ALT SERPL-CCNC: 8 U/L (ref 2–50)
ANION GAP SERPL CALC-SCNC: 13 MMOL/L (ref 7–16)
ANISOCYTOSIS BLD QL SMEAR: ABNORMAL
APPEARANCE UR: CLEAR
APTT PPP: 32.2 SEC (ref 24.7–36)
AST SERPL-CCNC: 14 U/L (ref 12–45)
BASOPHILS # BLD AUTO: 0.6 % (ref 0–1.8)
BASOPHILS # BLD: 0.03 K/UL (ref 0–0.12)
BILIRUB SERPL-MCNC: 0.4 MG/DL (ref 0.1–1.5)
BILIRUB UR QL STRIP.AUTO: NEGATIVE
BUN SERPL-MCNC: 47 MG/DL (ref 8–22)
CALCIUM ALBUM COR SERPL-MCNC: 9.9 MG/DL (ref 8.5–10.5)
CALCIUM SERPL-MCNC: 10.2 MG/DL (ref 8.4–10.2)
CHLORIDE SERPL-SCNC: 102 MMOL/L (ref 96–112)
CO2 SERPL-SCNC: 20 MMOL/L (ref 20–33)
COLOR UR: NORMAL
CREAT SERPL-MCNC: 1.58 MG/DL (ref 0.5–1.4)
DACRYOCYTES BLD QL SMEAR: NORMAL
EOSINOPHIL # BLD AUTO: 0.13 K/UL (ref 0–0.51)
EOSINOPHIL NFR BLD: 2.4 % (ref 0–6.9)
ERYTHROCYTE [DISTWIDTH] IN BLOOD BY AUTOMATED COUNT: 67.8 FL (ref 35.9–50)
GFR SERPLBLD CREATININE-BSD FMLA CKD-EPI: 36 ML/MIN/1.73 M 2
GLOBULIN SER CALC-MCNC: 3.5 G/DL (ref 1.9–3.5)
GLUCOSE SERPL-MCNC: 106 MG/DL (ref 65–99)
GLUCOSE UR STRIP.AUTO-MCNC: NEGATIVE MG/DL
HCT VFR BLD AUTO: 23.3 % (ref 37–47)
HGB BLD-MCNC: 7.3 G/DL (ref 12–16)
HYPOCHROMIA BLD QL SMEAR: ABNORMAL
IMM GRANULOCYTES # BLD AUTO: 0.03 K/UL (ref 0–0.11)
IMM GRANULOCYTES NFR BLD AUTO: 0.6 % (ref 0–0.9)
INR PPP: 1.28 (ref 0.87–1.13)
KETONES UR STRIP.AUTO-MCNC: NEGATIVE MG/DL
LEUKOCYTE ESTERASE UR QL STRIP.AUTO: NEGATIVE
LYMPHOCYTES # BLD AUTO: 0.9 K/UL (ref 1–4.8)
LYMPHOCYTES NFR BLD: 16.7 % (ref 22–41)
MCH RBC QN AUTO: 24.8 PG (ref 27–33)
MCHC RBC AUTO-ENTMCNC: 31.3 G/DL (ref 32.2–35.5)
MCV RBC AUTO: 79.3 FL (ref 81.4–97.8)
MICRO URNS: NORMAL
MICROCYTES BLD QL SMEAR: ABNORMAL
MONOCYTES # BLD AUTO: 0.32 K/UL (ref 0–0.85)
MONOCYTES NFR BLD AUTO: 5.9 % (ref 0–13.4)
NEUTROPHILS # BLD AUTO: 3.99 K/UL (ref 1.82–7.42)
NEUTROPHILS NFR BLD: 73.8 % (ref 44–72)
NITRITE UR QL STRIP.AUTO: NEGATIVE
NRBC # BLD AUTO: 0 K/UL
NRBC BLD-RTO: 0 /100 WBC (ref 0–0.2)
OVALOCYTES BLD QL SMEAR: NORMAL
PH UR STRIP.AUTO: 6 [PH] (ref 5–8)
PLATELET # BLD AUTO: 181 K/UL (ref 164–446)
PLATELET BLD QL SMEAR: NORMAL
PMV BLD AUTO: 11.5 FL (ref 9–12.9)
POIKILOCYTOSIS BLD QL SMEAR: NORMAL
POLYCHROMASIA BLD QL SMEAR: NORMAL
POTASSIUM SERPL-SCNC: 4.3 MMOL/L (ref 3.6–5.5)
PROT SERPL-MCNC: 7.9 G/DL (ref 6–8.2)
PROT UR QL STRIP: NEGATIVE MG/DL
PROTHROMBIN TIME: 16.3 SEC (ref 12–14.6)
RBC # BLD AUTO: 2.94 M/UL (ref 4.2–5.4)
RBC BLD AUTO: PRESENT
RBC UR QL AUTO: NEGATIVE
SCHISTOCYTES BLD QL SMEAR: NORMAL
SODIUM SERPL-SCNC: 135 MMOL/L (ref 135–145)
SP GR UR STRIP.AUTO: 1.01
WBC # BLD AUTO: 5.4 K/UL (ref 4.8–10.8)

## 2024-11-20 PROCEDURE — 80053 COMPREHEN METABOLIC PANEL: CPT

## 2024-11-20 PROCEDURE — 85610 PROTHROMBIN TIME: CPT

## 2024-11-20 PROCEDURE — 81003 URINALYSIS AUTO W/O SCOPE: CPT

## 2024-11-20 PROCEDURE — 93005 ELECTROCARDIOGRAM TRACING: CPT

## 2024-11-20 PROCEDURE — 85730 THROMBOPLASTIN TIME PARTIAL: CPT

## 2024-11-20 PROCEDURE — 85025 COMPLETE CBC W/AUTO DIFF WBC: CPT

## 2024-11-20 PROCEDURE — 36415 COLL VENOUS BLD VENIPUNCTURE: CPT

## 2024-11-21 LAB — EKG IMPRESSION: NORMAL

## 2024-11-21 PROCEDURE — 93010 ELECTROCARDIOGRAM REPORT: CPT | Performed by: INTERNAL MEDICINE

## 2024-11-21 NOTE — OR NURSING
Called placed to Dr. Milian's office and message left for Sangeetha CHOWDARY regarding abnormal labs and clearance strongly recommended, last clearance 4/23/24

## 2024-11-21 NOTE — OR NURSING
Called Ольга @ Dr. Milian's office and left message that I called patient and informed her of labs needed (PT, PTT) on 11/26/2024, pt states she wants to go to an outpt [facility] lab close to her home and will go on the 11/26/24. Labs ordered.  She mentioned she is having dental work that day, a root canal,  I advised pt to notify your office of this and she was hesitant to do so.

## 2024-11-22 ENCOUNTER — TELEPHONE (OUTPATIENT)
Dept: CARDIOLOGY | Facility: MEDICAL CENTER | Age: 64
End: 2024-11-22
Payer: MEDICARE

## 2024-11-25 ENCOUNTER — TELEPHONE (OUTPATIENT)
Dept: CARDIOLOGY | Facility: MEDICAL CENTER | Age: 64
End: 2024-11-25
Payer: MEDICARE

## 2024-11-25 NOTE — LETTER
PROCEDURE/SURGERY CLEARANCE FORM      Encounter Date: 11/25/2024    Patient: Denisse Abel  YOB: 1960    CARDIOLOGIST:  German Xiong M.D.    REFERRING DOCTOR:  No ref. provider found    The following procedure/surgery: Intracept Destruction of Intraosseous Basivertebral Nerve surgery                                            PROCEDURE/SURGERY CLEARANCE FORM    Date: 11/25/2024   Patient Name: Denisse Abel    Dear Surgeon or Proceduralist,      Thank you for your request for cardiac stratification of our mutual patient Denisse Abel 1960. We have reviewed their [facility] records; and to the best of our understanding this patient has not had stenting, ablation, watchman, cardiothoracic surgery or hospitalization for cardiovascular reasons in the past 6 months.  Denisse Abel has been seen within the past 15 months and is considered to have non-modifiable cardiac risk for this low-risk procedure/surgery. They may proceed from a cardiovascular standpoint and may hold their antiplatelet/anticoagulation as briefly as possible. Please have patient resume this medication when hemodynamically stable to do so.     Aspirin or Prasugrel   - hold 7 days prior to procedure/surgery, resume when hemodynamically stable      Clopidrogrel or Ticagrelor  - hold 7 days for all neurological procedures, hold 5 days prior to all other procedure/surgery,  resume when hemodynamically stable     Warfarin - hold 7 days for all neurological procedures, hold 5 days prior to all other procedure/surgery and coordinate with [facility] ([phone]) INR testing and dose management.      Pradaxa/Xarelto/Eliquis/Savesya - hold 1 day prior to procedure for low bleeding risk procedure, 2 days for high bleeding risk procedure, or consider holding 3 days or longer for patients with reduced kidney function (CrCl <30mL/min) or spinal/cranial surgeries/procedures.      If they have a  mechanical heart valve, please coordinate with [facility] ([phone]) the proper management of their anticoagulant in the periprocedural or perioperative period.      Some patients have higher risk for cardiovascular complications or holding medication. If our patient has had prior complications of holding antiplatelet or anticoagulants in the past and we have seen them after these events, we have addressed these concerns with the patient. They are at an unknown degree of increased risk for recurrent complication.  You may hold anticoagulation/antiplatelets for the procedure or surgery if the benefits of the procedure or surgery outweigh this nonmodifiable risk.      If Denisse Abel 1960 has new symptoms of heart failure decompensation, unstable arrythmia, or angina please reach out and we will assess the patient.      If you have other patient-specific concerns, please feel free to reach out to the patient's cardiologist directly at [phone].     Thank you,       [facility]          Electronically Signed   MD Destiny Xiong M.D.

## 2024-11-25 NOTE — TELEPHONE ENCOUNTER
Last OV: 11/14/2024  Proposed Surgery: Intracept Destruction of Intraosseous Basivertebral Nerve surgery  Surgery Date: 12/3/2024  Requesting Office Name: [facility]  Fax Number: [phone]  Preference of Location (default is surgery center unless specified by Cardiologist or [facility])  Prior Clearance Addressed: No    Is this a general clearance? YES   Anticoags/Antiplatelets: Other N/A  Anticoags/Antiplatelet managed by Cardiology? YES    Outstanding Cardiac Imaging : Yes  Other CT-CTA HEART W/ 3D IMAGE  Clearance to provider to review  Ablation, Cardioversion, Stent, Cardiac Devices, Catheterization, Watchman: No  TAVR/Valve, Mitral Clip, Watchman (including open heart),: N/A   Recent Cardiac Hospitalization: No            When: N/A  History (cardiac history):   Past Medical History:   Diagnosis Date    Acute combined systolic and diastolic heart failure (HCC) 01/22/2024    Anemia due to chronic kidney disease     Heart valve disease     Nonrheumatic aortic valve stenosis    HLD (hyperlipidemia)     HTN (hypertension)     Hypertension     Hypotension     Liver cirrhosis (HCC)     Obesity     Renal disorder     CKD (chronic kidney disease) stage 4, GFR 15-29 ml/min (HCC)           Is this a dental clearance? NO  Ablation, Cardioversion, Watchman, Stents, Cath, Devices within the last 3 months? No   If yes- Send dental wait letter, do not forward to provider for review.     TAVR / Valve, Mitral clip within the last 6 months? No  If yes- Send dental wait letter, do not forward to provider for review.     If completing a general clearance, continue per protocol.           Surgical Clearance Letter Sent: No Provider to advise.   **Scan clearance request letter into [facility].**

## 2024-11-27 ENCOUNTER — APPOINTMENT (OUTPATIENT)
Dept: RADIOLOGY | Facility: MEDICAL CENTER | Age: 64
DRG: 056 | End: 2024-11-27
Attending: EMERGENCY MEDICINE
Payer: MEDICARE

## 2024-11-27 ENCOUNTER — HOSPITAL ENCOUNTER (INPATIENT)
Facility: MEDICAL CENTER | Age: 64
End: 2024-11-27
Attending: EMERGENCY MEDICINE | Admitting: HOSPITALIST
Payer: MEDICARE

## 2024-11-27 DIAGNOSIS — K72.01 ACUTE LIVER FAILURE WITH HEPATIC COMA (HCC): ICD-10-CM

## 2024-11-27 DIAGNOSIS — G40.109 PARTIAL SEIZURE DISORDER (HCC): ICD-10-CM

## 2024-11-27 DIAGNOSIS — N17.9 AKI (ACUTE KIDNEY INJURY) (HCC): ICD-10-CM

## 2024-11-27 DIAGNOSIS — R53.1 GENERALIZED WEAKNESS: ICD-10-CM

## 2024-11-27 DIAGNOSIS — Z29.89 NEED FOR PNEUMOCYSTIS PROPHYLAXIS: ICD-10-CM

## 2024-11-27 DIAGNOSIS — K21.9 GASTROESOPHAGEAL REFLUX DISEASE WITHOUT ESOPHAGITIS: ICD-10-CM

## 2024-11-27 DIAGNOSIS — E87.20 METABOLIC ACIDOSIS: ICD-10-CM

## 2024-11-27 DIAGNOSIS — R13.10 DYSPHAGIA, UNSPECIFIED TYPE: ICD-10-CM

## 2024-11-27 DIAGNOSIS — E43 SEVERE PROTEIN-CALORIE MALNUTRITION (HCC): ICD-10-CM

## 2024-11-27 DIAGNOSIS — R83.8 ELEVATED CSF PROTEIN: ICD-10-CM

## 2024-11-27 DIAGNOSIS — R40.1 STUPOR: ICD-10-CM

## 2024-11-27 DIAGNOSIS — K43.9 VENTRAL HERNIA WITHOUT OBSTRUCTION OR GANGRENE: ICD-10-CM

## 2024-11-27 DIAGNOSIS — G98.8 NEUROLOGIC DISORDER: ICD-10-CM

## 2024-11-27 PROBLEM — I50.42 CHRONIC COMBINED SYSTOLIC AND DIASTOLIC HEART FAILURE (HCC): Status: ACTIVE | Noted: 2024-01-22

## 2024-11-27 PROBLEM — G31.2 ALCOHOLIC ENCEPHALOPATHY (HCC): Status: ACTIVE | Noted: 2024-11-27

## 2024-11-27 LAB
ALBUMIN SERPL BCP-MCNC: 4.4 G/DL (ref 3.2–4.9)
ALBUMIN/GLOB SERPL: 1.2 G/DL
ALP SERPL-CCNC: 108 U/L (ref 30–99)
ALT SERPL-CCNC: 5 U/L (ref 2–50)
AMMONIA PLAS-SCNC: 66 UMOL/L (ref 11–45)
AMPHET UR QL SCN: NEGATIVE
ANION GAP SERPL CALC-SCNC: 29 MMOL/L (ref 7–16)
ANISOCYTOSIS BLD QL SMEAR: ABNORMAL
APPEARANCE UR: CLEAR
AST SERPL-CCNC: 14 U/L (ref 12–45)
BACTERIA BLD CULT: NORMAL
BACTERIA BLD CULT: NORMAL
BARBITURATES UR QL SCN: NEGATIVE
BASE EXCESS BLDV CALC-SCNC: -6 MMOL/L (ref -2–3)
BASOPHILS # BLD AUTO: 0.2 % (ref 0–1.8)
BASOPHILS # BLD: 0.02 K/UL (ref 0–0.12)
BENZODIAZ UR QL SCN: NEGATIVE
BILIRUB SERPL-MCNC: 1.1 MG/DL (ref 0.1–1.5)
BILIRUB UR QL STRIP.AUTO: ABNORMAL
BODY TEMPERATURE: 36.6 CENTIGRADE
BUN SERPL-MCNC: 83 MG/DL (ref 8–22)
BZE UR QL SCN: NEGATIVE
CALCIUM ALBUM COR SERPL-MCNC: 10.8 MG/DL (ref 8.5–10.5)
CALCIUM SERPL-MCNC: 11.1 MG/DL (ref 8.4–10.2)
CANNABINOIDS UR QL SCN: NEGATIVE
CHLORIDE SERPL-SCNC: 84 MMOL/L (ref 96–112)
CK SERPL-CCNC: 19 U/L (ref 0–154)
CO2 SERPL-SCNC: 15 MMOL/L (ref 20–33)
COLOR UR: YELLOW
CREAT SERPL-MCNC: 2.23 MG/DL (ref 0.5–1.4)
EKG IMPRESSION: NORMAL
EOSINOPHIL # BLD AUTO: 0.01 K/UL (ref 0–0.51)
EOSINOPHIL NFR BLD: 0.1 % (ref 0–6.9)
ERYTHROCYTE [DISTWIDTH] IN BLOOD BY AUTOMATED COUNT: 65.1 FL (ref 35.9–50)
ETHANOL BLD-MCNC: <10.1 MG/DL
FENTANYL UR QL: NEGATIVE
GFR SERPLBLD CREATININE-BSD FMLA CKD-EPI: 24 ML/MIN/1.73 M 2
GLOBULIN SER CALC-MCNC: 3.8 G/DL (ref 1.9–3.5)
GLUCOSE BLD STRIP.AUTO-MCNC: 127 MG/DL (ref 65–99)
GLUCOSE BLD STRIP.AUTO-MCNC: 132 MG/DL (ref 65–99)
GLUCOSE SERPL-MCNC: 137 MG/DL (ref 65–99)
GLUCOSE UR STRIP.AUTO-MCNC: NEGATIVE MG/DL
HCO3 BLDV-SCNC: 16 MMOL/L (ref 22–29)
HCT VFR BLD AUTO: 29.2 % (ref 37–47)
HGB BLD-MCNC: 9.8 G/DL (ref 12–16)
HGB RETIC QN AUTO: 30.2 PG/CELL (ref 29–35)
IMM GRANULOCYTES # BLD AUTO: 0.07 K/UL (ref 0–0.11)
IMM GRANULOCYTES NFR BLD AUTO: 0.6 % (ref 0–0.9)
IMM RETICS NFR: 14.5 % (ref 2.6–16.1)
INHALED O2 FLOW RATE: 97 L/MIN
IRON SATN MFR SERPL: 8 % (ref 15–55)
IRON SERPL-MCNC: 21 UG/DL (ref 40–170)
KETONES UR STRIP.AUTO-MCNC: ABNORMAL MG/DL
LACTATE SERPL-SCNC: 1.8 MMOL/L (ref 0.5–2)
LACTATE SERPL-SCNC: 1.8 MMOL/L (ref 0.5–2)
LACTATE SERPL-SCNC: 2.2 MMOL/L (ref 0.5–2)
LEUKOCYTE ESTERASE UR QL STRIP.AUTO: NEGATIVE
LYMPHOCYTES # BLD AUTO: 0.38 K/UL (ref 1–4.8)
LYMPHOCYTES NFR BLD: 3.4 % (ref 22–41)
MAGNESIUM SERPL-MCNC: 1.9 MG/DL (ref 1.5–2.5)
MCH RBC QN AUTO: 26.1 PG (ref 27–33)
MCHC RBC AUTO-ENTMCNC: 33.6 G/DL (ref 32.2–35.5)
MCV RBC AUTO: 77.9 FL (ref 81.4–97.8)
METHADONE UR QL SCN: NEGATIVE
MICRO URNS: ABNORMAL
MICROCYTES BLD QL SMEAR: ABNORMAL
MONOCYTES # BLD AUTO: 0.85 K/UL (ref 0–0.85)
MONOCYTES NFR BLD AUTO: 7.7 % (ref 0–13.4)
NEUTROPHILS # BLD AUTO: 9.76 K/UL (ref 1.82–7.42)
NEUTROPHILS NFR BLD: 88 % (ref 44–72)
NITRITE UR QL STRIP.AUTO: NEGATIVE
NRBC # BLD AUTO: 0 K/UL
NRBC BLD-RTO: 0 /100 WBC (ref 0–0.2)
NT-PROBNP SERPL IA-MCNC: 2850 PG/ML (ref 0–125)
OPIATES UR QL SCN: NEGATIVE
OVALOCYTES BLD QL SMEAR: NORMAL
OXYCODONE UR QL SCN: NEGATIVE
PCO2 BLDV: 23.2 MMHG (ref 38–54)
PCO2 TEMP ADJ BLDV: 22.8 MMHG
PCP UR QL SCN: NEGATIVE
PH BLDV: 7.47 [PH] (ref 7.31–7.45)
PH TEMP ADJ BLDV: 7.47 [PH] (ref 7.31–7.45)
PH UR STRIP.AUTO: 5.5 [PH] (ref 5–8)
PLATELET # BLD AUTO: 259 K/UL (ref 164–446)
PLATELET BLD QL SMEAR: NORMAL
PMV BLD AUTO: 10.3 FL (ref 9–12.9)
PO2 BLDV: 32.7 MMHG (ref 23–48)
PO2 TEMP ADJ BLDV: 31.8 MMHG (ref 23–48)
POIKILOCYTOSIS BLD QL SMEAR: NORMAL
POLYCHROMASIA BLD QL SMEAR: NORMAL
POTASSIUM SERPL-SCNC: 3.9 MMOL/L (ref 3.6–5.5)
PROPOXYPH UR QL SCN: NEGATIVE
PROT SERPL-MCNC: 8.2 G/DL (ref 6–8.2)
PROT UR QL STRIP: NEGATIVE MG/DL
RBC # BLD AUTO: 3.75 M/UL (ref 4.2–5.4)
RBC BLD AUTO: PRESENT
RBC UR QL AUTO: NEGATIVE
RETICS # AUTO: 0.12 M/UL (ref 0.04–0.12)
RETICS/RBC NFR: 3.3 % (ref 0.8–2.6)
SAO2 % BLDV: 57.7 % (ref 60–85)
SIGNIFICANT IND 70042: NORMAL
SIGNIFICANT IND 70042: NORMAL
SITE SITE: NORMAL
SITE SITE: NORMAL
SODIUM SERPL-SCNC: 128 MMOL/L (ref 135–145)
SOURCE SOURCE: NORMAL
SOURCE SOURCE: NORMAL
SP GR UR STRIP.AUTO: 1.02
TIBC SERPL-MCNC: 263 UG/DL (ref 250–450)
TROPONIN T SERPL-MCNC: 56 NG/L (ref 6–19)
TSH SERPL DL<=0.005 MIU/L-ACNC: 0.4 UIU/ML (ref 0.38–5.33)
UIBC SERPL-MCNC: 242 UG/DL (ref 110–370)
WBC # BLD AUTO: 11.1 K/UL (ref 4.8–10.8)

## 2024-11-27 PROCEDURE — 83880 ASSAY OF NATRIURETIC PEPTIDE: CPT

## 2024-11-27 PROCEDURE — 80053 COMPREHEN METABOLIC PANEL: CPT

## 2024-11-27 PROCEDURE — 82140 ASSAY OF AMMONIA: CPT

## 2024-11-27 PROCEDURE — 82803 BLOOD GASES ANY COMBINATION: CPT

## 2024-11-27 PROCEDURE — 80307 DRUG TEST PRSMV CHEM ANLYZR: CPT

## 2024-11-27 PROCEDURE — 83550 IRON BINDING TEST: CPT

## 2024-11-27 PROCEDURE — 700111 HCHG RX REV CODE 636 W/ 250 OVERRIDE (IP): Performed by: HOSPITALIST

## 2024-11-27 PROCEDURE — 99223 1ST HOSP IP/OBS HIGH 75: CPT | Mod: AI | Performed by: HOSPITALIST

## 2024-11-27 PROCEDURE — 36415 COLL VENOUS BLD VENIPUNCTURE: CPT

## 2024-11-27 PROCEDURE — 94760 N-INVAS EAR/PLS OXIMETRY 1: CPT

## 2024-11-27 PROCEDURE — 93005 ELECTROCARDIOGRAM TRACING: CPT | Performed by: EMERGENCY MEDICINE

## 2024-11-27 PROCEDURE — HZ2ZZZZ DETOXIFICATION SERVICES FOR SUBSTANCE ABUSE TREATMENT: ICD-10-PCS | Performed by: INTERNAL MEDICINE

## 2024-11-27 PROCEDURE — A9270 NON-COVERED ITEM OR SERVICE: HCPCS | Performed by: HOSPITALIST

## 2024-11-27 PROCEDURE — 87077 CULTURE AEROBIC IDENTIFY: CPT

## 2024-11-27 PROCEDURE — 82077 ASSAY SPEC XCP UR&BREATH IA: CPT

## 2024-11-27 PROCEDURE — 74176 CT ABD & PELVIS W/O CONTRAST: CPT

## 2024-11-27 PROCEDURE — 770020 HCHG ROOM/CARE - TELE (206)

## 2024-11-27 PROCEDURE — 99285 EMERGENCY DEPT VISIT HI MDM: CPT

## 2024-11-27 PROCEDURE — 82607 VITAMIN B-12: CPT

## 2024-11-27 PROCEDURE — 85046 RETICYTE/HGB CONCENTRATE: CPT

## 2024-11-27 PROCEDURE — 83605 ASSAY OF LACTIC ACID: CPT

## 2024-11-27 PROCEDURE — 84484 ASSAY OF TROPONIN QUANT: CPT

## 2024-11-27 PROCEDURE — 87040 BLOOD CULTURE FOR BACTERIA: CPT | Mod: 91

## 2024-11-27 PROCEDURE — 85025 COMPLETE CBC W/AUTO DIFF WBC: CPT

## 2024-11-27 PROCEDURE — 70450 CT HEAD/BRAIN W/O DYE: CPT

## 2024-11-27 PROCEDURE — 82550 ASSAY OF CK (CPK): CPT

## 2024-11-27 PROCEDURE — 81003 URINALYSIS AUTO W/O SCOPE: CPT

## 2024-11-27 PROCEDURE — 700105 HCHG RX REV CODE 258: Performed by: EMERGENCY MEDICINE

## 2024-11-27 PROCEDURE — 71045 X-RAY EXAM CHEST 1 VIEW: CPT

## 2024-11-27 PROCEDURE — 84443 ASSAY THYROID STIM HORMONE: CPT

## 2024-11-27 PROCEDURE — 83540 ASSAY OF IRON: CPT

## 2024-11-27 PROCEDURE — 83735 ASSAY OF MAGNESIUM: CPT

## 2024-11-27 PROCEDURE — 87186 SC STD MICRODIL/AGAR DIL: CPT

## 2024-11-27 PROCEDURE — 82962 GLUCOSE BLOOD TEST: CPT

## 2024-11-27 PROCEDURE — 87086 URINE CULTURE/COLONY COUNT: CPT

## 2024-11-27 PROCEDURE — 700102 HCHG RX REV CODE 250 W/ 637 OVERRIDE(OP): Performed by: HOSPITALIST

## 2024-11-27 RX ORDER — FUROSEMIDE 20 MG/1
20 TABLET ORAL DAILY
Status: ON HOLD | COMMUNITY
End: 2024-12-09

## 2024-11-27 RX ORDER — ONDANSETRON 2 MG/ML
4 INJECTION INTRAMUSCULAR; INTRAVENOUS EVERY 4 HOURS PRN
Status: DISCONTINUED | OUTPATIENT
Start: 2024-11-27 | End: 2024-12-09 | Stop reason: HOSPADM

## 2024-11-27 RX ORDER — FOLIC ACID 1 MG/1
1 TABLET ORAL DAILY
Status: COMPLETED | OUTPATIENT
Start: 2024-11-27 | End: 2024-11-30

## 2024-11-27 RX ORDER — LABETALOL HYDROCHLORIDE 5 MG/ML
10 INJECTION, SOLUTION INTRAVENOUS EVERY 4 HOURS PRN
Status: DISCONTINUED | OUTPATIENT
Start: 2024-11-27 | End: 2024-12-09 | Stop reason: HOSPADM

## 2024-11-27 RX ORDER — SPIRONOLACTONE 50 MG/1
100 TABLET, FILM COATED ORAL DAILY
Status: DISCONTINUED | OUTPATIENT
Start: 2024-11-27 | End: 2024-12-09

## 2024-11-27 RX ORDER — INSULIN LISPRO 100 [IU]/ML
1-6 INJECTION, SOLUTION INTRAVENOUS; SUBCUTANEOUS
Status: DISCONTINUED | OUTPATIENT
Start: 2024-11-27 | End: 2024-12-09 | Stop reason: HOSPADM

## 2024-11-27 RX ORDER — OXYCODONE HYDROCHLORIDE 5 MG/1
5 TABLET ORAL
Status: DISCONTINUED | OUTPATIENT
Start: 2024-11-27 | End: 2024-12-06

## 2024-11-27 RX ORDER — FUROSEMIDE 10 MG/ML
40 INJECTION INTRAMUSCULAR; INTRAVENOUS
Status: DISCONTINUED | OUTPATIENT
Start: 2024-11-27 | End: 2024-11-30

## 2024-11-27 RX ORDER — HEPARIN SODIUM 5000 [USP'U]/ML
5000 INJECTION, SOLUTION INTRAVENOUS; SUBCUTANEOUS EVERY 8 HOURS
Status: DISCONTINUED | OUTPATIENT
Start: 2024-11-27 | End: 2024-12-09 | Stop reason: HOSPADM

## 2024-11-27 RX ORDER — LORAZEPAM 2 MG/ML
1.5 INJECTION INTRAMUSCULAR
Status: DISCONTINUED | OUTPATIENT
Start: 2024-11-27 | End: 2024-11-30

## 2024-11-27 RX ORDER — CHLORDIAZEPOXIDE HYDROCHLORIDE 25 MG/1
50 CAPSULE, GELATIN COATED ORAL EVERY 6 HOURS
Status: COMPLETED | OUTPATIENT
Start: 2024-11-27 | End: 2024-11-28

## 2024-11-27 RX ORDER — SODIUM CHLORIDE 9 MG/ML
1000 INJECTION, SOLUTION INTRAVENOUS ONCE
Status: COMPLETED | OUTPATIENT
Start: 2024-11-27 | End: 2024-11-27

## 2024-11-27 RX ORDER — LORAZEPAM 1 MG/1
3 TABLET ORAL
Status: DISCONTINUED | OUTPATIENT
Start: 2024-11-27 | End: 2024-11-30

## 2024-11-27 RX ORDER — LORAZEPAM 0.5 MG/1
0.5 TABLET ORAL EVERY 4 HOURS PRN
Status: DISCONTINUED | OUTPATIENT
Start: 2024-11-27 | End: 2024-11-30

## 2024-11-27 RX ORDER — OXYCODONE HYDROCHLORIDE 5 MG/1
2.5 TABLET ORAL
Status: DISCONTINUED | OUTPATIENT
Start: 2024-11-27 | End: 2024-12-06

## 2024-11-27 RX ORDER — LORAZEPAM 2 MG/ML
0.5 INJECTION INTRAMUSCULAR EVERY 4 HOURS PRN
Status: DISCONTINUED | OUTPATIENT
Start: 2024-11-27 | End: 2024-11-30

## 2024-11-27 RX ORDER — LORAZEPAM 1 MG/1
1 TABLET ORAL EVERY 4 HOURS PRN
Status: DISCONTINUED | OUTPATIENT
Start: 2024-11-27 | End: 2024-11-30

## 2024-11-27 RX ORDER — ACETAMINOPHEN 325 MG/1
650 TABLET ORAL EVERY 6 HOURS PRN
Status: DISCONTINUED | OUTPATIENT
Start: 2024-11-27 | End: 2024-12-06

## 2024-11-27 RX ORDER — ONDANSETRON 4 MG/1
4 TABLET, ORALLY DISINTEGRATING ORAL EVERY 4 HOURS PRN
Status: DISCONTINUED | OUTPATIENT
Start: 2024-11-27 | End: 2024-12-09 | Stop reason: HOSPADM

## 2024-11-27 RX ORDER — LORAZEPAM 1 MG/1
4 TABLET ORAL
Status: DISCONTINUED | OUTPATIENT
Start: 2024-11-27 | End: 2024-11-30

## 2024-11-27 RX ORDER — CLONIDINE HYDROCHLORIDE 0.1 MG/1
0.1 TABLET ORAL
Status: DISCONTINUED | OUTPATIENT
Start: 2024-11-27 | End: 2024-12-04

## 2024-11-27 RX ORDER — LORAZEPAM 2 MG/ML
2 INJECTION INTRAMUSCULAR
Status: DISCONTINUED | OUTPATIENT
Start: 2024-11-27 | End: 2024-11-30

## 2024-11-27 RX ORDER — LORAZEPAM 2 MG/ML
1 INJECTION INTRAMUSCULAR
Status: DISCONTINUED | OUTPATIENT
Start: 2024-11-27 | End: 2024-11-30

## 2024-11-27 RX ORDER — DEXTROSE MONOHYDRATE 25 G/50ML
25 INJECTION, SOLUTION INTRAVENOUS
Status: DISCONTINUED | OUTPATIENT
Start: 2024-11-27 | End: 2024-12-09 | Stop reason: HOSPADM

## 2024-11-27 RX ORDER — GAUZE BANDAGE 2" X 2"
100 BANDAGE TOPICAL DAILY
Status: DISCONTINUED | OUTPATIENT
Start: 2024-11-27 | End: 2024-11-28

## 2024-11-27 RX ORDER — CHLORDIAZEPOXIDE HYDROCHLORIDE 25 MG/1
25 CAPSULE, GELATIN COATED ORAL EVERY 6 HOURS
Status: DISCONTINUED | OUTPATIENT
Start: 2024-11-28 | End: 2024-11-28

## 2024-11-27 RX ORDER — LORAZEPAM 1 MG/1
2 TABLET ORAL
Status: DISCONTINUED | OUTPATIENT
Start: 2024-11-27 | End: 2024-11-30

## 2024-11-27 RX ORDER — LACTULOSE 10 G/15ML
30 SOLUTION ORAL 3 TIMES DAILY
Status: DISCONTINUED | OUTPATIENT
Start: 2024-11-27 | End: 2024-11-29

## 2024-11-27 RX ORDER — MORPHINE SULFATE 4 MG/ML
2 INJECTION INTRAVENOUS
Status: DISCONTINUED | OUTPATIENT
Start: 2024-11-27 | End: 2024-12-06

## 2024-11-27 RX ADMIN — MULTIVITAMIN TABLET 1 TABLET: TABLET at 13:54

## 2024-11-27 RX ADMIN — Medication 100 MG: at 13:54

## 2024-11-27 RX ADMIN — OMEPRAZOLE 40 MG: 20 CAPSULE, DELAYED RELEASE ORAL at 17:31

## 2024-11-27 RX ADMIN — SODIUM CHLORIDE 1000 ML: 9 INJECTION, SOLUTION INTRAVENOUS at 13:51

## 2024-11-27 RX ADMIN — SPIRONOLACTONE 100 MG: 50 TABLET ORAL at 13:53

## 2024-11-27 RX ADMIN — HEPARIN SODIUM 5000 UNITS: 5000 INJECTION, SOLUTION INTRAVENOUS; SUBCUTANEOUS at 13:54

## 2024-11-27 RX ADMIN — SODIUM CHLORIDE 1000 ML: 9 INJECTION, SOLUTION INTRAVENOUS at 09:30

## 2024-11-27 RX ADMIN — LACTULOSE 30 ML: 20 SOLUTION ORAL at 13:54

## 2024-11-27 RX ADMIN — LACTULOSE 30 ML: 20 SOLUTION ORAL at 17:30

## 2024-11-27 RX ADMIN — HEPARIN SODIUM 5000 UNITS: 5000 INJECTION, SOLUTION INTRAVENOUS; SUBCUTANEOUS at 20:21

## 2024-11-27 RX ADMIN — FOLIC ACID 1 MG: 1 TABLET ORAL at 13:54

## 2024-11-27 RX ADMIN — FUROSEMIDE 40 MG: 10 INJECTION, SOLUTION INTRAMUSCULAR; INTRAVENOUS at 13:54

## 2024-11-27 RX ADMIN — CHLORDIAZEPOXIDE HYDROCHLORIDE 50 MG: 25 CAPSULE ORAL at 17:30

## 2024-11-27 RX ADMIN — CHLORDIAZEPOXIDE HYDROCHLORIDE 50 MG: 25 CAPSULE ORAL at 13:53

## 2024-11-27 ASSESSMENT — COGNITIVE AND FUNCTIONAL STATUS - GENERAL
TOILETING: A LOT
TURNING FROM BACK TO SIDE WHILE IN FLAT BAD: A LITTLE
DAILY ACTIVITIY SCORE: 12
PERSONAL GROOMING: A LOT
SUGGESTED CMS G CODE MODIFIER MOBILITY: CK
DRESSING REGULAR LOWER BODY CLOTHING: A LOT
MOBILITY SCORE: 16
DRESSING REGULAR UPPER BODY CLOTHING: A LOT
MOVING TO AND FROM BED TO CHAIR: A LITTLE
SUGGESTED CMS G CODE MODIFIER DAILY ACTIVITY: CL
CLIMB 3 TO 5 STEPS WITH RAILING: A LOT
HELP NEEDED FOR BATHING: A LOT
WALKING IN HOSPITAL ROOM: A LOT
MOVING FROM LYING ON BACK TO SITTING ON SIDE OF FLAT BED: A LITTLE
STANDING UP FROM CHAIR USING ARMS: A LITTLE
EATING MEALS: A LOT

## 2024-11-27 ASSESSMENT — LIFESTYLE VARIABLES
VISUAL DISTURBANCES: NOT PRESENT
ANXIETY: NO ANXIETY (AT EASE)
HEADACHE, FULLNESS IN HEAD: NOT PRESENT
ORIENTATION AND CLOUDING OF SENSORIUM: DATE DISORIENTATION BY MORE THAN TWO CALENDAR DAYS
ANXIETY: NO ANXIETY (AT EASE)
TOTAL SCORE: 6
ORIENTATION AND CLOUDING OF SENSORIUM: ORIENTED AND CAN DO SERIAL ADDITIONS
TOTAL SCORE: 0
TOTAL SCORE: 0
PAROXYSMAL SWEATS: NO SWEAT VISIBLE
PAROXYSMAL SWEATS: NO SWEAT VISIBLE
TREMOR: NO TREMOR
HOW MANY TIMES IN THE PAST YEAR HAVE YOU HAD 5 OR MORE DRINKS IN A DAY: 0
NAUSEA AND VOMITING: NO NAUSEA AND NO VOMITING
NAUSEA AND VOMITING: NO NAUSEA AND NO VOMITING
ON A TYPICAL DAY WHEN YOU DRINK ALCOHOL HOW MANY DRINKS DO YOU HAVE: 0
CONSUMPTION TOTAL: NEGATIVE
AUDITORY DISTURBANCES: NOT PRESENT
AGITATION: NORMAL ACTIVITY
ALCOHOL_USE: NO
DOES PATIENT WANT TO STOP DRINKING: NO
AUDITORY DISTURBANCES: NOT PRESENT
EVER HAD A DRINK FIRST THING IN THE MORNING TO STEADY YOUR NERVES TO GET RID OF A HANGOVER: NO
PAROXYSMAL SWEATS: NO SWEAT VISIBLE
VISUAL DISTURBANCES: NOT PRESENT
HEADACHE, FULLNESS IN HEAD: NOT PRESENT
VISUAL DISTURBANCES: NOT PRESENT
HAVE PEOPLE ANNOYED YOU BY CRITICIZING YOUR DRINKING: NO
AVERAGE NUMBER OF DAYS PER WEEK YOU HAVE A DRINK CONTAINING ALCOHOL: 0
HAVE YOU EVER FELT YOU SHOULD CUT DOWN ON YOUR DRINKING: NO
TOTAL SCORE: 0
TREMOR: TREMOR NOT VISIBLE BUT CAN BE FELT, FINGERTIP TO FINGERTIP
TOTAL SCORE: 3
HEADACHE, FULLNESS IN HEAD: NOT PRESENT
TOTAL SCORE: 0
DO YOU DRINK ALCOHOL: NO
TREMOR: *
AUDITORY DISTURBANCES: NOT PRESENT
EVER FELT BAD OR GUILTY ABOUT YOUR DRINKING: NO
ORIENTATION AND CLOUDING OF SENSORIUM: DATE DISORIENTATION BY NO MORE THAN TWO CALENDAR DAYS
AGITATION: NORMAL ACTIVITY
NAUSEA AND VOMITING: NO NAUSEA AND NO VOMITING
AGITATION: SOMEWHAT MORE THAN NORMAL ACTIVITY
ANXIETY: NO ANXIETY (AT EASE)

## 2024-11-27 ASSESSMENT — FIBROSIS 4 INDEX
FIB4 SCORE: 1.8
FIB4 SCORE: 1.6

## 2024-11-27 ASSESSMENT — SOCIAL DETERMINANTS OF HEALTH (SDOH)

## 2024-11-27 ASSESSMENT — VISUAL ACUITY: OU: 1

## 2024-11-27 NOTE — ED NOTES
Iv placed , labs drawn and taken to lab. poc update given to pt, results pending at this time  Additional labs drawn

## 2024-11-27 NOTE — DIETARY
"Nutrition Services: Initial Assessment     Day of admit. Denisse Abel is 64 y.o., female with admitting DX of Alcoholic encephalopathy (HCC) [G31.2].    Consult Received for...: FTT/Malnutrition    Current Hospital Problems List:    Alcoholic encephalopathy  Esophageal varices w/out bleeding  Liver cirrhosis  Chronic combined systolic and diastolic heart failure   HTN  CKD stage 4  Severe protein-calorie malnutrition  Anemia due to CKD       Nutrition Assessment:      Height:  167.6 cm (5' 06\")  Weight: 59.9 kg (132 lb 0.9 oz)  Weight taken via: Bed Scale  BMI Calculated: 21.31   BMI Classification: WNL       Weight Readings from Last 5 encounters:   Wt Readings from Last 5 Encounters:   11/27/24 59.9 kg (132 lb 0.9 oz)   11/14/24 57.6 kg (127 lb)   11/05/24 56.6 kg (124 lb 12.5 oz)   10/15/24 56.4 kg (124 lb 5.4 oz)   10/02/24 59 kg (130 lb)   01/22/24 71.2 kg (157 lb)     Objective:   Pertinent Medical Hx: extensive alcohol abuse   Pertinent Labs: 11/27/24: sodium=128, glucose=137, Bun=83, creatinine=2.23, ammnonia=66  Pertinent Meds: Lasix, SSI, lactulose, omeprazole, Aldactone, thiamine, MVI, folic acid    Current Diet Order/Intake:   Consistent CHO diet      Subjective:   Pt was not able to tell me her birthday or the extent of wt loss and poor PO. She knew her name and which flavors of Glucerna she likes.   Patient reported UBW: unknown  Dietary Recall/Energy Intake: Pt reports poor PO but was not able to tell me the extent of poor PO. This indicates Insufficient energy intake. NFPE supports this.   Pt w/ poor appetite. Lunch was delivered and pt was not interested in eating it per nurse. Pt agreed to Glucerna TID with meals.  Pt may be at risk for refeeding. Overall impression is pt w/ self neglect PTA.        Nutrition Focused Physical Exam (NFPE)  Weight Loss: Pt reports wt loss but she is not able to say how much weight she has lost. Pt's wt on her NV ID was 160 lb. Wt in Epic on 1/22/24 was close " to this at 157 lb. Weight hx may be difficult to review due to report of fluid overload/ascites in MD's H&P.  Suspects this change related to poor PO intake.  Muscle Mass: Severe Wasting at  , Temples, Clavicles, and Acromion Shoulder Region  Subcutaneous Fat: Severe Loss at  , Orbital Pads, and Triceps Region  Fluid Accumulation: report of ascites/fluid overload in MD's H&P. No report of edema in flowsheets.   Reduced  Strength: N/A in acute care setting.    Nutrition Diagnosis:      Inadequate Oral Intake related to poor appetite due to ETOH abuse as evidenced by wt loss.      RD agrees with MD dx of Severe Malnutrition in context of Chronic Illness related to ETOH abuse as evidenced by severe muscle and fat loss noted.  This is a dx by provider Dr Chen.       Nutrition Interventions:      1. Recommend Glucerna (provides 220 calories, 10 g protein per 8 fl oz) TID.  2. Patient aware of active plan of care as appropriate.     Nutrition Monitoring and Evaluation:      Monitor nutrition POC, goal for >50% intake from meals and supplements.  Additional fluids per MD/DO  Monitor vital signs pertinent to nutrition.    RD following and will provide updated recommendations as indicated.      Nury Garcia R.D.                                         [facility]ITERIA FOR MALNUTRITION

## 2024-11-27 NOTE — PROGRESS NOTES
"1320: Patient arrived on unit from ER via transport. Patient minimally responsive at this time to verbal questions. Patient A&Ox2, not oriented to situation or time (stated \"911\" when asked what year it was). 2 RN skin check completed per protocol, bite marks on lips with dried blood. Attempted to educate and orient patient to room, bed operations and call light, but no evidence of learning at this time due to patient's current encephalopathic state.   "

## 2024-11-27 NOTE — ASSESSMENT & PLAN NOTE
Creatinine up to 1.84, she appears to be overly diuresed, stopped Lasix  Give 500 cc normal saline at 83

## 2024-11-27 NOTE — ASSESSMENT & PLAN NOTE
Chronic aortic stenosis  This is moderate to severe  Eventually the patient may need surgical intervention such as a TAVR.  Referred to outpatient cardiology

## 2024-11-27 NOTE — OR NURSING
Pt went to ED at [facility]. ED note states, pt found by friend this morning with altered mental status and pt taken to ED via [facility].  Left msg on pt's phone to call us when able to report status.  Left message with Dr. Milian's office regarding pt going to ED.

## 2024-11-27 NOTE — H&P
Hospital Medicine History & Physical Note    Date of Service  11/27/2024    Primary Care Physician  Jenna Gillette P.A.-C.    Consultants  None    Specialist Names: None    Code Status  Full Code    Chief Complaint  Chief Complaint   Patient presents with    ALOC       History of Presenting Illness  Denisse Abel is a 64 y.o. female who presented 11/27/2024 with confusion.  The patient apparently was somehow able to call 911 this morning.  Paramedics went to her house where they found her disheveled, confused, malnourished, and brought her into the emergency room but were able to give this information to the neighbors who then came into see the patient at bedside.  At this point we are not giving information to the neighbors as there is no direct relationship or consent for it I have however a POA who I am discussing the case with.  The patient herself has extensive alcohol abuse history and at this point is very confused most likely has Wernicke's encephalopathy.  She is minimally responding and only seems to recognize herself and is able to say yes.  Patient is not able to give me any information.  The patient does have chronic kidney injury and now she is slightly worse than her baseline.  She is definitely acidotic.  The acidosis may have been secondary to possible seizure as the patient does have some bites on her lips as well as her tongue.  The anemia at this point will need to be evaluated and monitored.  Her fluid overload could be significant due to her aortic stenosis and ascites and thus at this point we will continue with diuresis using spironolactone and Lasix..    I discussed the plan of care with patient, bedside RN, and emergency room physician Dr. Ortiz Ding .    Review of Systems  Review of Systems   Unable to perform ROS: Mental acuity       Past Medical History   has a past medical history of Acute combined systolic and diastolic heart failure (HCC) (01/22/2024), Anemia due to  chronic kidney disease, Heart valve disease, HLD (hyperlipidemia), HTN (hypertension), Hypertension, Hypotension, Liver cirrhosis (HCC), Obesity, and Renal disorder.    Surgical History   has a past surgical history that includes pelviscopy (6/5/08); ovarian cystectomy (6/5/08); cystoscopy (6/5/08); hysterectomy laparoscopy (2003); trigger finger release (Right, 3/31/2017); ganglion excision (Right, 3/31/2017); knee arthroscopy; and pr lap,diagnostic abdomen (2/23/2024).     Family History  family history includes Diabetes in her father and mother; Heart Disease in her mother; Stroke in her mother.   Family history reviewed with patient. There is no family history that is pertinent to the chief complaint.     Social History   reports that she has never smoked. She has never used smokeless tobacco. She reports that she does not currently use alcohol after a past usage of about 3.6 oz of alcohol per week. She reports that she does not currently use drugs after having used the following drugs: Marijuana.    Allergies  Allergies   Allergen Reactions    Pcn [Penicillins] Rash     Pt reports that it was a childhood allergie received body rash   Childhood allergy. Augmentin challenged 3/4/24 and tolerated.  Per [facility] pharmacy (11/27/2024)    Sulfa Drugs Rash     Pt reports that it was a childhood allergie received body rash   Per [facility] pharmacy (11/27/2024)       Medications  Prior to Admission Medications   Prescriptions Last Dose Informant Patient Reported? Taking?   Empagliflozin (JARDIANCE) 10 MG Tab tablet Unknown Patient's Home Pharmacy No No   Sig: Take 1 Tablet by mouth every day.   Patient taking differently: Take 10 mg by mouth every day.      HYDROcodone-acetaminophen (NORCO) 5-325 MG Tab per tablet Unknown Patient's Home Pharmacy Yes No   Sig: Take 1 Tablet by mouth 2 times a day as needed. Per [facility] pharmacy pt last filled on 11/11/2024 for 60 day supply  Indications: Pain   furosemide (LASIX) 20 MG Tab Unknown  Patient's Home Pharmacy Yes No   Sig: Take 20 mg by mouth every day. Per [facility] pharmacy pt last filled on 11/2/2024 for 90 day supply   omeprazole (PRILOSEC) 40 MG delayed-release capsule Unknown Patient's Home Pharmacy No No   Sig: Take 1 Capsule by mouth every day.   spironolactone (ALDACTONE) 50 MG Tab Unknown Patient's Home Pharmacy No No   Sig: Take 1 Tablet by mouth every day.      Facility-Administered Medications: None       Physical Exam  Temp:  [36.7 °C (98 °F)] 36.7 °C (98 °F)  Pulse:  [90-96] 90  Resp:  [19-20] 20  BP: (147-150)/(65-76) 147/76  SpO2:  [97 %] 97 %  Blood Pressure : (!) 147/76   Temperature: 36.7 °C (98 °F)   Pulse: 90   Respiration: 20   Pulse Oximetry: 97 %       Physical Exam  Vitals and nursing note reviewed. Exam conducted with a chaperone present.   Constitutional:       General: She is awake.      Appearance: Normal appearance. She is well-developed, well-groomed and normal weight. She is ill-appearing.   HENT:      Head: Normocephalic and atraumatic.      Jaw: There is normal jaw occlusion. No trismus.      Salivary Glands: Right salivary gland is not tender. Left salivary gland is not tender.      Right Ear: External ear normal.      Left Ear: External ear normal.      Mouth/Throat:      Mouth: Mucous membranes are dry.      Pharynx: Oropharynx is clear.      Comments: Patient has dried blood around her lips  Eyes:      General: Lids are normal. Vision grossly intact.         Right eye: No discharge.         Left eye: No discharge.      Extraocular Movements: Extraocular movements intact.      Conjunctiva/sclera: Conjunctivae normal.      Right eye: Right conjunctiva is not injected. No exudate.     Left eye: Left conjunctiva is not injected. No exudate.     Pupils: Pupils are equal, round, and reactive to light.   Neck:      Thyroid: No thyroid mass.      Vascular: No hepatojugular reflux or JVD.      Trachea: No abnormal tracheal secretions or tracheal deviation.   Cardiovascular:       Rate and Rhythm: Regular rhythm. Tachycardia present. Occasional Extrasystoles are present.     Pulses: Normal pulses.      Heart sounds: Murmur heard.      No friction rub.   Pulmonary:      Effort: Pulmonary effort is normal.      Breath sounds: Decreased air movement present. Examination of the right-lower field reveals decreased breath sounds. Examination of the left-lower field reveals decreased breath sounds. Decreased breath sounds present. No wheezing or rhonchi.   Abdominal:      General: Abdomen is flat. Bowel sounds are normal. There is distension.      Palpations: Abdomen is soft. There is fluid wave.      Tenderness: There is generalized abdominal tenderness. There is no right CVA tenderness or left CVA tenderness.      Hernia: No hernia is present.   Musculoskeletal:      Cervical back: Full passive range of motion without pain, normal range of motion and neck supple. No rigidity. No muscular tenderness.      Right lower leg: No edema.      Left lower leg: No edema.   Lymphadenopathy:      Head:      Right side of head: No submental adenopathy.      Left side of head: No submental adenopathy.      Cervical:      Right cervical: No superficial cervical adenopathy.     Left cervical: No superficial cervical adenopathy.      Upper Body:      Right upper body: No supraclavicular adenopathy.      Left upper body: No supraclavicular adenopathy.   Skin:     General: Skin is warm and dry.      Capillary Refill: Capillary refill takes less than 2 seconds.      Coloration: Skin is pale. Skin is not cyanotic.      Findings: No abrasion or bruising.   Neurological:      General: No focal deficit present.      Mental Status: She is alert. She is disoriented and confused.      GCS: GCS eye subscore is 3. GCS verbal subscore is 4. GCS motor subscore is 6.      Cranial Nerves: No cranial nerve deficit.      Sensory: No sensory deficit.      Motor: Motor function is intact.      Deep Tendon Reflexes:      Reflex  Scores:       Tricep reflexes are 2+ on the right side and 2+ on the left side.       Bicep reflexes are 2+ on the right side and 2+ on the left side.       Brachioradialis reflexes are 2+ on the right side and 2+ on the left side.       Patellar reflexes are 2+ on the right side and 2+ on the left side.       Achilles reflexes are 2+ on the right side and 2+ on the left side.  Psychiatric:         Attention and Perception: She is inattentive.         Mood and Affect: Mood is depressed. Affect is flat.         Speech: Speech is delayed.         Behavior: Behavior is uncooperative and slowed.         Cognition and Memory: Cognition is impaired. Memory is impaired. She exhibits impaired recent memory and impaired remote memory.         Judgment: Judgment is impulsive.         Laboratory:  Recent Labs     11/27/24  0858   WBC 11.1*   RBC 3.75*   HEMOGLOBIN 9.8*   HEMATOCRIT 29.2*   MCV 77.9*   MCH 26.1*   MCHC 33.6   RDW 65.1*   PLATELETCT 259   MPV 10.3     Recent Labs     11/27/24  0858   SODIUM 128*   POTASSIUM 3.9   CHLORIDE 84*   CO2 15*   GLUCOSE 137*   BUN 83*   CREATININE 2.23*   CALCIUM 11.1*     Recent Labs     11/27/24  0858   ALTSGPT 5   ASTSGOT 14   ALKPHOSPHAT 108*   TBILIRUBIN 1.1   GLUCOSE 137*         Recent Labs     11/27/24  0858   NTPROBNP 2850*         Recent Labs     11/27/24  0858   TROPONINT 56*       Imaging:  CT-ABDOMEN-PELVIS W/O   Final Result      1.  Small umbilical hernia which contains small intestine. Small intestine is dilated proximal to that hernia and decompressed distally consistent with resultant small bowel obstruction.      2.  Again seen cirrhotic appearance of the liver with small metallic ascites within the abdomen and pelvis and splenomegaly consistent with portal hypertension.      3.  Gallstones within the gallbladder.      CT-HEAD W/O   Final Result      No evidence of acute intracranial process.               DX-CHEST-PORTABLE (1 VIEW)   Final Result      No evidence of  acute cardiopulmonary process.          X-Ray:  I have personally reviewed the images and compared with prior images.  EKG:  I have personally reviewed the images and compared with prior images.    Assessment/Plan:  Justification for Admission Status  I anticipate this patient will require at least two midnights for appropriate medical management, necessitating inpatient admission because patient is acute alcohol induced encephalopathy.  The patient may also have had a seizure.  The patient does have acute on chronic renal failure as well.  Patient will need at this point at least 48 hours of inpatient management.    Patient will need a Telemetry bed on MEDICAL service .  The need is secondary to acute encephalopathy.    * Alcoholic encephalopathy (HCC)- (present on admission)  Assessment & Plan  Patient was brought in by ambulance today due to confusion and apparently she was able to call 911 this morning.  The patient at this point is only able to tell me very few words including yes and she was able to tell me her first name and her last name  Patient's ammonia level is elevated at 66 so we will start her on lactulose  Patient still drinks and may be in alcohol withdrawal at this point we will initiate alcohol withdrawal management  Monitor liver function test  Monitor electrolytes and supplement as needed  Give thiamine folic acid and multivitamin    Esophageal varices without bleeding (HCC)- (present on admission)  Assessment & Plan  Continue with PPI therapy    Liver cirrhosis (HCC)- (present on admission)  Assessment & Plan  Chronic liver cirrhosis secondary to alcohol abuse  Monitor liver function tests  Optimize diuresis  Optimize ammonia levels which are elevated initiate lactulose  Monitor magnesium and phosphorus levels  Initiate Librium  CIWA protocol  The patient did bite her lips and there is a potential for seizures so monitor for seizures.    Chronic combined systolic and diastolic heart failure  (HCC)- (present on admission)  Assessment & Plan  Monitor fluid status  Monitor daily weights  Most recent echocardiogram demonstrates moderate to severe aortic stenosis, ejection fraction fluctuates between 40 and 60  Continue with diuresis using IV Lasix and oral Aldactone    HTN (hypertension), benign- (present on admission)  Assessment & Plan  Optimize blood pressure management keep systolic blood pressure less than 140 diastolic under 90    CKD (chronic kidney disease) stage 4, GFR 15-29 ml/min ([facility])- (present on admission)  Assessment & Plan  Monitor renal functions currently she is slightly above her baseline but she is still close to her baseline.  Avoid nephrotoxic medications  Avoid giving excessive hydration    Severe protein-calorie malnutrition (HCC)- (present on admission)  Assessment & Plan  Nutritional support    Anemia due to chronic kidney disease- (present on admission)  Assessment & Plan  Monitor H&H if drops below 7 or 21 transfuse  Check iron panel B12 level    Heart murmur- (present on admission)  Assessment & Plan  Chronic aortic stenosis  This is moderate to severe  Eventually the patient may need surgical intervention such as a TAVR.  Referred to outpatient cardiology        VTE prophylaxis: SCDs/TEDs

## 2024-11-27 NOTE — ASSESSMENT & PLAN NOTE
Chronic liver cirrhosis secondary to alcohol abuse  Monitor liver function tests  Optimize diuresis  Hold lactulose - copious amounts of diarrhea  Monitor magnesium and phosphorus levels

## 2024-11-27 NOTE — ED PROVIDER NOTES
ED Provider Note    CHIEF COMPLAINT  Chief Complaint   Patient presents with    ALOC       EXTERNAL RECORDS REVIEWED  PCP note from July reviewing some of the patient's alcohol cirrhosis, the patient had a interventional radiology paracentesis performed in September.    HPI/ROS  LIMITATION TO HISTORY   Select: Altered mental status / Confusion  OUTSIDE HISTORIAN(S):  EMS bedside report    Denisse Abel is a 64 y.o. female who presents to the ED with altered mental status.  The patient cannot give me any history.  Apparently the patient was found down on the ground, last known normal was 2 days ago.  Patient cannot give me any history.  Cannot tell me if she has any pain or nausea vomiting.    PAST MEDICAL HISTORY   has a past medical history of Acute combined systolic and diastolic heart failure (HCC) (01/22/2024), Anemia due to chronic kidney disease, Heart valve disease, HLD (hyperlipidemia), HTN (hypertension), Hypertension, Hypotension, Liver cirrhosis (HCC), Obesity, and Renal disorder.    SURGICAL HISTORY   has a past surgical history that includes pelviscopy (6/5/08); ovarian cystectomy (6/5/08); cystoscopy (6/5/08); hysterectomy laparoscopy (2003); trigger finger release (Right, 3/31/2017); ganglion excision (Right, 3/31/2017); knee arthroscopy; and lap,diagnostic abdomen (2/23/2024).    FAMILY HISTORY  Family History   Problem Relation Age of Onset    Diabetes Mother     Heart Disease Mother     Stroke Mother     Diabetes Father        SOCIAL HISTORY  Social History     Tobacco Use    Smoking status: Never    Smokeless tobacco: Never   Vaping Use    Vaping status: Never Used   Substance and Sexual Activity    Alcohol use: Not Currently     Alcohol/week: 3.6 oz     Comment: denies    Drug use: Not Currently     Types: Marijuana     Comment: denies    Sexual activity: Not Currently       CURRENT MEDICATIONS  Home Medications       Reviewed by Milly Chen M.D. (Physician) on 11/27/24 at 6995  Med List  "Status: Complete     Medication Last Dose Status   Empagliflozin (JARDIANCE) 10 MG Tab tablet Unknown Active   furosemide (LASIX) 20 MG Tab Unknown Active   HYDROcodone-acetaminophen (NORCO) 5-325 MG Tab per tablet Unknown Active   omeprazole (PRILOSEC) 40 MG delayed-release capsule Unknown Active   spironolactone (ALDACTONE) 50 MG Tab Unknown Active                  Audit from Redirected Encounters    **Home medications have not yet been reviewed for this encounter**         ALLERGIES  Allergies   Allergen Reactions    Pcn [Penicillins] Rash     Pt reports that it was a childhood allergie received body rash   Childhood allergy. Augmentin challenged 3/4/24 and tolerated.  Per [facility] pharmacy (11/27/2024)    Sulfa Drugs Rash     Pt reports that it was a childhood allergie received body rash   Per [facility] pharmacy (11/27/2024)       PHYSICAL EXAM  VITAL SIGNS: /69   Pulse 91   Temp 36.3 °C (97.3 °F) (Temporal)   Resp 20   Ht 1.676 m (5' 6\")   Wt 59.9 kg (132 lb 0.9 oz)   SpO2 100%   BMI 21.31 kg/m²    Ill-appearing 64-year-old female who appears altered.  Head is atraumatic, normal cephalic, oropharynx with dry mucous membranes  EOMI, PERRLA, no scleral icterus  Neck is supple  Chest clear to auscultation, slight tachypnea  Regular rhythm  Abdomen questionable tenderness palpation throughout the abdomen neuro the patient is awake alert will briefly follow commands, she appears to be moving all 4 extremities spontaneously    EKG/LABS  Results for orders placed or performed during the hospital encounter of 11/27/24   CBC with Differential    Collection Time: 11/27/24  8:58 AM   Result Value Ref Range    WBC 11.1 (H) 4.8 - 10.8 K/uL    RBC 3.75 (L) 4.20 - 5.40 M/uL    Hemoglobin 9.8 (L) 12.0 - 16.0 g/dL    Hematocrit 29.2 (L) 37.0 - 47.0 %    MCV 77.9 (L) 81.4 - 97.8 fL    MCH 26.1 (L) 27.0 - 33.0 pg    MCHC 33.6 32.2 - 35.5 g/dL    RDW 65.1 (H) 35.9 - 50.0 fL    Platelet Count 259 164 - 446 K/uL    MPV 10.3 9.0 - " 12.9 fL    Neutrophils-Polys 88.00 (H) 44.00 - 72.00 %    Lymphocytes 3.40 (L) 22.00 - 41.00 %    Monocytes 7.70 0.00 - 13.40 %    Eosinophils 0.10 0.00 - 6.90 %    Basophils 0.20 0.00 - 1.80 %    Immature Granulocytes 0.60 0.00 - 0.90 %    Nucleated RBC 0.00 0.00 - 0.20 /100 WBC    Neutrophils (Absolute) 9.76 (H) 1.82 - 7.42 K/uL    Lymphs (Absolute) 0.38 (L) 1.00 - 4.80 K/uL    Monos (Absolute) 0.85 0.00 - 0.85 K/uL    Eos (Absolute) 0.01 0.00 - 0.51 K/uL    Baso (Absolute) 0.02 0.00 - 0.12 K/uL    Immature Granulocytes (abs) 0.07 0.00 - 0.11 K/uL    NRBC (Absolute) 0.00 K/uL    Anisocytosis 1+     Microcytosis 1+    Complete Metabolic Panel    Collection Time: 11/27/24  8:58 AM   Result Value Ref Range    Sodium 128 (L) 135 - 145 mmol/L    Potassium 3.9 3.6 - 5.5 mmol/L    Chloride 84 (L) 96 - 112 mmol/L    Co2 15 (L) 20 - 33 mmol/L    Anion Gap 29.0 (H) 7.0 - 16.0    Glucose 137 (H) 65 - 99 mg/dL    Bun 83 (H) 8 - 22 mg/dL    Creatinine 2.23 (H) 0.50 - 1.40 mg/dL    Calcium 11.1 (H) 8.4 - 10.2 mg/dL    Correct Calcium 10.8 (H) 8.5 - 10.5 mg/dL    AST(SGOT) 14 12 - 45 U/L    ALT(SGPT) 5 2 - 50 U/L    Alkaline Phosphatase 108 (H) 30 - 99 U/L    Total Bilirubin 1.1 0.1 - 1.5 mg/dL    Albumin 4.4 3.2 - 4.9 g/dL    Total Protein 8.2 6.0 - 8.2 g/dL    Globulin 3.8 (H) 1.9 - 3.5 g/dL    A-G Ratio 1.2 g/dL   Blood Culture - Draw one from central line and one from peripheral site    Collection Time: 11/27/24  8:58 AM    Specimen: Peripheral; Blood   Result Value Ref Range    Significant Indicator NEG     Source BLD     Site PERIPHERAL     Culture Result       No Growth  Note: Blood cultures are incubated for 5 days and  are monitored continuously.Positive blood cultures  are called to the RN and reported as soon as  they are identified.     CREATINE KINASE    Collection Time: 11/27/24  8:58 AM   Result Value Ref Range    CPK Total 19 0 - 154 U/L   Troponin    Collection Time: 11/27/24  8:58 AM   Result Value Ref Range     Troponin T 56 (H) 6 - 19 ng/L   proBrain Natriuretic Peptide, NT    Collection Time: 11/27/24  8:58 AM   Result Value Ref Range    NT-proBNP 2850 (H) 0 - 125 pg/mL   VENOUS BLOOD GAS    Collection Time: 11/27/24  8:58 AM   Result Value Ref Range    Venous Bg Ph 7.47 (H) 7.31 - 7.45    Venous Bg Ph Temp Corrected 7.47 (H) 7.31 - 7.45    Venous Bg Pco2 23.2 (L) 38.0 - 54.0 mmHg    Venous Bg Pco2 Temp Corrected 22.8 mmHg    Venous Bg Po2 32.7 23.0 - 48.0 mmHg    Venous Bg Po2 Temp Corrected 31.8 23.0 - 48.0 mmHg    Venous Bg O2 Saturation 57.7 (L) 60.0 - 85.0 %    Venous Bg Hco3 16 (L) 22 - 29 mmol/L    Venous Bg Base Excess -6 (L) -2 - 3 mmol/L    Body Temp 36.6 Centigrade    O2 Therapy 97    TSH WITH REFLEX TO FT4    Collection Time: 11/27/24  8:58 AM   Result Value Ref Range    TSH 0.402 0.380 - 5.330 uIU/mL   ETHYL ALCOHOL (BLOOD)    Collection Time: 11/27/24  8:58 AM   Result Value Ref Range    Diagnostic Alcohol <10.1 <10.1 mg/dL   PLATELET ESTIMATE    Collection Time: 11/27/24  8:58 AM   Result Value Ref Range    Plt Estimation Normal    MORPHOLOGY    Collection Time: 11/27/24  8:58 AM   Result Value Ref Range    RBC Morphology Present     Polychromia 1+     Poikilocytosis 1+     Ovalocytes 1+    ESTIMATED GFR    Collection Time: 11/27/24  8:58 AM   Result Value Ref Range    GFR (CKD-EPI) 24 (A) >60 mL/min/1.73 m 2   RETICULOCYTES COUNT    Collection Time: 11/27/24  8:58 AM   Result Value Ref Range    Reticulocyte Count 3.3 (H) 0.8 - 2.6 %    Retic, Absolute 0.12 0.04 - 0.12 M/uL    Imm. Reticulocyte Fraction 14.5 2.6 - 16.1 %    Retic Hgb Equivalent 30.2 29.0 - 35.0 pg/cell   MAGNESIUM    Collection Time: 11/27/24  8:58 AM   Result Value Ref Range    Magnesium 1.9 1.5 - 2.5 mg/dL   IRON/TOTAL IRON BIND    Collection Time: 11/27/24  8:58 AM   Result Value Ref Range    Iron 21 (L) 40 - 170 ug/dL    Total Iron Binding 263 250 - 450 ug/dL    Unsat Iron Binding 242 110 - 370 ug/dL    % Saturation 8 (L) 15 - 55 %    Lactic Acid    Collection Time: 24  9:23 AM   Result Value Ref Range    Lactic Acid 2.2 (H) 0.5 - 2.0 mmol/L   Urinalysis    Collection Time: 24  9:28 AM    Specimen: Urine   Result Value Ref Range    Color Yellow     Character Clear     Specific Gravity 1.025 <1.035    Ph 5.5 5.0 - 8.0    Glucose Negative Negative mg/dL    Ketones Trace (A) Negative mg/dL    Protein Negative Negative mg/dL    Bilirubin Moderate (A) Negative    Nitrite Negative Negative    Leukocyte Esterase Negative Negative    Occult Blood Negative Negative    Micro Urine Req see below    Blood Culture - Draw one from central line and one from peripheral site    Collection Time: 24  9:28 AM    Specimen: Line; Blood   Result Value Ref Range    Significant Indicator NEG     Source BLD     Site LINE     Culture Result       No Growth  Note: Blood cultures are incubated for 5 days and  are monitored continuously.Positive blood cultures  are called to the RN and reported as soon as  they are identified.     AMMONIA    Collection Time: 24  9:28 AM   Result Value Ref Range    Ammonia 66 (H) 11 - 45 umol/L   URINE DRUG SCREEN    Collection Time: 24  9:39 AM   Result Value Ref Range    Amphetamines Urine Negative Negative    Barbiturates Negative Negative    Benzodiazepines Negative Negative    Cocaine Metabolite Negative Negative    Fentanyl, Urine Negative Negative    Methadone Negative Negative    Opiates Negative Negative    Oxycodone Negative Negative    Phencyclidine -Pcp Negative Negative    Propoxyphene Negative Negative    Cannabinoid Metab Negative Negative   EKG    Collection Time: 24  9:43 AM   Result Value Ref Range    Report       [facility] Emergency Dept.    Test Date:  2024  Pt Name:    MARLINE ROBLES               Department: [facility]  MRN:        [id number]                      [facility]  Gender:     Female                       Technician: [id number]  :         1960                   Requested By:CELENA PINTO  Order #:    [id number]                    Reading MD: CELENA PINTO MD    Measurements  Intervals                                Axis  Rate:       94                           P:          50  HI:         165                          QRS:        -25  QRSD:       83                           T:          60  QT:         363  QTc:        454    Interpretive Statements  Sinus rhythm  Probable left atrial enlargement  Borderline left axis deviation  Anterior infarct, old  Compared to ECG 11/20/2024 13:30:45  Myocardial infarct finding now present  Electronically Signed On 11- 11:37:25 PST by CELENA PINTO MD        I have independently interpreted this EKG    RADIOLOGY/PROCEDURES   I have independently interpreted the diagnostic imaging associated with this visit and am waiting the final reading from the radiologist.   My preliminary interpretation is as follows:   Results for orders placed or performed during the hospital encounter of 11/27/24   CBC with Differential    Collection Time: 11/27/24  8:58 AM   Result Value Ref Range    WBC 11.1 (H) 4.8 - 10.8 K/uL    RBC 3.75 (L) 4.20 - 5.40 M/uL    Hemoglobin 9.8 (L) 12.0 - 16.0 g/dL    Hematocrit 29.2 (L) 37.0 - 47.0 %    MCV 77.9 (L) 81.4 - 97.8 fL    MCH 26.1 (L) 27.0 - 33.0 pg    MCHC 33.6 32.2 - 35.5 g/dL    RDW 65.1 (H) 35.9 - 50.0 fL    Platelet Count 259 164 - 446 K/uL    MPV 10.3 9.0 - 12.9 fL    Neutrophils-Polys 88.00 (H) 44.00 - 72.00 %    Lymphocytes 3.40 (L) 22.00 - 41.00 %    Monocytes 7.70 0.00 - 13.40 %    Eosinophils 0.10 0.00 - 6.90 %    Basophils 0.20 0.00 - 1.80 %    Immature Granulocytes 0.60 0.00 - 0.90 %    Nucleated RBC 0.00 0.00 - 0.20 /100 WBC    Neutrophils (Absolute) 9.76 (H) 1.82 - 7.42 K/uL    Lymphs (Absolute) 0.38 (L) 1.00 - 4.80 K/uL    Monos (Absolute) 0.85 0.00 - 0.85 K/uL    Eos (Absolute) 0.01 0.00 - 0.51 K/uL    Baso (Absolute) 0.02 0.00 - 0.12 K/uL     Immature Granulocytes (abs) 0.07 0.00 - 0.11 K/uL    NRBC (Absolute) 0.00 K/uL    Anisocytosis 1+     Microcytosis 1+    Complete Metabolic Panel    Collection Time: 11/27/24  8:58 AM   Result Value Ref Range    Sodium 128 (L) 135 - 145 mmol/L    Potassium 3.9 3.6 - 5.5 mmol/L    Chloride 84 (L) 96 - 112 mmol/L    Co2 15 (L) 20 - 33 mmol/L    Anion Gap 29.0 (H) 7.0 - 16.0    Glucose 137 (H) 65 - 99 mg/dL    Bun 83 (H) 8 - 22 mg/dL    Creatinine 2.23 (H) 0.50 - 1.40 mg/dL    Calcium 11.1 (H) 8.4 - 10.2 mg/dL    Correct Calcium 10.8 (H) 8.5 - 10.5 mg/dL    AST(SGOT) 14 12 - 45 U/L    ALT(SGPT) 5 2 - 50 U/L    Alkaline Phosphatase 108 (H) 30 - 99 U/L    Total Bilirubin 1.1 0.1 - 1.5 mg/dL    Albumin 4.4 3.2 - 4.9 g/dL    Total Protein 8.2 6.0 - 8.2 g/dL    Globulin 3.8 (H) 1.9 - 3.5 g/dL    A-G Ratio 1.2 g/dL   Blood Culture - Draw one from central line and one from peripheral site    Collection Time: 11/27/24  8:58 AM    Specimen: Peripheral; Blood   Result Value Ref Range    Significant Indicator NEG     Source BLD     Site PERIPHERAL     Culture Result       No Growth  Note: Blood cultures are incubated for 5 days and  are monitored continuously.Positive blood cultures  are called to the RN and reported as soon as  they are identified.     CREATINE KINASE    Collection Time: 11/27/24  8:58 AM   Result Value Ref Range    CPK Total 19 0 - 154 U/L   Troponin    Collection Time: 11/27/24  8:58 AM   Result Value Ref Range    Troponin T 56 (H) 6 - 19 ng/L   proBrain Natriuretic Peptide, NT    Collection Time: 11/27/24  8:58 AM   Result Value Ref Range    NT-proBNP 2850 (H) 0 - 125 pg/mL   VENOUS BLOOD GAS    Collection Time: 11/27/24  8:58 AM   Result Value Ref Range    Venous Bg Ph 7.47 (H) 7.31 - 7.45    Venous Bg Ph Temp Corrected 7.47 (H) 7.31 - 7.45    Venous Bg Pco2 23.2 (L) 38.0 - 54.0 mmHg    Venous Bg Pco2 Temp Corrected 22.8 mmHg    Venous Bg Po2 32.7 23.0 - 48.0 mmHg    Venous Bg Po2 Temp Corrected 31.8 23.0 -  48.0 mmHg    Venous Bg O2 Saturation 57.7 (L) 60.0 - 85.0 %    Venous Bg Hco3 16 (L) 22 - 29 mmol/L    Venous Bg Base Excess -6 (L) -2 - 3 mmol/L    Body Temp 36.6 Centigrade    O2 Therapy 97    TSH WITH REFLEX TO FT4    Collection Time: 11/27/24  8:58 AM   Result Value Ref Range    TSH 0.402 0.380 - 5.330 uIU/mL   ETHYL ALCOHOL (BLOOD)    Collection Time: 11/27/24  8:58 AM   Result Value Ref Range    Diagnostic Alcohol <10.1 <10.1 mg/dL   PLATELET ESTIMATE    Collection Time: 11/27/24  8:58 AM   Result Value Ref Range    Plt Estimation Normal    MORPHOLOGY    Collection Time: 11/27/24  8:58 AM   Result Value Ref Range    RBC Morphology Present     Polychromia 1+     Poikilocytosis 1+     Ovalocytes 1+    ESTIMATED GFR    Collection Time: 11/27/24  8:58 AM   Result Value Ref Range    GFR (CKD-EPI) 24 (A) >60 mL/min/1.73 m 2   RETICULOCYTES COUNT    Collection Time: 11/27/24  8:58 AM   Result Value Ref Range    Reticulocyte Count 3.3 (H) 0.8 - 2.6 %    Retic, Absolute 0.12 0.04 - 0.12 M/uL    Imm. Reticulocyte Fraction 14.5 2.6 - 16.1 %    Retic Hgb Equivalent 30.2 29.0 - 35.0 pg/cell   MAGNESIUM    Collection Time: 11/27/24  8:58 AM   Result Value Ref Range    Magnesium 1.9 1.5 - 2.5 mg/dL   IRON/TOTAL IRON BIND    Collection Time: 11/27/24  8:58 AM   Result Value Ref Range    Iron 21 (L) 40 - 170 ug/dL    Total Iron Binding 263 250 - 450 ug/dL    Unsat Iron Binding 242 110 - 370 ug/dL    % Saturation 8 (L) 15 - 55 %   Lactic Acid    Collection Time: 11/27/24  9:23 AM   Result Value Ref Range    Lactic Acid 2.2 (H) 0.5 - 2.0 mmol/L   Urinalysis    Collection Time: 11/27/24  9:28 AM    Specimen: Urine   Result Value Ref Range    Color Yellow     Character Clear     Specific Gravity 1.025 <1.035    Ph 5.5 5.0 - 8.0    Glucose Negative Negative mg/dL    Ketones Trace (A) Negative mg/dL    Protein Negative Negative mg/dL    Bilirubin Moderate (A) Negative    Nitrite Negative Negative    Leukocyte Esterase Negative  Negative    Occult Blood Negative Negative    Micro Urine Req see below    Blood Culture - Draw one from central line and one from peripheral site    Collection Time: 24  9:28 AM    Specimen: Line; Blood   Result Value Ref Range    Significant Indicator NEG     Source BLD     Site LINE     Culture Result       No Growth  Note: Blood cultures are incubated for 5 days and  are monitored continuously.Positive blood cultures  are called to the RN and reported as soon as  they are identified.     AMMONIA    Collection Time: 24  9:28 AM   Result Value Ref Range    Ammonia 66 (H) 11 - 45 umol/L   URINE DRUG SCREEN    Collection Time: 24  9:39 AM   Result Value Ref Range    Amphetamines Urine Negative Negative    Barbiturates Negative Negative    Benzodiazepines Negative Negative    Cocaine Metabolite Negative Negative    Fentanyl, Urine Negative Negative    Methadone Negative Negative    Opiates Negative Negative    Oxycodone Negative Negative    Phencyclidine -Pcp Negative Negative    Propoxyphene Negative Negative    Cannabinoid Metab Negative Negative   EKG    Collection Time: 24  9:43 AM   Result Value Ref Range    Report       [facility] Emergency Dept.    Test Date:  2024  Pt Name:    MARLINE ROBLES               Department: [facility]  MRN:        [id number]                      [facility]  Gender:     Female                       Technician: [id number]  :        1960                   Requested By:CELENA PINTO  Order #:    [id number]                    Reading MD: CELENA PINTO MD    Measurements  Intervals                                Axis  Rate:       94                           P:          50  NM:         165                          QRS:        -25  QRSD:       83                           T:          60  QT:         363  QTc:        454    Interpretive Statements  Sinus rhythm  Probable left atrial enlargement  Borderline left axis  deviation  Anterior infarct, old  Compared to ECG 11/20/2024 13:30:45  Myocardial infarct finding now present  Electronically Signed On 11- 11:37:25 PST by CELENA PINTO MD          Radiologist interpretation:  CT-ABDOMEN-PELVIS W/O   Final Result      1.  Small umbilical hernia which contains small intestine. Small intestine is dilated proximal to that hernia and decompressed distally consistent with resultant small bowel obstruction.      2.  Again seen cirrhotic appearance of the liver with small metallic ascites within the abdomen and pelvis and splenomegaly consistent with portal hypertension.      3.  Gallstones within the gallbladder.      CT-HEAD W/O   Final Result      No evidence of acute intracranial process.               DX-CHEST-PORTABLE (1 VIEW)   Final Result      No evidence of acute cardiopulmonary process.          COURSE & MEDICAL DECISION MAKING    ASSESSMENT, COURSE AND PLAN  Care Narrative: Is a 64-year-old with previous history of cirrhosis, likely alcohol induced that is been found on the ground for the last several days.  The patient is awake but altered will not follow commands.  I do not see any unilateral signs of a stroke or any obvious head trauma but I will get a CT scan of the head to make sure there is no intracranial bleeding.  The patient has dry mucous membranes, question of the patient is jaundiced although I do not see any scleral icterus.  There is a possibility patient could be in a hepatic encephalopathy will check basic laboratory test, evaluate for sepsis.    Workup here shows acute kidney injury, some encephalopathy possibly secondary to elevated ammonia.  The patient has metabolic acidosis, fluid resuscitated the patient.  Patient will need to be admitted to the hospital, discussed the case with the hospitalist for hospitalization.    CRITICAL CARE  The very real possibilty of a deterioration of this patient's condition required the highest level of my  preparedness for sudden, emergent intervention.  I provided critical care services, which included medication orders, frequent reevaluations of the patient's condition and response to treatment, ordering and reviewing test results, and discussing the case with various consultants.  The critical care time associated with the care of the patient was 35 minutes. Review chart for interventions. This time is exclusive of any other billable procedures.       Hydration: Based on the patient's presentation of Other metabolic acidosis the patient was given IV fluids. IV Hydration was used because oral hydration was not adequate alone. Upon recheck following hydration, the patient was mild improvement.        DISPOSITION AND DISCUSSIONS  I have discussed management of the patient with the following physicians and BURT's: Dr. Chen    FINAL DIAGNOSIS  1. Stupor    2. Acute liver failure with hepatic coma (HCC)    3. ANDERS (acute kidney injury) (HCC)    4. Metabolic acidosis    5. Ventral hernia without obstruction or gangrene         Electronically signed by: Ortiz Ding M.D., 11/27/2024 9:22 AM

## 2024-11-27 NOTE — ED TRIAGE NOTES
Pt to ed from home by treasure.  Pt friend found her this morning, altered mental status  AOx 1  Last known well Mod[place]

## 2024-11-27 NOTE — PROGRESS NOTES
4 Eyes Skin Assessment Completed by SILVIA Oneil and SILVIA Llamas.    Head WDL  Ears WDL  Nose WDL  Mouth Redness and Bleeding  Neck WDL  Breast/Chest WDL  Shoulder Blades WDL  Spine WDL  (R) Arm/Elbow/Hand WDL  (L) Arm/Elbow/Hand WDL  Abdomen WDL  Groin WDL  Scrotum/Coccyx/Buttocks WDL  (R) Leg WDL  (L) Leg WDL  (R) Heel/Foot/Toe WDL  (L) Heel/Foot/Toe WDL          Devices In Places Tele Box      Interventions In Place Pillows    Possible Skin Injury No    Pictures Uploaded Into Epic N/A  Wound Consult Placed N/A  RN Wound Prevention Protocol Ordered No

## 2024-11-27 NOTE — ED NOTES
Medication history reviewed with pts pharmacy ([facility]). Med rec is complete. Per [facility] pharmacy had allergies on file for Sulf Drugs and Penicillins,not sure the reaction    Pt is not able to tell me what medications she is taking.  Called pts cousin (Nica) at [phone]. Per Nica is not sure what medications she is taking.    Called [facility] at [phone] to verify all medications.  Pt last filled JARDIANCE 10MG on 8/15/2024 for #90, per pharmacy has this medication ready to be picked up.  OMEPRAZOLE 40MG was last filled on 10/2/2024 for 90 day supply.  SPIRONOLACTONE 50MG was last filled on 9/7/2024 for 90 day supply.  Pt last filled ENTRESTO 49-51MG on 9/7/2024 for # 60 for 30 day supply.     Patient has not had any outpatient antibiotics in the last 30 days.    Pt is not on any anticoagulants

## 2024-11-27 NOTE — ASSESSMENT & PLAN NOTE
Monitor fluid status  Monitor daily weights  Most recent echocardiogram demonstrates moderate to severe aortic stenosis, ejection fraction fluctuates between 40 and 60  Slightly dehydrated today, give 500 cc bolus slowly, hold Lasix for now, continue Aldactone

## 2024-11-27 NOTE — ASSESSMENT & PLAN NOTE
Patient came in encephalopathic but after administering care in the emergency room brought up to the floor she started showing improvement with her mental status.  Librium was given and likely resulted in worsening encephalopathy rather than improvement    Mental status improves when she gets helped up to the chair either with therapy or nursing.  Avoid sedative medications  CIWA protocol discontinued and she did not receive any doses of Ativan  Frequent orientation and intervention

## 2024-11-27 NOTE — DISCHARGE PLANNING
Attempted to talk to pt but unable to due to LOC.    CM called POA /Cousin  Eddie.   Eddie said that pt was Alert and Oriented x3, lives alone. Was independent with ADLs and IADLs. Used a walker intermittently. She was driving. She had lasik surgery done and would only use eyeglasses for reading. She did not have HH services.    Eddie said that pt is pretty well off and is not financially strained.     PCP is JAQUAN Gillette.   Goes to [facility] on [address]  Friend Lyssa # [phone] can provide transportation upon discharge. Stacie said she took pt's purse and Ipad home with her.     Further Stacie said that pt's sister in law, Elly Romero is flying in [facility] from [place] to be with pt.       Care Transition Team Assessment    Information Source  Orientation Level: Oriented to person  Information Given By: Other (Comments)  Informant's Name: COUSIN EDDIE  Who is responsible for making decisions for patient? : POA  Name(s) of Primary Decision Maker:  Eddie Daly [phone]    Readmission Evaluation  Is this a readmission?: No    Elopement Risk  Legal Hold: No    Interdisciplinary Discharge Planning  Does Admitting Nurse Feel This Could be a Complex Discharge?: No  Primary Care Physician: JAQUAN Gillette  Lives with - Patient's Self Care Capacity: Related Adult, Unrelated Adult  Support Systems: Family Member(s), Friends / Neighbors  Housing / Facility: [address]  Do You Take your Prescribed Medications Regularly: Yes  Able to Return to Previous ADL's: Yes  Mobility Issues: No  Prior Services: Home-Independent  Patient Prefers to be Discharged to:: Home with HH  Assistance Needed: No    Discharge Preparedness  What is your plan after discharge?: Home health care  What are your discharge supports?: Other (comment)  Prior Functional Level: Ambulatory, Drives Self, Independent with Activities of Daily Living, Independent with Medication Management  Difficulity with ADLs: Walking, Dressing,  Bathing, Toileting  Difficulity with IADLs: Cooking, Driving, Laundry, Shopping    Functional Assesment  Prior Functional Level: Ambulatory, Drives Self, Independent with Activities of Daily Living, Independent with Medication Management    Finances  Financial Barriers to Discharge: No  Prescription Coverage: Yes    Vision / Hearing Impairment  Vision Impairment : No  Hearing Impairment : No    Values / Beliefs / Concerns  Values / Beliefs Concerns : No    Advance Directive  Advance Directive?: DPOA for Health Care  Durable Power of  Name and Contact : Nica [phone]    Domestic Abuse  Have you ever been the victim of abuse or violence?: No    Psychological Assessment  History of Substance Abuse: None    Discharge Risks or Barriers  Discharge risks or barriers?: Post-acute placement / services  Patient risk factors: Cognitive / sensory / physical deficit, Complex medical needs    Anticipated Discharge Information  Discharge Disposition: D/T to home under [facility] care in anticipation of covered skilled care (06)

## 2024-11-28 LAB
ALBUMIN SERPL BCP-MCNC: 3.7 G/DL (ref 3.2–4.9)
ALBUMIN/GLOB SERPL: 1.1 G/DL
ALP SERPL-CCNC: 92 U/L (ref 30–99)
ALT SERPL-CCNC: <5 U/L (ref 2–50)
ANION GAP SERPL CALC-SCNC: 16 MMOL/L (ref 7–16)
AST SERPL-CCNC: 11 U/L (ref 12–45)
BILIRUB SERPL-MCNC: 0.7 MG/DL (ref 0.1–1.5)
BUN SERPL-MCNC: 67 MG/DL (ref 8–22)
CALCIUM ALBUM COR SERPL-MCNC: 10.1 MG/DL (ref 8.5–10.5)
CALCIUM SERPL-MCNC: 9.9 MG/DL (ref 8.4–10.2)
CHLORIDE SERPL-SCNC: 92 MMOL/L (ref 96–112)
CO2 SERPL-SCNC: 19 MMOL/L (ref 20–33)
CREAT SERPL-MCNC: 1.71 MG/DL (ref 0.5–1.4)
ERYTHROCYTE [DISTWIDTH] IN BLOOD BY AUTOMATED COUNT: 66 FL (ref 35.9–50)
EXPOSED MRN EXMRN: NORMAL
EXPOSED MRN EXMRN: NORMAL
GFR SERPLBLD CREATININE-BSD FMLA CKD-EPI: 33 ML/MIN/1.73 M 2
GLOBULIN SER CALC-MCNC: 3.5 G/DL (ref 1.9–3.5)
GLUCOSE BLD STRIP.AUTO-MCNC: 110 MG/DL (ref 65–99)
GLUCOSE SERPL-MCNC: 130 MG/DL (ref 65–99)
HBV SURFACE AG SER QL: NORMAL
HCT VFR BLD AUTO: 25.5 % (ref 37–47)
HCV AB SER QL: NORMAL
HGB BLD-MCNC: 8.3 G/DL (ref 12–16)
HIV 1+2 AB+HIV1 P24 AG SERPL QL IA: NORMAL
MAGNESIUM SERPL-MCNC: 1.8 MG/DL (ref 1.5–2.5)
MCH RBC QN AUTO: 25.6 PG (ref 27–33)
MCHC RBC AUTO-ENTMCNC: 32.5 G/DL (ref 32.2–35.5)
MCV RBC AUTO: 78.7 FL (ref 81.4–97.8)
PHOSPHATE SERPL-MCNC: 2.5 MG/DL (ref 2.5–4.5)
PLATELET # BLD AUTO: 203 K/UL (ref 164–446)
PMV BLD AUTO: 10.2 FL (ref 9–12.9)
POTASSIUM SERPL-SCNC: 2.4 MMOL/L (ref 3.6–5.5)
POTASSIUM SERPL-SCNC: 3 MMOL/L (ref 3.6–5.5)
PROT SERPL-MCNC: 7.2 G/DL (ref 6–8.2)
RBC # BLD AUTO: 3.24 M/UL (ref 4.2–5.4)
SODIUM SERPL-SCNC: 127 MMOL/L (ref 135–145)
VIT B12 SERPL-MCNC: 1279 PG/ML (ref 211–911)
WBC # BLD AUTO: 7 K/UL (ref 4.8–10.8)

## 2024-11-28 PROCEDURE — 700102 HCHG RX REV CODE 250 W/ 637 OVERRIDE(OP): Mod: JZ | Performed by: INTERNAL MEDICINE

## 2024-11-28 PROCEDURE — 770020 HCHG ROOM/CARE - TELE (206)

## 2024-11-28 PROCEDURE — 82962 GLUCOSE BLOOD TEST: CPT | Mod: 91

## 2024-11-28 PROCEDURE — 80053 COMPREHEN METABOLIC PANEL: CPT

## 2024-11-28 PROCEDURE — 700105 HCHG RX REV CODE 258: Performed by: INTERNAL MEDICINE

## 2024-11-28 PROCEDURE — 99233 SBSQ HOSP IP/OBS HIGH 50: CPT | Performed by: INTERNAL MEDICINE

## 2024-11-28 PROCEDURE — 700111 HCHG RX REV CODE 636 W/ 250 OVERRIDE (IP): Mod: JZ | Performed by: HOSPITALIST

## 2024-11-28 PROCEDURE — 84132 ASSAY OF SERUM POTASSIUM: CPT

## 2024-11-28 PROCEDURE — 700102 HCHG RX REV CODE 250 W/ 637 OVERRIDE(OP): Performed by: HOSPITALIST

## 2024-11-28 PROCEDURE — A9270 NON-COVERED ITEM OR SERVICE: HCPCS | Mod: JZ | Performed by: INTERNAL MEDICINE

## 2024-11-28 PROCEDURE — A9270 NON-COVERED ITEM OR SERVICE: HCPCS | Performed by: HOSPITALIST

## 2024-11-28 PROCEDURE — 36415 COLL VENOUS BLD VENIPUNCTURE: CPT

## 2024-11-28 PROCEDURE — 85027 COMPLETE CBC AUTOMATED: CPT

## 2024-11-28 PROCEDURE — 700111 HCHG RX REV CODE 636 W/ 250 OVERRIDE (IP): Performed by: INTERNAL MEDICINE

## 2024-11-28 PROCEDURE — 83735 ASSAY OF MAGNESIUM: CPT

## 2024-11-28 PROCEDURE — 84100 ASSAY OF PHOSPHORUS: CPT

## 2024-11-28 PROCEDURE — 94760 N-INVAS EAR/PLS OXIMETRY 1: CPT

## 2024-11-28 RX ORDER — POTASSIUM CHLORIDE 7.45 MG/ML
10 INJECTION INTRAVENOUS
Status: COMPLETED | OUTPATIENT
Start: 2024-11-28 | End: 2024-11-28

## 2024-11-28 RX ORDER — POTASSIUM CHLORIDE 1500 MG/1
40 TABLET, EXTENDED RELEASE ORAL ONCE
Status: COMPLETED | OUTPATIENT
Start: 2024-11-28 | End: 2024-11-28

## 2024-11-28 RX ADMIN — FOLIC ACID 1 MG: 1 TABLET ORAL at 05:30

## 2024-11-28 RX ADMIN — POTASSIUM CHLORIDE 10 MEQ: 10 INJECTION, SOLUTION INTRAVENOUS at 05:17

## 2024-11-28 RX ADMIN — HEPARIN SODIUM 5000 UNITS: 5000 INJECTION, SOLUTION INTRAVENOUS; SUBCUTANEOUS at 14:10

## 2024-11-28 RX ADMIN — LACTULOSE 30 ML: 20 SOLUTION ORAL at 05:30

## 2024-11-28 RX ADMIN — SPIRONOLACTONE 100 MG: 50 TABLET ORAL at 05:29

## 2024-11-28 RX ADMIN — OMEPRAZOLE 40 MG: 20 CAPSULE, DELAYED RELEASE ORAL at 18:23

## 2024-11-28 RX ADMIN — CHLORDIAZEPOXIDE HYDROCHLORIDE 50 MG: 25 CAPSULE ORAL at 05:30

## 2024-11-28 RX ADMIN — HEPARIN SODIUM 5000 UNITS: 5000 INJECTION, SOLUTION INTRAVENOUS; SUBCUTANEOUS at 05:28

## 2024-11-28 RX ADMIN — MULTIVITAMIN TABLET 1 TABLET: TABLET at 05:29

## 2024-11-28 RX ADMIN — LACTULOSE 30 ML: 20 SOLUTION ORAL at 11:34

## 2024-11-28 RX ADMIN — POTASSIUM CHLORIDE 10 MEQ: 10 INJECTION, SOLUTION INTRAVENOUS at 04:20

## 2024-11-28 RX ADMIN — FUROSEMIDE 40 MG: 10 INJECTION, SOLUTION INTRAMUSCULAR; INTRAVENOUS at 05:30

## 2024-11-28 RX ADMIN — HEPARIN SODIUM 5000 UNITS: 5000 INJECTION, SOLUTION INTRAVENOUS; SUBCUTANEOUS at 22:11

## 2024-11-28 RX ADMIN — Medication 100 MG: at 05:30

## 2024-11-28 RX ADMIN — OMEPRAZOLE 40 MG: 20 CAPSULE, DELAYED RELEASE ORAL at 05:28

## 2024-11-28 RX ADMIN — POTASSIUM CHLORIDE 10 MEQ: 10 INJECTION, SOLUTION INTRAVENOUS at 06:27

## 2024-11-28 RX ADMIN — CHLORDIAZEPOXIDE HYDROCHLORIDE 50 MG: 25 CAPSULE ORAL at 00:14

## 2024-11-28 RX ADMIN — FUROSEMIDE 40 MG: 10 INJECTION, SOLUTION INTRAMUSCULAR; INTRAVENOUS at 16:33

## 2024-11-28 RX ADMIN — POTASSIUM CHLORIDE 40 MEQ: 1500 TABLET, EXTENDED RELEASE ORAL at 11:33

## 2024-11-28 RX ADMIN — THIAMINE HYDROCHLORIDE 500 MG: 100 INJECTION, SOLUTION INTRAMUSCULAR; INTRAVENOUS at 12:37

## 2024-11-28 ASSESSMENT — LIFESTYLE VARIABLES
PAROXYSMAL SWEATS: NO SWEAT VISIBLE
ORIENTATION AND CLOUDING OF SENSORIUM: ORIENTED AND CAN DO SERIAL ADDITIONS
HEADACHE, FULLNESS IN HEAD: NOT PRESENT
HEADACHE, FULLNESS IN HEAD: NOT PRESENT
TOTAL SCORE: 3
AGITATION: NORMAL ACTIVITY
VISUAL DISTURBANCES: NOT PRESENT
TOTAL SCORE: 0
AGITATION: NORMAL ACTIVITY
AUDITORY DISTURBANCES: NOT PRESENT
TOTAL SCORE: 0
TREMOR: NO TREMOR
TREMOR: NO TREMOR
TOTAL SCORE: 0
VISUAL DISTURBANCES: NOT PRESENT
VISUAL DISTURBANCES: NOT PRESENT
ANXIETY: NO ANXIETY (AT EASE)
NAUSEA AND VOMITING: NO NAUSEA AND NO VOMITING
VISUAL DISTURBANCES: NOT PRESENT
HEADACHE, FULLNESS IN HEAD: NOT PRESENT
NAUSEA AND VOMITING: NO NAUSEA AND NO VOMITING
ANXIETY: NO ANXIETY (AT EASE)
PAROXYSMAL SWEATS: NO SWEAT VISIBLE
AUDITORY DISTURBANCES: NOT PRESENT
TREMOR: NO TREMOR
ORIENTATION AND CLOUDING OF SENSORIUM: DATE DISORIENTATION BY NO MORE THAN TWO CALENDAR DAYS
AGITATION: NORMAL ACTIVITY
AUDITORY DISTURBANCES: NOT PRESENT
ANXIETY: NO ANXIETY (AT EASE)
ORIENTATION AND CLOUDING OF SENSORIUM: ORIENTED AND CAN DO SERIAL ADDITIONS
AUDITORY DISTURBANCES: NOT PRESENT
PAROXYSMAL SWEATS: NO SWEAT VISIBLE
AUDITORY DISTURBANCES: NOT PRESENT
HEADACHE, FULLNESS IN HEAD: NOT PRESENT
NAUSEA AND VOMITING: NO NAUSEA AND NO VOMITING
HEADACHE, FULLNESS IN HEAD: NOT PRESENT
AGITATION: NORMAL ACTIVITY
VISUAL DISTURBANCES: NOT PRESENT
ANXIETY: NO ANXIETY (AT EASE)
TREMOR: TREMOR NOT VISIBLE BUT CAN BE FELT, FINGERTIP TO FINGERTIP
NAUSEA AND VOMITING: NO NAUSEA AND NO VOMITING
PAROXYSMAL SWEATS: NO SWEAT VISIBLE
PAROXYSMAL SWEATS: NO SWEAT VISIBLE
ANXIETY: NO ANXIETY (AT EASE)
NAUSEA AND VOMITING: NO NAUSEA AND NO VOMITING
AGITATION: NORMAL ACTIVITY
ORIENTATION AND CLOUDING OF SENSORIUM: DATE DISORIENTATION BY NO MORE THAN TWO CALENDAR DAYS
ORIENTATION AND CLOUDING OF SENSORIUM: ORIENTED AND CAN DO SERIAL ADDITIONS
ORIENTATION AND CLOUDING OF SENSORIUM: ORIENTED AND CAN DO SERIAL ADDITIONS
AUDITORY DISTURBANCES: NOT PRESENT
TOTAL SCORE: 3
TOTAL SCORE: 0
TREMOR: TREMOR NOT VISIBLE BUT CAN BE FELT, FINGERTIP TO FINGERTIP
TREMOR: NO TREMOR
AGITATION: NORMAL ACTIVITY
PAROXYSMAL SWEATS: NO SWEAT VISIBLE
NAUSEA AND VOMITING: NO NAUSEA AND NO VOMITING
ANXIETY: NO ANXIETY (AT EASE)
VISUAL DISTURBANCES: NOT PRESENT
HEADACHE, FULLNESS IN HEAD: NOT PRESENT

## 2024-11-28 ASSESSMENT — PAIN DESCRIPTION - PAIN TYPE
TYPE: ACUTE PAIN
TYPE: ACUTE PAIN

## 2024-11-28 NOTE — CARE PLAN
The patient is Stable - Low risk of patient condition declining or worsening    Shift Goals  Clinical Goals: Replace electrolytes, CIWA  Patient Goals: Sleep, comfort  Family Goals: MORGAN    Progress made toward(s) clinical / shift goals:      Problem: Pain - Standard  Goal: Alleviation of pain or a reduction in pain to the patient’s comfort goal  Outcome: Progressing     Problem: Optimal Care for Alcohol Withdrawal  Goal: Optimal Care for the alcohol withdrawal patient  Outcome: Progressing  Note: CIWA assessment completed per protocol      Problem: Seizure Precautions  Goal: Implementation of seizure precautions  Outcome: Progressing     Problem: Fall Risk  Goal: Patient will remain free from falls  Outcome: Progressing     Problem: Skin Integrity  Goal: Skin integrity is maintained or improved  Outcome: Progressing

## 2024-11-28 NOTE — PROGRESS NOTES
Telemetry Shift Summary     Rhythm: SR  HR: 83-99  Ectopy: rPVC    Measurements: .18/.08/.36    Normal Values  Rhythm: SR  HR:   Measurements: 0.12-0.20/0.08-0.10/0.30-0.52

## 2024-11-28 NOTE — CARE PLAN
The patient is Stable - Low risk of patient condition declining or worsening    Shift Goals  Clinical Goals: safety  Patient Goals: rest    Progress made toward(s) clinical / shift goals:    Problem: Knowledge Deficit - Standard  Goal: Patient and family/care givers will demonstrate understanding of plan of care, disease process/condition, diagnostic tests and medications  Outcome: Progressing     Problem: Fall Risk  Goal: Patient will remain free from falls  Outcome: Progressing       Patient is not progressing towards the following goals:

## 2024-11-28 NOTE — PROGRESS NOTES
[facility] Medicine Daily Progress Note    Date of Service  11/28/2024    Chief Complaint  Denisse Abel is a 64 y.o. female admitted 11/27/2024 with altered mental status and confusion    Hospital Course  Patient was brought to the hospital by EMS after she was able to call 911 but unable to give any history.  Paramedics found her disheveled, confused malnourished and brought her to the emergency room.  Patient with history of alcohol abuse and concern for possible Warnicke's encephalopathy versus acute alcohol withdrawal.  Patient on admission was unable to give any information but when she got up to the floor bedside nurse said that she was coherent and was doing better.  She denied any recent drinking but then was started on Librium and since that time has been very sedated.  I have discontinued the Librium and will continue with CIWA protocol if needed but she is not looking like acute alcohol withdrawal at this point.  Will continue with lactulose for her cirrhosis and ascites.  Potassium markedly low this morning and received IV potassium with a repeat level 3.0 and will continue with oral potassium supplementation.  I have also started the patient on aggressive IV thiamine for concern of Warnicke's.    Interval Problem Update  11/28 patient unable to be woken the first time I rounded on her and then when I approached her a couple hours later she was able to give one-word answers to questions.  She is denying pain but is very somnolent and is requesting a vanilla milkshake.    I have discussed this patient's plan of care and discharge plan at IDT rounds today with Case Management, Nursing, Nursing leadership, and other members of the IDT team.    Consultants/Specialty  none    Code Status  Full Code    Disposition  The patient is not medically cleared for discharge to home or a post-acute facility.  Anticipate discharge to: home with close outpatient follow-up    I have placed the appropriate orders for  post-discharge needs.    Review of Systems  Review of Systems   Unable to perform ROS: Mental status change        Physical Exam  Temp:  [36.1 °C (96.9 °F)-37.1 °C (98.8 °F)] 36.5 °C (97.7 °F)  Pulse:  [] 87  Resp:  [16-20] 16  BP: (117-144)/(64-81) 119/64  SpO2:  [95 %-100 %] 95 %    Physical Exam  Vitals and nursing note reviewed.   Constitutional:       General: She is not in acute distress.     Appearance: Normal appearance. She is not ill-appearing.   HENT:      Head: Normocephalic and atraumatic.      Nose: Nose normal.   Cardiovascular:      Rate and Rhythm: Normal rate and regular rhythm.      Heart sounds: Normal heart sounds. No murmur heard.  Pulmonary:      Effort: Pulmonary effort is normal.      Breath sounds: Normal breath sounds.   Abdominal:      General: Bowel sounds are normal. There is no distension.      Palpations: Abdomen is soft.   Musculoskeletal:         General: No swelling or tenderness.      Cervical back: Neck supple.   Skin:     General: Skin is warm and dry.   Neurological:      General: No focal deficit present.      Mental Status: She is alert. She is disoriented.      Comments: Patient sedated able to answer 1 stage questioning, frequently falls back asleep   Psychiatric:         Mood and Affect: Mood normal.         Behavior: Behavior normal.         Fluids    Intake/Output Summary (Last 24 hours) at 11/28/2024 1227  Last data filed at 11/28/2024 0519  Gross per 24 hour   Intake 0 ml   Output 750 ml   Net -750 ml        Laboratory  Recent Labs     11/27/24  0858 11/28/24 0242   WBC 11.1* 7.0   RBC 3.75* 3.24*   HEMOGLOBIN 9.8* 8.3*   HEMATOCRIT 29.2* 25.5*   MCV 77.9* 78.7*   MCH 26.1* 25.6*   MCHC 33.6 32.5   RDW 65.1* 66.0*   PLATELETCT 259 203   MPV 10.3 10.2     Recent Labs     11/27/24  0858 11/28/24  0242 11/28/24  0947   SODIUM 128* 127*  --    POTASSIUM 3.9 2.4* 3.0*   CHLORIDE 84* 92*  --    CO2 15* 19*  --    GLUCOSE 137* 130*  --    BUN 83* 67*  --    CREATININE  2.23* 1.71*  --    CALCIUM 11.1* 9.9  --                    Imaging  CT-ABDOMEN-PELVIS W/O   Final Result      1.  Small umbilical hernia which contains small intestine. Small intestine is dilated proximal to that hernia and decompressed distally consistent with resultant small bowel obstruction.      2.  Again seen cirrhotic appearance of the liver with small metallic ascites within the abdomen and pelvis and splenomegaly consistent with portal hypertension.      3.  Gallstones within the gallbladder.      CT-HEAD W/O   Final Result      No evidence of acute intracranial process.               DX-CHEST-PORTABLE (1 VIEW)   Final Result      No evidence of acute cardiopulmonary process.           Assessment/Plan  * Alcoholic encephalopathy (HCC)- (present on admission)  Assessment & Plan  Patient came in encephalopathic but after administering care in the emergency room brought up to the floor she started showing improvement with her mental status.  She then continued taking the Librium and now she is sedated likely related to the medication rather than her suspected alcohol withdrawal.  I have discontinued Librium and given the concern of possible Warnicke's started her on IV thiamine.    CKD (chronic kidney disease) stage 4, GFR 15-29 ml/min (HCC)- (present on admission)  Assessment & Plan  Monitor renal functions currently she is slightly above her baseline but she is still close to her baseline.  Avoid nephrotoxic medications  Avoid giving excessive hydration    Esophageal varices without bleeding (HCC)- (present on admission)  Assessment & Plan  Continue with PPI therapy    Severe protein-calorie malnutrition (HCC)- (present on admission)  Assessment & Plan  Nutritional support    Anemia due to chronic kidney disease- (present on admission)  Assessment & Plan  Monitor H&H if drops below 7 or 21 transfuse  Check iron panel B12 level    Liver cirrhosis (HCC)- (present on admission)  Assessment & Plan  Chronic  liver cirrhosis secondary to alcohol abuse  Monitor liver function tests  Optimize diuresis  Initiate lactulose  Monitor magnesium and phosphorus levels  WA protocol available for alcohol withdrawal      Hypokalemia- (present on admission)  Assessment & Plan  Potassium after receiving IV Lasix down to 2.4, continue cardiac monitoring, IV potassium repleted and with recheck up to 3.0.  Continue with oral potassium supplementation    Chronic combined systolic and diastolic heart failure (HCC)- (present on admission)  Assessment & Plan  Monitor fluid status  Monitor daily weights  Most recent echocardiogram demonstrates moderate to severe aortic stenosis, ejection fraction fluctuates between 40 and 60  Continue with diuresis using Lasix and oral Aldactone    Heart murmur- (present on admission)  Assessment & Plan  Chronic aortic stenosis  This is moderate to severe  Eventually the patient may need surgical intervention such as a TAVR.  Referred to outpatient cardiology    HTN (hypertension), benign- (present on admission)  Assessment & Plan  Optimize blood pressure management keep systolic blood pressure less than 140 diastolic under 90         VTE prophylaxis:   SCDs/TEDs      I have performed a physical exam and reviewed and updated ROS and Plan today (11/28/2024). In review of yesterday's note (11/27/2024), there are no changes except as documented above.    My total time spent caring for the patient on the day of the encounter was 57 minutes.   This does not include time spent on separately billable procedures/tests.

## 2024-11-28 NOTE — PROGRESS NOTES
Telemetry Shift Summary     Rhythm: SR  Rate: 83-88  Measurements: .18/.06/.40  Ectopy (reported by Monitor Tech): rPVC     Normal Values  Rhythm: Sinus  HR:   Measurements: 0.12-0.20/0.06-0.10/0.30-0.52

## 2024-11-28 NOTE — HOSPITAL COURSE
Patient was brought to the hospital by EMS after she was able to call 911 but unable to give any history.  Paramedics found her disheveled, confused malnourished and brought her to the emergency room.  Patient with history of alcohol abuse and concern for possible Warnicke's encephalopathy versus acute alcohol withdrawal.  Patient on admission was unable to give any information but when she got up to the floor bedside nurse said that she was coherent and was doing better.  She denied any recent drinking but then was started on Librium and since that time has been very sedated.  I have discontinued the Librium and will continue with CIWA protocol if needed but she is not looking like acute alcohol withdrawal at this point.  Will continue with lactulose for her cirrhosis and ascites.  Potassium markedly low this morning and received IV potassium with a repeat level 3.0 and will continue with oral potassium supplementation.  I have also started the patient on aggressive IV thiamine for concern of Warnicke's.

## 2024-11-28 NOTE — ASSESSMENT & PLAN NOTE
Replete as needed, potassium today 3.8  Also replete magnesium at 1.6 today with 1 g magnesium sulfate

## 2024-11-29 LAB
ANION GAP SERPL CALC-SCNC: 17 MMOL/L (ref 7–16)
BACTERIA UR CULT: ABNORMAL
BACTERIA UR CULT: ABNORMAL
BUN SERPL-MCNC: 59 MG/DL (ref 8–22)
CALCIUM SERPL-MCNC: 10.8 MG/DL (ref 8.4–10.2)
CHLORIDE SERPL-SCNC: 95 MMOL/L (ref 96–112)
CO2 SERPL-SCNC: 18 MMOL/L (ref 20–33)
CREAT SERPL-MCNC: 1.66 MG/DL (ref 0.5–1.4)
ERYTHROCYTE [DISTWIDTH] IN BLOOD BY AUTOMATED COUNT: 66.4 FL (ref 35.9–50)
GFR SERPLBLD CREATININE-BSD FMLA CKD-EPI: 34 ML/MIN/1.73 M 2
GLUCOSE BLD STRIP.AUTO-MCNC: 113 MG/DL (ref 65–99)
GLUCOSE BLD STRIP.AUTO-MCNC: 121 MG/DL (ref 65–99)
GLUCOSE BLD STRIP.AUTO-MCNC: 129 MG/DL (ref 65–99)
GLUCOSE BLD STRIP.AUTO-MCNC: 153 MG/DL (ref 65–99)
GLUCOSE SERPL-MCNC: 118 MG/DL (ref 65–99)
HCT VFR BLD AUTO: 31.5 % (ref 37–47)
HGB BLD-MCNC: 10.1 G/DL (ref 12–16)
MCH RBC QN AUTO: 25.8 PG (ref 27–33)
MCHC RBC AUTO-ENTMCNC: 32.1 G/DL (ref 32.2–35.5)
MCV RBC AUTO: 80.4 FL (ref 81.4–97.8)
PLATELET # BLD AUTO: 234 K/UL (ref 164–446)
PMV BLD AUTO: 10.6 FL (ref 9–12.9)
POTASSIUM SERPL-SCNC: 3.5 MMOL/L (ref 3.6–5.5)
RBC # BLD AUTO: 3.92 M/UL (ref 4.2–5.4)
SIGNIFICANT IND 70042: ABNORMAL
SITE SITE: ABNORMAL
SODIUM SERPL-SCNC: 130 MMOL/L (ref 135–145)
SOURCE SOURCE: ABNORMAL
WBC # BLD AUTO: 7.7 K/UL (ref 4.8–10.8)

## 2024-11-29 PROCEDURE — 700102 HCHG RX REV CODE 250 W/ 637 OVERRIDE(OP): Performed by: HOSPITALIST

## 2024-11-29 PROCEDURE — 97163 PT EVAL HIGH COMPLEX 45 MIN: CPT

## 2024-11-29 PROCEDURE — 82962 GLUCOSE BLOOD TEST: CPT | Mod: 91

## 2024-11-29 PROCEDURE — 700111 HCHG RX REV CODE 636 W/ 250 OVERRIDE (IP): Performed by: INTERNAL MEDICINE

## 2024-11-29 PROCEDURE — A9270 NON-COVERED ITEM OR SERVICE: HCPCS | Performed by: HOSPITALIST

## 2024-11-29 PROCEDURE — 85027 COMPLETE CBC AUTOMATED: CPT

## 2024-11-29 PROCEDURE — 700111 HCHG RX REV CODE 636 W/ 250 OVERRIDE (IP): Mod: JZ | Performed by: HOSPITALIST

## 2024-11-29 PROCEDURE — 700105 HCHG RX REV CODE 258: Performed by: INTERNAL MEDICINE

## 2024-11-29 PROCEDURE — 770020 HCHG ROOM/CARE - TELE (206)

## 2024-11-29 PROCEDURE — 97535 SELF CARE MNGMENT TRAINING: CPT

## 2024-11-29 PROCEDURE — 97166 OT EVAL MOD COMPLEX 45 MIN: CPT

## 2024-11-29 PROCEDURE — 99233 SBSQ HOSP IP/OBS HIGH 50: CPT | Performed by: INTERNAL MEDICINE

## 2024-11-29 PROCEDURE — 36415 COLL VENOUS BLD VENIPUNCTURE: CPT

## 2024-11-29 PROCEDURE — 97530 THERAPEUTIC ACTIVITIES: CPT

## 2024-11-29 PROCEDURE — 80048 BASIC METABOLIC PNL TOTAL CA: CPT

## 2024-11-29 RX ADMIN — INSULIN LISPRO 1 UNITS: 100 INJECTION, SOLUTION INTRAVENOUS; SUBCUTANEOUS at 12:35

## 2024-11-29 RX ADMIN — INSULIN LISPRO 1 UNITS: 100 INJECTION, SOLUTION INTRAVENOUS; SUBCUTANEOUS at 20:11

## 2024-11-29 RX ADMIN — FOLIC ACID 1 MG: 1 TABLET ORAL at 05:37

## 2024-11-29 RX ADMIN — LACTULOSE 30 ML: 20 SOLUTION ORAL at 05:37

## 2024-11-29 RX ADMIN — MULTIVITAMIN TABLET 1 TABLET: TABLET at 05:37

## 2024-11-29 RX ADMIN — FUROSEMIDE 40 MG: 10 INJECTION, SOLUTION INTRAMUSCULAR; INTRAVENOUS at 17:13

## 2024-11-29 RX ADMIN — THIAMINE HYDROCHLORIDE 500 MG: 100 INJECTION, SOLUTION INTRAMUSCULAR; INTRAVENOUS at 05:44

## 2024-11-29 RX ADMIN — OMEPRAZOLE 40 MG: 20 CAPSULE, DELAYED RELEASE ORAL at 05:37

## 2024-11-29 RX ADMIN — HEPARIN SODIUM 5000 UNITS: 5000 INJECTION, SOLUTION INTRAVENOUS; SUBCUTANEOUS at 12:36

## 2024-11-29 RX ADMIN — HEPARIN SODIUM 5000 UNITS: 5000 INJECTION, SOLUTION INTRAVENOUS; SUBCUTANEOUS at 05:37

## 2024-11-29 RX ADMIN — FUROSEMIDE 40 MG: 10 INJECTION, SOLUTION INTRAMUSCULAR; INTRAVENOUS at 05:37

## 2024-11-29 RX ADMIN — OMEPRAZOLE 40 MG: 20 CAPSULE, DELAYED RELEASE ORAL at 17:14

## 2024-11-29 RX ADMIN — SPIRONOLACTONE 100 MG: 50 TABLET ORAL at 05:37

## 2024-11-29 RX ADMIN — HEPARIN SODIUM 5000 UNITS: 5000 INJECTION, SOLUTION INTRAVENOUS; SUBCUTANEOUS at 21:29

## 2024-11-29 ASSESSMENT — PAIN DESCRIPTION - PAIN TYPE
TYPE: ACUTE PAIN
TYPE: ACUTE PAIN

## 2024-11-29 ASSESSMENT — COGNITIVE AND FUNCTIONAL STATUS - GENERAL
MOBILITY SCORE: 10
WALKING IN HOSPITAL ROOM: TOTAL
MOVING FROM LYING ON BACK TO SITTING ON SIDE OF FLAT BED: A LOT
HELP NEEDED FOR BATHING: A LOT
CLIMB 3 TO 5 STEPS WITH RAILING: TOTAL
SUGGESTED CMS G CODE MODIFIER DAILY ACTIVITY: CK
DAILY ACTIVITIY SCORE: 14
PERSONAL GROOMING: A LITTLE
MOVING TO AND FROM BED TO CHAIR: A LOT
EATING MEALS: A LITTLE
DRESSING REGULAR UPPER BODY CLOTHING: A LOT
STANDING UP FROM CHAIR USING ARMS: A LOT
DRESSING REGULAR LOWER BODY CLOTHING: A LOT
SUGGESTED CMS G CODE MODIFIER MOBILITY: CL
TURNING FROM BACK TO SIDE WHILE IN FLAT BAD: A LOT
TOILETING: A LOT

## 2024-11-29 ASSESSMENT — ENCOUNTER SYMPTOMS
VOMITING: 0
HEARTBURN: 0
SHORTNESS OF BREATH: 0
SORE THROAT: 0
WEAKNESS: 1
CONSTIPATION: 0
PHOTOPHOBIA: 0
DIZZINESS: 0
FEVER: 0
DIARRHEA: 0
SENSORY CHANGE: 0
SPEECH CHANGE: 0
CHILLS: 0
INSOMNIA: 0
HEADACHES: 0
BLURRED VISION: 0
NERVOUS/ANXIOUS: 0
MYALGIAS: 0
DEPRESSION: 0
CLAUDICATION: 0
ABDOMINAL PAIN: 0
COUGH: 0

## 2024-11-29 ASSESSMENT — LIFESTYLE VARIABLES
ANXIETY: NO ANXIETY (AT EASE)
TREMOR: NO TREMOR
ANXIETY: NO ANXIETY (AT EASE)
AGITATION: NORMAL ACTIVITY
VISUAL DISTURBANCES: NOT PRESENT
AUDITORY DISTURBANCES: NOT PRESENT
TOTAL SCORE: 1
ORIENTATION AND CLOUDING OF SENSORIUM: ORIENTED AND CAN DO SERIAL ADDITIONS
NAUSEA AND VOMITING: NO NAUSEA AND NO VOMITING
NAUSEA AND VOMITING: NO NAUSEA AND NO VOMITING
PAROXYSMAL SWEATS: NO SWEAT VISIBLE
AGITATION: NORMAL ACTIVITY
PAROXYSMAL SWEATS: NO SWEAT VISIBLE
TREMOR: NO TREMOR
AUDITORY DISTURBANCES: NOT PRESENT
TOTAL SCORE: 0
HEADACHE, FULLNESS IN HEAD: NOT PRESENT
VISUAL DISTURBANCES: VERY MILD SENSITIVITY
HEADACHE, FULLNESS IN HEAD: NOT PRESENT
ORIENTATION AND CLOUDING OF SENSORIUM: ORIENTED AND CAN DO SERIAL ADDITIONS

## 2024-11-29 ASSESSMENT — ACTIVITIES OF DAILY LIVING (ADL): TOILETING: INDEPENDENT

## 2024-11-29 ASSESSMENT — GAIT ASSESSMENTS: GAIT LEVEL OF ASSIST: UNABLE TO PARTICIPATE

## 2024-11-29 NOTE — THERAPY
Occupational Therapy   Initial Evaluation     Patient Name: Denisse Abel  Age:  64 y.o., Sex:  female  Medical Record #: [id number]  Today's Date: 11/29/2024     Precautions  Precautions: Fall Risk    Assessment  Patient is 64 y.o. female who presented 11/27/24 w/ confusion, AMS, malnourished.  Admitted w/ alcoholic encephalopathy.  PMH - ETOH, CKD, HTN, liver cirrhosis, Renal disorder.   Additional factors influencing patient status / progress: Delayed response, weakness, limited activity tolerance, impaired balance.  Pt lives alone in a [facility] in [place], [place].  Pt reported friends in the area are available to assist on a limited basis.  PLOF Mod I to Indep for ADL's, transfers and ambulation w/out a device.  Pt demonstrated Mod assist supine to EOB sitting, Max assist sit/stand, Max assist BSC transfer, Max assist bedside chair transfer, max assist LBCM, Max assist toileting, Mod assist UBCM.  Therapist reviewed environmental/safety awareness, fall precautions, AE/DME, ADL's and transfers.      Plan    Occupational Therapy Initial Treatment Plan   Treatment Interventions: (P) Self Care / Activities of Daily Living, Adaptive Equipment, Neuro Re-Education / Balance, Therapeutic Exercises, Therapeutic Activity  Treatment Frequency: (P) 3 Times per Week  Duration: (P) Until Therapy Goals Met    DC Equipment Recommendations: (P) Unable to determine at this time  Discharge Recommendations: (P) Recommend post-acute placement for additional occupational therapy services prior to discharge home     Subjective    Pt was alert, delayed in response, slow processing and cooperative w/ tx.     Objective       11/29/24 0940    Services   Is patient using  services for this encounter? No   Initial Contact Note    Initial Contact Note Order Received and Verified, Occupational Therapy Evaluation in Progress with Full Report to Follow.   Prior Living Situation   Housing / Facility [address] [facility]   Steps Into Home 0    Steps In Home 0   Bathroom Set up Walk In Shower;Shower Chair   Equipment Owned None   Lives with - Patient's Self Care Capacity Alone and Unable to Care For Self   Comments Pt lives alone in a [facility] in [place], [place].  Pt reported friends in the area are available to assist on a limited basis.  PLOF Mod I to Indep for ADL's, transfers and ambulation w/out a device.   Prior Level of ADL Function   Self Feeding Independent   Grooming / Hygiene Independent   Bathing Independent   Dressing Independent   Toileting Independent   Prior Level of IADL Function   Medication Management Independent   Laundry Independent   Kitchen Mobility Independent   Finances Independent   Home Management Independent   Shopping Independent   Prior Level Of Mobility Independent Without Device in Community;Independent With Steps in Community;Independent Without Device in Home;Independent With Steps in Home   Driving / Transportation Driving Independent   Precautions   Precautions Fall Risk   Vitals   Patient BP Position Sitting   Pulse Oximetry 95 %   O2 (LPM) 0   O2 Delivery Device None - Room Air   Pain   Intervention Rest;Repositioned   Pain 0 - 10 Group   Location Knee   Location Orientation Right   Description Aching   Comfort Goal Comfort with Movement;Perform Activity   Cognition    Cognition / Consciousness X   Speech/ Communication Delayed Responses   Level of Consciousness Alert   Safety Awareness Impaired   Attention Impaired   Initiation Impaired   Coordination Upper Body   Coordination Not Tested   Balance Assessment   Sitting Balance (Static) Fair -   Sitting Balance (Dynamic) Poor   Standing Balance (Static) Trace +   Standing Balance (Dynamic) Dependent   Weight Shift Sitting Poor   Weight Shift Standing Absent   Bed Mobility    Supine to Sit Moderate Assist   Comments Pt transferred to bedside chair at end of session   ADL Assessment   Upper Body Dressing Moderate Assist   Lower Body Dressing Maximal Assist   Toileting Maximal  Assist   How much help from another person does the patient currently need...   Putting on and taking off regular lower body clothing? 2   Bathing (including washing, rinsing, and drying)? 2   Toileting, which includes using a toilet, bedpan, or urinal? 2   Putting on and taking off regular upper body clothing? 2   Taking care of personal grooming such as brushing teeth? 3   Eating meals? 3   6 Clicks Daily Activity Score 14   Functional Mobility   Sit to Stand Maximal Assist   Bed, Chair, Wheelchair Transfer Maximal Assist   Toilet Transfers Maximal Assist  (BSC)   Transfer Method Sit Pivot   Edema / Skin Assessment   Edema / Skin  Not Assessed   Short Term Goals   Short Term Goal # 1 Sup bed mobility (to/from supine/EOB sitting)   Short Term Goal # 2 Mod I UBCM   Short Term Goal # 3 Min assist LBCM via AE/DME PRN   Short Term Goal # 4 CGA toilet transfer via DME PRN   Short Term Goal # 5 Min assist toileting/commode via AE/DME PRN   Education Group   Education Provided Transfers;Role of Occupational Therapist;Activities of Daily Living;Use of Call Light   Role of Occupational Therapist Patient Response Patient;Acceptance;Explanation;Verbal Demonstration   Transfers Patient Response Patient;Acceptance;Explanation;Demonstration;Teach Back;Verbal Demonstration;Action Demonstration;Reinforcement Needed   ADL Patient Response Patient;Acceptance;Explanation;Demonstration;Teach Back;Verbal Demonstration;Action Demonstration;Reinforcement Needed   Use of Call Light Patient Response Patient;Acceptance;Explanation;Demonstration;Verbal Demonstration   Occupational Therapy Initial Treatment Plan    Treatment Interventions Self Care / Activities of Daily Living;Adaptive Equipment;Neuro Re-Education / Balance;Therapeutic Exercises;Therapeutic Activity   Treatment Frequency 3 Times per Week   Duration Until Therapy Goals Met   Problem List   Problem List Decreased Active Daily Living Skills;Decreased Upper Extremity Strength  Right;Decreased Upper Extremity Strength Left;Decreased Functional Mobility;Decreased Activity Tolerance;Safety Awareness Deficits / Cognition;Impaired Postural Control / Balance   Anticipated Discharge Equipment and Recommendations   DC Equipment Recommendations Unable to determine at this time   Discharge Recommendations Recommend post-acute placement for additional occupational therapy services prior to discharge home

## 2024-11-29 NOTE — DIETARY
Nutrition Update: Follow-up for Intake Care Plan  Day 2 of admit.  Denisse Abel is a 64 y.o. female with admitting DX of Alcoholic encephalopathy (HCC) [G31.2].    Current Diet: Consistent CHO, TID Glucerna supplements. PO intake per ADLs w/ limited documentation. RD visited pt at bedside; she reports her appetite is the same. States she prefers vanilla and strawberry supplements to chocolate; RD updated [facility] w/ pt's preferences. Observed 1.5 supplements on bedside table. Pt is agreeable to snacks between meals; seems to prefer sweet items and she was requesting items such as sprite and popsicles. Per CNA, pt ate ~50% of breakfast this a.m.     Per initial RD assessment 11/27, concern for refeeding risk. No phos/mag labs available for this a.m. K+ = 3.5. RD to extended refeeding labs +3 days.    PES:  Inadequate Oral Intake related to poor appetite due to ETOH abuse as evidenced by wt loss.      RD agrees with MD dx of Severe Malnutrition in context of Chronic Illness related to ETOH abuse as evidenced by severe muscle and fat loss noted.      Nutrition Dx Status: Ongoing     Problem: Nutritional:  Goal: Achieve adequate nutritional intake  Description: Patient will consume >50% of meals, snacks, and supplements  Outcome: Progressing slower than expected      RD continues to follow.

## 2024-11-29 NOTE — CARE PLAN
The patient is Stable - Low risk of patient condition declining or worsening    Shift Goals  Clinical Goals: mentation, BM, safety  Patient Goals: rest and comfort  Family Goals: MORGAN    Progress made toward(s) clinical / shift goals:    Problem: Pain - Standard  Goal: Alleviation of pain or a reduction in pain to the patient’s comfort goal  Outcome: Progressing     Problem: Knowledge Deficit - Standard  Goal: Patient and family/care givers will demonstrate understanding of plan of care, disease process/condition, diagnostic tests and medications  Outcome: Progressing  Note: Pt updated on POC, agreeable. Monitoring mentation and safety, Aox4. All questions and concerns addressed at this time.      Problem: Fall Risk  Goal: Patient will remain free from falls  Outcome: Progressing  Note: Pt is a high fall risk, fall precautions in place. Pt educated on use of call light for any needed assistance.      Problem: Skin Integrity  Goal: Skin integrity is maintained or improved  Outcome: Progressing

## 2024-11-29 NOTE — PROGRESS NOTES
Telemetry Shift Summary     Rhythm: SR  Rate: 83-97  Measurements: .18/.08/.34  Ectopy (reported by Monitor Tech): rPVC     Normal Values  Rhythm: Sinus  HR:   Measurements: 0.12-0.20/0.06-0.10/0.30-0.52

## 2024-11-29 NOTE — PROGRESS NOTES
[facility] Daily Progress Note    Date of Service  11/29/2024    Chief Complaint  Denisse Abel is a 64 y.o. female admitted 11/27/2024 with altered mental status and confusion    Hospital Course  Patient was brought to the hospital by EMS after she was able to call 911 but unable to give any history.  Paramedics found her disheveled, confused malnourished and brought her to the emergency room.  Patient with history of alcohol abuse and concern for possible Warnicke's encephalopathy versus acute alcohol withdrawal.  Patient on admission was unable to give any information but when she got up to the floor bedside nurse said that she was coherent and was doing better.  She denied any recent drinking but then was started on Librium and since that time has been very sedated.  I have discontinued the Librium and will continue with CIWA protocol if needed but she is not looking like acute alcohol withdrawal at this point.  Will continue with lactulose for her cirrhosis and ascites.  Potassium markedly low this morning and received IV potassium with a repeat level 3.0 and will continue with oral potassium supplementation.  I have also started the patient on aggressive IV thiamine for concern of Warnicke's.    Interval Problem Update  11/28 patient unable to be woken the first time I rounded on her and then when I approached her a couple hours later she was able to give one-word answers to questions.  She is denying pain but is very somnolent and is requesting a vanilla milkshake.  11/29 patient's mentation is markedly better today she was up to chair after being seen by physical and Occupational Therapy.  Skilled nursing placement was recommended given the patient's profound weakness and patient states that she wants to continue here in the hospital does not feel that she needs skilled nursing and that she has help at home.  Will continue with nutrition support through the weekend and see how she does to see if  she truly will need placement or not.    I have discussed this patient's plan of care and discharge plan at IDT rounds today with Case Management, Nursing, Nursing leadership, and other members of the IDT team.    Consultants/Specialty  none    Code Status  Full Code    Disposition  The patient is not medically cleared for discharge to home or a post-acute facility.  Anticipate discharge to: skilled nursing facility    I have placed the appropriate orders for post-discharge needs.    Review of Systems  Review of Systems   Constitutional:  Positive for malaise/fatigue. Negative for chills and fever.   HENT:  Negative for congestion and sore throat.    Eyes:  Negative for blurred vision and photophobia.   Respiratory:  Negative for cough and shortness of breath.    Cardiovascular:  Negative for chest pain, claudication and leg swelling.   Gastrointestinal:  Negative for abdominal pain, constipation, diarrhea, heartburn and vomiting.   Genitourinary:  Negative for dysuria and hematuria.   Musculoskeletal:  Negative for joint pain and myalgias.   Skin:  Negative for itching and rash.   Neurological:  Positive for weakness. Negative for dizziness, sensory change, speech change and headaches.   Psychiatric/Behavioral:  Negative for depression. The patient is not nervous/anxious and does not have insomnia.         Physical Exam  Temp:  [36.4 °C (97.6 °F)-36.9 °C (98.5 °F)] 36.4 °C (97.6 °F)  Pulse:  [] 83  Resp:  [17-18] 17  BP: (110-142)/(68-75) 110/73  SpO2:  [95 %-99 %] 95 %    Physical Exam  Vitals and nursing note reviewed.   Constitutional:       General: She is not in acute distress.     Appearance: Normal appearance. She is not ill-appearing.   HENT:      Head: Normocephalic and atraumatic.      Nose: Nose normal.   Eyes:      General: No scleral icterus.  Cardiovascular:      Rate and Rhythm: Normal rate and regular rhythm.      Heart sounds: Normal heart sounds. No murmur heard.  Pulmonary:      Effort:  Pulmonary effort is normal.      Breath sounds: Normal breath sounds.   Abdominal:      General: Bowel sounds are normal. There is no distension.      Palpations: Abdomen is soft.   Musculoskeletal:         General: No swelling or tenderness.      Cervical back: Neck supple.   Skin:     General: Skin is warm and dry.   Neurological:      General: No focal deficit present.      Mental Status: She is alert and oriented to person, place, and time.   Psychiatric:         Mood and Affect: Mood normal.         Behavior: Behavior normal.         Fluids    Intake/Output Summary (Last 24 hours) at 11/29/2024 1233  Last data filed at 11/29/2024 0900  Gross per 24 hour   Intake --   Output 850 ml   Net -850 ml        Laboratory  Recent Labs     11/27/24  0858 11/28/24  0242 11/29/24  0043   WBC 11.1* 7.0 7.7   RBC 3.75* 3.24* 3.92*   HEMOGLOBIN 9.8* 8.3* 10.1*   HEMATOCRIT 29.2* 25.5* 31.5*   MCV 77.9* 78.7* 80.4*   MCH 26.1* 25.6* 25.8*   MCHC 33.6 32.5 32.1*   RDW 65.1* 66.0* 66.4*   PLATELETCT 259 203 234   MPV 10.3 10.2 10.6     Recent Labs     11/27/24  0858 11/28/24  0242 11/28/24  0947 11/29/24  0043   SODIUM 128* 127*  --  130*   POTASSIUM 3.9 2.4* 3.0* 3.5*   CHLORIDE 84* 92*  --  95*   CO2 15* 19*  --  18*   GLUCOSE 137* 130*  --  118*   BUN 83* 67*  --  59*   CREATININE 2.23* 1.71*  --  1.66*   CALCIUM 11.1* 9.9  --  10.8*                   Imaging  CT-ABDOMEN-PELVIS W/O   Final Result      1.  Small umbilical hernia which contains small intestine. Small intestine is dilated proximal to that hernia and decompressed distally consistent with resultant small bowel obstruction.      2.  Again seen cirrhotic appearance of the liver with small metallic ascites within the abdomen and pelvis and splenomegaly consistent with portal hypertension.      3.  Gallstones within the gallbladder.      CT-HEAD W/O   Final Result      No evidence of acute intracranial process.               DX-CHEST-PORTABLE (1 VIEW)   Final Result       No evidence of acute cardiopulmonary process.           Assessment/Plan  * Alcoholic encephalopathy (HCC)- (present on admission)  Assessment & Plan  Patient came in encephalopathic but after administering care in the emergency room brought up to the floor she started showing improvement with her mental status.  Librium was given and likely resulted in worsening encephalopathy rather than improvement.,  Stopped yesterday and her mental status is improving      CKD (chronic kidney disease) stage 4, GFR 15-29 ml/min (HCC)- (present on admission)  Assessment & Plan  Monitor renal functions currently she is slightly above her baseline but she is still close to her baseline.  Avoid nephrotoxic medications  Avoid giving excessive hydration    Esophageal varices without bleeding (HCC)- (present on admission)  Assessment & Plan  Continue with PPI therapy    Severe protein-calorie malnutrition (HCC)- (present on admission)  Assessment & Plan  Nutritional support    Anemia due to chronic kidney disease- (present on admission)  Assessment & Plan  Monitor H&H if drops below 7 or 21 transfuse  Check iron panel B12 level    Liver cirrhosis (HCC)- (present on admission)  Assessment & Plan  Chronic liver cirrhosis secondary to alcohol abuse  Monitor liver function tests  Optimize diuresis  Hold lactulose - copious amounts of diarrhea  Monitor magnesium and phosphorus levels  CIWA protocol available for alcohol withdrawal      Hypokalemia- (present on admission)  Assessment & Plan  Potassium after receiving IV Lasix down to 2.4, continue cardiac monitoring, IV potassium repleted and with recheck up to 3.0.  Continue with oral potassium supplementation    Chronic combined systolic and diastolic heart failure (HCC)- (present on admission)  Assessment & Plan  Monitor fluid status  Monitor daily weights  Most recent echocardiogram demonstrates moderate to severe aortic stenosis, ejection fraction fluctuates between 40 and 60  Continue  with diuresis using Lasix and oral Aldactone    Heart murmur- (present on admission)  Assessment & Plan  Chronic aortic stenosis  This is moderate to severe  Eventually the patient may need surgical intervention such as a TAVR.  Referred to outpatient cardiology    HTN (hypertension), benign- (present on admission)  Assessment & Plan  Optimize blood pressure management keep systolic blood pressure less than 140 diastolic under 90         VTE prophylaxis:    heparin ppx      I have performed a physical exam and reviewed and updated ROS and Plan today (11/29/2024). In review of yesterday's note (11/28/2024), there are no changes except as documented above.    My total time spent caring for the patient on the day of the encounter was 52 minutes.   This does not include time spent on separately billable procedures/tests.

## 2024-11-29 NOTE — THERAPY
Physical Therapy   Initial Evaluation     Patient Name: Denisse Abel  Age:  64 y.o., Sex:  female  Medical Record #: [id number]  Today's Date: 11/29/2024     Precautions  Precautions: Fall Risk    Assessment  Patient is 64 y.o. female who was recently admitted for AMS/confusion secondary to alcohol withdrawal. Pt was agreeable to therapy evaluation and presented to PT with impaired balance, impaired gait, weakness, pain, confusion, and poor activity tolerance. Pt was primarily limited by significant weakness and poor upright balance. These impairments are limiting her ability to safely perform bed mobility, sit<>stands, transfers, and ambulation. Pt currently requires Mod A to Max A for all upright mobility with therapist assist. Pt was able to  participate in multiple sit<>stands and transfers from BSC and chair, however, presents as a high fall risk and unable to perform under own strength. Therapeutic interventions, including verbal cues, sequencing, compensatory strategies, and facilitation techniques, were applied to improve the patient's mobility. For bed mobility, verbal cues and sequencing were used to guide repositioning, while facilitation assisted with transitions. During sit-to-stand transfers, verbal cues and hands-on facilitation supported weight transfer, and compensatory strategies were used to address deficits. Gait training involved verbal correction, manual assistance for weight shifting, and the use of compensatory techniques for improved safety and function. Recommend post-acute placement for continued physical therapy services prior to discharge home.       Plan    Physical Therapy Initial Treatment Plan   Treatment Plan : (P) Bed Mobility, Equipment, Gait Training, Manual Therapy, Neuro Re-Education / Balance, Self Care / Home Evaluation, Stair Training, Therapeutic Activities, Therapeutic Exercise  Treatment Frequency: (P) 3 Times per Week  Duration: (P) Until Therapy Goals Met    DC  Equipment Recommendations: (P) Unable to determine at this time  Discharge Recommendations: (P) Recommend post-acute placement for additional physical therapy services prior to discharge home     Objective       11/29/24 1001   Initial Contact Note    Initial Contact Note Order Received and Verified, Physical Therapy Evaluation in Progress with Full Report to Follow.   Precautions   Precautions Fall Risk   Pain 0 - 10 Group   Location Knee   Location Orientation Right   Description Aching   Therapist Pain Assessment Prior to Activity;During Activity;Post Activity;Nurse Notified  (c/o moderate pain of R knee)   Prior Living Situation   Housing / Facility 1 Story Apartment / Condo   Steps Into Home 0   Steps In Home 0   Equipment Owned None   Lives with - Patient's Self Care Capacity Alone and Unable to Care For Self   Comments pt states she primairly lives alone and does not have family in area for assistance   Prior Level of Functional Mobility   Bed Mobility Independent   Transfer Status Independent   Ambulation Independent   Ambulation Distance   (community)   Assistive Devices Used None   Stairs Independent   Comments reports of an IPLOF and was driving   Cognition    Cognition / Consciousness X   Speech/ Communication Delayed Responses   Level of Consciousness Alert   Attention Impaired   Initiation Impaired   Comments slow responding and lethargic throughout session, unclear if at baseline cognition. Pt requires increased time for sequencing/processing   Active ROM Upper Body   Active ROM Upper Body  X   Comments presents with poor  strength   Passive ROM Lower Body   Passive ROM Lower Body WDL   Active ROM Lower Body    Active ROM Lower Body  X   Gross Active ROM Gross Active Range of Motion Impaired,  but Appears Adequate for Functional Mobility.   Strength Lower Body   Lower Body Strength  X   Gross Strength Generalized Weakness, Equal Bilaterally   Comments presents with gross strength of 2+/5 in B LE,  poor functional strength noted   Sensation Lower Body   Lower Extremity Sensation   WDL   Lower Body Muscle Tone   Lower Body Muscle Tone  X   Rt Lower Extremity Muscle Tone Hypotonic   Lt Lower Extremity Muscle Tone Hypotonic   Neurological Concerns   Neurological Concerns No   Coordination Upper Body   Coordination Not Tested   Coordination Lower Body    Coordination Lower Body  Not Tested   Balance Assessment   Sitting Balance (Static) Fair -   Sitting Balance (Dynamic) Poor   Standing Balance (Static) Trace +   Standing Balance (Dynamic) Dependent   Weight Shift Sitting Poor   Weight Shift Standing Absent   Comments w/therapist assist for standing balance   Bed Mobility    Supine to Sit Moderate Assist   Scooting Maximal Assist   Comments HOB elevated and rails up   Gait Analysis   Gait Level Of Assist Unable to Participate   Weight Bearing Status fwb   Functional Mobility   Sit to Stand Maximal Assist   Bed, Chair, Wheelchair Transfer Maximal Assist   Toilet Transfers Maximal Assist   Transfer Method Stand Pivot   Mobility EOB, sit<>stand x 4, transfer to BSC, transfer to chair   6 Clicks Assessment - How much HELP from from another person do you currently need... (If the patient hasn't done an activity recently, how much help from another person do you think he/she would need if he/she tried?)   Turning from your back to your side while in a flat bed without using bedrails? 2   Moving from lying on your back to sitting on the side of a flat bed without using bedrails? 2   Moving to and from a bed to a chair (including a wheelchair)? 2   Standing up from a chair using your arms (e.g., wheelchair, or bedside chair)? 2   Walking in hospital room? 1   Climbing 3-5 steps with a railing? 1   6 clicks Mobility Score 10   Activity Tolerance   Sitting in Chair left up in chair, RN aware   Sitting Edge of Bed 8 mins   Standing 45 seconds x 4   Comments limited by fatigue and weakness   Edema / Skin Assessment   Edema /  Skin  Not Assessed   Patient / Family Goals    Patient / Family Goal #1 none stated   Short Term Goals    Short Term Goal # 1 pt will go supine<>sit w/hob elevated and rails up w/Min a in 6tx   Short Term Goal # 2 pt will go sit<>stand w/fww w/Min A in 6tx   Short Term Goal # 3 pt will transfer bed<>chair w/fww w/Mod A in 6tx   Short Term Goal # 4 pt will ambulate for 10 ft w/fww w/Mod A in 6tx   Education Group   Education Provided Role of Physical Therapist   Role of Physical Therapist Patient Response Patient;Acceptance;Explanation;Demonstration;Verbal Demonstration;Action Demonstration   Physical Therapy Initial Treatment Plan    Treatment Plan  Bed Mobility;Equipment;Gait Training;Manual Therapy;Neuro Re-Education / Balance;Self Care / Home Evaluation;Stair Training;Therapeutic Activities;Therapeutic Exercise   Treatment Frequency 3 Times per Week   Duration Until Therapy Goals Met   Problem List    Problems Pain;Impaired Bed Mobility;Impaired Transfers;Impaired Ambulation;Functional Strength Deficit;Impaired Balance;Decreased Activity Tolerance;Safety Awareness Deficits / Cognition;Motor Planning / Sequencing   Anticipated Discharge Equipment and Recommendations   DC Equipment Recommendations Unable to determine at this time   Discharge Recommendations Recommend post-acute placement for additional physical therapy services prior to discharge home   Interdisciplinary Plan of Care Collaboration   IDT Collaboration with  Nursing;Occupational Therapist   Patient Position at End of Therapy Seated;Chair Alarm On;Call Light within Reach;Tray Table within Reach;Phone within Reach   Collaboration Comments aware of visit and recs   Session Information   Date / Session Number  11/29-1 (1/3, 12/5)

## 2024-11-29 NOTE — PROGRESS NOTES
Telemetry summary    Rhythm: SR  Rate: 82-89  Ectopy: rPVC  Measurements: 0.14/0.08/0.34    Normal Values  Rhythm: SR  HR Range:   Measurement: 0.12-0.2/0.06-0.10/0.30-0.52

## 2024-11-29 NOTE — DISCHARGE PLANNING
Case Management Discharge Planning    Admission Date: 11/27/2024  GMLOS: 6  ALOS: 2    6-Clicks ADL Score: 12  6-Clicks Mobility Score: 10      Anticipated Discharge Dispo: Discharge Disposition: D/T to SNF with Medicare cert in anticipation of skilled care (03)     Patient discussed during IDT rounds. She will need placement, but is not medically cleared. I've requested [facility] send out SNF referrals. PASRR: [id number].    Patient has an advance directive on file naming her cousin Nica as health care surrogate.

## 2024-11-30 ENCOUNTER — APPOINTMENT (OUTPATIENT)
Dept: RADIOLOGY | Facility: MEDICAL CENTER | Age: 64
DRG: 056 | End: 2024-11-30
Attending: INTERNAL MEDICINE
Payer: MEDICARE

## 2024-11-30 VITALS
WEIGHT: 132.06 LBS | TEMPERATURE: 97.4 F | BODY MASS INDEX: 21.22 KG/M2 | RESPIRATION RATE: 18 BRPM | HEART RATE: 79 BPM | OXYGEN SATURATION: 99 % | DIASTOLIC BLOOD PRESSURE: 69 MMHG | HEIGHT: 66 IN | SYSTOLIC BLOOD PRESSURE: 115 MMHG

## 2024-11-30 LAB
ANION GAP SERPL CALC-SCNC: 17 MMOL/L (ref 7–16)
BUN SERPL-MCNC: 57 MG/DL (ref 8–22)
CALCIUM SERPL-MCNC: 10.4 MG/DL (ref 8.4–10.2)
CHLORIDE SERPL-SCNC: 91 MMOL/L (ref 96–112)
CO2 SERPL-SCNC: 19 MMOL/L (ref 20–33)
CREAT SERPL-MCNC: 1.84 MG/DL (ref 0.5–1.4)
ERYTHROCYTE [DISTWIDTH] IN BLOOD BY AUTOMATED COUNT: 63.9 FL (ref 35.9–50)
GFR SERPLBLD CREATININE-BSD FMLA CKD-EPI: 30 ML/MIN/1.73 M 2
GLUCOSE BLD STRIP.AUTO-MCNC: 109 MG/DL (ref 65–99)
GLUCOSE BLD STRIP.AUTO-MCNC: 117 MG/DL (ref 65–99)
GLUCOSE SERPL-MCNC: 114 MG/DL (ref 65–99)
HCT VFR BLD AUTO: 27.7 % (ref 37–47)
HGB BLD-MCNC: 9.3 G/DL (ref 12–16)
MAGNESIUM SERPL-MCNC: 1.6 MG/DL (ref 1.5–2.5)
MCH RBC QN AUTO: 26.2 PG (ref 27–33)
MCHC RBC AUTO-ENTMCNC: 33.6 G/DL (ref 32.2–35.5)
MCV RBC AUTO: 78 FL (ref 81.4–97.8)
PHOSPHATE SERPL-MCNC: 1.9 MG/DL (ref 2.5–4.5)
PLATELET # BLD AUTO: 246 K/UL (ref 164–446)
PMV BLD AUTO: 10.5 FL (ref 9–12.9)
POTASSIUM SERPL-SCNC: 3.8 MMOL/L (ref 3.6–5.5)
RBC # BLD AUTO: 3.55 M/UL (ref 4.2–5.4)
SODIUM SERPL-SCNC: 127 MMOL/L (ref 135–145)
WBC # BLD AUTO: 10.9 K/UL (ref 4.8–10.8)

## 2024-11-30 PROCEDURE — 85027 COMPLETE CBC AUTOMATED: CPT

## 2024-11-30 PROCEDURE — 80048 BASIC METABOLIC PNL TOTAL CA: CPT

## 2024-11-30 PROCEDURE — 94760 N-INVAS EAR/PLS OXIMETRY 1: CPT

## 2024-11-30 PROCEDURE — 700102 HCHG RX REV CODE 250 W/ 637 OVERRIDE(OP): Performed by: INTERNAL MEDICINE

## 2024-11-30 PROCEDURE — 84100 ASSAY OF PHOSPHORUS: CPT

## 2024-11-30 PROCEDURE — 36415 COLL VENOUS BLD VENIPUNCTURE: CPT

## 2024-11-30 PROCEDURE — A9270 NON-COVERED ITEM OR SERVICE: HCPCS | Performed by: INTERNAL MEDICINE

## 2024-11-30 PROCEDURE — 70551 MRI BRAIN STEM W/O DYE: CPT

## 2024-11-30 PROCEDURE — 82962 GLUCOSE BLOOD TEST: CPT | Mod: 91

## 2024-11-30 PROCEDURE — 700102 HCHG RX REV CODE 250 W/ 637 OVERRIDE(OP): Performed by: HOSPITALIST

## 2024-11-30 PROCEDURE — 700111 HCHG RX REV CODE 636 W/ 250 OVERRIDE (IP): Performed by: INTERNAL MEDICINE

## 2024-11-30 PROCEDURE — 83735 ASSAY OF MAGNESIUM: CPT

## 2024-11-30 PROCEDURE — 99233 SBSQ HOSP IP/OBS HIGH 50: CPT | Performed by: INTERNAL MEDICINE

## 2024-11-30 PROCEDURE — 700105 HCHG RX REV CODE 258: Performed by: INTERNAL MEDICINE

## 2024-11-30 PROCEDURE — 770020 HCHG ROOM/CARE - TELE (206)

## 2024-11-30 PROCEDURE — 700111 HCHG RX REV CODE 636 W/ 250 OVERRIDE (IP): Performed by: HOSPITALIST

## 2024-11-30 PROCEDURE — A9270 NON-COVERED ITEM OR SERVICE: HCPCS | Performed by: HOSPITALIST

## 2024-11-30 RX ORDER — SODIUM CHLORIDE 9 MG/ML
500 INJECTION, SOLUTION INTRAVENOUS CONTINUOUS
Status: DISCONTINUED | OUTPATIENT
Start: 2024-11-30 | End: 2024-12-01

## 2024-11-30 RX ORDER — MAGNESIUM SULFATE 1 G/100ML
1 INJECTION INTRAVENOUS ONCE
Status: COMPLETED | OUTPATIENT
Start: 2024-11-30 | End: 2024-11-30

## 2024-11-30 RX ORDER — DEXTROAMPHETAMINE SACCHARATE, AMPHETAMINE ASPARTATE, DEXTROAMPHETAMINE SULFATE AND AMPHETAMINE SULFATE 2.5; 2.5; 2.5; 2.5 MG/1; MG/1; MG/1; MG/1
10 TABLET ORAL DAILY
Status: DISCONTINUED | OUTPATIENT
Start: 2024-11-30 | End: 2024-12-08

## 2024-11-30 RX ADMIN — FOLIC ACID 1 MG: 1 TABLET ORAL at 05:42

## 2024-11-30 RX ADMIN — HEPARIN SODIUM 5000 UNITS: 5000 INJECTION, SOLUTION INTRAVENOUS; SUBCUTANEOUS at 13:21

## 2024-11-30 RX ADMIN — HEPARIN SODIUM 5000 UNITS: 5000 INJECTION, SOLUTION INTRAVENOUS; SUBCUTANEOUS at 05:43

## 2024-11-30 RX ADMIN — THIAMINE HYDROCHLORIDE 500 MG: 100 INJECTION, SOLUTION INTRAMUSCULAR; INTRAVENOUS at 05:47

## 2024-11-30 RX ADMIN — FUROSEMIDE 40 MG: 10 INJECTION, SOLUTION INTRAMUSCULAR; INTRAVENOUS at 05:43

## 2024-11-30 RX ADMIN — MULTIVITAMIN TABLET 1 TABLET: TABLET at 05:42

## 2024-11-30 RX ADMIN — HEPARIN SODIUM 5000 UNITS: 5000 INJECTION, SOLUTION INTRAVENOUS; SUBCUTANEOUS at 21:08

## 2024-11-30 RX ADMIN — OMEPRAZOLE 40 MG: 20 CAPSULE, DELAYED RELEASE ORAL at 17:18

## 2024-11-30 RX ADMIN — SPIRONOLACTONE 100 MG: 50 TABLET ORAL at 05:42

## 2024-11-30 RX ADMIN — SODIUM CHLORIDE 500 ML: 9 INJECTION, SOLUTION INTRAVENOUS at 07:30

## 2024-11-30 RX ADMIN — OMEPRAZOLE 40 MG: 20 CAPSULE, DELAYED RELEASE ORAL at 05:42

## 2024-11-30 RX ADMIN — MAGNESIUM SULFATE IN DEXTROSE 1 G: 10 INJECTION, SOLUTION INTRAVENOUS at 13:15

## 2024-11-30 RX ADMIN — DEXTROAMPHETAMINE SACCHARATE, AMPHETAMINE ASPARTATE, DEXTROAMPHETAMINE SULFATE, AMPHETAMINE SULFATE TABLETS, 10 MG,CLL 10 MG: 2.5; 2.5; 2.5; 2.5 TABLET ORAL at 14:03

## 2024-11-30 ASSESSMENT — LIFESTYLE VARIABLES
AUDITORY DISTURBANCES: NOT PRESENT
NAUSEA AND VOMITING: NO NAUSEA AND NO VOMITING
VISUAL DISTURBANCES: NOT PRESENT
TREMOR: NO TREMOR
TREMOR: NO TREMOR
HEADACHE, FULLNESS IN HEAD: NOT PRESENT
AGITATION: NORMAL ACTIVITY
ANXIETY: NO ANXIETY (AT EASE)
NAUSEA AND VOMITING: NO NAUSEA AND NO VOMITING
HEADACHE, FULLNESS IN HEAD: NOT PRESENT
ORIENTATION AND CLOUDING OF SENSORIUM: ORIENTED AND CAN DO SERIAL ADDITIONS
ORIENTATION AND CLOUDING OF SENSORIUM: ORIENTED AND CAN DO SERIAL ADDITIONS
AGITATION: NORMAL ACTIVITY
VISUAL DISTURBANCES: NOT PRESENT
TOTAL SCORE: 0
ANXIETY: NO ANXIETY (AT EASE)
PAROXYSMAL SWEATS: NO SWEAT VISIBLE
AUDITORY DISTURBANCES: NOT PRESENT
TOTAL SCORE: 0
PAROXYSMAL SWEATS: NO SWEAT VISIBLE

## 2024-11-30 ASSESSMENT — ENCOUNTER SYMPTOMS
HEADACHES: 0
ABDOMINAL PAIN: 0
VOMITING: 0
SHORTNESS OF BREATH: 0
COUGH: 0
NERVOUS/ANXIOUS: 0
MYALGIAS: 0
INSOMNIA: 0
PHOTOPHOBIA: 0
CONSTIPATION: 0
WEAKNESS: 1
CHILLS: 0
SORE THROAT: 0
SPEECH CHANGE: 0
FEVER: 0
DIARRHEA: 0
CLAUDICATION: 0
DIZZINESS: 0
DEPRESSION: 0
SENSORY CHANGE: 0
BLURRED VISION: 0
HEARTBURN: 0

## 2024-11-30 ASSESSMENT — PAIN DESCRIPTION - PAIN TYPE
TYPE: ACUTE PAIN
TYPE: ACUTE PAIN

## 2024-11-30 NOTE — CARE PLAN
The patient is Stable - Low risk of patient condition declining or worsening    Shift Goals  Clinical Goals: mentation, IVF, OOB  Patient Goals: Comfort, rest  Family Goals: POC updates    Progress made toward(s) clinical / shift goals:      Problem: Pain - Standard  Goal: Alleviation of pain or a reduction in pain to the patient’s comfort goal  Outcome: Progressing     Problem: Knowledge Deficit - Standard  Goal: Patient and family/care givers will demonstrate understanding of plan of care, disease process/condition, diagnostic tests and medications  Outcome: Progressing     Problem: Seizure Precautions  Goal: Implementation of seizure precautions  Outcome: Progressing     Problem: Skin Integrity  Goal: Skin integrity is maintained or improved  Outcome: Progressing

## 2024-11-30 NOTE — PROGRESS NOTES
Telemetry summary    Rhythm: SR/ST  Rate:   Ectopy: r-oPVC, rPAC  Measurements: 0.18/0.08/0.32    Normal Values  Rhythm: SR  HR Range:   Measurement: 0.12-0.2/0.06-0.10/0.30-0.52

## 2024-11-30 NOTE — PROGRESS NOTES
[facility] Medicine Daily Progress Note    Date of Service  11/30/2024    Chief Complaint  Denisse Abel is a 64 y.o. female admitted 11/27/2024 with altered mental status and confusion    Hospital Course  Patient was brought to the hospital by EMS after she was able to call 911 but unable to give any history.  Paramedics found her disheveled, confused malnourished and brought her to the emergency room.  Patient with history of alcohol abuse and concern for possible Warnicke's encephalopathy versus acute alcohol withdrawal.  Patient on admission was unable to give any information but when she got up to the floor bedside nurse said that she was coherent and was doing better.  She denied any recent drinking but then was started on Librium and since that time has been very sedated.  I have discontinued the Librium and will continue with CIWA protocol if needed but she is not looking like acute alcohol withdrawal at this point.  Will continue with lactulose for her cirrhosis and ascites.  Potassium markedly low this morning and received IV potassium with a repeat level 3.0 and will continue with oral potassium supplementation.  I have also started the patient on aggressive IV thiamine for concern of Warnicke's.    Interval Problem Update  11/28 patient unable to be woken the first time I rounded on her and then when I approached her a couple hours later she was able to give one-word answers to questions.  She is denying pain but is very somnolent and is requesting a vanilla milkshake.  11/29 patient's mentation is markedly better today she was up to chair after being seen by physical and Occupational Therapy.  Skilled nursing placement was recommended given the patient's profound weakness and patient states that she wants to continue here in the hospital does not feel that she needs skilled nursing and that she has help at home.  Will continue with nutrition support through the weekend and see how she does to see if  she truly will need placement or not.  11/30 patient's mentation is not as good as it was yesterday.  She is somnolent and in bed at time of examination.  Discussed with bedside RN who will get patient up to chair like therapy did yesterday in effort to wake her up a little bit more.  Increase in creatinine today and patient appears dry, will give a 500 cc bolus and hold Lasix at this time.  Sodium slightly low likely related to overdiuresis, at 127.  Discussed with the patient's POA and cousin Nica yesterday and will give another update on Monday or if any major changes happen.    I have discussed this patient's plan of care and discharge plan at IDT rounds today with Case Management, Nursing, Nursing leadership, and other members of the IDT team.    Consultants/Specialty  none    Code Status  Full Code    Disposition  The patient is not medically cleared for discharge to home or a post-acute facility.  Anticipate discharge to: skilled nursing facility    I have placed the appropriate orders for post-discharge needs.    Review of Systems  Review of Systems   Constitutional:  Positive for malaise/fatigue. Negative for chills and fever.   HENT:  Negative for congestion and sore throat.    Eyes:  Negative for blurred vision and photophobia.   Respiratory:  Negative for cough and shortness of breath.    Cardiovascular:  Negative for chest pain, claudication and leg swelling.   Gastrointestinal:  Negative for abdominal pain, constipation, diarrhea, heartburn and vomiting.   Genitourinary:  Negative for dysuria and hematuria.   Musculoskeletal:  Negative for joint pain and myalgias.   Skin:  Negative for itching and rash.   Neurological:  Positive for weakness. Negative for dizziness, sensory change, speech change and headaches.   Psychiatric/Behavioral:  Negative for depression. The patient is not nervous/anxious and does not have insomnia.         Physical Exam  Temp:  [36.4 °C (97.5 °F)-37.1 °C (98.7 °F)] 36.4 °C  (97.6 °F)  Pulse:  [] 92  Resp:  [17-18] 18  BP: (113-147)/(57-81) 113/57  SpO2:  [94 %-100 %] 97 %    Physical Exam  Vitals and nursing note reviewed.   Constitutional:       General: She is not in acute distress.     Appearance: Normal appearance. She is not ill-appearing.   HENT:      Head: Normocephalic and atraumatic.      Nose: Nose normal.   Eyes:      General: No scleral icterus.  Cardiovascular:      Rate and Rhythm: Normal rate and regular rhythm.      Heart sounds: Normal heart sounds. No murmur heard.  Pulmonary:      Effort: Pulmonary effort is normal.      Breath sounds: Normal breath sounds.   Abdominal:      General: Bowel sounds are normal. There is no distension.      Palpations: Abdomen is soft.   Musculoskeletal:         General: No swelling or tenderness.      Cervical back: Neck supple.   Skin:     General: Skin is warm and dry.   Neurological:      General: No focal deficit present.      Mental Status: She is alert.      Comments: Patient somnolent again today   Psychiatric:         Mood and Affect: Mood normal.         Behavior: Behavior normal.         Fluids    Intake/Output Summary (Last 24 hours) at 11/30/2024 1307  Last data filed at 11/30/2024 0600  Gross per 24 hour   Intake 120 ml   Output 500 ml   Net -380 ml        Laboratory  Recent Labs     11/28/24 0242 11/29/24 0043 11/30/24  0103   WBC 7.0 7.7 10.9*   RBC 3.24* 3.92* 3.55*   HEMOGLOBIN 8.3* 10.1* 9.3*   HEMATOCRIT 25.5* 31.5* 27.7*   MCV 78.7* 80.4* 78.0*   MCH 25.6* 25.8* 26.2*   MCHC 32.5 32.1* 33.6   RDW 66.0* 66.4* 63.9*   PLATELETCT 203 234 246   MPV 10.2 10.6 10.5     Recent Labs     11/28/24  0242 11/28/24  0947 11/29/24 0043 11/30/24  0103   SODIUM 127*  --  130* 127*   POTASSIUM 2.4* 3.0* 3.5* 3.8   CHLORIDE 92*  --  95* 91*   CO2 19*  --  18* 19*   GLUCOSE 130*  --  118* 114*   BUN 67*  --  59* 57*   CREATININE 1.71*  --  1.66* 1.84*   CALCIUM 9.9  --  10.8* 10.4*                   Imaging  CT-ABDOMEN-PELVIS  W/O   Final Result      1.  Small umbilical hernia which contains small intestine. Small intestine is dilated proximal to that hernia and decompressed distally consistent with resultant small bowel obstruction.      2.  Again seen cirrhotic appearance of the liver with small metallic ascites within the abdomen and pelvis and splenomegaly consistent with portal hypertension.      3.  Gallstones within the gallbladder.      CT-HEAD W/O   Final Result      No evidence of acute intracranial process.               DX-CHEST-PORTABLE (1 VIEW)   Final Result      No evidence of acute cardiopulmonary process.           Assessment/Plan  * Alcoholic encephalopathy (HCC)- (present on admission)  Assessment & Plan  Patient came in encephalopathic but after administering care in the emergency room brought up to the floor she started showing improvement with her mental status.  Librium was given and likely resulted in worsening encephalopathy rather than improvement    Mental status improves when she gets helped up to the chair either with therapy or nursing.  Avoid sedative medications  CIWA protocol discontinued and she did not receive any doses of Ativan  Frequent orientation and intervention      CKD (chronic kidney disease) stage 4, GFR 15-29 ml/min (HCC)- (present on admission)  Assessment & Plan  Creatinine up to 1.84, she appears to be overly diuresed, stopped Lasix  Give 500 cc normal saline at 83    Esophageal varices without bleeding (HCC)- (present on admission)  Assessment & Plan  Continue with PPI therapy    Severe protein-calorie malnutrition (HCC)- (present on admission)  Assessment & Plan  Nutritional support    Anemia due to chronic kidney disease- (present on admission)  Assessment & Plan  Globin 9.3 today, no significant drop    Liver cirrhosis (HCC)- (present on admission)  Assessment & Plan  Chronic liver cirrhosis secondary to alcohol abuse  Monitor liver function tests  Optimize diuresis  Hold lactulose -  copious amounts of diarrhea  Monitor magnesium and phosphorus levels        Hypokalemia- (present on admission)  Assessment & Plan  Replete as needed, potassium today 3.8  Also replete magnesium at 1.6 today with 1 g magnesium sulfate    Chronic combined systolic and diastolic heart failure (HCC)- (present on admission)  Assessment & Plan  Monitor fluid status  Monitor daily weights  Most recent echocardiogram demonstrates moderate to severe aortic stenosis, ejection fraction fluctuates between 40 and 60  Slightly dehydrated today, give 500 cc bolus slowly, hold Lasix for now, continue Aldactone    Heart murmur- (present on admission)  Assessment & Plan  Chronic aortic stenosis  This is moderate to severe  Eventually the patient may need surgical intervention such as a TAVR.  Referred to outpatient cardiology    HTN (hypertension), benign- (present on admission)  Assessment & Plan  Optimize blood pressure management keep systolic blood pressure less than 140 diastolic under 90         VTE prophylaxis:    heparin ppx      I have performed a physical exam and reviewed and updated ROS and Plan today (11/30/2024). In review of yesterday's note (11/29/2024), there are no changes except as documented above.    My total time spent caring for the patient on the day of the encounter was 54 minutes.   This does not include time spent on separately billable procedures/tests.

## 2024-11-30 NOTE — CARE PLAN
The patient is Stable - Low risk of patient condition declining or worsening    Shift Goals  Clinical Goals: mentation, get stronger  Patient Goals: rest and comfort  Family Goals: MORGAN    Progress made toward(s) clinical / shift goals:    Problem: Pain - Standard  Goal: Alleviation of pain or a reduction in pain to the patient’s comfort goal  Outcome: Progressing  Note: Pt denies pain, rates 0/10. Pt educated on use of call light for any needed pain management.      Problem: Knowledge Deficit - Standard  Goal: Patient and family/care givers will demonstrate understanding of plan of care, disease process/condition, diagnostic tests and medications  Outcome: Progressing  Note: Pt updated on POC, agreeable. Monitoring mentation, pt is currently Aox4. Working with PT/OT. All questions and concerns addressed at this time.      Problem: Fall Risk  Goal: Patient will remain free from falls  Outcome: Progressing  Note: Pt is a high fall risk, fall precautions in place. Pt educated on use of call light for any needed assistance.

## 2024-12-01 LAB
ANION GAP SERPL CALC-SCNC: 15 MMOL/L (ref 7–16)
BUN SERPL-MCNC: 52 MG/DL (ref 8–22)
CALCIUM SERPL-MCNC: 10.1 MG/DL (ref 8.4–10.2)
CHLORIDE SERPL-SCNC: 89 MMOL/L (ref 96–112)
CO2 SERPL-SCNC: 20 MMOL/L (ref 20–33)
CREAT SERPL-MCNC: 1.56 MG/DL (ref 0.5–1.4)
ERYTHROCYTE [DISTWIDTH] IN BLOOD BY AUTOMATED COUNT: 63.8 FL (ref 35.9–50)
GFR SERPLBLD CREATININE-BSD FMLA CKD-EPI: 36 ML/MIN/1.73 M 2
GLUCOSE SERPL-MCNC: 120 MG/DL (ref 65–99)
HCT VFR BLD AUTO: 27.6 % (ref 37–47)
HGB BLD-MCNC: 9.3 G/DL (ref 12–16)
MAGNESIUM SERPL-MCNC: 1.8 MG/DL (ref 1.5–2.5)
MCH RBC QN AUTO: 26.2 PG (ref 27–33)
MCHC RBC AUTO-ENTMCNC: 33.7 G/DL (ref 32.2–35.5)
MCV RBC AUTO: 77.7 FL (ref 81.4–97.8)
PHOSPHATE SERPL-MCNC: 2 MG/DL (ref 2.5–4.5)
PLATELET # BLD AUTO: 247 K/UL (ref 164–446)
PMV BLD AUTO: 11 FL (ref 9–12.9)
POTASSIUM SERPL-SCNC: 3.7 MMOL/L (ref 3.6–5.5)
RBC # BLD AUTO: 3.55 M/UL (ref 4.2–5.4)
SODIUM SERPL-SCNC: 124 MMOL/L (ref 135–145)
WBC # BLD AUTO: 9 K/UL (ref 4.8–10.8)

## 2024-12-01 PROCEDURE — 82962 GLUCOSE BLOOD TEST: CPT | Mod: 91

## 2024-12-01 PROCEDURE — 700111 HCHG RX REV CODE 636 W/ 250 OVERRIDE (IP): Performed by: HOSPITALIST

## 2024-12-01 PROCEDURE — 770020 HCHG ROOM/CARE - TELE (206)

## 2024-12-01 PROCEDURE — 36415 COLL VENOUS BLD VENIPUNCTURE: CPT

## 2024-12-01 PROCEDURE — 83735 ASSAY OF MAGNESIUM: CPT

## 2024-12-01 PROCEDURE — 99233 SBSQ HOSP IP/OBS HIGH 50: CPT | Performed by: INTERNAL MEDICINE

## 2024-12-01 PROCEDURE — 700102 HCHG RX REV CODE 250 W/ 637 OVERRIDE(OP): Performed by: INTERNAL MEDICINE

## 2024-12-01 PROCEDURE — 80048 BASIC METABOLIC PNL TOTAL CA: CPT

## 2024-12-01 PROCEDURE — A9270 NON-COVERED ITEM OR SERVICE: HCPCS | Performed by: HOSPITALIST

## 2024-12-01 PROCEDURE — 85027 COMPLETE CBC AUTOMATED: CPT

## 2024-12-01 PROCEDURE — A9270 NON-COVERED ITEM OR SERVICE: HCPCS | Performed by: INTERNAL MEDICINE

## 2024-12-01 PROCEDURE — 84100 ASSAY OF PHOSPHORUS: CPT

## 2024-12-01 PROCEDURE — 700102 HCHG RX REV CODE 250 W/ 637 OVERRIDE(OP): Performed by: HOSPITALIST

## 2024-12-01 RX ADMIN — OXYCODONE 5 MG: 5 TABLET ORAL at 21:51

## 2024-12-01 RX ADMIN — HEPARIN SODIUM 5000 UNITS: 5000 INJECTION, SOLUTION INTRAVENOUS; SUBCUTANEOUS at 05:05

## 2024-12-01 RX ADMIN — SPIRONOLACTONE 100 MG: 50 TABLET ORAL at 05:01

## 2024-12-01 RX ADMIN — DIBASIC SODIUM PHOSPHATE, MONOBASIC POTASSIUM PHOSPHATE AND MONOBASIC SODIUM PHOSPHATE 250 MG: 852; 155; 130 TABLET ORAL at 09:55

## 2024-12-01 RX ADMIN — HEPARIN SODIUM 5000 UNITS: 5000 INJECTION, SOLUTION INTRAVENOUS; SUBCUTANEOUS at 21:46

## 2024-12-01 RX ADMIN — OMEPRAZOLE 40 MG: 20 CAPSULE, DELAYED RELEASE ORAL at 17:01

## 2024-12-01 RX ADMIN — HEPARIN SODIUM 5000 UNITS: 5000 INJECTION, SOLUTION INTRAVENOUS; SUBCUTANEOUS at 15:39

## 2024-12-01 RX ADMIN — DEXTROAMPHETAMINE SACCHARATE, AMPHETAMINE ASPARTATE, DEXTROAMPHETAMINE SULFATE, AMPHETAMINE SULFATE TABLETS, 10 MG,CLL 10 MG: 2.5; 2.5; 2.5; 2.5 TABLET ORAL at 05:01

## 2024-12-01 RX ADMIN — DIBASIC SODIUM PHOSPHATE, MONOBASIC POTASSIUM PHOSPHATE AND MONOBASIC SODIUM PHOSPHATE 250 MG: 852; 155; 130 TABLET ORAL at 17:00

## 2024-12-01 RX ADMIN — OMEPRAZOLE 40 MG: 20 CAPSULE, DELAYED RELEASE ORAL at 05:01

## 2024-12-01 RX ADMIN — OXYCODONE 5 MG: 5 TABLET ORAL at 16:05

## 2024-12-01 ASSESSMENT — ENCOUNTER SYMPTOMS
BLURRED VISION: 0
COUGH: 0
DEPRESSION: 0
HEADACHES: 0
CHILLS: 0
SORE THROAT: 0
NERVOUS/ANXIOUS: 0
CONSTIPATION: 0
ABDOMINAL PAIN: 0
SHORTNESS OF BREATH: 0
FEVER: 0
SPEECH CHANGE: 0
PHOTOPHOBIA: 0
DIARRHEA: 0
INSOMNIA: 0
MYALGIAS: 0
HEARTBURN: 0
DIZZINESS: 0
SENSORY CHANGE: 0
CLAUDICATION: 0
WEAKNESS: 1
VOMITING: 0

## 2024-12-01 ASSESSMENT — PAIN DESCRIPTION - PAIN TYPE
TYPE: ACUTE PAIN

## 2024-12-01 NOTE — PROGRESS NOTES
[facility] Medicine Daily Progress Note    Date of Service  12/1/2024    Chief Complaint  Denisse Abel is a 64 y.o. female admitted 11/27/2024 with altered mental status and confusion    Hospital Course  Patient was brought to the hospital by EMS after she was able to call 911 but unable to give any history.  Paramedics found her disheveled, confused malnourished and brought her to the emergency room.  Patient with history of alcohol abuse and concern for possible Warnicke's encephalopathy versus acute alcohol withdrawal.  Patient on admission was unable to give any information but when she got up to the floor bedside nurse said that she was coherent and was doing better.  She denied any recent drinking but then was started on Librium and since that time has been very sedated.  I have discontinued the Librium and will continue with CIWA protocol if needed but she is not looking like acute alcohol withdrawal at this point.  Will continue with lactulose for her cirrhosis and ascites.  Potassium markedly low this morning and received IV potassium with a repeat level 3.0 and will continue with oral potassium supplementation.  I have also started the patient on aggressive IV thiamine for concern of Warnicke's.    Interval Problem Update  11/28 patient unable to be woken the first time I rounded on her and then when I approached her a couple hours later she was able to give one-word answers to questions.  She is denying pain but is very somnolent and is requesting a vanilla milkshake.  11/29 patient's mentation is markedly better today she was up to chair after being seen by physical and Occupational Therapy.  Skilled nursing placement was recommended given the patient's profound weakness and patient states that she wants to continue here in the hospital does not feel that she needs skilled nursing and that she has help at home.  Will continue with nutrition support through the weekend and see how she does to see if  she truly will need placement or not.  11/30 patient's mentation is not as good as it was yesterday.  She is somnolent and in bed at time of examination.  Discussed with bedside RN who will get patient up to chair like therapy did yesterday in effort to wake her up a little bit more.  Increase in creatinine today and patient appears dry, will give a 500 cc bolus and hold Lasix at this time.  Sodium slightly low likely related to overdiuresis, at 127.  Discussed with the patient's POA and cousin Nica yesterday and will give another update on Monday or if any major changes happen.  12/1 patient's mentation markedly improved after receiving Adderall yesterday and again this morning.  MRI of the brain was done given her hypersomnolence yesterday which came back within normal limits other than senescent changes.  Patient is still markedly weak and her main complaint is she has not really recovered from her gastric ulcers that she had in March.  [facility] follows with [facility] should they prove needed on this hospital stay.  Will continue to work with physical and Occupational Therapy and patient likely will need skilled nursing prior to returning home as she lives alone.    I have discussed this patient's plan of care and discharge plan at IDT rounds today with Case Management, Nursing, Nursing leadership, and other members of the IDT team.    Consultants/Specialty  none    Code Status  Full Code    Disposition  The patient is not medically cleared for discharge to home or a post-acute facility.  Anticipate discharge to: skilled nursing facility    I have placed the appropriate orders for post-discharge needs.    Review of Systems  Review of Systems   Constitutional:  Positive for malaise/fatigue. Negative for chills and fever.   HENT:  Negative for congestion and sore throat.    Eyes:  Negative for blurred vision and photophobia.   Respiratory:  Negative for cough and shortness of breath.     Cardiovascular:  Negative for chest pain, claudication and leg swelling.   Gastrointestinal:  Negative for abdominal pain, constipation, diarrhea, heartburn and vomiting.   Genitourinary:  Negative for dysuria and hematuria.   Musculoskeletal:  Negative for joint pain and myalgias.   Skin:  Negative for itching and rash.   Neurological:  Positive for weakness. Negative for dizziness, sensory change, speech change and headaches.   Psychiatric/Behavioral:  Negative for depression. The patient is not nervous/anxious and does not have insomnia.         Physical Exam  Temp:  [36.2 °C (97.2 °F)-37 °C (98.6 °F)] 37 °C (98.6 °F)  Pulse:  [79-87] 87  Resp:  [16-18] 16  BP: (106-118)/(63-72) 116/72  SpO2:  [97 %-99 %] 98 %    Physical Exam  Vitals and nursing note reviewed.   Constitutional:       General: She is not in acute distress.     Appearance: Normal appearance. She is not ill-appearing.   HENT:      Head: Normocephalic and atraumatic.      Nose: Nose normal.   Eyes:      General: No scleral icterus.  Cardiovascular:      Rate and Rhythm: Normal rate and regular rhythm.      Heart sounds: Normal heart sounds. No murmur heard.  Pulmonary:      Effort: Pulmonary effort is normal.      Breath sounds: Normal breath sounds.   Abdominal:      General: Bowel sounds are normal. There is no distension.      Palpations: Abdomen is soft.   Musculoskeletal:         General: No swelling or tenderness.      Cervical back: Neck supple.   Skin:     General: Skin is warm and dry.   Neurological:      General: No focal deficit present.      Mental Status: She is alert and oriented to person, place, and time.   Psychiatric:         Mood and Affect: Mood normal.         Behavior: Behavior normal.         Fluids    Intake/Output Summary (Last 24 hours) at 12/1/2024 1206  Last data filed at 11/30/2024 2105  Gross per 24 hour   Intake --   Output 350 ml   Net -350 ml        Laboratory  Recent Labs     11/29/24  0043 11/30/24  0103  12/01/24 0227   WBC 7.7 10.9* 9.0   RBC 3.92* 3.55* 3.55*   HEMOGLOBIN 10.1* 9.3* 9.3*   HEMATOCRIT 31.5* 27.7* 27.6*   MCV 80.4* 78.0* 77.7*   MCH 25.8* 26.2* 26.2*   MCHC 32.1* 33.6 33.7   RDW 66.4* 63.9* 63.8*   PLATELETCT 234 246 247   MPV 10.6 10.5 11.0     Recent Labs     11/29/24  0043 11/30/24  0103 12/01/24 0227   SODIUM 130* 127* 124*   POTASSIUM 3.5* 3.8 3.7   CHLORIDE 95* 91* 89*   CO2 18* 19* 20   GLUCOSE 118* 114* 120*   BUN 59* 57* 52*   CREATININE 1.66* 1.84* 1.56*   CALCIUM 10.8* 10.4* 10.1                   Imaging  MR-BRAIN-W/O   Final Result      1.  No evidence of acute territorial infarct, intracranial hemorrhage or mass lesion.   2.  Mild diffuse cerebral and cerebellar substance loss.   3.  Mild microangiopathic ischemic change versus demyelination or gliosis.      CT-ABDOMEN-PELVIS W/O   Final Result      1.  Small umbilical hernia which contains small intestine. Small intestine is dilated proximal to that hernia and decompressed distally consistent with resultant small bowel obstruction.      2.  Again seen cirrhotic appearance of the liver with small metallic ascites within the abdomen and pelvis and splenomegaly consistent with portal hypertension.      3.  Gallstones within the gallbladder.      CT-HEAD W/O   Final Result      No evidence of acute intracranial process.               DX-CHEST-PORTABLE (1 VIEW)   Final Result      No evidence of acute cardiopulmonary process.           Assessment/Plan  * Alcoholic encephalopathy (HCC)- (present on admission)  Assessment & Plan  Patient came in encephalopathic but after administering care in the emergency room brought up to the floor she started showing improvement with her mental status.  Librium was given and likely resulted in worsening encephalopathy rather than improvement    Mental status improves when she gets helped up to the chair either with therapy or nursing.  Avoid sedative medications  CIWA protocol discontinued and she did  not receive any doses of Ativan  Patient responded very well to Adderall, awake and conversant  MRI of the brain within normal limits other than changes related to age      CKD (chronic kidney disease) stage 4, GFR 15-29 ml/min (HCC)- (present on admission)  Assessment & Plan  Creatinine up to 1.84, she appears to be overly diuresed, stopped Lasix  Give 500 cc normal saline at 83    Esophageal varices without bleeding (HCC)- (present on admission)  Assessment & Plan  Continue with PPI therapy    Severe protein-calorie malnutrition (HCC)- (present on admission)  Assessment & Plan  Nutritional support    Anemia due to chronic kidney disease- (present on admission)  Assessment & Plan  hemoGlobin 9.3 today, no significant drop    Liver cirrhosis (HCC)- (present on admission)  Assessment & Plan  Chronic liver cirrhosis secondary to alcohol abuse  Monitor liver function tests  Optimize diuresis  Hold lactulose - copious amounts of diarrhea  Monitor magnesium and phosphorus levels        Hypokalemia- (present on admission)  Assessment & Plan  Replete as needed, potassium today 3.7      Chronic combined systolic and diastolic heart failure (HCC)- (present on admission)  Assessment & Plan  Monitor fluid status  Monitor daily weights  Most recent echocardiogram demonstrates moderate to severe aortic stenosis, ejection fraction fluctuates between 40 and 60  hold Lasix for now, continue Aldactone    Heart murmur- (present on admission)  Assessment & Plan  Chronic aortic stenosis  This is moderate to severe  Eventually the patient may need surgical intervention such as a TAVR.  Referred to outpatient cardiology    HTN (hypertension), benign- (present on admission)  Assessment & Plan  Optimize blood pressure management keep systolic blood pressure less than 140 diastolic under 90         VTE prophylaxis:    heparin ppx      I have performed a physical exam and reviewed and updated ROS and Plan today (12/1/2024). In review of  yesterday's note (11/30/2024), there are no changes except as documented above.    My total time spent caring for the patient on the day of the encounter was 52 minutes.   This does not include time spent on separately billable procedures/tests.

## 2024-12-01 NOTE — PROGRESS NOTES
Telemetry summary    Rhythm: SR  Rate: 78-93  Ectopy: 0  Measurements: 0.16/0.04/0.34    Normal Values  Rhythm: SR  HR Range:   Measurement: 0.12-0.2/0.06-0.10/0.30-0.52

## 2024-12-01 NOTE — CARE PLAN
The patient is Stable - Low risk of patient condition declining or worsening    Shift Goals  Clinical Goals: Feel better, rest, pain management  Patient Goals: Rest, pain management  Family Goals: MORGAN    Progress made toward(s) clinical / shift goals:        Problem: Pain - Standard  Goal: Alleviation of pain or a reduction in pain to the patient’s comfort goal  Outcome: Progressing  Flowsheets (Taken 12/1/2024 7926)  Non Verbal Scale: Calm  OB Pain Intervention:   Rest   Repositioned  Pain Rating Scale (NPRS): 2  Note: 1. Document pain using the appropriate pain scale per order or unit policy 2. Educate and implement non-pharmacologic comfort measures (i.e. relaxation, distraction, massage, cold/heat therapy, etc.) 3. Pain management medications as ordered 4. Reassess pain after pain med administration per policy 5. If opiods administered assess patient's response to pain medication is appropriate per POSS sedation scale      Problem: Fall Risk  Goal: Patient will remain free from falls  Outcome: Progressing  Note:   1.  Assess for fall risk factors 2.  Implement fall precautions    Bed alarm set, non slip socks, hourly rounding, PT/OT involved              Patient is not progressing towards the following goals:  NA

## 2024-12-01 NOTE — PROGRESS NOTES
Telemetry Shift Summary     Rhythm: SR/ST  Rate:   Measurements: .16/.08/.38  Ectopy (reported by Monitor Tech): rPAC/PVC     Normal Values  Rhythm: Sinus  HR:   Measurements: 0.12-0.20/0.06-0.10/0.30-0.52

## 2024-12-01 NOTE — CARE PLAN
The patient is Stable - Low risk of patient condition declining or worsening    Shift Goals  Clinical Goals: mentation, increase strength  Patient Goals: comfort and rest  Family Goals: MORGAN    Progress made toward(s) clinical / shift goals:    Problem: Pain - Standard  Goal: Alleviation of pain or a reduction in pain to the patient’s comfort goal  Outcome: Progressing     Problem: Knowledge Deficit - Standard  Goal: Patient and family/care givers will demonstrate understanding of plan of care, disease process/condition, diagnostic tests and medications  Outcome: Progressing  Note: Pt updated on POC, agreeable. Monitoring mentation and increasing strength. All questions and concerns addressed at this time.      Problem: Fall Risk  Goal: Patient will remain free from falls  Outcome: Progressing  Note: Pt is a high fall risk, fall precautions in place. Pt educated on use of call light for any needed assistance.      Problem: Skin Integrity  Goal: Skin integrity is maintained or improved  Outcome: Progressing

## 2024-12-02 LAB
ANION GAP SERPL CALC-SCNC: 14 MMOL/L (ref 7–16)
BACTERIA BLD CULT: NORMAL
BACTERIA BLD CULT: NORMAL
BUN SERPL-MCNC: 49 MG/DL (ref 8–22)
CALCIUM SERPL-MCNC: 10 MG/DL (ref 8.4–10.2)
CHLORIDE SERPL-SCNC: 92 MMOL/L (ref 96–112)
CO2 SERPL-SCNC: 21 MMOL/L (ref 20–33)
CREAT SERPL-MCNC: 1.55 MG/DL (ref 0.5–1.4)
ERYTHROCYTE [DISTWIDTH] IN BLOOD BY AUTOMATED COUNT: 64.7 FL (ref 35.9–50)
GFR SERPLBLD CREATININE-BSD FMLA CKD-EPI: 37 ML/MIN/1.73 M 2
GLUCOSE BLD STRIP.AUTO-MCNC: 116 MG/DL (ref 65–99)
GLUCOSE BLD STRIP.AUTO-MCNC: 120 MG/DL (ref 65–99)
GLUCOSE BLD STRIP.AUTO-MCNC: 122 MG/DL (ref 65–99)
GLUCOSE BLD STRIP.AUTO-MCNC: 125 MG/DL (ref 65–99)
GLUCOSE BLD STRIP.AUTO-MCNC: 125 MG/DL (ref 65–99)
GLUCOSE BLD STRIP.AUTO-MCNC: 127 MG/DL (ref 65–99)
GLUCOSE BLD STRIP.AUTO-MCNC: 127 MG/DL (ref 65–99)
GLUCOSE BLD STRIP.AUTO-MCNC: 130 MG/DL (ref 65–99)
GLUCOSE BLD STRIP.AUTO-MCNC: 131 MG/DL (ref 65–99)
GLUCOSE BLD STRIP.AUTO-MCNC: 139 MG/DL (ref 65–99)
GLUCOSE BLD STRIP.AUTO-MCNC: 141 MG/DL (ref 65–99)
GLUCOSE BLD STRIP.AUTO-MCNC: 141 MG/DL (ref 65–99)
GLUCOSE BLD STRIP.AUTO-MCNC: 159 MG/DL (ref 65–99)
GLUCOSE SERPL-MCNC: 113 MG/DL (ref 65–99)
HCT VFR BLD AUTO: 27.3 % (ref 37–47)
HGB BLD-MCNC: 9.1 G/DL (ref 12–16)
MAGNESIUM SERPL-MCNC: 2 MG/DL (ref 1.5–2.5)
MCH RBC QN AUTO: 26.1 PG (ref 27–33)
MCHC RBC AUTO-ENTMCNC: 33.3 G/DL (ref 32.2–35.5)
MCV RBC AUTO: 78.2 FL (ref 81.4–97.8)
PHOSPHATE SERPL-MCNC: 3 MG/DL (ref 2.5–4.5)
PLATELET # BLD AUTO: 240 K/UL (ref 164–446)
PMV BLD AUTO: 11.2 FL (ref 9–12.9)
POTASSIUM SERPL-SCNC: 4.2 MMOL/L (ref 3.6–5.5)
RBC # BLD AUTO: 3.49 M/UL (ref 4.2–5.4)
SIGNIFICANT IND 70042: NORMAL
SIGNIFICANT IND 70042: NORMAL
SITE SITE: NORMAL
SITE SITE: NORMAL
SODIUM SERPL-SCNC: 127 MMOL/L (ref 135–145)
SOURCE SOURCE: NORMAL
SOURCE SOURCE: NORMAL
WBC # BLD AUTO: 11.3 K/UL (ref 4.8–10.8)

## 2024-12-02 PROCEDURE — 83735 ASSAY OF MAGNESIUM: CPT

## 2024-12-02 PROCEDURE — 92610 EVALUATE SWALLOWING FUNCTION: CPT

## 2024-12-02 PROCEDURE — 94760 N-INVAS EAR/PLS OXIMETRY 1: CPT

## 2024-12-02 PROCEDURE — 770020 HCHG ROOM/CARE - TELE (206)

## 2024-12-02 PROCEDURE — 700102 HCHG RX REV CODE 250 W/ 637 OVERRIDE(OP): Performed by: HOSPITALIST

## 2024-12-02 PROCEDURE — 97602 WOUND(S) CARE NON-SELECTIVE: CPT

## 2024-12-02 PROCEDURE — A9270 NON-COVERED ITEM OR SERVICE: HCPCS | Performed by: HOSPITALIST

## 2024-12-02 PROCEDURE — 99233 SBSQ HOSP IP/OBS HIGH 50: CPT | Performed by: INTERNAL MEDICINE

## 2024-12-02 PROCEDURE — 82962 GLUCOSE BLOOD TEST: CPT

## 2024-12-02 PROCEDURE — 700111 HCHG RX REV CODE 636 W/ 250 OVERRIDE (IP): Performed by: HOSPITALIST

## 2024-12-02 PROCEDURE — A9270 NON-COVERED ITEM OR SERVICE: HCPCS | Performed by: INTERNAL MEDICINE

## 2024-12-02 PROCEDURE — 84100 ASSAY OF PHOSPHORUS: CPT

## 2024-12-02 PROCEDURE — 36415 COLL VENOUS BLD VENIPUNCTURE: CPT

## 2024-12-02 PROCEDURE — 700102 HCHG RX REV CODE 250 W/ 637 OVERRIDE(OP): Performed by: INTERNAL MEDICINE

## 2024-12-02 PROCEDURE — 80048 BASIC METABOLIC PNL TOTAL CA: CPT

## 2024-12-02 PROCEDURE — 85027 COMPLETE CBC AUTOMATED: CPT

## 2024-12-02 RX ORDER — GABAPENTIN 100 MG/1
100 CAPSULE ORAL 3 TIMES DAILY
Status: DISCONTINUED | OUTPATIENT
Start: 2024-12-02 | End: 2024-12-06

## 2024-12-02 RX ORDER — NYSTATIN 100000 [USP'U]/ML
5 SUSPENSION ORAL 4 TIMES DAILY
Status: DISCONTINUED | OUTPATIENT
Start: 2024-12-02 | End: 2024-12-09

## 2024-12-02 RX ADMIN — NYSTATIN 500000 UNITS: 100000 SUSPENSION ORAL at 17:25

## 2024-12-02 RX ADMIN — GABAPENTIN 100 MG: 100 CAPSULE ORAL at 17:27

## 2024-12-02 RX ADMIN — NYSTATIN 500000 UNITS: 100000 SUSPENSION ORAL at 22:06

## 2024-12-02 RX ADMIN — DEXTROAMPHETAMINE SACCHARATE, AMPHETAMINE ASPARTATE, DEXTROAMPHETAMINE SULFATE, AMPHETAMINE SULFATE TABLETS, 10 MG,CLL 10 MG: 2.5; 2.5; 2.5; 2.5 TABLET ORAL at 06:28

## 2024-12-02 RX ADMIN — OXYCODONE 5 MG: 5 TABLET ORAL at 11:40

## 2024-12-02 RX ADMIN — HEPARIN SODIUM 5000 UNITS: 5000 INJECTION, SOLUTION INTRAVENOUS; SUBCUTANEOUS at 14:56

## 2024-12-02 RX ADMIN — OXYCODONE 5 MG: 5 TABLET ORAL at 17:41

## 2024-12-02 RX ADMIN — SPIRONOLACTONE 100 MG: 50 TABLET ORAL at 06:28

## 2024-12-02 RX ADMIN — DIBASIC SODIUM PHOSPHATE, MONOBASIC POTASSIUM PHOSPHATE AND MONOBASIC SODIUM PHOSPHATE 250 MG: 852; 155; 130 TABLET ORAL at 17:24

## 2024-12-02 RX ADMIN — GABAPENTIN 100 MG: 100 CAPSULE ORAL at 14:56

## 2024-12-02 RX ADMIN — HEPARIN SODIUM 5000 UNITS: 5000 INJECTION, SOLUTION INTRAVENOUS; SUBCUTANEOUS at 06:28

## 2024-12-02 RX ADMIN — OMEPRAZOLE 40 MG: 20 CAPSULE, DELAYED RELEASE ORAL at 06:28

## 2024-12-02 RX ADMIN — OMEPRAZOLE 40 MG: 20 CAPSULE, DELAYED RELEASE ORAL at 17:25

## 2024-12-02 RX ADMIN — DIBASIC SODIUM PHOSPHATE, MONOBASIC POTASSIUM PHOSPHATE AND MONOBASIC SODIUM PHOSPHATE 250 MG: 852; 155; 130 TABLET ORAL at 06:28

## 2024-12-02 RX ADMIN — HEPARIN SODIUM 5000 UNITS: 5000 INJECTION, SOLUTION INTRAVENOUS; SUBCUTANEOUS at 22:06

## 2024-12-02 ASSESSMENT — ENCOUNTER SYMPTOMS
ABDOMINAL PAIN: 0
NERVOUS/ANXIOUS: 0
DIZZINESS: 0
DEPRESSION: 0
SPEECH CHANGE: 0
CONSTIPATION: 0
WEAKNESS: 1
HEADACHES: 0
BLURRED VISION: 0
VOMITING: 0
CLAUDICATION: 0
DIARRHEA: 0
HEARTBURN: 0
MYALGIAS: 1
FEVER: 0
COUGH: 0
SENSORY CHANGE: 0
INSOMNIA: 0
SORE THROAT: 0
CHILLS: 0
PHOTOPHOBIA: 0
SHORTNESS OF BREATH: 0

## 2024-12-02 ASSESSMENT — PAIN DESCRIPTION - PAIN TYPE
TYPE: ACUTE PAIN

## 2024-12-02 NOTE — ASSESSMENT & PLAN NOTE
Sodium 127, from 127, 124, 127  Patient may be receiving too much free water.  I am placing free water restriction of 500 mL/day, total fluid restriction of 1500 mL/day.  Sodium 131

## 2024-12-02 NOTE — PROGRESS NOTES
Telemetry Shift Summary     Rhythm SR/ST  HR Range   Ectopy roPVC  Measurements  0.18/0.08/0.34     Normal Values  Rhythm SR  HR Range    Measurements 0.12-0.20 / 0.06-0.10  / 0.30-0.52

## 2024-12-02 NOTE — CARE PLAN
The patient is Stable - Low risk of patient condition declining or worsening    Shift Goals  Clinical Goals: Pain management, monitor labs and mentation  Patient Goals: Rest, pain management  Family Goals: MORGAN    Progress made toward(s) clinical / shift goals:      Problem: Pain - Standard  Goal: Alleviation of pain or a reduction in pain to the patient’s comfort goal  Outcome: Progressing     Problem: Knowledge Deficit - Standard  Goal: Patient and family/care givers will demonstrate understanding of plan of care, disease process/condition, diagnostic tests and medications  Outcome: Progressing     Problem: Fall Risk  Goal: Patient will remain free from falls  Outcome: Progressing     Problem: Skin Integrity  Goal: Skin integrity is maintained or improved  Outcome: Progressing       Patient is not progressing towards the following goals:

## 2024-12-02 NOTE — DIETARY
Nutrition Update: Follow-up for PO intake  Day 5 of admit.  Denisse Abel is a 64 y.o. female with admitting DX of Alcoholic encephalopathy (HCC) [G31.2].    Current Diet: consistent CHO (diabetic) diet w/ Glucerna TID and snacks BID. Recorded PO intake of meals has been poor at < 25%.     RD visited pt in her room. She does not know why her appetite is poor. Per CNA, she is now not able to feed herself or walk to the bathroom. She does better with fluids because she is able to drink out of a straw. She is drinking some of the Glucerna supplements. She is not eating her snacks. She thought she would drink Chobani smoothies instead of current snacks. RD encouraged PO of protein foods, especially the Glucerna supplements and Chobani Smoothies. She expressed understanding.    Labs:   12/2/24: phos=3, mag=2, potassium=4.2    Meds: phosphorus    Malnutrition risk: RD agrees with MD dx of Severe Malnutrition in context of Chronic Illness related to ETOH abuse as evidenced by severe muscle and fat loss noted.  This is a dx by provider Dr Chen.     Inadequate Oral Intake related to poor appetite due to ETOH abuse as evidenced by wt loss.     Nutrition Dx Status: Ongoing    Problem: Nutritional:  Goal: Achieve adequate nutritional intake  Description: Patient will consume >50% of meals, snacks, supplements.  Outcome: not progressing    RD following.

## 2024-12-02 NOTE — PROGRESS NOTES
[facility] Medicine Daily Progress Note    Date of Service  12/2/2024    Chief Complaint  Denisse Abel is a 64 y.o. female admitted 11/27/2024 with altered mental status and confusion    Hospital Course  Patient was brought to the hospital by EMS after she was able to call 911 but unable to give any history.  Paramedics found her disheveled, confused malnourished and brought her to the emergency room.  Patient with history of alcohol abuse and concern for possible Warnicke's encephalopathy versus acute alcohol withdrawal.  Patient on admission was unable to give any information but when she got up to the floor bedside nurse said that she was coherent and was doing better.  She denied any recent drinking but then was started on Librium and since that time has been very sedated.  I have discontinued the Librium and will continue with CIWA protocol if needed but she is not looking like acute alcohol withdrawal at this point.  Will continue with lactulose for her cirrhosis and ascites.  Potassium markedly low this morning and received IV potassium with a repeat level 3.0 and will continue with oral potassium supplementation.  I have also started the patient on aggressive IV thiamine for concern of Warnicke's.    Interval Problem Update  11/28 patient unable to be woken the first time I rounded on her and then when I approached her a couple hours later she was able to give one-word answers to questions.  She is denying pain but is very somnolent and is requesting a vanilla milkshake.  11/29 patient's mentation is markedly better today she was up to chair after being seen by physical and Occupational Therapy.  Skilled nursing placement was recommended given the patient's profound weakness and patient states that she wants to continue here in the hospital does not feel that she needs skilled nursing and that she has help at home.  Will continue with nutrition support through the weekend and see how she does to see if  she truly will need placement or not.  11/30 patient's mentation is not as good as it was yesterday.  She is somnolent and in bed at time of examination.  Discussed with bedside RN who will get patient up to chair like therapy did yesterday in effort to wake her up a little bit more.  Increase in creatinine today and patient appears dry, will give a 500 cc bolus and hold Lasix at this time.  Sodium slightly low likely related to overdiuresis, at 127.  Discussed with the patient's POA and cousin Nica yesterday and will give another update on Monday or if any major changes happen.  12/1 patient's mentation markedly improved after receiving Adderall yesterday and again this morning.  MRI of the brain was done given her hypersomnolence yesterday which came back within normal limits other than senescent changes.  Patient is still markedly weak and her main complaint is she has not really recovered from her gastric ulcers that she had in March.  [facility] should they prove needed on this hospital stay.  Will continue to work with physical and Occupational Therapy and patient likely will need skilled nursing prior to returning home as she lives alone.  12/2 no significant change today.  Patient is still awake in the morning secondary to the Adderall which does seem to be helping.  She is not complaining of abdominal pain and remains on Prilosec.  She is not showing signs of volume overload and likely can resume her home dose of oral Lasix tomorrow.  I started her on low-dose gabapentin to see if this can help with her leg pain.  Once sodium has stabilized she should be able to transition to skilled nursing.    I have discussed this patient's plan of care and discharge plan at IDT rounds today with Case Management, Nursing, Nursing leadership, and other members of the IDT team.    Consultants/Specialty  none    Code Status  Full Code    Disposition  The patient is not medically cleared  for discharge to home or a post-acute facility.  Anticipate discharge to: skilled nursing facility    I have placed the appropriate orders for post-discharge needs.    Review of Systems  Review of Systems   Constitutional:  Positive for malaise/fatigue. Negative for chills and fever.   HENT:  Negative for congestion and sore throat.    Eyes:  Negative for blurred vision and photophobia.   Respiratory:  Negative for cough and shortness of breath.    Cardiovascular:  Negative for chest pain, claudication and leg swelling.   Gastrointestinal:  Negative for abdominal pain, constipation, diarrhea, heartburn and vomiting.   Genitourinary:  Negative for dysuria and hematuria.   Musculoskeletal:  Positive for myalgias (Bilateral leg pain). Negative for joint pain.   Skin:  Negative for itching and rash.   Neurological:  Positive for weakness. Negative for dizziness, sensory change, speech change and headaches.   Psychiatric/Behavioral:  Negative for depression. The patient is not nervous/anxious and does not have insomnia.         Physical Exam  Temp:  [36.7 °C (98.1 °F)-37.2 °C (98.9 °F)] 36.9 °C (98.5 °F)  Pulse:  [] 109  Resp:  [16] 16  BP: (106-123)/(61-68) 106/62  SpO2:  [95 %-99 %] 96 %    Physical Exam  Vitals and nursing note reviewed.   Constitutional:       General: She is not in acute distress.     Appearance: Normal appearance. She is not ill-appearing.   HENT:      Head: Normocephalic and atraumatic.      Nose: Nose normal.   Eyes:      General: No scleral icterus.  Cardiovascular:      Rate and Rhythm: Normal rate and regular rhythm.      Heart sounds: Normal heart sounds. No murmur heard.  Pulmonary:      Effort: Pulmonary effort is normal.      Breath sounds: Normal breath sounds.   Abdominal:      General: Bowel sounds are normal. There is no distension.      Palpations: Abdomen is soft.      Tenderness: There is no guarding.   Musculoskeletal:         General: No swelling or tenderness.      Cervical  back: Neck supple.   Skin:     General: Skin is warm and dry.      Findings: No bruising, erythema, lesion or rash.   Neurological:      General: No focal deficit present.      Mental Status: She is alert and oriented to person, place, and time.   Psychiatric:         Mood and Affect: Mood normal.         Behavior: Behavior normal.         Fluids    Intake/Output Summary (Last 24 hours) at 12/2/2024 1332  Last data filed at 12/2/2024 1326  Gross per 24 hour   Intake 150 ml   Output 900 ml   Net -750 ml        Laboratory  Recent Labs     11/30/24  0103 12/01/24  0227 12/02/24  0119   WBC 10.9* 9.0 11.3*   RBC 3.55* 3.55* 3.49*   HEMOGLOBIN 9.3* 9.3* 9.1*   HEMATOCRIT 27.7* 27.6* 27.3*   MCV 78.0* 77.7* 78.2*   MCH 26.2* 26.2* 26.1*   MCHC 33.6 33.7 33.3   RDW 63.9* 63.8* 64.7*   PLATELETCT 246 247 240   MPV 10.5 11.0 11.2     Recent Labs     11/30/24  0103 12/01/24  0227 12/02/24  0119   SODIUM 127* 124* 127*   POTASSIUM 3.8 3.7 4.2   CHLORIDE 91* 89* 92*   CO2 19* 20 21   GLUCOSE 114* 120* 113*   BUN 57* 52* 49*   CREATININE 1.84* 1.56* 1.55*   CALCIUM 10.4* 10.1 10.0                   Imaging  MR-BRAIN-W/O   Final Result      1.  No evidence of acute territorial infarct, intracranial hemorrhage or mass lesion.   2.  Mild diffuse cerebral and cerebellar substance loss.   3.  Mild microangiopathic ischemic change versus demyelination or gliosis.      CT-ABDOMEN-PELVIS W/O   Final Result      1.  Small umbilical hernia which contains small intestine. Small intestine is dilated proximal to that hernia and decompressed distally consistent with resultant small bowel obstruction.      2.  Again seen cirrhotic appearance of the liver with small metallic ascites within the abdomen and pelvis and splenomegaly consistent with portal hypertension.      3.  Gallstones within the gallbladder.      CT-HEAD W/O   Final Result      No evidence of acute intracranial process.               DX-CHEST-PORTABLE (1 VIEW)   Final Result       No evidence of acute cardiopulmonary process.           Assessment/Plan  * Alcoholic encephalopathy (HCC)- (present on admission)  Assessment & Plan  Patient came in encephalopathic but after administering care in the emergency room brought up to the floor she started showing improvement with her mental status.  Librium was given and likely resulted in worsening encephalopathy rather than improvement    Mental status improves when she gets helped up to the chair either with therapy or nursing.  Avoid sedative medications  CIWA protocol discontinued and she did not receive any doses of Ativan  Patient responded very well to Adderall, awake and conversant  MRI of the brain within normal limits other than changes related to age      CKD (chronic kidney disease) stage 4, GFR 15-29 ml/min (HCC)- (present on admission)  Assessment & Plan  Creatinine back to baseline at 1.55    Esophageal varices without bleeding (HCC)- (present on admission)  Assessment & Plan  Continue with PPI therapy    Severe protein-calorie malnutrition (HCC)- (present on admission)  Assessment & Plan  Nutritional support    Anemia due to chronic kidney disease- (present on admission)  Assessment & Plan  hemoGlobin 9.1 today, no significant drop    Liver cirrhosis (HCC)- (present on admission)  Assessment & Plan  Chronic liver cirrhosis secondary to alcohol abuse  Monitor liver function tests  Optimize diuresis  Hold lactulose - copious amounts of diarrhea  Monitor magnesium and phosphorus levels        Hypokalemia- (present on admission)  Assessment & Plan  Replete as needed, potassium today 3.7      Hyponatremia- (present on admission)  Assessment & Plan  Much variability between day-to-day labs, sodium is trending up back to 127.  Continue to monitor daily trend, doubt this is contributing to her decreased mental status    Chronic combined systolic and diastolic heart failure (HCC)- (present on admission)  Assessment & Plan  Monitor fluid  status  Monitor daily weights  Most recent echocardiogram demonstrates moderate to severe aortic stenosis, ejection fraction fluctuates between 40 and 60  hold Lasix for now, continue Aldactone    Heart murmur- (present on admission)  Assessment & Plan  Chronic aortic stenosis  This is moderate to severe  Eventually the patient may need surgical intervention such as a TAVR.  Referred to outpatient cardiology    HTN (hypertension), benign- (present on admission)  Assessment & Plan  Optimize blood pressure management keep systolic blood pressure less than 140 diastolic under 90         VTE prophylaxis:    heparin ppx      I have performed a physical exam and reviewed and updated ROS and Plan today (12/2/2024). In review of yesterday's note (12/1/2024), there are no changes except as documented above.    My total time spent caring for the patient on the day of the encounter was 51 minutes.   This does not include time spent on separately billable procedures/tests.

## 2024-12-02 NOTE — CARE PLAN
The patient is Stable - Low risk of patient condition declining or worsening    Shift Goals  Clinical Goals: Pain management, PO intake, mentation  Patient Goals: Comfort, rest  Family Goals: MORGAN    Progress made toward(s) clinical / shift goals:      Problem: Knowledge Deficit - Standard  Goal: Patient and family/care givers will demonstrate understanding of plan of care, disease process/condition, diagnostic tests and medications  Outcome: Progressing     Problem: Fall Risk  Goal: Patient will remain free from falls  Outcome: Progressing     Problem: Skin Integrity  Goal: Skin integrity is maintained or improved  Outcome: Progressing  Note: Barrier cream and [id number] in effect.        Patient is not progressing towards the following goals:      Problem: Pain - Standard  Goal: Alleviation of pain or a reduction in pain to the patient’s comfort goal  Outcome: Not Progressing  Flowsheets (Taken 12/2/2024 1005)  Non Verbal Scale: (with movement)   Moaning   Grimacing  Note: Patient endorsing pain to BLE, mainly to knees. Right side worse pain than left side.

## 2024-12-02 NOTE — DISCHARGE PLANNING
Case Management Discharge Planning    Admission Date: 11/27/2024  GMLOS: 6  ALOS: 5    6-Clicks ADL Score: 14  6-Clicks Mobility Score: 10    Anticipated Discharge Dispo: Discharge Disposition: D/T to home under A care in anticipation of covered skilled care (06)  Discharge Address: [id number] [place], [place] [address] [place] [address]    Action(s) Taken: Choice obtained    I spoke to patient at bedside. Introduced myself and my role. We discussed physical therapy's evaluation and recommendation and the SNFs that have accepted her referral. Patient was hesitant about placement after discussing what a stay there looks like. She then shared with me that she has a sister-in-law that will be staying with her for two weeks when she discharges. We discussed home health and she said she would prefer this over placement. I attempted having patient sign the  choice form, but she was too weak and opted for a verbal instead. I had a conversation with patient about safety and the benefit of some therapy before going home. I left the [facility] choice form with patient at bedside. I told her to think about placement and let us know if she changes her mind. Patient verbalized understanding. As of now, she wants home health. Verbal choice obtained for [facility] (1), [facility] (2), [facility] (3), and [facility] (4). Choice form faxed to [facility].

## 2024-12-03 ENCOUNTER — APPOINTMENT (OUTPATIENT)
Dept: RADIOLOGY | Facility: MEDICAL CENTER | Age: 64
DRG: 056 | End: 2024-12-03
Attending: INTERNAL MEDICINE
Payer: MEDICARE

## 2024-12-03 PROBLEM — G40.109 PARTIAL SEIZURE DISORDER (HCC): Status: ACTIVE | Noted: 2024-12-03

## 2024-12-03 PROBLEM — N30.90 CYSTITIS: Status: ACTIVE | Noted: 2024-12-03

## 2024-12-03 LAB
ANION GAP SERPL CALC-SCNC: 14 MMOL/L (ref 7–16)
BUN SERPL-MCNC: 45 MG/DL (ref 8–22)
CALCIUM SERPL-MCNC: 9.9 MG/DL (ref 8.4–10.2)
CHLORIDE SERPL-SCNC: 91 MMOL/L (ref 96–112)
CO2 SERPL-SCNC: 22 MMOL/L (ref 20–33)
CREAT SERPL-MCNC: 1.33 MG/DL (ref 0.5–1.4)
ERYTHROCYTE [DISTWIDTH] IN BLOOD BY AUTOMATED COUNT: 64.8 FL (ref 35.9–50)
GFR SERPLBLD CREATININE-BSD FMLA CKD-EPI: 44 ML/MIN/1.73 M 2
GLUCOSE BLD STRIP.AUTO-MCNC: 113 MG/DL (ref 65–99)
GLUCOSE BLD STRIP.AUTO-MCNC: 129 MG/DL (ref 65–99)
GLUCOSE BLD STRIP.AUTO-MCNC: 144 MG/DL (ref 65–99)
GLUCOSE BLD STRIP.AUTO-MCNC: 151 MG/DL (ref 65–99)
GLUCOSE SERPL-MCNC: 121 MG/DL (ref 65–99)
HCT VFR BLD AUTO: 27.8 % (ref 37–47)
HGB BLD-MCNC: 9.2 G/DL (ref 12–16)
MCH RBC QN AUTO: 26.1 PG (ref 27–33)
MCHC RBC AUTO-ENTMCNC: 33.1 G/DL (ref 32.2–35.5)
MCV RBC AUTO: 78.8 FL (ref 81.4–97.8)
PLATELET # BLD AUTO: 229 K/UL (ref 164–446)
PMV BLD AUTO: 10.7 FL (ref 9–12.9)
POTASSIUM SERPL-SCNC: 4.3 MMOL/L (ref 3.6–5.5)
RBC # BLD AUTO: 3.53 M/UL (ref 4.2–5.4)
SODIUM SERPL-SCNC: 127 MMOL/L (ref 135–145)
WBC # BLD AUTO: 10.3 K/UL (ref 4.8–10.8)

## 2024-12-03 PROCEDURE — 700111 HCHG RX REV CODE 636 W/ 250 OVERRIDE (IP): Mod: JZ | Performed by: INTERNAL MEDICINE

## 2024-12-03 PROCEDURE — 82962 GLUCOSE BLOOD TEST: CPT

## 2024-12-03 PROCEDURE — 95819 EEG AWAKE AND ASLEEP: CPT | Performed by: PSYCHIATRY & NEUROLOGY

## 2024-12-03 PROCEDURE — 700111 HCHG RX REV CODE 636 W/ 250 OVERRIDE (IP): Performed by: HOSPITALIST

## 2024-12-03 PROCEDURE — 700105 HCHG RX REV CODE 258: Performed by: INTERNAL MEDICINE

## 2024-12-03 PROCEDURE — 92526 ORAL FUNCTION THERAPY: CPT

## 2024-12-03 PROCEDURE — A9270 NON-COVERED ITEM OR SERVICE: HCPCS | Performed by: HOSPITALIST

## 2024-12-03 PROCEDURE — 94760 N-INVAS EAR/PLS OXIMETRY 1: CPT

## 2024-12-03 PROCEDURE — 700102 HCHG RX REV CODE 250 W/ 637 OVERRIDE(OP): Performed by: INTERNAL MEDICINE

## 2024-12-03 PROCEDURE — 4A10X4Z MONITORING OF CENTRAL NERVOUS ELECTRICAL ACTIVITY, EXTERNAL APPROACH: ICD-10-PCS | Performed by: PSYCHIATRY & NEUROLOGY

## 2024-12-03 PROCEDURE — 85027 COMPLETE CBC AUTOMATED: CPT

## 2024-12-03 PROCEDURE — 700102 HCHG RX REV CODE 250 W/ 637 OVERRIDE(OP): Performed by: HOSPITALIST

## 2024-12-03 PROCEDURE — 770020 HCHG ROOM/CARE - TELE (206)

## 2024-12-03 PROCEDURE — 99233 SBSQ HOSP IP/OBS HIGH 50: CPT | Performed by: INTERNAL MEDICINE

## 2024-12-03 PROCEDURE — 95819 EEG AWAKE AND ASLEEP: CPT | Mod: 26 | Performed by: PSYCHIATRY & NEUROLOGY

## 2024-12-03 PROCEDURE — 51798 US URINE CAPACITY MEASURE: CPT

## 2024-12-03 PROCEDURE — A9270 NON-COVERED ITEM OR SERVICE: HCPCS | Performed by: INTERNAL MEDICINE

## 2024-12-03 PROCEDURE — 36415 COLL VENOUS BLD VENIPUNCTURE: CPT

## 2024-12-03 PROCEDURE — 80048 BASIC METABOLIC PNL TOTAL CA: CPT

## 2024-12-03 PROCEDURE — 71045 X-RAY EXAM CHEST 1 VIEW: CPT

## 2024-12-03 RX ORDER — GAUZE BANDAGE 2" X 2"
100 BANDAGE TOPICAL DAILY
Status: DISCONTINUED | OUTPATIENT
Start: 2024-12-04 | End: 2024-12-05

## 2024-12-03 RX ORDER — LEVETIRACETAM 500 MG/1
500 TABLET ORAL EVERY 12 HOURS
Status: DISCONTINUED | OUTPATIENT
Start: 2024-12-03 | End: 2024-12-04

## 2024-12-03 RX ORDER — LEVETIRACETAM 100 MG/ML
500 SOLUTION ORAL EVERY 12 HOURS
Status: DISCONTINUED | OUTPATIENT
Start: 2024-12-03 | End: 2024-12-03

## 2024-12-03 RX ORDER — LEVETIRACETAM 500 MG/5ML
1000 INJECTION, SOLUTION, CONCENTRATE INTRAVENOUS ONCE
Status: COMPLETED | OUTPATIENT
Start: 2024-12-03 | End: 2024-12-03

## 2024-12-03 RX ORDER — FOLIC ACID 1 MG/1
1 TABLET ORAL DAILY
Status: DISCONTINUED | OUTPATIENT
Start: 2024-12-04 | End: 2024-12-09 | Stop reason: HOSPADM

## 2024-12-03 RX ADMIN — LEVETIRACETAM 1000 MG: 100 INJECTION, SOLUTION, CONCENTRATE INTRAVENOUS at 14:11

## 2024-12-03 RX ADMIN — DIBASIC SODIUM PHOSPHATE, MONOBASIC POTASSIUM PHOSPHATE AND MONOBASIC SODIUM PHOSPHATE 250 MG: 852; 155; 130 TABLET ORAL at 18:04

## 2024-12-03 RX ADMIN — GABAPENTIN 100 MG: 100 CAPSULE ORAL at 18:04

## 2024-12-03 RX ADMIN — NYSTATIN 500000 UNITS: 100000 SUSPENSION ORAL at 21:09

## 2024-12-03 RX ADMIN — DEXTROAMPHETAMINE SACCHARATE, AMPHETAMINE ASPARTATE, DEXTROAMPHETAMINE SULFATE, AMPHETAMINE SULFATE TABLETS, 10 MG,CLL 10 MG: 2.5; 2.5; 2.5; 2.5 TABLET ORAL at 05:38

## 2024-12-03 RX ADMIN — SPIRONOLACTONE 100 MG: 50 TABLET ORAL at 05:39

## 2024-12-03 RX ADMIN — GABAPENTIN 100 MG: 100 CAPSULE ORAL at 05:39

## 2024-12-03 RX ADMIN — NYSTATIN 500000 UNITS: 100000 SUSPENSION ORAL at 14:11

## 2024-12-03 RX ADMIN — LEVETIRACETAM 500 MG: 500 TABLET, FILM COATED ORAL at 18:04

## 2024-12-03 RX ADMIN — HEPARIN SODIUM 5000 UNITS: 5000 INJECTION, SOLUTION INTRAVENOUS; SUBCUTANEOUS at 14:11

## 2024-12-03 RX ADMIN — OXYCODONE 5 MG: 5 TABLET ORAL at 05:37

## 2024-12-03 RX ADMIN — CEFTRIAXONE SODIUM 1000 MG: 1 INJECTION, POWDER, FOR SOLUTION INTRAMUSCULAR; INTRAVENOUS at 19:55

## 2024-12-03 RX ADMIN — DIBASIC SODIUM PHOSPHATE, MONOBASIC POTASSIUM PHOSPHATE AND MONOBASIC SODIUM PHOSPHATE 250 MG: 852; 155; 130 TABLET ORAL at 05:38

## 2024-12-03 RX ADMIN — ACETAMINOPHEN 650 MG: 325 TABLET ORAL at 02:24

## 2024-12-03 RX ADMIN — ACETAMINOPHEN 650 MG: 325 TABLET ORAL at 18:05

## 2024-12-03 RX ADMIN — HEPARIN SODIUM 5000 UNITS: 5000 INJECTION, SOLUTION INTRAVENOUS; SUBCUTANEOUS at 05:39

## 2024-12-03 RX ADMIN — OMEPRAZOLE 40 MG: 20 CAPSULE, DELAYED RELEASE ORAL at 18:04

## 2024-12-03 RX ADMIN — GABAPENTIN 100 MG: 100 CAPSULE ORAL at 14:11

## 2024-12-03 RX ADMIN — ACETAMINOPHEN 650 MG: 325 TABLET ORAL at 14:11

## 2024-12-03 RX ADMIN — INSULIN LISPRO 1 UNITS: 100 INJECTION, SOLUTION INTRAVENOUS; SUBCUTANEOUS at 06:16

## 2024-12-03 RX ADMIN — HEPARIN SODIUM 5000 UNITS: 5000 INJECTION, SOLUTION INTRAVENOUS; SUBCUTANEOUS at 21:09

## 2024-12-03 RX ADMIN — OMEPRAZOLE 40 MG: 20 CAPSULE, DELAYED RELEASE ORAL at 05:42

## 2024-12-03 RX ADMIN — NYSTATIN 500000 UNITS: 100000 SUSPENSION ORAL at 18:05

## 2024-12-03 ASSESSMENT — PAIN DESCRIPTION - PAIN TYPE
TYPE: ACUTE PAIN
TYPE: ACUTE PAIN
TYPE: CHRONIC PAIN

## 2024-12-03 ASSESSMENT — ENCOUNTER SYMPTOMS
WEIGHT LOSS: 1
WEAKNESS: 1
ABDOMINAL PAIN: 0

## 2024-12-03 ASSESSMENT — FIBROSIS 4 INDEX: FIB4 SCORE: 1.49

## 2024-12-03 NOTE — PROCEDURES
[facility]    Inpatient Standard Video Electroencephalogram Report      Patient Name: Denisse Abel  MRN: [id number]  #: [id number]  Date of Service: 12/03/24  Total Recording Time: 0 hours and 25 minutes.  Referring Provider: Delfino Alfaro M.D.    INDICATION:  Denisse Abel 64 y.o. female. This standard, inpatient, EEG was requested to evaluate for seizure(s).    CURRENT ANTI-SEIZURE AND OTHER PERTINENT MEDICATIONS:     Current Facility-Administered Medications:     gabapentin    nystatin    phosphorus    amphetamine-dextroamphetamine    omeprazole    spironolactone    heparin    Notify provider if pain remains uncontrolled **AND** Use the Numeric Rating Scale (NRS), Santos-Baker Faces (WBF), or FLACC on regular floors and Critical-Care Pain Observation Tool (CPOT) on ICUs/Trauma to assess pain **AND** Pulse Ox **AND** Pharmacy Consult Request **AND** If patient difficult to arouse and/or has respiratory depression (respiratory rate of 10 or less), stop any opiates that are currently infusing and call a Rapid Response.    oxyCODONE immediate-release **OR** oxyCODONE immediate-release **OR** morphine injection    acetaminophen    labetalol    cloNIDine    ondansetron    ondansetron    insulin lispro **AND** POC blood glucose manual result **AND** NOTIFY MD and PharmD **AND** Administer 20 grams of glucose (approximately 8 ounces of fruit juice) every 15 minutes PRN FSBG less than 70 mg/dL **AND** dextrose bolus    TECHNIQUE: Standard inpatient video EEG was set up by a Neurodiagnostic technologist who performed education to the patient and staff. A minimum of 23 electrodes and 23 channel recording was setup and performed by Neurodiagnostic technologist, in accordance with the international 10-20 system. Impedence, electrode integrity, and technical impressions were documented. The study was reviewed in bipolar and referential montages. The recording examined the patient in the awake, drowsy, and sleep  state(s). There was at times a significant muscle/electrical artifact noted.     DESCRIPTION OF THE RECORD:  EEG background: The background was continuous/rarely discontinuous, symmetrical, and showed a 6-7 Hz posterior dominant rhythm, with a mixture of theta, less prominent delta . Reactivity was seen. Spontaneous variability was seen. State changes  were seen.  During drowsiness, a loss of myogenic artifact and theta/delta frequencies were seen.     Occasional N2 sleep transients in the form of rudimentary and/or ill-defined sleep spindles fragments and vertex waves were seen in the leads over the central regions.     ACTIVATION PROCEDURES:   Intermittent Photic stimulation was performed in a stepwise fashion from 1 to 30 Hz and it elicited rare, poorly formed, left temporal sharps. There was prominent photic driving at 3 Hz.     ICTAL AND INTERICTAL FINDINGS:   There were rare, left temporal sharps, elicited by photic stimulation, as noted above.         There was generalized rhythmic delta (GRDA) seen intermittently.     No persistent regional slowing was seen during this routine study.      No electroclinical or electrographic seizures were reported or recorded during the study.     EKG: Sampling of the EKG recording did not demonstrate any abnormalities    EVENTS:  No clinical events recorded or reported    INTERPRETATION:  This was, within the above noted limitations, an abnormal video EEG recording in the awake, drowsy, and sleep state(s):  Mild, diffuse, slowing of the background, with some discontinuity, suggestive of diffuse and/or multifocal cerebral dysfunction.  This finding might be seen in a myriad of encephalopathies and in itself is a nonspecific finding, but in this particular situation is at least partially due to toxic-metabolic etiology, supported by the presence of GRDA (GRDA in itself is a non-specific finding).   The presence of rare, left temporal, poorly formed, sharps, elicited by photic  stimulation, might point toward increased propensity toward focal seizures arising from that region.   No seizures.     El Freed MD  Neurology Attending, Epilepsy Program  [facility]

## 2024-12-03 NOTE — PROGRESS NOTES
Telemetry Summary    Rhythm: SR/ST, 1st Degree  Rate: 80's-100's  Ectopy: Rare to Frequent PVC's  Measurements: (.20/.08/.32)          Normal Values  Rhythm: SR  Rate:   Measurements: 0.12-0.20/0.06-0.10/0.30-0.52

## 2024-12-03 NOTE — CARE PLAN
The patient is Stable - Low risk of patient condition declining or worsening    Shift Goals  Clinical Goals: Monitor labs and vitals; safety; 1:1 feeding; EEG; Chest xray  Patient Goals: rest and comfort  Family Goals: MORGAN    Progress made toward(s) clinical / shift goals: Patient assessed and consulted by MD at bedside. Patient's friend visited at bedside. OT worked with patient. EEG performed. Chest xray performed. SLP assessed and consulted with patient. Patient continued to exhibit delayed responses and fluctuating mentation.       Problem: Pain - Standard  Goal: Alleviation of pain or a reduction in pain to the patient’s comfort goal  Outcome: Progressing  Note: Patient verbalizes pain using 0-10 scale. Patient uses pharmacological and non-pharmacological methods of pain management.       Problem: Knowledge Deficit - Standard  Goal: Patient and family/care givers will demonstrate understanding of plan of care, disease process/condition, diagnostic tests and medications  Outcome: Progressing  Note: Patient verbalizes understanding of plan of care and barriers to discharge. Patient asks appropriate questions about plan of care.       Problem: Fall Risk  Goal: Patient will remain free from falls  Outcome: Progressing  Note: Fall precautions are in place. Patient uses call light appropriately.         Patient is not progressing towards the following goals:

## 2024-12-03 NOTE — PROGRESS NOTES
"CNA was 1:1 feeding patient, patient began to choke on \"cranberry juice\" according to CNA and a staff assist was alerted. Several RNs came to bedside, suction was set up, patient's lung sounds were listened to, MD notified. Patient awake and alert, stated, \"I feel much better now.\"    "

## 2024-12-03 NOTE — THERAPY
Speech Language Pathology   Daily Treatment     Patient Name: Denisse Abel  AGE:  64 y.o., SEX:  female  Medical Record #: [id number]  Date of Service: 12/3/2024      Precautions:  Precautions: Fall Risk, Swallow Precautions         Subjective  RN cleared pt to work with SLP after her EEG. RN reports pt did have some coughing w/ cranberry juice this morning. Patient reports she drank it from a straw too fast.      Assessment  Patient was A&Ox4, pleasant and cooperative. [facility] with no focal deficuts but xerostomia was noted. She is still unable to feed herself d/t BUE weakness. She consumed thin liquids via cup x1 and then straw sips, which were easier for her to control and take smaller sips from. Throat clearing occurred following cup sips of thin liquids, concerning for possible airway invasion. Patient then took small, single sips of thin liquids (water, cranberry juice) from a straw with prompt pharyngeal swallow response and no overt s/sx of airway invasion appreciated. Her vocal quality was clear pre/post intake. She also consumed pureed solids with good oral clearance and no oral holding today. Oral cavity was clear upon inspection. Tx trial of small bite of sharlene cracker was given with pt demonstrating slow/prolonged but adequate mastication and oral clearance. Reflexive cough response noted while talking while chewing, concerning for possible posterior bolus loss and airway invasion. Tx trials of dry solids were discontinued. Patient was conversant and lucid today, expressing how she lives in a 3 bed/2 bath house, is originally from [place], misses her dog, Ellen, but has a good support system of friends who are taking care of her house and dog for her.     Clinical Impressions  Patient does present with s/sx of oropharyngeal dysphagia, likely due to overall weakness and deconditioning. Her current diet is appropriate as long as she is awake, responsive, sitting completely upright and  "taking single, small sips from a straw. Will consider an instrumental swallow study with any ongoing difficulty. Cognitively, pt appears to be improving compared to swallow evaluation note from yesterday.    Recommendations  Treatment Completed: Dysphagia Treatment       Dysphagia Treatment  Diet Consistency: Pureed solids, thin liquids  Instrumentation: Instrumental swallow study pending clinical progress  Medication: Crush with applesauce, as appropriate, Crush with pudding/puree, as appropriate  Supervision: 1:1 feeding with constant supervision  Positioning: Fully upright and midline during oral intake, Remain upright for 45 minutes after oral intake  Risk Management : Small bites/sips, Slow rate of intake, Physical mobility, as tolerated  Oral Care:  (after meals, before bed)      SLP Treatment Plan  Treatment Plan: Dysphagia Treatment  SLP Frequency: 4x Per Week  Estimated Duration: Until Therapy Goals Met      Anticipated Discharge Needs  Discharge Recommendations: Recommend post-acute placement for additional speech therapy services prior to discharge home  Therapy Recommendations Upon DC: Dysphagia Training, Patient / Family / Caregiver Education      Patient / Family Goals  Patient / Family Goal #1: \"I eat yogurt then I walk my dog. I come home and have eggs and toast.\"  Short Term Goals  Short Term Goal # 1: Patient will consume PU4/TN0 diet with 1:1 feeding with no overt s/sx of aspiration or decline in respiratory status.  Goal Outcome # 1: Progressing as expected      Gifty Ca MS,CCC-SLP  "

## 2024-12-03 NOTE — CARE PLAN
The patient is Watcher - Medium risk of patient condition declining or worsening    Shift Goals  Clinical Goals: Monitor, safety  Patient Goals: Sleep/rest  Family Goals: MORGAN    Progress made toward(s) clinical / shift goals:  Patient monitored closely throughout shift. Comfort measures provided to alleviate anxiety. Care clustered to promote sleep/rest    Problem: Psychosocial  Goal: Patient's level of anxiety will decrease  Outcome: Progressing       Patient is not progressing towards the following goals:

## 2024-12-03 NOTE — PROGRESS NOTES
Patient has improved slightly throughout shift, verbal responses and visual tracking are less delayed, motor function in left arm improving, though still weak.

## 2024-12-03 NOTE — PROGRESS NOTES
"At start of shift this RN woke patient to assess, patient was slow to arouse and respond to stimuli, though became more responsive as assessment continued. Pt is slow to respond to questions, though responds appropriately and is fully oriented. Patient often during conversation will \"glaze over\" and stare off in another direction, but will respond and return to conversation when her name is called. During assessment, pt was noted to have a drooping left eyelid, which she states is not her baseline as well as marked weakness in extremities, pt unable to lift legs, barely able to move arms. Patient states that she has had issues with weakness in her legs \"for years\" but that current weakness is worse, and the weakness in her arms and hands is new. Patient tearfully told this RN that she is scared, and worried about her current condition.   "

## 2024-12-03 NOTE — PROGRESS NOTES
Telemetry Shift Summary     Rhythm: SR/ST  Rate:   Measurements: .18/.08/.28  Ectopy (reported by Monitor Tech): roPVC     Normal Values  Rhythm: Sinus  HR:   Measurements: 0.12-0.20/0.06-0.10/0.30-0.52

## 2024-12-03 NOTE — WOUND TEAM
[facility]  Inpatient Services  Wound and Skin Care Brief Evaluation    Admission Date: 11/27/2024     Last order of IP CONSULT TO WOUND CARE was found on 12/1/2024 from Hospital Encounter on 11/27/2024     HPI, PMH, SH: Reviewed    Chief Complaint   Patient presents with    ALOC     Diagnosis: Alcoholic encephalopathy (HCC) [G31.2]    Unit where seen by Wound Team: 1120/01     Wound consult placed regarding abdomen. Chart and images reviewed. This discussed with bedside RN, Clarice. This clinician in to assess patient. Patient pleasant and agreeable. Patient abdomen assessed, sacrum and bilateral heels assessed. Non-selectively debrided with Moist warm washcloth.     No pressure injuries or advanced wound care needs identified. Abdomen has a deflated blood blister likely from tele pad, unlikely from pressure.  Sacrum and bilateral heels are normal discoloration for skin tone, intact and blanching. Patient would benefit from JONATHAN as she is cachetic and not eating well.  Wound consult completed. No further follow up unless indicated and consulted.                PREVENTATIVE INTERVENTIONS:    Q shift John - performed per nursing policy  Q shift pressure point assessments - performed per nursing policy    Surface/Positioning  Low Airloss - Ordered  Standard/trauma mattress - Currently in Place  Reposition q 2 hours - Currently in Place  TAPs Turning system - Currently in Place    Offloading/Redistribution  Sacral offloading dressing (Silicone dressing) - Currently in Place  Float Heels off Bed with Pillows - Currently in Place           Respiratory  Silicone O2 tubing - Currently in Place  Gray Foam Ear protectors - Currently in Place    Containment/Moisture Prevention    Dri-juan pad - Currently in Place  Barrier paste - Currently in Place    Mobilization      Unable to assess

## 2024-12-03 NOTE — THERAPY
"Speech Language Pathology   Clinical Swallow Evaluation     Patient Name: Denisse Abel  AGE:  64 y.o., SEX:  female  Medical Record #: [id number]  Date of Service: 12/2/2024      History of Present Illness  Pt is a 65 y/o female admitted 11/27 with ALOC and confusion. Found to have alcoholic encephalopathy, liver cirrhosis, esophageal varices w/out bleeding, CKD.   PMHx: alcohol abuse w/ Wernicke's encephalopathy, HF, anemia, CKD, HLD, HTN, liver cirrhosis.   No previous SLP notes found in EMR.     Head CT: negative.   CXR: negative.   Brain MRI:   1.  No evidence of acute territorial infarct, intracranial hemorrhage or mass lesion.  2.  Mild diffuse cerebral and cerebellar substance loss.  3.  Mild microangiopathic ischemic change versus demyelination or gliosis.    General Information:  Vitals  O2 Delivery Device: None - Room Air  Level of Consciousness: Lethargic  Patient Behaviors: Flat Affect, Lethargic, Fatigue  Orientation: Oriented x 4  Follows Directives: Yes - simple commands only    Prior Living Situation & Level of Function:  Prior Services: Home-Independent  Lives with - Patient's Self Care Capacity: Alone and Able to Care For Self  Communication: Unknown; per DC planning notes, pt was independent at home, but EMR reports hx of EtOH encephalopathy  Swallowing: Unknown     Oral Mechanism Evaluation:  Dentition: Good, Natural dentition   Facial Symmetry: Equal, Pt did not follow commands to assess  Facial Sensation: Pt did not follow commands to assess     Labial Observations: Open mouth posture   Lingual Observations: Midline, Xerostomia (White coating suspicious for thrush)  Motor Speech: WFL       Laryngeal Function:  Secretion Management: Adequate  Voice Quality: Hoarse, Other (see comments) (Decreased intensity)  Max Phonation Time (seconds): 3  Cough: Perceptually weak    Subjective  RN cleared patient for CSE. Patient received awake, not blinking initially, appeared to be \"zoning out.\" When " "name was called 2-3 times, patient appeared to \"come to\" and eyes focused and patient made eye contact. Patient appeared altered, weak, fatigued, and was lethargic during CSE. Patient also unable to track this clinician; RN and MD notified; concern for possible seizures. Patient oriented, but still appeared to have delayed, slow responses, and appeared very altered if her baseline is independent. Of note, she reports she was independent living alone, driving, and her last drink was one year ago; this appears consistent with d/c planning notes. RN reported patient has been progressively getting weaker during admit, which warranted CSE.    Assessment  Current Method of Nutrition: Oral diet (Regular/thins, but RN holding meals)  Positioning: Morley's (60-90 degrees)  Bolus Administration: SLP  O2 Delivery Device: None - Room Air  Factor(s) Affecting Performance: Impaired endurance, Impaired mental status, Impaired command following    Swallowing Trials:  Swallowing Trials  Ice: WFL  Thin Liquid (TN0): WFL  Liquidised (LQ3): WFL  Pureed (PU4): Impaired (Mild lingual residue; cleared w/ cues for second swallow)    Comments: Patient unable to follow all formal commands for oral motor evaluation, but upon inspection, patient was noted to have whitish, spotty coating on lingual surface, suspicious for thrush; RN and MD aware and Nystatin ordered. Patient also with xerostomia. Dentition intact and no asymmetry noted. Patient consumed PO trials of ice, thins via cup sip and straw, liquidized purees, and pudding. Patient required 1:1 feeding for all PO intake d/t lethargy and AMS. Patient presented with reduced bolus acceptance and poor oral containment w/ thin liquids via cup, evidenced by anterior bolus loss of thins intermittently. Patient had improved oral containment with thins via straw. PO trials of liquidized purees and pudding resulted in prolonged oral holding of up to 15 seconds at times, but was able to trigger " "more timely swallow when verbally cued. Mild lingual residue w/ pudding, but this cleared with liquid wash or additional swallow. No s/sx of aspiration with PO intake, but patient appears at elevated risk for aspiration d/t AMS, weakness, fatigue, and possible seizures. No solids were tested d/t same.    Clinical Impressions  Patient appears far from cognitive baseline if she was independent and living alone PTA. Patient would benefit from downgrade to PU4/TN0 diet (purees w/ thin liquids) w/ 1:1 feeding when awake and alert. Patient would also benefit from Nystatin and EEG as medically appropriate, as patient appears to possible be having petite seizures and does having coating on tongue consistent with thrush. RN and MD aware and MD agreeable to order same. SLP to follow closely. Please hold PO intake and notify SLP w/ any further concerns.     Recommendations  Diet Consistency: PU4/TN0 diet (purees w/ thin liquids)  Instrumentation: Instrumental swallow study pending clinical progress  Medication: Crush with applesauce, as appropriate, Crush with pudding/puree, as appropriate  Supervision: 1:1 feeding with constant supervision  Positioning: Fully upright and midline during oral intake  Risk Management : Small bites/sips, Slow rate of intake, Reduce environmental distractions, Monitor for left pocketing, Monitor for right pocketing  Oral Care: Q4h     SLP Treatment Plan  Treatment Plan: Dysphagia Treatment  SLP Frequency: 4x Per Week  Estimated Duration: Until Therapy Goals Met    Anticipated Discharge Needs  Discharge Recommendations: Recommend post-acute placement for additional speech therapy services prior to discharge home   Therapy Recommendations Upon DC: Dysphagia Training, Community Re-Integration, Patient / Family / Caregiver Education      Patient / Family Goals  Patient / Family Goal #1: \"My knee hurts\"  Short Term Goals  Short Term Goal # 1: Patient will consume PU4/TN0 diet with 1:1 feeding with no " overt s/sx of aspiration or decline in respiratory status.    Tawanna Polo, SLP

## 2024-12-04 PROBLEM — R13.10 DYSPHAGIA: Status: ACTIVE | Noted: 2024-12-04

## 2024-12-04 LAB
ALBUMIN SERPL BCP-MCNC: 3.4 G/DL (ref 3.2–4.9)
ALBUMIN SERPL BCP-MCNC: 3.4 G/DL (ref 3.2–4.9)
ALP SERPL-CCNC: 153 U/L (ref 30–99)
ALT SERPL-CCNC: 7 U/L (ref 2–50)
AST SERPL-CCNC: 19 U/L (ref 12–45)
BILIRUB CONJ SERPL-MCNC: 0.2 MG/DL (ref 0.1–0.5)
BILIRUB INDIRECT SERPL-MCNC: 0.2 MG/DL (ref 0–1)
BILIRUB SERPL-MCNC: 0.4 MG/DL (ref 0.1–1.5)
BUN SERPL-MCNC: 47 MG/DL (ref 8–22)
BURR CELLS/RBC NFR CSF MANUAL: 0 %
C GATTII+NEOFOR DNA CSF QL NAA+NON-PROBE: NOT DETECTED
CALCIUM ALBUM COR SERPL-MCNC: 10.3 MG/DL (ref 8.5–10.5)
CALCIUM SERPL-MCNC: 9.8 MG/DL (ref 8.4–10.2)
CHLORIDE SERPL-SCNC: 92 MMOL/L (ref 96–112)
CLARITY CSF: CLEAR
CMV DNA CSF QL NAA+NON-PROBE: NOT DETECTED
CO2 SERPL-SCNC: 23 MMOL/L (ref 20–33)
COLOR CSF: COLORLESS
COLOR SPUN CSF: COLORLESS
CREAT SERPL-MCNC: 1.28 MG/DL (ref 0.5–1.4)
CSF COMMENTS 1658: NORMAL
E COLI K1 DNA CSF QL NAA+NON-PROBE: NOT DETECTED
ERYTHROCYTE [DISTWIDTH] IN BLOOD BY AUTOMATED COUNT: 64.6 FL (ref 35.9–50)
EV RNA CSF QL NAA+NON-PROBE: NOT DETECTED
GFR SERPLBLD CREATININE-BSD FMLA CKD-EPI: 46 ML/MIN/1.73 M 2
GLUCOSE BLD STRIP.AUTO-MCNC: 118 MG/DL (ref 65–99)
GLUCOSE BLD STRIP.AUTO-MCNC: 132 MG/DL (ref 65–99)
GLUCOSE BLD STRIP.AUTO-MCNC: 90 MG/DL (ref 65–99)
GLUCOSE BLD STRIP.AUTO-MCNC: 99 MG/DL (ref 65–99)
GLUCOSE CSF-MCNC: 74 MG/DL (ref 40–80)
GLUCOSE SERPL-MCNC: 121 MG/DL (ref 65–99)
GP B STREP DNA CSF QL NAA+NON-PROBE: NOT DETECTED
GRAM STN SPEC: NORMAL
HAEM INFLU DNA CSF QL NAA+NON-PROBE: NOT DETECTED
HCT VFR BLD AUTO: 27.8 % (ref 37–47)
HGB BLD-MCNC: 8.9 G/DL (ref 12–16)
HHV6 DNA CSF QL NAA+NON-PROBE: NOT DETECTED
HSV1 DNA CSF QL NAA+NON-PROBE: NOT DETECTED
HSV2 DNA CSF QL NAA+NON-PROBE: NOT DETECTED
L MONOCYTOG DNA CSF QL NAA+NON-PROBE: NOT DETECTED
MAGNESIUM SERPL-MCNC: 2 MG/DL (ref 1.5–2.5)
MCH RBC QN AUTO: 25.7 PG (ref 27–33)
MCHC RBC AUTO-ENTMCNC: 32 G/DL (ref 32.2–35.5)
MCV RBC AUTO: 80.3 FL (ref 81.4–97.8)
N MEN DNA CSF QL NAA+NON-PROBE: NOT DETECTED
NUC CELL # CSF: 4 CELLS/UL (ref 0–10)
PARECHOVIRUS A RNA CSF QL NAA+NON-PROBE: NOT DETECTED
PHOSPHATE SERPL-MCNC: 3.1 MG/DL (ref 2.5–4.5)
PLATELET # BLD AUTO: 255 K/UL (ref 164–446)
PMV BLD AUTO: 12.4 FL (ref 9–12.9)
POTASSIUM SERPL-SCNC: 4.9 MMOL/L (ref 3.6–5.5)
PROT CSF-MCNC: 62 MG/DL (ref 15–45)
PROT SERPL-MCNC: 7.6 G/DL (ref 6–8.2)
RBC # BLD AUTO: 3.46 M/UL (ref 4.2–5.4)
RBC # CSF: 69 CELLS/UL
S PNEUM DNA CSF QL NAA+NON-PROBE: NOT DETECTED
SIGNIFICANT IND 70042: NORMAL
SITE SITE: NORMAL
SODIUM SERPL-SCNC: 127 MMOL/L (ref 135–145)
SOURCE SOURCE: NORMAL
SPECIMEN VOL CSF: 4 ML
T PALLIDUM AB SER QL IA: NORMAL
TUBE # CSF: 4
TUBE # CSF: NORMAL
VZV DNA CSF QL NAA+NON-PROBE: NOT DETECTED
WBC # BLD AUTO: 8.9 K/UL (ref 4.8–10.8)

## 2024-12-04 PROCEDURE — 89051 BODY FLUID CELL COUNT: CPT

## 2024-12-04 PROCEDURE — 87070 CULTURE OTHR SPECIMN AEROBIC: CPT

## 2024-12-04 PROCEDURE — 94760 N-INVAS EAR/PLS OXIMETRY 1: CPT

## 2024-12-04 PROCEDURE — 83916 OLIGOCLONAL BANDS: CPT

## 2024-12-04 PROCEDURE — 700111 HCHG RX REV CODE 636 W/ 250 OVERRIDE (IP): Performed by: HOSPITALIST

## 2024-12-04 PROCEDURE — 82945 GLUCOSE OTHER FLUID: CPT

## 2024-12-04 PROCEDURE — A9270 NON-COVERED ITEM OR SERVICE: HCPCS | Performed by: HOSPITALIST

## 2024-12-04 PROCEDURE — 87483 CNS DNA AMP PROBE TYPE 12-25: CPT

## 2024-12-04 PROCEDURE — 700105 HCHG RX REV CODE 258: Performed by: INTERNAL MEDICINE

## 2024-12-04 PROCEDURE — 84157 ASSAY OF PROTEIN OTHER: CPT

## 2024-12-04 PROCEDURE — 85027 COMPLETE CBC AUTOMATED: CPT

## 2024-12-04 PROCEDURE — 770020 HCHG ROOM/CARE - TELE (206)

## 2024-12-04 PROCEDURE — 62270 DX LMBR SPI PNXR: CPT | Performed by: HOSPITALIST

## 2024-12-04 PROCEDURE — 84460 ALANINE AMINO (ALT) (SGPT): CPT

## 2024-12-04 PROCEDURE — 700102 HCHG RX REV CODE 250 W/ 637 OVERRIDE(OP): Performed by: INTERNAL MEDICINE

## 2024-12-04 PROCEDURE — 84075 ASSAY ALKALINE PHOSPHATASE: CPT

## 2024-12-04 PROCEDURE — 99233 SBSQ HOSP IP/OBS HIGH 50: CPT | Performed by: INTERNAL MEDICINE

## 2024-12-04 PROCEDURE — 700111 HCHG RX REV CODE 636 W/ 250 OVERRIDE (IP): Performed by: INTERNAL MEDICINE

## 2024-12-04 PROCEDURE — A9270 NON-COVERED ITEM OR SERVICE: HCPCS | Performed by: INTERNAL MEDICINE

## 2024-12-04 PROCEDURE — 87205 SMEAR GRAM STAIN: CPT

## 2024-12-04 PROCEDURE — 83735 ASSAY OF MAGNESIUM: CPT

## 2024-12-04 PROCEDURE — 82248 BILIRUBIN DIRECT: CPT

## 2024-12-04 PROCEDURE — 92526 ORAL FUNCTION THERAPY: CPT

## 2024-12-04 PROCEDURE — 84450 TRANSFERASE (AST) (SGOT): CPT

## 2024-12-04 PROCEDURE — 82247 BILIRUBIN TOTAL: CPT

## 2024-12-04 PROCEDURE — 62270 DX LMBR SPI PNXR: CPT | Mod: 25 | Performed by: INTERNAL MEDICINE

## 2024-12-04 PROCEDURE — 82962 GLUCOSE BLOOD TEST: CPT

## 2024-12-04 PROCEDURE — 86592 SYPHILIS TEST NON-TREP QUAL: CPT

## 2024-12-04 PROCEDURE — 80069 RENAL FUNCTION PANEL: CPT

## 2024-12-04 PROCEDURE — 86780 TREPONEMA PALLIDUM: CPT

## 2024-12-04 PROCEDURE — 009U3ZX DRAINAGE OF SPINAL CANAL, PERCUTANEOUS APPROACH, DIAGNOSTIC: ICD-10-PCS | Performed by: INTERNAL MEDICINE

## 2024-12-04 PROCEDURE — 36415 COLL VENOUS BLD VENIPUNCTURE: CPT

## 2024-12-04 PROCEDURE — 700102 HCHG RX REV CODE 250 W/ 637 OVERRIDE(OP): Performed by: HOSPITALIST

## 2024-12-04 PROCEDURE — 84155 ASSAY OF PROTEIN SERUM: CPT

## 2024-12-04 RX ORDER — PANTOPRAZOLE SODIUM 40 MG/10ML
40 INJECTION, POWDER, LYOPHILIZED, FOR SOLUTION INTRAVENOUS DAILY
Status: DISCONTINUED | OUTPATIENT
Start: 2024-12-04 | End: 2024-12-09

## 2024-12-04 RX ORDER — HYDROMORPHONE HYDROCHLORIDE 1 MG/ML
0.5 INJECTION, SOLUTION INTRAMUSCULAR; INTRAVENOUS; SUBCUTANEOUS ONCE
Status: COMPLETED | OUTPATIENT
Start: 2024-12-04 | End: 2024-12-04

## 2024-12-04 RX ADMIN — HEPARIN SODIUM 5000 UNITS: 5000 INJECTION, SOLUTION INTRAVENOUS; SUBCUTANEOUS at 05:57

## 2024-12-04 RX ADMIN — SPIRONOLACTONE 100 MG: 50 TABLET ORAL at 05:58

## 2024-12-04 RX ADMIN — DEXTROAMPHETAMINE SACCHARATE, AMPHETAMINE ASPARTATE, DEXTROAMPHETAMINE SULFATE, AMPHETAMINE SULFATE TABLETS, 10 MG,CLL 10 MG: 2.5; 2.5; 2.5; 2.5 TABLET ORAL at 05:57

## 2024-12-04 RX ADMIN — HYDROMORPHONE HYDROCHLORIDE 0.5 MG: 1 INJECTION, SOLUTION INTRAMUSCULAR; INTRAVENOUS; SUBCUTANEOUS at 17:12

## 2024-12-04 RX ADMIN — Medication 100 MG: at 05:58

## 2024-12-04 RX ADMIN — DIBASIC SODIUM PHOSPHATE, MONOBASIC POTASSIUM PHOSPHATE AND MONOBASIC SODIUM PHOSPHATE 250 MG: 852; 155; 130 TABLET ORAL at 05:58

## 2024-12-04 RX ADMIN — OMEPRAZOLE 40 MG: 20 CAPSULE, DELAYED RELEASE ORAL at 05:57

## 2024-12-04 RX ADMIN — PANTOPRAZOLE SODIUM 40 MG: 40 INJECTION, POWDER, FOR SOLUTION INTRAVENOUS at 18:37

## 2024-12-04 RX ADMIN — LEVETIRACETAM 500 MG: 500 TABLET, FILM COATED ORAL at 05:58

## 2024-12-04 RX ADMIN — FOLIC ACID 1 MG: 1 TABLET ORAL at 05:57

## 2024-12-04 RX ADMIN — CEFTRIAXONE SODIUM 1000 MG: 1 INJECTION, POWDER, FOR SOLUTION INTRAMUSCULAR; INTRAVENOUS at 18:46

## 2024-12-04 RX ADMIN — GABAPENTIN 100 MG: 100 CAPSULE ORAL at 05:57

## 2024-12-04 ASSESSMENT — PAIN DESCRIPTION - PAIN TYPE
TYPE: ACUTE PAIN

## 2024-12-04 ASSESSMENT — FIBROSIS 4 INDEX: FIB4 SCORE: 1.34

## 2024-12-04 ASSESSMENT — ENCOUNTER SYMPTOMS
WEIGHT LOSS: 1
WEAKNESS: 1
ABDOMINAL PAIN: 0

## 2024-12-04 NOTE — PROGRESS NOTES
12/3/24 Around 0745  Performed 1-1 feeding with pt. Pt was tolerating it well. Pt tolerated cream of wheat and protein shake. When taking continuous sips of cranberry juice pt began choking. CNA got behind pt in the bed to perform Heimlich maneuver twice. Echo tech pressed staff assist at 0745 in which staff was present to assess pt.   Post dated to ensure accuracy of event.

## 2024-12-04 NOTE — THERAPY
Speech Language Therapy Contact Note    Patient Name: Denisse Abel  Age:  64 y.o., Sex:  female  Medical Record #: [id number]  Today's Date: 12/4/2024    Discussed missed therapy with SILVIA Butler. Attempted to see pt for dysphagia tx; however, pt is unarousable at this time, despite sternal rub and repositioning. Pt's eyes were partially open and  her mouth was fully open upon entering her room and she initially nodded when addressed but then did not respond to any other questions or directions. RN has set up suction, reporting pt sounds gurgly and her mentation has been fluctuating quite a bit. RN is holding pt's food and meds while in this current state. Will hold for now and round back later, as able. If pt's mentation continues to deteriorate and/or does not rebound, consider a non oral source of nutrition/hydration, if aligned with GOC.        12/04/24 1045   Treatment Variance   Reason For Missed Therapy Medical - Patient Unarousable   Total Treatment Time   SLP Time Spent Yes   SLP Missed Visit (Mins) 5

## 2024-12-04 NOTE — ASSESSMENT & PLAN NOTE
Pt c/o abdominal pain  Urine showing Klebsiella oxytoca, resistant to IV Ancef, she is allergic to sulfa and PCNs.  Completed IV ceftriaxone 12/5

## 2024-12-04 NOTE — PROGRESS NOTES
Telemetry Summary    Rhythm: SR/ST  Rate: 80's-100's  Ectopy: rare PVC's  Measurements: (.18/.04/.32)    Normal Values  Rhythm: SR  Rate:   Measurements: 0.12-0.20/0.06-0.10/0.30-0.52

## 2024-12-04 NOTE — ASSESSMENT & PLAN NOTE
I reviewed patient's EEG today, there is a finding of left temporal abnormal waveforms, induced with photic stimulation.  Patient may have focal seizures in this region.  He has diffuse slowing suggestive of cerebral dysfunction.     No Keppra needed at this time  Partial seizures may still be a possibility given an unclear diagnosis of a neurological disorder

## 2024-12-04 NOTE — CARE PLAN
The patient is Stable - Low risk of patient condition declining or worsening    Shift Goals  Clinical Goals: Monitor labs and vitals; SLP; monitor blood sugars; monitor mentation  Patient Goals: rest and comfort  Family Goals: MORGAN    Progress made toward(s) clinical / shift goals: Patient's mentation status declined in the AM but improved over the course of the shift. Patient's family/friends at bedside. MD assessed and consulted with patient at bedside. Spinal tap completed. SLP consulted and assessed patient at bedside.     Problem: Pain - Standard  Goal: Alleviation of pain or a reduction in pain to the patient’s comfort goal  Outcome: Progressing  Note: Patient verbalizes pain using 0-10 scale. Patient uses pharmacological and non-pharmacological methods of pain management.       Problem: Knowledge Deficit - Standard  Goal: Patient and family/care givers will demonstrate understanding of plan of care, disease process/condition, diagnostic tests and medications  Outcome: Progressing  Note: Patient verbalizes understanding of plan of care and barriers to discharge. Patient asks appropriate questions about plan of care.         Patient is not progressing towards the following goals:

## 2024-12-04 NOTE — PROGRESS NOTES
[facility] Daily Progress Note    Date of Service  12/4/2024    Chief Complaint  Denisse Abel is a 64 y.o. female admitted 11/27/2024 with   Chief Complaint   Patient presents with    ALOC     Hospital Course  Patient was brought to the hospital by EMS after she was able to call 911 but unable to give any history.  Paramedics found her disheveled, confused malnourished and brought her to the emergency room.  Patient with history of alcohol abuse and concern for possible Warnicke's encephalopathy versus acute alcohol withdrawal.  Patient on admission was unable to give any information but when she got up to the floor bedside nurse said that she was coherent and was doing better.  She denied any recent drinking but then was started on Librium and since that time has been very sedated.  I have discontinued the Librium and will continue with CIWA protocol if needed but she is not looking like acute alcohol withdrawal at this point.  Will continue with lactulose for her cirrhosis and ascites.  Potassium markedly low this morning and received IV potassium with a repeat level 3.0 and will continue with oral potassium supplementation.  I have also started the patient on aggressive IV thiamine for concern of Warnicke's.    Interval Problem Update  12/04:  Checking LP  Checking RPR. Pt stated she last had intercourse 7 years ago, she has had multiple partners before her last partner. She did have unprotected intercourse. 01/24 HIV was non-reactive.  I discussed with patient's friend from [place] and friend from [place] at bedside with her permission.  I provided updates.    12/03:  Patient stated she has been feeling weak for the last 2 months now.  She has had significant decrease in eating at home.  She does live at home alone with her dog.  She does have family members but her sister lives in [place].  I reviewed patient's EEG today, there is a finding of left temporal abnormal waveforms, induced with  photic stimulation.  Patient may have focal seizures in this region.  He has diffuse slowing suggestive of cerebral dysfunction.   I discussed with patient that this may be a partial seizures, she agreed to starting loading dose of IV Keppra and oral Keppra.  I reviewed her labs, sodium 127 from 127, 124, 127  Patient apparently choked this morning while eating breakfast, she did not require Heimlich maneuver but did require help to get food out.  Speech therapy evaluate patient and recommended one-to-one feeding, puréed solids and thin liquids.  Postacute placement needed for ongoing speech therapy services.   I ordered a chest x-ray, there was no findings of aspiration  Patient reports a history of cirrhosis, follows with [facility] Dr. Anna.  She was mentioning she had a history of ascites.  I tried calling Leesa Daly, patient had quit alcohol one year ago. L3-L5 severe spinal damage, was supposed to have a procedure, intercept nerve ablation. Withdrawal from Norco pain medications, patient was addicted to pain meds.  Patient stopped trying to eat at home.   Urine showing Klebsiella oxytoca, resistant to IV Ancef, she is allergic to sulfa and PCNs. I am starting IV Ceftriaxone.    I have discussed this patient's plan of care and discharge plan at IDT rounds today with Case Management, Nursing, Nursing leadership, and other members of the IDT team.    Consultants/Specialty  none    Code Status  Full Code    Disposition  The patient is not medically cleared for discharge to home or a post-acute facility.  Anticipate discharge to: skilled nursing facility    I have placed the appropriate orders for post-discharge needs.    Review of Systems  Review of Systems   Constitutional:  Positive for malaise/fatigue and weight loss.        + Decreased eating   Gastrointestinal:  Negative for abdominal pain.   Neurological:  Positive for weakness.        Physical Exam  Temp:  [36.1 °C (96.9 °F)-36.9 °C  (98.4 °F)] 36.1 °C (96.9 °F)  Pulse:  [76-99] 76  Resp:  [16-19] 18  BP: (101-118)/(60-72) 118/68  SpO2:  [95 %-97 %] 97 %    Physical Exam  Vitals and nursing note reviewed.   Constitutional:       General: She is not in acute distress.     Appearance: She is not ill-appearing.      Comments: Frail appearing female   HENT:      Head: Normocephalic and atraumatic.      Comments: Temporal muscle wasting positive     Mouth/Throat:      Mouth: Mucous membranes are dry.      Pharynx: No oropharyngeal exudate.   Eyes:      Pupils: Pupils are equal, round, and reactive to light.      Comments: No right lateral eye gaze, normal other eye movements. Mild left eye right nystagmus.   Cardiovascular:      Rate and Rhythm: Normal rate and regular rhythm.      Pulses: Normal pulses.      Heart sounds: Normal heart sounds. No murmur heard.  Pulmonary:      Effort: Pulmonary effort is normal. No respiratory distress.      Breath sounds: Normal breath sounds. No wheezing.   Abdominal:      General: Abdomen is flat. Bowel sounds are normal. There is no distension.      Palpations: Abdomen is soft.      Tenderness: There is no abdominal tenderness. There is no guarding.   Musculoskeletal:         General: No swelling or tenderness.      Cervical back: Normal range of motion. No rigidity.      Right lower leg: No edema.      Left lower leg: No edema.      Comments: Sarcopenic. Bilateral hand muscle atrophy noted.   Skin:     General: Skin is warm.      Capillary Refill: Capillary refill takes less than 2 seconds.      Coloration: Skin is not jaundiced.      Findings: No erythema.   Neurological:      Mental Status: She is alert and oriented to person, place, and time. Mental status is at baseline.      Cranial Nerves: Cranial nerve deficit present.      Sensory: No sensory deficit.      Motor: Weakness present.      Comments: BUE 2/5 strength   Psychiatric:         Mood and Affect: Mood normal.         Behavior: Behavior normal.          Thought Content: Thought content normal.         Judgment: Judgment normal.         Fluids    Intake/Output Summary (Last 24 hours) at 12/4/2024 1955  Last data filed at 12/4/2024 1900  Gross per 24 hour   Intake 220 ml   Output 700 ml   Net -480 ml        Laboratory  Recent Labs     12/02/24  0119 12/03/24  0117 12/04/24  0316   WBC 11.3* 10.3 8.9   RBC 3.49* 3.53* 3.46*   HEMOGLOBIN 9.1* 9.2* 8.9*   HEMATOCRIT 27.3* 27.8* 27.8*   MCV 78.2* 78.8* 80.3*   MCH 26.1* 26.1* 25.7*   MCHC 33.3 33.1 32.0*   RDW 64.7* 64.8* 64.6*   PLATELETCT 240 229 255   MPV 11.2 10.7 12.4     Recent Labs     12/02/24  0119 12/03/24  0117 12/04/24  0316   SODIUM 127* 127* 127*   POTASSIUM 4.2 4.3 4.9   CHLORIDE 92* 91* 92*   CO2 21 22 23   GLUCOSE 113* 121* 121*   BUN 49* 45* 47*   CREATININE 1.55* 1.33 1.28   CALCIUM 10.0 9.9 9.8                   Imaging  DX-CHEST-LIMITED (1 VIEW)   Final Result      No evidence of acute cardiopulmonary process.      MR-BRAIN-W/O   Final Result      1.  No evidence of acute territorial infarct, intracranial hemorrhage or mass lesion.   2.  Mild diffuse cerebral and cerebellar substance loss.   3.  Mild microangiopathic ischemic change versus demyelination or gliosis.      CT-ABDOMEN-PELVIS W/O   Final Result      1.  Small umbilical hernia which contains small intestine. Small intestine is dilated proximal to that hernia and decompressed distally consistent with resultant small bowel obstruction.      2.  Again seen cirrhotic appearance of the liver with small metallic ascites within the abdomen and pelvis and splenomegaly consistent with portal hypertension.      3.  Gallstones within the gallbladder.      CT-HEAD W/O   Final Result      No evidence of acute intracranial process.               DX-CHEST-PORTABLE (1 VIEW)   Final Result      No evidence of acute cardiopulmonary process.           Assessment/Plan  * Alcoholic encephalopathy (HCC)- (present on admission)  Assessment & Plan  Patient came  in encephalopathic but after administering care in the emergency room brought up to the floor she started showing improvement with her mental status.  Librium was given and likely resulted in worsening encephalopathy rather than improvement    Mental state waxing and waning  I reviewed patient's EEG today, there is a finding of left temporal abnormal waveforms, induced with photic stimulation.  Patient may have focal seizures in this region.  He has diffuse slowing suggestive of cerebral dysfunction.     12/4:  Patient was reported to be more lethargic today.  I have now held Keppra, gabapentin, pain medications.  Throughout the day, she had more improvement in her mental state.  She was able to consent for an LP.  She was able to discuss with her friends at bedside today.  I suspect she is oversedated as a possibility for her lower GCS in the mornings.    Dysphagia- (present on admission)  Assessment & Plan  Patient is having repeat dysphagia  NPO, except for ice chips, crushed meds in applesauce.  I discussed evaluation by Gifty Ca today.  Speech therapy to reevaluate tomorrow.    Partial seizure disorder (HCC)- (present on admission)  Assessment & Plan  I reviewed patient's EEG today, there is a finding of left temporal abnormal waveforms, induced with photic stimulation.  Patient may have focal seizures in this region.  He has diffuse slowing suggestive of cerebral dysfunction.     We trialed Keppra but patient may be too affected by side effects.  We will hold Keppra at this moment    Cystitis- (present on admission)  Assessment & Plan  Pt c/o abdominal pain  Urine showing Klebsiella oxytoca, resistant to IV Ancef, she is allergic to sulfa and PCNs.  Continues 3 days IV ceftriaxone    CKD (chronic kidney disease) stage 4, GFR 15-29 ml/min ([facility])- (present on admission)  Assessment & Plan  Creatinine was 2.23 on admission  Creatinine 1.28  Monitoring closely    Esophageal varices without bleeding ([facility])-  (present on admission)  Assessment & Plan  Change omeprazole to IV Protonix.  Patient is NPO.  Hx of grade 2 esophageal varices.    Severe protein-calorie malnutrition (HCC)- (present on admission)  Assessment & Plan  Dietitian agreed patient meets Aspen criteria  Continue diet supplements  Patient again had trouble eating, speech therapy recommended n.p.o. status.    Anemia due to chronic kidney disease- (present on admission)  Assessment & Plan  Hemoglobin 9.2, platelets 229    Liver cirrhosis (HCC)- (present on admission)  Assessment & Plan  Chronic liver cirrhosis secondary to alcohol abuse  Monitor liver function tests  Optimize diuresis  Hold lactulose - copious amounts of diarrhea  Monitor magnesium and phosphorus levels    Follows with [facility]    Hypokalemia- (present on admission)  Assessment & Plan  Potassium 4.3    Hyponatremia- (present on admission)  Assessment & Plan  Sodium 127, from 127, 124, 127  Sodium still 127.  I am checking urine sodium, creatinine ratio, urine osmolality, serum osmolality    Chronic combined systolic and diastolic heart failure (HCC)- (present on admission)  Assessment & Plan  Patient's 1/22/2024 echocardiogram showed LVEF of 40%.  There was a ascites noted.  Grade 2 diastolic dysfunction noted.  Severely dilated LA, mild mitral regurgitation, moderate aortic valve stenosis.  Her last SEFERINO on 9/6/2024 showed trace aortic insufficiency with a moderate aortic valve stenosis.  Continue spironolactone    Heart murmur- (present on admission)  Assessment & Plan  Chronic moderate aortic stenosis  Eventually the patient may need surgical intervention such as a TAVR.  Will need outpatient cardiology    Stage 3b chronic kidney disease- (present on admission)  Assessment & Plan  chronic    HTN (hypertension), benign- (present on admission)  Assessment & Plan  Continue spironolactone  As needed labetalol         VTE prophylaxis:    heparin ppx      I have performed a physical exam and reviewed  and updated ROS and Plan today (12/4/2024). In review of yesterday's note (12/3/2024), there are no changes except as documented above.      Total time spent 52 minutes.    This included my review of patient's overnight RN notes, face to face interview, physical examination, lab analysis.  Also includes repeat visits with the patient, and my documented assessments and interventions above.  In addition, I spoke with entire care team on patient's treatment plan, and DC planning on morning rounds and IDT rounds.    This note was generated using voice recognition software which has a chance of producing errors of grammar and content.  I have made every reasonable attempt to find and correct any errors, but it should be expected that some may not be found prior to finalization of this note.

## 2024-12-04 NOTE — THERAPY
Speech Language Pathology   Daily Treatment     Patient Name: Denisse Abel  AGE:  64 y.o., SEX:  female  Medical Record #: [id number]  Date of Service: 12/4/2024    Precautions:  Precautions: Fall Risk, Swallow Precautions     Subjective  RN contacted SLP to re-evaluate pt now that she is more awake, hungry and thirsty.   Patient was awake but very lethargic and weak upon arrival, asking for Sprite and vanilla Glucerna.     Assessment  Upon oral inspection, significant xerostomia noted though no focal deficits appreciated. Pt spoke though her voice was weak and difficult to understand. She was repositioned upright and given sips of Sprite and Glucerna via single straw sips. Suspected delayed pharyngeal swallow onset based on visual observation of hyolaryngeal excursion. Pt produced weak reflexive cough in 2/4 trials of thin liquids, concerning for airway invasion. She was given one bite of pureed solids with no s/sx of airway invasion appreciated. However, suspect reduced bolus formation and AP transit as pt had min lingual residue post swallow. Discussed concerns for possible aspiration and ability to protect her airway with intake of liquids with pt, who agreed to participate in an instrumental swallow study.     Clinical Impressions  Patient presents with overt s/sx of airway invasion with intake of thin liquids. Suspect oropharyngeal dysphagia fluctuates along with her mental status. Per discussion with MD, pt was loaded with IV and oral Keppra, which could be contributing to her overall fatigue and lethargy; this was discontinued. Will plan to reassess tomorrow to determine readiness for oral intake vs need for instrumental swallow study to guide POC.     Recommendations  Treatment Completed: Dysphagia Treatment    Dysphagia Treatment  Diet Consistency: NPO except ice chips, meds crushed in applesauce pending reassessment tomorrow  Instrumentation: Instrumental swallow study pending clinical  "progress  Medication: Crush with applesauce, as appropriate  Supervision: 1:1 feeding with constant supervision  Positioning: Fully upright and midline during oral intake  Risk Management :  (TBD pending reassessment)  Oral Care: Q4h    SLP Treatment Plan  Treatment Plan: Dysphagia Treatment, Patient/Family/Caregiver Training  SLP Frequency: 4x Per Week  Estimated Duration: Until Therapy Goals Met    Anticipated Discharge Needs  Discharge Recommendations: Recommend post-acute placement for additional speech therapy services prior to discharge home  Therapy Recommendations Upon DC: Dysphagia Training, Patient / Family / Caregiver Education    Patient / Family Goals  Patient / Family Goal #1: \"I eat yogurt then I walk my dog. I come home and have eggs and toast.\"  Goal #1 Outcome: Goal not met  Short Term Goals  Short Term Goal # 1: Patient will consume PU4/TN0 diet with 1:1 feeding with no overt s/sx of aspiration or decline in respiratory status.  Goal Outcome # 1: Goal not met  Short Term Goal # 2: Patient will participate in PO trials with SLP to determine readiness for oral diet vs instrumental swallow study.      Gifty Ca MS,CCC-SLP  "

## 2024-12-04 NOTE — PROGRESS NOTES
[facility] Medicine Daily Progress Note    Date of Service  12/3/2024    Chief Complaint  Denisse Abel is a 64 y.o. female admitted 11/27/2024 with   Chief Complaint   Patient presents with    ALOC     Hospital Course  Patient was brought to the hospital by EMS after she was able to call 911 but unable to give any history.  Paramedics found her disheveled, confused malnourished and brought her to the emergency room.  Patient with history of alcohol abuse and concern for possible Warnicke's encephalopathy versus acute alcohol withdrawal.  Patient on admission was unable to give any information but when she got up to the floor bedside nurse said that she was coherent and was doing better.  She denied any recent drinking but then was started on Librium and since that time has been very sedated.  I have discontinued the Librium and will continue with CIWA protocol if needed but she is not looking like acute alcohol withdrawal at this point.  Will continue with lactulose for her cirrhosis and ascites.  Potassium markedly low this morning and received IV potassium with a repeat level 3.0 and will continue with oral potassium supplementation.  I have also started the patient on aggressive IV thiamine for concern of Warnicke's.    Interval Problem Update  Patient stated she has been feeling weak for the last 2 months now.  She has had significant decrease in eating at home.  She does live at home alone with her dog.  She does have family members but her sister lives in [place].  I reviewed patient's EEG today, there is a finding of left temporal abnormal waveforms, induced with photic stimulation.  Patient may have focal seizures in this region.  He has diffuse slowing suggestive of cerebral dysfunction.   I discussed with patient that this may be a partial seizures, she agreed to starting loading dose of IV Keppra and oral Keppra.  I reviewed her labs, sodium 127 from 127, 124, 127  Patient apparently choked this  morning while eating breakfast, she did not require Heimlich maneuver but did require help to get food out.  Speech therapy evaluate patient and recommended one-to-one feeding, puréed solids and thin liquids.  Postacute placement needed for ongoing speech therapy services.   I ordered a chest x-ray, there was no findings of aspiration  Patient reports a history of cirrhosis, follows with [facility] Dr. Anna.  She was mentioning she had a history of ascites.  I tried calling Leesa Addy, patient had quit alcohol one year ago. L3-L5 severe spinal damage, was supposed to have a procedure, intercept nerve ablation. Withdrawal from Norco pain medications, patient was addicted to pain meds.  Patient stopped trying to eat at home.   Urine showing Klebsiella oxytoca, resistant to IV Ancef, she is allergic to sulfa and PCNs. I am starting IV Ceftriaxone.    I have discussed this patient's plan of care and discharge plan at IDT rounds today with Case Management, Nursing, Nursing leadership, and other members of the IDT team.    Consultants/Specialty  none    Code Status  Full Code    Disposition  The patient is not medically cleared for discharge to home or a post-acute facility.  Anticipate discharge to: skilled nursing facility    I have placed the appropriate orders for post-discharge needs.    Review of Systems  Review of Systems   Constitutional:  Positive for malaise/fatigue and weight loss.        + Decreased eating   Gastrointestinal:  Negative for abdominal pain.   Neurological:  Positive for weakness.        Physical Exam  Temp:  [36.3 °C (97.4 °F)-37.1 °C (98.8 °F)] 36.4 °C (97.6 °F)  Pulse:  [] 86  Resp:  [14-18] 16  BP: (108-117)/(59-67) 114/62  SpO2:  [94 %-99 %] 99 %    Physical Exam  Vitals and nursing note reviewed.   Constitutional:       General: She is not in acute distress.     Appearance: She is not ill-appearing.      Comments: Frail appearing female   HENT:      Head:  Normocephalic and atraumatic.      Comments: Temporal muscle wasting positive     Mouth/Throat:      Mouth: Mucous membranes are dry.      Pharynx: No oropharyngeal exudate.   Eyes:      General: No scleral icterus.     Pupils: Pupils are equal, round, and reactive to light.      Comments: Doll eye appearance   Cardiovascular:      Rate and Rhythm: Normal rate and regular rhythm.      Pulses: Normal pulses.      Heart sounds: Normal heart sounds. No murmur heard.  Pulmonary:      Effort: Pulmonary effort is normal. No respiratory distress.      Breath sounds: Normal breath sounds. No wheezing.   Abdominal:      General: Abdomen is flat. Bowel sounds are normal. There is no distension.      Palpations: Abdomen is soft.      Tenderness: There is no abdominal tenderness. There is no guarding.   Musculoskeletal:         General: No swelling or tenderness.      Cervical back: Normal range of motion. No rigidity.      Comments: Sarcopenic. Bilateral hand muscle atrophy noted.   Skin:     General: Skin is warm.      Capillary Refill: Capillary refill takes less than 2 seconds.      Coloration: Skin is not jaundiced.      Findings: No erythema.   Neurological:      Mental Status: She is alert and oriented to person, place, and time. Mental status is at baseline.      Motor: Weakness present.   Psychiatric:         Mood and Affect: Mood normal.         Behavior: Behavior normal.         Thought Content: Thought content normal.         Judgment: Judgment normal.         Fluids    Intake/Output Summary (Last 24 hours) at 12/3/2024 1910  Last data filed at 12/3/2024 0943  Gross per 24 hour   Intake 440 ml   Output 350 ml   Net 90 ml        Laboratory  Recent Labs     12/01/24  0227 12/02/24  0119 12/03/24  0117   WBC 9.0 11.3* 10.3   RBC 3.55* 3.49* 3.53*   HEMOGLOBIN 9.3* 9.1* 9.2*   HEMATOCRIT 27.6* 27.3* 27.8*   MCV 77.7* 78.2* 78.8*   MCH 26.2* 26.1* 26.1*   MCHC 33.7 33.3 33.1   RDW 63.8* 64.7* 64.8*   PLATELETCT 247 240  229   MPV 11.0 11.2 10.7     Recent Labs     12/01/24  0227 12/02/24  0119 12/03/24  0117   SODIUM 124* 127* 127*   POTASSIUM 3.7 4.2 4.3   CHLORIDE 89* 92* 91*   CO2 20 21 22   GLUCOSE 120* 113* 121*   BUN 52* 49* 45*   CREATININE 1.56* 1.55* 1.33   CALCIUM 10.1 10.0 9.9                   Imaging  DX-CHEST-LIMITED (1 VIEW)   Final Result      No evidence of acute cardiopulmonary process.      MR-BRAIN-W/O   Final Result      1.  No evidence of acute territorial infarct, intracranial hemorrhage or mass lesion.   2.  Mild diffuse cerebral and cerebellar substance loss.   3.  Mild microangiopathic ischemic change versus demyelination or gliosis.      CT-ABDOMEN-PELVIS W/O   Final Result      1.  Small umbilical hernia which contains small intestine. Small intestine is dilated proximal to that hernia and decompressed distally consistent with resultant small bowel obstruction.      2.  Again seen cirrhotic appearance of the liver with small metallic ascites within the abdomen and pelvis and splenomegaly consistent with portal hypertension.      3.  Gallstones within the gallbladder.      CT-HEAD W/O   Final Result      No evidence of acute intracranial process.               DX-CHEST-PORTABLE (1 VIEW)   Final Result      No evidence of acute cardiopulmonary process.           Assessment/Plan  * Alcoholic encephalopathy (HCC)- (present on admission)  Assessment & Plan  Patient came in encephalopathic but after administering care in the emergency room brought up to the floor she started showing improvement with her mental status.  Librium was given and likely resulted in worsening encephalopathy rather than improvement    Mental state waxing and waning  I reviewed patient's EEG today, there is a finding of left temporal abnormal waveforms, induced with photic stimulation.  Patient may have focal seizures in this region.  He has diffuse slowing suggestive of cerebral dysfunction.   I discussed with patient that this may be  a partial seizures, she agreed to starting loading dose of IV Keppra and oral Kep    Partial seizure disorder (HCC)- (present on admission)  Assessment & Plan  I reviewed patient's EEG today, there is a finding of left temporal abnormal waveforms, induced with photic stimulation.  Patient may have focal seizures in this region.  He has diffuse slowing suggestive of cerebral dysfunction.   I discussed with patient that this may be a partial seizures, she agreed to starting loading dose of IV Keppra and oral Keppra.    Cystitis- (present on admission)  Assessment & Plan  Pt c/o abdominal pain  Urine showing Klebsiella oxytoca, resistant to IV Ancef, she is allergic to sulfa and PCNs. I am starting IV Ceftriaxone.    CKD (chronic kidney disease) stage 4, GFR 15-29 ml/min (HCC)- (present on admission)  Assessment & Plan  Creatinine was 2.23 on admission  Creatinine improved to 1.33    Esophageal varices without bleeding (HCC)- (present on admission)  Assessment & Plan  Omeprazole  Hx of grade 2 esophageal varices.    Severe protein-calorie malnutrition (HCC)- (present on admission)  Assessment & Plan  Dietitian agreed patient meets Aspen criteria  Continue diet supplements    Anemia due to chronic kidney disease- (present on admission)  Assessment & Plan  Hemoglobin 9.2, platelets 229    Liver cirrhosis (HCC)- (present on admission)  Assessment & Plan  Chronic liver cirrhosis secondary to alcohol abuse  Monitor liver function tests  Optimize diuresis  Hold lactulose - copious amounts of diarrhea  Monitor magnesium and phosphorus levels    Follows with [facility]    Hypokalemia- (present on admission)  Assessment & Plan  Potassium 4.3    Hyponatremia- (present on admission)  Assessment & Plan  Sodium 127, from 127, 124, 127  Monitoring    Chronic combined systolic and diastolic heart failure (HCC)- (present on admission)  Assessment & Plan  Monitor fluid status  Monitor daily weights    Patient's 1/22/2024 echocardiogram showed  LVEF of 40%.  There was a ascites noted.  Grade 2 diastolic dysfunction noted.  Severely dilated LA, mild mitral regurgitation, moderate aortic valve stenosis.  Her last SEFERINO on 9/6/2024 showed trace aortic insufficiency with a moderate aortic valve stenosis.    Heart murmur- (present on admission)  Assessment & Plan  Chronic moderate aortic stenosis  Eventually the patient may need surgical intervention such as a TAVR.  Will need outpatient cardiology    Stage 3b chronic kidney disease- (present on admission)  Assessment & Plan  chronic    HTN (hypertension), benign- (present on admission)  Assessment & Plan  Patient is on spironolactone  As needed clonidine  Normotensive at this time         VTE prophylaxis:    heparin ppx      I have performed a physical exam and reviewed and updated ROS and Plan today (12/3/2024). In review of yesterday's note (12/2/2024), there are no changes except as documented above.      Total time spent 51 minutes. I spent greater than 50% of the time for patient care, including unit/floor time, multiple face-to-face encounters with the patient, counseling on treatment plan and discussion with bedside RN.  Further, I spent time on my own review of patient's overnight events, RN notes, imaging and lab analysis, and developing my assessment and plan above.  In addition, working with , social workers, and Charge RN on case coordination on this date.    This note was generated using voice recognition software which has a chance of producing errors of grammar and content.  I have made every reasonable attempt to find and correct any errors, but it should be expected that some may not be found prior to finalization of this note.

## 2024-12-04 NOTE — PROGRESS NOTES
Telemetry Shift Summary     Rhythm: SR  Rate: 75-88  Measurements: .16/.08/.32  Ectopy (reported by Monitor Tech): rPVC     Normal Values  Rhythm: Sinus  HR:   Measurements: 0.12-0.20/0.06-0.10/0.30-0.52

## 2024-12-05 ENCOUNTER — APPOINTMENT (OUTPATIENT)
Dept: RADIOLOGY | Facility: MEDICAL CENTER | Age: 64
DRG: 056 | End: 2024-12-05
Attending: INTERNAL MEDICINE
Payer: MEDICARE

## 2024-12-05 PROBLEM — R83.8 ELEVATED CSF PROTEIN: Status: ACTIVE | Noted: 2024-12-05

## 2024-12-05 LAB
ALBUMIN SERPL BCP-MCNC: 3.4 G/DL (ref 3.2–4.9)
ALP SERPL-CCNC: 138 U/L (ref 30–99)
ALT SERPL-CCNC: 8 U/L (ref 2–50)
AMMONIA PLAS-SCNC: 43 UMOL/L (ref 11–45)
APTT PPP: 35.4 SEC (ref 24.7–36)
AST SERPL-CCNC: 16 U/L (ref 12–45)
BILIRUB CONJ SERPL-MCNC: <0.2 MG/DL (ref 0.1–0.5)
BILIRUB INDIRECT SERPL-MCNC: ABNORMAL MG/DL (ref 0–1)
BILIRUB SERPL-MCNC: 0.4 MG/DL (ref 0.1–1.5)
BUN SERPL-MCNC: 44 MG/DL (ref 8–22)
CALCIUM ALBUM COR SERPL-MCNC: 10.7 MG/DL (ref 8.5–10.5)
CALCIUM SERPL-MCNC: 10.2 MG/DL (ref 8.4–10.2)
CHLORIDE SERPL-SCNC: 97 MMOL/L (ref 96–112)
CO2 SERPL-SCNC: 21 MMOL/L (ref 20–33)
CREAT SERPL-MCNC: 1.17 MG/DL (ref 0.5–1.4)
ERYTHROCYTE [DISTWIDTH] IN BLOOD BY AUTOMATED COUNT: 67.6 FL (ref 35.9–50)
GFR SERPLBLD CREATININE-BSD FMLA CKD-EPI: 51 ML/MIN/1.73 M 2
GLUCOSE BLD STRIP.AUTO-MCNC: 111 MG/DL (ref 65–99)
GLUCOSE BLD STRIP.AUTO-MCNC: 113 MG/DL (ref 65–99)
GLUCOSE BLD STRIP.AUTO-MCNC: 121 MG/DL (ref 65–99)
GLUCOSE BLD STRIP.AUTO-MCNC: 230 MG/DL (ref 65–99)
GLUCOSE BLD STRIP.AUTO-MCNC: 242 MG/DL (ref 65–99)
GLUCOSE SERPL-MCNC: 104 MG/DL (ref 65–99)
HCT VFR BLD AUTO: 34.9 % (ref 37–47)
HGB BLD-MCNC: 11 G/DL (ref 12–16)
INR PPP: 1.13 (ref 0.87–1.13)
MAGNESIUM SERPL-MCNC: 2 MG/DL (ref 1.5–2.5)
MCH RBC QN AUTO: 25.8 PG (ref 27–33)
MCHC RBC AUTO-ENTMCNC: 31.5 G/DL (ref 32.2–35.5)
MCV RBC AUTO: 81.9 FL (ref 81.4–97.8)
PHOSPHATE SERPL-MCNC: 2.8 MG/DL (ref 2.5–4.5)
PLATELET # BLD AUTO: 231 K/UL (ref 164–446)
PMV BLD AUTO: 10.8 FL (ref 9–12.9)
POTASSIUM SERPL-SCNC: 4.4 MMOL/L (ref 3.6–5.5)
PROT SERPL-MCNC: 7.7 G/DL (ref 6–8.2)
PROTHROMBIN TIME: 14.9 SEC (ref 12–14.6)
RBC # BLD AUTO: 4.26 M/UL (ref 4.2–5.4)
SODIUM SERPL-SCNC: 132 MMOL/L (ref 135–145)
WBC # BLD AUTO: 4.3 K/UL (ref 4.8–10.8)

## 2024-12-05 PROCEDURE — 84155 ASSAY OF PROTEIN SERUM: CPT

## 2024-12-05 PROCEDURE — A9270 NON-COVERED ITEM OR SERVICE: HCPCS | Performed by: INTERNAL MEDICINE

## 2024-12-05 PROCEDURE — 97530 THERAPEUTIC ACTIVITIES: CPT

## 2024-12-05 PROCEDURE — 85730 THROMBOPLASTIN TIME PARTIAL: CPT

## 2024-12-05 PROCEDURE — 84450 TRANSFERASE (AST) (SGOT): CPT

## 2024-12-05 PROCEDURE — 94760 N-INVAS EAR/PLS OXIMETRY 1: CPT

## 2024-12-05 PROCEDURE — 74230 X-RAY XM SWLNG FUNCJ C+: CPT

## 2024-12-05 PROCEDURE — 700111 HCHG RX REV CODE 636 W/ 250 OVERRIDE (IP): Mod: JZ | Performed by: INTERNAL MEDICINE

## 2024-12-05 PROCEDURE — 84460 ALANINE AMINO (ALT) (SGPT): CPT

## 2024-12-05 PROCEDURE — 700102 HCHG RX REV CODE 250 W/ 637 OVERRIDE(OP): Performed by: INTERNAL MEDICINE

## 2024-12-05 PROCEDURE — 700102 HCHG RX REV CODE 250 W/ 637 OVERRIDE(OP): Performed by: HOSPITALIST

## 2024-12-05 PROCEDURE — 82962 GLUCOSE BLOOD TEST: CPT | Mod: 91

## 2024-12-05 PROCEDURE — 700111 HCHG RX REV CODE 636 W/ 250 OVERRIDE (IP): Performed by: INTERNAL MEDICINE

## 2024-12-05 PROCEDURE — 84075 ASSAY ALKALINE PHOSPHATASE: CPT

## 2024-12-05 PROCEDURE — 86596 VOLTAGE-GTD CA CHNL ANTB EA: CPT

## 2024-12-05 PROCEDURE — 97112 NEUROMUSCULAR REEDUCATION: CPT

## 2024-12-05 PROCEDURE — 86038 ANTINUCLEAR ANTIBODIES: CPT

## 2024-12-05 PROCEDURE — 82140 ASSAY OF AMMONIA: CPT

## 2024-12-05 PROCEDURE — 36415 COLL VENOUS BLD VENIPUNCTURE: CPT

## 2024-12-05 PROCEDURE — 86042 ACETYLCHOLN RCPTR BLCKG ANTB: CPT

## 2024-12-05 PROCEDURE — A9270 NON-COVERED ITEM OR SERVICE: HCPCS | Performed by: HOSPITALIST

## 2024-12-05 PROCEDURE — 85027 COMPLETE CBC AUTOMATED: CPT

## 2024-12-05 PROCEDURE — 86041 ACETYLCHOLN RCPTR BNDNG ANTB: CPT

## 2024-12-05 PROCEDURE — 700105 HCHG RX REV CODE 258: Performed by: INTERNAL MEDICINE

## 2024-12-05 PROCEDURE — 85610 PROTHROMBIN TIME: CPT

## 2024-12-05 PROCEDURE — 99233 SBSQ HOSP IP/OBS HIGH 50: CPT | Performed by: INTERNAL MEDICINE

## 2024-12-05 PROCEDURE — 83735 ASSAY OF MAGNESIUM: CPT

## 2024-12-05 PROCEDURE — 86052 AQUAPORIN-4 ANTB CBA EACH: CPT

## 2024-12-05 PROCEDURE — 83930 ASSAY OF BLOOD OSMOLALITY: CPT

## 2024-12-05 PROCEDURE — 86366 MUSCLE-SPECIFIC KINASE ANTB: CPT

## 2024-12-05 PROCEDURE — 92526 ORAL FUNCTION THERAPY: CPT

## 2024-12-05 PROCEDURE — 80069 RENAL FUNCTION PANEL: CPT

## 2024-12-05 PROCEDURE — 97535 SELF CARE MNGMENT TRAINING: CPT

## 2024-12-05 PROCEDURE — 770020 HCHG ROOM/CARE - TELE (206)

## 2024-12-05 PROCEDURE — 82247 BILIRUBIN TOTAL: CPT

## 2024-12-05 PROCEDURE — 92611 MOTION FLUOROSCOPY/SWALLOW: CPT

## 2024-12-05 PROCEDURE — 82248 BILIRUBIN DIRECT: CPT

## 2024-12-05 RX ORDER — DEXTROSE MONOHYDRATE AND SODIUM CHLORIDE 5; .9 G/100ML; G/100ML
INJECTION, SOLUTION INTRAVENOUS CONTINUOUS
Status: DISCONTINUED | OUTPATIENT
Start: 2024-12-05 | End: 2024-12-05

## 2024-12-05 RX ORDER — THIAMINE HYDROCHLORIDE 100 MG/ML
100 INJECTION, SOLUTION INTRAMUSCULAR; INTRAVENOUS DAILY
Status: COMPLETED | OUTPATIENT
Start: 2024-12-05 | End: 2024-12-07

## 2024-12-05 RX ORDER — MIRTAZAPINE 15 MG/1
15 TABLET, ORALLY DISINTEGRATING ORAL
Status: DISCONTINUED | OUTPATIENT
Start: 2024-12-05 | End: 2024-12-09 | Stop reason: HOSPADM

## 2024-12-05 RX ADMIN — MIRTAZAPINE 15 MG: 15 TABLET, ORALLY DISINTEGRATING ORAL at 20:40

## 2024-12-05 RX ADMIN — ACETAMINOPHEN 650 MG: 325 TABLET ORAL at 20:46

## 2024-12-05 RX ADMIN — Medication 100 MG: at 05:32

## 2024-12-05 RX ADMIN — METHYLPREDNISOLONE SODIUM SUCCINATE 1000 MG: 1 INJECTION, POWDER, LYOPHILIZED, FOR SOLUTION INTRAMUSCULAR; INTRAVENOUS at 13:07

## 2024-12-05 RX ADMIN — INSULIN LISPRO 2 UNITS: 100 INJECTION, SOLUTION INTRAVENOUS; SUBCUTANEOUS at 17:13

## 2024-12-05 RX ADMIN — THIAMINE HYDROCHLORIDE 100 MG: 100 INJECTION, SOLUTION INTRAMUSCULAR; INTRAVENOUS at 10:35

## 2024-12-05 RX ADMIN — HEPARIN SODIUM 5000 UNITS: 5000 INJECTION, SOLUTION INTRAVENOUS; SUBCUTANEOUS at 20:40

## 2024-12-05 RX ADMIN — DEXTROAMPHETAMINE SACCHARATE, AMPHETAMINE ASPARTATE, DEXTROAMPHETAMINE SULFATE, AMPHETAMINE SULFATE TABLETS, 10 MG,CLL 10 MG: 2.5; 2.5; 2.5; 2.5 TABLET ORAL at 05:32

## 2024-12-05 RX ADMIN — FOLIC ACID 1 MG: 1 TABLET ORAL at 05:32

## 2024-12-05 RX ADMIN — PANTOPRAZOLE SODIUM 40 MG: 40 INJECTION, POWDER, FOR SOLUTION INTRAVENOUS at 05:32

## 2024-12-05 RX ADMIN — DEXTROSE AND SODIUM CHLORIDE: 5; 900 INJECTION, SOLUTION INTRAVENOUS at 10:33

## 2024-12-05 RX ADMIN — NYSTATIN 500000 UNITS: 100000 SUSPENSION ORAL at 17:09

## 2024-12-05 RX ADMIN — INSULIN LISPRO 2 UNITS: 100 INJECTION, SOLUTION INTRAVENOUS; SUBCUTANEOUS at 20:41

## 2024-12-05 RX ADMIN — CEFTRIAXONE SODIUM 1000 MG: 1 INJECTION, POWDER, FOR SOLUTION INTRAMUSCULAR; INTRAVENOUS at 17:10

## 2024-12-05 RX ADMIN — NYSTATIN 500000 UNITS: 100000 SUSPENSION ORAL at 08:50

## 2024-12-05 RX ADMIN — NYSTATIN 500000 UNITS: 100000 SUSPENSION ORAL at 20:41

## 2024-12-05 RX ADMIN — NYSTATIN 500000 UNITS: 100000 SUSPENSION ORAL at 13:37

## 2024-12-05 ASSESSMENT — ENCOUNTER SYMPTOMS
WEAKNESS: 1
WEIGHT LOSS: 1
ABDOMINAL PAIN: 0

## 2024-12-05 ASSESSMENT — COGNITIVE AND FUNCTIONAL STATUS - GENERAL
DRESSING REGULAR UPPER BODY CLOTHING: A LOT
SUGGESTED CMS G CODE MODIFIER DAILY ACTIVITY: CL
MOVING TO AND FROM BED TO CHAIR: TOTAL
WALKING IN HOSPITAL ROOM: TOTAL
EATING MEALS: A LOT
HELP NEEDED FOR BATHING: A LOT
MOVING FROM LYING ON BACK TO SITTING ON SIDE OF FLAT BED: A LOT
PERSONAL GROOMING: A LOT
TOILETING: TOTAL
CLIMB 3 TO 5 STEPS WITH RAILING: TOTAL
SUGGESTED CMS G CODE MODIFIER MOBILITY: CM
DAILY ACTIVITIY SCORE: 10
MOBILITY SCORE: 8
STANDING UP FROM CHAIR USING ARMS: TOTAL
DRESSING REGULAR LOWER BODY CLOTHING: TOTAL
TURNING FROM BACK TO SIDE WHILE IN FLAT BAD: A LOT

## 2024-12-05 ASSESSMENT — GAIT ASSESSMENTS: GAIT LEVEL OF ASSIST: UNABLE TO PARTICIPATE

## 2024-12-05 ASSESSMENT — PAIN DESCRIPTION - PAIN TYPE
TYPE: ACUTE PAIN
TYPE: ACUTE PAIN

## 2024-12-05 ASSESSMENT — FIBROSIS 4 INDEX: FIB4 SCORE: 1.616244071283537198

## 2024-12-05 NOTE — ASSESSMENT & PLAN NOTE
Patient is having repeat dysphagia  I discussed with speech therapist, patient was improving in her strength.  Patient went for MBSS, showed mild oropharyngeal dysphagia  Continue purée with thin liquid diet.  Repeat speech therapy evaluation

## 2024-12-05 NOTE — PROGRESS NOTES
Telemetry Shift Summary     Rhythm: ST/SR  Rate:   Measurements: .16/.08/.34  Ectopy (reported by Monitor Tech): none     Normal Values  Rhythm: Sinus  HR:   Measurements: 0.12-0.20/0.06-0.10/0.30-0.52

## 2024-12-05 NOTE — THERAPY
Physical Therapy   Daily Treatment     Patient Name: Denisse Abel  Age:  64 y.o., Sex:  female  Medical Record #: [id number]  Today's Date: 12/5/2024     Precautions  Precautions: Fall Risk;Swallow Precautions    Assessment    Pt is progressing slower than expected with therapy and continues to demonstrate with poor activity tolerance and significant weakness. Session was primarily focused on neuromusclar re-education and pt was provided with frequent weight shifts, faciliation, postural faciliation, and compensatory strategies to improve seated balance. Pt demonstrated with frequent L lateral lean LOB and poor ability to maintain dynamic balance. Pt was able to maintain static balance for about 20-30 seconds. As fatigue inc pt demonstrated with poor seated balance. Attempted sit<>stands, however, pt required total assist for upright standing posture and was unable to maintain for long durations due to fatigue. Recommend post-acute placement for continued physical therapy services prior to discharge home.       Plan    Treatment Plan Status: (P) Continue Current Treatment Plan  Type of Treatment: Bed Mobility, Equipment, Gait Training, Manual Therapy, Neuro Re-Education / Balance, Self Care / Home Evaluation, Stair Training, Therapeutic Activities, Therapeutic Exercise  Treatment Frequency: 3 Times per Week  Treatment Duration: Until Therapy Goals Met    DC Equipment Recommendations: (P) Unable to determine at this time  Discharge Recommendations: (P) Recommend post-acute placement for additional physical therapy services prior to discharge home    Objective       12/05/24 1202   Precautions   Precautions Fall Risk;Swallow Precautions   Pain 0 - 10 Group   Therapist Pain Assessment Nurse Notified;0   Cognition    Cognition / Consciousness X   Speech/ Communication Delayed Responses   Level of Consciousness Alert   Safety Awareness Impaired   Attention Impaired   Initiation Impaired   Comments continues to be  slow responding and requires inc time for sequencing and processing   Strength Lower Body   Gross Strength Generalized Weakness, Equal Bilaterally   Neuro-Muscular Treatments   Neuro-Muscular Treatments Anterior weight shift;Weight Shift Right;Weight Shift Left;Postural Changes;Postural Facilitation   Comments pt provided with frequent weight shits, anterior weight shifts and postural faciliation to maintain sitting posture   Balance   Sitting Balance (Static) Fair -   Sitting Balance (Dynamic) Poor +   Standing Balance (Static) Trace +   Standing Balance (Dynamic) Dependent   Weight Shift Sitting Poor   Weight Shift Standing Absent   Skilled Intervention Verbal Cuing;Compensatory Strategies;Sequencing;Postural Facilitation;Facilitation   Comments presents with L lateral lean and LOB upon sitting EOB. Able to maintain seated balance for about 30 seconds, however, as fatigue inc pt tends to lose balance   Bed Mobility    Supine to Sit Moderate Assist   Sit to Supine Maximal Assist   Scooting Maximal Assist   Skilled Intervention Verbal Cuing;Sequencing;Compensatory Strategies   Comments HOB elevated and rails up   Gait Analysis   Gait Level Of Assist Unable to Participate   Weight Bearing Status fwb   Functional Mobility   Sit to Stand Total Assist   Mobility EOB, sit<>stand attempt x 2, seated balance, and back to bed   Skilled Intervention Verbal Cuing;Facilitation;Compensatory Strategies   6 Clicks Assessment - How much HELP from from another person do you currently need... (If the patient hasn't done an activity recently, how much help from another person do you think he/she would need if he/she tried?)   Turning from your back to your side while in a flat bed without using bedrails? 2   Moving from lying on your back to sitting on the side of a flat bed without using bedrails? 2   Moving to and from a bed to a chair (including a wheelchair)? 1   Standing up from a chair using your arms (e.g., wheelchair, or  bedside chair)? 1   Walking in hospital room? 1   Climbing 3-5 steps with a railing? 1   6 clicks Mobility Score 8   Activity Tolerance   Sitting in Chair NT   Sitting Edge of Bed 10 mins   Standing 10 seconds   Comments limited by fatigue and weakness   Patient / Family Goals    Patient / Family Goal #1 none stated   Short Term Goals    Short Term Goal # 1 pt will go supine<>sit w/hob elevated and rails up w/Min a in 6tx   Goal Outcome # 1 Progressing slower than expected   Short Term Goal # 2 pt will go sit<>stand w/fww w/Min A in 6tx   Goal Outcome # 2 Progressing slower than expected   Short Term Goal # 3 pt will transfer bed<>chair w/fww w/Mod A in 6tx   Goal Outcome # 3 Progressing slower than expected   Short Term Goal # 4 pt will ambulate for 10 ft w/fww w/Mod A in 6tx   Goal Outcome # 4 Goal not met   Education Group   Role of Physical Therapist Patient Response Patient;Acceptance;Explanation;Demonstration;Verbal Demonstration;Action Demonstration   Physical Therapy Treatment Plan   Physical Therapy Treatment Plan Continue Current Treatment Plan   Anticipated Discharge Equipment and Recommendations   DC Equipment Recommendations Unable to determine at this time   Discharge Recommendations Recommend post-acute placement for additional physical therapy services prior to discharge home   Interdisciplinary Plan of Care Collaboration   IDT Collaboration with  Nursing   Patient Position at End of Therapy In Bed;Bed Alarm On;Tray Table within Reach;Call Light within Reach;Phone within Reach   Collaboration Comments aware of visit and recs   Session Information   Date / Session Number  12/5-1 (2/3, 12/5)

## 2024-12-05 NOTE — THERAPY
Speech Language Pathology   Daily Treatment     Patient Name: Denisse Abel  AGE:  64 y.o., SEX:  female  Medical Record #: [id number]  Date of Service: 12/5/2024      Precautions:  Precautions: Fall Risk, Swallow Precautions    Subjective  RN cleared patient for dysphagia tx session. Patient awake, alert, and much more conversant and appropriate than previous tx session with this SLP. Patient NPO/NN pending SLP reassessment and asking for water. Patient oriented x4 but still appearing fatigued and weak.     Assessment  Patient consumed clinician-delivered PO trials of ice, thins via cup sip and straw, liquidized purees, and soft solids. Patient demonstrated appropriate bolus acceptance and sufficient containment. Presumed delayed A-P bolus transit at times with mildly effortful swallow noted, though patient denied this and odynophagia when asked. Mastication was prolonged and fatigue was noted with soft solid textures and patient endorsed this when asked. No s/sx of aspiration noted on any textures trialed.     Clinical Impressions  Patient has presented with waxing/waning mental status, alertness, and oropharyngeal swallow function. Discussed patient's status and pending w/u with RN and MD. Patient would benefit from MBSS to further evaluate oropharyngeal swallow function and r/o aspiration. Patient agreeable. MBSS tentatively planned for 1400 today.     Recommendations  Treatment Completed: Dysphagia Treatment     Dysphagia Treatment  Diet Consistency: NPO pending MBSS today  Instrumentation: VFSS (MBSS)  Medication: Crush with applesauce, as appropriate, Crush with pudding/puree, as appropriate  Supervision: 1:1 feeding with constant supervision  Oral Care: Q4h    SLP Treatment Plan  Treatment Plan: Dysphagia Treatment, Patient/Family/Caregiver Training  SLP Frequency: 4x Per Week  Estimated Duration: Until Therapy Goals Met    Anticipated Discharge Needs  Discharge Recommendations: Recommend post-acute  "placement for additional speech therapy services prior to discharge home  Therapy Recommendations Upon DC: Dysphagia Training, Community Re-Integration, Patient / Family / Caregiver Education    Patient / Family Goals  Patient / Family Goal #1: \"I eat yogurt then I walk my dog. I come home and have eggs and toast.\"  Goal #1 Outcome: Goal not met  Short Term Goals  Short Term Goal # 1: Patient will consume PU4/TN0 diet with 1:1 feeding with no overt s/sx of aspiration or decline in respiratory status.  Goal Outcome # 1: Progressing slower than expected  Short Term Goal # 2: Patient will participate in PO trials with SLP to determine readiness for oral diet vs instrumental swallow study.  Goal Outcome # 2 : Progressing as expected  Short Term Goal # 3: Patient will participate in MBSS to further evaluate oropharyngeal swallow function and r/o aspiration.    Tawanna Polo, SLP  "

## 2024-12-05 NOTE — THERAPY
"   Speech Language Pathology   Videofluoroscopic Swallow Study Evaluation     Patient Name: Denisse Abel  AGE:  64 y.o., SEX:  female  Medical Record #: [id number]  Date of Service: 12/5/2024      History of Present Illness  Pt is a 63 y/o female admitted 11/27 with ALOC and confusion. Found to have alcoholic encephalopathy, liver cirrhosis, esophageal varices w/out bleeding, CKD.   PMHx: alcohol abuse w/ Wernicke's encephalopathy, HF, anemia, CKD, HLD, HTN, liver cirrhosis.   No previous SLP notes found in EMR.      Head CT: negative.   CXR: negative.   Brain MRI:   1.  No evidence of acute territorial infarct, intracranial hemorrhage or mass lesion.  2.  Mild diffuse cerebral and cerebellar substance loss.  3.  Mild microangiopathic ischemic change versus demyelination or gliosis.    Pertinent Information  Current Method of Nutrition: NPO until cleared by speech pathology  Patient Behaviors: Drowsy, Fatigue, Lethargic  Dentition: Good, Natural dentition  Secretion Management: Adequate  Feeding Tube: None  Tracheostomy: No   Factor(s) Affecting Performance: Impaired endurance, Impaired mental status, Impaired command following; AMS with lethargy and appeared to \"zone out\" at times (has had EEG this admit for suspected seizures)    Discussed the risks, benefits, and alternatives of the VFSS procedure. Patient acknowledged and agreed to proceed.    Assessment  Videofluoroscopic Swallow Study was conducted in the Lateral projection(s) to evaluate oropharyngeal swallow function. A radiology tech was present to assist with the procedure.    Positioning: Seated upright in fluoroscopy chair  Anatomic View: WFL  Bolus Administration: SLP, Patient  PO Barium Contrast Trials: Varibar thin, Liquidised mixed with Varibar powder, Varibar pudding, Soft & Bite sized coated with Varibar powder, Regular solid coated with Varibar pudding, Mixed consistency with Varibar powder    Consistency PAS Score Timing Residue Comments "   Thin Liquid 2 Pre Swallow, During swallow Vallecular Residue: Mild (5%-25%)  Pyriform Sinus Residue: Trace (1%-5%) Cup-PAS 2 before and during  Straw-PAS 2 during, PAS 1, PAS 1, PAS 1   Liquidised 2 During Swallow Vallecular Residue: Mild (5%-25%)  Pyriform Sinus Residue: Trace (1%-5%) PAS 2 during x2   Pudding 1 N/A Vallecular Residue: Trace (1%-5%)  Pyriform Sinus Residue: Trace (1%-5%)    Soft & Bite Sized 1 N/A Vallecular Residue: Moderate (25%-50%)  Pyriform Sinus Residue: Mild (5%-25%)    Regular Solid 1 N/A Vallecular Residue: Moderate (25%-50%)  Pyriform Sinus Residue: Trace (1%-5%)    Mixed 1 N/A Vallecular Residue: Moderate (25%-50%)  Pyriform Sinus Residue: Trace (1%-5%)      Penetration-Aspiration Scale (PAS)  1     No contrast enters airway  2     Contrast enters the airway, remains above the vocal folds, and is ejected from the airway (not seen in the airway at the end of the swallow).  3     Contrast enters the airway, remains above the vocal folds, and is not ejected from the airway (is seen in the airway after the swallow).  4     Contrast enters the airway, contacts the vocal folds, and is ejected from the airway.  5     Contrast enters the airway, contacts the vocal folds, and is not ejected from the airway  6     Contrast enters the airway, crosses the plane of the vocal folds, and is ejected from the airway.  7     Contrast enters the airway, crosses the plane of the vocal folds, and is not ejected from the airway despite effort.  8     Contrast enters the airway, crosses the plane of the vocal folds, is not ejected from the airway and there is no response to aspiration.     Oral phase:  Appropriate bolus acceptance and sufficient containment. Timely A-P bolus transit with thin liquids and purees. Mastication and bolus transit prolonged with soft and dry solids and patient appeared to fatigue with trials of same. Trace to mild lingual residue noted.     Pharyngeal phase:  Mild deficits  characterized by: sluggish and incomplete epiglottic inversion, impaired LVC, and reduced BoT retraction. These deficits lead to the followin.) Flash, shallow penetration during the swallow with thins via cup sip and straw and liquidized purees.   2.) Trace to moderate residue in the vallecula and pyriform sinuses after the swallow.     Esophageal phase:   AP view not completed d/t fatigue and AMS; one episode of residue of barium in the upper esophagus noted with liquidized purees in the lateral view.     Compensatory Strategies:  Multiple swallows: INEFFECTIVE in clearing residue  Liquid wash: EFFECTIVE in reducing residue     Severity Rating:   Severity Rating: WINSTON  WINSTON: Mild    Clinical Impressions  The pt presents with a mild oropharyngeal dysphagia, likely acute related to AMS, debility, fatigue, and possible neurological workup. Swallow safety is preserved; swallow efficiency is impaired. Pt appears to be at moderate risk for aspiration PNA d/t AMS, and moderate for malnutrition/dehydration. Diet modification is indicated. Swallow prognosis is fair given AMS and waxing/waning mental status. Patient appears to be a good candidate for behavioral swallow rehabilitation.    Recommendations  Diet Consistency: PU4/TN0 (purees w/ thin liquids)  Medication: Crush with applesauce, as appropriate, Crush with pudding/puree, as appropriate  Supervision: 1:1 feeding with constant supervision  Positioning: Fully upright and midline during oral intake, Remain upright for 30 minutes after oral intake, Meals sitting upright in a chair, as tolerated  Strategies: Small bites/sips, Alternate bites and sips, Slow rate of intake, Multiple swallows (2) per bite/sips, Reduce environmental distractions  Oral Care: Q4h  Additional Instrumentation: None     SLP Treatment Plan  Treatment Plan: Dysphagia Treatment, Patient/Family/Caregiver Training  SLP Frequency: 4x Per Week  Estimated Duration: Until Therapy Goals  "Met    Anticipated Discharge Needs  Discharge Recommendations: Recommend post-acute placement for additional speech therapy services prior to discharge home   Therapy Recommendations Upon DC: Dysphagia Training, Community Re-Integration, Patient / Family / Caregiver Education      Patient / Family Goals  Patient / Family Goal #1: \"I eat yogurt then I walk my dog. I come home and have eggs and toast.\"  Goal #1 Outcome: Goal not met  Short Term Goal # 1: Patient will consume PU4/TN0 diet with 1:1 feeding with no overt s/sx of aspiration or decline in respiratory status.  Goal Outcome # 1: Progressing slower than expected  Short Term Goal # 2: Patient will participate in PO trials with SLP to determine readiness for oral diet vs instrumental swallow study.  Goal Outcome # 2 : Goal met  Short Term Goal # 3: Patient will participate in MBSS to further evaluate oropharyngeal swallow function and r/o aspiration.  Goal Outcome  # 3: Goal met    Tawanna Polo, SLP   "

## 2024-12-05 NOTE — PROGRESS NOTES
Telemetry shift summary    Rhythm: sr  HR: 71-86  Ectopy: r pvc    Measurements: .18 / .08 / .34    Normal values  Rhythm SR  HR   Measurements: 0.12-0.20/0.08-0.10/0.30-0.52

## 2024-12-05 NOTE — PROCEDURES
Procedure Lumbar Puncture    Date/Time: 12/4/2024 4:50 PM    Performed by: Delfino Alfaro M.D.  Authorized by: Delfino Alfaro M.D.    Consent:     Consent obtained:  Written and verbal (Patient gave verbal consent, but too physically weak to sign consent. Consent signed by two physicians.)    Consent given by:  Patient    Risks discussed:  Bleeding, infection, pain, repeat procedure, nerve damage and headache    Alternatives discussed:  No treatment and delayed treatment  Pre-procedure details:     Procedure purpose:  Diagnostic  Sedation:     Sedation type:  None  Anesthesia:     Anesthesia method:  Local infiltration    Local anesthetic:  Lidocaine 1% w/o epi  Procedure details:     Lumbar space:  L3-L4 interspace    Patient position:  L lateral decubitus    Needle gauge:  20    Needle length (in):  3.5    Ultrasound guidance: no      Number of attempts:  3    Tubes of fluid:  4    Total volume (ml):  4  Post-procedure:     Puncture site:  Adhesive bandage applied and direct pressure applied    Patient tolerance of procedure:  Tolerated well, no immediate complications  Comments:      Dr. Natasha Torres was present during entire procedure. I was unable to obtain CSF sample. I asked Dr. Torres to attempt.

## 2024-12-05 NOTE — CARE PLAN
Problem: Pain - Standard  Goal: Alleviation of pain or a reduction in pain to the patient’s comfort goal  Outcome: Progressing     Problem: Knowledge Deficit - Standard  Goal: Patient and family/care givers will demonstrate understanding of plan of care, disease process/condition, diagnostic tests and medications  Outcome: Progressing   The patient is Stable - Low risk of patient condition declining or worsening    Shift Goals  Clinical Goals: Monitor labs and vitals; SLP; monitor blood sugars; monitor mentation  Patient Goals: rest and comfort  Family Goals: MORGAN    Progress made toward(s) clinical / shift goals:  pt educated on plan of care and medications. No questions at this time    Patient is not progressing towards the following goals:

## 2024-12-05 NOTE — CARE PLAN
The patient is Watcher - Medium risk of patient condition declining or worsening    Shift Goals  Clinical Goals: Monitor mentation, monitor vitals and labs  Patient Goals: Comfort, rest, hair washed  Family Goals: POC updates    Progress made toward(s) clinical / shift goals:      Problem: Pain - Standard  Goal: Alleviation of pain or a reduction in pain to the patient’s comfort goal  Outcome: Progressing  Note: RLE knee pain     Problem: Fall Risk  Goal: Patient will remain free from falls  Outcome: Progressing     Problem: Skin Integrity  Goal: Skin integrity is maintained or improved  Outcome: Progressing

## 2024-12-05 NOTE — PROGRESS NOTES
[facility] Medicine Daily Progress Note    Date of Service  12/5/2024    Chief Complaint  Denisse Abel is a 64 y.o. female admitted 11/27/2024 with   Chief Complaint   Patient presents with    ALOC     Hospital Course  Patient was brought to the hospital by EMS after she was able to call 911 but unable to give any history.  Paramedics found her disheveled, confused malnourished and brought her to the emergency room.  Patient with history of alcohol abuse and concern for possible Warnicke's encephalopathy versus acute alcohol withdrawal.  Patient on admission was unable to give any information but when she got up to the floor bedside nurse said that she was coherent and was doing better.  She denied any recent drinking but then was started on Librium and since that time has been very sedated.  I have discontinued the Librium and will continue with CIWA protocol if needed but she is not looking like acute alcohol withdrawal at this point.  Will continue with lactulose for her cirrhosis and ascites.  Potassium markedly low this morning and received IV potassium with a repeat level 3.0 and will continue with oral potassium supplementation.  I have also started the patient on aggressive IV thiamine for concern of Warnicke's.      Interval Problem Update  12/05:  I reviewed patient's labs, WBC 4.3, significantly decreased.  Hemoglobin 11.0, platelets 231.  Sodium 132, potassium 4.4, creatinine 1.17, corrected calcium 10.7 elevated.  Phos 2.8, mag 2.0, ammonia 43.  CSF analysis showing nucleated cells 4, glucose 74, total protein elevated at 62.  Meningitis/encephalitis PCR testing negative.  I discussed with patient, we will trial IV solumedrol 1 gram, as she may have a demyelinating syndrome.  Pending: CSF/serum Oligoclonal bands pending, I ordered serum aquaporin 4, serum myasthenia gravis workup, CSF VDRL  Serum RPR negative  I discussed with speech therapist, patient was improving in her strength.  Patient went  for MBSS, showed mild oropharyngeal dysphagia but patient is being able to transition back to purées with thin liquids.  I discussed with cousin Leesa. Patient was anorexic as a child. Around 40's she had anorexia again.    12/04:  Checking LP  Checking RPR. Pt stated she last had intercourse 7 years ago, she has had multiple partners before her last partner. She did have unprotected intercourse. 01/24 HIV was non-reactive.  I discussed with patient's friend from [place] and friend from [place] at bedside with her permission.  I provided updates.    12/03:  Patient stated she has been feeling weak for the last 2 months now.  She has had significant decrease in eating at home.  She does live at home alone with her dog.  She does have family members but her sister lives in [place].  I reviewed patient's EEG today, there is a finding of left temporal abnormal waveforms, induced with photic stimulation.  Patient may have focal seizures in this region.  He has diffuse slowing suggestive of cerebral dysfunction.   I discussed with patient that this may be a partial seizures, she agreed to starting loading dose of IV Keppra and oral Keppra.  I reviewed her labs, sodium 127 from 127, 124, 127  Patient apparently choked this morning while eating breakfast, she did not require Heimlich maneuver but did require help to get food out.  Speech therapy evaluate patient and recommended one-to-one feeding, puréed solids and thin liquids.  Postacute placement needed for ongoing speech therapy services.   I ordered a chest x-ray, there was no findings of aspiration  Patient reports a history of cirrhosis, follows with [facility] Dr. Anna.  She was mentioning she had a history of ascites.  I tried calling Leesa Daly, patient had quit alcohol one year ago. L3-L5 severe spinal damage, was supposed to have a procedure, intercept nerve ablation. Withdrawal from Norco pain medications, patient was addicted to  pain meds.  Patient stopped trying to eat at home.   Urine showing Klebsiella oxytoca, resistant to IV Ancef, she is allergic to sulfa and PCNs. I am starting IV Ceftriaxone.    I have discussed this patient's plan of care and discharge plan at IDT rounds today with Case Management, Nursing, Nursing leadership, and other members of the IDT team.    Consultants/Specialty  none    Code Status  Full Code    Disposition  The patient is not medically cleared for discharge to home or a post-acute facility.  Anticipate discharge to: skilled nursing facility    I have placed the appropriate orders for post-discharge needs.    Review of Systems  Review of Systems   Constitutional:  Positive for malaise/fatigue and weight loss.        + Decreased eating   Gastrointestinal:  Negative for abdominal pain.   Neurological:  Positive for weakness.        Physical Exam  Temp:  [35.8 °C (96.5 °F)-36.3 °C (97.4 °F)] 36.3 °C (97.4 °F)  Pulse:  [70-81] 70  Resp:  [18] 18  BP: ()/(60-73) 127/70  SpO2:  [97 %-99 %] 97 %    Physical Exam  Vitals and nursing note reviewed.   Constitutional:       General: She is not in acute distress.     Appearance: She is ill-appearing.      Comments: Frail appearing female   HENT:      Head: Normocephalic and atraumatic.      Comments: Temporal muscle wasting positive     Mouth/Throat:      Mouth: Mucous membranes are dry.      Pharynx: No oropharyngeal exudate.   Eyes:      Pupils: Pupils are equal, round, and reactive to light.      Comments: No right lateral eye gaze, normal other eye movements. Mild left eye right nystagmus.   Cardiovascular:      Rate and Rhythm: Normal rate and regular rhythm.      Pulses: Normal pulses.      Heart sounds: Normal heart sounds. No murmur heard.  Pulmonary:      Effort: Pulmonary effort is normal. No respiratory distress.      Breath sounds: Normal breath sounds. No wheezing.   Abdominal:      General: Abdomen is flat. Bowel sounds are normal. There is no  distension.      Palpations: Abdomen is soft.      Tenderness: There is no abdominal tenderness. There is no guarding.   Musculoskeletal:         General: No swelling or tenderness.      Cervical back: Normal range of motion. No rigidity.      Right lower leg: No edema.      Left lower leg: No edema.      Comments: Sarcopenic. Bilateral hand muscle atrophy noted.   Skin:     General: Skin is warm.      Capillary Refill: Capillary refill takes less than 2 seconds.      Coloration: Skin is not jaundiced.      Findings: No erythema.   Neurological:      Mental Status: She is alert and oriented to person, place, and time. Mental status is at baseline.      Cranial Nerves: Cranial nerve deficit present.      Sensory: No sensory deficit.      Motor: Weakness (3/5 BUE, BLE 2/5) present.      Comments: BUE 2/5 strength   Psychiatric:         Mood and Affect: Mood normal.         Behavior: Behavior normal.         Thought Content: Thought content normal.         Judgment: Judgment normal.         Fluids    Intake/Output Summary (Last 24 hours) at 12/5/2024 1843  Last data filed at 12/5/2024 1800  Gross per 24 hour   Intake 240 ml   Output 1000 ml   Net -760 ml        Laboratory  Recent Labs     12/03/24  0117 12/04/24  0316 12/05/24  0144   WBC 10.3 8.9 4.3*   RBC 3.53* 3.46* 4.26   HEMOGLOBIN 9.2* 8.9* 11.0*   HEMATOCRIT 27.8* 27.8* 34.9*   MCV 78.8* 80.3* 81.9   MCH 26.1* 25.7* 25.8*   MCHC 33.1 32.0* 31.5*   RDW 64.8* 64.6* 67.6*   PLATELETCT 229 255 231   MPV 10.7 12.4 10.8     Recent Labs     12/03/24  0117 12/04/24  0316 12/05/24  0144   SODIUM 127* 127* 132*   POTASSIUM 4.3 4.9 4.4   CHLORIDE 91* 92* 97   CO2 22 23 21   GLUCOSE 121* 121* 104*   BUN 45* 47* 44*   CREATININE 1.33 1.28 1.17   CALCIUM 9.9 9.8 10.2     Recent Labs     12/05/24  0144   APTT 35.4   INR 1.13               Imaging  DX-ESOPHAGUS - [id number]   Final Result      DX-CHEST-LIMITED (1 VIEW)   Final Result      No evidence of acute  cardiopulmonary process.      MR-BRAIN-W/O   Final Result      1.  No evidence of acute territorial infarct, intracranial hemorrhage or mass lesion.   2.  Mild diffuse cerebral and cerebellar substance loss.   3.  Mild microangiopathic ischemic change versus demyelination or gliosis.      CT-ABDOMEN-PELVIS W/O   Final Result      1.  Small umbilical hernia which contains small intestine. Small intestine is dilated proximal to that hernia and decompressed distally consistent with resultant small bowel obstruction.      2.  Again seen cirrhotic appearance of the liver with small metallic ascites within the abdomen and pelvis and splenomegaly consistent with portal hypertension.      3.  Gallstones within the gallbladder.      CT-HEAD W/O   Final Result      No evidence of acute intracranial process.               DX-CHEST-PORTABLE (1 VIEW)   Final Result      No evidence of acute cardiopulmonary process.           Assessment/Plan  * Alcoholic encephalopathy (HCC)- (present on admission)  Assessment & Plan  Patient came in encephalopathic but after administering care in the emergency room brought up to the floor she started showing improvement with her mental status.  Librium was given and likely resulted in worsening encephalopathy rather than improvement    Mental state waxing and waning  I reviewed patient's EEG today, there is a finding of left temporal abnormal waveforms, induced with photic stimulation.  Patient may have focal seizures in this region.  He has diffuse slowing suggestive of cerebral dysfunction.     12/4:  Patient was reported to be more lethargic today.  I have now held Keppra, gabapentin, pain medications.  Throughout the day, she had more improvement in her mental state.  She was able to consent for an LP.  She was able to discuss with her friends at bedside today.  I suspect she is oversedated as a possibility for her lower GCS in the mornings.    12/5:  Patient has been more cooperative after  we have held gabapentin, opioids, stop Keppra.  It is unclear this was medication-induced but likely.  Patient still has neurological findings on exam.  As she is able to consent for procedures and understands her goals, I feel that she is showing improvements but very slow.  Will need to continue monitoring.  I am giving more IV thiamine due to her malnutrition.    Elevated CSF protein- (present on admission)  Assessment & Plan  CSF analysis showing nucleated cells 4, glucose 74, total protein elevated at 62.  Meningitis/encephalitis PCR testing negative.  I discussed with patient, we will trial IV solumedrol 1 gram, as she may have a demyelinating syndrome.  Pending: CSF/serum Oligoclonal bands pending, I ordered serum aquaporin 4, serum myasthenia gravis workup, CSF VDRL  Serum RPR negative    Dysphagia- (present on admission)  Assessment & Plan  Patient is having repeat dysphagia  I discussed with speech therapist, patient was improving in her strength.  Patient went for MBSS, showed mild oropharyngeal dysphagia but patient is being able to transition back to purées with thin liquids.    Partial seizure disorder (HCC)- (present on admission)  Assessment & Plan  I reviewed patient's EEG today, there is a finding of left temporal abnormal waveforms, induced with photic stimulation.  Patient may have focal seizures in this region.  He has diffuse slowing suggestive of cerebral dysfunction.     We trialed Keppra but patient may be too affected by side effects.    Continue to hold Keppra.    Cystitis- (present on admission)  Assessment & Plan  Pt c/o abdominal pain  Urine showing Klebsiella oxytoca, resistant to IV Ancef, she is allergic to sulfa and PCNs.  Completing IV ceftriaxone    CKD (chronic kidney disease) stage 4, GFR 15-29 ml/min ([facility])- (present on admission)  Assessment & Plan  Creatinine was 2.23 on admission  Creatinine 1.17  Monitoring closely    Esophageal varices without bleeding ([facility])- (present on  admission)  Assessment & Plan  Change omeprazole to IV Protonix.  Patient is NPO.  Hx of grade 2 esophageal varices.    Severe protein-calorie malnutrition (HCC)- (present on admission)  Assessment & Plan  Dietitian agreed patient meets Aspen criteria  Patient being placed back onto a diet.  Continue diet supplements.    Anemia due to chronic kidney disease- (present on admission)  Assessment & Plan  Hemoglobin 9.2, platelets 229    Liver cirrhosis (HCC)- (present on admission)  Assessment & Plan  Chronic liver cirrhosis secondary to alcohol abuse  Monitor liver function tests  Optimize diuresis  Hold lactulose - copious amounts of diarrhea  Monitor magnesium and phosphorus levels    Follows with [facility]    Hypokalemia- (present on admission)  Assessment & Plan  K 4.4  On spironolactone, hold spironolactone if potassium is greater than 4.4    Hyponatremia- (present on admission)  Assessment & Plan  Sodium 127, from 127, 124, 127  Sodium 132 after patient was NPO.  Patient may be receiving too much free water.  I am placing free water restriction of 500 mL/day, total fluid restriction of 1500 mL/day.    Chronic combined systolic and diastolic heart failure (HCC)- (present on admission)  Assessment & Plan  Patient's 1/22/2024 echocardiogram showed LVEF of 40%.  There was a ascites noted.  Grade 2 diastolic dysfunction noted.  Severely dilated LA, mild mitral regurgitation, moderate aortic valve stenosis.  Her last SEFERINO on 9/6/2024 showed trace aortic insufficiency with a moderate aortic valve stenosis.  Continue spironolactone    Heart murmur- (present on admission)  Assessment & Plan  Chronic moderate aortic stenosis  Eventually the patient may need surgical intervention such as a TAVR.  Will need outpatient cardiology    Stage 3b chronic kidney disease- (present on admission)  Assessment & Plan  chronic    HTN (hypertension), benign- (present on admission)  Assessment & Plan  Continue spironolactone, with holding  parameters  As needed labetalol         VTE prophylaxis:    heparin ppx      I have performed a physical exam and reviewed and updated ROS and Plan today (12/5/2024). In review of yesterday's note (12/4/2024), there are no changes except as documented above.      Total time spent 54 minutes. I spent greater than 50% of the time for patient care, including unit/floor time, multiple face-to-face encounters with the patient, counseling on treatment plan and discussion with bedside RN.  Further, I spent time on my own review of patient's overnight events, RN notes, imaging and lab analysis, and developing my assessment and plan above.  In addition, working with , social workers, and Charge RN on case coordination on this date.    This note was generated using voice recognition software which has a chance of producing errors of grammar and content.  I have made every reasonable attempt to find and correct any errors, but it should be expected that some may not be found prior to finalization of this note.

## 2024-12-05 NOTE — PROCEDURES
Procedures  Procedure Lumbar Puncture    Date/Time: 12/4/2024 5:16 PM    Performed by: Natasha Torres M.D.  Authorized by: Natasha Torres M.D.    Consent:     Consent obtained:  Verbal    Consent given by:  Patient    Risks discussed:  Bleeding, infection, pain, repeat procedure, nerve damage and headache    Alternatives discussed:  No treatment  Pre-procedure details:    Procedure purpose:  Diagnostic  Sedation:     Sedation type:  None  Anesthesia:     Anesthesia method:  Local infiltration    Local anesthetic:  Lidocaine 1% w/o epi  Procedure details:     Lumbar space:  L3-L4 interspace    Patient position:  L lateral decubitus    Needle gauge:  20    Needle length (in):  3.5    Ultrasound guidance: no      Number of attempts:  1    Tubes of fluid:  4    Total volume (ml):  4    Preparation: Patient was prepped and draped in usual sterile fashion    Post-procedure:     Puncture site:  Adhesive bandage applied    Patient tolerance of procedure:  Tolerated with difficulty

## 2024-12-06 ENCOUNTER — APPOINTMENT (OUTPATIENT)
Dept: RADIOLOGY | Facility: MEDICAL CENTER | Age: 64
DRG: 056 | End: 2024-12-06
Attending: INTERNAL MEDICINE
Payer: MEDICARE

## 2024-12-06 PROBLEM — F50.013: Status: ACTIVE | Noted: 2024-12-06

## 2024-12-06 LAB
ALBUMIN SERPL BCP-MCNC: 3.5 G/DL (ref 3.2–4.9)
ALP SERPL-CCNC: 170 U/L (ref 30–99)
ALT SERPL-CCNC: 9 U/L (ref 2–50)
AST SERPL-CCNC: 16 U/L (ref 12–45)
BILIRUB CONJ SERPL-MCNC: <0.2 MG/DL (ref 0.1–0.5)
BILIRUB INDIRECT SERPL-MCNC: ABNORMAL MG/DL (ref 0–1)
BILIRUB SERPL-MCNC: 0.3 MG/DL (ref 0.1–1.5)
BUN SERPL-MCNC: 41 MG/DL (ref 8–22)
CALCIUM ALBUM COR SERPL-MCNC: 10.5 MG/DL (ref 8.5–10.5)
CALCIUM SERPL-MCNC: 10.1 MG/DL (ref 8.4–10.2)
CHLORIDE SERPL-SCNC: 96 MMOL/L (ref 96–112)
CO2 SERPL-SCNC: 21 MMOL/L (ref 20–33)
CREAT SERPL-MCNC: 1.16 MG/DL (ref 0.5–1.4)
ERYTHROCYTE [DISTWIDTH] IN BLOOD BY AUTOMATED COUNT: 63.3 FL (ref 35.9–50)
GFR SERPLBLD CREATININE-BSD FMLA CKD-EPI: 52 ML/MIN/1.73 M 2
GLUCOSE BLD STRIP.AUTO-MCNC: 140 MG/DL (ref 65–99)
GLUCOSE BLD STRIP.AUTO-MCNC: 144 MG/DL (ref 65–99)
GLUCOSE BLD STRIP.AUTO-MCNC: 166 MG/DL (ref 65–99)
GLUCOSE BLD STRIP.AUTO-MCNC: 175 MG/DL (ref 65–99)
GLUCOSE SERPL-MCNC: 161 MG/DL (ref 65–99)
HCT VFR BLD AUTO: 27.3 % (ref 37–47)
HGB BLD-MCNC: 8.9 G/DL (ref 12–16)
MAGNESIUM SERPL-MCNC: 2 MG/DL (ref 1.5–2.5)
MCH RBC QN AUTO: 25.9 PG (ref 27–33)
MCHC RBC AUTO-ENTMCNC: 32.6 G/DL (ref 32.2–35.5)
MCV RBC AUTO: 79.6 FL (ref 81.4–97.8)
NUCLEAR IGG SER QL IA: NORMAL
OSMOLALITY SERPL: 288 MOSM/KG H2O (ref 278–298)
PHOSPHATE SERPL-MCNC: 2 MG/DL (ref 2.5–4.5)
PLATELET # BLD AUTO: 205 K/UL (ref 164–446)
PMV BLD AUTO: 10.2 FL (ref 9–12.9)
POTASSIUM SERPL-SCNC: 5 MMOL/L (ref 3.6–5.5)
PROCALCITONIN SERPL-MCNC: 0.16 NG/ML
PROT SERPL-MCNC: 7.5 G/DL (ref 6–8.2)
RBC # BLD AUTO: 3.43 M/UL (ref 4.2–5.4)
SODIUM SERPL-SCNC: 129 MMOL/L (ref 135–145)
WBC # BLD AUTO: 4.8 K/UL (ref 4.8–10.8)

## 2024-12-06 PROCEDURE — 700117 HCHG RX CONTRAST REV CODE 255: Mod: JZ | Performed by: INTERNAL MEDICINE

## 2024-12-06 PROCEDURE — 72157 MRI CHEST SPINE W/O & W/DYE: CPT

## 2024-12-06 PROCEDURE — 84145 PROCALCITONIN (PCT): CPT

## 2024-12-06 PROCEDURE — A9270 NON-COVERED ITEM OR SERVICE: HCPCS | Performed by: INTERNAL MEDICINE

## 2024-12-06 PROCEDURE — 770020 HCHG ROOM/CARE - TELE (206)

## 2024-12-06 PROCEDURE — 82247 BILIRUBIN TOTAL: CPT

## 2024-12-06 PROCEDURE — 94760 N-INVAS EAR/PLS OXIMETRY 1: CPT

## 2024-12-06 PROCEDURE — 72156 MRI NECK SPINE W/O & W/DYE: CPT

## 2024-12-06 PROCEDURE — 72158 MRI LUMBAR SPINE W/O & W/DYE: CPT

## 2024-12-06 PROCEDURE — 82962 GLUCOSE BLOOD TEST: CPT | Mod: 91

## 2024-12-06 PROCEDURE — 51798 US URINE CAPACITY MEASURE: CPT

## 2024-12-06 PROCEDURE — 84460 ALANINE AMINO (ALT) (SGPT): CPT

## 2024-12-06 PROCEDURE — 99233 SBSQ HOSP IP/OBS HIGH 50: CPT | Performed by: INTERNAL MEDICINE

## 2024-12-06 PROCEDURE — 83735 ASSAY OF MAGNESIUM: CPT

## 2024-12-06 PROCEDURE — 700111 HCHG RX REV CODE 636 W/ 250 OVERRIDE (IP): Performed by: INTERNAL MEDICINE

## 2024-12-06 PROCEDURE — A9579 GAD-BASE MR CONTRAST NOS,1ML: HCPCS | Mod: JZ | Performed by: INTERNAL MEDICINE

## 2024-12-06 PROCEDURE — 84207 ASSAY OF VITAMIN B-6: CPT

## 2024-12-06 PROCEDURE — 700102 HCHG RX REV CODE 250 W/ 637 OVERRIDE(OP): Performed by: INTERNAL MEDICINE

## 2024-12-06 PROCEDURE — 700102 HCHG RX REV CODE 250 W/ 637 OVERRIDE(OP): Performed by: HOSPITALIST

## 2024-12-06 PROCEDURE — 85027 COMPLETE CBC AUTOMATED: CPT

## 2024-12-06 PROCEDURE — 82248 BILIRUBIN DIRECT: CPT

## 2024-12-06 PROCEDURE — 80069 RENAL FUNCTION PANEL: CPT

## 2024-12-06 PROCEDURE — 97602 WOUND(S) CARE NON-SELECTIVE: CPT

## 2024-12-06 PROCEDURE — 84155 ASSAY OF PROTEIN SERUM: CPT

## 2024-12-06 PROCEDURE — 700105 HCHG RX REV CODE 258: Performed by: INTERNAL MEDICINE

## 2024-12-06 PROCEDURE — 84450 TRANSFERASE (AST) (SGOT): CPT

## 2024-12-06 PROCEDURE — 36415 COLL VENOUS BLD VENIPUNCTURE: CPT

## 2024-12-06 PROCEDURE — 84075 ASSAY ALKALINE PHOSPHATASE: CPT

## 2024-12-06 RX ORDER — ACETAMINOPHEN 325 MG/1
650 TABLET ORAL EVERY 6 HOURS PRN
Status: DISCONTINUED | OUTPATIENT
Start: 2024-12-06 | End: 2024-12-09 | Stop reason: HOSPADM

## 2024-12-06 RX ORDER — OXYCODONE HYDROCHLORIDE 5 MG/1
2.5 TABLET ORAL
Status: DISCONTINUED | OUTPATIENT
Start: 2024-12-06 | End: 2024-12-09

## 2024-12-06 RX ORDER — B-COMPLEX WITH VITAMIN C
1 TABLET ORAL DAILY
Status: DISCONTINUED | OUTPATIENT
Start: 2024-12-07 | End: 2024-12-09 | Stop reason: HOSPADM

## 2024-12-06 RX ADMIN — PANTOPRAZOLE SODIUM 40 MG: 40 INJECTION, POWDER, FOR SOLUTION INTRAVENOUS at 05:41

## 2024-12-06 RX ADMIN — MIRTAZAPINE 15 MG: 15 TABLET, ORALLY DISINTEGRATING ORAL at 20:48

## 2024-12-06 RX ADMIN — INSULIN LISPRO 1 UNITS: 100 INJECTION, SOLUTION INTRAVENOUS; SUBCUTANEOUS at 20:53

## 2024-12-06 RX ADMIN — INSULIN LISPRO 1 UNITS: 100 INJECTION, SOLUTION INTRAVENOUS; SUBCUTANEOUS at 18:08

## 2024-12-06 RX ADMIN — FOLIC ACID 1 MG: 1 TABLET ORAL at 09:20

## 2024-12-06 RX ADMIN — NYSTATIN 500000 UNITS: 100000 SUSPENSION ORAL at 18:09

## 2024-12-06 RX ADMIN — METHYLPREDNISOLONE SODIUM SUCCINATE 1000 MG: 1 INJECTION, POWDER, LYOPHILIZED, FOR SOLUTION INTRAMUSCULAR; INTRAVENOUS at 09:31

## 2024-12-06 RX ADMIN — NYSTATIN 500000 UNITS: 100000 SUSPENSION ORAL at 20:48

## 2024-12-06 RX ADMIN — GADOTERIDOL 10 ML: 279.3 INJECTION, SOLUTION INTRAVENOUS at 17:09

## 2024-12-06 RX ADMIN — THIAMINE HYDROCHLORIDE 100 MG: 100 INJECTION, SOLUTION INTRAMUSCULAR; INTRAVENOUS at 09:20

## 2024-12-06 RX ADMIN — HEPARIN SODIUM 5000 UNITS: 5000 INJECTION, SOLUTION INTRAVENOUS; SUBCUTANEOUS at 15:31

## 2024-12-06 RX ADMIN — NYSTATIN 500000 UNITS: 100000 SUSPENSION ORAL at 09:20

## 2024-12-06 RX ADMIN — HEPARIN SODIUM 5000 UNITS: 5000 INJECTION, SOLUTION INTRAVENOUS; SUBCUTANEOUS at 05:41

## 2024-12-06 RX ADMIN — HEPARIN SODIUM 5000 UNITS: 5000 INJECTION, SOLUTION INTRAVENOUS; SUBCUTANEOUS at 22:00

## 2024-12-06 ASSESSMENT — PAIN DESCRIPTION - PAIN TYPE
TYPE: ACUTE PAIN
TYPE: ACUTE PAIN

## 2024-12-06 ASSESSMENT — ENCOUNTER SYMPTOMS
ABDOMINAL PAIN: 0
WEIGHT LOSS: 1
WEAKNESS: 1

## 2024-12-06 ASSESSMENT — FIBROSIS 4 INDEX: FIB4 SCORE: 1.72

## 2024-12-06 NOTE — CARE PLAN
Problem: Knowledge Deficit - Standard  Goal: Patient and family/care givers will demonstrate understanding of plan of care, disease process/condition, diagnostic tests and medications  Outcome: Progressing     Problem: Optimal Care for Alcohol Withdrawal  Goal: Optimal Care for the alcohol withdrawal patient  Outcome: Progressing   The patient is Stable - Low risk of patient condition declining or worsening    Shift Goals  Clinical Goals: Monitor mentation, monitor vitals and labs  Patient Goals: Comfort, rest, hair washed  Family Goals: POC updates    Progress made toward(s) clinical / shift goals:  pt educated on plan of care and medaiations. No questions at this timea    Patient is not progressing towards the following goals:

## 2024-12-06 NOTE — ASSESSMENT & PLAN NOTE
CSF analysis showing nucleated cells 4, glucose 74, total protein elevated at 62.   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  Finishing IV Solu-Medrol today.  Transitioning to prednisone 60 mg plus atovaquone MWF (patient has a sulfa allergy unable to use Bactrim)

## 2024-12-06 NOTE — THERAPY
Occupational Therapy  Daily Treatment     Patient Name: Denisse Abel  Age:  64 y.o., Sex:  female  Medical Record #: [id number]  Today's Date: 12/5/2024     Precautions  Precautions: Fall Risk, Swallow Precautions    Assessment    Pt demonstrated Mod assist supine to EOB sitting, max assist EOB sitting to supine, Poor EOB sitting balance required postural facilitation, Total assist LBCM, Max assist UBCM.  Barriers include weakness, throughout, impaired balance, limited activity tolerance.    Plan    Treatment Plan Status: (P) Continue Current Treatment Plan  Type of Treatment: Self Care / Activities of Daily Living, Adaptive Equipment, Neuro Re-Education / Balance, Therapeutic Exercises, Therapeutic Activity  Treatment Frequency: 3 Times per Week  Treatment Duration: Until Therapy Goals Met    DC Equipment Recommendations: (P) Unable to determine at this time  Discharge Recommendations: (P) Recommend post-acute placement for additional occupational therapy services prior to discharge home    Subjective    Pt was alert and cooperative w/ tx.  Noted delayed response.     Objective       12/05/24 1300    Services   Is patient using  services for this encounter? No   Precautions   Precautions Fall Risk;Swallow Precautions   Vitals   Patient BP Position Sitting   Pulse Oximetry 98 %   O2 (LPM) 0   O2 Delivery Device None - Room Air   Pain   Intervention Rest;Repositioned   Non Verbal Descriptors   Non Verbal Scale  Calm   Cognition    Speech/ Communication Delayed Responses   Level of Consciousness Alert   Attention Impaired   Initiation Impaired   Active ROM Upper Body   Active ROM Upper Body  X   Rt Shoulder Flexion Degrees 30   Rt Forearm Supination Degrees 0  (Neutral)   Lt Shoulder Flexion Degrees 30   Lt Forearm Supination Degrees   (Neutral)   Strength Upper Body   Upper Body Strength  X   Comments weakness bilaterally   Balance   Sitting Balance (Static) Fair -   Sitting Balance  (Dynamic) Poor +   Weight Shift Sitting Poor   Skilled Intervention Verbal Cuing;Tactile Cuing;Postural Facilitation;Facilitation   Bed Mobility    Supine to Sit Moderate Assist   Sit to Supine Maximal Assist   Skilled Intervention Verbal Cuing;Tactile Cuing;Postural Facilitation;Facilitation   Activities of Daily Living   Upper Body Dressing Maximal Assist   Lower Body Dressing Total Assist   Toileting Total Assist   How much help from another person does the patient currently need...   Putting on and taking off regular lower body clothing? 1   Bathing (including washing, rinsing, and drying)? 2   Toileting, which includes using a toilet, bedpan, or urinal? 1   Putting on and taking off regular upper body clothing? 2   Taking care of personal grooming such as brushing teeth? 2   Eating meals? 2   6 Clicks Daily Activity Score 10   Short Term Goals   Short Term Goal # 1 Sup bed mobility (to/from supine/EOB sitting)   Goal Outcome # 1 Progressing slower than expected   Short Term Goal # 2 Mod I UBCM   Goal Outcome # 2 Progressing slower than expected   Short Term Goal # 3 Min assist LBCM via AE/DME PRN   Goal Outcome # 3 Progressing slower than expected   Occupational Therapy Treatment Plan    O.T. Treatment Plan Continue Current Treatment Plan   Anticipated Discharge Equipment and Recommendations   DC Equipment Recommendations Unable to determine at this time   Discharge Recommendations Recommend post-acute placement for additional occupational therapy services prior to discharge home

## 2024-12-06 NOTE — DIETARY
Nutrition Update: Follow-up for PO intake  Day 9 of admit.  Denisse Abel is a 64 y.o. female with admitting DX of Alcoholic encephalopathy (HCC) [G31.2].    Current Diet: Level 4-pureed diet with 500 mL/day free water restriction and 1500 mL/day total fluid restriction. Glucerna supplements TID and Chobani Smoothies TID at snack. Recorded PO intake is still poor at <25% of most meals.     Per nurse, pt may be spilling most of her supplements. She may not be getting that much of it. RD attempted to visit pt x 2 but she was not in her room. Possibly out for MRI. RD will change Glucerna on dinner tray to Magic Cup to see if she is able to eat this without spilling it.     Meds: Remeron ordered on 12/5- this may encourage pt's appetite.     Malnutrition risk: RD agrees with MD dx of Severe Malnutrition in context of Chronic Illness related to ETOH abuse as evidenced by severe muscle and fat loss noted.  This is a dx by provider Dr Chen.     Inadequate Oral Intake related to poor appetite due to ETOH abuse as evidenced by wt loss    Nutrition Dx Status: Ongoing     Problem: Nutritional:  Goal: Achieve adequate nutritional intake  Description: Patient will consume >50% of meals  Outcome: not progressing    RD following.

## 2024-12-06 NOTE — PROGRESS NOTES
Telemetry shift summary    Rhythm: sr  HR:   Ectopy: r pvc    Measurements: .14 / .08 / .30    Normal values  Rhythm SR  HR   Measurements: 0.12-0.20/0.08-0.10/0.30-0.52

## 2024-12-06 NOTE — PROGRESS NOTES
[facility] Medicine Daily Progress Note    Date of Service  12/6/2024    Chief Complaint  Denisse Abel is a 64 y.o. female admitted 11/27/2024 with   Chief Complaint   Patient presents with    ALOC     Hospital Course  Patient was brought to the hospital by EMS after she was able to call 911 but unable to give any history.  Paramedics found her disheveled, confused malnourished and brought her to the emergency room.  Patient with history of alcohol abuse and concern for possible Warnicke's encephalopathy versus acute alcohol withdrawal.  Patient on admission was unable to give any information but when she got up to the floor bedside nurse said that she was coherent and was doing better.  She denied any recent drinking but then was started on Librium and since that time has been very sedated.  I have discontinued the Librium and will continue with CIWA protocol if needed but she is not looking like acute alcohol withdrawal at this point.  Will continue with lactulose for her cirrhosis and ascites.  Potassium markedly low this morning and received IV potassium with a repeat level 3.0 and will continue with oral potassium supplementation.  I have also started the patient on aggressive IV thiamine for concern of Warnicke's.      Interval Problem Update  12/06:  No significant improvement yet today with IV solumedrol 1g. Patient is already, oriented x3, she is speaking faster.   No DTRs, right foot has more foot drop.  Right hand grasp slightly stronger than left.  Pt right eye less reactive that left, similar to prior days. She has poor EOM. Today left eye had right lateral gaze, compared to prior. 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  She could not perform a vertical gaze bilaterally.  She was able to raise left leg slowly, but more than right leg.  checking B6 levels  I ordered MR C/T/L with contrast    12/05:  I reviewed patient's labs, WBC 4.3, significantly decreased.   Hemoglobin 11.0, platelets 231.  Sodium 132, potassium 4.4, creatinine 1.17, corrected calcium 10.7 elevated.  Phos 2.8, mag 2.0, ammonia 43.  CSF analysis showing nucleated cells 4, glucose 74, total protein elevated at 62.  Meningitis/encephalitis PCR testing negative.  I discussed with patient, we will trial IV solumedrol 1 gram, as she may have a demyelinating syndrome.  Pending: CSF/serum Oligoclonal bands pending, I ordered serum aquaporin 4, serum myasthenia gravis workup, CSF VDRL  Serum RPR negative  I discussed with speech therapist, patient was improving in her strength.  Patient went for MBSS, showed mild oropharyngeal dysphagia but patient is being able to transition back to purées with thin liquids.  I discussed with cousin Leesa. Patient was anorexic as a child. Around 40's she had anorexia again.    12/04:  Checking LP  Checking RPR. Pt stated she last had intercourse 7 years ago, she has had multiple partners before her last partner. She did have unprotected intercourse. 01/24 HIV was non-reactive.  I discussed with patient's friend from [place] and friend from [place] at bedside with her permission.  I provided updates.    12/03:  Patient stated she has been feeling weak for the last 2 months now.  She has had significant decrease in eating at home.  She does live at home alone with her dog.  She does have family members but her sister lives in [place].  I reviewed patient's EEG today, there is a finding of left temporal abnormal waveforms, induced with photic stimulation.  Patient may have focal seizures in this region.  He has diffuse slowing suggestive of cerebral dysfunction.   I discussed with patient that this may be a partial seizures, she agreed to starting loading dose of IV Keppra and oral Keppra.  I reviewed her labs, sodium 127 from 127, 124, 127  Patient apparently choked this morning while eating breakfast, she did not require Heimlich maneuver but did require help to get food  out.  Speech therapy evaluate patient and recommended one-to-one feeding, puréed solids and thin liquids.  Postacute placement needed for ongoing speech therapy services.   I ordered a chest x-ray, there was no findings of aspiration  Patient reports a history of cirrhosis, follows with [facility] Dr. Anna.  She was mentioning she had a history of ascites.  I tried calling Leesa Daly, patient had quit alcohol one year ago. L3-L5 severe spinal damage, was supposed to have a procedure, intercept nerve ablation. Withdrawal from Norco pain medications, patient was addicted to pain meds.  Patient stopped trying to eat at home.   Urine showing Klebsiella oxytoca, resistant to IV Ancef, she is allergic to sulfa and PCNs. I am starting IV Ceftriaxone.    I have discussed this patient's plan of care and discharge plan at IDT rounds today with Case Management, Nursing, Nursing leadership, and other members of the IDT team.    Consultants/Specialty  none    Code Status  Full Code    Disposition  The patient is not medically cleared for discharge to home or a post-acute facility.  Anticipate discharge to: skilled nursing facility    I have placed the appropriate orders for post-discharge needs.    Review of Systems  Review of Systems   Constitutional:  Positive for malaise/fatigue and weight loss.        + Decreased eating   Gastrointestinal:  Negative for abdominal pain.   Neurological:  Positive for weakness.        Physical Exam  Temp:  [36.2 °C (97.2 °F)-36.9 °C (98.4 °F)] 36.4 °C (97.5 °F)  Pulse:  [] 91  Resp:  [18-19] 18  BP: (108-128)/(56-74) 108/57  SpO2:  [93 %-97 %] 94 %    Physical Exam  Vitals and nursing note reviewed.   Constitutional:       General: She is not in acute distress.     Appearance: She is ill-appearing.      Comments: Frail appearing female   HENT:      Head: Normocephalic and atraumatic.      Comments: Temporal muscle wasting positive     Mouth/Throat:      Mouth:  Mucous membranes are dry.      Pharynx: No oropharyngeal exudate.   Eyes:      Pupils: Pupils are equal, round, and reactive to light.      Comments: No right lateral eye gaze, normal other eye movements. Mild left eye right nystagmus.   Cardiovascular:      Rate and Rhythm: Normal rate and regular rhythm.      Pulses: Normal pulses.      Heart sounds: Normal heart sounds. No murmur heard.  Pulmonary:      Effort: Pulmonary effort is normal. No respiratory distress.      Breath sounds: Normal breath sounds. No wheezing.   Abdominal:      General: Abdomen is flat. Bowel sounds are normal. There is no distension.      Palpations: Abdomen is soft.      Tenderness: There is no abdominal tenderness. There is no guarding.   Musculoskeletal:         General: No swelling or tenderness.      Cervical back: Normal range of motion. No rigidity.      Right lower leg: No edema.      Left lower leg: No edema.      Comments: Sarcopenic. Bilateral hand muscle atrophy noted.   Skin:     General: Skin is warm.      Capillary Refill: Capillary refill takes less than 2 seconds.      Coloration: Skin is not jaundiced.      Findings: No erythema.   Neurological:      Mental Status: She is alert and oriented to person, place, and time. Mental status is at baseline.      Cranial Nerves: Cranial nerve deficit present.      Sensory: No sensory deficit.      Motor: Weakness (3/5 BUE, BLE 2/5) present.      Comments: BUE 2/5 strength   Psychiatric:         Mood and Affect: Mood normal.         Behavior: Behavior normal.         Thought Content: Thought content normal.         Judgment: Judgment normal.         Fluids    Intake/Output Summary (Last 24 hours) at 12/6/2024 1851  Last data filed at 12/6/2024 0442  Gross per 24 hour   Intake --   Output 200 ml   Net -200 ml        Laboratory  Recent Labs     12/04/24  0316 12/05/24  0144 12/06/24  0316   WBC 8.9 4.3* 4.8   RBC 3.46* 4.26 3.43*   HEMOGLOBIN 8.9* 11.0* 8.9*   HEMATOCRIT 27.8* 34.9*  27.3*   MCV 80.3* 81.9 79.6*   MCH 25.7* 25.8* 25.9*   MCHC 32.0* 31.5* 32.6   RDW 64.6* 67.6* 63.3*   PLATELETCT 255 231 205   MPV 12.4 10.8 10.2     Recent Labs     12/04/24  0316 12/05/24  0144 12/06/24  0316   SODIUM 127* 132* 129*   POTASSIUM 4.9 4.4 5.0   CHLORIDE 92* 97 96   CO2 23 21 21   GLUCOSE 121* 104* 161*   BUN 47* 44* 41*   CREATININE 1.28 1.17 1.16   CALCIUM 9.8 10.2 10.1     Recent Labs     12/05/24  0144   APTT 35.4   INR 1.13               Imaging  DX-ESOPHAGUS - [id number]   Final Result      DX-CHEST-LIMITED (1 VIEW)   Final Result      No evidence of acute cardiopulmonary process.      MR-BRAIN-W/O   Final Result      1.  No evidence of acute territorial infarct, intracranial hemorrhage or mass lesion.   2.  Mild diffuse cerebral and cerebellar substance loss.   3.  Mild microangiopathic ischemic change versus demyelination or gliosis.      CT-ABDOMEN-PELVIS W/O   Final Result      1.  Small umbilical hernia which contains small intestine. Small intestine is dilated proximal to that hernia and decompressed distally consistent with resultant small bowel obstruction.      2.  Again seen cirrhotic appearance of the liver with small metallic ascites within the abdomen and pelvis and splenomegaly consistent with portal hypertension.      3.  Gallstones within the gallbladder.      CT-HEAD W/O   Final Result      No evidence of acute intracranial process.               DX-CHEST-PORTABLE (1 VIEW)   Final Result      No evidence of acute cardiopulmonary process.      MR-LUMBAR SPINE-WITH & W/O    (Results Pending)   MR-CERVICAL SPINE-WITH & W/O    (Results Pending)   MR-THORACIC SPINE-WITH & W/O    (Results Pending)        Assessment/Plan  * Alcoholic encephalopathy (HCC)- (present on admission)  Assessment & Plan  Patient came in encephalopathic but after administering care in the emergency room brought up to the floor she started showing improvement with her mental status.  Librium was given and  likely resulted in worsening encephalopathy rather than improvement    Mental state waxing and waning  I reviewed patient's EEG today, there is a finding of left temporal abnormal waveforms, induced with photic stimulation.  Patient may have focal seizures in this region.  He has diffuse slowing suggestive of cerebral dysfunction.     12/4:  Patient was reported to be more lethargic today.  I have now held Keppra, gabapentin, pain medications.  Throughout the day, she had more improvement in her mental state.  She was able to consent for an LP.  She was able to discuss with her friends at bedside today.  I suspect she is oversedated as a possibility for her lower GCS in the mornings.    12/5:  Patient has been more cooperative after we have held gabapentin, opioids, stop Keppra.  It is unclear this was medication-induced but likely.  Patient still has neurological findings on exam.  As she is able to consent for procedures and understands her goals, I feel that she is showing improvements but very slow.  Will need to continue monitoring.  I am giving more IV thiamine due to her malnutrition.    12/06:   Continue to hold high dose opioids, gabapentin, no Keppra.  Patient's maintaining is easier to remain alert now.  We are also holding Adderall.  For chronic pains PRN oxycodone 2.5mg ordered    Elevated CSF protein- (present on admission)  Assessment & Plan  CSF analysis showing nucleated cells 4, glucose 74, total protein elevated at 62.  Meningitis/encephalitis PCR testing negative.  I discussed with patient, we will trial IV solumedrol 1 gram, as she may have a demyelinating syndrome.  Pending: CSF/serum Oligoclonal bands pending, I ordered serum aquaporin 4, serum myasthenia gravis workup, CSF VDRL  Serum RPR negative    Continue Solu-Medrol trial    Dysphagia- (present on admission)  Assessment & Plan  Patient is having repeat dysphagia  I discussed with speech therapist, patient was improving in her strength.   Patient went for MBSS, showed mild oropharyngeal dysphagia  Continue purée with thin liquid diet.    Partial seizure disorder (HCC)- (present on admission)  Assessment & Plan  I reviewed patient's EEG today, there is a finding of left temporal abnormal waveforms, induced with photic stimulation.  Patient may have focal seizures in this region.  He has diffuse slowing suggestive of cerebral dysfunction.     We trialed Keppra but patient may be too affected by side effects.    At this time we will not restart Keppra    Extreme restricting type anorexia nervosa- (present on admission)  Assessment & Plan  Patient appears to also have's extreme restricting anorexia nervosa.  After speaking with patient's friend Luiza, they had cleaned out her refrigerator but most of her items were uneaten food.  - Patient's cousin Nica also mention patient has had relapsing anorexia.  - I do worry that patient may be or has already suffered to a point where recovery will be an extremely prolonged time if possible.  Is unclear if she has had a significant amount of deconditioning, thiamine deficiency, other vitamin deficiencies and could have to her current state.    Cystitis- (present on admission)  Assessment & Plan  Pt c/o abdominal pain  Urine showing Klebsiella oxytoca, resistant to IV Ancef, she is allergic to sulfa and PCNs.  Completed IV ceftriaxone 12/5    CKD (chronic kidney disease) stage 4, GFR 15-29 ml/min (HCC)- (present on admission)  Assessment & Plan  Creatinine was 2.23 on admission  Monitoring closely  Cr 1.16    Esophageal varices without bleeding (HCC)- (present on admission)  Assessment & Plan  Change omeprazole to IV Protonix.  Patient is NPO.  Hx of grade 2 esophageal varices.    Severe protein-calorie malnutrition (HCC)- (present on admission)  Assessment & Plan  Dietitian agreed patient meets Aspen criteria  Patient being placed back onto a diet.  Continue diet supplements.    Anemia due to chronic kidney  disease- (present on admission)  Assessment & Plan  Hemoglobin 9.2, platelets 229    Liver cirrhosis (HCC)- (present on admission)  Assessment & Plan  Chronic liver cirrhosis secondary to alcohol abuse  Monitor liver function tests  Optimize diuresis  Hold lactulose - copious amounts of diarrhea  Monitor magnesium and phosphorus levels    Follows with [facility]    Hypokalemia- (present on admission)  Assessment & Plan  K 4.4  On spironolactone, hold spironolactone if potassium is greater than 4.4    Hyponatremia- (present on admission)  Assessment & Plan  Sodium 127, from 127, 124, 127  Patient may be receiving too much free water.  I am placing free water restriction of 500 mL/day, total fluid restriction of 1500 mL/day.  Sodium 129    Chronic combined systolic and diastolic heart failure (HCC)- (present on admission)  Assessment & Plan  Patient's 1/22/2024 echocardiogram showed LVEF of 40%.  There was a ascites noted.  Grade 2 diastolic dysfunction noted.  Severely dilated LA, mild mitral regurgitation, moderate aortic valve stenosis.  Her last SEFERINO on 9/6/2024 showed trace aortic insufficiency with a moderate aortic valve stenosis.  Continue spironolactone    Heart murmur- (present on admission)  Assessment & Plan  Chronic moderate aortic stenosis  Eventually the patient may need surgical intervention such as a TAVR.  Will need outpatient cardiology    Stage 3b chronic kidney disease- (present on admission)  Assessment & Plan  chronic    HTN (hypertension), benign- (present on admission)  Assessment & Plan  Continue spironolactone, with holding parameters  As needed labetalol         VTE prophylaxis:    heparin ppx      I have performed a physical exam and reviewed and updated ROS and Plan today (12/6/2024). In review of yesterday's note (12/5/2024), there are no changes except as documented above.      Total time spent 51 minutes. I spent greater than 50% of the time for patient care, including unit/floor time,  multiple face-to-face encounters with the patient, counseling on treatment plan and discussion with bedside RN.  Further, I spent time on my own review of patient's overnight events, RN notes, imaging and lab analysis, and developing my assessment and plan above.  In addition, working with , social workers, and Charge RN on case coordination on this date.    This note was generated using voice recognition software which has a chance of producing errors of grammar and content.  I have made every reasonable attempt to find and correct any errors, but it should be expected that some may not be found prior to finalization of this note.

## 2024-12-06 NOTE — PROGRESS NOTES
Telemetry Shift Summary     Rhythm: SR  Rate: 71-88  Measurements: .18/.08/.36  Ectopy (reported by Monitor Tech): rPVC     Normal Values  Rhythm: Sinus  HR:   Measurements: 0.12-0.20/0.06-0.10/0.30-0.52

## 2024-12-07 LAB
AQP4 H2O CHANNEL IGG SERPL QL IF: NORMAL
BACTERIA CSF CULT: NORMAL
GLUCOSE BLD STRIP.AUTO-MCNC: 200 MG/DL (ref 65–99)
GLUCOSE BLD STRIP.AUTO-MCNC: 207 MG/DL (ref 65–99)
GLUCOSE BLD STRIP.AUTO-MCNC: 243 MG/DL (ref 65–99)
GLUCOSE BLD STRIP.AUTO-MCNC: 269 MG/DL (ref 65–99)
GRAM STN SPEC: NORMAL
MUSK AB SER QL: NORMAL
SIGNIFICANT IND 70042: NORMAL
SITE SITE: NORMAL
SOURCE SOURCE: NORMAL

## 2024-12-07 PROCEDURE — 700111 HCHG RX REV CODE 636 W/ 250 OVERRIDE (IP): Performed by: INTERNAL MEDICINE

## 2024-12-07 PROCEDURE — 82962 GLUCOSE BLOOD TEST: CPT | Mod: 91

## 2024-12-07 PROCEDURE — 94760 N-INVAS EAR/PLS OXIMETRY 1: CPT

## 2024-12-07 PROCEDURE — 770020 HCHG ROOM/CARE - TELE (206)

## 2024-12-07 PROCEDURE — 700102 HCHG RX REV CODE 250 W/ 637 OVERRIDE(OP): Performed by: INTERNAL MEDICINE

## 2024-12-07 PROCEDURE — A9270 NON-COVERED ITEM OR SERVICE: HCPCS | Performed by: INTERNAL MEDICINE

## 2024-12-07 PROCEDURE — 700102 HCHG RX REV CODE 250 W/ 637 OVERRIDE(OP): Performed by: HOSPITALIST

## 2024-12-07 PROCEDURE — 99233 SBSQ HOSP IP/OBS HIGH 50: CPT | Performed by: INTERNAL MEDICINE

## 2024-12-07 PROCEDURE — 700105 HCHG RX REV CODE 258: Performed by: INTERNAL MEDICINE

## 2024-12-07 RX ORDER — SODIUM CHLORIDE 9 MG/ML
INJECTION, SOLUTION INTRAVENOUS CONTINUOUS
Status: ACTIVE | OUTPATIENT
Start: 2024-12-07 | End: 2024-12-08

## 2024-12-07 RX ORDER — GAUZE BANDAGE 2" X 2"
100 BANDAGE TOPICAL DAILY
Status: DISCONTINUED | OUTPATIENT
Start: 2024-12-07 | End: 2024-12-09 | Stop reason: HOSPADM

## 2024-12-07 RX ADMIN — METHYLPREDNISOLONE SODIUM SUCCINATE 1000 MG: 1 INJECTION, POWDER, LYOPHILIZED, FOR SOLUTION INTRAMUSCULAR; INTRAVENOUS at 09:34

## 2024-12-07 RX ADMIN — THIAMINE HYDROCHLORIDE 100 MG: 100 INJECTION, SOLUTION INTRAMUSCULAR; INTRAVENOUS at 05:31

## 2024-12-07 RX ADMIN — Medication 1 TABLET: at 05:31

## 2024-12-07 RX ADMIN — HEPARIN SODIUM 5000 UNITS: 5000 INJECTION, SOLUTION INTRAVENOUS; SUBCUTANEOUS at 05:30

## 2024-12-07 RX ADMIN — NYSTATIN 500000 UNITS: 100000 SUSPENSION ORAL at 12:43

## 2024-12-07 RX ADMIN — HEPARIN SODIUM 5000 UNITS: 5000 INJECTION, SOLUTION INTRAVENOUS; SUBCUTANEOUS at 12:43

## 2024-12-07 RX ADMIN — INSULIN LISPRO 2 UNITS: 100 INJECTION, SOLUTION INTRAVENOUS; SUBCUTANEOUS at 18:14

## 2024-12-07 RX ADMIN — PANTOPRAZOLE SODIUM 40 MG: 40 INJECTION, POWDER, FOR SOLUTION INTRAVENOUS at 05:30

## 2024-12-07 RX ADMIN — FOLIC ACID 1 MG: 1 TABLET ORAL at 05:31

## 2024-12-07 RX ADMIN — NYSTATIN 500000 UNITS: 100000 SUSPENSION ORAL at 20:40

## 2024-12-07 RX ADMIN — NYSTATIN 500000 UNITS: 100000 SUSPENSION ORAL at 18:14

## 2024-12-07 RX ADMIN — HEPARIN SODIUM 5000 UNITS: 5000 INJECTION, SOLUTION INTRAVENOUS; SUBCUTANEOUS at 21:29

## 2024-12-07 RX ADMIN — SODIUM CHLORIDE: 9 INJECTION, SOLUTION INTRAVENOUS at 12:46

## 2024-12-07 RX ADMIN — INSULIN LISPRO 3 UNITS: 100 INJECTION, SOLUTION INTRAVENOUS; SUBCUTANEOUS at 20:44

## 2024-12-07 RX ADMIN — INSULIN LISPRO 2 UNITS: 100 INJECTION, SOLUTION INTRAVENOUS; SUBCUTANEOUS at 05:40

## 2024-12-07 RX ADMIN — INSULIN LISPRO 1 UNITS: 100 INJECTION, SOLUTION INTRAVENOUS; SUBCUTANEOUS at 12:40

## 2024-12-07 RX ADMIN — MIRTAZAPINE 15 MG: 15 TABLET, ORALLY DISINTEGRATING ORAL at 20:39

## 2024-12-07 RX ADMIN — Medication 100 MG: at 12:44

## 2024-12-07 ASSESSMENT — PAIN DESCRIPTION - PAIN TYPE: TYPE: ACUTE PAIN

## 2024-12-07 ASSESSMENT — ENCOUNTER SYMPTOMS
WEIGHT LOSS: 0
WEAKNESS: 1
ABDOMINAL PAIN: 0

## 2024-12-07 NOTE — WOUND TEAM
Patient is not in room.  Wound team will attempt to round back on patient at a later time.  Bedside Nursing to implement all offloading interventions

## 2024-12-07 NOTE — DIETARY
Nutrition services: Day 10 of admit.  Denisse Abel is a 64 y.o. female with admitting DX of Alcoholic encephalopathy (HCC) [G31.2]    Consult received for supplements per Wound RN for pt w/ DTPI to sacrum.  Pt currently w/ TID [employer]ani smoothies and supplement order just updated by RD 12/6 for TID Magic Cups w/ meals. Remeron added 12/5.  Diet order includes 1:1 supervision w/ nursing.  If pt's oral intake does not improve w/ remeron and nutrition supplements, she may benefit from enteral feeds if within GOC, consideration for severe malnutrition dx and sacrum DTPI.  Will continue current interventions and follow up as scheduled.    RD continues to follow.

## 2024-12-07 NOTE — DISCHARGE PLANNING
Case Management Discharge Planning    Admission Date: 11/27/2024  GMLOS: 6  ALOS: 10    Anticipated Discharge Dispo: Discharge Disposition: D/T to SNF with Medicare cert in anticipation of skilled care (03)  Discharge Address: [id number] [place] [address] [address] [place] [address]    Pt's case discussed during IDT rounds. Pt not medically cleared at this time, pending improvement. Pt does have accepting SNF's.   Discussed during rounds that pt might be appropriate for acute rehab- consult placed.

## 2024-12-07 NOTE — WOUND TEAM
[facility]  Inpatient Services  Initial Wound and Skin Care Evaluation    Admission Date: 11/27/2024     Last order of IP CONSULT TO WOUND CARE was found on 12/6/2024 from Hospital Encounter on 11/27/2024     HPI, PMH, SH: Reviewed    Past Surgical History:   Procedure Laterality Date    HI LAP,DIAGNOSTIC ABDOMEN  2/23/2024    Procedure: LAPAROSCOPY, KIM'S PATCH;  Surgeon: Neel Amezcua M.D.;  Location: SURGERY [facility];  Service: General    TRIGGER FINGER RELEASE Right 3/31/2017    Procedure: TRIGGER FINGER RELEASE;  Surgeon: Jimmy Atkins M.D.;  Location: SURGERY [facility];  Service:     GANGLION EXCISION Right 3/31/2017    Procedure: GANGLION EXCISION - HAND CYST;  Surgeon: Jimmy Atkins M.D.;  Location: SURGERY [facility];  Service:     PELVISCOPY  6/5/08    Performed by NATALIYA ARCEO at SURGERY SAME DAY [facility]    OVARIAN CYSTECTOMY  6/5/08    Performed by NATALIYA ARCEO at SURGERY SAME DAY [facility]S    CYSTOSCOPY  6/5/08    Performed by NATALIYA ARCEO at SURGERY SAME DAY [facility] ORS    HYSTERECTOMY LAPAROSCOPY  2003    KNEE ARTHROSCOPY       Social History     Tobacco Use    Smoking status: Never    Smokeless tobacco: Never   Substance Use Topics    Alcohol use: Not Currently     Alcohol/week: 3.6 oz     Comment: denies     Chief Complaint   Patient presents with    ALOC     Diagnosis: Alcoholic encephalopathy (HCC) [G31.2]    Unit where seen by Wound Team: 1120/01     WOUND CONSULT RELATED TO:  Sacrum     WOUND TEAM PLAN OF CARE - Frequency of Follow-up:   Nursing to follow dressing orders written for wound care. Contact wound team if area fails to progress, deteriorates or with any questions/concerns if something comes up before next scheduled follow up (See below as to whether wound is following and frequency of wound follow up)   Weekly - Sacrum     WOUND HISTORY:   Patient admitted after calling 911 for help.  Extensive history of ETOH and new  onset confusion.  CKD, malnourished, disheveled and seizures.          WOUND ASSESSMENT/LDA  Wound 12/06/24 Pressure Injury Sacrum Evolving DTI (Active)   Date First Assessed/Time First Assessed: 12/06/24 0600   Present on Original Admission: No  Hand Hygiene Completed: Yes  Primary Wound Type: (c) Pressure Injury  Location: Sacrum  Wound Description (Comments): Evolving DTI      Assessments 12/6/2024  6:00 PM   Wound Image        Site Assessment Purple;Red   Periwound Assessment Pink   Margins Undefined edges;Attached edges   Closure Open to air   Drainage Amount None   Treatments Cleansed;Nonselective debridement;Site care;Offloading   Wound Cleansing Soap and Water   Periwound Protectant Barrier Paste   Dressing Status Open to Air   NEXT Weekly Photo (Inpatient Only) 12/13/24   Wound Team Following Weekly   Pressure Injury Stage Deep Tissue   Wound Length (cm) 5 cm   Wound Width (cm) 7 cm   Wound Surface Area (cm^2) 35 cm^2   Shape irregular   Wound Odor None   WOUND NURSE ONLY - Time Spent with Patient (mins) 60        Vascular:    EVARISTO:   No results found.    Lab Values:    Lab Results   Component Value Date/Time    WBC 4.8 12/06/2024 03:16 AM    RBC 3.43 (L) 12/06/2024 03:16 AM    HEMOGLOBIN 8.9 (L) 12/06/2024 03:16 AM    HEMATOCRIT 27.3 (L) 12/06/2024 03:16 AM    CREACTPROT 39.04 (H) 02/23/2024 10:05 AM    HBA1C 6.4 (H) 01/27/2020 11:13 AM         Culture Results show:  No results found for this or any previous visit (from the past 720 hours).    Pain Level/Medicated:  None, Tolerated without pain medication       INTERVENTIONS BY WOUND TEAM:  Chart and images reviewed. Discussed with bedside RN. All areas of concern (based on picture review, LDA review and discussion with bedside RN) have been thoroughly assessed. Documentation of areas based on significant findings. This RN in to assess patient. Performed standard wound care which includes appropriate positioning, dressing removal and non-selective  debridement. Pictures and measurements obtained weekly if/when required.    Wound:  Sacrum   Cleansed/Non-selectively Debrided with:  Moist warm washcloth  Kristen wound: Cleansed with Moist warm washcloth, Prepped with Cavilon Advanced  Primary Dressing:  barrier paste     [facility] Discharge Planning  Number of Clinicians necessary to complete wound care: 1  Is patient requiring IV pain medications for dressing changes:  No   Length of time for dressing change 25 min. (This does not include chart review, pre-medication time, set up, clean up or time spent charting.)    Interdisciplinary consultation: Patient, Bedside RN (Isatu),  Maureen QUIROGA .  Pressure injury and staging reviewed with  Maureen Scott    EVALUATION / RATIONALE FOR TREATMENT:     Date:  12/06/24  Wound Status:  Initial evaluation    Patient found to have an evolving DTI to her sacrum.  Purple discoloration that has opened over bony prominence. New orders placed.      Bilateral heels are intact.           Goals: Steady decrease in wound area and depth weekly.    NURSING PLAN OF CARE ORDERS:  Dressing changes: See Dressing Care orders  Skin care: See Skin Care orders  RN Prevention Protocol    NUTRITION RECOMMENDATIONS   Wound Team Recommendations:  Dietary consult    DIET ORDERS (From admission to next 24h)       Start     Ordered    12/05/24 8629  Diet Order Diet: Level 4 - Pureed (Crush meds in puree please; ONLY feed when awake and alert. Free water restricted to 500mL/day, total fluid restriction 1500mL/day.); Liquid level: Level 0 - Thin; Tray Modifications (optional): SLP - 1:1 Supervi...  ALL MEALS        Question Answer Comment   Diet: Level 4 - Pureed Crush meds in puree please; ONLY feed when awake and alert. Free water restricted to 500mL/day, total fluid restriction 1500mL/day.   Liquid level Level 0 - Thin    Tray Modifications (optional) SLP - 1:1 Supervision by Nursing    Tray Modifications (optional) SLP - Deliver to Nursing Station         12/05/24 1839    11/27/24 1430  Supplements  ALL MEALS        Comments: No vanilla   Question Answer Comment   Which Supplement Glucerna    Glucerna: Glucerna Shake Carton        11/27/24 1430                    PREVENTATIVE INTERVENTIONS:    Q shift John - performed per nursing policy  Q shift pressure point assessments - performed per nursing policy    Surface/Positioning  Low Airloss - Currently in Place  Reposition q 2 hours - Ordered  TAPs Turning system - Ordered    Offloading/Redistribution  Heel offloading dressing (Silicone dressing) - Ordered  Float Heels off Bed with Pillows - Applied this Visit           Containment/Moisture Prevention    Purwick/Condom Cath - Applied this Visit  Diapered - Currently in Place  Barrier paste - Ordered    Mobilization      Q2 turns,      Anticipated discharge plans:  TBD        Vac Discharge Needs:  Vac Discharge plan is purely a recommendation from wound team and not a requirement for discharge unless otherwise stated by physician.  Not Applicable Pt not on a wound vac

## 2024-12-07 NOTE — PROGRESS NOTES
[facility] Medicine Daily Progress Note    Date of Service  12/7/2024    Chief Complaint  Denisse Abel is a 64 y.o. female admitted 11/27/2024 with   Chief Complaint   Patient presents with    ALOC     Hospital Course  Patient was brought to the hospital by EMS after she was able to call 911 but unable to give any history.  Paramedics found her disheveled, confused malnourished and brought her to the emergency room.  Patient with history of alcohol abuse and concern for possible Warnicke's encephalopathy versus acute alcohol withdrawal.  Patient on admission was unable to give any information but when she got up to the floor bedside nurse said that she was coherent and was doing better.  She denied any recent drinking but then was started on Librium and since that time has been very sedated.  I have discontinued the Librium and will continue with CIWA protocol if needed but she is not looking like acute alcohol withdrawal at this point.  Will continue with lactulose for her cirrhosis and ascites.  Potassium markedly low this morning and received IV potassium with a repeat level 3.0 and will continue with oral potassium supplementation.  I have also started the patient on aggressive IV thiamine for concern of Warnicke's.      Interval Problem Update  12/07:  Patient has drastic improvement today in regards to her cranial exam.  Today patient is able to perform a right lateral gaze with both eyes.  She was also able to perform vertical gaze easily.  She has more expressions and is verbalizing much easier today.  She was able to raise bilateral upper extremities much easier today, still has decreased bilateral  strength.  Patient's friend was at bedside, was in agreement patient was bit more lively today compared to before.  Patient is having an increase in her appetite still.  Primary diagnosis, autoimmune/demyelinating neurological disorder not otherwise specified.  I reviewed MRI, there is no substantial  findings for MS or other demyelinating syndromes.  I reviewed labs, sodium 129, potassium 5.0, creatinine 1.16.  I am restarting IV fluids as patient is slowly increasing more.    12/06:  No significant improvement yet today with IV solumedrol 1g. Patient is already, oriented x3, she is speaking faster.   No DTRs, right foot has more foot drop.  Right hand grasp slightly stronger than left.  Pt right eye less reactive that left, similar to prior days. She has poor EOM. Today left eye had right lateral gaze, compared to prior. 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  She could not perform a vertical gaze bilaterally.  She was able to raise left leg slowly, but more than right leg.  checking B6 levels  I ordered MR C/T/L with contrast    12/05:  I reviewed patient's labs, WBC 4.3, significantly decreased.  Hemoglobin 11.0, platelets 231.  Sodium 132, potassium 4.4, creatinine 1.17, corrected calcium 10.7 elevated.  Phos 2.8, mag 2.0, ammonia 43.  CSF analysis showing nucleated cells 4, glucose 74, total protein elevated at 62.  Meningitis/encephalitis PCR testing negative.  I discussed with patient, we will trial IV solumedrol 1 gram, as she may have a demyelinating syndrome.  Pending: CSF/serum Oligoclonal bands pending, I ordered serum aquaporin 4, serum myasthenia gravis workup, CSF VDRL  Serum RPR negative  I discussed with speech therapist, patient was improving in her strength.  Patient went for MBSS, showed mild oropharyngeal dysphagia but patient is being able to transition back to purées with thin liquids.  I discussed with cousin Leesa. Patient was anorexic as a child. Around 40's she had anorexia again.    12/04:  Checking LP  Checking RPR. Pt stated she last had intercourse 7 years ago, she has had multiple partners before her last partner. She did have unprotected intercourse. 01/24 HIV was non-reactive.  I discussed with patient's friend from [place] and friend from  [place] at bedside with her permission.  I provided updates.    12/03:  Patient stated she has been feeling weak for the last 2 months now.  She has had significant decrease in eating at home.  She does live at home alone with her dog.  She does have family members but her sister lives in [place].  I reviewed patient's EEG today, there is a finding of left temporal abnormal waveforms, induced with photic stimulation.  Patient may have focal seizures in this region.  He has diffuse slowing suggestive of cerebral dysfunction.   I discussed with patient that this may be a partial seizures, she agreed to starting loading dose of IV Keppra and oral Keppra.  I reviewed her labs, sodium 127 from 127, 124, 127  Patient apparently choked this morning while eating breakfast, she did not require Heimlich maneuver but did require help to get food out.  Speech therapy evaluate patient and recommended one-to-one feeding, puréed solids and thin liquids.  Postacute placement needed for ongoing speech therapy services.   I ordered a chest x-ray, there was no findings of aspiration  Patient reports a history of cirrhosis, follows with [facility] Dr. Anna.  She was mentioning she had a history of ascites.  I tried calling Leesa Daly, patient had quit alcohol one year ago. L3-L5 severe spinal damage, was supposed to have a procedure, intercept nerve ablation. Withdrawal from Norco pain medications, patient was addicted to pain meds.  Patient stopped trying to eat at home.   Urine showing Klebsiella oxytoca, resistant to IV Ancef, she is allergic to sulfa and PCNs. I am starting IV Ceftriaxone.    I have discussed this patient's plan of care and discharge plan at IDT rounds today with Case Management, Nursing, Nursing leadership, and other members of the IDT team.    Consultants/Specialty  none    Code Status  Full Code    Disposition  The patient is not medically cleared for discharge to home or a post-acute  facility.  Anticipate discharge to: skilled nursing facility    I have placed the appropriate orders for post-discharge needs.    Review of Systems  Review of Systems   Constitutional:  Positive for malaise/fatigue. Negative for weight loss.        + Decreased eating   Gastrointestinal:  Negative for abdominal pain.   Neurological:  Positive for weakness.        Physical Exam  Temp:  [36.4 °C (97.5 °F)-37.1 °C (98.8 °F)] 36.5 °C (97.7 °F)  Pulse:  [73-91] 77  Resp:  [18] 18  BP: (107-132)/(56-63) 132/59  SpO2:  [94 %-97 %] 97 %    Physical Exam  Vitals and nursing note reviewed.   Constitutional:       General: She is not in acute distress.     Appearance: She is ill-appearing.      Comments: Frail appearing female   HENT:      Head: Normocephalic and atraumatic.      Comments: Temporal muscle wasting positive     Mouth/Throat:      Mouth: Mucous membranes are dry.      Pharynx: No oropharyngeal exudate.   Eyes:      Pupils: Pupils are equal, round, and reactive to light.      Comments: + Mild left eye right nystagmus. Improved EOM. Right lateral gaze improved bilaterally.   Cardiovascular:      Rate and Rhythm: Normal rate and regular rhythm.      Pulses: Normal pulses.      Heart sounds: Normal heart sounds. No murmur heard.  Pulmonary:      Effort: Pulmonary effort is normal. No respiratory distress.      Breath sounds: Normal breath sounds. No wheezing.   Abdominal:      General: Abdomen is flat. Bowel sounds are normal. There is no distension.      Palpations: Abdomen is soft.      Tenderness: There is no abdominal tenderness. There is no guarding.   Musculoskeletal:         General: No swelling or tenderness.      Cervical back: Normal range of motion. No rigidity.      Right lower leg: No edema.      Left lower leg: No edema.      Comments: Sarcopenic. Bilateral hand muscle atrophy noted.   Skin:     General: Skin is warm.      Capillary Refill: Capillary refill takes less than 2 seconds.      Coloration:  Skin is not jaundiced.      Findings: No erythema.   Neurological:      Mental Status: She is alert and oriented to person, place, and time. Mental status is at baseline.      Cranial Nerves: Cranial nerve deficit (improved CN 3 and CN6) present.      Sensory: No sensory deficit.      Motor: Weakness (4/5 BUE, BLE 2/5.  strength remains significantly decreased.) present.      Comments: BUE 2/5 strength   Psychiatric:         Mood and Affect: Mood normal.         Behavior: Behavior normal.         Thought Content: Thought content normal.         Judgment: Judgment normal.         Fluids    Intake/Output Summary (Last 24 hours) at 12/7/2024 1115  Last data filed at 12/7/2024 0300  Gross per 24 hour   Intake 360 ml   Output 150 ml   Net 210 ml        Laboratory  Recent Labs     12/05/24 0144 12/06/24  0316   WBC 4.3* 4.8   RBC 4.26 3.43*   HEMOGLOBIN 11.0* 8.9*   HEMATOCRIT 34.9* 27.3*   MCV 81.9 79.6*   MCH 25.8* 25.9*   MCHC 31.5* 32.6   RDW 67.6* 63.3*   PLATELETCT 231 205   MPV 10.8 10.2     Recent Labs     12/05/24  0144 12/06/24  0316   SODIUM 132* 129*   POTASSIUM 4.4 5.0   CHLORIDE 97 96   CO2 21 21   GLUCOSE 104* 161*   BUN 44* 41*   CREATININE 1.17 1.16   CALCIUM 10.2 10.1     Recent Labs     12/05/24 0144   APTT 35.4   INR 1.13               Imaging  MR-THORACIC SPINE-WITH & W/O   Final Result      Mild degenerative disease as described above. There is no thoracic myelopathy.      MR-CERVICAL SPINE-WITH & W/O   Final Result      Mild degenerative disease in the cervical spine as described above.      MR-LUMBAR SPINE-WITH & W/O   Final Result      1.  There is mild abnormal T2 hyperintensity noted at L3-4 discs. Adjacent endplate irregularities are noted. Post gadolinium sequences demonstrates abnormal disc and endplate enhancement. There is enhancement of the bilateral facet joints. The previous    MRI dated 6/12/2015 demonstrates significant degenerative changes at the L3-4 disc and facet joints.  There is no epidural or prevertebral enhancement/fluid collection. Therefore these findings likely represent progression of the degenerative arthritis    with inflammatory erosive arthropathy. However, it is difficult to rule out early discitis due to the presence of abnormal T2 hyperintensity and disc spaces enhancement. Please correlate with the clinical findings.   2.  Moderate central canal stenosis at L3-4.   3.  Degenerative changes.      DX-ESOPHAGUS - [id number]   Final Result      DX-CHEST-LIMITED (1 VIEW)   Final Result      No evidence of acute cardiopulmonary process.      MR-BRAIN-W/O   Final Result      1.  No evidence of acute territorial infarct, intracranial hemorrhage or mass lesion.   2.  Mild diffuse cerebral and cerebellar substance loss.   3.  Mild microangiopathic ischemic change versus demyelination or gliosis.      CT-ABDOMEN-PELVIS W/O   Final Result      1.  Small umbilical hernia which contains small intestine. Small intestine is dilated proximal to that hernia and decompressed distally consistent with resultant small bowel obstruction.      2.  Again seen cirrhotic appearance of the liver with small metallic ascites within the abdomen and pelvis and splenomegaly consistent with portal hypertension.      3.  Gallstones within the gallbladder.      CT-HEAD W/O   Final Result      No evidence of acute intracranial process.               DX-CHEST-PORTABLE (1 VIEW)   Final Result      No evidence of acute cardiopulmonary process.           Assessment/Plan  * Alcoholic encephalopathy (HCC)- (present on admission)  Assessment & Plan  Patient came in encephalopathic but after administering care in the emergency room brought up to the floor she started showing improvement with her mental status.  Librium was given and likely resulted in worsening encephalopathy rather than improvement    Mental state waxing and waning  I reviewed patient's EEG today, there is a finding of left temporal  abnormal waveforms, induced with photic stimulation.  Patient may have focal seizures in this region.  He has diffuse slowing suggestive of cerebral dysfunction.     12/7:  Initially after EEG, we trialed Keppra but caused patient to be more somnolent.  We have discontinued.  On examination today, she appears to have improvement after third dose of IV Solu-Medrol 1 g.  She was performing more neurological functions compared to the prior days.  During hospitalization she had been able to walk and showed sharp rapid decline.  She is only started to improve once Solu-Medrol has been started.  CSF shows no growth to date    Elevated CSF protein- (present on admission)  Assessment & Plan  CSF analysis showing nucleated cells 4, glucose 74, total protein elevated at 62.   Pending: CSF/serum Oligoclonal bands, serum aquaporin 4, serum myasthenia gravis workup, CSF VDRL, B6  Serum RPR negative  Meningitis/encephalitis PCR testing negative    Primary diagnosis, autoimmune/demyelinating neurological disorder not otherwise specified.  Continue Solu-Medrol 1g.  Patient is showing improvement in her cranial nerve examination and strength    Dysphagia- (present on admission)  Assessment & Plan  Patient is having repeat dysphagia  I discussed with speech therapist, patient was improving in her strength.  Patient went for MBSS, showed mild oropharyngeal dysphagia  Continue purée with thin liquid diet.  Continue with speech therapy evaluations    Partial seizure disorder (HCC)- (present on admission)  Assessment & Plan  I reviewed patient's EEG today, there is a finding of left temporal abnormal waveforms, induced with photic stimulation.  Patient may have focal seizures in this region.  He has diffuse slowing suggestive of cerebral dysfunction.     No Keppra needed at this time  Partial seizures may still be a possibility but patient is showing improvement    Extreme restricting type anorexia nervosa- (present on  admission)  Assessment & Plan  Patient appears to also have's extreme restricting anorexia nervosa.  After speaking with patient's friend Luiza, they had cleaned out her refrigerator but most of her items were uneaten food.  - Patient's cousin Nica also mention patient has had relapsing anorexia.  - I do worry that patient may be or has already suffered to a point where recovery will be an extremely prolonged time if possible.  Is unclear if she has had a significant amount of deconditioning, thiamine deficiency, other vitamin deficiencies and could have to her current state.  Continue mirtazapine, QTc 454 ms    Cystitis- (present on admission)  Assessment & Plan  Pt c/o abdominal pain  Urine showing Klebsiella oxytoca, resistant to IV Ancef, she is allergic to sulfa and PCNs.  Completed IV ceftriaxone 12/5    CKD (chronic kidney disease) stage 4, GFR 15-29 ml/min (HCC)- (present on admission)  Assessment & Plan  Creatinine was 2.23 on admission  Monitoring closely  Creatinine 1.17    Esophageal varices without bleeding (HCC)- (present on admission)  Assessment & Plan  Change omeprazole to IV Protonix.  Patient is NPO.  Hx of grade 2 esophageal varices.    Severe protein-calorie malnutrition (HCC)- (present on admission)  Assessment & Plan  Dietitian agreed patient meets Aspen criteria  Continue diet supplements  Continue mirtazapine  Dietitian following  Will give additional 3 days of IV thiamine.  Continue p.o. thiamine, folic acid, Vit b, Vit C.    Anemia due to chronic kidney disease- (present on admission)  Assessment & Plan  Hemoglobin 8.9, platelets 205    Liver cirrhosis (HCC)- (present on admission)  Assessment & Plan  Chronic liver cirrhosis secondary to alcohol abuse  Monitor liver function tests  Optimize diuresis  Hold lactulose - copious amounts of diarrhea  Monitor magnesium and phosphorus levels    Follows with [facility]    Hypokalemia- (present on admission)  Assessment & Plan  K 4.4  On  spironolactone, hold spironolactone if potassium is greater than 4.4    Hyponatremia- (present on admission)  Assessment & Plan  Sodium 127, from 127, 124, 127  Patient may be receiving too much free water.  I am placing free water restriction of 500 mL/day, total fluid restriction of 1500 mL/day.  Sodium 129    Chronic combined systolic and diastolic heart failure (HCC)- (present on admission)  Assessment & Plan  Patient's 1/22/2024 echocardiogram showed LVEF of 40%.  There was a ascites noted.  Grade 2 diastolic dysfunction noted.  Severely dilated LA, mild mitral regurgitation, moderate aortic valve stenosis.  Her last SEFERINO on 9/6/2024 showed trace aortic insufficiency with a moderate aortic valve stenosis.  Continue spironolactone    Heart murmur- (present on admission)  Assessment & Plan  Chronic moderate aortic stenosis  Eventually the patient may need surgical intervention such as a TAVR.  Will need outpatient cardiology    Stage 3b chronic kidney disease- (present on admission)  Assessment & Plan  chronic    HTN (hypertension), benign- (present on admission)  Assessment & Plan  Continue spironolactone, with holding parameters  As needed labetalol         VTE prophylaxis:    heparin ppx      I have performed a physical exam and reviewed and updated ROS and Plan today (12/7/2024). In review of yesterday's note (12/6/2024), there are no changes except as documented above.      Total time spent 52 minutes.    This included my review of patient's overnight RN notes, face to face interview, physical examination, lab analysis.  Also includes repeat visits with the patient, and my documented assessments and interventions above.  In addition, I spoke with entire care team on patient's treatment plan, and DC planning on morning rounds and IDT rounds.    This note was generated using voice recognition software which has a chance of producing errors of grammar and content.  I have made every reasonable attempt to find  and correct any errors, but it should be expected that some may not be found prior to finalization of this note.

## 2024-12-07 NOTE — PROGRESS NOTES
Telemetry shift summary    Rhythm: sr  HR:   Ectopy: r pvc,r big    Measurements: .20 / .08 / .36    Normal values  Rhythm SR  HR   Measurements: 0.12-0.20/0.08-0.10/0.30-0.52

## 2024-12-07 NOTE — ASSESSMENT & PLAN NOTE
Patient appears to also have's extreme restricting anorexia nervosa.  After speaking with patient's friend Luiza, they had cleaned out her refrigerator but most of her items were uneaten food.  - Patient's cousin Nica also mention patient has had relapsing anorexia.  - I do worry that patient may be or has already suffered to a point where recovery will be an extremely prolonged time if possible.  Is unclear if she has had a significant amount of deconditioning, thiamine deficiency, other vitamin deficiencies and could have to her current state.  Continue mirtazapine.  QTc 454 ms

## 2024-12-07 NOTE — CARE PLAN
Problem: Pain - Standard  Goal: Alleviation of pain or a reduction in pain to the patient’s comfort goal  Outcome: Progressing     Problem: Knowledge Deficit - Standard  Goal: Patient and family/care givers will demonstrate understanding of plan of care, disease process/condition, diagnostic tests and medications  Outcome: Progressing   The patient is Stable - Low risk of patient condition declining or worsening    Shift Goals  Clinical Goals: PT/OT, monitor intake, monitor mentation  Patient Goals: comfort, rest  Family Goals: updates    Progress made toward(s) clinical / shift goals:  pt educated on plan of care and medications. No questions at this time    Patient is not progressing towards the following goals:

## 2024-12-08 PROBLEM — E83.42 HYPOMAGNESEMIA: Status: ACTIVE | Noted: 2024-12-08

## 2024-12-08 PROBLEM — E83.39 HYPOPHOSPHATEMIA: Status: ACTIVE | Noted: 2024-12-08

## 2024-12-08 LAB
ACHR BIND AB SER-SCNC: 0 NMOL/L (ref 0–0.4)
ACHR BLOCK AB/ACHR TOTAL SFR SER: 0 % (ref 0–26)
ALBUMIN SERPL BCP-MCNC: 3.1 G/DL (ref 3.2–4.9)
BUN SERPL-MCNC: 44 MG/DL (ref 8–22)
CALCIUM ALBUM COR SERPL-MCNC: 10.4 MG/DL (ref 8.5–10.5)
CALCIUM SERPL-MCNC: 9.7 MG/DL (ref 8.4–10.2)
CHLORIDE SERPL-SCNC: 102 MMOL/L (ref 96–112)
CO2 SERPL-SCNC: 20 MMOL/L (ref 20–33)
CREAT SERPL-MCNC: 1.01 MG/DL (ref 0.5–1.4)
ERYTHROCYTE [DISTWIDTH] IN BLOOD BY AUTOMATED COUNT: 65.5 FL (ref 35.9–50)
GFR SERPLBLD CREATININE-BSD FMLA CKD-EPI: 61 ML/MIN/1.73 M 2
GLUCOSE BLD STRIP.AUTO-MCNC: 140 MG/DL (ref 65–99)
GLUCOSE BLD STRIP.AUTO-MCNC: 164 MG/DL (ref 65–99)
GLUCOSE BLD STRIP.AUTO-MCNC: 245 MG/DL (ref 65–99)
GLUCOSE BLD STRIP.AUTO-MCNC: 269 MG/DL (ref 65–99)
GLUCOSE SERPL-MCNC: 112 MG/DL (ref 65–99)
HCT VFR BLD AUTO: 25.2 % (ref 37–47)
HGB BLD-MCNC: 8 G/DL (ref 12–16)
MAGNESIUM SERPL-MCNC: 1.8 MG/DL (ref 1.5–2.5)
MCH RBC QN AUTO: 25.9 PG (ref 27–33)
MCHC RBC AUTO-ENTMCNC: 31.7 G/DL (ref 32.2–35.5)
MCV RBC AUTO: 81.6 FL (ref 81.4–97.8)
PHOSPHATE SERPL-MCNC: 1.7 MG/DL (ref 2.5–4.5)
PLATELET # BLD AUTO: 169 K/UL (ref 164–446)
PMV BLD AUTO: 10.9 FL (ref 9–12.9)
POTASSIUM SERPL-SCNC: 4.9 MMOL/L (ref 3.6–5.5)
RBC # BLD AUTO: 3.09 M/UL (ref 4.2–5.4)
SODIUM SERPL-SCNC: 132 MMOL/L (ref 135–145)
WBC # BLD AUTO: 6.3 K/UL (ref 4.8–10.8)

## 2024-12-08 PROCEDURE — 770020 HCHG ROOM/CARE - TELE (206)

## 2024-12-08 PROCEDURE — A9270 NON-COVERED ITEM OR SERVICE: HCPCS | Performed by: INTERNAL MEDICINE

## 2024-12-08 PROCEDURE — 80069 RENAL FUNCTION PANEL: CPT

## 2024-12-08 PROCEDURE — 84146 ASSAY OF PROLACTIN: CPT

## 2024-12-08 PROCEDURE — 700111 HCHG RX REV CODE 636 W/ 250 OVERRIDE (IP): Performed by: INTERNAL MEDICINE

## 2024-12-08 PROCEDURE — 99233 SBSQ HOSP IP/OBS HIGH 50: CPT | Performed by: INTERNAL MEDICINE

## 2024-12-08 PROCEDURE — 83735 ASSAY OF MAGNESIUM: CPT

## 2024-12-08 PROCEDURE — 700102 HCHG RX REV CODE 250 W/ 637 OVERRIDE(OP): Performed by: INTERNAL MEDICINE

## 2024-12-08 PROCEDURE — 700101 HCHG RX REV CODE 250: Performed by: INTERNAL MEDICINE

## 2024-12-08 PROCEDURE — 700105 HCHG RX REV CODE 258: Performed by: INTERNAL MEDICINE

## 2024-12-08 PROCEDURE — 94760 N-INVAS EAR/PLS OXIMETRY 1: CPT

## 2024-12-08 PROCEDURE — 82962 GLUCOSE BLOOD TEST: CPT

## 2024-12-08 PROCEDURE — 36415 COLL VENOUS BLD VENIPUNCTURE: CPT

## 2024-12-08 PROCEDURE — 85027 COMPLETE CBC AUTOMATED: CPT

## 2024-12-08 RX ORDER — MAGNESIUM SULFATE HEPTAHYDRATE 40 MG/ML
2 INJECTION, SOLUTION INTRAVENOUS ONCE
Status: COMPLETED | OUTPATIENT
Start: 2024-12-08 | End: 2024-12-08

## 2024-12-08 RX ADMIN — SODIUM PHOSPHATE, MONOBASIC, MONOHYDRATE AND SODIUM PHOSPHATE, DIBASIC, ANHYDROUS 15 MMOL: 276; 142 INJECTION, SOLUTION INTRAVENOUS at 12:44

## 2024-12-08 RX ADMIN — NYSTATIN 500000 UNITS: 100000 SUSPENSION ORAL at 20:59

## 2024-12-08 RX ADMIN — NYSTATIN 500000 UNITS: 100000 SUSPENSION ORAL at 12:41

## 2024-12-08 RX ADMIN — HEPARIN SODIUM 5000 UNITS: 5000 INJECTION, SOLUTION INTRAVENOUS; SUBCUTANEOUS at 05:32

## 2024-12-08 RX ADMIN — MAGNESIUM SULFATE HEPTAHYDRATE 2 G: 2 INJECTION, SOLUTION INTRAVENOUS at 10:52

## 2024-12-08 RX ADMIN — Medication 1 TABLET: at 05:35

## 2024-12-08 RX ADMIN — MIRTAZAPINE 15 MG: 15 TABLET, ORALLY DISINTEGRATING ORAL at 20:59

## 2024-12-08 RX ADMIN — INSULIN LISPRO 3 UNITS: 100 INJECTION, SOLUTION INTRAVENOUS; SUBCUTANEOUS at 16:38

## 2024-12-08 RX ADMIN — INSULIN LISPRO 1 UNITS: 100 INJECTION, SOLUTION INTRAVENOUS; SUBCUTANEOUS at 10:56

## 2024-12-08 RX ADMIN — Medication 100 MG: at 05:35

## 2024-12-08 RX ADMIN — FOLIC ACID 1 MG: 1 TABLET ORAL at 05:35

## 2024-12-08 RX ADMIN — NYSTATIN 500000 UNITS: 100000 SUSPENSION ORAL at 16:37

## 2024-12-08 RX ADMIN — INSULIN LISPRO 2 UNITS: 100 INJECTION, SOLUTION INTRAVENOUS; SUBCUTANEOUS at 21:07

## 2024-12-08 RX ADMIN — METHYLPREDNISOLONE SODIUM SUCCINATE 1000 MG: 1 INJECTION, POWDER, LYOPHILIZED, FOR SOLUTION INTRAMUSCULAR; INTRAVENOUS at 08:56

## 2024-12-08 RX ADMIN — PANTOPRAZOLE SODIUM 40 MG: 40 INJECTION, POWDER, FOR SOLUTION INTRAVENOUS at 05:29

## 2024-12-08 RX ADMIN — NYSTATIN 500000 UNITS: 100000 SUSPENSION ORAL at 08:56

## 2024-12-08 ASSESSMENT — ENCOUNTER SYMPTOMS
ABDOMINAL PAIN: 0
WEAKNESS: 1
WEIGHT LOSS: 0

## 2024-12-08 ASSESSMENT — PAIN DESCRIPTION - PAIN TYPE: TYPE: ACUTE PAIN

## 2024-12-08 ASSESSMENT — FIBROSIS 4 INDEX: FIB4 SCORE: 2.08

## 2024-12-08 NOTE — PROGRESS NOTES
At 19:15 Bedside report received from Isatu VEGA and assumed Pt's cares. Call light within reach. Safety measures in place.    07:15 Bedside report given to next shift RNMami. Care released.

## 2024-12-08 NOTE — CARE PLAN
The patient is Stable - Low risk of patient condition declining or worsening    Shift Goals  Clinical Goals: Pt safety. Turn q 2hrs  Patient Goals: Sleep  Family Goals: No family present    Progress made toward(s) clinical / shift goals:  Have been checking on Pt hourly. She has been encouraged to call for assistance as needed. Bed in low position, upper side rails up, anti slippery socks on, call light within reach, and bed and strip alarm on.       She has been repositioning q 2 hrs or more often.    Patient is not progressing towards the following goals:      Problem: Pain - Standard  Goal: Alleviation of pain or a reduction in pain to the patient’s comfort goal  Outcome: Progressing     Problem: Knowledge Deficit - Standard  Goal: Patient and family/care givers will demonstrate understanding of plan of care, disease process/condition, diagnostic tests and medications  Outcome: Progressing     Problem: Seizure Precautions  Goal: Implementation of seizure precautions  Outcome: Progressing

## 2024-12-08 NOTE — PROGRESS NOTES
Telemetry shift summary:  Rhythm: SR     HR Range: 60's to 90's      Measurements from strip printed at 23:58:42   NC: 0.20   QRS 0.06   QT 0.36     Ectopies (As per Telemetry Tech report) Rare PVC.

## 2024-12-09 ENCOUNTER — APPOINTMENT (OUTPATIENT)
Dept: CARDIOLOGY | Facility: MEDICAL CENTER | Age: 64
DRG: 056 | End: 2024-12-09
Attending: INTERNAL MEDICINE
Payer: MEDICARE

## 2024-12-09 ENCOUNTER — HOSPITAL ENCOUNTER (INPATIENT)
Facility: REHABILITATION | Age: 64
LOS: 8 days | DRG: 056 | End: 2024-12-17
Attending: PHYSICAL MEDICINE & REHABILITATION | Admitting: PHYSICAL MEDICINE & REHABILITATION
Payer: MEDICARE

## 2024-12-09 VITALS
HEIGHT: 66 IN | RESPIRATION RATE: 16 BRPM | BODY MASS INDEX: 19.24 KG/M2 | SYSTOLIC BLOOD PRESSURE: 141 MMHG | TEMPERATURE: 97.9 F | DIASTOLIC BLOOD PRESSURE: 78 MMHG | HEART RATE: 89 BPM | OXYGEN SATURATION: 98 % | WEIGHT: 119.71 LBS

## 2024-12-09 DIAGNOSIS — R53.1 GENERALIZED WEAKNESS: ICD-10-CM

## 2024-12-09 DIAGNOSIS — E43 SEVERE PROTEIN-CALORIE MALNUTRITION (HCC): ICD-10-CM

## 2024-12-09 DIAGNOSIS — R83.8 ELEVATED CSF PROTEIN: ICD-10-CM

## 2024-12-09 DIAGNOSIS — M54.17 LUMBOSACRAL RADICULOPATHY: ICD-10-CM

## 2024-12-09 DIAGNOSIS — Z29.89 NEED FOR PNEUMOCYSTIS PROPHYLAXIS: ICD-10-CM

## 2024-12-09 DIAGNOSIS — N18.9 CHRONIC KIDNEY DISEASE, UNSPECIFIED CKD STAGE: ICD-10-CM

## 2024-12-09 DIAGNOSIS — K21.9 GASTROESOPHAGEAL REFLUX DISEASE WITHOUT ESOPHAGITIS: ICD-10-CM

## 2024-12-09 DIAGNOSIS — G98.8 NEUROLOGIC DISORDER: ICD-10-CM

## 2024-12-09 PROBLEM — G93.40 ACUTE ENCEPHALOPATHY: Status: ACTIVE | Noted: 2024-12-09

## 2024-12-09 PROBLEM — G31.2 ALCOHOLIC ENCEPHALOPATHY (HCC): Status: RESOLVED | Noted: 2024-11-27 | Resolved: 2024-12-09

## 2024-12-09 PROBLEM — I35.0 NONRHEUMATIC AORTIC VALVE STENOSIS: Chronic | Status: ACTIVE | Noted: 2024-09-17

## 2024-12-09 PROBLEM — R13.10 DYSPHAGIA: Status: RESOLVED | Noted: 2024-12-04 | Resolved: 2024-12-09

## 2024-12-09 PROBLEM — E87.6 HYPOKALEMIA: Status: RESOLVED | Noted: 2024-01-22 | Resolved: 2024-12-09

## 2024-12-09 LAB
ALBUMIN SERPL BCP-MCNC: 3.1 G/DL (ref 3.2–4.9)
BUN SERPL-MCNC: 42 MG/DL (ref 8–22)
CALCIUM ALBUM COR SERPL-MCNC: 10.4 MG/DL (ref 8.5–10.5)
CALCIUM SERPL-MCNC: 9.7 MG/DL (ref 8.4–10.2)
CHLORIDE SERPL-SCNC: 101 MMOL/L (ref 96–112)
CO2 SERPL-SCNC: 20 MMOL/L (ref 20–33)
CREAT SERPL-MCNC: 1.06 MG/DL (ref 0.5–1.4)
ERYTHROCYTE [DISTWIDTH] IN BLOOD BY AUTOMATED COUNT: 65.2 FL (ref 35.9–50)
GFR SERPLBLD CREATININE-BSD FMLA CKD-EPI: 58 ML/MIN/1.73 M 2
GLUCOSE BLD STRIP.AUTO-MCNC: 144 MG/DL (ref 65–99)
GLUCOSE BLD STRIP.AUTO-MCNC: 176 MG/DL (ref 65–99)
GLUCOSE BLD STRIP.AUTO-MCNC: 187 MG/DL (ref 65–99)
GLUCOSE BLD STRIP.AUTO-MCNC: 197 MG/DL (ref 65–99)
GLUCOSE SERPL-MCNC: 133 MG/DL (ref 65–99)
HCT VFR BLD AUTO: 24.7 % (ref 37–47)
HGB BLD-MCNC: 7.9 G/DL (ref 12–16)
MAGNESIUM SERPL-MCNC: 2.3 MG/DL (ref 1.5–2.5)
MCH RBC QN AUTO: 25.7 PG (ref 27–33)
MCHC RBC AUTO-ENTMCNC: 32 G/DL (ref 32.2–35.5)
MCV RBC AUTO: 80.5 FL (ref 81.4–97.8)
NT-PROBNP SERPL IA-MCNC: 3055 PG/ML (ref 0–125)
OLIGOCLONAL BANDS CSF ELPH-IMP: NORMAL
OLIGOCLONAL BANDS CSF IEF: NORMAL BANDS (ref 0–1)
OLIGOCLONAL BANDS.IT SER+CSF QL: NEGATIVE
PHOSPHATE SERPL-MCNC: 2.1 MG/DL (ref 2.5–4.5)
PLATELET # BLD AUTO: 166 K/UL (ref 164–446)
PMV BLD AUTO: 10.9 FL (ref 9–12.9)
POTASSIUM SERPL-SCNC: 4.7 MMOL/L (ref 3.6–5.5)
PROLACTIN SERPL-MCNC: 13.7 NG/ML (ref 2.8–26)
RBC # BLD AUTO: 3.07 M/UL (ref 4.2–5.4)
SODIUM SERPL-SCNC: 131 MMOL/L (ref 135–145)
TEST NAME 95000: NORMAL
VIT B6 SERPL-MCNC: 9.2 NMOL/L (ref 20–125)
WBC # BLD AUTO: 5.8 K/UL (ref 4.8–10.8)

## 2024-12-09 PROCEDURE — 700102 HCHG RX REV CODE 250 W/ 637 OVERRIDE(OP): Performed by: INTERNAL MEDICINE

## 2024-12-09 PROCEDURE — 97535 SELF CARE MNGMENT TRAINING: CPT

## 2024-12-09 PROCEDURE — 83880 ASSAY OF NATRIURETIC PEPTIDE: CPT

## 2024-12-09 PROCEDURE — 82962 GLUCOSE BLOOD TEST: CPT | Mod: 91

## 2024-12-09 PROCEDURE — 700102 HCHG RX REV CODE 250 W/ 637 OVERRIDE(OP): Performed by: PHYSICAL MEDICINE & REHABILITATION

## 2024-12-09 PROCEDURE — 99223 1ST HOSP IP/OBS HIGH 75: CPT | Performed by: PHYSICAL MEDICINE & REHABILITATION

## 2024-12-09 PROCEDURE — 700105 HCHG RX REV CODE 258: Performed by: INTERNAL MEDICINE

## 2024-12-09 PROCEDURE — 700111 HCHG RX REV CODE 636 W/ 250 OVERRIDE (IP): Performed by: PHYSICAL MEDICINE & REHABILITATION

## 2024-12-09 PROCEDURE — A9270 NON-COVERED ITEM OR SERVICE: HCPCS | Performed by: PHYSICAL MEDICINE & REHABILITATION

## 2024-12-09 PROCEDURE — 99239 HOSP IP/OBS DSCHRG MGMT >30: CPT | Performed by: INTERNAL MEDICINE

## 2024-12-09 PROCEDURE — 83735 ASSAY OF MAGNESIUM: CPT

## 2024-12-09 PROCEDURE — 770010 HCHG ROOM/CARE - REHAB SEMI PRIVAT*

## 2024-12-09 PROCEDURE — 36415 COLL VENOUS BLD VENIPUNCTURE: CPT

## 2024-12-09 PROCEDURE — 80069 RENAL FUNCTION PANEL: CPT

## 2024-12-09 PROCEDURE — 94760 N-INVAS EAR/PLS OXIMETRY 1: CPT

## 2024-12-09 PROCEDURE — 92526 ORAL FUNCTION THERAPY: CPT

## 2024-12-09 PROCEDURE — 85027 COMPLETE CBC AUTOMATED: CPT

## 2024-12-09 PROCEDURE — A9270 NON-COVERED ITEM OR SERVICE: HCPCS | Performed by: INTERNAL MEDICINE

## 2024-12-09 PROCEDURE — 97116 GAIT TRAINING THERAPY: CPT

## 2024-12-09 PROCEDURE — 97164 PT RE-EVAL EST PLAN CARE: CPT

## 2024-12-09 PROCEDURE — 700111 HCHG RX REV CODE 636 W/ 250 OVERRIDE (IP): Performed by: INTERNAL MEDICINE

## 2024-12-09 RX ORDER — B-COMPLEX WITH VITAMIN C
1 TABLET ORAL DAILY
Status: SHIPPED
Start: 2024-12-10

## 2024-12-09 RX ORDER — OXYCODONE HYDROCHLORIDE 10 MG/1
10 TABLET ORAL
Status: DISCONTINUED | OUTPATIENT
Start: 2024-12-09 | End: 2024-12-10

## 2024-12-09 RX ORDER — MIRTAZAPINE 15 MG/1
15 TABLET, ORALLY DISINTEGRATING ORAL
Status: ON HOLD
Start: 2024-12-09 | End: 2024-12-16

## 2024-12-09 RX ORDER — ONDANSETRON 2 MG/ML
4 INJECTION INTRAMUSCULAR; INTRAVENOUS 4 TIMES DAILY PRN
Status: DISCONTINUED | OUTPATIENT
Start: 2024-12-09 | End: 2024-12-17 | Stop reason: HOSPADM

## 2024-12-09 RX ORDER — INSULIN LISPRO 100 [IU]/ML
1-6 INJECTION, SOLUTION INTRAVENOUS; SUBCUTANEOUS
Status: CANCELLED | OUTPATIENT
Start: 2024-12-09

## 2024-12-09 RX ORDER — FOLIC ACID 1 MG/1
1 TABLET ORAL DAILY
Status: DISCONTINUED | OUTPATIENT
Start: 2024-12-10 | End: 2024-12-17 | Stop reason: HOSPADM

## 2024-12-09 RX ORDER — PREDNISONE 20 MG/1
60 TABLET ORAL DAILY
Status: DISCONTINUED | OUTPATIENT
Start: 2024-12-10 | End: 2024-12-16

## 2024-12-09 RX ORDER — INSULIN LISPRO 100 [IU]/ML
1-6 INJECTION, SOLUTION INTRAVENOUS; SUBCUTANEOUS
Status: DISCONTINUED | OUTPATIENT
Start: 2024-12-09 | End: 2024-12-10

## 2024-12-09 RX ORDER — MIRTAZAPINE 15 MG/1
15 TABLET, ORALLY DISINTEGRATING ORAL
Status: DISCONTINUED | OUTPATIENT
Start: 2024-12-09 | End: 2024-12-17 | Stop reason: HOSPADM

## 2024-12-09 RX ORDER — ALUMINA, MAGNESIA, AND SIMETHICONE 2400; 2400; 240 MG/30ML; MG/30ML; MG/30ML
20 SUSPENSION ORAL
Status: DISCONTINUED | OUTPATIENT
Start: 2024-12-09 | End: 2024-12-17 | Stop reason: HOSPADM

## 2024-12-09 RX ORDER — B-COMPLEX WITH VITAMIN C
1 TABLET ORAL DAILY
Status: DISCONTINUED | OUTPATIENT
Start: 2024-12-10 | End: 2024-12-17 | Stop reason: HOSPADM

## 2024-12-09 RX ORDER — HEPARIN SODIUM 5000 [USP'U]/ML
5000 INJECTION, SOLUTION INTRAVENOUS; SUBCUTANEOUS EVERY 8 HOURS
Status: CANCELLED | OUTPATIENT
Start: 2024-12-09

## 2024-12-09 RX ORDER — LANOLIN ALCOHOL/MO/W.PET/CERES
100 CREAM (GRAM) TOPICAL DAILY
Status: SHIPPED
Start: 2024-12-10

## 2024-12-09 RX ORDER — AMOXICILLIN 250 MG
2 CAPSULE ORAL EVERY EVENING
Status: DISCONTINUED | OUTPATIENT
Start: 2024-12-09 | End: 2024-12-12

## 2024-12-09 RX ORDER — GAUZE BANDAGE 2" X 2"
100 BANDAGE TOPICAL DAILY
Status: DISCONTINUED | OUTPATIENT
Start: 2024-12-10 | End: 2024-12-17 | Stop reason: HOSPADM

## 2024-12-09 RX ORDER — DEXTROSE MONOHYDRATE 25 G/50ML
25 INJECTION, SOLUTION INTRAVENOUS
Status: CANCELLED | OUTPATIENT
Start: 2024-12-09

## 2024-12-09 RX ORDER — DEXTROSE MONOHYDRATE 25 G/50ML
25 INJECTION, SOLUTION INTRAVENOUS
Status: DISCONTINUED | OUTPATIENT
Start: 2024-12-09 | End: 2024-12-10

## 2024-12-09 RX ORDER — MIRTAZAPINE 15 MG/1
15 TABLET, ORALLY DISINTEGRATING ORAL
Status: CANCELLED | OUTPATIENT
Start: 2024-12-09

## 2024-12-09 RX ORDER — ATOVAQUONE 750 MG/5ML
750 SUSPENSION ORAL
Status: DISCONTINUED | OUTPATIENT
Start: 2024-12-09 | End: 2024-12-09

## 2024-12-09 RX ORDER — TRAZODONE HYDROCHLORIDE 50 MG/1
50 TABLET, FILM COATED ORAL
Status: DISCONTINUED | OUTPATIENT
Start: 2024-12-09 | End: 2024-12-17 | Stop reason: HOSPADM

## 2024-12-09 RX ORDER — ATOVAQUONE 750 MG/5ML
1500 SUSPENSION ORAL 2 TIMES DAILY
Status: DISCONTINUED | OUTPATIENT
Start: 2024-12-09 | End: 2024-12-17 | Stop reason: HOSPADM

## 2024-12-09 RX ORDER — OXYCODONE HYDROCHLORIDE 5 MG/1
5 TABLET ORAL
Status: DISCONTINUED | OUTPATIENT
Start: 2024-12-09 | End: 2024-12-10

## 2024-12-09 RX ORDER — HYDRALAZINE HYDROCHLORIDE 25 MG/1
25 TABLET, FILM COATED ORAL EVERY 8 HOURS PRN
Status: DISCONTINUED | OUTPATIENT
Start: 2024-12-09 | End: 2024-12-17 | Stop reason: HOSPADM

## 2024-12-09 RX ORDER — ECHINACEA PURPUREA EXTRACT 125 MG
2 TABLET ORAL PRN
Status: DISCONTINUED | OUTPATIENT
Start: 2024-12-09 | End: 2024-12-17 | Stop reason: HOSPADM

## 2024-12-09 RX ORDER — CARBOXYMETHYLCELLULOSE SODIUM 5 MG/ML
1 SOLUTION/ DROPS OPHTHALMIC PRN
Status: DISCONTINUED | OUTPATIENT
Start: 2024-12-09 | End: 2024-12-17 | Stop reason: HOSPADM

## 2024-12-09 RX ORDER — ATOVAQUONE 750 MG/5ML
1500 SUSPENSION ORAL DAILY
Status: ON HOLD
Start: 2024-12-16 | End: 2024-12-16

## 2024-12-09 RX ORDER — FOLIC ACID 1 MG/1
1 TABLET ORAL DAILY
Status: CANCELLED | OUTPATIENT
Start: 2024-12-10

## 2024-12-09 RX ORDER — FOLIC ACID 1 MG/1
1 TABLET ORAL DAILY
Status: SHIPPED
Start: 2024-12-10

## 2024-12-09 RX ORDER — PREDNISONE 20 MG/1
60 TABLET ORAL DAILY
Status: ON HOLD
Start: 2024-12-10 | End: 2024-12-16

## 2024-12-09 RX ORDER — HEPARIN SODIUM 5000 [USP'U]/ML
5000 INJECTION, SOLUTION INTRAVENOUS; SUBCUTANEOUS EVERY 8 HOURS
Status: DISCONTINUED | OUTPATIENT
Start: 2024-12-09 | End: 2024-12-13

## 2024-12-09 RX ORDER — ONDANSETRON 4 MG/1
4 TABLET, ORALLY DISINTEGRATING ORAL 4 TIMES DAILY PRN
Status: DISCONTINUED | OUTPATIENT
Start: 2024-12-09 | End: 2024-12-17 | Stop reason: HOSPADM

## 2024-12-09 RX ORDER — HEPARIN SODIUM 5000 [USP'U]/ML
5000 INJECTION, SOLUTION INTRAVENOUS; SUBCUTANEOUS EVERY 8 HOURS
Status: ON HOLD | DISCHARGE
Start: 2024-12-09 | End: 2024-12-16

## 2024-12-09 RX ORDER — GAUZE BANDAGE 2" X 2"
100 BANDAGE TOPICAL DAILY
Status: CANCELLED | OUTPATIENT
Start: 2024-12-10

## 2024-12-09 RX ORDER — POLYETHYLENE GLYCOL 3350 17 G/17G
1 POWDER, FOR SOLUTION ORAL
Status: DISCONTINUED | OUTPATIENT
Start: 2024-12-09 | End: 2024-12-12

## 2024-12-09 RX ORDER — ACETAMINOPHEN 325 MG/1
650 TABLET ORAL EVERY 4 HOURS PRN
Status: DISCONTINUED | OUTPATIENT
Start: 2024-12-09 | End: 2024-12-17 | Stop reason: HOSPADM

## 2024-12-09 RX ORDER — B-COMPLEX WITH VITAMIN C
1 TABLET ORAL DAILY
Status: CANCELLED | OUTPATIENT
Start: 2024-12-10 | End: 2024-12-21

## 2024-12-09 RX ADMIN — INSULIN LISPRO 1 UNITS: 100 INJECTION, SOLUTION INTRAVENOUS; SUBCUTANEOUS at 21:07

## 2024-12-09 RX ADMIN — INSULIN LISPRO 1 UNITS: 100 INJECTION, SOLUTION INTRAVENOUS; SUBCUTANEOUS at 17:35

## 2024-12-09 RX ADMIN — HEPARIN SODIUM 5000 UNITS: 5000 INJECTION, SOLUTION INTRAVENOUS; SUBCUTANEOUS at 21:10

## 2024-12-09 RX ADMIN — Medication 100 MG: at 06:36

## 2024-12-09 RX ADMIN — PANTOPRAZOLE SODIUM 40 MG: 40 INJECTION, POWDER, FOR SOLUTION INTRAVENOUS at 06:39

## 2024-12-09 RX ADMIN — Medication 1 TABLET: at 06:36

## 2024-12-09 RX ADMIN — ATOVAQUONE 750 MG: 750 SUSPENSION ORAL at 09:40

## 2024-12-09 RX ADMIN — OMEPRAZOLE 20 MG: 20 CAPSULE, DELAYED RELEASE ORAL at 08:13

## 2024-12-09 RX ADMIN — SENNOSIDES AND DOCUSATE SODIUM 2 TABLET: 50; 8.6 TABLET ORAL at 21:10

## 2024-12-09 RX ADMIN — ATOVAQUONE 1500 MG: 750 SUSPENSION ORAL at 21:57

## 2024-12-09 RX ADMIN — METHYLPREDNISOLONE SODIUM SUCCINATE 1000 MG: 1 INJECTION, POWDER, LYOPHILIZED, FOR SOLUTION INTRAMUSCULAR; INTRAVENOUS at 08:00

## 2024-12-09 RX ADMIN — INSULIN LISPRO 1 UNITS: 100 INJECTION, SOLUTION INTRAVENOUS; SUBCUTANEOUS at 11:34

## 2024-12-09 RX ADMIN — MIRTAZAPINE 15 MG: 15 TABLET, ORALLY DISINTEGRATING ORAL at 21:10

## 2024-12-09 RX ADMIN — FOLIC ACID 1 MG: 1 TABLET ORAL at 06:36

## 2024-12-09 RX ADMIN — PREDNISONE 60 MG: 50 TABLET ORAL at 08:13

## 2024-12-09 ASSESSMENT — COPD QUESTIONNAIRES
DO YOU EVER COUGH UP ANY MUCUS OR PHLEGM?: NO/ONLY WITH OCCASIONAL COLDS OR INFECTIONS
COPD SCREENING SCORE: 2
DURING THE PAST 4 WEEKS HOW MUCH DID YOU FEEL SHORT OF BREATH: NONE/LITTLE OF THE TIME
HAVE YOU SMOKED AT LEAST 100 CIGARETTES IN YOUR ENTIRE LIFE: NO/DON'T KNOW

## 2024-12-09 ASSESSMENT — LIFESTYLE VARIABLES
ON A TYPICAL DAY WHEN YOU DRINK ALCOHOL HOW MANY DRINKS DO YOU HAVE: 0
AVERAGE NUMBER OF DAYS PER WEEK YOU HAVE A DRINK CONTAINING ALCOHOL: 0
EVER HAD A DRINK FIRST THING IN THE MORNING TO STEADY YOUR NERVES TO GET RID OF A HANGOVER: NO
HOW MANY TIMES IN THE PAST YEAR HAVE YOU HAD 5 OR MORE DRINKS IN A DAY: 0
CONSUMPTION TOTAL: NEGATIVE
DOES PATIENT WANT TO STOP DRINKING: NO
TOTAL SCORE: 0
HAVE PEOPLE ANNOYED YOU BY CRITICIZING YOUR DRINKING: NO
EVER FELT BAD OR GUILTY ABOUT YOUR DRINKING: NO
EVER_SMOKED: NEVER
HAVE YOU EVER FELT YOU SHOULD CUT DOWN ON YOUR DRINKING: NO
ALCOHOL_USE: NO

## 2024-12-09 ASSESSMENT — COGNITIVE AND FUNCTIONAL STATUS - GENERAL
SUGGESTED CMS G CODE MODIFIER MOBILITY: CL
PERSONAL GROOMING: A LITTLE
MOVING FROM LYING ON BACK TO SITTING ON SIDE OF FLAT BED: A LOT
SUGGESTED CMS G CODE MODIFIER DAILY ACTIVITY: CK
HELP NEEDED FOR BATHING: A LOT
DRESSING REGULAR LOWER BODY CLOTHING: A LOT
EATING MEALS: A LITTLE
MOBILITY SCORE: 14
STANDING UP FROM CHAIR USING ARMS: A LOT
TOILETING: A LOT
TURNING FROM BACK TO SIDE WHILE IN FLAT BAD: A LOT
DAILY ACTIVITIY SCORE: 15
CLIMB 3 TO 5 STEPS WITH RAILING: A LOT
WALKING IN HOSPITAL ROOM: A LITTLE
MOVING TO AND FROM BED TO CHAIR: A LITTLE
DRESSING REGULAR UPPER BODY CLOTHING: A LITTLE

## 2024-12-09 ASSESSMENT — GAIT ASSESSMENTS
DISTANCE (FEET): 20
DEVIATION: SHUFFLED GAIT;BRADYKINETIC
ASSISTIVE DEVICE: FRONT WHEEL WALKER
GAIT LEVEL OF ASSIST: MINIMAL ASSIST
DISTANCE (FEET): 20

## 2024-12-09 ASSESSMENT — PATIENT HEALTH QUESTIONNAIRE - PHQ9
2. FEELING DOWN, DEPRESSED, IRRITABLE, OR HOPELESS: NOT AT ALL
1. LITTLE INTEREST OR PLEASURE IN DOING THINGS: NOT AT ALL
SUM OF ALL RESPONSES TO PHQ9 QUESTIONS 1 AND 2: 0
SUM OF ALL RESPONSES TO PHQ9 QUESTIONS 1 AND 2: 0
1. LITTLE INTEREST OR PLEASURE IN DOING THINGS: NOT AT ALL
2. FEELING DOWN, DEPRESSED, IRRITABLE, OR HOPELESS: NOT AT ALL

## 2024-12-09 ASSESSMENT — FIBROSIS 4 INDEX
FIB4 SCORE: 2.12
FIB4 SCORE: 2.12

## 2024-12-09 ASSESSMENT — PAIN DESCRIPTION - PAIN TYPE: TYPE: ACUTE PAIN

## 2024-12-09 NOTE — PROGRESS NOTES
[facility] Medicine Daily Progress Note    Date of Service  12/8/2024    Chief Complaint  Denisse Abel is a 64 y.o. female admitted 11/27/2024 with   Chief Complaint   Patient presents with    ALOC     Hospital Course  Patient was brought to the hospital by EMS after she was able to call 911 but unable to give any history.  Paramedics found her disheveled, confused malnourished and brought her to the emergency room.  Patient with history of alcohol abuse and concern for possible Warnicke's encephalopathy versus acute alcohol withdrawal.  Patient on admission was unable to give any information but when she got up to the floor bedside nurse said that she was coherent and was doing better.  She denied any recent drinking but then was started on Librium and since that time has been very sedated.  I have discontinued the Librium and will continue with CIWA protocol if needed but she is not looking like acute alcohol withdrawal at this point.  Will continue with lactulose for her cirrhosis and ascites.  Potassium markedly low this morning and received IV potassium with a repeat level 3.0 and will continue with oral potassium supplementation.  I have also started the patient on aggressive IV thiamine for concern of Warnicke's.      Interval Problem Update  12/08:  Patient today showing significant improvement.  She appears to have more neurological function, return of her potential baseline on cranial nerves.  She is more interactive.  I did a video call with patient's cousin Nica, provided updates at bedside on call.    12/07:  Patient has drastic improvement today in regards to her cranial exam.  Today patient is able to perform a right lateral gaze with both eyes.  She was also able to perform vertical gaze easily.  She has more expressions and is verbalizing much easier today.  She was able to raise bilateral upper extremities much easier today, still has decreased bilateral  strength.  Patient's friend was  at bedside, was in agreement patient was bit more lively today compared to before.  Patient is having an increase in her appetite still.  Primary diagnosis, autoimmune/demyelinating neurological disorder not otherwise specified.  I reviewed MRI, there is no substantial findings for MS or other demyelinating syndromes.  I reviewed labs, sodium 129, potassium 5.0, creatinine 1.16.  I am restarting IV fluids as patient is slowly increasing more.    12/06:  No significant improvement yet today with IV solumedrol 1g. Patient is already, oriented x3, she is speaking faster.   No DTRs, right foot has more foot drop.  Right hand grasp slightly stronger than left.  Pt right eye less reactive that left, similar to prior days. She has poor EOM. Today left eye had right lateral gaze, compared to prior. 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  She could not perform a vertical gaze bilaterally.  She was able to raise left leg slowly, but more than right leg.  checking B6 levels  I ordered MR C/T/L with contrast    12/05:  I reviewed patient's labs, WBC 4.3, significantly decreased.  Hemoglobin 11.0, platelets 231.  Sodium 132, potassium 4.4, creatinine 1.17, corrected calcium 10.7 elevated.  Phos 2.8, mag 2.0, ammonia 43.  CSF analysis showing nucleated cells 4, glucose 74, total protein elevated at 62.  Meningitis/encephalitis PCR testing negative.  I discussed with patient, we will trial IV solumedrol 1 gram, as she may have a demyelinating syndrome.  Pending: CSF/serum Oligoclonal bands pending, I ordered serum aquaporin 4, serum myasthenia gravis workup, CSF VDRL  Serum RPR negative  I discussed with speech therapist, patient was improving in her strength.  Patient went for MBSS, showed mild oropharyngeal dysphagia but patient is being able to transition back to purées with thin liquids.  I discussed with cousin Leesa. Patient was anorexic as a child. Around 40's she had anorexia  again.    12/04:  Checking LP  Checking RPR. Pt stated she last had intercourse 7 years ago, she has had multiple partners before her last partner. She did have unprotected intercourse. 01/24 HIV was non-reactive.  I discussed with patient's friend from [place] and friend from [place] at bedside with her permission.  I provided updates.    12/03:  Patient stated she has been feeling weak for the last 2 months now.  She has had significant decrease in eating at home.  She does live at home alone with her dog.  She does have family members but her sister lives in [place].  I reviewed patient's EEG today, there is a finding of left temporal abnormal waveforms, induced with photic stimulation.  Patient may have focal seizures in this region.  He has diffuse slowing suggestive of cerebral dysfunction.   I discussed with patient that this may be a partial seizures, she agreed to starting loading dose of IV Keppra and oral Keppra.  I reviewed her labs, sodium 127 from 127, 124, 127  Patient apparently choked this morning while eating breakfast, she did not require Heimlich maneuver but did require help to get food out.  Speech therapy evaluate patient and recommended one-to-one feeding, puréed solids and thin liquids.  Postacute placement needed for ongoing speech therapy services.   I ordered a chest x-ray, there was no findings of aspiration  Patient reports a history of cirrhosis, follows with [facility] Dr. Anna.  She was mentioning she had a history of ascites.  I tried calling Leesa Daly, patient had quit alcohol one year ago. L3-L5 severe spinal damage, was supposed to have a procedure, intercept nerve ablation. Withdrawal from Norco pain medications, patient was addicted to pain meds.  Patient stopped trying to eat at home.   Urine showing Klebsiella oxytoca, resistant to IV Ancef, she is allergic to sulfa and PCNs. I am starting IV Ceftriaxone.    I have discussed this patient's plan of  care and discharge plan at IDT rounds today with Case Management, Nursing, Nursing leadership, and other members of the IDT team.    Consultants/Specialty  none    Code Status  Full Code    Disposition  The patient is not medically cleared for discharge to home or a post-acute facility.  Anticipate discharge to: an inpatient rehabilitation hospital    I have placed the appropriate orders for post-discharge needs.    Review of Systems  Review of Systems   Constitutional:  Positive for malaise/fatigue. Negative for weight loss.   Gastrointestinal:  Negative for abdominal pain.   Neurological:  Positive for weakness.   All other systems reviewed and are negative.       Physical Exam  Temp:  [36.3 °C (97.3 °F)-36.9 °C (98.4 °F)] 36.9 °C (98.4 °F)  Pulse:  [66-93] 80  Resp:  [14-20] 16  BP: (107-134)/(48-68) 128/62  SpO2:  [93 %-99 %] 97 %    Physical Exam  Vitals and nursing note reviewed.   Constitutional:       General: She is not in acute distress.     Appearance: She is not ill-appearing.      Comments: Frail appearing female   HENT:      Head: Normocephalic and atraumatic.      Comments: Temporal muscle wasting positive     Mouth/Throat:      Mouth: Mucous membranes are dry.      Pharynx: No oropharyngeal exudate.   Eyes:      General: No scleral icterus.     Extraocular Movements: Extraocular movements intact.      Pupils: Pupils are equal, round, and reactive to light.      Comments: + Mild left eye right nystagmus.   Cardiovascular:      Rate and Rhythm: Normal rate and regular rhythm.      Pulses: Normal pulses.      Heart sounds: Normal heart sounds. No murmur heard.  Pulmonary:      Effort: Pulmonary effort is normal. No respiratory distress.      Breath sounds: Normal breath sounds. No wheezing.   Abdominal:      General: Abdomen is flat. Bowel sounds are normal. There is no distension.      Palpations: Abdomen is soft.      Tenderness: There is no abdominal tenderness. There is no guarding.    Musculoskeletal:         General: No swelling or tenderness.      Cervical back: Normal range of motion. No rigidity.      Right lower leg: No edema.      Left lower leg: No edema.      Comments: Sarcopenic. Bilateral hand muscle atrophy noted.   Skin:     General: Skin is warm.      Capillary Refill: Capillary refill takes less than 2 seconds.      Coloration: Skin is not jaundiced.      Findings: No erythema.   Neurological:      Mental Status: She is alert and oriented to person, place, and time. Mental status is at baseline.      Cranial Nerves: No cranial nerve deficit.      Sensory: No sensory deficit.      Motor: Weakness (4/5 BUE, BLE 2/5.  strength remains significantly decreased.) present.      Comments: BUE 2/5 strength   Psychiatric:         Mood and Affect: Mood normal.         Behavior: Behavior normal.         Thought Content: Thought content normal.         Judgment: Judgment normal.         Fluids    Intake/Output Summary (Last 24 hours) at 12/8/2024 1719  Last data filed at 12/8/2024 1230  Gross per 24 hour   Intake 2397.62 ml   Output 350 ml   Net 2047.62 ml        Laboratory  Recent Labs     12/06/24  0316 12/08/24  0230   WBC 4.8 6.3   RBC 3.43* 3.09*   HEMOGLOBIN 8.9* 8.0*   HEMATOCRIT 27.3* 25.2*   MCV 79.6* 81.6   MCH 25.9* 25.9*   MCHC 32.6 31.7*   RDW 63.3* 65.5*   PLATELETCT 205 169   MPV 10.2 10.9     Recent Labs     12/06/24  0316 12/08/24  0230   SODIUM 129* 132*   POTASSIUM 5.0 4.9   CHLORIDE 96 102   CO2 21 20   GLUCOSE 161* 112*   BUN 41* 44*   CREATININE 1.16 1.01   CALCIUM 10.1 9.7                     Imaging  MR-THORACIC SPINE-WITH & W/O   Final Result      Mild degenerative disease as described above. There is no thoracic myelopathy.      MR-CERVICAL SPINE-WITH & W/O   Final Result      Mild degenerative disease in the cervical spine as described above.      MR-LUMBAR SPINE-WITH & W/O   Final Result      1.  There is mild abnormal T2 hyperintensity noted at L3-4 discs.  Adjacent endplate irregularities are noted. Post gadolinium sequences demonstrates abnormal disc and endplate enhancement. There is enhancement of the bilateral facet joints. The previous    MRI dated 6/12/2015 demonstrates significant degenerative changes at the L3-4 disc and facet joints. There is no epidural or prevertebral enhancement/fluid collection. Therefore these findings likely represent progression of the degenerative arthritis    with inflammatory erosive arthropathy. However, it is difficult to rule out early discitis due to the presence of abnormal T2 hyperintensity and disc spaces enhancement. Please correlate with the clinical findings.   2.  Moderate central canal stenosis at L3-4.   3.  Degenerative changes.      DX-ESOPHAGUS - RBYP-CESCM-QH   Final Result      DX-CHEST-LIMITED (1 VIEW)   Final Result      No evidence of acute cardiopulmonary process.      MR-BRAIN-W/O   Final Result      1.  No evidence of acute territorial infarct, intracranial hemorrhage or mass lesion.   2.  Mild diffuse cerebral and cerebellar substance loss.   3.  Mild microangiopathic ischemic change versus demyelination or gliosis.      CT-ABDOMEN-PELVIS W/O   Final Result      1.  Small umbilical hernia which contains small intestine. Small intestine is dilated proximal to that hernia and decompressed distally consistent with resultant small bowel obstruction.      2.  Again seen cirrhotic appearance of the liver with small metallic ascites within the abdomen and pelvis and splenomegaly consistent with portal hypertension.      3.  Gallstones within the gallbladder.      CT-HEAD W/O   Final Result      No evidence of acute intracranial process.               DX-CHEST-PORTABLE (1 VIEW)   Final Result      No evidence of acute cardiopulmonary process.           Assessment/Plan  * Alcoholic encephalopathy (HCC)- (present on admission)  Assessment & Plan  Patient came in encephalopathic but after administering care in the  emergency room brought up to the floor she started showing improvement with her mental status.  Librium was given and likely resulted in worsening encephalopathy rather than improvement    Mental state waxing and waning  I reviewed patient's EEG today, there is a finding of left temporal abnormal waveforms, induced with photic stimulation.  Patient may have focal seizures in this region.  He has diffuse slowing suggestive of cerebral dysfunction.     12/7:  Initially after EEG, we trialed Keppra but caused patient to be more somnolent.  We have discontinued.  On examination today, she appears to have improvement after third dose of IV Solu-Medrol 1 g.  She was performing more neurological functions compared to the prior days.  During hospitalization she had been able to walk and showed sharp rapid decline.  She is only started to improve once Solu-Medrol has been started.  CSF shows no growth to date    12/8:  Back to patient's baseline    Elevated CSF protein- (present on admission)  Assessment & Plan  CSF analysis showing nucleated cells 4, glucose 74, total protein elevated at 62.   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  Continue IV Solu-Medrol 1 g, patient is significantly improving.    She will need prednisone 60 mg for a prolonged taper, with atovaquone for prophylaxis MWF.    Dysphagia- (present on admission)  Assessment & Plan  Patient is having repeat dysphagia  I discussed with speech therapist, patient was improving in her strength.  Patient went for MBSS, showed mild oropharyngeal dysphagia  Continue purée with thin liquid diet.  Will need repeat speech therapy evaluation today or tomorrow    Partial seizure disorder (HCC)- (present on admission)  Assessment & Plan  I reviewed  patient's EEG today, there is a finding of left temporal abnormal waveforms, induced with photic stimulation.  Patient may have focal seizures in this region.  He has diffuse slowing suggestive of cerebral dysfunction.     No Keppra needed at this time  Partial seizures may still be a possibility but patient is showing improvement    Hypomagnesemia  Assessment & Plan  Low serum magnesium levels, 1.8  I am replacing via IV, monitoring closely for hypotension side effect  I am repeating labs in AM    Hypophosphatemia  Assessment & Plan  Phos 1.7  Replacing via IV    Extreme restricting type anorexia nervosa- (present on admission)  Assessment & Plan  Patient appears to also have's extreme restricting anorexia nervosa.  After speaking with patient's friend Luiza, they had cleaned out her refrigerator but most of her items were uneaten food.  - Patient's cousin Nica also mention patient has had relapsing anorexia.  - I do worry that patient may be or has already suffered to a point where recovery will be an extremely prolonged time if possible.  Is unclear if she has had a significant amount of deconditioning, thiamine deficiency, other vitamin deficiencies and could have to her current state.  Continue mirtazapine, QTc 454 ms    Cystitis- (present on admission)  Assessment & Plan  Pt c/o abdominal pain  Urine showing Klebsiella oxytoca, resistant to IV Ancef, she is allergic to sulfa and PCNs.  Completed IV ceftriaxone 12/5    CKD (chronic kidney disease) stage 4, GFR 15-29 ml/min ([facility])- (present on admission)  Assessment & Plan  Creatinine was 2.23 on admission  Monitoring closely  Creatinine 1.17    Esophageal varices without bleeding (HCC)- (present on admission)  Assessment & Plan  Change omeprazole to IV Protonix.  Patient is NPO.  Hx of grade 2 esophageal varices.    Severe protein-calorie malnutrition (HCC)- (present on admission)  Assessment & Plan  Dietitian agreed patient meets Aspen criteria  Continue  diet supplements  Continue mirtazapine  Dietitian following  Will give additional 3 days of IV thiamine.  Continue p.o. thiamine, folic acid, Vit b, Vit C.    Anemia due to chronic kidney disease- (present on admission)  Assessment & Plan  Hemoglobin 8.9, platelets 205    Liver cirrhosis (HCC)- (present on admission)  Assessment & Plan  Chronic liver cirrhosis secondary to alcohol abuse  Monitor liver function tests  Optimize diuresis  Hold lactulose - copious amounts of diarrhea  Monitor magnesium and phosphorus levels    Follows with [facility]    Hypokalemia- (present on admission)  Assessment & Plan  Potassium 4.9  Monitoring while on spironolactone    Hyponatremia- (present on admission)  Assessment & Plan  Sodium 127, from 127, 124, 127  Patient may be receiving too much free water.  I am placing free water restriction of 500 mL/day, total fluid restriction of 1500 mL/day.  Na 132    Chronic combined systolic and diastolic heart failure (HCC)- (present on admission)  Assessment & Plan  Patient's 1/22/2024 echocardiogram showed LVEF of 40%.  There was a ascites noted.  Grade 2 diastolic dysfunction noted.  Severely dilated LA, mild mitral regurgitation, moderate aortic valve stenosis.  Her last SEFERINO on 9/6/2024 showed trace aortic insufficiency with a moderate aortic valve stenosis.  Continue spironolactone    Heart murmur- (present on admission)  Assessment & Plan  Chronic moderate aortic stenosis  Eventually the patient may need surgical intervention such as a TAVR.  Will need outpatient cardiology    Stage 3b chronic kidney disease- (present on admission)  Assessment & Plan  chronic    HTN (hypertension), benign- (present on admission)  Assessment & Plan  Continue spironolactone, with holding parameters  As needed labetalol         VTE prophylaxis:    heparin ppx      I have performed a physical exam and reviewed and updated ROS and Plan today (12/8/2024). In review of yesterday's note (12/7/2024), there are no  changes except as documented above.      Total time spent 51 minutes. I spent greater than 50% of the time for patient care, including unit/floor time, multiple face-to-face encounters with the patient, counseling on treatment plan and discussion with bedside RN.  Further, I spent time on my own review of patient's overnight events, RN notes, imaging and lab analysis, and developing my assessment and plan above.  In addition, working with , social workers, and Charge RN on case coordination on this date.    This note was generated using voice recognition software which has a chance of producing errors of grammar and content.  I have made every reasonable attempt to find and correct any errors, but it should be expected that some may not be found prior to finalization of this note.

## 2024-12-09 NOTE — CARE PLAN
The patient is Stable - Low risk of patient condition declining or worsening    Shift Goals  Clinical Goals: Ambulation; IV solu-medrol; safety/free from falls/injury  Patient Goals: get stronger  Family Goals: MORGAN    Progress made toward(s) clinical / shift goals:  Continue to encourage ambulation especially during wake hours. Patient receives IV solu-medrol during dayshift. Bed and strip alarm in place continue to remind patient to use call light for needs.     Problem: Knowledge Deficit - Standard  Goal: Patient and family/care givers will demonstrate understanding of plan of care, disease process/condition, diagnostic tests and medications  Outcome: Progressing     Problem: Fall Risk  Goal: Patient will remain free from falls  Outcome: Progressing       Patient is not progressing towards the following goals: n/a

## 2024-12-09 NOTE — CONSULTS
Physical Medicine & Rehabilitation Chart Review      Please note that this is a chart review.    History of Present Illness:  Patient is a 64 y.o. female with a past medical history significant for CHF, HTN, HLD, cirrhosis, and CKD admitted to [facility] on 11/27/2024  8:29 AM with AMS.  She reportedly has a history of alcohol abuse and was found disheveled and confused.  Her librium was discontinued.  She was started on IV potassium for hypokalemia and was started on lactulose for liver cirrhosis. She was diagnosed with alcoholic encephalppathy. EEG was negative, she was trialed on Keppra but that has since been discontinued.       Past Medical History:  Past Medical History:   Diagnosis Date    Acute combined systolic and diastolic heart failure (HCC) 01/22/2024    Anemia due to chronic kidney disease     Heart valve disease     Nonrheumatic aortic valve stenosis    HLD (hyperlipidemia)     HTN (hypertension)     Hypertension     Hypotension     Liver cirrhosis (HCC)     Obesity     Renal disorder     CKD (chronic kidney disease) stage 4, GFR 15-29 ml/min (HCC)       Past Surgical History:  Past Surgical History:   Procedure Laterality Date    WI LAP,DIAGNOSTIC ABDOMEN  2/23/2024    Procedure: LAPAROSCOPY, KIM'S PATCH;  Surgeon: Neel Amezcua M.D.;  Location: [facility];  Service: General    TRIGGER FINGER RELEASE Right 3/31/2017    Procedure: TRIGGER FINGER RELEASE;  Surgeon: Jimmy Atkins M.D.;  Location: [facility];  Service:     GANGLION EXCISION Right 3/31/2017    Procedure: GANGLION EXCISION - HAND CYST;  Surgeon: Jimmy Atkins M.D.;  Location: [facility];  Service:     PELVISCOPY  6/5/08    Performed by NATALIYA ARCEO at SURGERY SAME DAY [facility]    OVARIAN CYSTECTOMY  6/5/08    Performed by NATALIYA ARCEO at SURGERY SAME DAY [facility]    CYSTOSCOPY  6/5/08    Performed by NATALIYA ARCEO at SURGERY SAME DAY [facility]     HYSTERECTOMY LAPAROSCOPY  2003    KNEE ARTHROSCOPY         Family History:  Family History   Problem Relation Age of Onset    Diabetes Mother     Heart Disease Mother     Stroke Mother     Diabetes Father        Medications:  Scheduled Medications   Medication Dose Frequency    predniSONE  60 mg DAILY    atovaquone  750 mg MO, WE + FR    omeprazole  20 mg DAILY    thiamine  100 mg DAILY    vitamin B + C complex  1 Tablet DAILY    mirtazapine  15 mg QHS    folic acid  1 mg DAILY    heparin  5,000 Units Q8HRS    Pharmacy Consult Request  1 Each PHARMACY TO DOSE    insulin lispro  1-6 Units 4X/DAY ACHS     PRN medications: acetaminophen, labetalol, ondansetron, ondansetron, insulin lispro **AND** POC blood glucose manual result **AND** NOTIFY MD and PharmD **AND** Administer 20 grams of glucose (approximately 8 ounces of fruit juice) every 15 minutes PRN FSBG less than 70 mg/dL **AND** dextrose bolus    Allergies:  Pcn [penicillins] and Sulfa drugs    Assessment & Plan:  The patient presents functional deficits in mobility and self-care as well as cognitive deficits and swallowing/speech, and Minimal  de-conditioning. Patient was previously Independent using None living with Alone and Able to Care For Self in a 1 Story Apartment / Condo with 0 step(s) to enter. Per most recent PT note they are = Maximal Assist= for transfers.  Per most recent OT note they are Total Assist at LB dressing, Total Assist at toileting,    The patient's current diet is:  Current Diet Order   Procedures    Diet Order Diet: Level 4 - Pureed (Crush meds in puree please; ONLY feed when awake and alert. Free water restricted to 500mL/day, total fluid restriction 1500mL/day.); Liquid level: Level 0 - Thin; Tray Modifications (optional): SLP - 1:1 Supervi...       The patient is aFair  candidate for an acute inpatient rehabilitation program with a coordinated program of care at an intensity and frequency not available at a lower level of care.      Note: This recommendation requires that patient has at least CGA/Minimal Assistance needs in at least two therapy disciplines.  If patient progresses to no longer need CGA/Esme with at least two therapy disciplines they may be more appropriate for Skilled nursing facility versus home with home health.      This recommendation is substantiated by the patient's current medical condition with intervention and assessment of medical issues requiring an acute level of care for patient's safety and maximum outcome. A coordinated program of care will be provided by an interdisciplinary team including physical therapy, occupational therapy, speech language pathology, hospitalist, physiatry, rehab nursing, and rehab psychology. Rehab goals include improved cognition and swallowing, mobility, self-care management, strength and conditioning/endurance, pain management, bowel and bladder management, mood and affect, and safety with independent home management including caregiver training. Estimated length of stay is approximately 10-17 days. Rehab potential: Good. Disposition: to pre-morbid independent living setting with supportive care of patient's family. We will continue to follow with you in anticipation of discharge to acute inpatient rehabilitation when medically stable to do so at the discretion of the attending physician. Thank you for allowing us to participate in this patient's care. Please call with any questions regarding this recommendation.    ____________________________________    T. Juliocesar Bender MD/PhD  [facility] - Physical Medicine & Rehabilitation   [facility] - Brain Injury Medicine   ____________________________________

## 2024-12-09 NOTE — ASSESSMENT & PLAN NOTE
Patient's prior echocardiogram showed LVEF of 40 to 45%, grade 2 diastolic dysfunction, dilated RV, RA, JANNET.  Moderate aortic valve stenosis.  Mild mitral regurgitation.  Trace tricuspid regurgitation.  Pleural effusion and ascites noted.  Normal aorta.  Indeterminate RVSP.  1/22/2024.    8/9/2024, LVEF 66%.  Moderate to severe aortic valve stenosis.  RVSP 25 mmHg.  Otherwise similar to previous exam.      Patient had SEFERINO on 9/6/2024, no evidence of PFO or ASD.  No thrombus.  Moderate aortic valve stenosis.  Trace aortic insufficiency.

## 2024-12-09 NOTE — DIETARY
Nutrition Update: Follow-up for PO intake  Day 12 of admit.  Denisse Abel is a 64 y.o. female with admitting DX of Alcoholic encephalopathy (HCC) [G31.2].    Current Diet: regular with Glucerna supplement TID and Chobani Smoothie TID at snack. Recorded PO intake has improved to 50-75% of the 2 most recent meals. Report of improvement in status in MD's progress note.     Malnutrition risk: RD agrees with MD dx of Severe Malnutrition in context of Chronic Illness related to ETOH abuse as evidenced by severe muscle and fat loss noted.  This is a dx by provider Dr Chen.     Inadequate Oral Intake related to poor appetite due to ETOH abuse as evidenced by wt loss    Nutrition Dx Status: Resolved     Problem: Nutritional:  Goal: Achieve adequate nutritional intake  Description: Patient will consume >50% of meals  Outcome: progressing    RD following.

## 2024-12-09 NOTE — THERAPY
"Occupational Therapy  Daily Treatment     Patient Name: Denisse Abel  Age:  64 y.o., Sex:  female  Medical Record #: [id number]  Today's Date: 12/9/2024     Precautions  Precautions: Fall Risk, Swallow Precautions    Assessment    Pt demos significant functional improvement since previous session.  She is able to converse effectively, able to stand, transfer and walk very short distances with assist.  Overall improvement in strength and mental status.  She continues to demo decreased functional mobility, activity tolerance, cognition, strength, coordination, balance, and adherence to precautions which are affecting her ability to complete ADLs/IADLs at baseline. See grid for details. Pt will benefit from further inpt post acute therapy, ideally Acute Rehab if she is a candidate. Will continue to follow.       Plan    Treatment Plan Status: Continue Current Treatment Plan  Type of Treatment: Self Care / Activities of Daily Living, Adaptive Equipment, Neuro Re-Education / Balance, Therapeutic Exercises, Therapeutic Activity  Treatment Frequency: 3 Times per Week  Treatment Duration: Until Therapy Goals Met    DC Equipment Recommendations: Unable to determine at this time  Discharge Recommendations: Recommend post-acute placement for additional occupational therapy services prior to discharge home    Subjective    \"I am a strong woman, I will do well at Rehab.\"     Objective       12/09/24 1005   Vitals   O2 Delivery Device None - Room Air   Pain 0 - 10 Group   Therapist Pain Assessment 0;During Activity;Nurse Notified   Cognition    Level of Consciousness Alert   Comments Pt appears to be more alert, speech more fluid, able to follow directions and demos improved sequencing.  Displayed mild confusion when asked if she recalls events from the last couple days, and she began describing problems with her intestines and issues with cirrhosis that occured in approximately January.  Pt would benefit from more detailed " cogntive evaluation   Active ROM Upper Body   Comments mildy limited B shoulder flexion, however appears grossly functional today.   Strength Upper Body   Comments Weakness BUE, however demos improvement in BUE strength required for functional use.  Able to manipulate objects for ADL's on her tray   Sensation Upper Body   Comments pt reports intact, with no numbness   Upper Body Muscle Tone   Upper Body Muscle Tone  WDL   Fine Motor / Dexterity    Comments  able to use BUE with FWW and self care tasks   Balance   Sitting Balance (Static) Good   Sitting Balance (Dynamic) Fair   Standing Balance (Static) Fair -   Standing Balance (Dynamic) Poor +   Weight Shift Sitting Fair   Weight Shift Standing Poor   Skilled Intervention Verbal Cuing;Tactile Cuing;Sequencing;Postural Facilitation   Comments Slowed, shuffled gait with FWW   Bed Mobility    Comments UIC pre and post   Activities of Daily Living   Grooming Standby Assist  (hair seated)   Lower Body Dressing Moderate Assist  (socks from seated)   Skilled Intervention Verbal Cuing   How much help from another person does the patient currently need...   Putting on and taking off regular lower body clothing? 2   Bathing (including washing, rinsing, and drying)? 2   Toileting, which includes using a toilet, bedpan, or urinal? 2   Putting on and taking off regular upper body clothing? 3   Taking care of personal grooming such as brushing teeth? 3   Eating meals? 3   6 Clicks Daily Activity Score 15   Functional Mobility   Sit to Stand Minimal Assist  (with cues to push with BUE)   Bed, Chair, Wheelchair Transfer Minimal Assist   Toilet Transfers Moderate Assist   Transfer Method Stand Step   Mobility Esme amb to BR and back with FWW, demos mild foot drop on R   Distance (Feet) 20   # of Times Distance was Traveled 2   Skilled Intervention Verbal Cuing;Tactile Cuing;Sequencing;Postural Facilitation   Visual Perception   Comments appears grossly functional. pt able to track  "in all directions, reports she can see clearly.  L eye noted to be jerking side to side with attempts at tracking   Activity Tolerance   Sitting in Chair > 30 min   Standing 2-3 min x2   Patient / Family Goals   Patient / Family Goal #1 \"I want to go to Rehab\"   Short Term Goals   Short Term Goal # 1 Sup bed mobility (to/from supine/EOB sitting)   Goal Outcome # 1 Progressing slower than expected   Short Term Goal # 2 Mod I UBCM   Goal Outcome # 2 Progressing slower than expected   Short Term Goal # 3 Min assist LBCM via AE/DME PRN   Goal Outcome # 3 Progressing as expected   Short Term Goal # 4 CGA toilet transfer via DME PRN   Goal Outcome # 4 Progressing slower than expected   Short Term Goal # 5 Min assist toileting/commode via AE/DME PRN   Goal Outcome # 5 Progressing slower than expected   Education Group   Education Provided Role of Occupational Therapist;Activities of Daily Living   Role of Occupational Therapist Patient Response Patient;Acceptance;Explanation;Verbal Demonstration   ADL Patient Response Patient;Acceptance;Explanation;Verbal Demonstration           "

## 2024-12-09 NOTE — DISCHARGE PLANNING
Approved transfer to [facility] transport arranged for 1530 . Dr. Bender accepting. Patient notified. Attending team notified.

## 2024-12-09 NOTE — DISCHARGE SUMMARY
Discharge Summary    CHIEF COMPLAINT ON ADMISSION  Chief Complaint   Patient presents with    ALOC       Reason for Admission  EMS    Admission Date  11/27/2024     CODE STATUS  Full Code    HPI & HOSPITAL COURSE  This is a Denisse Abel, a 64 y.o. female here with concerns for disability and altered level of consciousness.  She was brought to the hospital by EMS after she was able to call 911 but unable to give any history.  Paramedics found her disheveled, confused malnourished and brought her to the emergency room.  Patient with history of alcohol abuse and concern for possible Warnicke's encephalopathy versus acute alcohol withdrawal.  Patient on admission was unable to give any information but when she got up to the floor bedside nurse said that she was coherent and was doing better.  She denied any recent drinking but then was started on Librium and since that time has been very sedated.    Patient initially was thought to have alcohol withdrawal and potential Wernicke's Korsakoff psychosis.  She had been given IV thiamine but unfortunately she did not recover her neurological functions.  She had continuous neurological decline to the point where she unfortunately was bedbound.      She was not capable to perform simple movements in bed including rolling over, patient  strength significantly decreased.  She was unable to perform arm raises.  Her lower extremity strength became to slip out of 5.  Her  strength significantly decreased where she could no longer hold any utensils or even any light weight items.  Cognitively, she would wax and wane in her mental state.  At times she would really do very difficult to arouse.    After I started with the patient on 12/3 patient did not continue to improve.  She also had 2 episodes in the morning trying to eat where she required a Heimlich maneuver and she was converted to n.p.o.  She was evaluated by speech therapy and eventually remained n.p.o.    We  evaluate the EEG to rule out potential seizures.  EEG was reading potential temporal lobe findings but no epileptiform findings.  We trialed Keppra with worsening results.    She did not improve by 12/4. On examination patient lost her ability to perform bilateral right and vertical ocular gaze.  She had difficulty speaking and maintaining an alert state.  At that point we discontinued many CNS acting medications including Keppra, gabapentin, Adderall, opioids.  Despite removing medications, patient still had ongoing neurological decline.    We performed a lumbar puncture on 12/4.  At this time we have ruled out any meningitis and encephalitis.  Her oligoclonal bands have returned negative.  She was negative for myasthenia gravis antibody labs, negative for aquaporin 4.    TSH was borderline low at 0.4, normal free T4.  She had a slightly decreased free T3.  We did not perform a a.m. cortisol level.    Patient did have mild cystitis, we treated her with IV ceftriaxone for 3 days.    Prior to labs returning, I started patient on high-dose IV Solu-Medrol 1 g for a trial of 5 days.  Once Solu-Medrol was initiated, patient had daily improvement and regain of complete function.  From as low as bedbound state, patient has improved significantly, now sitting on chair, regain of cranial nerves, speaking functionally and at baseline.  We are able to place patient back onto a regular diet.      Patient did meet ASPEN criteria for severe protein calorie malnutrition.  From her history, she did have issues with anorexia before.  Patient currently is denying any recent flares for her anorexia.  As per cousin, she had anorexia really bad as a kid but did relapse when she was a bit older.  At this time patient has been eating her meals since she has regained her swallowing ability.    At this time, I believe she has an undiagnosed neurological disorder.  I have recommended for patient to see a neurology specialist.  I discussed  with her cousin Nica living in [place] that patient may need a more specialized center compared to [facility].  She may need to see [facility] for neurology or even [facility].      What we know at this time is that Solu-Medrol has inhibited a potential demyelinating syndrome or autoimmune condition.  As patient's condition was deteriorating quickly, I could not perform other laboratory evaluations before steroids could be started.  She will be difficult to trial off of steroids as she may have a relapse of her condition.  At this time I am recommending to continue a prolonged prednisone treatment and eventual taper.  She is allergic to sulfa drugs, she will need atovaquone 1.5 g daily as a pneumocystis jiroveci prophylaxis.    Patient was accepted to [facility] inpatient rehabilitation facility.  I expect that she should be successful and increasing her strength in order to continue living alone.  I did discuss with Nica that if needed, they may need to consider for patient to move in with family in [place].    CSF analysis showing nucleated cells 4, glucose 74, total protein elevated at 62.   Pending: CSF VDRL, B6  Negative labs:  Serum RPR negative  Meningitis/encephalitis PCR testing negative  Negative aquaporin 4  CSF/serum Oligoclonal bands, myasthenia gravis antibodies.    Therefore, she is discharged in fair and stable condition to an inpatient rehabilitation hospital.    The patient met 2-midnight criteria for an inpatient stay at the time of discharge.      FOLLOW UP ITEMS POST DISCHARGE  12/09/2024    DISCHARGE DIAGNOSES  Principal Problem (Resolved):    Alcoholic encephalopathy (HCC) (POA: Yes)  Active Problems:    Partial seizure disorder (HCC) (POA: Yes)    Elevated CSF protein (POA: Yes)    HTN (hypertension), benign (POA: Yes)    Stage 3b chronic kidney disease (POA: Yes)    Chronic combined systolic and diastolic heart failure (HCC) (POA: Yes)    Hyponatremia (POA: Yes)    Hypokalemia (POA:  Yes)    Liver cirrhosis (HCC) (POA: Yes)    Anemia due to chronic kidney disease (POA: Yes)    Severe protein-calorie malnutrition (HCC) (POA: Yes)    Esophageal varices without bleeding (HCC) (POA: Yes)      Overview: 6/14/2024 GI  Dr Toni Anna Grade II esophageal varices in lowr third       of the esophagus    Nonrheumatic moderate aortic valve stenosis (Chronic) (POA: Yes)    CKD (chronic kidney disease) stage 4, GFR 15-29 ml/min (HCC) (POA: Yes)    Cystitis (POA: Yes)    Extreme restricting type anorexia nervosa (POA: Yes)    Hypophosphatemia (POA: Clinically Undetermined)    Hypomagnesemia (POA: Clinically Undetermined)  Resolved Problems:    Dysphagia (POA: Yes)      FOLLOW UP  Future Appointments   Date Time Provider Department Center   1/23/2025  2:15 PM DIXIE Che None     Neurology    Follow up  Will need complete workup.  Labs have been negative thus far.  Neurology Referral ordered    Jenna Gillette P.A.-C.  [address]  [address]  [facility] [address]  [phone]    Schedule an appointment as soon as possible for a visit in 1 week(s)  Follow-up 1 week after discharging from [facility] rehab.      MEDICATIONS ON DISCHARGE     Medication List        START taking these medications        Instructions   atovaquone 750 MG/5ML Susp  Start taking on: December 16, 2024  Commonly known as: Mepron   Doctor's comments: Prophylaxis for prolonged Prednisone  Take 10 mL by mouth every day.  Dose: 1,500 mg     folic acid 1 MG Tabs  Start taking on: December 10, 2024  Commonly known as: Folvite   Take 1 Tablet by mouth every day.  Dose: 1 mg     heparin 5000 UNIT/ML Soln   Inject 1 mL under the skin every 8 hours.  Dose: 5,000 Units     mirtazapine 15 MG Tbdp  Commonly known as: Remeron   Take 1 Tablet by mouth at bedtime.  Dose: 15 mg     predniSONE 20 MG Tabs  Start taking on: December 10, 2024  Commonly known as: Deltasone   Doctor's comments: Extended treatment for demyelination/neurologic  disorder  Take 3 Tablets by mouth every day.  Dose: 60 mg     thiamine 100 MG tablet  Start taking on: December 10, 2024  Commonly known as: Thiamine   Take 1 Tablet by mouth every day.  Dose: 100 mg     vitamin B + C complex Tabs  Start taking on: December 10, 2024   Take 1 Tablet by mouth every day.  Dose: 1 Tablet            CHANGE how you take these medications        Instructions   omeprazole 20 MG delayed-release capsule  Start taking on: December 10, 2024  What changed:   medication strength  how much to take  Commonly known as: PriLOSEC   Take 1 Capsule by mouth every day.  Dose: 20 mg            STOP taking these medications      furosemide 20 MG Tabs  Commonly known as: Lasix     HYDROcodone-acetaminophen 5-325 MG Tabs per tablet  Commonly known as: Norco     Jardiance 10 MG Tabs tablet  Generic drug: Empagliflozin     spironolactone 50 MG Tabs  Commonly known as: Aldactone              Allergies  Allergies   Allergen Reactions    Pcn [Penicillins] Rash     Pt reports that it was a childhood allergie received body rash   Childhood allergy. Augmentin challenged 3/4/24 and tolerated.  Per [facility] pharmacy (11/27/2024)    Sulfa Drugs Rash     Pt reports that it was a childhood allergie received body rash   Per [facility] pharmacy (11/27/2024)       DIET  Orders Placed This Encounter   Procedures    Diet Order Diet: Regular     Standing Status:   Standing     Number of Occurrences:   1     Order Specific Question:   Diet:     Answer:   Regular [1]       ACTIVITY  As tolerated and directed by rehab.  Weight bearing as tolerated    LINES, DRAINS, AND WOUNDS  This is an automated list. Peripheral IVs will be removed prior to discharge.  Peripheral IV 11/27/24 20 G Anterior;Right Upper arm (Active)   Site Assessment Clean;Dry;Intact 12/09/24 0800   Dressing Type Occlusive;Transparent 12/09/24 0800   Line Status Flushed;Scrubbed the hub prior to access;Saline locked 12/09/24 0800   Dressing Status Clean;Dry;Intact 12/09/24  0800   Dressing Intervention N/A 12/09/24 0800   Dressing Change Due 12/14/24 12/08/24 2100   Date Primary Tubing Changed 11/30/24 11/30/24 2105   Date Secondary Tubing Changed 11/30/24 11/30/24 2105   NEXT Primary Tubing Change  12/03/24 11/30/24 2105   NEXT Secondary Tubing Change  12/03/24 11/30/24 2105   Infiltration Grading ([facility]) 0 12/09/24 0800   Phlebitis Scale ([facility] Only) 0 12/09/24 0800       Wound 02/23/24 Incision Abdomen DERMABOND, DRAIN SPONGE, TAPE (Active)       Wound 02/27/24 Sacrum red/pink blanching painful tissue (Active)       Wound 12/06/24 Pressure Injury Sacrum Evolving DTI (Active)   Wound Image      12/06/24 1800   Site Assessment Red;Pink;Light Purple;Excoriated;Fragile 12/08/24 2100   Periwound Assessment Red 12/08/24 2100   Margins Undefined edges 12/08/24 2100   Closure Open to air 12/08/24 2100   Drainage Amount None 12/08/24 2100   Treatments Offloading 12/08/24 2100   Offloading/DME Other (comment) 12/07/24 2000   Wound Cleansing Foam Cleanser/Washcloth 12/08/24 2100   Periwound Protectant Barrier Paste 12/08/24 2100   Dressing Status Clean;Dry;Intact 12/09/24 0800   Dressing Changed Observed 12/09/24 0800   Dressing Options Offloading Dressing - Sacral 12/08/24 2100   NEXT Weekly Photo (Inpatient Only) 12/13/24 12/06/24 1800   Wound Team Following Weekly 12/06/24 1800   WOUND NURSE ONLY - Pressure Injury Stage DTPI 12/06/24 1800   Wound Length (cm) 5 cm 12/06/24 1800   Wound Width (cm) 7 cm 12/06/24 1800   Wound Surface Area (cm^2) 35 cm^2 12/06/24 1800   Shape irregular 12/06/24 1800   Wound Odor None 12/06/24 1800   WOUND NURSE ONLY - Time Spent with Patient (mins) 60 12/06/24 1800       Peripheral IV 11/27/24 20 G Anterior;Right Upper arm (Active)   Site Assessment Clean;Dry;Intact 12/09/24 0800   Dressing Type Occlusive;Transparent 12/09/24 0800   Line Status Flushed;Scrubbed the hub prior to access;Saline locked 12/09/24 0800   Dressing Status Clean;Dry;Intact 12/09/24  0800   Dressing Intervention N/A 12/09/24 0800   Dressing Change Due 12/14/24 12/08/24 2100   Date Primary Tubing Changed 11/30/24 11/30/24 2105   Date Secondary Tubing Changed 11/30/24 11/30/24 2105   NEXT Primary Tubing Change  12/03/24 11/30/24 2105   NEXT Secondary Tubing Change  12/03/24 11/30/24 2105   Infiltration Grading ([facility], [facility]) 0 12/09/24 0800   Phlebitis Scale ([facility] Only) 0 12/09/24 0800               MENTAL STATUS ON TRANSFER   AOx4     CONSULTATIONS  none    PROCEDURES  Lumbar Puncture 12/4/24  EEG 12/3/24    LABORATORY  Lab Results   Component Value Date    SODIUM 131 (L) 12/09/2024    POTASSIUM 4.7 12/09/2024    CHLORIDE 101 12/09/2024    CO2 20 12/09/2024    GLUCOSE 133 (H) 12/09/2024    BUN 42 (H) 12/09/2024    CREATININE 1.06 12/09/2024    CREATININE 0.8 04/16/2009        Lab Results   Component Value Date    WBC 5.8 12/09/2024    HEMOGLOBIN 7.9 (L) 12/09/2024    HEMATOCRIT 24.7 (L) 12/09/2024    PLATELETCT 166 12/09/2024      MR-THORACIC SPINE-WITH & W/O   Final Result      Mild degenerative disease as described above. There is no thoracic myelopathy.      MR-CERVICAL SPINE-WITH & W/O   Final Result      Mild degenerative disease in the cervical spine as described above.      MR-LUMBAR SPINE-WITH & W/O   Final Result      1.  There is mild abnormal T2 hyperintensity noted at L3-4 discs. Adjacent endplate irregularities are noted. Post gadolinium sequences demonstrates abnormal disc and endplate enhancement. There is enhancement of the bilateral facet joints. The previous    MRI dated 6/12/2015 demonstrates significant degenerative changes at the L3-4 disc and facet joints. There is no epidural or prevertebral enhancement/fluid collection. Therefore these findings likely represent progression of the degenerative arthritis    with inflammatory erosive arthropathy. However, it is difficult to rule out early discitis due to the presence of abnormal T2 hyperintensity and disc spaces enhancement.  Please correlate with the clinical findings.   2.  Moderate central canal stenosis at L3-4.   3.  Degenerative changes.      DX-ESOPHAGUS - [id number]   Final Result      DX-CHEST-LIMITED (1 VIEW)   Final Result      No evidence of acute cardiopulmonary process.      MR-BRAIN-W/O   Final Result      1.  No evidence of acute territorial infarct, intracranial hemorrhage or mass lesion.   2.  Mild diffuse cerebral and cerebellar substance loss.   3.  Mild microangiopathic ischemic change versus demyelination or gliosis.      CT-ABDOMEN-PELVIS W/O   Final Result      1.  Small umbilical hernia which contains small intestine. Small intestine is dilated proximal to that hernia and decompressed distally consistent with resultant small bowel obstruction.      2.  Again seen cirrhotic appearance of the liver with small metallic ascites within the abdomen and pelvis and splenomegaly consistent with portal hypertension.      3.  Gallstones within the gallbladder.      CT-HEAD W/O   Final Result      No evidence of acute intracranial process.               DX-CHEST-PORTABLE (1 VIEW)   Final Result      No evidence of acute cardiopulmonary process.      EC-ECHOCARDIOGRAM COMPLETE W/O CONT    (Results Pending)       Total time of the discharge process exceeds 45 minutes.

## 2024-12-09 NOTE — THERAPY
Physical Therapy   Re-assessment     Patient Name: Denisse Abel  Age:  64 y.o., Sex:  female  Medical Record #: [id number]  Today's Date: 12/9/2024     Precautions  Precautions: Fall Risk;Swallow Precautions    Assessment    Pt has had a change of status since initial eval, is now more alert and talkative, follows commands, improved motor control and ability to participate with PT.  See below for details and updated goals.  Pt is presenting with B UE/LE weakness and impaired balance but was able to ambulate 20 ft x2 with FWW Porsche. Pt is very motivated to recover, recommend /u PT at acute rehab prior to DC home.     Plan    Treatment Plan Status: Update Current Treatment Plan  Type of Treatment: Bed Mobility, Equipment, Gait Training, Manual Therapy, Neuro Re-Education / Balance, Self Care / Home Evaluation, Stair Training, Therapeutic Activities, Therapeutic Exercise  Treatment Frequency: 4 Times per Week  Treatment Duration: Until Therapy Goals Met    DC Equipment Recommendations: Unable to determine at this time  Discharge Recommendations: Recommend post-acute placement for additional physical therapy services prior to discharge home (acute rehab referral)       12/09/24 1006   Cognition    Comments Pt alert, talkative and agreeable for PT, motivated to recover   Active ROM Lower Body    Active ROM Lower Body  WDL   Strength Lower Body   Gross Strength Generalized Weakness, Equal Bilaterally   Comments B LE grossly 4-/5 except B hips 3+/5   Lower Body Muscle Tone   Lower Body Muscle Tone  WDL   Balance   Sitting Balance (Static) Fair   Sitting Balance (Dynamic) Poor +   Standing Balance (Static) Fair -   Standing Balance (Dynamic) Poor +   Weight Shift Sitting Fair   Weight Shift Standing Fair   Skilled Intervention Postural Facilitation;Tactile Cuing;Sequencing;Verbal Cuing   Comments stdg with FWW   Bed Mobility    Supine to Sit   (NT, pt sitting up in chair)   Gait Analysis   Gait Level Of Assist Minimal  Assist   Assistive Device Front Wheel Walker   Distance (Feet) 20   # of Times Distance was Traveled 2   Deviation Shuffled Gait;Bradykinetic   Functional Mobility   Sit to Stand Minimal Assist   Bed, Chair, Wheelchair Transfer Minimal Assist   Toilet Transfers Minimal Assist  (modA sit > stand from low toilet)   Transfer Method Stand Pivot  (with FWW)   Skilled Intervention Tactile Cuing;Verbal Cuing;Sequencing;Postural Facilitation   Activity Tolerance   Sitting in Chair as tolerated   Standing 4-5 min   Short Term Goals    Short Term Goal # 1 pt will go supine<>sit w/hob elevated and rails up w SBA in 6tx   Goal Outcome # 1 Progressing as expected   Short Term Goal # 2 pt will go sit<>stand w/fww w/SBA in 6tx   Goal Outcome # 2 Progressing as expected   Short Term Goal # 3 pt will transfer bed<>chair w/fww w/ SBA in 6tx   Goal Outcome # 3 Progressing as expected   Short Term Goal # 4 pt will ambulate for 100 ft w/fww SBA in 6tx   Goal Outcome # 4 Progressing as expected

## 2024-12-09 NOTE — PROGRESS NOTES
Telemetry Shift Summary    Rhythm SR/ST  HR Range   Ectopy rPVC  Measurements 0.20/0.08/0.34        Normal Values  Rhythm SR  HR Range    Measurements 0.12-0.20 / 0.06-0.10  / 0.30-0.52

## 2024-12-09 NOTE — THERAPY
Speech Language Pathology   Daily Treatment     Patient Name: Denisse Abel  AGE:  64 y.o., SEX:  female  Medical Record #: [id number]  Date of Service: 12/9/2024      Precautions:  Precautions: Fall Risk, Swallow Precautions    Subjective  Charge RN and MD requested SLP reassessment d/t patient being markedly improved today compared to last SLP session. RN cleared patient for dysphagia tx session. Patient awake, alert, oriented, and UIC after walking to the bathroom. Patient reported eagerness for diet upgrade.     Assessment  Patient with strong, clear vocal quality and appeared much more awake and alert today. Patient self-delivered PO trials of soft solids, mixed consistencies, dry solids, and thin liquids via straw. Mild-moderate weakness was noted during self-feeding. Patient presented with appropriate bolus acceptance and sufficient containment. Mastication was timely with complete oral clearance achieved. No s/sx of aspiration on any textures trialed. Patient's endurance and fatigue appear much-improved compared to previous sessions. Patient appears appropriate for diet upgrade to regular solids w/ thin liquids.     Clinical Impressions  Patient overall appears much-improved from previous sessions with this SLP. Patient appears appropriate to upgrade to regular solids w/ thin liquids w/ assistance for meal tray setup. Pills okay to give whole w/ thin liquid wash as tolerated.     Of note, patient appears to now be able to tolerate intense inpatient rehab therapy and would likely benefit from same, as she was reportedly independent prior to admit.     Patient may benefit from formal cognitive evaluation as well, as she has been altered during admit and far from baseline; patient appears improved, but suspect cognitive function is not yet at baseline.     Recommendations  Treatment Completed: Dysphagia Treatment     Dysphagia Treatment  Diet Consistency: Regular/thins  Instrumentation: None indicated at  "this time  Medication: Whole with liquid, As tolerated  Supervision: Assist with meal tray set up, Encourage self-feeding  Positioning: Fully upright and midline during oral intake, Meals sitting upright in a chair, as tolerated  Risk Management : Small bites/sips, Slow rate of intake, Reduce environmental distractions, Physical mobility, as tolerated  Oral Care: Q8h    SLP Treatment Plan  Treatment Plan: Dysphagia Treatment  SLP Frequency: 4x Per Week  Estimated Duration: Until Therapy Goals Met    Anticipated Discharge Needs  Discharge Recommendations: Recommend home health for continued speech therapy services  Therapy Recommendations Upon DC: Dysphagia Training, Community Re-Integration, Patient / Family / Caregiver Education    Patient / Family Goals  Patient / Family Goal #1: \"I eat yogurt then I walk my dog. I come home and have eggs and toast.\"  Goal #1 Outcome: Progressing as expected  Short Term Goals  Short Term Goal # 1: Patient will consume PU4/TN0 diet with 1:1 feeding with no overt s/sx of aspiration or decline in respiratory status.  Goal Outcome # 1: Goal met, new goal added  Short Term Goal # 1 B : NEW 12/9: Patient will consume regular diet w/ thin liquids with assistance as needed with no overt s/sx of aspiration or decline in respiratory status.  Short Term Goal # 2: Patient will participate in PO trials with SLP to determine readiness for oral diet vs instrumental swallow study.  Goal Outcome # 2 : Goal met  Short Term Goal # 3: Patient will participate in MBSS to further evaluate oropharyngeal swallow function and r/o aspiration.  Goal Outcome  # 3: Goal met    Tawanna Polo, SLP  "

## 2024-12-09 NOTE — DISCHARGE PLANNING
PM&R referral from Dr. Alfaro. Encephalopathy. Spoke with Denisse about goals and expectation for IRF level of care. Discussed therapy plan for 3 hours per day 5 days a week. Discussed therapy schedules 30/45 or 60 minute sessions typically 1.5 hours between breakfast and lunch and another 1.5 hours between lunch and dinner. Discussed PT/OT and SLP roles. Discussed potential length of stay. Discussed comprehensive medical surveillance by physiatry as well as hospitalist team. Discussed patient to nursing ratio. Discussed insurance SCP coverage for the program. Discussed visitation and clothing requirements. A friend will be primary support for Denisse  to return to the community. Discussed participation with therapy program when visiting.  Patient rehab goals are  to achieve LINDSEY with transfers and mobility, self care. Denisse wants to be able to take care of herself and take her dog to the park like she used to. Denisse is an active . Home is a 1 story with 0 step(s) to enter.  Discussed discharge planner role and team conference. In the event of additional support need discussed mention of cousin and sister in law, neither are july to assist when she goes home. Denisse tells me that she has a friend that can assist but on a limited bases.   Physiatry to consult per protocol.

## 2024-12-09 NOTE — PROGRESS NOTES
12-hour chart check complete.    Monitor Summary  Rhythm: SR  Rate: 60's-70's  Ectopy: Rare PVC's  Measurements: .18/.08/.32

## 2024-12-09 NOTE — PREADMISSION SCREENING NOTE
Pre-Admission Screening Form    Patient Information:   Name: Denisse Abel     MRN: [id number]       : 1960      Age: 64 y.o.   Gender: female      Race: White [7]       Marital Status: Single [1]  Family Contact: Nica Daly (Cousin)  Lyssa Valdez        Relationship: Relative [11]  Friend [5]  Home Phone:              Cell Phone: [phone]  Advanced Directives: Copy in Chart  Code Status:  FULL  Current Attending Provider: Delfino Houser M.D.  Referring Physician: Dr. houser      Physiatrist Consult: Dr. Bender        Referral Date: 2024  Primary Payor Source:  [facility] [employer] [employer]  Secondary Payor Source:      Medical Information:   Date of Admission to Acute Care [place]2024  Room Number: 1120/01  Rehabilitation Diagnosis: 0002.1 - Brain Dysfunction: Non-Traumatic  Immunization History   Administered Date(s) Administered    COVID-19, mRNA, LNP-S, PF, leon-sucrose, 30 mcg/0.3 mL 10/08/2024    Influenza Vac Subunit Quad Inj (Pf) 10/24/2017, 10/17/2018    Influenza Vaccine Quad Inj (Pf) 2016, 10/22/2019, 2020    Influenza Vaccine Quad Inj (Preserved) 2014    Influenza Vaccine Quad Recombinant 2021    Influenza high-dose trivalent (PF) 10/03/2024    Influenza split virus trivalent (PF) 2013, 10/29/2015    MODERNA SARS-COV-2 VACCINE (12+) 2022    PFIZER PURPLE CAP SARS-COV-2 VACCINATION (12+) 02/10/2021, 2021    Pneumococcal Conjugate Vaccine (PCV20) 10/03/2024    Tuberculin Skin Test 10/18/2017, 10/17/2018, 2020, 2021    ZZZInfluenza Vaccine Pediatric Preserv Free 2009, 2010     Allergies   Allergen Reactions    Pcn [Penicillins] Rash     Pt reports that it was a childhood allergie received body rash   Childhood allergy. Augmentin challenged 3/4/24 and tolerated.  Per [facility] pharmacy (2024)    Sulfa Drugs Rash     Pt reports that it was a childhood allergie received body rash   Per [facility] pharmacy  (11/27/2024)     Past Medical History:   Diagnosis Date    Acute combined systolic and diastolic heart failure (HCC) 01/22/2024    Anemia due to chronic kidney disease     Heart valve disease     Nonrheumatic aortic valve stenosis    HLD (hyperlipidemia)     HTN (hypertension)     Hypertension     Hypotension     Liver cirrhosis (HCC)     Obesity     Renal disorder     CKD (chronic kidney disease) stage 4, GFR 15-29 ml/min (HCC)     Past Surgical History:   Procedure Laterality Date    AZ LAP,DIAGNOSTIC ABDOMEN  2/23/2024    Procedure: LAPAROSCOPY, KIM'S PATCH;  Surgeon: Neel Amezcua M.D.;  Location: [facility];  Service: General    TRIGGER FINGER RELEASE Right 3/31/2017    Procedure: TRIGGER FINGER RELEASE;  Surgeon: Jimmy Atkins M.D.;  Location: [facility];  Service:     GANGLION EXCISION Right 3/31/2017    Procedure: GANGLION EXCISION - HAND CYST;  Surgeon: Jimmy Atkins M.D.;  Location: [facility];  Service:     PELVISCOPY  6/5/08    Performed by NATALIYA ARCEO at SURGERY SAME DAY [facility]    OVARIAN CYSTECTOMY  6/5/08    Performed by NATALIYA ARCEO at SURGERY SAME DAY [facility]    CYSTOSCOPY  6/5/08    Performed by NATALIYA ARCEO at SURGERY SAME DAY [facility]    HYSTERECTOMY LAPAROSCOPY  2003    KNEE ARTHROSCOPY         History Leading to Admission, Conditions that Caused the Need for Rehab (CMS):   cyndi Chen M.D.  Physician  [facility] Medicine     H&P     Signed     Date of Service: 11/27/2024 12:31 PM     Expand All Collapse All    Hospital Medicine History & Physical Note     Date of Service  11/27/2024     Primary Care Physician  Jenna Gillette P.A.-C.     Consultants  None     Specialist Names: None     Code Status  Full Code     Chief Complaint      Chief Complaint   Patient presents with    ALOC         History of Presenting Illness  Denisse Abel is a 64 y.o. female who presented 11/27/2024 with confusion.  The patient  apparently was somehow able to call 911 this morning.  Paramedics went to her house where they found her disheveled, confused, malnourished, and brought her into the emergency room but were able to give this information to the neighbors who then came into see the patient at bedside.  At this point we are not giving information to the neighbors as there is no direct relationship or consent for it I have however a POA who I am discussing the case with.  The patient herself has extensive alcohol abuse history and at this point is very confused most likely has Wernicke's encephalopathy.  She is minimally responding and only seems to recognize herself and is able to say yes.  Patient is not able to give me any information.  The patient does have chronic kidney injury and now she is slightly worse than her baseline.  She is definitely acidotic.  The acidosis may have been secondary to possible seizure as the patient does have some bites on her lips as well as her tongue.  The anemia at this point will need to be evaluated and monitored.  Her fluid overload could be significant due to her aortic stenosis and ascites and thus at this point we will continue with diuresis using spironolactone and Lasix..           Assessment/Plan:  Justification for Admission Status  I anticipate this patient will require at least two midnights for appropriate medical management, necessitating inpatient admission because patient is acute alcohol induced encephalopathy.  The patient may also have had a seizure.  The patient does have acute on chronic renal failure as well.  Patient will need at this point at least 48 hours of inpatient management.     Patient will need a Telemetry bed on MEDICAL service .  The need is secondary to acute encephalopathy.     * Alcoholic encephalopathy (HCC)- (present on admission)  Assessment & Plan  Patient was brought in by ambulance today due to confusion and apparently she was able to call 911 this  morning.  The patient at this point is only able to tell me very few words including yes and she was able to tell me her first name and her last name  Patient's ammonia level is elevated at 66 so we will start her on lactulose  Patient still drinks and may be in alcohol withdrawal at this point we will initiate alcohol withdrawal management  Monitor liver function test  Monitor electrolytes and supplement as needed  Give thiamine folic acid and multivitamin     Esophageal varices without bleeding (HCC)- (present on admission)  Assessment & Plan  Continue with PPI therapy     Liver cirrhosis (HCC)- (present on admission)  Assessment & Plan  Chronic liver cirrhosis secondary to alcohol abuse  Monitor liver function tests  Optimize diuresis  Optimize ammonia levels which are elevated initiate lactulose  Monitor magnesium and phosphorus levels  Initiate Librium  CIWA protocol  The patient did bite her lips and there is a potential for seizures so monitor for seizures.     Chronic combined systolic and diastolic heart failure (HCC)- (present on admission)  Assessment & Plan  Monitor fluid status  Monitor daily weights  Most recent echocardiogram demonstrates moderate to severe aortic stenosis, ejection fraction fluctuates between 40 and 60  Continue with diuresis using IV Lasix and oral Aldactone     HTN (hypertension), benign- (present on admission)  Assessment & Plan  Optimize blood pressure management keep systolic blood pressure less than 140 diastolic under 90     CKD (chronic kidney disease) stage 4, GFR 15-29 ml/min (HCC)- (present on admission)  Assessment & Plan  Monitor renal functions currently she is slightly above her baseline but she is still close to her baseline.  Avoid nephrotoxic medications  Avoid giving excessive hydration     Severe protein-calorie malnutrition (HCC)- (present on admission)  Assessment & Plan  Nutritional support     Anemia due to chronic kidney disease- (present on  admission)  Assessment & Plan  Monitor H&H if drops below 7 or 21 transfuse  Check iron panel B12 level     Heart murmur- (present on admission)  Assessment & Plan  Chronic aortic stenosis  This is moderate to severe  Eventually the patient may need surgical intervention such as a TAVR.  Referred to outpatient cardiology      Cristiano Bender M.D.  Physician  Physical Medicine & Rehab     Consults     Signed     Date of Service: 12/9/2024 10:09 AM  Consult Orders  IP Consult For Physiatry [[id number]] ordered by Delfino Alfaro M.D. at 12/09/24 0916       Expand All Collapse All      Physical Medicine & Rehabilitation Chart Review       Please note that this is a chart review.     History of Present Illness:  Patient is a 64 y.o. female with a past medical history significant for CHF, HTN, HLD, cirrhosis, and CKD admitted to [facility] on 11/27/2024  8:29 AM with AMS.  She reportedly has a history of alcohol abuse and was found disheveled and confused.  Her librium was discontinued.  She was started on IV potassium for hypokalemia and was started on lactulose for liver cirrhosis. She was diagnosed with alcoholic encephalppathy. EEG was negative, she was trialed on Keppra but that has since been discontinued.         Past Medical History:    Past Medical History  Past Medical History:  Diagnosis Date   Acute combined systolic and diastolic heart failure (HCC) 01/22/2024   Anemia due to chronic kidney disease     Heart valve disease      Nonrheumatic aortic valve stenosis   HLD (hyperlipidemia)     HTN (hypertension)     Hypertension     Hypotension     Liver cirrhosis (HCC)     Obesity     Renal disorder      CKD (chronic kidney disease) stage 4, GFR 15-29 ml/min (HCC)          Past Surgical History:    Past Surgical History  Past Surgical History:  Procedure Laterality Date   NH LAP,DIAGNOSTIC ABDOMEN   2/23/2024    Procedure: LAPAROSCOPY, KIM'S PATCH;  Surgeon: Neel Amezcua M.D.;   Location: SURGERY [facility];  Service: General   TRIGGER FINGER RELEASE Right 3/31/2017    Procedure: TRIGGER FINGER RELEASE;  Surgeon: Jimmy Atkins M.D.;  Location: SURGERY [facility];  Service:    GANGLION EXCISION Right 3/31/2017    Procedure: GANGLION EXCISION - HAND CYST;  Surgeon: Jimmy Atkins M.D.;  Location: SURGERY [facility];  Service:    PELVISCOPY   6/5/08    Performed by NATALIYA ARCEO at SURGERY SAME DAY [facility]   OVARIAN CYSTECTOMY   6/5/08    Performed by NATALIYA ARCEO at SURGERY SAME DAY [facility]   CYSTOSCOPY   6/5/08    Performed by NATALIYA ARCEO at SURGERY SAME DAY [facility]   HYSTERECTOMY LAPAROSCOPY   2003   KNEE ARTHROSCOPY              Family History:    Family History  Family History  Problem Relation Age of Onset   Diabetes Mother     Heart Disease Mother     Stroke Mother     Diabetes Father            Medications:    Scheduled Medications  Scheduled Medications  Medication Dose Frequency   predniSONE  60 mg DAILY   atovaquone  750 mg MO, WE + FR   omeprazole  20 mg DAILY   thiamine  100 mg DAILY   vitamin B + C complex  1 Tablet DAILY   mirtazapine  15 mg QHS   folic acid  1 mg DAILY   heparin  5,000 Units Q8HRS   Pharmacy Consult Request  1 Each PHARMACY TO DOSE   insulin lispro  1-6 Units 4X/DAY ACHS       PRN medications: acetaminophen, labetalol, ondansetron, ondansetron, insulin lispro **AND** POC blood glucose manual result **AND** NOTIFY MD and PharmD **AND** Administer 20 grams of glucose (approximately 8 ounces of fruit juice) every 15 minutes PRN FSBG less than 70 mg/dL **AND** dextrose bolus     Allergies:  Pcn [penicillins] and Sulfa drugs     Assessment & Plan:  The patient presents functional deficits in mobility and self-care as well as cognitive deficits and swallowing/speech, and Minimal  de-conditioning. Patient was previously Independent using None living with Alone and Able to Care For Self in a 1 Story Apartment / Condo  with 0 step(s) to enter. Per most recent PT note they are = Maximal Assist= for transfers.  Per most recent OT note they are Total Assist at LB dressing, Total Assist at toileting,     The patient's current diet is:    Current Diet Order  Procedures   Diet Order Diet: Level 4 - Pureed (Crush meds in puree please; ONLY feed when awake and alert. Free water restricted to 500mL/day, total fluid restriction 1500mL/day.); Liquid level: Level 0 - Thin; Tray Modifications (optional): SLP - 1:1 Supervi...        The patient is aFair  candidate for an acute inpatient rehabilitation program with a coordinated program of care at an intensity and frequency not available at a lower level of care.      Note: This recommendation requires that patient has at least CGA/Minimal Assistance needs in at least two therapy disciplines.  If patient progresses to no longer need CGA/Esme with at least two therapy disciplines they may be more appropriate for Skilled nursing facility versus home with home health.       This recommendation is substantiated by the patient's current medical condition with intervention and assessment of medical issues requiring an acute level of care for patient's safety and maximum outcome. A coordinated program of care will be provided by an interdisciplinary team including physical therapy, occupational therapy, speech language pathology, hospitalist, physiatry, rehab nursing, and rehab psychology. Rehab goals include improved cognition and swallowing, mobility, self-care management, strength and conditioning/endurance, pain management, bowel and bladder management, mood and affect, and safety with independent home management including caregiver training. Estimated length of stay is approximately 10-17 days. Rehab potential: Good. Disposition: to pre-morbid independent living setting with supportive care of patient's family. We will continue to follow with you in anticipation of discharge to acute inpatient  rehabilitation when medically stable to do so at the discretion of the attending physician. Thank you for allowing us to participate in this patient's care. Please call with any questions regarding this recommendation.     ____________________________________     T. Juliocesar Bender MD/PhD  [facility] - Physical Medicine & Rehabilitation   [facility] Brain Injury Medicine   ____________________________________       Co-morbidities:  as listed above and below   Potential Risk - Complications: Cognitive Impairment, Deep Vein Thrombosis, Dysphagia, Malnutrition, Pain, Perceptual Impairment, Pneumonia, and Seizures  Level of Risk: High    Ongoing Medical Management Needed (Medical/Nursing Needs):   Patient Active Problem List    Diagnosis Date Noted    Hypophosphatemia 12/08/2024    Hypomagnesemia 12/08/2024    Extreme restricting type anorexia nervosa 12/06/2024    Elevated CSF protein 12/05/2024    Dysphagia 12/04/2024    Partial seizure disorder (HCC) 12/03/2024    Cystitis 12/03/2024    Alcoholic encephalopathy (HCC) 11/27/2024    CKD (chronic kidney disease) stage 4, GFR 15-29 ml/min (HCC) 11/14/2024    ACC/AHA stage C systolic heart failure (HCC) 09/17/2024    Hypotension 09/17/2024    Nonrheumatic moderate aortic valve stenosis 09/17/2024    Liver disease 07/30/2024    Gastroesophageal reflux disease without esophagitis 07/30/2024    Other ascites 07/30/2024    Alcohol dependence with alcohol-induced disorder (HCC) 07/25/2024    Esophageal varices without bleeding (HCC) 07/25/2024    Portal hypertensive gastropathy (HCC) 07/25/2024    Severe protein-calorie malnutrition (HCC) 02/25/2024    Hospital discharge follow-up 02/02/2024    Anemia due to chronic kidney disease 02/02/2024    Anxiety and depression 02/02/2024    Chronic combined systolic and diastolic heart failure (HCC) 01/22/2024    Hyponatremia 01/22/2024    Hypokalemia 01/22/2024    Troponin I above reference range 01/22/2024    Elevated brain natriuretic  "peptide (BNP) level 01/22/2024    Severe anemia requiring blood transfusions anemia 01/22/2024    Liver cirrhosis (HCC) 01/22/2024    Stage 3b chronic kidney disease 01/12/2024    Heart murmur 01/12/2024    Recurrent major depressive disorder (HCC) 01/12/2024    Lumbar spondylosis 03/15/2023    Lumbar radiculopathy 10/10/2022    Acute stress reaction 07/20/2021    Transaminitis 01/30/2020    Alkaline phosphatase elevation 01/30/2020    Ganglion of right hand 03/31/2017    Low back pain 06/07/2016    Lumbosacral radiculopathy 06/07/2016    HTN (hypertension), benign 11/05/2009       Current Vital Signs:   Temperature: 36.6 °C (97.9 °F) Pulse: 89 Respiration: 16 Blood Pressure : (!) 141/78  Weight: 54.3 kg (119 lb 11.4 oz) Height: 167.6 cm (5' 6\")  Pulse Oximetry: 98 % O2 (LPM): 0      Completed Laboratory Reports:  Recent Labs     12/08/24  0230 12/09/24  0138   WBC 6.3 5.8   HEMOGLOBIN 8.0* 7.9*   HEMATOCRIT 25.2* 24.7*   PLATELETCT 169 166   SODIUM 132* 131*   POTASSIUM 4.9 4.7   BUN 44* 42*   CREATININE 1.01 1.06   ALBUMIN 3.1* 3.1*   GLUCOSE 112* 133*     Additional Labs: Not Applicable    Prior Living Situation:   Housing / Facility: 1 Story Apartment / Condo  Steps Into Home: 0  Steps In Home: 0  Lives with - Patient's Self Care Capacity: Alone and Able to Care For Self  Equipment Owned: None    Prior Level of Function / Living Situation:   Physical Therapy: Prior Services: Home-Independent  Housing / Facility: 1 Story Apartment / Condo  Steps Into Home: 0  Steps In Home: 0  Bathroom Set up: Walk In Shower, Shower Chair  Equipment Owned: None  Lives with - Patient's Self Care Capacity: Alone and Able to Care For Self  Bed Mobility: Independent  Transfer Status: Independent  Ambulation: Independent  Assistive Devices Used: None  Stairs: Independent  Current Level of Function:   Gait Level Of Assist: Minimal Assist  Assistive Device: Front Wheel Walker  Distance (Feet): 20  Deviation: Shuffled Gait, " Bradykinetic  Weight Bearing Status: fwb  Supine to Sit:  (NT, pt sitting up in chair)  Sit to Supine: Maximal Assist  Scooting: Maximal Assist  Skilled Intervention: Verbal Cuing, Tactile Cuing, Postural Facilitation, Facilitation  Comments: UIC pre and post  Sit to Stand: Minimal Assist  Bed, Chair, Wheelchair Transfer: Minimal Assist  Toilet Transfers: Minimal Assist (modA sit > stand from low toilet)  Transfer Method: Stand Pivot (with FWW)  Skilled Intervention: Tactile Cuing, Verbal Cuing, Sequencing, Postural Facilitation  Sitting in Chair: as tolerated  Sitting Edge of Bed: 10 mins  Standin-5 min  Occupational Therapy:   Self Feeding: Independent  Grooming / Hygiene: Independent  Bathing: Independent  Dressing: Independent  Toileting: Independent  Medication Management: Independent  Laundry: Independent  Kitchen Mobility: Independent  Finances: Independent  Home Management: Independent  Shopping: Independent  Prior Level Of Mobility: Independent Without Device in Community, Independent With Steps in Community, Independent Without Device in Home, Independent With Steps in Home  Driving / Transportation: Driving Independent  Prior Services: Home-Independent  Housing / Facility: 1 Story Apartment / Condo  Current Level of Function:   Upper Body Dressing: Maximal Assist  Lower Body Dressing: Moderate Assist (socks from seated)  Toileting: Total Assist  Skilled Intervention: Verbal Cuing  Speech Language Pathology:      Rehabilitation Prognosis/Potential: Fair  Estimated Length of Stay: 10-14 days    Nursing:      Continent    Scope/Intensity of Services Recommended:  Physical Therapy: 1 hr / day  5 days / week. Therapeutic Interventions Required: Maximize Endurance, Mobility, Strength, and Safety  Occupational Therapy: 1 hr / day 5 days / week. Therapeutic Interventions Required: Maximize Self Care, ADLs, IADLs, and Energy Conservation  Speech & Language Pathology: 1 hr / day 5 days / week. Therapeutic  Interventions Required: Maximize Cognition and Swallowing  Rehabilitation Nursin/7. Therapeutic Interventions Required: Monitor Pain, Skin, Wound(s), Vital Signs, Intake and Output, Labs, Safety, Aspiration Risk, and Family Training  Rehabilitation Physician: 3 - 5 days / week. Therapeutic Interventions Required: Medical Management  Respiratory Care: evaluate . Therapeutic Interventions Required: Pulmonary Toileting and Aspiration Risk  Dietician: consult . Therapeutic Interventions Required: nutritional need     She requires 24-hour rehabilitation nursing to manage bowel and bladder function, skin care, wound, nutrition and fluid intake, pulmonary hygiene, pain control, safety, medication management, and patient/family goals. In addition, rehabilitation nursing will reiterate and reinforce therapy skills and equipment use, including ADLs, as well as provide education to the patient and family. Denisse Abel is willing to participate in and is able to tolerate the proposed plan of care.    Rehabilitation Goals and Plan (Expected frequency & duration of treatment in the IRF):   Return to the Community, Modified Independent Level of Care, Minimal Assist Level of Care, and Outpatient Support  Anticipated Date of Rehabilitation Admission: 2024  Patient/Family oriented IRF level of care/facility/plan: Yes  Patient/Family willing to participate in IRF care/facility/plan: Yes  Patient able to tolerate IRF level of care proposed: Yes  Patient has potential to benefit IRF level of care proposed: Yes  Comments: Not Applicable    Special Needs or Precautions - Medical Necessity:  Safety Concerns/Precautions:  Fall Risk / High Risk for Falls and Balance  Complex Wound Care: Sacrum  Pain Management  Cardiac Precautions  Current Medications:    Current Facility-Administered Medications Ordered in Epic   Medication Dose Route Frequency Provider Last Rate Last Admin    predniSONE (Deltasone) tablet 60 mg  60 mg  Oral DAILY Delfino Alfaro M.D.   60 mg at 12/09/24 0813    omeprazole (PriLOSEC) capsule 20 mg  20 mg Oral DAILY Delfino Alfaro M.D.   20 mg at 12/09/24 0813    thiamine (Vitamin B-1) tablet 100 mg  100 mg Oral DAILY Delfino Alfaro M.D.   100 mg at 12/09/24 0636    vitamin B + C complex tablet 1 Tablet  1 Tablet Oral DAILY Delfino Alfaro M.D.   1 Tablet at 12/09/24 0636    acetaminophen (Tylenol) tablet 650 mg  650 mg Oral Q6HRS PRN Delfino Alfaro M.D.        mirtazapine (Remeron) orally disintegrating tab 15 mg  15 mg Oral QHS Delfino Alfaro M.D.   15 mg at 12/08/24 2059    folic acid (Folvite) tablet 1 mg  1 mg Oral DAILY Delfino Alfaro M.D.   1 mg at 12/09/24 0636    [Held by provider] heparin injection 5,000 Units  5,000 Units Subcutaneous Q8HRS Delfino Alfaro M.D.   5,000 Units at 12/08/24 0532    Pharmacy Consult Request ...Pain Management Review 1 Each  1 Each Other PHARMACY TO DOSE Milly Chen M.D.        labetalol (Normodyne/Trandate) injection 10 mg  10 mg Intravenous Q4HRS PRN Milly Chen M.D.        ondansetron (Zofran) syringe/vial injection 4 mg  4 mg Intravenous Q4HRS PRN Milly Chen M.D.        ondansetron (Zofran ODT) dispertab 4 mg  4 mg Oral Q4HRS PRN Milly Chen M.D.        insulin lispro (HumaLOG,AdmeLOG) subcutaneous injection  1-6 Units Subcutaneous 4X/DAY GABRIEL Chen M.D.   1 Units at 12/09/24 1134    And    dextrose 50% (D50W) injection 25 g  25 g Intravenous Q15 MIN PRN Milly Chen M.D.         No current Epic-ordered outpatient medications on file.     Diet:   DIET ORDERS (From admission to next 24h)       Start     Ordered    12/09/24 1015  Diet Order Diet: Regular  ALL MEALS        Question:  Diet:  Answer:  Regular    12/09/24 1015    11/27/24 1430  Supplements  ALL MEALS        Comments: No vanilla   Question Answer Comment   Which Supplement Glucerna    Glucerna: Glucerna Shake Carton        11/27/24 1435                     Anticipated Discharge Destination / Patient/Family Goal:  Destination: Home with Assistance Support System: Friends  Anticipated home health services: OT, PT, Nursing, and Aide  Previously used HH service/ provider: Not Applicable  Anticipated DME Needs: Walker  Outpatient Services: OT and PT  Alternative resources to address additional identified needs:   Future Appointments   Date Time Provider Department Center   1/23/2025  2:15 PM DIXIE Che None     Poli Robbins  Clinical Admissions Coordinator     Discharge Planning     Signed     Date of Service: 12/9/2024  9:11 AM      PM&R referral from Dr. [place]. Encephalopathy. Spoke with Denisse about goals and expectation for IRF level of care. Discussed therapy plan for 3 hours per day 5 days a week. Discussed therapy schedules 30/45 or 60 minute sessions typically 1.5 hours between breakfast and lunch and another 1.5 hours between lunch and dinner. Discussed PT/OT and SLP roles. Discussed potential length of stay. Discussed comprehensive medical surveillance by physiatry as well as hospitalist team. Discussed patient to nursing ratio. Discussed insurance SCP coverage for the program. Discussed visitation and clothing requirements. A friend will be primary support for Denisse  to return to the community. Discussed participation with therapy program when visiting.  Patient rehab goals are  to achieve LINDSEY with transfers and mobility, self care. Denisse wants to be able to take care of herself and take her dog to the park like she used to. Denisse is an active . Home is a 1 story with 0 step(s) to enter.  Discussed discharge planner role and team conference. In the event of additional support need discussed mention of cousin and sister in law, neither are july to assist when she goes home. Denisse tells me that she has a friend that can assist but on a limited bases.   Physiatry to consult per protocol.            Pre-Screen Completed: 12/9/2024  2:29 PM Poli Robbins

## 2024-12-09 NOTE — H&P
Physical Medicine & Rehabilitation  History and Physical (H&P)  &     Post Admission Physician Evaluation (MAI)       Date of Admission: 12/9/2024  Date of Service: 12/9/2024   Denisse Abel    [facility] Code to Support Admission: 0002.1 - Brain Dysfunction: Non-Traumatic  Etiologic Diagnosis: Alcoholic encephalopathy (HCC)    _______________________________________________    Chief Complaint: Decreased mobility, weakness    History of Present Illness:  Adapted from the PM&R Consult by Dr. Bender:   Patient is a 64 y.o. female with a past medical history significant for CHF, HTN, HLD, cirrhosis, and CKD admitted to [facility] on 11/27/2024  8:29 AM with AMS.  She reportedly has a history of alcohol abuse and was found disheveled and confused.  Her librium was discontinued.  She was started on IV potassium for hypokalemia and was started on lactulose for liver cirrhosis. She was diagnosed with alcoholic encephalppathy. EEG was negative, she was trialed on Keppra but that has since been discontinued. She has since improved in function after steroids and per hospitalist service should have slow taper off of steroids.     Patient tolerated transfer to [facility]. She reports she is doing well. She reports she is diffusely weak. She does not remember her early stay. She denies pain at rest. Denies SOB. She is very motivated to get home alone.     Review of Systems:     Comprehensive 14 point ROS was reviewed and all were negative except as noted elsewhere in this document.     Past Medical History:  Past Medical History:   Diagnosis Date    Acute combined systolic and diastolic heart failure (HCC) 01/22/2024    Anemia due to chronic kidney disease     Heart valve disease     Nonrheumatic aortic valve stenosis    HLD (hyperlipidemia)     HTN (hypertension)     Hypertension     Hypotension     Liver cirrhosis (HCC)     Obesity     Renal disorder     CKD (chronic kidney disease) stage 4, GFR 15-29 ml/min ([facility])        Past Surgical History:  Past Surgical History:   Procedure Laterality Date    AR LAP,DIAGNOSTIC ABDOMEN  2/23/2024    Procedure: LAPAROSCOPY, KIM'S PATCH;  Surgeon: Neel Amezcua M.D.;  Location: SURGERY [facility];  Service: General    TRIGGER FINGER RELEASE Right 3/31/2017    Procedure: TRIGGER FINGER RELEASE;  Surgeon: Jimmy Atkins M.D.;  Location: [facility];  Service:     GANGLION EXCISION Right 3/31/2017    Procedure: GANGLION EXCISION - HAND CYST;  Surgeon: Jimmy Atkins M.D.;  Location: [facility];  Service:     PELVISCOPY  6/5/08    Performed by NATALIYA ARCEO at SURGERY SAME DAY [facility]    OVARIAN CYSTECTOMY  6/5/08    Performed by NATALIYA ARCEO at SURGERY SAME DAY [facility]    CYSTOSCOPY  6/5/08    Performed by NATALIYA ARCEO at SURGERY SAME DAY [facility]    HYSTERECTOMY LAPAROSCOPY  2003    KNEE ARTHROSCOPY         Family History:  Family History   Problem Relation Age of Onset    Diabetes Mother     Heart Disease Mother     Stroke Mother     Diabetes Father        Medications:  No current facility-administered medications for this encounter.     Current Outpatient Medications   Medication    [START ON 12/10/2024] omeprazole (PRILOSEC) 20 MG delayed-release capsule    [START ON 12/10/2024] folic acid (FOLVITE) 1 MG Tab    heparin 5000 UNIT/ML Solution    mirtazapine (REMERON) 15 MG TABLET DISPERSIBLE    [START ON 12/10/2024] predniSONE (DELTASONE) 20 MG Tab    [START ON 12/10/2024] thiamine (THIAMINE) 100 MG tablet    [START ON 12/10/2024] B Complex-C (VITAMIN B + C COMPLEX) Tab    [START ON 12/16/2024] atovaquone (MEPRON) 750 MG/5ML Suspension     Facility-Administered Medications Ordered in Other Encounters   Medication Dose    predniSONE (Deltasone) tablet 60 mg  60 mg    omeprazole (PriLOSEC) capsule 20 mg  20 mg    thiamine (Vitamin B-1) tablet 100 mg  100 mg    vitamin B + C complex tablet 1 Tablet  1 Tablet    acetaminophen  (Tylenol) tablet 650 mg  650 mg    mirtazapine (Remeron) orally disintegrating tab 15 mg  15 mg    folic acid (Folvite) tablet 1 mg  1 mg    [Held by provider] heparin injection 5,000 Units  5,000 Units    Pharmacy Consult Request ...Pain Management Review 1 Each  1 Each    labetalol (Normodyne/Trandate) injection 10 mg  10 mg    ondansetron (Zofran) syringe/vial injection 4 mg  4 mg    ondansetron (Zofran ODT) dispertab 4 mg  4 mg    insulin lispro (HumaLOG,AdmeLOG) subcutaneous injection  1-6 Units    And    dextrose 50% (D50W) injection 25 g  25 g       Allergies:  Pcn [penicillins] and Sulfa drugs    Psychosocial History:  Housing / Facility: 1 Story Apartment / Condo  Steps Into Home: 0  Steps In Home: 0  Lives with - Patient's Self Care Capacity: Alone and Able to Care For Self  Equipment Owned: None     Prior Level of Function / Living Situation:   Physical Therapy: Prior Services: Home-Independent  Housing / Facility: 1 Story Apartment / Condo  Steps Into Home: 0  Steps In Home: 0  Bathroom Set up: Walk In Shower, Shower Chair  Equipment Owned: None  Lives with - Patient's Self Care Capacity: Alone and Able to Care For Self  Bed Mobility: Independent  Transfer Status: Independent  Ambulation: Independent  Assistive Devices Used: None  Stairs: Independent  Current Level of Function:   Gait Level Of Assist: Minimal Assist  Assistive Device: Front Wheel Walker  Distance (Feet): 20  Deviation: Shuffled Gait, Bradykinetic  Weight Bearing Status: fwb  Supine to Sit:  (NT, pt sitting up in chair)  Sit to Supine: Maximal Assist  Scooting: Maximal Assist  Skilled Intervention: Verbal Cuing, Tactile Cuing, Postural Facilitation, Facilitation  Comments: UIC pre and post  Sit to Stand: Minimal Assist  Bed, Chair, Wheelchair Transfer: Minimal Assist  Toilet Transfers: Minimal Assist (modA sit > stand from low toilet)  Transfer Method: Stand Pivot (with FWW)  Skilled Intervention: Tactile Cuing, Verbal Cuing, Sequencing,  Postural Facilitation  Sitting in Chair: as tolerated  Sitting Edge of Bed: 10 mins  Standin-5 min  Occupational Therapy:   Self Feeding: Independent  Grooming / Hygiene: Independent  Bathing: Independent  Dressing: Independent  Toileting: Independent  Medication Management: Independent  Laundry: Independent  Kitchen Mobility: Independent  Finances: Independent  Home Management: Independent  Shopping: Independent  Prior Level Of Mobility: Independent Without Device in Community, Independent With Steps in Community, Independent Without Device in Home, Independent With Steps in Home  Driving / Transportation: Driving Independent  Prior Services: Home-Independent  Housing / Facility: 1 Story Apartment / Condo  Current Level of Function:   Upper Body Dressing: Maximal Assist  Lower Body Dressing: Moderate Assist (socks from seated)  Toileting: Total Assist  Skilled Intervention: Verbal Cuing    CURRENT LEVEL OF FUNCTION:   Same as level of function prior to admission to [facility]    Physical Examination:   GENERAL: No apparent distress  HEENT: Normocephalic/atraumatic, EOMI, and PERRL  CARDIAC: Regular rate and rhythm, normal S1, S2   LUNGS: Clear to auscultation   ABDOMINAL: bowel sounds present, soft, and nontender    EXTREMITIES: no contractures, spasticity, or edema.   NEURO:  Mental status: answers questions appropriately follows commands  Speech: fluent, no aphasia or dysarthria  CRANIAL NERVES:  2,3: visual acuity grossly intact, PERRL  3,4,6: EOMI bilaterally, no nystagmus or diplopia  7: no facial asymmetry  8: hearing grossly intact  9,10: symmetric palate elevation  11: SCM/Trapezius strength 5/5 bilaterally  12: tongue protrudes midline    Motor:    Shoulder flexors:  Right -  5/5, Left -  5/5  Elbow flexors:  Right -  4/5, Left -  4/5  Wrist extensors:  Right -  4/5, Left -  4/5  Elbow extensors:  Right -  4/5, Left -  4/5  Finger flexors:  Right -  4/5, Left -  4/5  Finger  abductors:  Right -  4/5, Left -  4/5  Hip flexors:  Right -  3/5, Left -  3/5  Knee ext:  Right -  4/5, Left -  4/5  Dorsiflexors:  Right -  4/5, Left -  4/5  EHL:  Right -  4/5, Left -  4/5  Plantar flexors:  Right -  4/5, Left -  4/5  Sensory: intact to light touch through out  DTRs: 2+ in bilateral biceps and patellar tendons    Radiology:                   CT Head 11/27/24  IMPRESSION:     No evidence of acute intracranial process.         Laboratory Values:  Recent Labs     12/08/24  0230 12/09/24  0138   SODIUM 132* 131*   POTASSIUM 4.9 4.7   CHLORIDE 102 101   CO2 20 20   GLUCOSE 112* 133*   BUN 44* 42*   CREATININE 1.01 1.06   CALCIUM 9.7 9.7     Recent Labs     12/08/24  0230 12/09/24  0138   WBC 6.3 5.8   RBC 3.09* 3.07*   HEMOGLOBIN 8.0* 7.9*   HEMATOCRIT 25.2* 24.7*   MCV 81.6 80.5*   MCH 25.9* 25.7*   MCHC 31.7* 32.0*   RDW 65.5* 65.2*   PLATELETCT 169 166   MPV 10.9 10.9           Primary Rehabilitation Diagnosis:    This patient is a 64 y.o. female admitted for acute inpatient rehabilitation with Alcoholic encephalopathy (HCC).    Impairments:   Cognitive  ADLs/IADLs  Mobility  Speech  Swallow    Secondary Diagnosis/Medical Co-morbidities Affecting Function  HTN/sCHF  DM2 with hyperglycemia  Anemia  Hyponatremia  CKD/Azotemia  Hypophosphatemia   Severe protein-calorie malnutrition  Depression  Alcohol abuse/cirrhosis    Relevant Changes Since Preadmission Evaluation:    Status unchanged    The patient's rehabilitation potential is Good  The patient's medical prognosis is fair    Rehabilitation Plan:   Discussion and Recommendations:   1. The patient requires an acute inpatient rehabilitation program with a coordinated program of care at an intensity and frequency not available at a lower level of care. This recommendation is substantiated by the patient's medical physicians who recommend that the patient's intervention and assessment of medical issues needs to be done at an acute level of care for  patient's safety and maximum outcome.   2. A coordinated program of care will be supplied by an interdisciplinary team of physical therapy, occupational therapy, rehab physician, rehab nursing, and, if needed, speech therapy and rehab psychology. Rehab team presents a patient-specific rehabilitation and education program concentrating on prevention of future problems related to accessibility, mobility, skin, bowel, bladder, sexuality, and psychosocial and medical/surgical problems.   3. Need for Rehabilitation Physician: The rehab physician will be evaluating the patient on a multi-weekly basis to help coordinate the program of care. The rehab physician communicates between medical physicians, therapists, and nurses to maximize the patient's potential outcome. Specific areas in which the rehab physician will be providing daily assessment include the following:   A. Assessing the patient's heart rate and blood pressure response (vitals monitoring) to activity and making adjustments in medications or conservative measures as needed.   B. The rehab physician will be assessing the frequency at which the program can be increased to allow the patient to reach optimal functional outcome.   C. The rehab physician will also provide assessments in daily skin care, especially in light of patient's impairments in mobility.   D. The rehab physician will provide special expertise in understanding how to work with functional impairment and recommend appropriate interventions, compensatory techniques, and education that will facilitate the patient's outcome.   4. Rehab R.N.   The rehab RN will be working with patient to carry over in room mobility and activities of daily living when the patient is not in 3 hours of skilled therapy. Rehab nursing will be working in conjunction with rehab physician to address all the medical issues above and continue to assess laboratory work and discuss abnormalities with the treating physicians,  assess vitals, and response to activity, and discuss and report abnormalities with the rehab physician. Rehab RN will also continue daily skin care, supervise bladder/bowel program, instruct in medication administration, and ensure patient safety.   5. Rehab Therapy: Therapies to treat at intensity and frequency of (may change after completion of evaluation by all therapeutic disciplines):       PT:  Physical therapy to address mobility, transfer, gait training and evaluation for adaptive equipment needs 1 hour/day at least 5 days/week for the duration of the ELOS (see below)       OT:  Occupational therapy to address ADLs, self-care, home management training, functional mobility/transfers and assistive device evaluation, and community re-integration 1  hour/day at least 5 days/week for the duration of the ELOS (see below).        ST/Dysphagia:  Speech therapy to address speech, language, and cognitive deficits as well as swallowing difficulties with retraining/dysphagia management and community re-integration with comprehension, expression, cognitive training 1  hour/day at least 5 days/week for the duration of the ELOS (see below).     Medical management / Rehabilitation Issues/ Adverse Potential as part of rehabilitation plan     Rehabilitation Issues/Adverse Potential  1.  Hepatic encephalopathy - Patient with confusion on 11/27 with AMS and failure to thrive, thought to be hepatic encephalopathy vs autoimmune encephalitis. She was started on steroids and has been changed to prednisone 60 mg daily. Patient demonstrates functional deficits in cognition, behavior, strength, balance, coordination, and ADL's. The patient requires therapy to correct these deficits prior to discharge. Patient is admitted to [facility] for comprehensive rehabilitation therapy, including physical, occupational and speech therapy.     Rehabilitation nursing monitors bowel and bladder control, educates on medication  administration, co-morbidities and monitors patient safety.    2.  Neurostimulants: None at this time but continue to assess daily for need to initiate should status change.    3.  DVT prophylaxis:  Patient is on Heparin for anticoagulation upon transfer. Encourage OOB. Monitor daily for signs and symptoms of DVT including but not limited to swelling and pain to prevent the development of DVT that may interfere with therapies.    4.  GI prophylaxis:  On prilosec to prevent gastritis/dyspepsia which may interfere with therapies.    5.  Pain: No issues with pain currently / Controlled with APAP/Oxycodone    6.  Nutrition/Dysphagia: Dietician monitors nutrient intake, recommend supplements prn and provide nutrition education to pt/family to promote optimal nutrition for wound healing/recovery.     7.  Bladder/bowel:  Start bowel and bladder program as described below, to prevent constipation, urinary retention (which may lead to UTI), and urinary incontinence (which will impact upon pt's functional independence).   - Post void bladder scans, I&O cath for PVRs >400  - up to commode after meal     8.  Skin/dermal ulcer prophylaxis: Monitor for new skin conditions with q.2 h. turns as required to prevent the development of skin breakdown.     9.  Cognition/Behavior: As needed psychologist provides adjustment counseling to illness and psychosocial barriers that may be potential barriers to rehabilitation.     10. Respiratory therapy: RT performs O2 management prn, breathing retraining, pulmonary hygiene and bronchospasm management prn to optimize participation in therapies.     Medical Co-Morbidities/Adverse Potential Affecting Function:   Hepatic encephalopathy - Patient with confusion on 11/27 with AMS and failure to thrive, thought to be hepatic encephalopathy vs autoimmune encephalitis. She was started on steroids and has been changed to prednisone 60 mg daily.  -PT and OT for mobility and ADLs. Per guidelines, 15  hours per week between PT, OT and/or SLP.  -Follow-up Neurology    HTN/sCHF - Patient off medications. Will monitor.     DM2 with hyperglycemia - Patient on SSI on transfer.     Anemia - Check AM CBC    Hyponatremia - Check AM CMP    CKD/Azotemia - Check AM CMP. Avoid nephrotoxic agents.     Hypophosphatemia - Check AM level    Severe protein-calorie malnutrition - Meets Aspen criteria for severe. Dietitian to consult    Depression - Patient on Remeron 15 mg QHS    Alcohol abuse/cirrhosis - Patient on Thimaine/folic acid. May need to restart Lactulose    Pain - Patient on PRN Tylenol/Oxycodone     Skin - Patient at risk for skin breakdown due to debility in areas including sacrum, achilles, elbows and head in addition to other sites. Nursing to assess skin daily.     GI Ppx - Patient on Prilosec for GERD prophylaxis. Patient on Senna-docusate for constipation prophylaxis.        DVT Ppx - Patient Heparin on transfer.    I personally performed a complete drug regimen review and no potential clinically significant medication issues were identified.     Goals/Expected Level of Function Based on Current Medical and Functional Status:  (may change based on patient's medical status and rate of impairment recovery)  Transfers:   Supervision  Mobility/Gait:   Supervision  ADL's:   Supervision  Cognition:  Supervision  Swallowing:  Regular    DISPOSITION: Discharge to pre-morbid independent living setting with the supportive care of patient's family.    ELOS: 10-17 days  ____________________________________    T. Juliocesar Bender MD/PhD  [facility] - Physical Medicine & Rehabilitation   [facility] - Brain Injury Medicine   ____________________________________    Pt was seen today for 75 min. Time spent included pre-admission screening, pre-admission review of vitals and laboratory values/tests, unit/floor time, face-to-face time with the patient including physical examination, care coordination, counseling of patient and/or family,  ordering medications/procedures/tests, discussion with other healthcare providers, and/or documentation in the electronic medical record.

## 2024-12-09 NOTE — DISCHARGE PLANNING
Member Name: Denisse Deja Abel   Member Number: [id number]   Reference Number: [id number]   Approved Services: Inpatient Rehab (Internal option)   Approved Service Dates: 12/09/2024 - 12/15/2024   Requesting Provider: [facility]   Requested Provider: [facility]

## 2024-12-09 NOTE — PROGRESS NOTES
Discharge instructions given and discussed, signed copy in chart. Pt verbalized understanding and all questions answered. New prescriptions discussed. Pt discharged to [facility] in stable condition escorted by GMT. Personal belongings with patient. IV removed and tolerated well. Tele box removed, monitor tech notified.

## 2024-12-10 ENCOUNTER — APPOINTMENT (OUTPATIENT)
Dept: OCCUPATIONAL THERAPY | Facility: REHABILITATION | Age: 64
DRG: 056 | End: 2024-12-10
Attending: PHYSICAL MEDICINE & REHABILITATION
Payer: MEDICARE

## 2024-12-10 ENCOUNTER — APPOINTMENT (OUTPATIENT)
Dept: SPEECH THERAPY | Facility: REHABILITATION | Age: 64
DRG: 056 | End: 2024-12-10
Attending: PHYSICAL MEDICINE & REHABILITATION
Payer: MEDICARE

## 2024-12-10 ENCOUNTER — APPOINTMENT (OUTPATIENT)
Dept: PHYSICAL THERAPY | Facility: REHABILITATION | Age: 64
DRG: 056 | End: 2024-12-10
Attending: PHYSICAL MEDICINE & REHABILITATION
Payer: MEDICARE

## 2024-12-10 PROBLEM — D69.6 THROMBOCYTOPENIA (HCC): Status: ACTIVE | Noted: 2024-12-10

## 2024-12-10 PROBLEM — R94.6 ABNORMAL THYROID FUNCTION TEST: Status: ACTIVE | Noted: 2024-12-10

## 2024-12-10 PROBLEM — D64.9 ANEMIA: Status: ACTIVE | Noted: 2024-12-10

## 2024-12-10 PROBLEM — G40.109 PARTIAL SEIZURE DISORDER (HCC): Status: RESOLVED | Noted: 2024-12-03 | Resolved: 2024-12-10

## 2024-12-10 PROBLEM — R79.89 AZOTEMIA: Status: ACTIVE | Noted: 2024-12-10

## 2024-12-10 PROBLEM — D64.9 ANEMIA: Status: RESOLVED | Noted: 2024-01-22 | Resolved: 2024-12-10

## 2024-12-10 PROBLEM — R53.1 GENERALIZED WEAKNESS: Status: ACTIVE | Noted: 2024-12-10

## 2024-12-10 PROBLEM — R73.9 HYPERGLYCEMIA: Status: ACTIVE | Noted: 2024-12-10

## 2024-12-10 LAB
25(OH)D3 SERPL-MCNC: 30 NG/ML (ref 30–100)
ALBUMIN SERPL BCP-MCNC: 3.4 G/DL (ref 3.2–4.9)
ALBUMIN/GLOB SERPL: 1.3 G/DL
ALP SERPL-CCNC: 102 U/L (ref 30–99)
ALT SERPL-CCNC: 18 U/L (ref 2–50)
ANION GAP SERPL CALC-SCNC: 8 MMOL/L (ref 7–16)
ANISOCYTOSIS BLD QL SMEAR: ABNORMAL
AST SERPL-CCNC: 18 U/L (ref 12–45)
BASOPHILS # BLD AUTO: 0 % (ref 0–1.8)
BASOPHILS # BLD: 0 K/UL (ref 0–0.12)
BILIRUB SERPL-MCNC: 0.3 MG/DL (ref 0.1–1.5)
BUN SERPL-MCNC: 46 MG/DL (ref 8–22)
CALCIUM ALBUM COR SERPL-MCNC: 10.2 MG/DL (ref 8.5–10.5)
CALCIUM SERPL-MCNC: 9.7 MG/DL (ref 8.5–10.5)
CHLORIDE SERPL-SCNC: 102 MMOL/L (ref 96–112)
CO2 SERPL-SCNC: 22 MMOL/L (ref 20–33)
CREAT SERPL-MCNC: 0.96 MG/DL (ref 0.5–1.4)
EOSINOPHIL # BLD AUTO: 0 K/UL (ref 0–0.51)
EOSINOPHIL NFR BLD: 0 % (ref 0–6.9)
ERYTHROCYTE [DISTWIDTH] IN BLOOD BY AUTOMATED COUNT: 65.3 FL (ref 35.9–50)
EST. AVERAGE GLUCOSE BLD GHB EST-MCNC: 103 MG/DL
GFR SERPLBLD CREATININE-BSD FMLA CKD-EPI: 65 ML/MIN/1.73 M 2
GLOBULIN SER CALC-MCNC: 2.6 G/DL (ref 1.9–3.5)
GLUCOSE BLD STRIP.AUTO-MCNC: 115 MG/DL (ref 65–99)
GLUCOSE BLD STRIP.AUTO-MCNC: 126 MG/DL (ref 65–99)
GLUCOSE BLD STRIP.AUTO-MCNC: 158 MG/DL (ref 65–99)
GLUCOSE BLD STRIP.AUTO-MCNC: 217 MG/DL (ref 65–99)
GLUCOSE SERPL-MCNC: 136 MG/DL (ref 65–99)
HBA1C MFR BLD: 5.2 % (ref 4–5.6)
HCT VFR BLD AUTO: 23 % (ref 37–47)
HGB BLD-MCNC: 7.4 G/DL (ref 12–16)
LYMPHOCYTES # BLD AUTO: 0.29 K/UL (ref 1–4.8)
LYMPHOCYTES NFR BLD: 6.1 % (ref 22–41)
MANUAL DIFF BLD: NORMAL
MCH RBC QN AUTO: 25.7 PG (ref 27–33)
MCHC RBC AUTO-ENTMCNC: 32.2 G/DL (ref 32.2–35.5)
MCV RBC AUTO: 79.9 FL (ref 81.4–97.8)
MICROCYTES BLD QL SMEAR: ABNORMAL
MONOCYTES # BLD AUTO: 0.04 K/UL (ref 0–0.85)
MONOCYTES NFR BLD AUTO: 0.9 % (ref 0–13.4)
MORPHOLOGY BLD-IMP: NORMAL
NEUTROPHILS # BLD AUTO: 4.46 K/UL (ref 1.82–7.42)
NEUTROPHILS NFR BLD: 93 % (ref 44–72)
NRBC # BLD AUTO: 0 K/UL
NRBC BLD-RTO: 0 /100 WBC (ref 0–0.2)
OVALOCYTES BLD QL SMEAR: NORMAL
PHOSPHATE SERPL-MCNC: 2 MG/DL (ref 2.5–4.5)
PLATELET # BLD AUTO: 133 K/UL (ref 164–446)
PLATELET BLD QL SMEAR: NORMAL
PMV BLD AUTO: 9.9 FL (ref 9–12.9)
POIKILOCYTOSIS BLD QL SMEAR: NORMAL
POTASSIUM SERPL-SCNC: 4.6 MMOL/L (ref 3.6–5.5)
PROT SERPL-MCNC: 6 G/DL (ref 6–8.2)
RBC # BLD AUTO: 2.88 M/UL (ref 4.2–5.4)
RBC BLD AUTO: PRESENT
SCHISTOCYTES BLD QL SMEAR: NORMAL
SODIUM SERPL-SCNC: 132 MMOL/L (ref 135–145)
T4 FREE SERPL-MCNC: 0.9 NG/DL (ref 0.93–1.7)
TSH SERPL DL<=0.005 MIU/L-ACNC: 0.17 UIU/ML (ref 0.38–5.33)
WBC # BLD AUTO: 4.8 K/UL (ref 4.8–10.8)

## 2024-12-10 PROCEDURE — 82962 GLUCOSE BLOOD TEST: CPT | Mod: 91

## 2024-12-10 PROCEDURE — 36415 COLL VENOUS BLD VENIPUNCTURE: CPT

## 2024-12-10 PROCEDURE — 700105 HCHG RX REV CODE 258: Performed by: HOSPITALIST

## 2024-12-10 PROCEDURE — 80053 COMPREHEN METABOLIC PANEL: CPT

## 2024-12-10 PROCEDURE — 770010 HCHG ROOM/CARE - REHAB SEMI PRIVAT*

## 2024-12-10 PROCEDURE — 84443 ASSAY THYROID STIM HORMONE: CPT

## 2024-12-10 PROCEDURE — 97166 OT EVAL MOD COMPLEX 45 MIN: CPT

## 2024-12-10 PROCEDURE — 99233 SBSQ HOSP IP/OBS HIGH 50: CPT | Performed by: PHYSICAL MEDICINE & REHABILITATION

## 2024-12-10 PROCEDURE — 92610 EVALUATE SWALLOWING FUNCTION: CPT

## 2024-12-10 PROCEDURE — 82306 VITAMIN D 25 HYDROXY: CPT

## 2024-12-10 PROCEDURE — 85027 COMPLETE CBC AUTOMATED: CPT

## 2024-12-10 PROCEDURE — 84439 ASSAY OF FREE THYROXINE: CPT

## 2024-12-10 PROCEDURE — 700102 HCHG RX REV CODE 250 W/ 637 OVERRIDE(OP): Performed by: PHYSICAL MEDICINE & REHABILITATION

## 2024-12-10 PROCEDURE — 97530 THERAPEUTIC ACTIVITIES: CPT

## 2024-12-10 PROCEDURE — 83036 HEMOGLOBIN GLYCOSYLATED A1C: CPT

## 2024-12-10 PROCEDURE — A9270 NON-COVERED ITEM OR SERVICE: HCPCS | Performed by: PHYSICAL MEDICINE & REHABILITATION

## 2024-12-10 PROCEDURE — 700111 HCHG RX REV CODE 636 W/ 250 OVERRIDE (IP): Performed by: PHYSICAL MEDICINE & REHABILITATION

## 2024-12-10 PROCEDURE — 700102 HCHG RX REV CODE 250 W/ 637 OVERRIDE(OP): Performed by: HOSPITALIST

## 2024-12-10 PROCEDURE — 97161 PT EVAL LOW COMPLEX 20 MIN: CPT

## 2024-12-10 PROCEDURE — 99221 1ST HOSP IP/OBS SF/LOW 40: CPT | Mod: AI | Performed by: HOSPITALIST

## 2024-12-10 PROCEDURE — 97535 SELF CARE MNGMENT TRAINING: CPT

## 2024-12-10 PROCEDURE — A9270 NON-COVERED ITEM OR SERVICE: HCPCS | Performed by: HOSPITALIST

## 2024-12-10 PROCEDURE — 84100 ASSAY OF PHOSPHORUS: CPT

## 2024-12-10 PROCEDURE — 92523 SPEECH SOUND LANG COMPREHEN: CPT

## 2024-12-10 PROCEDURE — 85007 BL SMEAR W/DIFF WBC COUNT: CPT

## 2024-12-10 RX ORDER — FERROUS GLUCONATE 324(38)MG
324 TABLET ORAL
Status: DISCONTINUED | OUTPATIENT
Start: 2024-12-11 | End: 2024-12-17 | Stop reason: HOSPADM

## 2024-12-10 RX ORDER — SODIUM CHLORIDE 9 MG/ML
INJECTION, SOLUTION INTRAVENOUS ONCE
Status: COMPLETED | OUTPATIENT
Start: 2024-12-10 | End: 2024-12-10

## 2024-12-10 RX ORDER — PYRIDOXINE HCL (VITAMIN B6) 50 MG
50 TABLET ORAL DAILY
Status: DISCONTINUED | OUTPATIENT
Start: 2024-12-10 | End: 2024-12-17 | Stop reason: HOSPADM

## 2024-12-10 RX ORDER — HYDROCODONE BITARTRATE AND ACETAMINOPHEN 5; 325 MG/1; MG/1
1-2 TABLET ORAL
Status: DISCONTINUED | OUTPATIENT
Start: 2024-12-10 | End: 2024-12-17 | Stop reason: HOSPADM

## 2024-12-10 RX ORDER — DEXTROSE MONOHYDRATE 25 G/50ML
25 INJECTION, SOLUTION INTRAVENOUS
Status: DISCONTINUED | OUTPATIENT
Start: 2024-12-10 | End: 2024-12-15

## 2024-12-10 RX ORDER — INSULIN LISPRO 100 [IU]/ML
2-12 INJECTION, SOLUTION INTRAVENOUS; SUBCUTANEOUS
Status: DISCONTINUED | OUTPATIENT
Start: 2024-12-10 | End: 2024-12-15

## 2024-12-10 RX ADMIN — DIBASIC SODIUM PHOSPHATE, MONOBASIC POTASSIUM PHOSPHATE AND MONOBASIC SODIUM PHOSPHATE 250 MG: 852; 155; 130 TABLET ORAL at 20:50

## 2024-12-10 RX ADMIN — PREDNISONE 60 MG: 20 TABLET ORAL at 09:38

## 2024-12-10 RX ADMIN — HEPARIN SODIUM 5000 UNITS: 5000 INJECTION, SOLUTION INTRAVENOUS; SUBCUTANEOUS at 05:41

## 2024-12-10 RX ADMIN — INSULIN LISPRO 1 UNITS: 100 INJECTION, SOLUTION INTRAVENOUS; SUBCUTANEOUS at 11:38

## 2024-12-10 RX ADMIN — HYDROCODONE BITARTRATE AND ACETAMINOPHEN 1 TABLET: 5; 325 TABLET ORAL at 20:51

## 2024-12-10 RX ADMIN — Medication 1 TABLET: at 09:38

## 2024-12-10 RX ADMIN — HEPARIN SODIUM 5000 UNITS: 5000 INJECTION, SOLUTION INTRAVENOUS; SUBCUTANEOUS at 14:13

## 2024-12-10 RX ADMIN — ATOVAQUONE 1500 MG: 750 SUSPENSION ORAL at 09:38

## 2024-12-10 RX ADMIN — Medication 100 MG: at 09:38

## 2024-12-10 RX ADMIN — SENNOSIDES AND DOCUSATE SODIUM 2 TABLET: 50; 8.6 TABLET ORAL at 20:52

## 2024-12-10 RX ADMIN — HEPARIN SODIUM 5000 UNITS: 5000 INJECTION, SOLUTION INTRAVENOUS; SUBCUTANEOUS at 20:50

## 2024-12-10 RX ADMIN — FOLIC ACID 1 MG: 1 TABLET ORAL at 09:38

## 2024-12-10 RX ADMIN — OMEPRAZOLE 20 MG: 20 CAPSULE, DELAYED RELEASE ORAL at 09:38

## 2024-12-10 RX ADMIN — INSULIN LISPRO 4 UNITS: 100 INJECTION, SOLUTION INTRAVENOUS; SUBCUTANEOUS at 21:05

## 2024-12-10 RX ADMIN — SODIUM CHLORIDE: 9 INJECTION, SOLUTION INTRAVENOUS at 14:35

## 2024-12-10 RX ADMIN — DIBASIC SODIUM PHOSPHATE, MONOBASIC POTASSIUM PHOSPHATE AND MONOBASIC SODIUM PHOSPHATE 250 MG: 852; 155; 130 TABLET ORAL at 14:13

## 2024-12-10 RX ADMIN — THERA TABS 1 TABLET: TAB at 14:13

## 2024-12-10 RX ADMIN — MIRTAZAPINE 15 MG: 15 TABLET, ORALLY DISINTEGRATING ORAL at 20:50

## 2024-12-10 RX ADMIN — HYDROCODONE BITARTRATE AND ACETAMINOPHEN 1 TABLET: 5; 325 TABLET ORAL at 14:18

## 2024-12-10 RX ADMIN — ATOVAQUONE 1500 MG: 750 SUSPENSION ORAL at 20:50

## 2024-12-10 RX ADMIN — PYRIDOXINE HCL TAB 50 MG 50 MG: 50 TAB at 14:13

## 2024-12-10 ASSESSMENT — ENCOUNTER SYMPTOMS
NAUSEA: 0
EYES NEGATIVE: 1
PALPITATIONS: 0
BRUISES/BLEEDS EASILY: 0
CHILLS: 0
FEVER: 0
MUSCULOSKELETAL NEGATIVE: 1
SHORTNESS OF BREATH: 0
POLYDIPSIA: 0
ABDOMINAL PAIN: 0
WEAKNESS: 1
VOMITING: 0
COUGH: 0

## 2024-12-10 ASSESSMENT — ACTIVITIES OF DAILY LIVING (ADL): TOILETING: INDEPENDENT

## 2024-12-10 ASSESSMENT — BRIEF INTERVIEW FOR MENTAL STATUS (BIMS)
WHAT DAY OF THE WEEK IS IT: CORRECT
INITIAL REPETITION OF BED BLUE SOCK - FIRST ATTEMPT: 3
WHAT YEAR IS IT: CORRECT
ASKED TO RECALL SOCK: YES, NO CUE REQUIRED
ASKED TO RECALL BED: NO, COULD NOT RECALL
BIMS SUMMARY SCORE: 13
ASKED TO RECALL BLUE: YES, NO CUE REQUIRED
WHAT MONTH IS IT: ACCURATE WITHIN 5 DAYS

## 2024-12-10 ASSESSMENT — GAIT ASSESSMENTS
GAIT LEVEL OF ASSIST: MINIMAL ASSIST
DEVIATION: DECREASED BASE OF SUPPORT;SHUFFLED GAIT;DECREASED HEEL STRIKE;DECREASED TOE OFF;BRADYKINETIC
DISTANCE (FEET): 150
ASSISTIVE DEVICE: NONE;OTHER (COMMENTS)

## 2024-12-10 ASSESSMENT — PAIN DESCRIPTION - PAIN TYPE
TYPE: ACUTE PAIN
TYPE: ACUTE PAIN

## 2024-12-10 NOTE — PROGRESS NOTES
"  Physical Medicine & Rehabilitation Progress Note    Encounter Date: 12/10/2024    Chief Complaint: Decreased mobility    Interval Events (Subjective):  Patient sitting up in room. SBP elevated this AM. Discussed about anemia and thrombocytopenia. Discussed about hospitalist consult. Thyroid levels are abnormal.  She reports she normally uses Hydrocodone instead of Oxycodone.     Objective:  VITAL SIGNS: BP (!) 141/65   Pulse 74   Temp 36.3 °C (97.4 °F) (Oral)   Resp 18   Ht 1.707 m (5' 7.2\")   Wt 56.5 kg (124 lb 9 oz)   SpO2 98%   BMI 19.39 kg/m²   Gen: NAD  Psych: Mood and affect appropriate  CV: RRR, 0 edema  Resp: CTAB, no upper airway sounds  Abd: NTND  Neuro: AOx3, following commands    Laboratory Values:  Recent Results (from the past 72 hours)   POCT glucose device results    Collection Time: 12/07/24  5:59 PM   Result Value Ref Range    POC Glucose, Blood 243 (H) 65 - 99 mg/dL   POCT glucose device results    Collection Time: 12/07/24  8:43 PM   Result Value Ref Range    POC Glucose, Blood 269 (H) 65 - 99 mg/dL   PROLACTIN    Collection Time: 12/08/24  2:30 AM   Result Value Ref Range    Prolactin 13.70 2.80 - 26.00 ng/mL   Renal Function Panel    Collection Time: 12/08/24  2:30 AM   Result Value Ref Range    Sodium 132 (L) 135 - 145 mmol/L    Potassium 4.9 3.6 - 5.5 mmol/L    Chloride 102 96 - 112 mmol/L    Co2 20 20 - 33 mmol/L    Glucose 112 (H) 65 - 99 mg/dL    Creatinine 1.01 0.50 - 1.40 mg/dL    Bun 44 (H) 8 - 22 mg/dL    Calcium 9.7 8.4 - 10.2 mg/dL    Correct Calcium 10.4 8.5 - 10.5 mg/dL    Phosphorus 1.7 (L) 2.5 - 4.5 mg/dL    Albumin 3.1 (L) 3.2 - 4.9 g/dL   MAGNESIUM    Collection Time: 12/08/24  2:30 AM   Result Value Ref Range    Magnesium 1.8 1.5 - 2.5 mg/dL   CBC WITHOUT DIFFERENTIAL    Collection Time: 12/08/24  2:30 AM   Result Value Ref Range    WBC 6.3 4.8 - 10.8 K/uL    RBC 3.09 (L) 4.20 - 5.40 M/uL    Hemoglobin 8.0 (L) 12.0 - 16.0 g/dL    Hematocrit 25.2 (L) 37.0 - 47.0 %    " MCV 81.6 81.4 - 97.8 fL    MCH 25.9 (L) 27.0 - 33.0 pg    MCHC 31.7 (L) 32.2 - 35.5 g/dL    RDW 65.5 (H) 35.9 - 50.0 fL    Platelet Count 169 164 - 446 K/uL    MPV 10.9 9.0 - 12.9 fL   ESTIMATED GFR    Collection Time: 12/08/24  2:30 AM   Result Value Ref Range    GFR (CKD-EPI) 61 >60 mL/min/1.73 m 2   POCT glucose device results    Collection Time: 12/08/24  5:41 AM   Result Value Ref Range    POC Glucose, Blood 140 (H) 65 - 99 mg/dL   POCT glucose device results    Collection Time: 12/08/24 10:54 AM   Result Value Ref Range    POC Glucose, Blood 164 (H) 65 - 99 mg/dL   POCT glucose device results    Collection Time: 12/08/24  4:38 PM   Result Value Ref Range    POC Glucose, Blood 269 (H) 65 - 99 mg/dL   POCT glucose device results    Collection Time: 12/08/24  8:57 PM   Result Value Ref Range    POC Glucose, Blood 245 (H) 65 - 99 mg/dL   Renal Function Panel    Collection Time: 12/09/24  1:38 AM   Result Value Ref Range    Sodium 131 (L) 135 - 145 mmol/L    Potassium 4.7 3.6 - 5.5 mmol/L    Chloride 101 96 - 112 mmol/L    Co2 20 20 - 33 mmol/L    Glucose 133 (H) 65 - 99 mg/dL    Creatinine 1.06 0.50 - 1.40 mg/dL    Bun 42 (H) 8 - 22 mg/dL    Calcium 9.7 8.4 - 10.2 mg/dL    Correct Calcium 10.4 8.5 - 10.5 mg/dL    Phosphorus 2.1 (L) 2.5 - 4.5 mg/dL    Albumin 3.1 (L) 3.2 - 4.9 g/dL   MAGNESIUM    Collection Time: 12/09/24  1:38 AM   Result Value Ref Range    Magnesium 2.3 1.5 - 2.5 mg/dL   CBC WITHOUT DIFFERENTIAL    Collection Time: 12/09/24  1:38 AM   Result Value Ref Range    WBC 5.8 4.8 - 10.8 K/uL    RBC 3.07 (L) 4.20 - 5.40 M/uL    Hemoglobin 7.9 (L) 12.0 - 16.0 g/dL    Hematocrit 24.7 (L) 37.0 - 47.0 %    MCV 80.5 (L) 81.4 - 97.8 fL    MCH 25.7 (L) 27.0 - 33.0 pg    MCHC 32.0 (L) 32.2 - 35.5 g/dL    RDW 65.2 (H) 35.9 - 50.0 fL    Platelet Count 166 164 - 446 K/uL    MPV 10.9 9.0 - 12.9 fL   proBrain Natriuretic Peptide, NT    Collection Time: 12/09/24  1:38 AM   Result Value Ref Range    NT-proBNP 3055 (H) 0  - 125 pg/mL   ESTIMATED GFR    Collection Time: 12/09/24  1:38 AM   Result Value Ref Range    GFR (CKD-EPI) 58 (A) >60 mL/min/1.73 m 2   POCT glucose device results    Collection Time: 12/09/24  6:35 AM   Result Value Ref Range    POC Glucose, Blood 144 (H) 65 - 99 mg/dL   POCT glucose device results    Collection Time: 12/09/24 11:32 AM   Result Value Ref Range    POC Glucose, Blood 197 (H) 65 - 99 mg/dL   POCT glucose device results    Collection Time: 12/09/24  5:26 PM   Result Value Ref Range    POC Glucose, Blood 176 (H) 65 - 99 mg/dL   POCT glucose device results    Collection Time: 12/09/24  9:06 PM   Result Value Ref Range    POC Glucose, Blood 187 (H) 65 - 99 mg/dL   CBC with Differential    Collection Time: 12/10/24  6:05 AM   Result Value Ref Range    WBC 4.8 4.8 - 10.8 K/uL    RBC 2.88 (L) 4.20 - 5.40 M/uL    Hemoglobin 7.4 (L) 12.0 - 16.0 g/dL    Hematocrit 23.0 (L) 37.0 - 47.0 %    MCV 79.9 (L) 81.4 - 97.8 fL    MCH 25.7 (L) 27.0 - 33.0 pg    MCHC 32.2 32.2 - 35.5 g/dL    RDW 65.3 (H) 35.9 - 50.0 fL    Platelet Count 133 (L) 164 - 446 K/uL    MPV 9.9 9.0 - 12.9 fL    Neutrophils-Polys 93.00 (H) 44.00 - 72.00 %    Lymphocytes 6.10 (L) 22.00 - 41.00 %    Monocytes 0.90 0.00 - 13.40 %    Eosinophils 0.00 0.00 - 6.90 %    Basophils 0.00 0.00 - 1.80 %    Nucleated RBC 0.00 0.00 - 0.20 /100 WBC    Neutrophils (Absolute) 4.46 1.82 - 7.42 K/uL    Lymphs (Absolute) 0.29 (L) 1.00 - 4.80 K/uL    Monos (Absolute) 0.04 0.00 - 0.85 K/uL    Eos (Absolute) 0.00 0.00 - 0.51 K/uL    Baso (Absolute) 0.00 0.00 - 0.12 K/uL    NRBC (Absolute) 0.00 K/uL    Anisocytosis 1+     Microcytosis 2+ (A)    Comp Metabolic Panel (CMP)    Collection Time: 12/10/24  6:05 AM   Result Value Ref Range    Sodium 132 (L) 135 - 145 mmol/L    Potassium 4.6 3.6 - 5.5 mmol/L    Chloride 102 96 - 112 mmol/L    Co2 22 20 - 33 mmol/L    Anion Gap 8.0 7.0 - 16.0    Glucose 136 (H) 65 - 99 mg/dL    Bun 46 (H) 8 - 22 mg/dL    Creatinine 0.96 0.50 -  1.40 mg/dL    Calcium 9.7 8.5 - 10.5 mg/dL    Correct Calcium 10.2 8.5 - 10.5 mg/dL    AST(SGOT) 18 12 - 45 U/L    ALT(SGPT) 18 2 - 50 U/L    Alkaline Phosphatase 102 (H) 30 - 99 U/L    Total Bilirubin 0.3 0.1 - 1.5 mg/dL    Albumin 3.4 3.2 - 4.9 g/dL    Total Protein 6.0 6.0 - 8.2 g/dL    Globulin 2.6 1.9 - 3.5 g/dL    A-G Ratio 1.3 g/dL   HEMOGLOBIN A1C    Collection Time: 12/10/24  6:05 AM   Result Value Ref Range    Glycohemoglobin 5.2 4.0 - 5.6 %    Est Avg Glucose 103 mg/dL   TSH with Reflex to FT4    Collection Time: 12/10/24  6:05 AM   Result Value Ref Range    TSH 0.168 (L) 0.380 - 5.330 uIU/mL   Vitamin D, 25-hydroxy (blood)    Collection Time: 12/10/24  6:05 AM   Result Value Ref Range    25-Hydroxy   Vitamin D 25 30 30 - 100 ng/mL   PHOSPHORUS    Collection Time: 12/10/24  6:05 AM   Result Value Ref Range    Phosphorus 2.0 (L) 2.5 - 4.5 mg/dL   ESTIMATED GFR    Collection Time: 12/10/24  6:05 AM   Result Value Ref Range    GFR (CKD-EPI) 65 >60 mL/min/1.73 m 2   FREE THYROXINE    Collection Time: 12/10/24  6:05 AM   Result Value Ref Range    Free T-4 0.90 (L) 0.93 - 1.70 ng/dL   DIFFERENTIAL MANUAL    Collection Time: 12/10/24  6:05 AM   Result Value Ref Range    Manual Diff Status PERFORMED    PERIPHERAL SMEAR REVIEW    Collection Time: 12/10/24  6:05 AM   Result Value Ref Range    Peripheral Smear Review see below    PLATELET ESTIMATE    Collection Time: 12/10/24  6:05 AM   Result Value Ref Range    Plt Estimation Decreased    MORPHOLOGY    Collection Time: 12/10/24  6:05 AM   Result Value Ref Range    RBC Morphology Present     Poikilocytosis 3+     Ovalocytes 1+     Schistocytes 1+    POCT glucose device results    Collection Time: 12/10/24  8:11 AM   Result Value Ref Range    POC Glucose, Blood 115 (H) 65 - 99 mg/dL   POCT glucose device results    Collection Time: 12/10/24 11:35 AM   Result Value Ref Range    POC Glucose, Blood 158 (H) 65 - 99 mg/dL       Medications:  Scheduled Medications    Medication Dose Frequency    Pharmacy Consult Request  1 Each PHARMACY TO DOSE    senna-docusate  2 Tablet Q EVENING    omeprazole  20 mg DAILY    atovaquone  1,500 mg BID    folic acid  1 mg DAILY    heparin  5,000 Units Q8HRS    insulin lispro  1-6 Units 4X/DAY ACHS    mirtazapine  15 mg QHS    predniSONE  60 mg DAILY    thiamine  100 mg DAILY    vitamin B + C complex  1 Tablet DAILY     PRN medications: hydrALAZINE, acetaminophen, senna-docusate **AND** polyethylene glycol/lytes, docusate sodium, magnesium hydroxide, carboxymethylcellulose, benzocaine-menthol, mag hydrox-al hydrox-simeth, ondansetron **OR** ondansetron, traZODone, sodium chloride, oxyCODONE immediate-release **OR** oxyCODONE immediate-release, insulin lispro **AND** POC blood glucose manual result **AND** NOTIFY MD and PharmD **AND** Administer 20 grams of glucose (approximately 8 ounces of fruit juice) every 15 minutes PRN FSBG less than 70 mg/dL **AND** dextrose bolus    Diet:  Current Diet Order   Procedures    Diet Order Diet: Regular       Medical Decision Making and Plan:  Hepatic encephalopathy - Patient with confusion on 11/27 with AMS and failure to thrive, thought to be hepatic encephalopathy vs autoimmune encephalitis. She was started on steroids and has been changed to prednisone 60 mg daily.  -PT and OT for mobility and ADLs. Per guidelines, 15 hours per week between PT, OT and/or SLP.  -Follow-up Neurology     HTN/sCHF - Patient off medications. Will monitor.      DM2 with hyperglycemia - Patient on SSI on transfer.      Anemia - Check AM CBC - 7.4, consult hospitalist    Thrombocytopenia - 133 on admission, consult hospitalist     Hyponatremia - Check AM CMP - 132, consult hospitalist     CKD/Azotemia - Check AM CMP. Avoid nephrotoxic agents. BUN 46 on admission     Hypophosphatemia - Check AM level - 2.0      Severe protein-calorie malnutrition - Meets Aspen criteria for severe. Dietitian to consult     Depression - Patient on  Remeron 15 mg QHS     Alcohol abuse/cirrhosis - Patient on Thimaine/folic acid. May need to restart Lactulose     Pain - Patient on PRN Tylenol/Oxycodone. Switch to Norco     Skin - Patient at risk for skin breakdown due to debility in areas including sacrum, achilles, elbows and head in addition to other sites. Nursing to assess skin daily.      GI Ppx - Patient on Prilosec for GERD prophylaxis. Patient on Senna-docusate for constipation prophylaxis.      DVT Ppx - Patient Heparin on transfer.  ____________________________________    T. Juliocesar Bender MD/PhD  [facility] - Physical Medicine & Rehabilitation   [facility] - Brain Injury Medicine   ____________________________________    Total time:  50 minutes. Time spent included pre-rounding review of vitals and tests, unit/floor time, face-to-face time with the patient including physical examination, care coordination, counseling of patient and/or family, ordering medications/procedures/tests, discussion with CM, PT, OT, SLP and/or other healthcare providers, and documentation in the electronic medical record. Topics discussed included worsening anemia, low Phosph, thrombocytopenia, and consult hospitalist. Patient's case was discussed face to face with Hospitalist on [facility] floor.

## 2024-12-10 NOTE — CONSULTS
HOSPITAL MEDICINE CONSULTATION    Requesting Physician:  Dr. Bender    Reason for Consult:  General Medical Management    History of Present Illness:  The patient is a 64-year-old  female with past medical history significant for alcoholic cirrhosis and aortic stenosis.  She was admitted to [facility] on 11/27/24 for altered mental status.  She was treated for hepatic encephalopathy and urinary tract infection.  The patient remained very weak and debilitated, so extensive work up ensued.  She was ultimately placed on intravenous steroids for a presumptive neurological diagnosis with improvement in her symptoms.  Due to her ongoing functional debility, the patient was transferred to [facility] on 12/9/24.  Hospital Medicine consultation is requested to assist in the general management of this medically complex patient.  She is noted to have anemia, thrombocytopenia, azotemia, hypernatremia, hyperglycemia, hypophosphatemia, and abnormal thyroid studies    Review of Systems:  Review of Systems   Constitutional:  Negative for chills and fever.   HENT: Negative.     Eyes: Negative.    Respiratory:  Negative for cough and shortness of breath.    Cardiovascular:  Negative for chest pain and palpitations.   Gastrointestinal:  Negative for abdominal pain, nausea and vomiting.   Musculoskeletal: Negative.    Skin:  Negative for itching and rash.   Neurological:  Positive for weakness.   Endo/Heme/Allergies:  Negative for polydipsia. Does not bruise/bleed easily.   All other systems reviewed and are negative.      Allergies:  Allergies   Allergen Reactions    Sulfa Drugs Rash     Pt reports that it was a childhood allergie received body rash   Per [facility] pharmacy (11/27/2024)       Medications:    Current Facility-Administered Medications:     HYDROcodone-acetaminophen (Norco) 5-325 MG per tablet 1-2 Tablet, 1-2 Tablet, Oral, Q3HRS KACYN, Cristiano Bender M.D., 1  Tablet at 12/10/24 1418    pyridoxine (Vitamin B-6) tablet 50 mg, 50 mg, Oral, DAILY, Cristiano Bender M.D., 50 mg at 12/10/24 1413    multivitamin tablet 1 Tablet, 1 Tablet, Oral, DAILY, Katarina Lucero M.D., 1 Tablet at 12/10/24 1413    insulin lispro (HumaLOG,AdmeLOG) subcutaneous injection, 2-12 Units, Subcutaneous, 4X/DAY ACHS **AND** POC blood glucose manual result, , , Q AC AND BEDTIME(S) **AND** NOTIFY MD and PharmD, , , Once **AND** Administer 20 grams of glucose (approximately 8 ounces of fruit juice) every 15 minutes PRN FSBG less than 70 mg/dL, , , PRN **AND** dextrose 50% (D50W) injection 25 g, 25 g, Intravenous, Q15 MIN PRN, Katarina Lucero M.D.    [START ON 12/11/2024] ferrous gluconate (Fergon) tablet 324 mg, 324 mg, Oral, QDAY with Breakfast, Katarina Lucero M.D.    phosphorus (K-Phos-Neutral) per tablet 250 mg, 250 mg, Oral, TID, Katarina Lucero M.D., 250 mg at 12/10/24 1413    hydrALAZINE (Apresoline) tablet 25 mg, 25 mg, Oral, Q8HRS PRN, Cristiano Bender M.D.    acetaminophen (Tylenol) tablet 650 mg, 650 mg, Oral, Q4HRS PRN, Cristiano Bender M.D.    senna-docusate (Pericolace Or Senokot S) 8.6-50 MG per tablet 2 Tablet, 2 Tablet, Oral, Q EVENING, 2 Tablet at 12/09/24 2110 **AND** polyethylene glycol/lytes (Miralax) Packet 1 Packet, 1 Packet, Oral, QDAY PRN, Cristiano Bender M.D.    docusate sodium (Enemeez) enema 283 mg, 283 mg, Rectal, QDAY PRN, Cristiano Bender M.D.    magnesium hydroxide (Milk Of Magnesia) suspension 30 mL, 30 mL, Oral, QDAY PRN, Cristiano Bender M.D.    omeprazole (PriLOSEC) capsule 20 mg, 20 mg, Oral, DAILY, Cristiano Bender M.D., 20 mg at 12/10/24 0938    carboxymethylcellulose (Refresh Tears) 0.5 % ophthalmic drops 1 Drop, 1 Drop, Both Eyes, PRN, Cristiano Bender M.D.    benzocaine-menthol (Cepacol) lozenge 1 Lozenge, 1 Lozenge, Mouth/Throat, Q2HRS PRN, Cristiano Bender M.D.    mag hydrox-al hydrox-simeth (Maalox Plus Es Or  Mylanta Ds) suspension 20 mL, 20 mL, Oral, Q2HRS PRN, Cristiano Bender M.D.    ondansetron (Zofran ODT) dispertab 4 mg, 4 mg, Oral, 4X/DAY PRN **OR** ondansetron (Zofran) syringe/vial injection 4 mg, 4 mg, Intramuscular, 4X/DAY PRN, Cristiano Bender M.D.    traZODone (Desyrel) tablet 50 mg, 50 mg, Oral, QHS PRN, Cristiano Bender M.D.    sodium chloride (Ocean) 0.65 % nasal spray 2 Spray, 2 Spray, Nasal, PRN, Cristiano Bender M.D.    atovaquone (Mepron) 750 MG/5ML suspension 1,500 mg, 1,500 mg, Oral, BID, Cristiano Bender M.D., 1,500 mg at 12/10/24 0938    folic acid (Folvite) tablet 1 mg, 1 mg, Oral, DAILY, Cristiano Bender M.D., 1 mg at 12/10/24 0938    heparin injection 5,000 Units, 5,000 Units, Subcutaneous, Q8HRS, Cristiano Bender M.D., 5,000 Units at 12/10/24 1413    mirtazapine (Remeron) orally disintegrating tab 15 mg, 15 mg, Oral, QHS, Cristiaon Bender M.D., 15 mg at 12/09/24 2110    predniSONE (Deltasone) tablet 60 mg, 60 mg, Oral, DAILY, Cristiano Bender M.D., 60 mg at 12/10/24 0938    thiamine (Vitamin B-1) tablet 100 mg, 100 mg, Oral, DAILY, Cristiano Bender M.D., 100 mg at 12/10/24 0938    vitamin B + C complex tablet 1 Tablet, 1 Tablet, Oral, DAILY, Cristiano Bender M.D., 1 Tablet at 12/10/24 0938    Past Medical/Surgical History:  Past Medical History:   Diagnosis Date    Acute combined systolic and diastolic heart failure (HCC) 01/22/2024    Anemia due to chronic kidney disease     Heart valve disease     Nonrheumatic aortic valve stenosis    HLD (hyperlipidemia)     HTN (hypertension)     Hypertension     Hypotension     Liver cirrhosis (HCC)     Obesity     Renal disorder     CKD (chronic kidney disease) stage 4, GFR 15-29 ml/min (HCC)     Past Surgical History:   Procedure Laterality Date    IL LAP,DIAGNOSTIC ABDOMEN  2/23/2024    Procedure: LAPAROSCOPY, KIM'S PATCH;  Surgeon: Neel Amezcua M.D.;  Location:  SURGERY [facility];  Service: General    TRIGGER FINGER RELEASE Right 3/31/2017    Procedure: TRIGGER FINGER RELEASE;  Surgeon: Jimmy Atkins M.D.;  Location: SURGERY [facility];  Service:     GANGLION EXCISION Right 3/31/2017    Procedure: GANGLION EXCISION - HAND CYST;  Surgeon: Jimmy Atkins M.D.;  Location: SURGERY [facility];  Service:     PELVISCOPY  6/5/08    Performed by NATALIYA ARCEO at SURGERY SAME DAY [facility]    OVARIAN CYSTECTOMY  6/5/08    Performed by NATALIYA ARCEO at SURGERY SAME DAY [facility]    CYSTOSCOPY  6/5/08    Performed by NATALIYA ARCEO at SURGERY SAME DAY [facility]    HYSTERECTOMY LAPAROSCOPY  2003    KNEE ARTHROSCOPY         Social History:  Social History     Socioeconomic History    Marital status: Single     Spouse name: Not on file    Number of children: Not on file    Years of education: Not on file    Highest education level: Master's degree (e.g., MA, MS, Monica, MEd, MSW, SUMAYA)   Occupational History    Not on file   Tobacco Use    Smoking status: Never    Smokeless tobacco: Never   Vaping Use    Vaping status: Never Used   Substance and Sexual Activity    Alcohol use: Not Currently     Alcohol/week: 3.6 oz     Comment: denies    Drug use: Not Currently     Types: Marijuana     Comment: denies    Sexual activity: Not Currently   Other Topics Concern    Not on file   Social History Narrative    Not on file     Social Drivers of Health     Financial Resource Strain: Low Risk  (1/11/2024)    Overall Financial Resource Strain (CARDIA)     Difficulty of Paying Living Expenses: Not hard at all   Food Insecurity: No Food Insecurity (12/9/2024)    Hunger Vital Sign     Worried About Running Out of Food in the Last Year: Never true     Ran Out of Food in the Last Year: Never true   Transportation Needs: No Transportation Needs (12/10/2024)    PRAPARE - Transportation     Lack of Transportation (Medical): No     Lack of Transportation (Non-Medical): No    Physical Activity: Sufficiently Active (1/11/2024)    Exercise Vital Sign     Days of Exercise per Week: 5 days     Minutes of Exercise per Session: 30 min   Stress: Stress Concern Present (1/11/2024)    [employer] - Occupational Stress Questionnaire     Feeling of Stress : Very much   Social Connections: Socially Isolated (1/11/2024)    Social Connection and Isolation Panel [NHANES]     Frequency of Communication with Friends and Family: More than three times a week     Frequency of Social Gatherings with Friends and Family: Once a week     Attends Yarsanism Services: Never     Active Member of Clubs or Organizations: No     Attends Club or Organization Meetings: Never     Marital Status:    Intimate Partner Violence: Not At Risk (12/9/2024)    Humiliation, Afraid, Rape, and Kick questionnaire     Fear of Current or Ex-Partner: No     Emotionally Abused: No     Physically Abused: No     Sexually Abused: No   Housing Stability: Low Risk  (12/9/2024)    Housing Stability Vital Sign     Unable to Pay for Housing in the Last Year: No     Number of Times Moved in the Last Year: 0     Homeless in the Last Year: No       Family History:  Family History   Problem Relation Age of Onset    Diabetes Mother     Heart Disease Mother     Stroke Mother     Diabetes Father        Physical Examination:   Vitals:    12/10/24 0500 12/10/24 0931 12/10/24 1000 12/10/24 1600   BP: 136/70 (!) 146/68 (!) 141/65 (!) 146/67   Pulse: 73 63 74 70   Resp: 16  18 18   Temp: 36.4 °C (97.6 °F)  36.3 °C (97.4 °F) 36.6 °C (97.8 °F)   TempSrc: Oral  Oral Oral   SpO2: 95% 99% 98% 98%   Weight:       Height:           Physical Exam  Vitals reviewed.   Constitutional:       General: She is not in acute distress.     Appearance: Normal appearance. She is not ill-appearing.   HENT:      Head: Normocephalic and atraumatic.      Right Ear: External ear normal.      Left Ear: External ear normal.      Nose: Nose  normal.      Mouth/Throat:      Pharynx: Oropharynx is clear.   Eyes:      General:         Right eye: No discharge.         Left eye: No discharge.      Extraocular Movements: Extraocular movements intact.      Conjunctiva/sclera: Conjunctivae normal.   Cardiovascular:      Rate and Rhythm: Normal rate and regular rhythm.      Heart sounds: Murmur heard.   Pulmonary:      Effort: Pulmonary effort is normal. No respiratory distress.      Breath sounds: Normal breath sounds. No wheezing.   Abdominal:      General: Bowel sounds are normal. There is no distension.      Palpations: Abdomen is soft.      Tenderness: There is no abdominal tenderness.   Musculoskeletal:      Cervical back: Normal range of motion and neck supple.      Right lower leg: No edema.      Left lower leg: No edema.   Skin:     General: Skin is warm and dry.   Neurological:      Mental Status: She is alert and oriented to person, place, and time.         Laboratory Data:  Recent Labs     12/08/24  0230 12/09/24  0138 12/10/24  0605   WBC 6.3 5.8 4.8   RBC 3.09* 3.07* 2.88*   HEMOGLOBIN 8.0* 7.9* 7.4*   HEMATOCRIT 25.2* 24.7* 23.0*   MCV 81.6 80.5* 79.9*   MCH 25.9* 25.7* 25.7*   MCHC 31.7* 32.0* 32.2   RDW 65.5* 65.2* 65.3*   PLATELETCT 169 166 133*   MPV 10.9 10.9 9.9     Recent Labs     12/08/24  0230 12/09/24  0138 12/10/24  0605   SODIUM 132* 131* 132*   POTASSIUM 4.9 4.7 4.6   CHLORIDE 102 101 102   CO2 20 20 22   GLUCOSE 112* 133* 136*   BUN 44* 42* 46*   CREATININE 1.01 1.06 0.96   CALCIUM 9.7 9.7 9.7       Imaging:  No orders to display       Impressions/Recommendations:  Heart murmur  Echo 8/9/24 normal EF, mod to severe AS  Needs Cardiology F/U    Hyperglycemia  Likely 2/2 steroids  Denies premorbid dx of DM w/ HbA1c 5.2  Increase SSI  Monitor serum glucose trends    Liver cirrhosis (HCC)  2/2 EtOH abuse  S/P recent Hepatic Encephalopathy at [facility]  On Folate and Thiamine, add MVT  Followed by GI as outpt    Hypophosphatemia  Start  supplement  Check F/U labs in AM    Abnormal thyroid function test  TSH 0.168 and FT4 0.90  TFT's may be skewed in the acute setting  Repeat labs in 1 wk    Thrombocytopenia (HCC)  May be 2/2 cirrhosis  Follow PLT    Azotemia  Start NS, which will also help w/ Na+ levels  Follow renal function    Anemia  Has microcytic indices  Fe 21, start supplements  Follow H/H    Generalized weakness  S/P empiric IV steroids w/ improvement  Now on Prednisone  On Atovaquone for PJP proph given sulfa allergy  Needs Neurology F/U    Full Code    Thank you for the opportunity to assist in this patient's care.  We will continue to follow along with you.

## 2024-12-10 NOTE — THERAPY
Occupational Therapy   Initial Evaluation     Patient Name: Denisse Abel  Age:  64 y.o., Sex:  female  Medical Record #: [id number]  Today's Date: 12/10/2024     Subjective    Pt in bed upon arrival,agreeable to participate in OT.      Objective     12/10/24 0701   OT Charge Group   Charges Yes   OT Self Care / ADL (Units) 1   OT Evaluation OT Evaluation Mod   OT Total Time Spent   OT Individual Total Time Spent (Mins) 60   Prior Living Situation   Prior Services Home-Independent   Housing / Facility 1 Story House  ([facility])   Steps Into Home 0   Steps In Home 0   Bathroom Set up Walk In Shower;Shower Glass Doors  (swinging door; pt reports she owns shower chair)   Equipment Owned Front-Wheel Walker;Tub / Shower Seat   Lives with - Patient's Self Care Capacity Alone and Able to Care For Self  (lives w/ dog (trinidad doodle))   Prior Level of ADL Function   Self Feeding Independent   Grooming / Hygiene Independent   Bathing Independent   Dressing Independent   Toileting Independent   Comments no AD/AE   Prior Level of IADL Function   Medication Management Independent   Laundry Independent   Kitchen Mobility Independent   Finances Independent   Home Management Independent  (monthly )   Shopping Independent   Prior Level Of Mobility Independent Without Device in Community;Independent Without Device in Home   Driving / Transportation Driving Independent   Occupation (Pre-Hospital Vocational) Retired Due To Age  (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; previously worked in organ donation field)   Leisure Interests Pets;Crafts  (Enjoys cooking, hiking and spending time w/ friends)   Prior Functioning: Everyday Activities   Self Care Independent   Indoor Mobility (Ambulation) Independent   Stairs Independent   Functional Cognition Independent   Prior Device Use None of the given options   Vitals   O2 Delivery Device None - Room Air   Cognition    Level of Consciousness Alert   Cognitive Pattern Assessment   Cognitive Pattern  "Assessment Used BIMS   Brief Interview for Mental Status (BIMS)   Repetition of Three Words (First Attempt) 3   Temporal Orientation: Year Correct   Temporal Orientation: Month Accurate within 5 days   Temporal Orientation: Day Correct   Recall: \"Sock\" Yes, no cue required   Recall: \"Blue\" Yes, no cue required   Recall: \"Bed\" No, could not recall   BIMS Summary Score 13   Confusion Assessment Method (CAM)   Is there evidence of an acute change in mental status from the patient's baseline? Yes  (slowed processing)   Inattention Behavior not present   Disorganized thinking Behavior not present   Altered level of consciousness Behavior not present   Vision Screen   Vision Not tested  (wears readers; denies any visual changes)   Active ROM Upper Body   Active ROM Upper Body  WDL   Dominant Hand Right   Strength Upper Body   Comments Generalized weakness bilaterally   Sensation Upper Body   Upper Extremity Sensation  Not Tested   Comments Pt did not report any numbness, tingling or sensory changes in UEs   Upper Body Muscle Tone   Upper Body Muscle Tone  WDL   Balance Assessment   Sitting Balance (Static) Good   Sitting Balance (Dynamic) Fair +   Bed Mobility    Supine to Sit Standby Assist   Sit to Stand Contact Guard Assist   Scooting Standby Assist   Coordination Upper Body   Coordination WDL   Comments Appears grossly intact based on functional observation   Hearing, Speech, and Vision   Ability to Hear Adequate   Ability to See in Adequate Light Adequate   Expression of Ideas and Wants Some difficulty   Understanding Verbal and Non-Verbal Content Usually understands   Functional Level of Assist   Eating Stand by Assist  (set-up)   Grooming Standby Assist  (set-up for grooming task @ w/c level (brushed and blow dried hair))   Bathing Minimal Assist  (SBA for seated bathing tasks (on fold down shower bench); min A for standing bathing tasks)   Upper Body Dressing Stand by Assist  (set-up and increased time to don long " sleeve shirt and sweatshirt while seated in w/c)   Lower Body Dressing Minimal Assist  (for standing balance and safety)   Toileting Minimal Assist   Bed, Chair, Wheelchair Transfer Minimal Assist   Toilet Transfers Minimal Assist   Tub / Shower Transfers Minimal Assist   Eating   Assistance Needed Set-up / clean-up   CARE Score - Eating 5   Oral Hygiene   Assistance Needed Set-up / clean-up   CARE Score - Oral Hygiene 5   Shower/Bathe Self   Assistance Needed Physical assistance   Physical Assistance Level 25% or less  (for standing bathing tasks)   CARE Score - Shower/Bathe Self 3   Upper Body Dressing   Assistance Needed Set-up / clean-up   CARE Score - Upper Body Dressing 5   Lower Body Dressing   Assistance Needed Physical assistance   Physical Assistance Level 25% or less   CARE Score - Lower Body Dressing 3   Putting On/Taking Off Footwear   Assistance Needed Set-up / clean-up   CARE Score - Putting On/Taking Off Footwear 5   Toileting Hygiene   Assistance Needed Physical assistance   Physical Assistance Level 25% or less   CARE Score - Toileting Hygiene 3   Toilet Transfer   Assistance Needed Physical assistance   Physical Assistance Level 25% or less   CARE Score - Toilet Transfer 3   Problem List   Problem List Decreased Active Daily Living Skills;Decreased Homemaking Skills;Decreased Upper Extremity Strength Right;Decreased Upper Extremity Strength Left;Decreased Functional Mobility;Decreased Activity Tolerance;Safety Awareness Deficits / Cognition;Impaired Postural Control / Balance   Precautions   Precautions Fall Risk   Current Discharge Plan   Current Discharge Plan Return to Prior Living Situation   Benefit    Therapy Benefit Patient Would Benefit from Inpatient Rehab Occupational Therapy to Maximize [place] with ADLs, IADLs and Functional Mobility.   Interdisciplinary Plan of Care Collaboration   IDT Collaboration with  Speech Therapist;Physical Therapist   Patient Position at End of Therapy  Seated;Self Releasing Lap Belt Applied   Collaboration Comments Txfrd care to ST; [facility]   OT DME Recommendations   Bathroom Equipment   (Pt has shower chair)   Strengths & Barriers   Strengths Able to follow instructions;Alert and oriented;Willingly participates in therapeutic activities;Pleasant and cooperative;Motivated for self care and independence;Independent prior level of function   Barriers Decreased endurance;Fatigue;Generalized weakness;Impaired activity tolerance;Impaired balance;Limited mobility       Assessment  Patient is 64 y.o. female with a diagnosis of alcoholic encephalopathy. Per H&P, pt w/ past medical history significant for CHF, HTN, HLD, cirrhosis, and CKD admitted to [facility] on 11/27/2024 w/ AMS.     At baseline, pt reports she was independent with all ADLs, IADLs and functional mobility without AD/AE. Pt recently retired and enjoys hiking, crafting and spending time w/ friends and her dog. At time of OT eval, pt presents below functional baseline w/ primary barriers being fatigue, decreased endurance, decreased balance and generalized weakness. Pt also required mildly increased time for processing. She will benefit from daily skilled OT to address above impairments to maximize functional safety/independence prior to DC home.     Plan  Recommend Occupational Therapy  minutes per day 5-7 days per week for 7-10 days for the following treatments:  OT Self Care/ADL, OT Cognitive Skill Dev, OT Manual Ther Technique, OT Neuro Re-Ed/Balance, OT Sensory Int Techniques, OT Therapeutic Activity, OT Evaluation, and OT Therapeutic Exercise.    Passport items to be completed:  Perform bathroom transfers, complete dressing, complete feeding, get ready for the day, prepare a simple meal, participate in household tasks, adapt home for safety needs, demonstrate home exercise program, complete caregiver training     Goals:  Long term and short term goals have been discussed with patient and  they are in agreement.    Occupational Therapy Goals (Active)       Problem: OT Long Term Goals       Dates: Start:  12/10/24         Goal: LTG-By discharge, patient will complete basic self care tasks w/ supervision to mod I        Dates: Start:  12/10/24            Goal: LTG-By discharge, patient will perform bathroom transfers w/ mod I w/ LRAD        Dates: Start:  12/10/24            Goal: LTG-By discharge, patient will complete basic home management w/ supervision to mod I        Dates: Start:  12/10/24               Problem: Pt will complete LB dressing @ SBA level        Dates: Start:  12/10/24

## 2024-12-10 NOTE — FLOWSHEET NOTE
12/09/24 1708   Events/Summary/Plan   Events/Summary/Plan RT assessment   Vital Signs   Pulse 74   Respiration 16   Pulse Oximetry 99 %   $ Pulse Oximetry (Spot Check) Yes   Respiratory Assessment   Respiratory Pattern Within Normal Limits   Level of Consciousness Alert   Chest Exam   Work Of Breathing / Effort Within Normal Limits   Breath Sounds   RUL Breath Sounds Clear   RML Breath Sounds Clear   RLL Breath Sounds Clear   PHUONG Breath Sounds Clear   LLL Breath Sounds Clear   Oxygen   O2 (LPM) 0   O2 Delivery Device None - Room Air   Smoking History   Have you ever smoked Never

## 2024-12-10 NOTE — PROGRESS NOTES
NURSING DAILY NOTE    Name: Denisse Abel   Date of Admission: 12/9/2024   Admitting Diagnosis: Alcoholic encephalopathy (HCC)  Attending Physician: HAMIDA LARA M.D.  Allergies: Sulfa drugs    Safety  Patient Assist     Patient Precautions     Precaution Comments     Bed Transfer Status     Toilet Transfer Status      Assistive Devices     Oxygen  None - Room Air  Diet/Therapeutic Dining  Current Diet Order   Procedures    Diet Order Diet: Regular     Pill Administration  whole  Agitated Behavioral Scale     ABS Level of Severity       Fall Risk  Has the patient had a fall this admission?      Selam Alonso Fall Risk Scoring  10, LOW RISK  Fall Risk Safety Measures  bed alarm and chair alarm    Vitals  Temperature: 36.3 °C (97.4 °F)  Temp src: Oral  Pulse: 74  Respiration: 16  Blood Pressure : (!) 145/79  Blood Pressure MAP (Calculated): 101 MM HG  BP Location: Left, Upper Arm  Patient BP Position: Supine     Oxygen  Pulse Oximetry: 99 %  O2 (LPM): 0  O2 Delivery Device: None - Room Air    Bowel and Bladder  Last Bowel Movement  12/08/24 (per report)  Stool Type     Bowel Device     Continent  Bladder: Continent void   Bowel: Continent movement  Bladder Function     Genitourinary Assessment        Skin  John Score   20  Sensory Interventions      Moisture Interventions         Pain  Pain Rating Scale     Pain Location     Pain Location Orientation     Pain Interventions        ADLs    Bathing      Linen Change      Personal Hygiene     Chlorhexidine Bath      Oral Care     Teeth/Dentures     Shave     Nutrition Percentage Eaten     Environmental Precautions     Patient Turns/Positioning  Patient turns self independently side to side without assistance, to offload sacral area  Patient Turns Assistance/Tolerance     Bed Positions     Head of Bed Elevated         Psychosocial/Neurologic Assessment  Psychosocial Assessment     Neurologic  Assessment  Level of Consciousness: Alert       Cardio/Pulmonary Assessment  Edema      Respiratory Breath Sounds  RUL Breath Sounds: Clear  RML Breath Sounds: Clear  RLL Breath Sounds: Clear  PHUONG Breath Sounds: Clear  LLL Breath Sounds: Clear  Cardiac Assessment

## 2024-12-10 NOTE — IDT DISCHARGE PLANNING
CASE MANAGEMENT INITIAL ASSESSMENT    Admit Date:  12/9/2024     Cm reviewed the medical chart and will meet with the patient to discuss role of case management / discharge planning / team conference.       Diagnosis: Acute encephalopathy [G93.40]    Co-morbidities:   Patient Active Problem List    Diagnosis Date Noted    Acute encephalopathy 12/09/2024    Hypophosphatemia 12/08/2024    Hypomagnesemia 12/08/2024    Extreme restricting type anorexia nervosa 12/06/2024    Elevated CSF protein 12/05/2024    Partial seizure disorder (HCC) 12/03/2024    Cystitis 12/03/2024    Alcoholic encephalopathy (HCC) 11/27/2024    CKD (chronic kidney disease) stage 4, GFR 15-29 ml/min (HCC) 11/14/2024    ACC/AHA stage C systolic heart failure (HCC) 09/17/2024    Hypotension 09/17/2024    Nonrheumatic moderate aortic valve stenosis 09/17/2024    Liver disease 07/30/2024    Gastroesophageal reflux disease without esophagitis 07/30/2024    Other ascites 07/30/2024    Alcohol dependence with alcohol-induced disorder (HCC) 07/25/2024    Esophageal varices without bleeding (HCC) 07/25/2024    Portal hypertensive gastropathy (HCC) 07/25/2024    Severe protein-calorie malnutrition (HCC) 02/25/2024    Hospital discharge follow-up 02/02/2024    Anemia due to chronic kidney disease 02/02/2024    Anxiety and depression 02/02/2024    Chronic combined systolic and diastolic heart failure (HCC) 01/22/2024    Hyponatremia 01/22/2024    Hypokalemia 01/22/2024    Troponin I above reference range 01/22/2024    Elevated brain natriuretic peptide (BNP) level 01/22/2024    Severe anemia requiring blood transfusions anemia 01/22/2024    Liver cirrhosis (HCC) 01/22/2024    Stage 3b chronic kidney disease 01/12/2024    Heart murmur 01/12/2024    Recurrent major depressive disorder (HCC) 01/12/2024    Lumbar spondylosis 03/15/2023    Lumbar radiculopathy 10/10/2022    Acute stress reaction 07/20/2021    Transaminitis 01/30/2020    Alkaline phosphatase  elevation 01/30/2020    Ganglion of right hand 03/31/2017    Low back pain 06/07/2016    Lumbosacral radiculopathy 06/07/2016    HTN (hypertension), benign 11/05/2009     Prior Living Situation:  Housing / Facility: 1 Story House ([facility])  Lives with - Patient's Self Care Capacity: Alone and Able to Care For Self (lives w/ dog (trinidad doodle))    Prior Level of Function:  Medication Management: Independent  Finances: Independent  Home Management: Independent  Shopping: Independent  Prior Level Of Mobility: Independent Without Device in Community, Independent Without Device in Home    Support Systems:  Primary : Nica Daly         Previous Services Utilized:   Equipment Owned: Front-Wheel Walker, Tub / Shower Seat  Prior Services: Home-Independent    Other Information:        Primary Payor Source: [employer], alf Plus  Primary Care Practitioner : Jenna Gillette    Patient / Family Goal:  Patient / Family Goal: Return home.    Plan:  1. Continue to follow patient through hospitalization and provide discharge planning in collaboration with patient, family, physicians and ancillary services.     2. Utilize community resources to ensure a safe discharge.

## 2024-12-10 NOTE — THERAPY
Recreational Therapy   Initial Evaluation     Patient Name: Denisse Abel  Age:  64 y.o., Sex:  female  Medical Record #: [id number]  Today's Date: 12/10/2024     Subjective    Patient was willing to meet for unscheduled session and participate in the assessment interview.     Objective       12/10/24 1101   Procedural Tracking   Procedural Tracking Community Re-Integration;Leisure Skills Awareness   Treatment Time   Total Time Spent (mins) 30   Leisure History   Leisure Interests Pets;Other (Comments)  (Enjoys interior design)   Pre-Morbid Leisure Lifestyle Individual;Occasionally Active   Prior Living Arrangements   Lives with - Patient's Self Care Capacity Alone and Able to Care For Self   Steps Into Home 1   Steps In Home 0   Ambulation Independent   Assistive Devices Used None   Driving / Transportation Driving Independent   Functional Ability Status - Cognitive   Attention Span Remains on Task   Comprehension Follows Three Step Commands   Judgment Able to Make Independent Decisions   Functional Ability Status - Emotional    Affect Appropriate   Mood Appropriate   Behavior Cooperative;Appropriate   Leisure Competence Measure   Leisure Awareness Independent   Leisure Attitude Independent   Leisure Skills Independent   Cultural / Social Behaviors Independent   Interpersonal Skills Independent   Community Integration Skills Independent   Social Contact Independent   Community Participation Independent   Clinical Impression   Clinical Impression Impaired Cognitive Leisure Functioning   Current Discharge Plan   Current Discharge Plan Return to Prior Living Situation   Benefit    Benefit Patient would Benefit from Inpatient Recreational Therapy to Maximize Independent Leisure Functioning    Interdisciplinary Plan of Care Collaboration   Patient Position at End of Therapy Seated;Call Light within Reach   Strengths & Barriers   Strengths Able to follow instructions;Independent prior level of function;Pleasant and  cooperative;Willingly participates in therapeutic activities   Barriers Generalized weakness;Impaired functional cognition         Assessment  Patient is 64 y.o. female with a diagnosis of Brain Dysfunction: Non-Traumatic.  Per H&P, patient has a past medical history significant for CHF, HTN, HLD, cirrhosis, and CKD admitted to [facility] on 11/27/2024  8:29 AM with AMS.  She reportedly has a history of alcohol abuse and was found disheveled and confused.  Her librium was discontinued.  She was started on IV potassium for hypokalemia and was started on lactulose for liver cirrhosis. She was diagnosed with alcoholic encephalppathy. EEG was negative, she was trialed on Keppra but that has since been discontinued. She has since improved in function after steroids and per hospitalist service should have slow taper off of steroids.     Patient reports that her friend found her in an altered state consciousness. She reported that they are not sure what is wrong with her. 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 When asked what she does for enjoyment she said spending time outside with her dog. Patient also enjoys interior design. She said that she used to hike and play tennis but has back and knee problems so she is unable to do those. Discussed other activities for getting out in nature and getting movement. Patient  reports that she has friends with dogs that she sees and goes to lunch. Patient said she would like to be more active. When asked what she does to cope, she said spending time with her dog, call a friend, get out in nature or cry. Patient also said she enjoys playing board games but does not have anyone to play with. She could not identify any cognitive activities that she does regularly and was open to exploring some new activities she can do at home to keep her mind stimulated.     Plan  Recommend Recreational  Therapy 30-60 minutes per day  2-3  days per week for 2 weeks for the following treatments:  Leisure Skills Development and Cognitive Skills Training    Passport items to be completed:  Verbalize two positive leisure activities, discuss returning to work, hobbies, community groups or volunteer activities, explore community resources     Goals:  Long term and short term goals have been discussed with patient and they are in agreement.    Recreation Therapy Problems (Active)       Problem: Recreation Therapy       Dates: Start:  12/10/24         Goal: STG-Within one week, patient will try at least 2 activities she can do to stimulate her mind.        Dates: Start:  12/10/24            Goal: LTG-By discharge, patient will demonstrate leisure problem solving by sharing on two activities they would like to participate in post discharge  that are cognitively stimulating and any perceived barriers to their participation.        Dates: Start:  12/10/24

## 2024-12-10 NOTE — CARE PLAN
Problem: Bladder / Voiding  Goal: Patient will establish and maintain regular urinary output  Note: Pt is continent of bladder voiding adequate amount of urine.PVR 15.Denies any discomfort or dysuria, afebrile.Will continue to monitor.     Problem: Bowel Elimination  Goal: Patient will participate in bowel management program  Note: Scheduled senna given at hs.Continent of bowel.LBM 12/9.Will continue to monitor.

## 2024-12-10 NOTE — FLOWSHEET NOTE
12/09/24 1709   Protocol Assessment   Initial Assessment Yes   Patient History   Pulmonary Diagnosis none   Procedures Relevant to Respiratory Status none   Home O2 No   Nocturnal CPAP No   Home Treatments/Frequency No   Sleep Apnea Screening   Have you had a sleep study? No   Have you been diagnosed with sleep apnea? No   S - Have you been told that you SNORE? 0   T - Are you often TIRED during the day? 0   O - Do you know if you stop breathing or has anyone witnessed you stop breathing while you were asleep? (OBSTRUCTION) 0   P - Do you have high blood PRESSURE or on medication to control high blood pressure? 0   B - Is your Body Mass Index greater than 35? (BMI) 0   A - Are you 50 years old or older? (AGE) 1   N - Are you a male with a NECK circumference greater than 17 inches, or a female with a neck circumference greater than 16 inches? 0   G - Are you male? (GENDER) 0   Stop Bang Total Score 1   COPD Risk Screening   Do you have a history of COPD? No   COPD Population Screener   During the past 4 weeks, how much did you feel short of breath? 0   Do you ever cough up any mucus or phlegm? 0   In the past 12 months, you do less than you used to because of your breathing problems 0   Have you smoked at least 100 cigarettes in your entire life? 0   How old are you? 2   COPD Screening Score 2   COPD Coordinator Not Recommended Yes

## 2024-12-10 NOTE — PROGRESS NOTES
NURSING DAILY NOTE    Name: Denisse Abel   Date of Admission: 12/9/2024   Admitting Diagnosis: Alcoholic encephalopathy (HCC)  Attending Physician: HAMIDA LARA M.D.  Allergies: Sulfa drugs    Safety  Patient Assist     Patient Precautions     Precaution Comments     Bed Transfer Status     Toilet Transfer Status      Assistive Devices  Wheelchair  Oxygen  None - Room Air  Diet/Therapeutic Dining  Current Diet Order   Procedures    Diet Order Diet: Regular     Pill Administration  whole  Agitated Behavioral Scale     ABS Level of Severity       Fall Risk  Has the patient had a fall this admission?   No  Selam Alonso Fall Risk Scoring  10, LOW RISK  Fall Risk Safety Measures  bed alarm and chair alarm    Vitals  Temperature: 36.3 °C (97.4 °F)  Temp src: Oral  Pulse: 74  Respiration: 16  Blood Pressure : (!) 145/79  Blood Pressure MAP (Calculated): 101 MM HG  BP Location: Left, Upper Arm  Patient BP Position: Supine     Oxygen  Pulse Oximetry: 99 %  O2 (LPM): 0  O2 Delivery Device: None - Room Air    Bowel and Bladder  Last Bowel Movement  12/09/24  Stool Type  Type 5: Soft blob with clear cut edges (passed easily)  Bowel Device  Bathroom  Continent  Bladder: Continent void   Bowel: Continent movement  Bladder Function  Urine Void (mL):  (300)  Urine Color: Unable To Evaluate  Genitourinary Assessment   Urine Color: Unable To Evaluate  Bladder Scan: Post Void  $ Bladder Scan Results (mL): 15    Skin  John Score   20  Sensory Interventions      Moisture Interventions         Pain  Pain Rating Scale     Pain Location     Pain Location Orientation     Pain Interventions   Repositioned, Rest    ADLs    Bathing      Linen Change      Personal Hygiene     Chlorhexidine Bath      Oral Care  Brushed Teeth  Teeth/Dentures     Shave     Nutrition Percentage Eaten     Environmental Precautions     Patient Turns/Positioning  Patient turns self  independently side to side without assistance, to offload sacral area  Patient Turns Assistance/Tolerance     Bed Positions     Head of Bed Elevated         Psychosocial/Neurologic Assessment  Psychosocial Assessment  Psychosocial (WDL):  Within Defined Limits  Neurologic Assessment  Level of Consciousness: Alert  EENT (WDL):  WDL Except    Cardio/Pulmonary Assessment  Edema      Respiratory Breath Sounds  RUL Breath Sounds: Clear  RML Breath Sounds: Clear  RLL Breath Sounds: Clear  PHUONG Breath Sounds: Clear  LLL Breath Sounds: Clear  Cardiac Assessment

## 2024-12-10 NOTE — PROGRESS NOTES
Pt arrived at [facility] from [facility] via GMT. Dr Bender to follow. Initial assessment initiated. Pt oriented to room and facility routine and safety measures; pt education binder provided and discussed. Pt A/O x 4, continent of bowel and bladder. Min assist for transfers. All wounds photographed and documented; photos uploaded to . Pt denies pain or discomfort at this time. Pt positioned for comfort in bed. Call light within reach, safety measures in place. Pt viewed fall prevention video.     \

## 2024-12-10 NOTE — THERAPY
"Speech Language Pathology   Initial Assessment     Patient Name: Denisse Abel  AGE:  64 y.o., SEX:  female  Medical Record #: [id number]  Today's Date: 12/10/2024     Subjective    Per H&P \"Adapted from the PM&R Consult by Dr. Bender:   Patient is a 64 y.o. female with a past medical history significant for CHF, HTN, HLD, cirrhosis, and CKD admitted [place] [facility] [place] [place]27/2024  8:29 AM with AMS.  She reportedly has a history of alcohol abuse and was found disheveled and confused.  Her librium was discontinued.  She was started on IV potassium for hypokalemia and was started on lactulose for liver cirrhosis. She was diagnosed with alcoholic encephalppathy. EEG was negative, she was trialed on Keppra but that has since been discontinued. She has since improved in function after steroids and per hospitalist service should have slow taper off of steroids.      Patient tolerated transfer to [facility]. She reports she is doing well. She reports she is diffusely weak. She does not remember her early stay. She denies pain at rest. Denies SOB. She is very motivated to get home alone.\"      Objective       12/10/24 0801   Evaluation Charges   Charges Yes   SLP Speech Language Evaluation Speech Sound Language Comprehension   SLP Oral Pharyngeal Evaluation Oral Pharyngeal Evaluation   SLP Total Time Spent   SLP Individual Total Time Spent (Mins) 90   Precautions   Precautions Fall Risk   Prior Living Situation   Prior Services Home-Independent   Housing / Facility 1 Story House   Lives with - Patient's Self Care Capacity Alone and Able to Care For Self   Prior Level Of Function   Communication Within Functional Limits   Swallow Within Functional Limits   Dentition Intact   Dentures None   Hearing Within Functional Limits for Evaluation   Hearing Aid None   Vision Wears Corrective Lenses;Reading    Patient's Primary Language English   Occupation (Pre-Hospital Vocational) Retired Due To Age  (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; " previously worked in organ donation field)   Receptive Language / Auditory Comprehension   Receptive Language / Auditory Comprehension WDL   Expressive Language   Expressive Language (WDL) WDL   Reading Comprehension    Reading Comprehension (WDL) WDL   Written Language Expression   Written Language Expression (WDL) X   Dominant Hand Right   Legibility Minimal (4)   Cognition    Level of Consciousness Alert   Cognition - Detailed   Cognitive-Linguistic (WDL) X   Simple Attention Within Functional Limits (6-7)   Moderate Attention Supervision (5)   Complex Attention Minimal (4)   Orientation  Within Functional Limits (6-7)   Functional Memory Activities Within Functional Limits (6-7)   Simple Reasoning / Problem Solving Within Functional Limits (6-7)   Complex Reasoning  / Problem Solving Minimal (4)   Executive Functioning / Organization Minimal (4)   Clock Drawing Disorganization;Poor Planning;Impaired Hand Placement   Social / Pragmatic Communication   Social / Pragmatic Communication WDL   Tracheostomy   Tracheostomy No   Speech Mechanisms / Voice Production   Speech Mechanisms / Voice Production (WDL) WDL   Labial Function   Labial Function (WDL) WDL   Lingual Function   Lingual Function (WDL) WDL   Jaw Function   Jaw Function (WDL) WDL   Velar Function   Velar Function (WDL) WDL   Laryngeal Function   Laryngeal Function (WDL) WDL   Swallowing   Swallowing (WDL) WDL   Dysphagia Strategies / Recommendations   Strategies / Interventions Recommended (Yes / No) No   Barriers To Oral Feeding   Barriers To Oral Feeding None   Swallowing/Nutritional Status   Swallowing/Nutritional Status Regular food   Functional Level of Assist   Comprehension Modified Independent   Comprehension Description Glasses   Expression Independent   Social Interaction Independent   Problem Solving Supervision   Problem Solving Description Verbal cueing;Therapy schedule;Seat belt;Increased time;Bed/chair alarm   Memory Modified Independent    Memory Description Verbal cueing;Therapy schedule;Increased time;Bed/chair alarm   Outcome Measures   Outcome Measures Utilized SCCAN   SCCAN (Scales of Cognitive and Communicative Ability for Neurorehabilitation)   Oral Expression - Raw Score 19   Oral Expression - Scale Performance Score 100   Orientation - Raw Score 12   Orientation - Scale Performance Score 100   Memory - Raw Score 18   Memory - Scale Performance Score 95   Speech Comprehension - Raw Score 12   Speech Comprehension - Scale Performance Score 92   Reading Comprehension - Raw Score 11   Reading Comprehension - Scale Performance Score 92   Writing - Raw Score 7   Writing - Scale Performance Score 100   Attention - Raw Score 14   Attention - Scale Performance Score 88   Problem Solving - Raw Score 20   Problem Solving - Scale Performance Score 87   SCCAN Total Raw Score 88   SCCAN Degree of Severity Typical Functioning   Problem List   Problem List Executive Function Deficit   Current Discharge Plan   Current Discharge Plan Return to Prior Living Situation   Benefit   Therapy Benefit Patient would benefit from Inpatient Rehab Speech-Language Pathology to address above identified deficits.   Interdisciplinary Plan of Care Collaboration   IDT Collaboration with  Occupational Therapist;Nursing   Patient Position at End of Therapy Seated;Call Light within Reach;Tray Table within Reach;Chair Alarm On   Collaboration Comments CLOF, POC   Strengths & Barriers   Strengths Able to follow instructions;Alert and oriented;Effective communication skills;Good carryover of learning;Good insight into deficits/needs;Independent prior level of function;Motivated for self care and independence;Pleasant and cooperative;Willingly participates in therapeutic activities   Barriers Impaired functional cognition   Speech Language Pathologist Assigned   Assigned SLP / Treatment Time / Comments LC 30 cog only       Assessment    Patient is 64 y.o. female with a diagnosis of  alcoholic encephalopathy.  Additional factors influencing patient status/progress (ie: cognitive factors, co-morbidities, social support, etc): lives alone, difficulty with complex cognitive tasks, motivated to participate.      Clinical swallow evaluation completed.  Pt was admitted on a 7- Regular Texture diet and 0-Thin liquids and reported no difficulty with swallowing.  MBSS was completed on 12/5 showing no aspiration of any consistencies and her diet was advanced to 7- Regular Textures and 0-Thin liquids on 12/9.  Oral [facility] exam found all oral structures to be WFL's in terms of strength and ROM.  Pt consumed 7- Regular Textures and 0-Thin liquids during breakfast without any overt s/sx of aspiration.  Pt reported no difficulty swallowing pills whole.  Recommend pt remains on a 7- Regular Texture and 0-Thin liquid diet with whole medications.  No further ST recommended for dysphagia management.      Cognitive evaluation completed using the SCCAN.  Pt achieved an overall score of 88/94 indicating her cognition is typically functioning; however pt was noted to make several errors related to higher level planning and organization tasks.  Pt currently lives alone in [place] and was independent with IADL's.  She reported that she has noticed increased difficulty with memory and concentration since her hospitalization.  Pt would benefit from ST for 30 minutes a day, 5 days a week to address complex attention and executive functions.      Plan  Recommend Speech Therapy 30-60 minutes per day 5-6 days per week for 7-10 days for the following treatments:  SLP Speech Language Treatment, SLP Oral Pharyngeal Evaluation, SLP Self Care / ADL Training , SLP Cognitive Skill Development, and SLP Group Treatment.    Passport items to be completed:  Express basic needs, understand food/liquid recommendations, consistently follow swallow precautions, manage finances, manage medications, arrive to therapy appointments on time, complete  daily memory log entries, solve problems related to safety situations, review education related to hospitalization, complete caregiver training     Goals:  Long term and short term goals have been discussed with patient and they are in agreement.    Speech Therapy Problems (Active)       Problem: Problem Solving STGs       Dates: Start:  12/10/24         Goal: STG-Within one week, patient will complete medication management tasks with spv and 90% accuracy.         Dates: Start:  12/10/24            Goal: STG-Within one week, patient will complete executive functions with min A and 90% accuracy.         Dates: Start:  12/10/24               Problem: Speech/Swallowing LTGs       Dates: Start:  12/10/24         Goal: LTG-By discharge, patient will solve complex problems with modified independence.         Dates: Start:  12/10/24

## 2024-12-10 NOTE — DIETARY
Nutrition services:    Per [employer] text to this RD, SLP said pt would like either s/b or vanilla Ensure supplements TID with meals. Pt was also receiving supplements while at [facility].     RD following.

## 2024-12-10 NOTE — THERAPY
"Physical Therapy   Initial Evaluation     Patient Name: Denisse Abel  Age:  64 y.o., Sex:  female  Medical Record #: [id number]  Today's Date: 12/10/2024     Subjective    Pt in w/c at arrival, receiving meds, agreeable to PT tx. \"Forgive me I had only 3 hours of sleep last night. I was pretty anxious.\"      Objective       12/10/24 0931   PT Charge Group   PT Therapeutic Activities (Units) 2   PT Evaluation PT Evaluation Low   PT Total Time Spent   PT Individual Total Time Spent (Mins) 60   Prior Living Situation   Prior Services Home-Independent   Housing / Facility 1 Story House  ([facility])   Steps Into Home 1   Steps In Home 0   Equipment Owned Front-Wheel Walker   Lives with - Patient's Self Care Capacity Alone and Able to Care For Self  (lives with dog)   Prior Level of Functional Mobility   Bed Mobility Independent   Transfer Status Independent   Ambulation Independent   Distance Ambulation (Feet) 71140  (reports walking 2 miles daily c/ dog)   Stairs Independent   Prior Functioning: Everyday Activities   Self Care Independent   Indoor Mobility (Ambulation) Independent   Stairs Independent   Functional Cognition Independent   Prior Device Use None of the given options   Vitals   Pulse 63   Patient BP Position Sitting   Blood Pressure  (!) 146/68   Pulse Oximetry 99 %   O2 Delivery Device None - Room Air   Passive ROM Lower Body   Passive ROM Lower Body WDL   Active ROM Lower Body    Active ROM Lower Body  WDL   Strength Lower Body   Lower Body Strength  X   Gross Strength Generalized Weakness, Equal Bilaterally   Comments grossly 3+/5   Sensation Lower Body   Lower Extremity Sensation   WDL   Comments to light touch   Lower Body Muscle Tone   Lower Body Muscle Tone  WDL   Balance Assessment   Standing Balance (Static) Trace +   Standing Balance (Dynamic) Trace +   Weight Shift Sitting Good   Weight Shift Standing Fair   Bed Mobility    Supine to Sit Standby Assist   Sit to Supine Standby Assist   Sit to " Stand Minimal Assist   Scooting Standby Assist   Rolling Standby Assist   Neurological Concerns   Neurological Concerns No   Roll Left and Right   Assistance Needed Supervision;Verbal cues   CARE Score - Roll Left and Right 4   Sit to Lying   Assistance Needed Supervision;Incidental touching;Verbal cues   CARE Score - Sit to Lying 4   Lying to Sitting on Side of Bed   Assistance Needed Supervision;Verbal cues;Incidental touching   CARE Score - Lying to Sitting on Side of Bed 4   Sit to Stand   Assistance Needed Physical assistance   Physical Assistance Level 25% or less   CARE Score - Sit to Stand 3   Chair/Bed-to-Chair Transfer   Assistance Needed Physical assistance   Physical Assistance Level 25% or less   CARE Score - Chair/Bed-to-Chair Transfer 3   Car Transfer   Assistance Needed Physical assistance   Physical Assistance Level 25% or less   CARE Score - Car Transfer 3   Walk 10 Feet   Assistance Needed Physical assistance   Physical Assistance Level 26%-50%   CARE Score - Walk 10 Feet 3   Walk 50 Feet with Two Turns   Assistance Needed Physical assistance   Physical Assistance Level 26%-50%   CARE Score - Walk 50 Feet with Two Turns 3   Walk 150 Feet   Assistance Needed Physical assistance   Physical Assistance Level 26%-50%   CARE Score - Walk 150 Feet 3   Walking 10 Feet on Uneven Surfaces   Assistance Needed Incidental touching;Physical assistance   Physical Assistance Level 25% or less   CARE Score - Walking 10 Feet on Uneven Surfaces 3   1 Step (Curb)   Assistance Needed Incidental touching   CARE Score - 1 Step (Curb) 4   4 Steps   Assistance Needed Incidental touching   CARE Score - 4 Steps 4   12 Steps   Reason if not Attempted Safety concerns   CARE Score - 12 Steps 88   Picking Up Object   Assistance Needed Physical assistance   Physical Assistance Level 25% or less   CARE Score - Picking Up Object 3   Wheel 50 Feet with Two Turns   Assistance Needed Supervision   CARE Score - Wheel 50 Feet with Two  "Turns 4   Wheel 150 Feet   Reason if not Attempted Activity not applicable   CARE Score - Wheel 150 Feet 9   Gait Functional Level of Assist    Gait Level Of Assist Minimal Assist  (steadying, encouragement, increased time, assist to weightshift)   Assistive Device None;Other (Comments)  (HHA + gait belt)   Distance (Feet) 150   # of Times Distance was Traveled 3  (1 c/ HHA, x 2 c/ FWW; assist improved to SBA c/ FWW)   Deviation Decreased Base Of Support;Shuffled Gait;Decreased Heel Strike;Decreased Toe Off;Bradykinetic   Wheelchair Functional Level of Assist   Wheelchair Assist Supervised   Distance Wheelchair (Feet or Distance) 50   Wheelchair Description Extra time;Leg rest management   Stairs Functional Level of Assist   Level of Assist with Stairs Contact Guard Assist   # of Stairs Climbed 8  (2 x 4 @ 6\"H)   Stairs Description Verbal cueing;Hand rails;Extra time  (step to pattern 2/2 R knee pain)   Transfer Functional Level of Assist   Bed, Chair, Wheelchair Transfer Minimal Assist   Bed Chair Wheelchair Transfer Description Other (comment);Verbal cueing;Set-up of equipment  (stand step, steadying and controlled lowering assist)   Problem List    Problems Pain;Impaired Transfers;Impaired Ambulation;Impaired Balance;Decreased Activity Tolerance   Precautions   Precautions Fall Risk   Current Discharge Plan   Current Discharge Plan Return to Prior Living Situation   Interdisciplinary Plan of Care Collaboration   IDT Collaboration with  Occupational Therapist   Patient Position at End of Therapy Seated;Call Light within Reach;Tray Table within Reach;Phone within Reach   Collaboration Comments CLOF, POC   PT DME Recommendations   Wheelchair 16\" Width;Lightweight;Removable/Flip Back Armrests;Standard Leg Rests;Anti-tippers   Cushion Standard   Assistive Device Front Wheeled Walker  (has this; may be candidate for 4WW)   Physical Therapist Assigned   Assigned PT / Treatment Time / Comments Napoleon FARAH (Gayatri for now) "   Benefit   Therapy Benefit Patient Would Benefit from Inpatient Rehabilitation Physical Therapy to Maximize Functional [place] with ADLs, IADLs and Mobility.   Strengths & Barriers   Strengths Able to follow instructions;Adequate strength;Alert and oriented;Good insight into deficits/needs;Independent prior level of function;Motivated for self care and independence;Pleasant and cooperative;Willingly participates in therapeutic activities   Barriers Confused;Decreased endurance;Fatigue;Generalized weakness;Home accessibility;Impaired activity tolerance;Impaired sleep pattern;Impaired balance;Pain  (R knee pain/OA)     Time spent to trial ramp c/ FWW, level ground gait c/ FWW 2 x 150', 1 x 50'. Pt reports increased confidence c/ FWW vs no device.     Patient education: reviewed PT plan of care, rehab expectations, mobility needs and patient passport, orientation to unit, role of PT, fall risk and use of call light. Also discussed need to practice relevant home skills given limited home support, especially tasks related to caring for her dog (amos willard).     Assessment    The patient is a 64 y.o. female admitted to [facility] inpatient rehabilitation 12/9/2024 with severe functional debility after acute care admission for alcoholic encephalopathy.     Patient's PMH includes:  Past Medical History:   Diagnosis Date    Acute combined systolic and diastolic heart failure (HCC) 01/22/2024    Anemia due to chronic kidney disease     Heart valve disease     Nonrheumatic aortic valve stenosis    HLD (hyperlipidemia)     HTN (hypertension)     Hypertension     Hypotension     Liver cirrhosis (HCC)     Obesity     Renal disorder     CKD (chronic kidney disease) stage 4, GFR 15-29 ml/min ([facility])         PT evaluation performed today; functional performance at today's assessment is as above.     At baseline, the patient was independent with ADLs/IADLs, driving, and caring for her dog, which involved regular park walks  "of ~ 2 miles without a device. Patient lives alone with limited local support, as [place] the [place] [facility]. Pt does report she has end stage OA in right knee which limits her on stairs and uneven surfaces but did not regularly use a device prior to admission, though she has a FWW at home. She is currently performing bed mobility at SBA, transfers c/ CGA<>SBA, and gait without device at Esme, improving to SBA c/ FWW. She reports preferring to use a walker until her balance confidence improves.     Recommend additional balance testing at next PT session.      The patient is performing well below their baseline level of function and will benefit from an interdisciplinary high intensity rehabilitation program to maximize functional independence, decrease burden of care, and support a safe return to home with limited local support.     Plan  Recommend Physical Therapy  minutes per day 5-7 days per week for 1-2 weeks for the following treatments:  PT Group Therapy, PT E Stim Attended, PT Orthotics Training, PT Gait Training, PT Self Care/Home Eval, PT Therapeutic Exercises, PT TENS Application, PT Neuro Re-Ed/Balance, PT Aquatic Therapy, PT Therapeutic Activity, PT Manual Therapy, and PT Evaluation.    Passport items to be completed:  Get in/out of bed safely, in/out of a vehicle, safely use mobility device, walk or wheel around home/community, navigate up and down stairs, show how to get up/down from the ground, ensure home is accessible, demonstrate HEP, complete caregiver training    Goals:  Long term and short term goals have been discussed with patient and they are in agreement.      Physical Therapy Problems (Active)       Problem: Balance       Dates: Start:  12/10/24         Goal: STG-Within one week, patient will maintain dynamic standing with </= 1 UE support while performing serial reaching task > 10\" outside of [place] with Daniella or better.        Dates: Start:  12/10/24               Problem: " Mobility       Dates: Start:  12/10/24         Goal: STG-Within one week, patient will ambulate community distances >/= 250' c/ LRAD at Daniella or better.        Dates: Start:  12/10/24            Goal: STG-Within one week, patient will ambulate up/down a curb c/ LRAD and Daniella or better.        Dates: Start:  12/10/24               Problem: Mobility Transfers       Dates: Start:  12/10/24         Goal: STG-Within one week, patient will transfer bed to chair c/ Daniella or better and LRAD.        Dates: Start:  12/10/24               Problem: PT-Long Term Goals       Dates: Start:  12/10/24         Goal: LTG-By discharge, patient will ambulate >/= 500' c/ LRAD at Alonzo.        Dates: Start:  12/10/24            Goal: LTG-By discharge, patient will transfer one surface to another c/ Alonzo.        Dates: Start:  12/10/24            Goal: LTG-By discharge, patient will transfer in/out of a car c/ Setup or better.        Dates: Start:  12/10/24            Goal: LTG-By discharge, patient will maintain balance while standing c/ LRAD while performing relevant activities to care for her dog (picking bowl from ground, picking up leash, etc).        Dates: Start:  12/10/24

## 2024-12-10 NOTE — PROGRESS NOTES
4 Eyes Skin Assessment Completed by SILVIA Molina and SILVIA Mistry.    Head WDL  Ears WDL  Nose WDL  Mouth WDL  Neck WDL  Breast/Chest WDL  Shoulder Blades WDL  Spine bruising  (R) Arm/Elbow/Hand Bruising  (L) Arm/Elbow/Hand Bruising  Abdomen WDL  Groin Excoriation  Scrotum/Coccyx/Buttocks Non-Blanching, Excoriation, and Discoloration  (R) Leg WDL  (L) Leg WDL  (R) Heel/Foot/Toe WDL  (L) Heel/Foot/Toe WDL          Devices In Places Blood Pressure Cuff      Interventions In Place Pillows    Possible Skin Injury Yes    Pictures Uploaded Into Epic Yes  Wound Consult Placed Yes  RN Wound Prevention Protocol Ordered Yes

## 2024-12-11 ENCOUNTER — APPOINTMENT (OUTPATIENT)
Dept: SPEECH THERAPY | Facility: REHABILITATION | Age: 64
DRG: 056 | End: 2024-12-11
Attending: PHYSICAL MEDICINE & REHABILITATION
Payer: MEDICARE

## 2024-12-11 ENCOUNTER — APPOINTMENT (OUTPATIENT)
Dept: OCCUPATIONAL THERAPY | Facility: REHABILITATION | Age: 64
DRG: 056 | End: 2024-12-11
Attending: PHYSICAL MEDICINE & REHABILITATION
Payer: MEDICARE

## 2024-12-11 ENCOUNTER — APPOINTMENT (OUTPATIENT)
Dept: PHYSICAL THERAPY | Facility: REHABILITATION | Age: 64
DRG: 056 | End: 2024-12-11
Attending: PHYSICAL MEDICINE & REHABILITATION
Payer: MEDICARE

## 2024-12-11 PROBLEM — N18.9 CKD (CHRONIC KIDNEY DISEASE): Status: ACTIVE | Noted: 2024-12-10

## 2024-12-11 LAB
AMMONIA PLAS-SCNC: 34 UMOL/L (ref 11–45)
ANION GAP SERPL CALC-SCNC: 8 MMOL/L (ref 7–16)
BUN SERPL-MCNC: 39 MG/DL (ref 8–22)
CALCIUM SERPL-MCNC: 9.7 MG/DL (ref 8.5–10.5)
CHLORIDE SERPL-SCNC: 105 MMOL/L (ref 96–112)
CO2 SERPL-SCNC: 22 MMOL/L (ref 20–33)
CREAT SERPL-MCNC: 0.99 MG/DL (ref 0.5–1.4)
ERYTHROCYTE [DISTWIDTH] IN BLOOD BY AUTOMATED COUNT: 67 FL (ref 35.9–50)
GFR SERPLBLD CREATININE-BSD FMLA CKD-EPI: 63 ML/MIN/1.73 M 2
GLUCOSE BLD STRIP.AUTO-MCNC: 130 MG/DL (ref 65–99)
GLUCOSE BLD STRIP.AUTO-MCNC: 146 MG/DL (ref 65–99)
GLUCOSE BLD STRIP.AUTO-MCNC: 71 MG/DL (ref 65–99)
GLUCOSE SERPL-MCNC: 82 MG/DL (ref 65–99)
HCT VFR BLD AUTO: 25.3 % (ref 37–47)
HGB BLD-MCNC: 8.2 G/DL (ref 12–16)
MCH RBC QN AUTO: 26.5 PG (ref 27–33)
MCHC RBC AUTO-ENTMCNC: 32.4 G/DL (ref 32.2–35.5)
MCV RBC AUTO: 81.6 FL (ref 81.4–97.8)
PHOSPHATE SERPL-MCNC: 1.8 MG/DL (ref 2.5–4.5)
PLATELET # BLD AUTO: 133 K/UL (ref 164–446)
PMV BLD AUTO: 9.8 FL (ref 9–12.9)
POTASSIUM SERPL-SCNC: 4.4 MMOL/L (ref 3.6–5.5)
RBC # BLD AUTO: 3.1 M/UL (ref 4.2–5.4)
SODIUM SERPL-SCNC: 135 MMOL/L (ref 135–145)
VDRL CSF QL: NON REACTIVE
WBC # BLD AUTO: 4.2 K/UL (ref 4.8–10.8)

## 2024-12-11 PROCEDURE — 99232 SBSQ HOSP IP/OBS MODERATE 35: CPT | Performed by: HOSPITALIST

## 2024-12-11 PROCEDURE — 97530 THERAPEUTIC ACTIVITIES: CPT

## 2024-12-11 PROCEDURE — 84100 ASSAY OF PHOSPHORUS: CPT

## 2024-12-11 PROCEDURE — 97112 NEUROMUSCULAR REEDUCATION: CPT

## 2024-12-11 PROCEDURE — 85027 COMPLETE CBC AUTOMATED: CPT

## 2024-12-11 PROCEDURE — 700102 HCHG RX REV CODE 250 W/ 637 OVERRIDE(OP): Performed by: PHYSICAL MEDICINE & REHABILITATION

## 2024-12-11 PROCEDURE — A9270 NON-COVERED ITEM OR SERVICE: HCPCS | Performed by: PHYSICAL MEDICINE & REHABILITATION

## 2024-12-11 PROCEDURE — 80048 BASIC METABOLIC PNL TOTAL CA: CPT

## 2024-12-11 PROCEDURE — 97110 THERAPEUTIC EXERCISES: CPT

## 2024-12-11 PROCEDURE — 82140 ASSAY OF AMMONIA: CPT

## 2024-12-11 PROCEDURE — A9270 NON-COVERED ITEM OR SERVICE: HCPCS | Performed by: HOSPITALIST

## 2024-12-11 PROCEDURE — 97129 THER IVNTJ 1ST 15 MIN: CPT

## 2024-12-11 PROCEDURE — 97535 SELF CARE MNGMENT TRAINING: CPT

## 2024-12-11 PROCEDURE — 770010 HCHG ROOM/CARE - REHAB SEMI PRIVAT*

## 2024-12-11 PROCEDURE — 97130 THER IVNTJ EA ADDL 15 MIN: CPT

## 2024-12-11 PROCEDURE — 99232 SBSQ HOSP IP/OBS MODERATE 35: CPT | Performed by: PHYSICAL MEDICINE & REHABILITATION

## 2024-12-11 PROCEDURE — 700102 HCHG RX REV CODE 250 W/ 637 OVERRIDE(OP): Performed by: HOSPITALIST

## 2024-12-11 PROCEDURE — 36415 COLL VENOUS BLD VENIPUNCTURE: CPT

## 2024-12-11 PROCEDURE — 97116 GAIT TRAINING THERAPY: CPT

## 2024-12-11 PROCEDURE — 700111 HCHG RX REV CODE 636 W/ 250 OVERRIDE (IP): Performed by: PHYSICAL MEDICINE & REHABILITATION

## 2024-12-11 PROCEDURE — 97602 WOUND(S) CARE NON-SELECTIVE: CPT

## 2024-12-11 PROCEDURE — 82962 GLUCOSE BLOOD TEST: CPT | Mod: 91

## 2024-12-11 RX ADMIN — DIBASIC SODIUM PHOSPHATE, MONOBASIC POTASSIUM PHOSPHATE AND MONOBASIC SODIUM PHOSPHATE 250 MG: 852; 155; 130 TABLET ORAL at 15:22

## 2024-12-11 RX ADMIN — HEPARIN SODIUM 5000 UNITS: 5000 INJECTION, SOLUTION INTRAVENOUS; SUBCUTANEOUS at 06:07

## 2024-12-11 RX ADMIN — INSULIN LISPRO 6 UNITS: 100 INJECTION, SOLUTION INTRAVENOUS; SUBCUTANEOUS at 20:19

## 2024-12-11 RX ADMIN — Medication 100 MG: at 08:47

## 2024-12-11 RX ADMIN — Medication 324 MG: at 08:47

## 2024-12-11 RX ADMIN — HEPARIN SODIUM 5000 UNITS: 5000 INJECTION, SOLUTION INTRAVENOUS; SUBCUTANEOUS at 20:02

## 2024-12-11 RX ADMIN — THERA TABS 1 TABLET: TAB at 08:46

## 2024-12-11 RX ADMIN — ATOVAQUONE 1500 MG: 750 SUSPENSION ORAL at 20:06

## 2024-12-11 RX ADMIN — SENNOSIDES AND DOCUSATE SODIUM 2 TABLET: 50; 8.6 TABLET ORAL at 20:02

## 2024-12-11 RX ADMIN — POLYETHYLENE GLYCOL 3350 1 PACKET: 17 POWDER, FOR SOLUTION ORAL at 17:57

## 2024-12-11 RX ADMIN — Medication 1 TABLET: at 09:00

## 2024-12-11 RX ADMIN — PYRIDOXINE HCL TAB 50 MG 50 MG: 50 TAB at 08:46

## 2024-12-11 RX ADMIN — OMEPRAZOLE 20 MG: 20 CAPSULE, DELAYED RELEASE ORAL at 08:46

## 2024-12-11 RX ADMIN — DIBASIC SODIUM PHOSPHATE, MONOBASIC POTASSIUM PHOSPHATE AND MONOBASIC SODIUM PHOSPHATE 250 MG: 852; 155; 130 TABLET ORAL at 20:02

## 2024-12-11 RX ADMIN — PREDNISONE 60 MG: 20 TABLET ORAL at 08:47

## 2024-12-11 RX ADMIN — FOLIC ACID 1 MG: 1 TABLET ORAL at 08:47

## 2024-12-11 RX ADMIN — ATOVAQUONE 1500 MG: 750 SUSPENSION ORAL at 08:58

## 2024-12-11 RX ADMIN — DIBASIC SODIUM PHOSPHATE, MONOBASIC POTASSIUM PHOSPHATE AND MONOBASIC SODIUM PHOSPHATE 250 MG: 852; 155; 130 TABLET ORAL at 08:46

## 2024-12-11 RX ADMIN — HEPARIN SODIUM 5000 UNITS: 5000 INJECTION, SOLUTION INTRAVENOUS; SUBCUTANEOUS at 15:22

## 2024-12-11 RX ADMIN — MIRTAZAPINE 15 MG: 15 TABLET, ORALLY DISINTEGRATING ORAL at 20:02

## 2024-12-11 RX ADMIN — HYDROCODONE BITARTRATE AND ACETAMINOPHEN 1 TABLET: 5; 325 TABLET ORAL at 06:07

## 2024-12-11 RX ADMIN — HYDROCODONE BITARTRATE AND ACETAMINOPHEN 1 TABLET: 5; 325 TABLET ORAL at 20:01

## 2024-12-11 ASSESSMENT — GAIT ASSESSMENTS
DISTANCE (FEET): 250
ASSISTIVE DEVICE: FRONT WHEEL WALKER
GAIT LEVEL OF ASSIST: CONTACT GUARD ASSIST
DEVIATION: DECREASED BASE OF SUPPORT;SHUFFLED GAIT;DECREASED HEEL STRIKE;DECREASED TOE OFF;BRADYKINETIC

## 2024-12-11 ASSESSMENT — PAIN DESCRIPTION - PAIN TYPE
TYPE: ACUTE PAIN
TYPE: ACUTE PAIN

## 2024-12-11 ASSESSMENT — ENCOUNTER SYMPTOMS
NERVOUS/ANXIOUS: 0
NAUSEA: 0
FEVER: 0
VOMITING: 0
ABDOMINAL PAIN: 0
DIARRHEA: 0
CHILLS: 0
SHORTNESS OF BREATH: 0

## 2024-12-11 NOTE — ASSESSMENT & PLAN NOTE
S/P empiric IV steroids with improvement  On Prednisone 60 mg daily (thru 1/7)  On Atovaquone for PJP proph given sulfa allergy  Needs Neurology F/U

## 2024-12-11 NOTE — WOUND TEAM
"[facility]  Inpatient Services  Initial Wound and Skin Care Evaluation    Admission Date: 12/9/2024     Last order of IP CONSULT TO WOUND CARE was found on 12/9/2024 from Hospital Encounter on 12/9/2024     HPI, PMH, SH: Reviewed    Past Surgical History:   Procedure Laterality Date    MI LAP,DIAGNOSTIC ABDOMEN  2/23/2024    Procedure: LAPAROSCOPY, KIM'S PATCH;  Surgeon: Neel Amezcua M.D.;  Location: SURGERY [facility];  Service: General    TRIGGER FINGER RELEASE Right 3/31/2017    Procedure: TRIGGER FINGER RELEASE;  Surgeon: Jimmy Atkins M.D.;  Location: SURGERY [facility];  Service:     GANGLION EXCISION Right 3/31/2017    Procedure: GANGLION EXCISION - HAND CYST;  Surgeon: Jimmy Atkins M.D.;  Location: [facility];  Service:     PELVISCOPY  6/5/08    Performed by NATALIYA ARCEO at SURGERY SAME DAY [facility]    OVARIAN CYSTECTOMY  6/5/08    Performed by NATALIYA ARCEO at SURGERY SAME DAY [facility] ORS    CYSTOSCOPY  6/5/08    Performed by NATALIYA ARCEO at SURGERY SAME DAY [facility] ORS    HYSTERECTOMY LAPAROSCOPY  2003    KNEE ARTHROSCOPY       Social History     Tobacco Use    Smoking status: Never    Smokeless tobacco: Never   Substance Use Topics    Alcohol use: Not Currently     Alcohol/week: 3.6 oz     Comment: denies     No chief complaint on file.    Diagnosis: Acute encephalopathy [G93.40]    Unit where seen by Wound Team: [facility]22/01     WOUND CONSULT RELATED TO:  sacrum and labia    WOUND TEAM PLAN OF CARE - Frequency of Follow-up:   Nursing to follow dressing orders written for wound care. Contact wound team if area fails to progress, deteriorates or with any questions/concerns if something comes up before next scheduled follow up (See below as to whether wound is following and frequency of wound follow up)   Weekly - sacrum    WOUND HISTORY:   Pt states she was \"in bed for 11 days\" and got the sacral  wound at the last hospital.   Per photos and " documentation, it is POA.  She declined assessment of her labia or vagina, she says there is no wound there, she denies pain in that area.          WOUND ASSESSMENT/LDA  Wound 12/06/24 Pressure Injury Sacrum Evolving DTI (Active)   Date First Assessed/Time First Assessed: 12/06/24 0600   Present on Original Admission: No  Hand Hygiene Completed: Yes  Primary Wound Type: (c) Pressure Injury  Location: Sacrum  Wound Description (Comments): Evolving DTI      Assessments 12/11/2024 10:15 AM   Wound Image     Site Assessment Clean;Purple;Brown;Epithelialization;Red   Periwound Assessment Clean;Purple;Scabbed;Fragile;Flaky   Margins Defined edges   Drainage Amount Small   Drainage Description Serous   Treatments Cleansed;Nonselective debridement;Site care   Wound Cleansing Approved Wound Cleanser   Periwound Protectant No-sting Skin Prep   Dressing Status Clean;Dry;Intact   Dressing Changed Changed   Dressing Options Silicone Adhesive Foam   Dressing Change/Treatment Frequency Every 48 hrs, and As Needed   NEXT Dressing Change/Treatment Date 12/13/24   Wound Team Following Weekly   Wound Length (cm) 7 cm   Wound Width (cm) 5 cm   Wound Depth (cm) 0.1 cm   Wound Surface Area (cm^2) 35 cm^2   Wound Volume (cm^3) 3.5 cm^3   Wound Odor None   WOUND NURSE ONLY - Time Spent with Patient (mins) 30        Vascular:    EVARISTO:   No results found.    Lab Values:    Lab Results   Component Value Date/Time    WBC 4.2 (L) 12/11/2024 06:35 AM    RBC 3.10 (L) 12/11/2024 06:35 AM    HEMOGLOBIN 8.2 (L) 12/11/2024 06:35 AM    HEMATOCRIT 25.3 (L) 12/11/2024 06:35 AM    CREACTPROT 39.04 (H) 02/23/2024 10:05 AM    HBA1C 5.2 12/10/2024 06:05 AM         Culture Results show:  No results found for this or any previous visit (from the past 720 hours).    Pain Level/Medicated:  None, Tolerated without pain medication       INTERVENTIONS BY WOUND TEAM:  Chart and images reviewed. Discussed with bedside RN. All areas of concern (based on picture  review, LDA review and discussion with bedside RN) have been thoroughly assessed. Documentation of areas based on significant findings. This RN in to assess patient. Performed standard wound care which includes appropriate positioning, dressing removal and non-selective debridement. Pictures and measurements obtained weekly if/when required.    Wound:  sacrum -DTI, evolving  Preparation for Dressing removal: Removed without difficulty  Cleansed/Non-selectively Debrided with:  Wound cleanser and Gauze  Kristen wound: Cleansed with Wound cleanser and Gauze, Prepped with No Sting  Primary Dressing:  barrier cream  Secondary (Outer) Dressing: Silicone adhesive foam    Advanced Wound Care Discharge Planning  Number of Clinicians necessary to complete wound care: 1  Is patient requiring IV pain medications for dressing changes:  No   Length of time for dressing change 15 min. (This does not include chart review, pre-medication time, set up, clean up or time spent charting.)    Interdisciplinary consultation: Patient, Bedside RN (Dulce), Provider, Dr. Bender.  Pressure injury and staging reviewed with N/A.    EVALUATION / RATIONALE FOR TREATMENT:     Date:  12/11/24  Wound Status:  Initial evaluation    DTI on sacrum-it is opening up and evolving. There is no slough.   Barrier cream used to keep open area moist, and protect skin. Silicone adhesive foam protects the skin and keeps the barrier cream in place.   Change dressing every 48 hours, to allow the wound to heal. If soiled, saturated or dislodged, change the dressing sooner. Offloading and nutrition for healing.          Goals: Steady decrease in wound area and depth weekly.    NURSING PLAN OF CARE ORDERS:  Dressing changes: See Dressing Care orders  Skin care: See Skin Care orders  RN Prevention Protocol    NUTRITION RECOMMENDATIONS   Wound Team Recommendations:  Dietary consult    DIET ORDERS (From admission to next 24h)       Start     Ordered    12/10/24 1004   Supplements  ALL MEALS        Question Answer Comment   Which Supplement Ensure    Ensure: Ensure Plus Carton        12/10/24 1004    12/09/24 1635  Diet Order Diet: Regular  ALL MEALS        Question:  Diet:  Answer:  Regular    12/09/24 5493                    PREVENTATIVE INTERVENTIONS:    Q shift John - performed per nursing policy  Q shift pressure point assessments - performed per nursing policy    Surface/Positioning  Low Airloss - Currently in Place  Reposition q 2 hours - Currently in Place    Offloading/Redistribution  Sacral offloading dressing (Silicone dressing) - Currently in Place  Float Heels off Bed with Pillows - Ordered           Containment/Moisture Prevention    Dri-juan pad - Currently in Place  Brief with mobilization - Currently in Place  Barrier paste - Currently in Place    Mobilization      Up to chair     Anticipated discharge plans:  Self/Family Care and Home Health Care        Vac Discharge Needs:  Vac Discharge plan is purely a recommendation from wound team and not a requirement for discharge unless otherwise stated by physician.  Not Applicable Pt not on a wound vac

## 2024-12-11 NOTE — CARE PLAN
The patient is Stable - Low risk of patient condition declining or worsening         Progress made toward(s) clinical / shift goals:    Problem: Knowledge Deficit - Standard  Goal: Patient and family/care givers will demonstrate understanding of plan of care, disease process/condition, diagnostic tests and medications  Outcome: Progressing   Patient educated on the POC and medications administered. All questions and concerns addressed at this time   Problem: Fall Risk - Rehab  Goal: Patient will remain free from falls  Outcome: Progressing   Bed is locked and in low position. Call light and belongings within reach

## 2024-12-11 NOTE — PROGRESS NOTES
[facility] Medicine Daily Progress Note    Date of Service  12/11/2024    Chief Complaint:  Hyperglycemia  Abn TFT's  CKD    Interval History:  Pt states she takes Jardiance at home for her heart, not diabetes.    Review of Systems  Review of Systems   Constitutional:  Negative for chills and fever.   Respiratory:  Negative for shortness of breath.    Cardiovascular:  Negative for chest pain.   Gastrointestinal:  Negative for abdominal pain, diarrhea, nausea and vomiting.   Psychiatric/Behavioral:  The patient is not nervous/anxious.         Physical Exam  Temp:  [36.6 °C (97.8 °F)-36.7 °C (98 °F)] 36.6 °C (97.8 °F)  Pulse:  [67-78] 78  Resp:  [18] 18  BP: (124-146)/(56-71) 140/71  SpO2:  [98 %] 98 %    Physical Exam  Vitals and nursing note reviewed.   Constitutional:       Appearance: Normal appearance.   HENT:      Head: Atraumatic.   Eyes:      Conjunctiva/sclera: Conjunctivae normal.      Pupils: Pupils are equal, round, and reactive to light.   Cardiovascular:      Rate and Rhythm: Normal rate and regular rhythm.      Heart sounds: Murmur heard.   Pulmonary:      Effort: Pulmonary effort is normal.      Breath sounds: No stridor. No wheezing or rales.   Abdominal:      General: There is no distension.      Palpations: Abdomen is soft.      Tenderness: There is no abdominal tenderness.   Musculoskeletal:      Cervical back: Normal range of motion and neck supple.      Right lower leg: No edema.      Left lower leg: No edema.   Skin:     General: Skin is warm and dry.      Findings: No rash.   Neurological:      Mental Status: She is alert and oriented to person, place, and time.   Psychiatric:         Mood and Affect: Mood normal.         Behavior: Behavior normal.         Fluids    Intake/Output Summary (Last 24 hours) at 12/11/2024 1101  Last data filed at 12/11/2024 1000  Gross per 24 hour   Intake 600 ml   Output --   Net 600 ml        Laboratory  Recent Labs     12/09/24  0138 12/10/24  0605 12/11/24  0635    WBC 5.8 4.8 4.2*   RBC 3.07* 2.88* 3.10*   HEMOGLOBIN 7.9* 7.4* 8.2*   HEMATOCRIT 24.7* 23.0* 25.3*   MCV 80.5* 79.9* 81.6   MCH 25.7* 25.7* 26.5*   MCHC 32.0* 32.2 32.4   RDW 65.2* 65.3* 67.0*   PLATELETCT 166 133* 133*   MPV 10.9 9.9 9.8     Recent Labs     12/09/24  0138 12/10/24  0605 12/11/24  0635   SODIUM 131* 132* 135   POTASSIUM 4.7 4.6 4.4   CHLORIDE 101 102 105   CO2 20 22 22   GLUCOSE 133* 136* 82   BUN 42* 46* 39*   CREATININE 1.06 0.96 0.99   CALCIUM 9.7 9.7 9.7                   Imaging    Assessment/Plan  Generalized weakness  Assessment & Plan  S/P empiric IV steroids with improvement  On Prednisone 60 mg daily (thru 1/7)  On Atovaquone for PJP proph given sulfa allergy  Needs Neurology F/U    Anemia  Assessment & Plan  H&H stable with Hb 8.2  Fe: 21, sats 8%  No ferritin ordered -- don't know if its ACD  On Fe supplements  Monitor    CKD (chronic kidney disease)  Assessment & Plan  Bun: 39  Cr: wnl  Cont to monitor    Abnormal thyroid function test  Assessment & Plan  TSH: 0.168 (12/10)  FT4: 0.90  ? Subclinical  Needs repeat TFT's in about 1 week to eval trend and treatment    Hyperglycemia  Assessment & Plan  Hba1c: 5.2 (12/10)  BS labile:  Likely 2nd to steroids  Denies hx of diabetes  Note: pt states she takes Jardiance at home for her heart  Cont to monitor    Hypophosphatemia- (present on admission)  Assessment & Plan  PO4: 1.8 (12/11)  Cont supplements  Cont to monitor    Liver cirrhosis (HCC)- (present on admission)  Assessment & Plan  2nd to etoh abuse  S/P recent hepatic encephalopathy at [facility]  Has pancytopenia  NH4: 34  On supplements

## 2024-12-11 NOTE — ASSESSMENT & PLAN NOTE
2nd to etoh abuse  S/P recent hepatic encephalopathy at [facility] [place] [place]  Has pancytopenia  NH4: 34 (12/11)  On supplements

## 2024-12-11 NOTE — ASSESSMENT & PLAN NOTE
H/O EtOH cirrhosis  Treated w/ Lactulose  On Folate and Thiamine, add MVT  Followed by GI as outpt

## 2024-12-11 NOTE — PROGRESS NOTES
"  Physical Medicine & Rehabilitation Progress Note    Encounter Date: 12/11/2024    Chief Complaint: Decreased mobility    Interval Events (Subjective):  Patient sitting up in therapy gym. She reports she is doing better. Denies pain. Denies NVD. Reviewed AM labs and improved anemia, hyponatremia, and ongoing hyperglycemia.     Objective:  VITAL SIGNS: BP (!) 140/71   Pulse 78   Temp 36.6 °C (97.8 °F) (Oral)   Resp 18   Ht 1.707 m (5' 7.2\")   Wt 56.5 kg (124 lb 9 oz)   SpO2 98%   BMI 19.39 kg/m²   Gen: NAD  Psych: Mood and affect appropriate  CV: RRR, 0 edema  Resp: CTAB, no upper airway sounds  Abd: NTND  Neuro: AOx3, following commands  Unchanged from 12/10/24    Laboratory Values:  Recent Results (from the past 72 hours)   POCT glucose device results    Collection Time: 12/08/24  4:38 PM   Result Value Ref Range    POC Glucose, Blood 269 (H) 65 - 99 mg/dL   POCT glucose device results    Collection Time: 12/08/24  8:57 PM   Result Value Ref Range    POC Glucose, Blood 245 (H) 65 - 99 mg/dL   Renal Function Panel    Collection Time: 12/09/24  1:38 AM   Result Value Ref Range    Sodium 131 (L) 135 - 145 mmol/L    Potassium 4.7 3.6 - 5.5 mmol/L    Chloride 101 96 - 112 mmol/L    Co2 20 20 - 33 mmol/L    Glucose 133 (H) 65 - 99 mg/dL    Creatinine 1.06 0.50 - 1.40 mg/dL    Bun 42 (H) 8 - 22 mg/dL    Calcium 9.7 8.4 - 10.2 mg/dL    Correct Calcium 10.4 8.5 - 10.5 mg/dL    Phosphorus 2.1 (L) 2.5 - 4.5 mg/dL    Albumin 3.1 (L) 3.2 - 4.9 g/dL   MAGNESIUM    Collection Time: 12/09/24  1:38 AM   Result Value Ref Range    Magnesium 2.3 1.5 - 2.5 mg/dL   CBC WITHOUT DIFFERENTIAL    Collection Time: 12/09/24  1:38 AM   Result Value Ref Range    WBC 5.8 4.8 - 10.8 K/uL    RBC 3.07 (L) 4.20 - 5.40 M/uL    Hemoglobin 7.9 (L) 12.0 - 16.0 g/dL    Hematocrit 24.7 (L) 37.0 - 47.0 %    MCV 80.5 (L) 81.4 - 97.8 fL    MCH 25.7 (L) 27.0 - 33.0 pg    MCHC 32.0 (L) 32.2 - 35.5 g/dL    RDW 65.2 (H) 35.9 - 50.0 fL    Platelet Count " 166 164 - 446 K/uL    MPV 10.9 9.0 - 12.9 fL   proBrain Natriuretic Peptide, NT    Collection Time: 12/09/24  1:38 AM   Result Value Ref Range    NT-proBNP 3055 (H) 0 - 125 pg/mL   ESTIMATED GFR    Collection Time: 12/09/24  1:38 AM   Result Value Ref Range    GFR (CKD-EPI) 58 (A) >60 mL/min/1.73 m 2   POCT glucose device results    Collection Time: 12/09/24  6:35 AM   Result Value Ref Range    POC Glucose, Blood 144 (H) 65 - 99 mg/dL   POCT glucose device results    Collection Time: 12/09/24 11:32 AM   Result Value Ref Range    POC Glucose, Blood 197 (H) 65 - 99 mg/dL   POCT glucose device results    Collection Time: 12/09/24  5:26 PM   Result Value Ref Range    POC Glucose, Blood 176 (H) 65 - 99 mg/dL   POCT glucose device results    Collection Time: 12/09/24  9:06 PM   Result Value Ref Range    POC Glucose, Blood 187 (H) 65 - 99 mg/dL   CBC with Differential    Collection Time: 12/10/24  6:05 AM   Result Value Ref Range    WBC 4.8 4.8 - 10.8 K/uL    RBC 2.88 (L) 4.20 - 5.40 M/uL    Hemoglobin 7.4 (L) 12.0 - 16.0 g/dL    Hematocrit 23.0 (L) 37.0 - 47.0 %    MCV 79.9 (L) 81.4 - 97.8 fL    MCH 25.7 (L) 27.0 - 33.0 pg    MCHC 32.2 32.2 - 35.5 g/dL    RDW 65.3 (H) 35.9 - 50.0 fL    Platelet Count 133 (L) 164 - 446 K/uL    MPV 9.9 9.0 - 12.9 fL    Neutrophils-Polys 93.00 (H) 44.00 - 72.00 %    Lymphocytes 6.10 (L) 22.00 - 41.00 %    Monocytes 0.90 0.00 - 13.40 %    Eosinophils 0.00 0.00 - 6.90 %    Basophils 0.00 0.00 - 1.80 %    Nucleated RBC 0.00 0.00 - 0.20 /100 WBC    Neutrophils (Absolute) 4.46 1.82 - 7.42 K/uL    Lymphs (Absolute) 0.29 (L) 1.00 - 4.80 K/uL    Monos (Absolute) 0.04 0.00 - 0.85 K/uL    Eos (Absolute) 0.00 0.00 - 0.51 K/uL    Baso (Absolute) 0.00 0.00 - 0.12 K/uL    NRBC (Absolute) 0.00 K/uL    Anisocytosis 1+     Microcytosis 2+ (A)    Comp Metabolic Panel (CMP)    Collection Time: 12/10/24  6:05 AM   Result Value Ref Range    Sodium 132 (L) 135 - 145 mmol/L    Potassium 4.6 3.6 - 5.5 mmol/L     Chloride 102 96 - 112 mmol/L    Co2 22 20 - 33 mmol/L    Anion Gap 8.0 7.0 - 16.0    Glucose 136 (H) 65 - 99 mg/dL    Bun 46 (H) 8 - 22 mg/dL    Creatinine 0.96 0.50 - 1.40 mg/dL    Calcium 9.7 8.5 - 10.5 mg/dL    Correct Calcium 10.2 8.5 - 10.5 mg/dL    AST(SGOT) 18 12 - 45 U/L    ALT(SGPT) 18 2 - 50 U/L    Alkaline Phosphatase 102 (H) 30 - 99 U/L    Total Bilirubin 0.3 0.1 - 1.5 mg/dL    Albumin 3.4 3.2 - 4.9 g/dL    Total Protein 6.0 6.0 - 8.2 g/dL    Globulin 2.6 1.9 - 3.5 g/dL    A-G Ratio 1.3 g/dL   HEMOGLOBIN A1C    Collection Time: 12/10/24  6:05 AM   Result Value Ref Range    Glycohemoglobin 5.2 4.0 - 5.6 %    Est Avg Glucose 103 mg/dL   TSH with Reflex to FT4    Collection Time: 12/10/24  6:05 AM   Result Value Ref Range    TSH 0.168 (L) 0.380 - 5.330 uIU/mL   Vitamin D, 25-hydroxy (blood)    Collection Time: 12/10/24  6:05 AM   Result Value Ref Range    25-Hydroxy   Vitamin D 25 30 30 - 100 ng/mL   PHOSPHORUS    Collection Time: 12/10/24  6:05 AM   Result Value Ref Range    Phosphorus 2.0 (L) 2.5 - 4.5 mg/dL   ESTIMATED GFR    Collection Time: 12/10/24  6:05 AM   Result Value Ref Range    GFR (CKD-EPI) 65 >60 mL/min/1.73 m 2   FREE THYROXINE    Collection Time: 12/10/24  6:05 AM   Result Value Ref Range    Free T-4 0.90 (L) 0.93 - 1.70 ng/dL   DIFFERENTIAL MANUAL    Collection Time: 12/10/24  6:05 AM   Result Value Ref Range    Manual Diff Status PERFORMED    PERIPHERAL SMEAR REVIEW    Collection Time: 12/10/24  6:05 AM   Result Value Ref Range    Peripheral Smear Review see below    PLATELET ESTIMATE    Collection Time: 12/10/24  6:05 AM   Result Value Ref Range    Plt Estimation Decreased    MORPHOLOGY    Collection Time: 12/10/24  6:05 AM   Result Value Ref Range    RBC Morphology Present     Poikilocytosis 3+     Ovalocytes 1+     Schistocytes 1+    POCT glucose device results    Collection Time: 12/10/24  8:11 AM   Result Value Ref Range    POC Glucose, Blood 115 (H) 65 - 99 mg/dL   POCT glucose  device results    Collection Time: 12/10/24 11:35 AM   Result Value Ref Range    POC Glucose, Blood 158 (H) 65 - 99 mg/dL   POCT glucose device results    Collection Time: 12/10/24  4:36 PM   Result Value Ref Range    POC Glucose, Blood 126 (H) 65 - 99 mg/dL   POCT glucose device results    Collection Time: 12/10/24  9:02 PM   Result Value Ref Range    POC Glucose, Blood 217 (H) 65 - 99 mg/dL   CBC WITHOUT DIFFERENTIAL    Collection Time: 12/11/24  6:35 AM   Result Value Ref Range    WBC 4.2 (L) 4.8 - 10.8 K/uL    RBC 3.10 (L) 4.20 - 5.40 M/uL    Hemoglobin 8.2 (L) 12.0 - 16.0 g/dL    Hematocrit 25.3 (L) 37.0 - 47.0 %    MCV 81.6 81.4 - 97.8 fL    MCH 26.5 (L) 27.0 - 33.0 pg    MCHC 32.4 32.2 - 35.5 g/dL    RDW 67.0 (H) 35.9 - 50.0 fL    Platelet Count 133 (L) 164 - 446 K/uL    MPV 9.8 9.0 - 12.9 fL   Basic Metabolic Panel    Collection Time: 12/11/24  6:35 AM   Result Value Ref Range    Sodium 135 135 - 145 mmol/L    Potassium 4.4 3.6 - 5.5 mmol/L    Chloride 105 96 - 112 mmol/L    Co2 22 20 - 33 mmol/L    Glucose 82 65 - 99 mg/dL    Bun 39 (H) 8 - 22 mg/dL    Creatinine 0.99 0.50 - 1.40 mg/dL    Calcium 9.7 8.5 - 10.5 mg/dL    Anion Gap 8.0 7.0 - 16.0   PHOSPHORUS    Collection Time: 12/11/24  6:35 AM   Result Value Ref Range    Phosphorus 1.8 (L) 2.5 - 4.5 mg/dL   AMMONIA    Collection Time: 12/11/24  6:35 AM   Result Value Ref Range    Ammonia 34 11 - 45 umol/L   ESTIMATED GFR    Collection Time: 12/11/24  6:35 AM   Result Value Ref Range    GFR (CKD-EPI) 63 >60 mL/min/1.73 m 2   POCT glucose device results    Collection Time: 12/11/24  7:33 AM   Result Value Ref Range    POC Glucose, Blood 71 65 - 99 mg/dL   POCT glucose device results    Collection Time: 12/11/24 11:43 AM   Result Value Ref Range    POC Glucose, Blood 130 (H) 65 - 99 mg/dL       Medications:  Scheduled Medications   Medication Dose Frequency    pyridoxine  50 mg DAILY    multivitamin  1 Tablet DAILY    insulin lispro  2-12 Units 4X/DAY ACHS     ferrous gluconate  324 mg QDAY with Breakfast    phosphorus  250 mg TID    senna-docusate  2 Tablet Q EVENING    omeprazole  20 mg DAILY    atovaquone  1,500 mg BID    folic acid  1 mg DAILY    heparin  5,000 Units Q8HRS    mirtazapine  15 mg QHS    predniSONE  60 mg DAILY    thiamine  100 mg DAILY    vitamin B + C complex  1 Tablet DAILY     PRN medications: HYDROcodone-acetaminophen, insulin lispro **AND** POC blood glucose manual result **AND** NOTIFY MD and PharmD **AND** Administer 20 grams of glucose (approximately 8 ounces of fruit juice) every 15 minutes PRN FSBG less than 70 mg/dL **AND** dextrose bolus, hydrALAZINE, acetaminophen, senna-docusate **AND** polyethylene glycol/lytes, docusate sodium, magnesium hydroxide, carboxymethylcellulose, benzocaine-menthol, mag hydrox-al hydrox-simeth, ondansetron **OR** ondansetron, traZODone, sodium chloride    Diet:  Current Diet Order   Procedures    Diet Order Diet: Regular       Medical Decision Making and Plan:  Hepatic encephalopathy - Patient with confusion on 11/27 with AMS and failure to thrive, thought to be hepatic encephalopathy vs autoimmune encephalitis. She was started on steroids and has been changed to prednisone 60 mg daily.  -PT and OT for mobility and ADLs. Per guidelines, 15 hours per week between PT, OT and/or SLP.  -Follow-up Neurology     HTN/sCHF - Patient off medications. Will monitor.      DM2 with hyperglycemia - Patient on SSI on transfer. Continue SSI blood sugars into 200s     Anemia - Check AM CBC - 7.4, consult hospitalist. Repeat 8.2 on 12/11. Started on Fe supplement    Thrombocytopenia - 133 on admission, consult hospitalist. Repeat 133 on 12/11     Hyponatremia - Check AM CMP - 132, consult hospitalist     CKD/Azotemia - Check AM CMP. Avoid nephrotoxic agents. BUN 46 on admission. Repeat 39 on 12/11, given IV fluids     Hypophosphatemia - Check AM level - 2.0. Continue K-Phos     Severe protein-calorie malnutrition - Meets Aspen  criteria for severe. Dietitian to consult     Depression - Patient on Remeron 15 mg QHS     Alcohol abuse/cirrhosis - Patient on Thimaine/folic acid. May need to restart Lactulose. Ammonia normal on 12/11     Pain - Patient on PRN Tylenol/Oxycodone. Switch to Norco     Skin - Patient at risk for skin breakdown due to debility in areas including sacrum, achilles, elbows and head in addition to other sites. Nursing to assess skin daily.      GI Ppx - Patient on Prilosec for GERD prophylaxis. Patient on Senna-docusate for constipation prophylaxis.      DVT Ppx - Patient Heparin on transfer. Continue Heparin as limited mobility  ____________________________________    T. Juliocesar Bender MD/PhD  [facility] - Physical Medicine & Rehabilitation   [facility]   ____________________________________

## 2024-12-11 NOTE — THERAPY
"Speech Language Pathology  Daily Treatment     Patient Name: Denisse Abel  Age:  64 y.o., Sex:  female  Medical Record #: [id number]  Today's Date: 12/11/2024     Precautions  Precautions: Fall Risk    Subjective    Pt pleasant and cooperative, expressed feeling \"rushed\" and needing more time to get ready.      Objective       12/11/24 0933   Treatment Charges   SLP Cognitive Skill Development First 15 Minutes 1   SLP Cognitive Skill Development Additional 15 Minutes 1   SLP Total Time Spent   SLP Individual Total Time Spent (Mins) 30         Assessment    Pt presented with who has more task - coins, pt correctly calculated 35/40 indep. Unable to review or correct with pt due to  time constraints.     Strengths: Able to follow instructions, Alert and oriented, Effective communication skills, Good carryover of learning, Good insight into deficits/needs, Independent prior level of function, Motivated for self care and independence, Pleasant and cooperative, Willingly participates in therapeutic activities  Barriers: Impaired functional cognition    Plan  Correct who has more, functional medication list    Speech Therapy Problems (Active)       Problem: Problem Solving STGs       Dates: Start:  12/10/24         Goal: STG-Within one week, patient will complete medication management tasks with spv and 90% accuracy.         Dates: Start:  12/10/24            Goal: STG-Within one week, patient will complete executive functions with min A and 90% accuracy.         Dates: Start:  12/10/24               Problem: Speech/Swallowing LTGs       Dates: Start:  12/10/24         Goal: LTG-By discharge, patient will solve complex problems with modified independence.         Dates: Start:  12/10/24               "

## 2024-12-11 NOTE — CARE PLAN
The patient is Stable - Low risk of patient condition declining or worsening    Shift Goals  Clinical Goals: safety  Patient Goals: pain control, rest    Progress made toward(s) clinical / shift goals:    Problem: Bladder / Voiding  Goal: Patient will establish and maintain regular urinary output  Outcome: Progressing. Patient has remained continent of bladder over shift, denies pain with urination.      Problem: Fall Risk - Rehab  Goal: Patient will remain free from falls  Outcome: Progressing. Patient bed in lowest locked position with call light within reach. Patient has not attempted to self transfer over shift. Patient calls appropriately with call light for needs.

## 2024-12-11 NOTE — ASSESSMENT & PLAN NOTE
Hba1c: 5.2 (12/10)  BS labile:   BS drop lower in am -- off night coverage  2nd to steroids  Note: no hx of diabetes  Note: pt states she takes Jardiance at home for her heart --> will resume after discharge  Cont to monitor

## 2024-12-11 NOTE — THERAPY
Occupational Therapy  Daily Treatment     Patient Name: Denisse Abel  Age:  64 y.o., Sex:  female  Medical Record #: [id number]  Today's Date: 12/11/2024     Precautions  Precautions: Fall Risk    Subjective    Pt seated in w/c upon arrival for both sessions (830am in room, 11am handoff from PT in west therapy gym)     Objective     12/11/24 0831 12/11/24 1101   OT Charge Group   OT Self Care / ADL (Units) 2  --    OT Neuromuscular Re-education / Balance (Units) 1 1   OT Therapy Activity (Units) 1  --    OT Therapeutic Exercise (Units)  --  1   OT Total Time Spent   OT Individual Total Time Spent (Mins) 60 30   Precautions   Precautions Fall Risk  --    Vitals   O2 Delivery Device None - Room Air None - Room Air   Sleep/Wake Cycle   Sleep & Rest Awake Awake   Functional Level of Assist   Eating Independent  --    Lower Body Dressing Minimal Assist  (Mod I to don socks, min A to don tie-free skechers) Modified Independent  (to don tie-free skechers w/ LH shoe horn)   IADL Treatments   IADL Treatments Meal preparation  --    Meal Preparation Pt completed stove top meal prep task in standing @ supervision level w/ no overt LOB or significant safety concerned observed; Pt retrieved frying pan from lower cabinet, butter from refrigerator, utensils from drawers and bowl from upper cabinet w/ light use of FWW or counter for UE support/balance  --    Interdisciplinary Plan of Care Collaboration   IDT Collaboration with  Speech Therapist  --    Patient Position at End of Therapy   (standing w/ FWW) Seated;Self Releasing Lap Belt Applied;Call Light within Reach;Tray Table within Reach   Collaboration Comments Handoff to ST  --      Fluidobike in standing x 8 mins, level 1 resistance @ slow pace due to generalized weakness     Pt retrieved 6 items from various height pantry shelves while standing (w/ intermittent use of grocery cart for standing balance) and txfrd items to grocery cart. Pt able to complete short distance  mobility w/ grocery cart (~50') w/ supervision. Pt then able to place 5/6 items back on the shelf to their original location.     SBA to supervision for FWW mobility room> ADL kitchen > handoff to [facility]     Assessment    Pt tolerated OT sessions well overall w/ focus on IADLs, standing balance/endurance, functional cognition and generalized strengthening. Pt demo'd good safety awareness throughout stove top meal prep task w/ no overt LOB observed and no external cues required to turn off stove. Pt will benefit from ongoing skilled OT to improve generalized strength/endurance and functional independence w/ LRAD.   Strengths: Able to follow instructions, Alert and oriented, Willingly participates in therapeutic activities, Pleasant and cooperative, Motivated for self care and independence, Independent prior level of function  Barriers: Decreased endurance, Fatigue, Generalized weakness, Impaired activity tolerance, Impaired balance, Limited mobility    Plan    Higher level balance activities, dual tasking, generalized strengthening/endurance training     DME  OT DME Recommendations  Bathroom Equipment:  (Pt has shower chair)    Passport items to be completed:  Perform bathroom transfers, complete dressing, complete feeding, get ready for the day, prepare a simple meal, participate in household tasks, adapt home for safety needs, demonstrate home exercise program, complete caregiver training     Occupational Therapy Goals (Active)       Problem: OT Long Term Goals       Dates: Start:  12/10/24         Goal: LTG-By discharge, patient will complete basic self care tasks w/ supervision to mod I        Dates: Start:  12/10/24            Goal: LTG-By discharge, patient will perform bathroom transfers w/ mod I w/ LRAD        Dates: Start:  12/10/24            Goal: LTG-By discharge, patient will complete basic home management w/ supervision to mod I        Dates: Start:  12/10/24               Problem: Pt  will complete LB dressing @ SBA level        Dates: Start:  12/10/24

## 2024-12-11 NOTE — PROGRESS NOTES
NURSING DAILY NOTE    Name: Denisse Abel   Date of Admission: 12/9/2024   Admitting Diagnosis: Alcoholic encephalopathy (HCC)  Attending Physician: Cristiano Bender M.d.  Allergies: Sulfa drugs    Safety  Patient Assist     Patient Precautions  Fall Risk  Precaution Comments     Bed Transfer Status  Minimal Assist  Toilet Transfer Status   Minimal Assist  Assistive Devices  Wheelchair  Oxygen  None - Room Air  Diet/Therapeutic Dining  Current Diet Order   Procedures    Diet Order Diet: Regular     Pill Administration  whole  Agitated Behavioral Scale     ABS Level of Severity       Fall Risk  Has the patient had a fall this admission?   No  Selam Alonso Fall Risk Scoring  12, MODERATE RISK  Fall Risk Safety Measures  bed alarm, chair alarm, poor balance, and low vision/ hearing    Vitals  Temperature: 36.6 °C (97.8 °F)  Temp src: Oral  Pulse: 70  Respiration: 18  Blood Pressure : (!) 146/67  Blood Pressure MAP (Calculated): 93 MM HG  BP Location: Left, Upper Arm  Patient BP Position: Sitting     Oxygen  Pulse Oximetry: 98 %  O2 (LPM): 0  O2 Delivery Device: None - Room Air    Bowel and Bladder  Last Bowel Movement  12/09/24  Stool Type  Type 5: Soft blob with clear cut edges (passed easily)  Bowel Device  Bathroom  Continent  Bladder: Continent void   Bowel: Continent movement  Bladder Function  Urine Void (mL):  (300)  Number of Times Voided: 1  Urine Color: Yellow  Genitourinary Assessment   Bladder Assessment (WDL):  WDL Except  Urine Color: Yellow  Bladder Scan: Post Void  $ Bladder Scan Results (mL): 15    Skin  John Score   20  Sensory Interventions      Moisture Interventions         Pain  Pain Rating Scale  0 - No Pain  Pain Location     Pain Location Orientation     Pain Interventions   Declines    ADLs    Bathing      Linen Change      Personal Hygiene     Chlorhexidine Bath      Oral Care  Brushed Teeth  Teeth/Dentures     Shave      Nutrition Percentage Eaten  Lunch, Less than 25% Consumed  Environmental Precautions  Treaded Slipper Socks on Patient, Personal Belongings, Wastebasket, Call Bell etc. in Easy Reach, Transferred to Stronger Side, Report Given to Other Health Care Providers Regarding Fall Risk, Bed in Low Position, Communication Sign for Patients & Families, Mobility Assessed & Appropriate Sign Placed  Patient Turns/Positioning  Patient turns self independently side to side without assistance, to offload sacral area  Patient Turns Assistance/Tolerance     Bed Positions  Bed Controls On, Bed Locked  Head of Bed Elevated  Self regulated      Psychosocial/Neurologic Assessment  Psychosocial Assessment  Psychosocial (WDL):  Within Defined Limits  Neurologic Assessment  Neuro (WDL): Exceptions to WDL  Level of Consciousness: Alert  Orientation Level: Oriented X4  Cognition: Follows commands  Speech: Clear  EENT (WDL):  WDL Except    Cardio/Pulmonary Assessment  Edema      Respiratory Breath Sounds  RUL Breath Sounds: Clear  RML Breath Sounds: Clear  RLL Breath Sounds: Clear  PHUONG Breath Sounds: Clear  LLL Breath Sounds: Clear  Cardiac Assessment   Cardiac (WDL):  WDL Except

## 2024-12-12 ENCOUNTER — APPOINTMENT (OUTPATIENT)
Dept: PHYSICAL THERAPY | Facility: REHABILITATION | Age: 64
DRG: 056 | End: 2024-12-12
Attending: PHYSICAL MEDICINE & REHABILITATION
Payer: MEDICARE

## 2024-12-12 ENCOUNTER — APPOINTMENT (OUTPATIENT)
Dept: OCCUPATIONAL THERAPY | Facility: REHABILITATION | Age: 64
DRG: 056 | End: 2024-12-12
Attending: PHYSICAL MEDICINE & REHABILITATION
Payer: MEDICARE

## 2024-12-12 ENCOUNTER — APPOINTMENT (OUTPATIENT)
Dept: SPEECH THERAPY | Facility: REHABILITATION | Age: 64
DRG: 056 | End: 2024-12-12
Attending: PHYSICAL MEDICINE & REHABILITATION
Payer: MEDICARE

## 2024-12-12 ENCOUNTER — HOME HEALTH ADMISSION (OUTPATIENT)
Dept: HOME HEALTH SERVICES | Facility: HOME HEALTHCARE | Age: 64
End: 2024-12-12
Payer: MEDICARE

## 2024-12-12 PROBLEM — K59.00 CONSTIPATION: Status: ACTIVE | Noted: 2024-12-12

## 2024-12-12 LAB
GLUCOSE BLD STRIP.AUTO-MCNC: 117 MG/DL (ref 65–99)
GLUCOSE BLD STRIP.AUTO-MCNC: 184 MG/DL (ref 65–99)
GLUCOSE BLD STRIP.AUTO-MCNC: 195 MG/DL (ref 65–99)
GLUCOSE BLD STRIP.AUTO-MCNC: 254 MG/DL (ref 65–99)
GLUCOSE BLD STRIP.AUTO-MCNC: 71 MG/DL (ref 65–99)

## 2024-12-12 PROCEDURE — 97112 NEUROMUSCULAR REEDUCATION: CPT

## 2024-12-12 PROCEDURE — 97110 THERAPEUTIC EXERCISES: CPT

## 2024-12-12 PROCEDURE — 700102 HCHG RX REV CODE 250 W/ 637 OVERRIDE(OP): Performed by: HOSPITALIST

## 2024-12-12 PROCEDURE — 99232 SBSQ HOSP IP/OBS MODERATE 35: CPT | Performed by: HOSPITALIST

## 2024-12-12 PROCEDURE — 97116 GAIT TRAINING THERAPY: CPT

## 2024-12-12 PROCEDURE — A9270 NON-COVERED ITEM OR SERVICE: HCPCS | Performed by: PHYSICAL MEDICINE & REHABILITATION

## 2024-12-12 PROCEDURE — 700102 HCHG RX REV CODE 250 W/ 637 OVERRIDE(OP): Performed by: PHYSICAL MEDICINE & REHABILITATION

## 2024-12-12 PROCEDURE — 82962 GLUCOSE BLOOD TEST: CPT

## 2024-12-12 PROCEDURE — 770010 HCHG ROOM/CARE - REHAB SEMI PRIVAT*

## 2024-12-12 PROCEDURE — 97535 SELF CARE MNGMENT TRAINING: CPT

## 2024-12-12 PROCEDURE — 97130 THER IVNTJ EA ADDL 15 MIN: CPT

## 2024-12-12 PROCEDURE — 97129 THER IVNTJ 1ST 15 MIN: CPT

## 2024-12-12 PROCEDURE — 99233 SBSQ HOSP IP/OBS HIGH 50: CPT | Performed by: PHYSICAL MEDICINE & REHABILITATION

## 2024-12-12 PROCEDURE — A9270 NON-COVERED ITEM OR SERVICE: HCPCS | Performed by: HOSPITALIST

## 2024-12-12 PROCEDURE — 97530 THERAPEUTIC ACTIVITIES: CPT

## 2024-12-12 PROCEDURE — 700111 HCHG RX REV CODE 636 W/ 250 OVERRIDE (IP): Performed by: PHYSICAL MEDICINE & REHABILITATION

## 2024-12-12 RX ORDER — AMOXICILLIN 250 MG
2 CAPSULE ORAL EVERY EVENING
Status: DISCONTINUED | OUTPATIENT
Start: 2024-12-12 | End: 2024-12-17 | Stop reason: HOSPADM

## 2024-12-12 RX ORDER — POLYETHYLENE GLYCOL 3350 17 G/17G
1 POWDER, FOR SOLUTION ORAL DAILY
Status: DISCONTINUED | OUTPATIENT
Start: 2024-12-13 | End: 2024-12-17 | Stop reason: HOSPADM

## 2024-12-12 RX ORDER — POLYETHYLENE GLYCOL 3350 17 G/17G
1 POWDER, FOR SOLUTION ORAL
Status: DISPENSED | OUTPATIENT
Start: 2024-12-12 | End: 2024-12-12

## 2024-12-12 RX ADMIN — Medication 100 MG: at 08:02

## 2024-12-12 RX ADMIN — PREDNISONE 60 MG: 20 TABLET ORAL at 08:01

## 2024-12-12 RX ADMIN — THERA TABS 1 TABLET: TAB at 08:01

## 2024-12-12 RX ADMIN — ATOVAQUONE 1500 MG: 750 SUSPENSION ORAL at 08:11

## 2024-12-12 RX ADMIN — POLYETHYLENE GLYCOL 3350 1 PACKET: 17 POWDER, FOR SOLUTION ORAL at 21:11

## 2024-12-12 RX ADMIN — TRAZODONE HYDROCHLORIDE 50 MG: 50 TABLET ORAL at 21:13

## 2024-12-12 RX ADMIN — HEPARIN SODIUM 5000 UNITS: 5000 INJECTION, SOLUTION INTRAVENOUS; SUBCUTANEOUS at 21:13

## 2024-12-12 RX ADMIN — HYDROCODONE BITARTRATE AND ACETAMINOPHEN 1 TABLET: 5; 325 TABLET ORAL at 21:17

## 2024-12-12 RX ADMIN — SENNOSIDES AND DOCUSATE SODIUM 2 TABLET: 50; 8.6 TABLET ORAL at 21:13

## 2024-12-12 RX ADMIN — INSULIN LISPRO 2 UNITS: 100 INJECTION, SOLUTION INTRAVENOUS; SUBCUTANEOUS at 11:53

## 2024-12-12 RX ADMIN — ATOVAQUONE 1500 MG: 750 SUSPENSION ORAL at 21:09

## 2024-12-12 RX ADMIN — DIBASIC SODIUM PHOSPHATE, MONOBASIC POTASSIUM PHOSPHATE AND MONOBASIC SODIUM PHOSPHATE 250 MG: 852; 155; 130 TABLET ORAL at 21:13

## 2024-12-12 RX ADMIN — HEPARIN SODIUM 5000 UNITS: 5000 INJECTION, SOLUTION INTRAVENOUS; SUBCUTANEOUS at 14:11

## 2024-12-12 RX ADMIN — MIRTAZAPINE 15 MG: 15 TABLET, ORALLY DISINTEGRATING ORAL at 21:13

## 2024-12-12 RX ADMIN — OMEPRAZOLE 20 MG: 20 CAPSULE, DELAYED RELEASE ORAL at 08:02

## 2024-12-12 RX ADMIN — Medication 1 TABLET: at 08:11

## 2024-12-12 RX ADMIN — HEPARIN SODIUM 5000 UNITS: 5000 INJECTION, SOLUTION INTRAVENOUS; SUBCUTANEOUS at 05:52

## 2024-12-12 RX ADMIN — PYRIDOXINE HCL TAB 50 MG 50 MG: 50 TAB at 08:01

## 2024-12-12 RX ADMIN — POLYETHYLENE GLYCOL 3350 1 PACKET: 17 POWDER, FOR SOLUTION ORAL at 18:01

## 2024-12-12 RX ADMIN — Medication 324 MG: at 08:01

## 2024-12-12 RX ADMIN — FOLIC ACID 1 MG: 1 TABLET ORAL at 08:02

## 2024-12-12 RX ADMIN — DIBASIC SODIUM PHOSPHATE, MONOBASIC POTASSIUM PHOSPHATE AND MONOBASIC SODIUM PHOSPHATE 250 MG: 852; 155; 130 TABLET ORAL at 08:01

## 2024-12-12 RX ADMIN — POLYETHYLENE GLYCOL 3350 1 PACKET: 17 POWDER, FOR SOLUTION ORAL at 17:27

## 2024-12-12 RX ADMIN — INSULIN LISPRO 2 UNITS: 100 INJECTION, SOLUTION INTRAVENOUS; SUBCUTANEOUS at 21:28

## 2024-12-12 RX ADMIN — DIBASIC SODIUM PHOSPHATE, MONOBASIC POTASSIUM PHOSPHATE AND MONOBASIC SODIUM PHOSPHATE 250 MG: 852; 155; 130 TABLET ORAL at 11:51

## 2024-12-12 ASSESSMENT — BALANCE ASSESSMENTS
MEDICARE IMPAIRMENT PERCENTAGE: 25
LOOK OVER LEFT AND RIGHT SHOULDERS WHILE STANDING: 2
TURN 360 DEGREES: 2
TRANSFERS: 3
STANDING ON ONE LEG: 1
STANDING UNSUPPORTED: 4
LONG VERSION TOTAL SCORE (MAX 56): 42
STANDING UNSUPPORTED ONE FOOT IN FRONT: 2
STANDING UNSUPPORTED WITH FEET TOGETHER: 4
REACHING FORWARD WITH OUTSTRETCHED ARM WHILE STANDING: 4
PICK UP OBJECT FROM THE FLOOR FROM A STANDING POSITION: 4
STANDING TO SITTING: 3
PLACE ALTERNATE FOOT ON STEP OR STOOL WHILE STANDING UNSUPPORTED: 2
STANDING UNSUPPORTED WITH EYES CLOSED: 4
SITTING UNSUPPORTED: 4
SITTING TO STANDING: 3
LONG VERSION TOTAL SCORE (MAX 56): 42

## 2024-12-12 ASSESSMENT — ENCOUNTER SYMPTOMS
BLURRED VISION: 0
SHORTNESS OF BREATH: 0
HEADACHES: 0
DIZZINESS: 0
NAUSEA: 0
FEVER: 0
VOMITING: 0
HALLUCINATIONS: 0
PALPITATIONS: 0

## 2024-12-12 ASSESSMENT — GAIT ASSESSMENTS
ASSISTIVE DEVICE: FRONT WHEEL WALKER
DEVIATION: DECREASED BASE OF SUPPORT;SHUFFLED GAIT;DECREASED HEEL STRIKE;DECREASED TOE OFF;BRADYKINETIC
DISTANCE (FEET): 300
DISTANCE (FEET): 200
ASSISTIVE DEVICE: FRONT WHEEL WALKER
DEVIATION: DECREASED BASE OF SUPPORT;SHUFFLED GAIT;DECREASED HEEL STRIKE;DECREASED TOE OFF;BRADYKINETIC
GAIT LEVEL OF ASSIST: STANDBY ASSIST
GAIT LEVEL OF ASSIST: STANDBY ASSIST

## 2024-12-12 ASSESSMENT — ACTIVITIES OF DAILY LIVING (ADL): BED_CHAIR_WHEELCHAIR_TRANSFER_DESCRIPTION: INCREASED TIME;SET-UP OF EQUIPMENT;VERBAL CUEING

## 2024-12-12 ASSESSMENT — PAIN DESCRIPTION - PAIN TYPE: TYPE: ACUTE PAIN

## 2024-12-12 NOTE — CARE PLAN
Problem: Problem Solving STGs  Goal: STG-Within one week, patient will complete medication management tasks with spv and 90% accuracy.    Outcome: Not Met  Goal: STG-Within one week, patient will complete executive functions with min A and 90% accuracy.    Outcome: Not Met

## 2024-12-12 NOTE — PROGRESS NOTES
Physical Medicine & Rehabilitation Progress Note    Encounter Date: 12/12/2024    Chief Complaint: Decreased mobility    Interval Events (Subjective):  Patient sitting up in room. She reports she is doing OK. She reports constipation with multiple agents trialed. Will do bowel clean out after therapy. Discussed would have IDT later today.     _____________________________________  Interdisciplinary Team Conference   Most recent IDT on 12/12/2024    ICristiano M.D./Ph.D., was present and led the interdisciplinary team conference on 12/12/2024.  I led the IDT conference and agree with the IDT conference documentation and plan of care as noted below.     Nursing:  Diet Current Diet Order   Procedures    Diet Order Diet: Regular       Eating ADL Independent      % of Last Meal  Oral Nutrition: *  * Meal *  *, Breakfast, Between 50-75% Consumed   Sleep    Bowel Last BM: 12/11/24   Bladder Continent   Barriers to Discharge Home:  Pain at night    Physical Therapy:  Bed Mobility    Transfers Contact Guard Assist  Increased time, Set-up of equipment, Verbal cueing (stand step w/ UE support)   Mobility Standby Assist   Stairs    Barriers to Discharge Home:  Needs friend assist     WC vs FWW    Occupational Therapy:  Grooming Standby Assist (set-up for grooming task @ w/c level (brushed and blow dried hair))   Bathing Minimal Assist (SBA for seated bathing tasks (on fold down shower bench); min A for standing bathing tasks)   UB Dressing Stand by Assist (set-up and increased time to don long sleeve shirt and sweatshirt while seated in w/c)   LB Dressing Modified Independent (to don tie-free skechers w/ LH shoe horn)   Toileting Minimal Assist   Shower & Transfer Porsche   Barriers to Discharge Home:  Limited    Speech-Language Pathology:    Comprehension:  Modified Independent  Comprehension Description:  Glasses  Expression:  Independent  Expression Description:     Social Interaction:  Independent  Social  "Interaction Description:     Problem Solving:  Supervision  Problem Solving Description:  Verbal cueing, Therapy schedule, Seat belt, Increased time, Bed/chair alarm  Memory:  Modified Independent  Memory Description:  Verbal cueing, Therapy schedule, Increased time, Bed/chair alarm  Barriers to Discharge Home:  Executive functioning    Respiratory Therapy:  O2 (LPM): 0  O2 Delivery Device: None - Room Air    Case Management:  Continues to work on disposition and DME needs.      Discharge Date/Disposition:  12/17/24  _____________________________________      Objective:  VITAL SIGNS: /64   Pulse 79   Temp 36.4 °C (97.6 °F) (Oral)   Resp 16   Ht 1.707 m (5' 7.2\")   Wt 56.5 kg (124 lb 9 oz)   SpO2 99%   BMI 19.39 kg/m²   Gen: NAD  Psych: Mood and affect appropriate  CV: RRR, 0 edema  Resp: CTAB, no upper airway sounds  Abd: NTND  Neuro: AOx4, following commands    Laboratory Values:  Recent Results (from the past 72 hours)   POCT glucose device results    Collection Time: 12/09/24  5:26 PM   Result Value Ref Range    POC Glucose, Blood 176 (H) 65 - 99 mg/dL   POCT glucose device results    Collection Time: 12/09/24  9:06 PM   Result Value Ref Range    POC Glucose, Blood 187 (H) 65 - 99 mg/dL   CBC with Differential    Collection Time: 12/10/24  6:05 AM   Result Value Ref Range    WBC 4.8 4.8 - 10.8 K/uL    RBC 2.88 (L) 4.20 - 5.40 M/uL    Hemoglobin 7.4 (L) 12.0 - 16.0 g/dL    Hematocrit 23.0 (L) 37.0 - 47.0 %    MCV 79.9 (L) 81.4 - 97.8 fL    MCH 25.7 (L) 27.0 - 33.0 pg    MCHC 32.2 32.2 - 35.5 g/dL    RDW 65.3 (H) 35.9 - 50.0 fL    Platelet Count 133 (L) 164 - 446 K/uL    MPV 9.9 9.0 - 12.9 fL    Neutrophils-Polys 93.00 (H) 44.00 - 72.00 %    Lymphocytes 6.10 (L) 22.00 - 41.00 %    Monocytes 0.90 0.00 - 13.40 %    Eosinophils 0.00 0.00 - 6.90 %    Basophils 0.00 0.00 - 1.80 %    Nucleated RBC 0.00 0.00 - 0.20 /100 WBC    Neutrophils (Absolute) 4.46 1.82 - 7.42 K/uL    Lymphs (Absolute) 0.29 (L) 1.00 - " 4.80 K/uL    Monos (Absolute) 0.04 0.00 - 0.85 K/uL    Eos (Absolute) 0.00 0.00 - 0.51 K/uL    Baso (Absolute) 0.00 0.00 - 0.12 K/uL    NRBC (Absolute) 0.00 K/uL    Anisocytosis 1+     Microcytosis 2+ (A)    Comp Metabolic Panel (CMP)    Collection Time: 12/10/24  6:05 AM   Result Value Ref Range    Sodium 132 (L) 135 - 145 mmol/L    Potassium 4.6 3.6 - 5.5 mmol/L    Chloride 102 96 - 112 mmol/L    Co2 22 20 - 33 mmol/L    Anion Gap 8.0 7.0 - 16.0    Glucose 136 (H) 65 - 99 mg/dL    Bun 46 (H) 8 - 22 mg/dL    Creatinine 0.96 0.50 - 1.40 mg/dL    Calcium 9.7 8.5 - 10.5 mg/dL    Correct Calcium 10.2 8.5 - 10.5 mg/dL    AST(SGOT) 18 12 - 45 U/L    ALT(SGPT) 18 2 - 50 U/L    Alkaline Phosphatase 102 (H) 30 - 99 U/L    Total Bilirubin 0.3 0.1 - 1.5 mg/dL    Albumin 3.4 3.2 - 4.9 g/dL    Total Protein 6.0 6.0 - 8.2 g/dL    Globulin 2.6 1.9 - 3.5 g/dL    A-G Ratio 1.3 g/dL   HEMOGLOBIN A1C    Collection Time: 12/10/24  6:05 AM   Result Value Ref Range    Glycohemoglobin 5.2 4.0 - 5.6 %    Est Avg Glucose 103 mg/dL   TSH with Reflex to FT4    Collection Time: 12/10/24  6:05 AM   Result Value Ref Range    TSH 0.168 (L) 0.380 - 5.330 uIU/mL   Vitamin D, 25-hydroxy (blood)    Collection Time: 12/10/24  6:05 AM   Result Value Ref Range    25-Hydroxy   Vitamin D 25 30 30 - 100 ng/mL   PHOSPHORUS    Collection Time: 12/10/24  6:05 AM   Result Value Ref Range    Phosphorus 2.0 (L) 2.5 - 4.5 mg/dL   ESTIMATED GFR    Collection Time: 12/10/24  6:05 AM   Result Value Ref Range    GFR (CKD-EPI) 65 >60 mL/min/1.73 m 2   FREE THYROXINE    Collection Time: 12/10/24  6:05 AM   Result Value Ref Range    Free T-4 0.90 (L) 0.93 - 1.70 ng/dL   DIFFERENTIAL MANUAL    Collection Time: 12/10/24  6:05 AM   Result Value Ref Range    Manual Diff Status PERFORMED    PERIPHERAL SMEAR REVIEW    Collection Time: 12/10/24  6:05 AM   Result Value Ref Range    Peripheral Smear Review see below    PLATELET ESTIMATE    Collection Time: 12/10/24  6:05 AM    Result Value Ref Range    Plt Estimation Decreased    MORPHOLOGY    Collection Time: 12/10/24  6:05 AM   Result Value Ref Range    RBC Morphology Present     Poikilocytosis 3+     Ovalocytes 1+     Schistocytes 1+    POCT glucose device results    Collection Time: 12/10/24  8:11 AM   Result Value Ref Range    POC Glucose, Blood 115 (H) 65 - 99 mg/dL   POCT glucose device results    Collection Time: 12/10/24 11:35 AM   Result Value Ref Range    POC Glucose, Blood 158 (H) 65 - 99 mg/dL   POCT glucose device results    Collection Time: 12/10/24  4:36 PM   Result Value Ref Range    POC Glucose, Blood 126 (H) 65 - 99 mg/dL   POCT glucose device results    Collection Time: 12/10/24  9:02 PM   Result Value Ref Range    POC Glucose, Blood 217 (H) 65 - 99 mg/dL   CBC WITHOUT DIFFERENTIAL    Collection Time: 12/11/24  6:35 AM   Result Value Ref Range    WBC 4.2 (L) 4.8 - 10.8 K/uL    RBC 3.10 (L) 4.20 - 5.40 M/uL    Hemoglobin 8.2 (L) 12.0 - 16.0 g/dL    Hematocrit 25.3 (L) 37.0 - 47.0 %    MCV 81.6 81.4 - 97.8 fL    MCH 26.5 (L) 27.0 - 33.0 pg    MCHC 32.4 32.2 - 35.5 g/dL    RDW 67.0 (H) 35.9 - 50.0 fL    Platelet Count 133 (L) 164 - 446 K/uL    MPV 9.8 9.0 - 12.9 fL   Basic Metabolic Panel    Collection Time: 12/11/24  6:35 AM   Result Value Ref Range    Sodium 135 135 - 145 mmol/L    Potassium 4.4 3.6 - 5.5 mmol/L    Chloride 105 96 - 112 mmol/L    Co2 22 20 - 33 mmol/L    Glucose 82 65 - 99 mg/dL    Bun 39 (H) 8 - 22 mg/dL    Creatinine 0.99 0.50 - 1.40 mg/dL    Calcium 9.7 8.5 - 10.5 mg/dL    Anion Gap 8.0 7.0 - 16.0   PHOSPHORUS    Collection Time: 12/11/24  6:35 AM   Result Value Ref Range    Phosphorus 1.8 (L) 2.5 - 4.5 mg/dL   AMMONIA    Collection Time: 12/11/24  6:35 AM   Result Value Ref Range    Ammonia 34 11 - 45 umol/L   ESTIMATED GFR    Collection Time: 12/11/24  6:35 AM   Result Value Ref Range    GFR (CKD-EPI) 63 >60 mL/min/1.73 m 2   POCT glucose device results    Collection Time: 12/11/24  7:33 AM    Result Value Ref Range    POC Glucose, Blood 71 65 - 99 mg/dL   POCT glucose device results    Collection Time: 12/11/24 11:43 AM   Result Value Ref Range    POC Glucose, Blood 130 (H) 65 - 99 mg/dL   POCT glucose device results    Collection Time: 12/11/24  5:13 PM   Result Value Ref Range    POC Glucose, Blood 146 (H) 65 - 99 mg/dL   POCT glucose device results    Collection Time: 12/11/24  8:17 PM   Result Value Ref Range    POC Glucose, Blood 254 (H) 65 - 99 mg/dL   POCT glucose device results    Collection Time: 12/12/24  7:25 AM   Result Value Ref Range    POC Glucose, Blood 71 65 - 99 mg/dL       Medications:  Scheduled Medications   Medication Dose Frequency    phosphorus  1 Tablet BID    pyridoxine  50 mg DAILY    multivitamin  1 Tablet DAILY    insulin lispro  2-12 Units 4X/DAY ACHS    ferrous gluconate  324 mg QDAY with Breakfast    senna-docusate  2 Tablet Q EVENING    omeprazole  20 mg DAILY    atovaquone  1,500 mg BID    folic acid  1 mg DAILY    heparin  5,000 Units Q8HRS    mirtazapine  15 mg QHS    predniSONE  60 mg DAILY    thiamine  100 mg DAILY    vitamin B + C complex  1 Tablet DAILY     PRN medications: HYDROcodone-acetaminophen, insulin lispro **AND** POC blood glucose manual result **AND** NOTIFY MD and PharmD **AND** Administer 20 grams of glucose (approximately 8 ounces of fruit juice) every 15 minutes PRN FSBG less than 70 mg/dL **AND** dextrose bolus, hydrALAZINE, acetaminophen, senna-docusate **AND** polyethylene glycol/lytes, docusate sodium, magnesium hydroxide, carboxymethylcellulose, benzocaine-menthol, mag hydrox-al hydrox-simeth, ondansetron **OR** ondansetron, traZODone, sodium chloride    Diet:  Current Diet Order   Procedures    Diet Order Diet: Regular       Medical Decision Making and Plan:  Hepatic encephalopathy - Patient with confusion on 11/27 with AMS and failure to thrive, thought to be hepatic encephalopathy vs autoimmune encephalitis. She was started on steroids and  has been changed to prednisone 60 mg daily.  -PT and OT for mobility and ADLs. Per guidelines, 15 hours per week between PT, OT and/or SLP.  -Follow-up Neurology     HTN/sCHF - Patient off medications. Will monitor.      DM2 with hyperglycemia - Patient on SSI on transfer. Continue SSI blood sugars into 200s     Anemia - Check AM CBC - 7.4, consult hospitalist. Repeat 8.2 on 12/11. Started on Fe supplement    Thrombocytopenia - 133 on admission, consult hospitalist. Repeat 133 on 12/11     Hyponatremia - Check AM CMP - 132, consult hospitalist     CKD/Azotemia - Check AM CMP. Avoid nephrotoxic agents. BUN 46 on admission. Repeat 39 on 12/11, given IV fluids     Hypophosphatemia - Check AM level - 2.0. Continue K-Phos     Severe protein-calorie malnutrition - Meets Aspen criteria for severe. Dietitian to consult     Depression - Patient on Remeron 15 mg QHS     Alcohol abuse/cirrhosis - Patient on Thimaine/folic acid. May need to restart Lactulose. Ammonia normal on 12/11  -Constipation, will start Miralax hourly x4 for bowel clean out. Consider restart Lactulose     Pain - Patient on PRN Tylenol/Oxycodone. Switch to Norco     Skin - Patient at risk for skin breakdown due to debility in areas including sacrum, achilles, elbows and head in addition to other sites. Nursing to assess skin daily.      GI Ppx - Patient on Prilosec for GERD prophylaxis. Patient on Senna-docusate for constipation prophylaxis.      DVT Ppx - Patient Heparin on transfer. Continue Heparin as limited mobility  ____________________________________    T. Juliocesar Bender MD/PhD  [facility] - Physical Medicine & Rehabilitation   [facility] - Brain Injury Medicine   ____________________________________    Total time:  53 minutes. Time spent included pre-rounding review of vitals and tests, unit/floor time, face-to-face time with the patient including physical examination, care coordination, counseling of patient and/or family, ordering  medications/procedures/tests, discussion with CM, PT, OT, SLP and/or other healthcare providers, and documentation in the electronic medical record. Topics discussed included new constipation, bowel cleanout, discharge planning, and elevated blood sugars. Patient was discussed separately in IDT today; please see details above

## 2024-12-12 NOTE — CARE PLAN
Problem: Knowledge Deficit - Standard  Goal: Patient and family/care givers will demonstrate understanding of plan of care, disease process/condition, diagnostic tests and medications  Outcome: Progressing  Reviewed POC, all questions answered at this time.      Problem: Psychosocial  Goal: Patient's ability to verbalize feelings about condition will improve  Outcome: Progressing  Goal: Patient's ability to re-evaluate and adapt role responsibilities will improve  Outcome: Progressing    Patient able to verbalize needs.  Will continue to monitor.     Problem: Mobility  Goal: Patient's capacity to carry out activities will improve  Outcome: Progressing    Pt is up and walking, participates in therapies, and up to dinning room for all meals.       The patient is Watcher - Medium risk of patient condition declining or worsening    Shift Goals  Clinical Goals: safety  Patient Goals: pain control and to have a bowel movement

## 2024-12-12 NOTE — PROGRESS NOTES
Received shift report and assumed care of patient.  Patient is awake and alert in w/c. Patient denies any pain at this time. Safety precautions in place. Call light and personal items in reach. Will continue to monitor.

## 2024-12-12 NOTE — THERAPY
Speech Language Pathology  Daily Treatment     Patient Name: Denisse Abel  Age:  64 y.o., Sex:  female  Medical Record #: [id number]  Today's Date: 12/12/2024     Precautions  Precautions: Fall Risk    Subjective    Pt pleasant and cooperative.     Objective       12/12/24 0933   Treatment Charges   SLP Cognitive Skill Development First 15 Minutes 1   SLP Cognitive Skill Development Additional 15 Minutes 1   SLP Total Time Spent   SLP Individual Total Time Spent (Mins) 30         Assessment    Corrected errors of who has more with MIN A and education re: simplifying task and breaking down for ease of completion. Medications reviewed with written medication list formulated, pt with FAIR awareness of current medications overall.     Strengths: Able to follow instructions, Alert and oriented, Effective communication skills, Good carryover of learning, Good insight into deficits/needs, Independent prior level of function, Motivated for self care and independence, Pleasant and cooperative, Willingly participates in therapeutic activities  Barriers: Impaired functional cognition    Plan    Functional medication sort, who has more dollars and coins, memory     Speech Therapy Problems (Active)       Problem: Problem Solving STGs       Dates: Start:  12/10/24         Goal: STG-Within one week, patient will complete medication management tasks with spv and 90% accuracy.         Dates: Start:  12/10/24            Goal: STG-Within one week, patient will complete executive functions with min A and 90% accuracy.         Dates: Start:  12/10/24               Problem: Speech/Swallowing LTGs       Dates: Start:  12/10/24         Goal: LTG-By discharge, patient will solve complex problems with modified independence.         Dates: Start:  12/10/24

## 2024-12-12 NOTE — CARE PLAN
Problem: OT Long Term Goals  Goal: LTG-By discharge, patient will complete basic self care tasks w/ supervision to mod I   Outcome: Not Met  Goal: LTG-By discharge, patient will perform bathroom transfers w/ mod I w/ LRAD   Outcome: Not Met  Goal: LTG-By discharge, patient will complete basic home management w/ supervision to mod I   Outcome: Not Met

## 2024-12-12 NOTE — THERAPY
Physical Therapy   Daily Treatment     Patient Name: Denisse Abel  Age:  64 y.o., Sex:  female  Medical Record #: [id number]  Today's Date: 12/11/2024     Precautions  Precautions: Fall Risk    Subjective    Patient is found seated up in chair, agreeable to participate. States her morning got off to a rough start, reports she feels better this afternoon.      Objective       12/11/24 1031 12/11/24 1431   PT Charge Group   PT Gait Training (Units)  --  4   PT Therapeutic Exercise (Units) 2  --    PT Total Time Spent   PT Individual Total Time Spent (Mins) 30 60   Gait Functional Level of Assist    Gait Level Of Assist  --  Contact Guard Assist   Assistive Device  --  Front Wheel Walker   Distance (Feet)  --  250  (outdoors over uneven ground, seated rest breaks as needed)   # of Times Distance was Traveled  --  4   Deviation  --  Decreased Base Of Support;Shuffled Gait;Decreased Heel Strike;Decreased Toe Off;Bradykinetic   Sitting Lower Body Exercises   Sit to Stand   (1 set of 5 in parallel bars with cues for no UE support)  --    Nustep Resistance Level 5  (10 min 354 steps)  --          Assessment    Patient demonstrates slow gait speed, requires rest breaks. Session this morning is limited by emotional distress related to having a rough morning. She will benefit from further strengthening/ balance training to improve conditioning and gait speed. Her walking is limited by general bradykinetic movement quality.     Strengths: Able to follow instructions, Adequate strength, Alert and oriented, Good insight into deficits/needs, Independent prior level of function, Motivated for self care and independence, Pleasant and cooperative, Willingly participates in therapeutic activities  Barriers: Confused, Decreased endurance, Fatigue, Generalized weakness, Home accessibility, Impaired activity tolerance, Impaired sleep pattern, Impaired balance, Pain (R knee pain/OA)    Plan    Balance  Dynamic gait  Strengthening  "  Aerobic activity tolerance    DME  PT DME Recommendations  Wheelchair: 16\" Width, Lightweight, Removable/Flip Back Armrests, Standard Leg Rests, Anti-tippers  Cushion: Standard  Assistive Device: Front Wheeled Walker (has this; may be candidate for 4WW)    Passport items to be completed:  Get in/out of bed safely, in/out of a vehicle, safely use mobility device, walk or wheel around home/community, navigate up and down stairs, show how to get up/down from the ground, ensure home is accessible, demonstrate HEP, complete caregiver training    Physical Therapy Problems (Active)       Problem: Balance       Dates: Start:  12/10/24         Goal: STG-Within one week, patient will maintain dynamic standing with </= 1 UE support while performing serial reaching task > 10\" outside of [place] with Daniella or better.        Dates: Start:  12/10/24               Problem: Mobility       Dates: Start:  12/10/24         Goal: STG-Within one week, patient will ambulate community distances >/= 250' c/ LRAD at [place] or better.        Dates: Start:  12/10/24            Goal: STG-Within one week, patient will ambulate up/down a curb c/ LRAD and [place]iella or better.        Dates: Start:  12/10/24               Problem: Mobility Transfers       Dates: Start:  12/10/24         Goal: STG-Within one week, patient will transfer bed to chair c/ Daniella or better and LRAD.        Dates: Start:  12/10/24               Problem: PT-Long Term Goals       Dates: Start:  12/10/24         Goal: LTG-By discharge, patient will ambulate >/= 500' c/ LRAD at [place].        Dates: Start:  12/10/24            Goal: LTG-By discharge, patient will transfer one surface to another c/ [place].        Dates: Start:  12/10/24            Goal: LTG-By discharge, patient will transfer in/out of a car c/ Setup or better.        Dates: Start:  12/10/24            Goal: LTG-By discharge, patient will maintain balance while standing c/ LRAD while performing relevant activities to care for " her dog (picking bowl from ground, picking up leash, etc).        Dates: Start:  12/10/24

## 2024-12-12 NOTE — DISCHARGE PLANNING
Case Management/IDT follow up.   Projected dc date:  IDT continues to recommend IRF level of care as patient continue to make progress with all therapies.     DC needs  Home health:  PT/OT/RN/CNA  DME: [facility]  Family training:    Follow up:   PCP:  Other:     I met with patient providing update from IDT and discussed plan of care.  She is in agreement w/ plan.     Plan:  Continue to follow

## 2024-12-12 NOTE — CARE PLAN
"  Problem: Balance  Goal: STG-Within one week, patient will maintain dynamic standing with </= 1 UE support while performing serial reaching task > 10\" outside of [place] with Daniella or better.   Outcome: Not Met     Problem: Mobility  Goal: STG-Within one week, patient will ambulate up/down a curb c/ LRAD and [place]iella or better.   Outcome: Not Met     Problem: Mobility  Goal: STG-Within one week, patient will ambulate community distances >/= 250' c/ LRAD at [place] or better.   Outcome: Met     Problem: Mobility Transfers  Goal: STG-Within one week, patient will transfer bed to chair c/ Daniella or better and LRAD.   Outcome: Met     "

## 2024-12-12 NOTE — THERAPY
Physical Therapy   Daily Treatment     Patient Name: Denisse Abel  Age:  64 y.o., Sex:  female  Medical Record #: [id number]  Today's Date: 12/12/2024     Precautions  Precautions: Fall Risk    Subjective    Pt received in w/c in room following completion of breakfast. Pt requested therapist to grab her prune juice to assist w/ her constipation. Pt willing to participate in therapy.     Objective       12/12/24 0830   PT Charge Group   PT Gait Training (Units) 1   PT Therapeutic Activities (Units) 1   PT Total Time Spent   PT Individual Total Time Spent (Mins) 30   Precautions   Precautions Fall Risk   Vitals   O2 Delivery Device None - Room Air   Pain   Intervention Rest   Pain 0 - 10 Group   Location Knee   Location Orientation Right   Description Aching   Therapist Pain Assessment During Activity  (c/o R knee pain during ambulatino)   Gait Functional Level of Assist    Gait Level Of Assist Standby Assist   Assistive Device Front Wheel Walker   Distance (Feet) 200   # of Times Distance was Traveled 1   Deviation Decreased Base Of Support;Shuffled Gait;Decreased Heel Strike;Decreased Toe Off;Bradykinetic   Transfer Functional Level of Assist   Bed, Chair, Wheelchair Transfer Contact Guard Assist   Bed Chair Wheelchair Transfer Description Increased time;Set-up of equipment;Verbal cueing  (stand step w/ UE support)   Bed Mobility    Sit to Stand Contact Guard Assist   Interdisciplinary Plan of Care Collaboration   IDT Collaboration with  Recreation Therapist   Patient Position at End of Therapy Seated;Chair Alarm On;Other (Comments)  (pt in gym)   Collaboration Comments hand off of care to recreational therapist     Esme for shoe don.    Assessment    Pt limited in ambulation today due to LE soreness and R knee pain she associated to arthritis and increased ambulation volume completed yesterday in therapy. Pt mildly unsteady during transfers and continues to require CGA for safety.    Strengths: Able to follow  "instructions, Adequate strength, Alert and oriented, Good insight into deficits/needs, Independent prior level of function, Motivated for self care and independence, Pleasant and cooperative, Willingly participates in therapeutic activities  Barriers: Confused, Decreased endurance, Fatigue, Generalized weakness, Home accessibility, Impaired activity tolerance, Impaired sleep pattern, Impaired balance, Pain (R knee pain/OA)    Plan    Balance  Dynamic gait  Strengthening   Aerobic activity tolerance    DME  PT DME Recommendations  Wheelchair: 16\" Width, Lightweight, Removable/Flip Back Armrests, Standard Leg Rests, Anti-tippers  Cushion: Standard  Assistive Device: Front Wheeled Walker (has this; may be candidate for 4WW)    Passport items to be completed:  Get in/out of bed safely, in/out of a vehicle, safely use mobility device, walk or wheel around home/community, navigate up and down stairs, show how to get up/down from the ground, ensure home is accessible, demonstrate HEP, complete caregiver training    Physical Therapy Problems (Active)       Problem: Balance       Dates: Start:  12/10/24         Goal: STG-Within one week, patient will maintain dynamic standing with </= 1 UE support while performing serial reaching task > 10\" outside of [place] with Daniella or better.        Dates: Start:  12/10/24               Problem: Mobility       Dates: Start:  12/10/24         Goal: STG-Within one week, patient will ambulate community distances >/= 250' c/ LRAD at [place] or better.        Dates: Start:  12/10/24            Goal: STG-Within one week, patient will ambulate up/down a curb c/ LRAD and [place] or better.        Dates: Start:  12/10/24               Problem: Mobility Transfers       Dates: Start:  12/10/24         Goal: STG-Within one week, patient will transfer bed to chair c/ Daniella or better and LRAD.        Dates: Start:  12/10/24               Problem: PT-Long Term Goals       Dates: Start:  12/10/24         Goal: " LTG-By discharge, patient will ambulate >/= 500' c/ LRAD at [place].        Dates: Start:  12/10/24            Goal: LTG-By discharge, patient will transfer one surface to another c/ [place].        Dates: Start:  12/10/24            Goal: LTG-By discharge, patient will transfer in/out of a car c/ Setup or better.        Dates: Start:  12/10/24            Goal: LTG-By discharge, patient will maintain balance while standing c/ LRAD while performing relevant activities to care for her dog (picking bowl from ground, picking up leash, etc).        Dates: Start:  12/10/24

## 2024-12-12 NOTE — DISCHARGE PLANNING
CM//Discharge :    The following has been ordered:   Home health:         [facility]             Disciplines ordered: RN, PT, OT, Aid, wound care  Status: Sent

## 2024-12-12 NOTE — THERAPY
Occupational Therapy  Daily Treatment     Patient Name: Denisse Abel  Age:  64 y.o., Sex:  female  Medical Record #: [id number]  Today's Date: 12/12/2024     Precautions  Precautions: (P) Fall Risk    Subjective    Pt seated in w/c upon arrival, agreeable to participate in OT.      Objective     12/12/24 1031   OT Charge Group   OT Self Care / ADL (Units) 2   OT Neuromuscular Re-education / Balance (Units) 2   OT Total Time Spent   OT Individual Total Time Spent (Mins) 60   Precautions   Precautions Fall Risk   Vitals   O2 Delivery Device None - Room Air   Cognition    Level of Consciousness Alert   Functional Level of Assist   Bathing Standby Assist  (Mod I for seated bathing tasks on fold down shower bench; SBA for standing tasks)   Upper Body Dressing Modified Independent   Lower Body Dressing Standby Assist  (to don underwear, elastic waist pants, socks and shoes (w/ LH shoe horn) while incorporating sitting and standing)   Tub / Shower Transfers Standby Assist  (FWW level)   Interdisciplinary Plan of Care Collaboration   Patient Position at End of Therapy Seated;Self Releasing Lap Belt Applied   Collaboration Comments Friend present @ end of session to visit patient     FWW mobility w/ SBA to supervision > 500' (indoors) including retrieval of towels from west therapy gym (in preparation for shower), pt hung towels on FWW and txfr'd them safely to room w/ no overt LOB noted    Assessment    Pt demo's excellent progress toward DC goals; SBA to mod I for ADL routine w/ incorporating sitting and standing. Benefits from using LH shoe horn to maximize LB dressing independence.   Strengths: Able to follow instructions, Alert and oriented, Willingly participates in therapeutic activities, Pleasant and cooperative, Motivated for self care and independence, Independent prior level of function  Barriers: Decreased endurance, Fatigue, Generalized weakness, Impaired activity tolerance, Impaired balance, Limited  mobility    Plan    Higher level balance activities, dual tasking, generalized strengthening/endurance training     DME  OT DME Recommendations  Bathroom Equipment:  (Pt has shower chair)    Passport items to be completed:  Perform bathroom transfers, complete dressing, complete feeding, get ready for the day, prepare a simple meal, participate in household tasks, adapt home for safety needs, demonstrate home exercise program, complete caregiver training     Occupational Therapy Goals (Active)       Problem: OT Long Term Goals       Dates: Start:  12/10/24         Goal: LTG-By discharge, patient will complete basic self care tasks w/ supervision to mod I        Dates: Start:  12/10/24            Goal: LTG-By discharge, patient will perform bathroom transfers w/ mod I w/ LRAD        Dates: Start:  12/10/24            Goal: LTG-By discharge, patient will complete basic home management w/ supervision to mod I        Dates: Start:  12/10/24               Problem: Pt will complete LB dressing @ SBA level        Dates: Start:  12/10/24            Please follow up with Dr. Sotelo in the office in 2 weeks. Call the office to schedule the appointment.

## 2024-12-12 NOTE — PROGRESS NOTES
NURSING DAILY NOTE    Name: Denisse Abel   Date of Admission: 12/9/2024   Admitting Diagnosis: No Principal Problem: There is no principal problem currently on the Problem List. Please update the Problem List and refresh.  Attending Physician: HAMIDA LARA M.D.  Allergies: Sulfa drugs    Safety  Patient Assist  MOD  Patient Precautions  Fall Risk  Precaution Comments     Bed Transfer Status  Minimal Assist  Toilet Transfer Status   Minimal Assist  Assistive Devices  Rails, Wheelchair  Oxygen  None - Room Air  Diet/Therapeutic Dining  Current Diet Order   Procedures    Diet Order Diet: Regular     Pill Administration  whole  Agitated Behavioral Scale     ABS Level of Severity       Fall Risk  Has the patient had a fall this admission?   No  Selam Alonso Fall Risk Scoring  12, MODERATE RISK  Fall Risk Safety Measures  bed alarm, chair alarm, poor balance, and low vision/ hearing    Vitals  Temperature: 36.7 °C (98 °F)  Temp src: Oral  Pulse: (!) 7  Respiration: 18  Blood Pressure : 115/60  Blood Pressure MAP (Calculated): 78 MM HG  BP Location: Left, Upper Arm  Patient BP Position: Supine     Oxygen  Pulse Oximetry: 95 %  O2 (LPM): 0  O2 Delivery Device: None - Room Air    Bowel and Bladder  Last Bowel Movement  12/11/24  Stool Type  Type 5: Soft blob with clear cut edges (passed easily)  Bowel Device  Bathroom  Continent  Bladder: Continent void   Bowel: Continent movement  Bladder Function  Urine Void (mL):  (Moderate)  Number of Times Voided: 1  Urine Color: Yellow  Genitourinary Assessment   Bladder Assessment (WDL):  WDL Except  Urine Color: Yellow  Bladder Device: Bathroom  Bladder Scan: Post Void  $ Bladder Scan Results (mL): 15    Skin  John Score   19  Sensory Interventions      Moisture Interventions         Pain  Pain Rating Scale  8 - Awful, hard to do anything  Pain Location  Back  Pain Location Orientation  Lower  Pain  Interventions   Medication (see MAR)    ADLs    Bathing   Patient Refused Bathing  Linen Change      Personal Hygiene     Chlorhexidine Bath      Oral Care  Brushed Teeth  Teeth/Dentures     Shave     Nutrition Percentage Eaten  *  * Meal *  *, Between 50-75% Consumed  Environmental Precautions  Treaded Slipper Socks on Patient, Bed in Low Position  Patient Turns/Positioning  Patient turns self independently side to side without assistance, to offload sacral area  Patient Turns Assistance/Tolerance     Bed Positions  Bed Controls On, Bed Locked  Head of Bed Elevated  Self regulated      Psychosocial/Neurologic Assessment  Psychosocial Assessment  Psychosocial (WDL):  Within Defined Limits  Neurologic Assessment  Neuro (WDL): Exceptions to WDL  Level of Consciousness: Alert  Orientation Level: Oriented X4  Cognition: Follows commands  Speech: Clear  EENT (WDL):  WDL Except    Cardio/Pulmonary Assessment  Edema      Respiratory Breath Sounds  RUL Breath Sounds: Clear  RML Breath Sounds: Clear  RLL Breath Sounds: Clear  PHUONG Breath Sounds: Clear  LLL Breath Sounds: Clear  Cardiac Assessment   Cardiac (WDL):  WDL Except

## 2024-12-12 NOTE — THERAPY
Recreational Therapy  Daily Treatment     Patient Name: Denisse Abel  AGE:  64 y.o., SEX:  female  Medical Record #: [id number]  Today's Date: 12/12/2024       Subjective    Patient was willing to try a new cognitive activity.     Objective       12/12/24 1000   Procedural Tracking   Procedural Tracking Cognitive Skills Training   Treatment Time   Total Time Spent (mins) 30   Functional Ability Status - Cognitive   Attention Span Remains on Task   Comprehension Follows Three Step Commands   Judgment Able to Make Independent Decisions   Functional Ability Status - Emotional    Affect Appropriate   Mood Appropriate   Behavior Cooperative;Appropriate   Skilled Intervention    Skilled Intervention Cognitive Leisure   Skilled Intervention Comments Sodoku   Interdisciplinary Plan of Care Collaboration   IDT Collaboration with  Physical Therapist;Speech Therapist   Patient Position at End of Therapy Seated   Collaboration Comments Received from PT, hand off to SLP   Strengths & Barriers   Strengths Able to follow instructions;Effective communication skills;Alert and oriented;Independent prior level of function;Pleasant and cooperative;Willingly participates in therapeutic activities   Barriers Generalized weakness     Patient was able to learn how to play sodoku and is going to try and do one on her own later.     Assessment    Patient was able to learn an activity she will likely be able to do on her own at home.     Strengths: (P) Able to follow instructions, Effective communication skills, Alert and oriented, Independent prior level of function, Pleasant and cooperative, Willingly participates in therapeutic activities  Barriers: (P) Generalized weakness    Plan    Will continue to provide activities for her to try that may stimulate her mind.     Passport items to be completed:  Verbalize two positive leisure activities, discuss returning to work, hobbies, community groups or volunteer activities, explore community  resources

## 2024-12-12 NOTE — THERAPY
Physical Therapy   Daily Treatment     Patient Name: Denisse Abel  Age:  64 y.o., Sex:  female  Medical Record #: [id number]  Today's Date: 12/12/2024     Precautions  Precautions: Fall Risk    Subjective    Found seated up in chair, just finished with lunch. Agreeable to participate     Objective       12/12/24 1231   PT Charge Group   PT Gait Training (Units) 2   PT Therapeutic Exercise (Units) 1   PT Therapeutic Activities (Units) 1   PT Total Time Spent   PT Individual Total Time Spent (Mins) 60   Gait Functional Level of Assist    Gait Level Of Assist Standby Assist   Assistive Device Front Wheel Walker   Distance (Feet) 300   # of Times Distance was Traveled 3   Deviation Decreased Base Of Support;Shuffled Gait;Decreased Heel Strike;Decreased Toe Off;Bradykinetic   Stairs Functional Level of Assist   Level of Assist with Stairs Contact Guard Assist   # of Stairs Climbed 12  (3 sets of 4 six inch stairs. Uses step over step sequencing to ascend, step to for descend.)   Stairs Description Extra time;Hand rails;Supervision for safety   Sitting Lower Body Exercises   Nustep Resistance Level 5  (10 min 328 steps)   Outcome Measures   Outcome Measures Utilized Woods Balance Scale   Woods Balance Scale   Sitting Unsupported (Score 0-4) 4   Change Of Positon: Sitting To Standing (Score 0-4) 3   Change Of Positon: Standing To Sitting (Score 0-4) 3   Transfers (Score 0-4) 3   Standing Unsupported (Score 0-4) 4   Standing With Eyes Closed (Score 0-4) 4   Standing With Feet Together (Score 0-4) 4   Tandem Standing (Score 0-4) 2   Standing On One Leg (Score 0-4) 1   Turning Trunk (Feet Fixed) (Score 0-4) 2   Retrieving Objects From Floor (Score 0-4) 4   Turning 360 Degrees (Score 0-4) 2   Stool Stepping (Score 0-4) 2   Reaching Forward While Standing (Score 0-4) 4   Woods Balance Total Score (0-56) 42   PT DME Recommendations   Wheelchair   (no DME anticipated, has 4ww at home, will not need wheelchair)   Cushion   (n/a)    Assistive Device   (n/a, has 4ww at home)   Chair/Bed-to-Chair Transfer   Assistance Needed Set-up / clean-up   CARE Score - Chair/Bed-to-Chair Transfer 5   Toilet Transfer   Assistance Needed Set-up / clean-up   CARE Score - Toilet Transfer 5   Car Transfer   Assistance Needed Set-up / clean-up   CARE Score - Car Transfer 5   Walk 10 Feet   Assistance Needed Set-up / clean-up   CARE Score - Walk 10 Feet 5   Walk 50 Feet with Two Turns   Assistance Needed Set-up / clean-up   CARE Score - Walk 50 Feet with Two Turns 5   Walk 150 Feet   Assistance Needed Set-up / clean-up   CARE Score - Walk 150 Feet 5   Walking 10 Feet on Uneven Surfaces   Assistance Needed Set-up / clean-up   CARE Score - Walking 10 Feet on Uneven Surfaces 5   12 Steps   Assistance Needed Incidental touching   CARE Score - 12 Steps 4   Picking Up Object   Assistance Needed Incidental touching   CARE Score - Picking Up Object 4   Wheel 50 Feet with Two Turns   Reason if not Attempted Activity not applicable   CARE Score - Wheel 50 Feet with Two Turns 9         Assessment    Patient's movement quality is slow, requires some cuing to address speed of movement. She is otherwise moving well, tolerating more activity, demonstrates good safety awareness. Updated Irfpai scoring to demonstrate her progress.     Strengths: Able to follow instructions, Adequate strength, Alert and oriented, Good insight into deficits/needs, Independent prior level of function, Motivated for self care and independence, Pleasant and cooperative, Willingly participates in therapeutic activities  Barriers: Confused, Decreased endurance, Fatigue, Generalized weakness, Home accessibility, Impaired activity tolerance, Impaired sleep pattern, Impaired balance, Pain (R knee pain/OA)    Plan    4ww training  Dynamic balance during gait  Aerobic activity tolerance    DME  PT DME Recommendations  Wheelchair: (P)  (no DME anticipated, has 4ww at home, will not need wheelchair)  Cushion:  "(P)  (n/a)  Assistive Device: (P)  (n/a, has 4ww at home)    Passport items to be completed:  Get in/out of bed safely, in/out of a vehicle, safely use mobility device, walk or wheel around home/community, navigate up and down stairs, show how to get up/down from the ground, ensure home is accessible, demonstrate HEP, complete caregiver training    Physical Therapy Problems (Active)       Problem: Balance       Dates: Start:  12/10/24         Goal: STG-Within one week, patient will maintain dynamic standing with </= 1 UE support while performing serial reaching task > 10\" outside of [place] with Daniella or better.        Dates: Start:  12/10/24               Problem: Mobility       Dates: Start:  12/10/24         Goal: STG-Within one week, patient will ambulate up/down a curb c/ LRAD and Daniella or better.        Dates: Start:  12/10/24               Problem: PT-Long Term Goals       Dates: Start:  12/10/24         Goal: LTG-By discharge, patient will ambulate >/= 500' c/ LRAD at [place].        Dates: Start:  12/10/24            Goal: LTG-By discharge, patient will transfer one surface to another c/ [place].        Dates: Start:  12/10/24            Goal: LTG-By discharge, patient will transfer in/out of a car c/ Setup or better.        Dates: Start:  12/10/24            Goal: LTG-By discharge, patient will maintain balance while standing c/ LRAD while performing relevant activities to care for her dog (picking bowl from ground, picking up leash, etc).        Dates: Start:  12/10/24              "

## 2024-12-12 NOTE — PROGRESS NOTES
[facility] Medicine Daily Progress Note    Date of Service  12/12/2024    Chief Complaint:  Hyperglycemia  Abn TFT's  CKD    Interval History:  Pt has some constipation recently -- will add laxatives.    Review of Systems  Review of Systems   Constitutional:  Negative for fever.   Eyes:  Negative for blurred vision.   Respiratory:  Negative for shortness of breath.    Cardiovascular:  Negative for palpitations.   Gastrointestinal:  Negative for nausea and vomiting.   Neurological:  Negative for dizziness and headaches.   Psychiatric/Behavioral:  Negative for hallucinations.         Physical Exam  Temp:  [36.4 °C (97.6 °F)-37 °C (98.6 °F)] 36.4 °C (97.6 °F)  Pulse:  [7-79] 79  Resp:  [16-18] 16  BP: (115-137)/(60-67) 137/64  SpO2:  [95 %-99 %] 99 %    Physical Exam  Vitals and nursing note reviewed.   Constitutional:       General: She is not in acute distress.  HENT:      Mouth/Throat:      Mouth: Mucous membranes are moist.      Pharynx: Oropharynx is clear.   Eyes:      General: No scleral icterus.  Neck:      Vascular: No JVD.   Cardiovascular:      Rate and Rhythm: Normal rate and regular rhythm.      Heart sounds: Murmur heard.   Pulmonary:      Effort: Pulmonary effort is normal.      Breath sounds: Normal breath sounds. No stridor.   Abdominal:      General: There is no distension.      Palpations: Abdomen is soft.      Tenderness: There is no abdominal tenderness.   Musculoskeletal:      Cervical back: No rigidity.      Right lower leg: No edema.      Left lower leg: No edema.   Skin:     General: Skin is warm and dry.      Findings: No rash.   Neurological:      Mental Status: She is alert and oriented to person, place, and time.   Psychiatric:         Mood and Affect: Mood normal.         Behavior: Behavior normal.         Fluids    Intake/Output Summary (Last 24 hours) at 12/12/2024 1039  Last data filed at 12/12/2024 0900  Gross per 24 hour   Intake 610 ml   Output --   Net 610 ml        Laboratory  Recent  Labs     12/10/24  0605 12/11/24  0635   WBC 4.8 4.2*   RBC 2.88* 3.10*   HEMOGLOBIN 7.4* 8.2*   HEMATOCRIT 23.0* 25.3*   MCV 79.9* 81.6   MCH 25.7* 26.5*   MCHC 32.2 32.4   RDW 65.3* 67.0*   PLATELETCT 133* 133*   MPV 9.9 9.8     Recent Labs     12/10/24  0605 12/11/24  0635   SODIUM 132* 135   POTASSIUM 4.6 4.4   CHLORIDE 102 105   CO2 22 22   GLUCOSE 136* 82   BUN 46* 39*   CREATININE 0.96 0.99   CALCIUM 9.7 9.7                   Imaging    Assessment/Plan  Constipation  Assessment & Plan  Has diminished BM's recently  Cont scheduled Senokot  On Miralax prn --> will change to scheduled daily  Monitor    Generalized weakness  Assessment & Plan  S/P empiric IV steroids with improvement  On Prednisone 60 mg daily (thru 1/7)  On Atovaquone for PJP proph given sulfa allergy  Needs Neurology F/U    Anemia  Assessment & Plan  H&H stable with Hb 8.2  Fe: 21, sats 8%  No ferritin ordered -- don't know if its ACD  On Fe supplements  Monitor    CKD (chronic kidney disease)  Assessment & Plan  Bun: 39  Cr: wnl  Cont to monitor    Abnormal thyroid function test  Assessment & Plan  TSH: 0.168 (12/10)  FT4: 0.90  ? Subclinical  Needs repeat TFT's in about 1 week to eval trend and treatment    Hyperglycemia  Assessment & Plan  Hba1c: 5.2 (12/10)  BS labile:   2nd to steroids  Note: no hx of diabetes  Note: pt states she takes Jardiance at home for her heart  Cont to monitor    Hypophosphatemia- (present on admission)  Assessment & Plan  PO4: 1.8 (12/11)  S/P supplements -- will cont for 5 more days  Cont to monitor    Liver cirrhosis (HCC)- (present on admission)  Assessment & Plan  2nd to etoh abuse  S/P recent hepatic encephalopathy at [facility] [place]  Has pancytopenia  NH4: 34 (12/11)  On supplements

## 2024-12-13 ENCOUNTER — APPOINTMENT (OUTPATIENT)
Dept: SPEECH THERAPY | Facility: REHABILITATION | Age: 64
DRG: 056 | End: 2024-12-13
Attending: PHYSICAL MEDICINE & REHABILITATION
Payer: MEDICARE

## 2024-12-13 ENCOUNTER — APPOINTMENT (OUTPATIENT)
Dept: PHYSICAL THERAPY | Facility: REHABILITATION | Age: 64
DRG: 056 | End: 2024-12-13
Attending: PHYSICAL MEDICINE & REHABILITATION
Payer: MEDICARE

## 2024-12-13 ENCOUNTER — APPOINTMENT (OUTPATIENT)
Dept: OCCUPATIONAL THERAPY | Facility: REHABILITATION | Age: 64
DRG: 056 | End: 2024-12-13
Attending: PHYSICAL MEDICINE & REHABILITATION
Payer: MEDICARE

## 2024-12-13 LAB
GLUCOSE BLD STRIP.AUTO-MCNC: 165 MG/DL (ref 65–99)
GLUCOSE BLD STRIP.AUTO-MCNC: 206 MG/DL (ref 65–99)
GLUCOSE BLD STRIP.AUTO-MCNC: 218 MG/DL (ref 65–99)
GLUCOSE BLD STRIP.AUTO-MCNC: 66 MG/DL (ref 65–99)
GLUCOSE BLD STRIP.AUTO-MCNC: 90 MG/DL (ref 65–99)

## 2024-12-13 PROCEDURE — 97130 THER IVNTJ EA ADDL 15 MIN: CPT

## 2024-12-13 PROCEDURE — 700102 HCHG RX REV CODE 250 W/ 637 OVERRIDE(OP): Performed by: PHYSICAL MEDICINE & REHABILITATION

## 2024-12-13 PROCEDURE — 97530 THERAPEUTIC ACTIVITIES: CPT

## 2024-12-13 PROCEDURE — A9270 NON-COVERED ITEM OR SERVICE: HCPCS | Performed by: HOSPITALIST

## 2024-12-13 PROCEDURE — 99232 SBSQ HOSP IP/OBS MODERATE 35: CPT | Performed by: HOSPITALIST

## 2024-12-13 PROCEDURE — 97110 THERAPEUTIC EXERCISES: CPT

## 2024-12-13 PROCEDURE — 700102 HCHG RX REV CODE 250 W/ 637 OVERRIDE(OP): Performed by: HOSPITALIST

## 2024-12-13 PROCEDURE — 770010 HCHG ROOM/CARE - REHAB SEMI PRIVAT*

## 2024-12-13 PROCEDURE — 700111 HCHG RX REV CODE 636 W/ 250 OVERRIDE (IP): Performed by: PHYSICAL MEDICINE & REHABILITATION

## 2024-12-13 PROCEDURE — 82962 GLUCOSE BLOOD TEST: CPT

## 2024-12-13 PROCEDURE — 97116 GAIT TRAINING THERAPY: CPT

## 2024-12-13 PROCEDURE — A9270 NON-COVERED ITEM OR SERVICE: HCPCS | Performed by: PHYSICAL MEDICINE & REHABILITATION

## 2024-12-13 PROCEDURE — 97129 THER IVNTJ 1ST 15 MIN: CPT

## 2024-12-13 PROCEDURE — 99232 SBSQ HOSP IP/OBS MODERATE 35: CPT | Performed by: PHYSICAL MEDICINE & REHABILITATION

## 2024-12-13 RX ADMIN — HYDROCODONE BITARTRATE AND ACETAMINOPHEN 1 TABLET: 5; 325 TABLET ORAL at 21:17

## 2024-12-13 RX ADMIN — POLYETHYLENE GLYCOL 3350 1 PACKET: 17 POWDER, FOR SOLUTION ORAL at 08:17

## 2024-12-13 RX ADMIN — PYRIDOXINE HCL TAB 50 MG 50 MG: 50 TAB at 08:14

## 2024-12-13 RX ADMIN — SENNOSIDES AND DOCUSATE SODIUM 2 TABLET: 50; 8.6 TABLET ORAL at 21:19

## 2024-12-13 RX ADMIN — ATOVAQUONE 1500 MG: 750 SUSPENSION ORAL at 08:09

## 2024-12-13 RX ADMIN — DIBASIC SODIUM PHOSPHATE, MONOBASIC POTASSIUM PHOSPHATE AND MONOBASIC SODIUM PHOSPHATE 250 MG: 852; 155; 130 TABLET ORAL at 08:14

## 2024-12-13 RX ADMIN — Medication 100 MG: at 08:14

## 2024-12-13 RX ADMIN — DIBASIC SODIUM PHOSPHATE, MONOBASIC POTASSIUM PHOSPHATE AND MONOBASIC SODIUM PHOSPHATE 250 MG: 852; 155; 130 TABLET ORAL at 21:19

## 2024-12-13 RX ADMIN — HEPARIN SODIUM 5000 UNITS: 5000 INJECTION, SOLUTION INTRAVENOUS; SUBCUTANEOUS at 06:08

## 2024-12-13 RX ADMIN — OMEPRAZOLE 20 MG: 20 CAPSULE, DELAYED RELEASE ORAL at 08:14

## 2024-12-13 RX ADMIN — TRAZODONE HYDROCHLORIDE 50 MG: 50 TABLET ORAL at 21:19

## 2024-12-13 RX ADMIN — INSULIN LISPRO 2 UNITS: 100 INJECTION, SOLUTION INTRAVENOUS; SUBCUTANEOUS at 11:57

## 2024-12-13 RX ADMIN — ACETAMINOPHEN 650 MG: 325 TABLET ORAL at 07:42

## 2024-12-13 RX ADMIN — PREDNISONE 60 MG: 20 TABLET ORAL at 08:14

## 2024-12-13 RX ADMIN — INSULIN LISPRO 4 UNITS: 100 INJECTION, SOLUTION INTRAVENOUS; SUBCUTANEOUS at 19:57

## 2024-12-13 RX ADMIN — MIRTAZAPINE 15 MG: 15 TABLET, ORALLY DISINTEGRATING ORAL at 21:19

## 2024-12-13 RX ADMIN — FOLIC ACID 1 MG: 1 TABLET ORAL at 08:14

## 2024-12-13 RX ADMIN — ATOVAQUONE 1500 MG: 750 SUSPENSION ORAL at 21:12

## 2024-12-13 RX ADMIN — THERA TABS 1 TABLET: TAB at 08:14

## 2024-12-13 RX ADMIN — Medication 1 TABLET: at 08:14

## 2024-12-13 RX ADMIN — Medication 324 MG: at 08:14

## 2024-12-13 RX ADMIN — INSULIN LISPRO 4 UNITS: 100 INJECTION, SOLUTION INTRAVENOUS; SUBCUTANEOUS at 17:22

## 2024-12-13 ASSESSMENT — ENCOUNTER SYMPTOMS
SHORTNESS OF BREATH: 0
VOMITING: 0
DIARRHEA: 0
FEVER: 0
ABDOMINAL PAIN: 0
CHILLS: 0
NERVOUS/ANXIOUS: 0
NAUSEA: 0

## 2024-12-13 ASSESSMENT — GAIT ASSESSMENTS
ASSISTIVE DEVICE: FRONT WHEEL WALKER
DISTANCE (FEET): 300
DEVIATION: DECREASED BASE OF SUPPORT;SHUFFLED GAIT;DECREASED HEEL STRIKE;DECREASED TOE OFF;BRADYKINETIC
GAIT LEVEL OF ASSIST: STANDBY ASSIST

## 2024-12-13 ASSESSMENT — PAIN DESCRIPTION - PAIN TYPE
TYPE: ACUTE PAIN
TYPE: ACUTE PAIN

## 2024-12-13 NOTE — DIETARY
"Nutrition Services: Initial Assessment     Day 3 of admit. Denisse Abel is 64 y.o., female with admitting DX of Acute encephalopathy [G93.40].    Consult Received for: Wound    Current Hospital Problems List:    Constipation  Generalized weakness  Anemia  CKD  Abnormal thyroid function test  Hyperglycemia  Hypophosphatemia  Liver cirrhosis       Nutrition Assessment:      Height: 170.7 cm (5' 7.2\")  Weight: 56.5 kg (124 lb 9 oz)  Weight taken via: Bed Scale  BMI Calculated: 19.39  BMI Classification: Underweight, For age group       Weight Readings from Last 5 encounters:   Wt Readings from Last 5 Encounters:   12/09/24 56.5 kg (124 lb 9 oz)   12/09/24 54.3 kg (119 lb 11.4 oz)   11/30/24 59.9 kg (132 lb)   11/14/24 57.6 kg (127 lb)   11/05/24 56.6 kg (124 lb 12.5 oz)   01-22/24 71.2 kg (157 lb)         Objective:   Pertinent Medical Hx: CHF, HTN, HLD, cirrhosis, and CKD   Pertinent Labs: 12/11/24: phos=1.8. POC glucose: . Most < 180  Pertinent Meds: ferrous gluconate, folic acid, SSI, Remeron, MVI, omeprazole, phosphorus, Miralax (1 pct Q hour), prednisone, vit B-6, senna-docusatie, Miralax, thiamine, vit B+C  Skin/Wounds:  Options: Deep Tissue Injury on sacrum  Last BM:  Type 5: Soft blob with clear cut edges (passed easily)  12/12/24       Current Diet Order/Intake:   Regular diet w/ Ensure High Protein TID. Recorded PO intake has been adequate at 50-75% of most meals. This should encourage wound healing.     Subjective:   Patient reported UBW: unknown  Dietary Recall/Energy Intake: Pt initially with poor PO at the acute care hospital. PO intake improved there and has been adequate at Rehab. This indicates Sufficient energy intake.       Nutrition Focused Physical Exam (NFPE)  Weight Loss: wt hx is difficult to assess because of presence of fluid overload while at acute.  Muscle Mass: Severe Wasting reported at [facility]  Subcutaneous Fat: Severe Loss reported at [facility]  Fluid Accumulation: none " noted.  Reduced  Strength: N/A in acute care setting.    Nutrition Diagnosis:      Increased nutrient Needs related to wound healing as evidenced by presence of evolving DTI on sacrum.      Per RD note at [facility] on 11/27:  Severe Malnutrition in context of Chronic Illness related to ETOH abuse as evidenced by severe muscle and fat loss noted.      Nutrition status currently improving with improvement in PO intake.        Nutrition Interventions:      Phos and thiamine per provider  Recommend Ensure Plus High Protein (provides 350 calories) 20 g protein per 8 fl oz) TID.  Patient aware of active plan of care as appropriate.       Nutrition Monitoring and Evaluation:      Monitor nutrition POC, goal for >50% intake from meals and supplements.  Additional fluids per MD/DO  Monitor vital signs pertinent to nutrition.    RD following and will provide updated recommendations as indicated.      Nury Garcia R.D.                                         [facility] FOR MALNUTRITION

## 2024-12-13 NOTE — THERAPY
Occupational Therapy  Daily Treatment     Patient Name: Denisse Abel  Age:  64 y.o., Sex:  female  Medical Record #: [id number]  Today's Date: 12/13/2024     Precautions  Precautions: (P) Fall Risk         Subjective    Patient was seated in w/c in her room upon OT arrival.  She was ready for OT and was happy to work on home IADL tasks.     Objective       12/13/24 1031   OT Charge Group   OT Therapy Activity (Units) 4   OT Total Time Spent   OT Individual Total Time Spent (Mins) 60   Precautions   Precautions Fall Risk   IADL Treatments   IADL Treatments Home management   Home Management While standing and mobilizing with FWW, patient removed regular sheet, fitted sheet and two pillowcases from full size bed, then replaced them with SBA/supersion and increased time.  She transferred wet clothes from washing machine to the dryer at the start of her session and removed them from the dryer at end of session with SBA/supervision.   Interdisciplinary Plan of Care Collaboration   Patient Position at End of Therapy Seated;Call Light within Reach;Tray Table within Reach;Phone within Reach;Self Releasing Lap Belt Applied;Chair Alarm On     Functional mobility in her room, hallways, gym and ADL bedroom/laundry room with SBA/supervision.    Dry stall shower transfer using grab bar and mod I with good safety.    Assessment    Patient required extra time to change the sheets on the bed in ADL suite, but had no losses of balance and demonstrated good safety.  Her activity tolerance continues to improve.  Strengths: Able to follow instructions, Alert and oriented, Willingly participates in therapeutic activities, Pleasant and cooperative, Motivated for self care and independence, Independent prior level of function  Barriers: Decreased endurance, Fatigue, Generalized weakness, Impaired activity tolerance, Impaired balance, Limited mobility    Plan    Higher level balance activities, dual tasking, generalized  strengthening/endurance training     Occupational Therapy Goals (Active)       Problem: OT Long Term Goals       Dates: Start:  12/10/24         Goal: LTG-By discharge, patient will complete basic self care tasks w/ supervision to mod I        Dates: Start:  12/10/24            Goal: LTG-By discharge, patient will perform bathroom transfers w/ mod I w/ LRAD        Dates: Start:  12/10/24            Goal: LTG-By discharge, patient will complete basic home management w/ supervision to mod I        Dates: Start:  12/10/24               Problem: Pt will complete LB dressing @ SBA level        Dates: Start:  12/10/24

## 2024-12-13 NOTE — THERAPY
"Occupational Therapy  Daily Treatment     Patient Name: Denisse Abel  Age:  64 y.o., Sex:  female  Medical Record #: [id number]  Today's Date: 12/13/2024     Precautions  Precautions: Fall Risk         Subjective    Pt willing to work with OT this afternoon. Complaining of some constipation. Pt states that she used to enjoy hiking but is now unable to due to injuries to her back and knees.      Objective       12/13/24 1331   OT Charge Group   OT Therapy Activity (Units) 1   OT Therapeutic Exercise (Units) 1   OT Total Time Spent   OT Individual Total Time Spent (Mins) 30   Precautions   Precautions Fall Risk   Standing Upper Body Exercises   Comments Stood at fluidobike levels 1-2 for 8 minutes   Neuro-Muscular Treatments   Comments patient stood in parallel bars to play \"ladder ball\" for one round while standing on a balance pad, in order to facilitate balance improvement     Pt walked from room to far therapy gym. Handoff to SLP.       Assessment    Pt seen for OT tx session focusing on balance and upper body strengthening. She tolerated treatments well. No loss of balance noted during all standing activities.   Strengths: Able to follow instructions, Alert and oriented, Willingly participates in therapeutic activities, Pleasant and cooperative, Motivated for self care and independence, Independent prior level of function  Barriers: Decreased endurance, Fatigue, Generalized weakness, Impaired activity tolerance, Impaired balance, Limited mobility    Plan    Higher level balance activities, dual tasking, generalized strengthening/endurance training        DME  OT DME Recommendations  Bathroom Equipment:  (Pt has shower chair)    Passport items to be completed:  Perform bathroom transfers, complete dressing, complete feeding, get ready for the day, prepare a simple meal, participate in household tasks, adapt home for safety needs, demonstrate home exercise program, complete caregiver training     Occupational " Therapy Goals (Active)       Problem: OT Long Term Goals       Dates: Start:  12/10/24         Goal: LTG-By discharge, patient will complete basic self care tasks w/ supervision to mod I        Dates: Start:  12/10/24            Goal: LTG-By discharge, patient will perform bathroom transfers w/ mod I w/ LRAD        Dates: Start:  12/10/24            Goal: LTG-By discharge, patient will complete basic home management w/ supervision to mod I        Dates: Start:  12/10/24               Problem: Pt will complete LB dressing @ SBA level        Dates: Start:  12/10/24

## 2024-12-13 NOTE — CARE PLAN
The patient is Watcher - Medium risk of patient condition declining or worsening    Shift Goals  Clinical Goals: Safety and Sleep  Patient Goals: Sleep well    Progress made toward(s) clinical / shift goals:    Problem: Knowledge Deficit - Standard  Goal: Patient and family/care givers will demonstrate understanding of plan of care, disease process/condition, diagnostic tests and medications  Outcome: Progressing  Reviewed POC, all questions answered at this time.      Problem: Communication  Goal: The ability to communicate needs accurately and effectively will improve  Outcome: Progressing  Patient able to verbalize needs.  Will continue to monitor.     Problem: Mobility  Goal: Patient's capacity to carry out activities will improve  Outcome: Progressing   Pt is able to go to the bathroom to void multiple times tonight prior to sleeping. Pt waited for help. Pt was able to bear weight with one assist.     Patient is not progressing towards the following goals:

## 2024-12-13 NOTE — CARE PLAN
The patient is Stable - Low risk of patient condition declining or worsening    Shift Goals  Clinical Goals: Safety and pain control  Patient Goals: pain control    Progress made toward(s) clinical / shift goals:    Problem: Knowledge Deficit - Standard  Goal: Patient and family/care givers will demonstrate understanding of plan of care, disease process/condition, diagnostic tests and medications  Outcome: Progressing     Problem: Mobility  Goal: Patient's capacity to carry out activities will improve  Outcome: Progressing       Patient is not progressing towards the following goals:

## 2024-12-13 NOTE — PROGRESS NOTES
Hypoglycemia Intervention    Hypoglycemia protocol intervention:  Blood glucose 66 at 0745.  Intervention: 4 oz of fruit juice   Repeat blood glucose 90 at 0803.  Intervention: none needed      Dr. Bender notified of the above at 0825.

## 2024-12-13 NOTE — PROGRESS NOTES
"  Physical Medicine & Rehabilitation Progress Note    Encounter Date: 12/13/2024    Chief Complaint: Decreased mobility    Interval Events (Subjective):  Patient sitting up in therapy gym. She reports she is doing well. She reports her cognition is improving. She is working on balance.     _____________________________________  Interdisciplinary Team Conference   Most recent IDT on 12/12/2024    Discharge Date/Disposition:  12/17/24  _____________________________________      Objective:  VITAL SIGNS: /59   Pulse 86   Temp 36.6 °C (97.9 °F) (Oral)   Resp 16   Ht 1.707 m (5' 7.2\")   Wt 56.5 kg (124 lb 9 oz)   SpO2 98%   BMI 19.39 kg/m²   Gen: NAD  Psych: Mood and affect appropriate  CV: RRR, 0 edema  Resp: CTAB, no upper airway sounds  Abd: NTND  Neuro: AOx4, following commands  Unchanged from 12/12/24    Laboratory Values:  Recent Results (from the past 72 hours)   POCT glucose device results    Collection Time: 12/10/24  4:36 PM   Result Value Ref Range    POC Glucose, Blood 126 (H) 65 - 99 mg/dL   POCT glucose device results    Collection Time: 12/10/24  9:02 PM   Result Value Ref Range    POC Glucose, Blood 217 (H) 65 - 99 mg/dL   CBC WITHOUT DIFFERENTIAL    Collection Time: 12/11/24  6:35 AM   Result Value Ref Range    WBC 4.2 (L) 4.8 - 10.8 K/uL    RBC 3.10 (L) 4.20 - 5.40 M/uL    Hemoglobin 8.2 (L) 12.0 - 16.0 g/dL    Hematocrit 25.3 (L) 37.0 - 47.0 %    MCV 81.6 81.4 - 97.8 fL    MCH 26.5 (L) 27.0 - 33.0 pg    MCHC 32.4 32.2 - 35.5 g/dL    RDW 67.0 (H) 35.9 - 50.0 fL    Platelet Count 133 (L) 164 - 446 K/uL    MPV 9.8 9.0 - 12.9 fL   Basic Metabolic Panel    Collection Time: 12/11/24  6:35 AM   Result Value Ref Range    Sodium 135 135 - 145 mmol/L    Potassium 4.4 3.6 - 5.5 mmol/L    Chloride 105 96 - 112 mmol/L    Co2 22 20 - 33 mmol/L    Glucose 82 65 - 99 mg/dL    Bun 39 (H) 8 - 22 mg/dL    Creatinine 0.99 0.50 - 1.40 mg/dL    Calcium 9.7 8.5 - 10.5 mg/dL    Anion Gap 8.0 7.0 - 16.0 "   PHOSPHORUS    Collection Time: 12/11/24  6:35 AM   Result Value Ref Range    Phosphorus 1.8 (L) 2.5 - 4.5 mg/dL   AMMONIA    Collection Time: 12/11/24  6:35 AM   Result Value Ref Range    Ammonia 34 11 - 45 umol/L   ESTIMATED GFR    Collection Time: 12/11/24  6:35 AM   Result Value Ref Range    GFR (CKD-EPI) 63 >60 mL/min/1.73 m 2   POCT glucose device results    Collection Time: 12/11/24  7:33 AM   Result Value Ref Range    POC Glucose, Blood 71 65 - 99 mg/dL   POCT glucose device results    Collection Time: 12/11/24 11:43 AM   Result Value Ref Range    POC Glucose, Blood 130 (H) 65 - 99 mg/dL   POCT glucose device results    Collection Time: 12/11/24  5:13 PM   Result Value Ref Range    POC Glucose, Blood 146 (H) 65 - 99 mg/dL   POCT glucose device results    Collection Time: 12/11/24  8:17 PM   Result Value Ref Range    POC Glucose, Blood 254 (H) 65 - 99 mg/dL   POCT glucose device results    Collection Time: 12/12/24  7:25 AM   Result Value Ref Range    POC Glucose, Blood 71 65 - 99 mg/dL   POCT glucose device results    Collection Time: 12/12/24 11:51 AM   Result Value Ref Range    POC Glucose, Blood 195 (H) 65 - 99 mg/dL   POCT glucose device results    Collection Time: 12/12/24  5:23 PM   Result Value Ref Range    POC Glucose, Blood 117 (H) 65 - 99 mg/dL   POCT glucose device results    Collection Time: 12/12/24  9:24 PM   Result Value Ref Range    POC Glucose, Blood 184 (H) 65 - 99 mg/dL   POCT glucose device results    Collection Time: 12/13/24  7:41 AM   Result Value Ref Range    POC Glucose, Blood 66 65 - 99 mg/dL   POCT glucose device results    Collection Time: 12/13/24  8:08 AM   Result Value Ref Range    POC Glucose, Blood 90 65 - 99 mg/dL   POCT glucose device results    Collection Time: 12/13/24 11:54 AM   Result Value Ref Range    POC Glucose, Blood 165 (H) 65 - 99 mg/dL       Medications:  Scheduled Medications   Medication Dose Frequency    phosphorus  1 Tablet BID    senna-docusate  2 Tablet Q  EVENING    And    polyethylene glycol/lytes  1 Packet DAILY    pyridoxine  50 mg DAILY    multivitamin  1 Tablet DAILY    insulin lispro  2-12 Units 4X/DAY ACHS    ferrous gluconate  324 mg QDAY with Breakfast    omeprazole  20 mg DAILY    atovaquone  1,500 mg BID    folic acid  1 mg DAILY    heparin  5,000 Units Q8HRS    mirtazapine  15 mg QHS    predniSONE  60 mg DAILY    thiamine  100 mg DAILY    vitamin B + C complex  1 Tablet DAILY     PRN medications: HYDROcodone-acetaminophen, insulin lispro **AND** POC blood glucose manual result **AND** NOTIFY MD and PharmD **AND** Administer 20 grams of glucose (approximately 8 ounces of fruit juice) every 15 minutes PRN FSBG less than 70 mg/dL **AND** dextrose bolus, hydrALAZINE, acetaminophen, docusate sodium, magnesium hydroxide, carboxymethylcellulose, benzocaine-menthol, mag hydrox-al hydrox-simeth, ondansetron **OR** ondansetron, traZODone, sodium chloride    Diet:  Current Diet Order   Procedures    Diet Order Diet: Regular       Medical Decision Making and Plan:  Hepatic encephalopathy - Patient with confusion on 11/27 with AMS and failure to thrive, thought to be hepatic encephalopathy vs autoimmune encephalitis. She was started on steroids and has been changed to prednisone 60 mg daily.  -PT and OT for mobility and ADLs. Per guidelines, 15 hours per week between PT, OT and/or SLP.  -Follow-up Neurology     HTN/sCHF - Patient off medications. Will monitor.      DM2 with hyperglycemia - Patient on SSI on transfer. Blood sugars into 180s, continue SSI     Anemia - Check AM CBC - 7.4, consult hospitalist. Repeat 8.2 on 12/11. Started on Fe supplement    Thrombocytopenia - 133 on admission, consult hospitalist. Repeat 133 on 12/11     Hyponatremia - Check AM CMP - 132, consult hospitalist     CKD/Azotemia - Check AM CMP. Avoid nephrotoxic agents. BUN 46 on admission. Repeat 39 on 12/11, given IV fluids     Hypophosphatemia - Check AM level - 2.0. Continue K-Phos      Severe protein-calorie malnutrition - Meets Aspen criteria for severe. Dietitian to consult     Depression - Patient on Remeron 15 mg QHS. Improved mood, continue Remeron     Alcohol abuse/cirrhosis - Patient on Thiamine/folic acid. May need to restart Lactulose. Ammonia normal on 12/11  -Constipation, will start Miralax hourly x4 for bowel clean out. Had BMs, continue senna-docusate     Pain - Patient on PRN Tylenol/Oxycodone. Switch to Norco     Skin - Patient at risk for skin breakdown due to debility in areas including sacrum, achilles, elbows and head in addition to other sites. Nursing to assess skin daily.      GI Ppx - Patient on Prilosec for GERD prophylaxis. Patient on Senna-docusate for constipation prophylaxis.      DVT Ppx - Patient Heparin on transfer. Ambulating > 125 feet, discontinue Heparin  ____________________________________    T. Juliocesar Bender MD/PhD  [facility] - Physical Medicine & Rehabilitation   [facility] - Brain Injury Medicine   ____________________________________

## 2024-12-13 NOTE — PROGRESS NOTES
NURSING DAILY NOTE    Name: Denisse Abel   Date of Admission: 12/9/2024   Admitting Diagnosis: No Principal Problem: There is no principal problem currently on the Problem List. Please update the Problem List and refresh.  Attending Physician: HAMIDA LARA M.D.  Allergies: Sulfa drugs    Safety  Patient Assist  MOD  Patient Precautions  Fall Risk  Precaution Comments     Bed Transfer Status  Contact Guard Assist  Toilet Transfer Status   Minimal Assist  Assistive Devices  Gait Belt, Rails, Wheelchair  Oxygen  None - Room Air  Diet/Therapeutic Dining  Current Diet Order   Procedures    Diet Order Diet: Regular     Pill Administration  whole  Agitated Behavioral Scale     ABS Level of Severity       Fall Risk  Has the patient had a fall this admission?   No  Selam Alonso Fall Risk Scoring  13, MODERATE RISK  Fall Risk Safety Measures  chair alarm and poor balance    Vitals  Temperature: 36.7 °C (98.1 °F)  Temp src: Oral  Pulse: 85  Respiration: 16  Blood Pressure : 125/68  Blood Pressure MAP (Calculated): 87 MM HG  BP Location: Left, Upper Arm  Patient BP Position: Sitting     Oxygen  Pulse Oximetry: 100 %  O2 (LPM): 0  O2 Delivery Device: None - Room Air    Bowel and Bladder  Last Bowel Movement  12/12/24  Stool Type  Type 5: Soft blob with clear cut edges (passed easily)  Bowel Device  Bathroom  Continent  Bladder: Continent void   Bowel: Continent movement  Bladder Function  Urine Void (mL):  (Moderate)  Number of Times Voided: 1  Urine Color: Unable To Evaluate  Genitourinary Assessment   Bladder Assessment (WDL):  WDL Except  Urine Color: Unable To Evaluate  Bladder Device: Bathroom  Bladder Scan: Post Void  $ Bladder Scan Results (mL): 15    Skin  John Score   19  Sensory Interventions      Moisture Interventions         Pain  Pain Rating Scale  0 - No Pain  Pain Location  Knee  Pain Location Orientation  Right  Pain Interventions    Rest    ADLs    Bathing   Shower, * * With Assistance from, Staff  Linen Change      Personal Hygiene  Moist Kristen Wipes, Perineal Care  Chlorhexidine Bath      Oral Care  Brushed Teeth  Teeth/Dentures     Shave     Nutrition Percentage Eaten  *  * Meal *  *, Lunch, Between 25-50% Consumed  Environmental Precautions  Bed in Low Position, Personal Belongings, Wastebasket, Call Bell etc. in Easy Reach  Patient Turns/Positioning  Patient turns self independently side to side without assistance, to offload sacral area  Patient Turns Assistance/Tolerance     Bed Positions  Bed Controls On, Bed Locked  Head of Bed Elevated  Self regulated      Psychosocial/Neurologic Assessment  Psychosocial Assessment  Psychosocial (WDL):  Within Defined Limits  Neurologic Assessment  Neuro (WDL): Exceptions to WDL  Level of Consciousness: Alert  Orientation Level: Oriented X4  Cognition: Follows commands  Speech: Clear  Pupil Assesment: No  Motor Function/Sensation Assessment: Sensation  EENT (WDL):  WDL Except    Cardio/Pulmonary Assessment  Edema      Respiratory Breath Sounds  RUL Breath Sounds: Clear  RML Breath Sounds: Clear  RLL Breath Sounds: Clear  PHUONG Breath Sounds: Clear  LLL Breath Sounds: Clear  Cardiac Assessment   Cardiac (WDL):  WDL Except (HTN)

## 2024-12-13 NOTE — PROGRESS NOTES
[facility] Medicine Daily Progress Note    Date of Service  12/13/2024    Chief Complaint:  Hyperglycemia  Abn TFT's  CKD    Interval History:  Had 2 small BM's recently.  Doing ok.    Review of Systems  Review of Systems   Constitutional:  Negative for chills and fever.   Respiratory:  Negative for shortness of breath.    Cardiovascular:  Negative for chest pain.   Gastrointestinal:  Negative for abdominal pain, diarrhea, nausea and vomiting.   Psychiatric/Behavioral:  The patient is not nervous/anxious.         Physical Exam  Temp:  [36.6 °C (97.9 °F)-36.9 °C (98.4 °F)] 36.6 °C (97.9 °F)  Pulse:  [78-86] 86  Resp:  [16-18] 16  BP: (118-138)/(59-70) 118/59  SpO2:  [97 %-100 %] 98 %    Physical Exam  Vitals and nursing note reviewed.   Constitutional:       Appearance: Normal appearance.   HENT:      Head: Atraumatic.   Eyes:      Conjunctiva/sclera: Conjunctivae normal.      Pupils: Pupils are equal, round, and reactive to light.   Cardiovascular:      Rate and Rhythm: Normal rate and regular rhythm.      Heart sounds: Murmur heard.   Pulmonary:      Effort: Pulmonary effort is normal.      Breath sounds: No stridor. No wheezing or rales.   Abdominal:      General: There is no distension.      Palpations: Abdomen is soft.      Tenderness: There is no abdominal tenderness.   Musculoskeletal:      Cervical back: Normal range of motion and neck supple.      Right lower leg: No edema.      Left lower leg: No edema.   Skin:     General: Skin is warm and dry.      Findings: No rash.   Neurological:      Mental Status: She is alert and oriented to person, place, and time.   Psychiatric:         Mood and Affect: Mood normal.         Behavior: Behavior normal.         Fluids    Intake/Output Summary (Last 24 hours) at 12/13/2024 1105  Last data filed at 12/13/2024 0900  Gross per 24 hour   Intake 560 ml   Output --   Net 560 ml        Laboratory  Recent Labs     12/11/24  0635   WBC 4.2*   RBC 3.10*   HEMOGLOBIN 8.2*    HEMATOCRIT 25.3*   MCV 81.6   MCH 26.5*   MCHC 32.4   RDW 67.0*   PLATELETCT 133*   MPV 9.8     Recent Labs     12/11/24  0635   SODIUM 135   POTASSIUM 4.4   CHLORIDE 105   CO2 22   GLUCOSE 82   BUN 39*   CREATININE 0.99   CALCIUM 9.7                   Imaging    Assessment/Plan  Constipation  Assessment & Plan  Had 2 smalll BM's recently  Management per Physiatry    Generalized weakness  Assessment & Plan  S/P empiric IV steroids with improvement  On Prednisone 60 mg daily (thru 1/7)  On Atovaquone for PJP proph given sulfa allergy  Needs Neurology F/U    Anemia  Assessment & Plan  H&H stable with Hb 8.2  Fe: 21, sats 8%  No ferritin ordered -- don't know if its ACD  On Fe supplements  Monitor    CKD (chronic kidney disease)  Assessment & Plan  Bun: 39  Cr: wnl  Cont to monitor    Abnormal thyroid function test  Assessment & Plan  TSH: 0.168 (12/10)  FT4: 0.90  ? Subclinical  Needs repeat TFT's in about 1 week to eval trend and treatment    Hyperglycemia  Assessment & Plan  Hba1c: 5.2 (12/10)  BS labile:   2nd to steroids  Note: no hx of diabetes  Note: pt states she takes Jardiance at home for her heart  Cont to monitor    Hypophosphatemia- (present on admission)  Assessment & Plan  PO4: 1.8 (12/11)  S/P supplements x 5 more days (thru 12/16)  Cont to monitor    Liver cirrhosis (HCC)- (present on admission)  Assessment & Plan  2nd to etoh abuse  S/P recent hepatic encephalopathy at [facility]  Has pancytopenia  NH4: 34 (12/11)  On supplements

## 2024-12-13 NOTE — THERAPY
Physical Therapy   Daily Treatment     Patient Name: Denisse Abel  Age:  64 y.o., Sex:  female  Medical Record #: [id number]  Today's Date: 12/13/2024     Precautions  Precautions: Fall Risk    Subjective    Patient is found seated up in chair just finishing breakfast, is agreeable to participate. Willing to address fall recovery.      Objective       12/13/24 0831   PT Charge Group   PT Gait Training (Units) 2   PT Therapeutic Exercise (Units) 1   PT Therapeutic Activities (Units) 1   PT Total Time Spent   PT Individual Total Time Spent (Mins) 60   Gait Functional Level of Assist    Gait Level Of Assist Standby Assist   Assistive Device Front Wheel Walker   Distance (Feet) 300   # of Times Distance was Traveled 2   Deviation Decreased Base Of Support;Shuffled Gait;Decreased Heel Strike;Decreased Toe Off;Bradykinetic   Sitting Lower Body Exercises   Other Exercises lateral and AP weight shifts on therapy ball x 5 mins, limited by knee pain. Emphasis on using core to create movement and not LEs.   Bed Mobility    Rolling   (floor recovery x 1, requires mod assist to move from semi sidelying to quadruped)   Interdisciplinary Plan of Care Collaboration   Patient Position at End of Therapy Seated;Call Light within Reach;Tray Table within Reach;Phone within Reach         Assessment    Patient is making good progress, is expected to be safe to return to home by anticipated discharge date. Will benefit from further core strengthening to improve dynamic balance and her ability to perform recovery from the floor.     Strengths: Able to follow instructions, Adequate strength, Alert and oriented, Good insight into deficits/needs, Independent prior level of function, Motivated for self care and independence, Pleasant and cooperative, Willingly participates in therapeutic activities  Barriers: Confused, Decreased endurance, Fatigue, Generalized weakness, Home accessibility, Impaired activity tolerance, Impaired sleep  "pattern, Impaired balance, Pain (R knee pain/OA)    Plan    4ww training  Dynamic balance during gait  Aerobic activity tolerance    DME  PT DME Recommendations  Wheelchair:  (no DME anticipated, has 4ww at home, will not need wheelchair)  Cushion:  (n/a)  Assistive Device:  (n/a, has 4ww at home)    Passport items to be completed:  Get in/out of bed safely, in/out of a vehicle, safely use mobility device, walk or wheel around home/community, navigate up and down stairs, show how to get up/down from the ground, ensure home is accessible, demonstrate HEP, complete caregiver training    Physical Therapy Problems (Active)       Problem: Balance       Dates: Start:  12/10/24         Goal: STG-Within one week, patient will maintain dynamic standing with </= 1 UE support while performing serial reaching task > 10\" outside of [place] with Daniella or better.        Dates: Start:  12/10/24               Problem: Mobility       Dates: Start:  12/10/24         Goal: STG-Within one week, patient will ambulate up/down a curb c/ LRAD and Daniella or better.        Dates: Start:  12/10/24               Problem: PT-Long Term Goals       Dates: Start:  12/10/24         Goal: LTG-By discharge, patient will ambulate >/= 500' c/ LRAD at [place].        Dates: Start:  12/10/24            Goal: LTG-By discharge, patient will transfer one surface to another c/ [place].        Dates: Start:  12/10/24            Goal: LTG-By discharge, patient will transfer in/out of a car c/ Setup or better.        Dates: Start:  12/10/24            Goal: LTG-By discharge, patient will maintain balance while standing c/ LRAD while performing relevant activities to care for her dog (picking bowl from ground, picking up leash, etc).        Dates: Start:  12/10/24              "

## 2024-12-13 NOTE — DISCHARGE PLANNING
ATTN: Case Management  RE: Referral for Home Health    As of 12/13/2024, we have accepted the Home Health referral for the patient listed above.    [facility]  will contact the patient within 48 hours. If you have any questions or concerns regarding the patient’s transition to Home Health, please do not hesitate to contact us at [phone].      We look forward to collaborating with you,  [facility] Team

## 2024-12-13 NOTE — THERAPY
Speech Language Pathology  Daily Treatment     Patient Name: Denisse Abel  Age:  64 y.o., Sex:  female  Medical Record #: [id number]  Today's Date: 12/13/2024     Precautions  Precautions: Fall Risk    Subjective    Pt pleasant and cooperative, completed OT just prior to SLP session.     Objective       12/13/24 1403   Treatment Charges   SLP Cognitive Skill Development First 15 Minutes 1   SLP Cognitive Skill Development Additional 15 Minutes 1   SLP Total Time Spent   SLP Individual Total Time Spent (Mins) 30         Assessment    Pt indep recalled home work task from day prior to ask questions to MD re: meds as well as use of trazodone to assist with sleep. Pt with many questions re: med management, what meds to take at dc, what to do with her meds at home, how to follow up with MD etc. All questions answered at this time. Pt able to log into [employer] and navigate indep for problem solving and locating needed information.     Strengths: Making steady progress towards goals, Motivated for self care and independence, Independent prior level of function, Able to follow instructions, Pleasant and cooperative  Barriers: Impaired functional cognition    Plan    Functional medication sort     Speech Therapy Problems (Active)       Problem: Problem Solving STGs       Dates: Start:  12/10/24         Goal: STG-Within one week, patient will complete medication management tasks with spv and 90% accuracy.         Dates: Start:  12/10/24            Goal: STG-Within one week, patient will complete executive functions with min A and 90% accuracy.         Dates: Start:  12/10/24               Problem: Speech/Swallowing LTGs       Dates: Start:  12/10/24         Goal: LTG-By discharge, patient will solve complex problems with modified independence.         Dates: Start:  12/10/24

## 2024-12-13 NOTE — PROGRESS NOTES
NURSING DAILY NOTE    Name: Denisse Abel   Date of Admission: 12/9/2024   Admitting Diagnosis: No Principal Problem: There is no principal problem currently on the Problem List. Please update the Problem List and refresh.  Attending Physician: HAMIDA LARA M.D.  Allergies: Sulfa drugs    Safety  Patient Assist  Min to Mod  Patient Precautions  Fall Risk  Precaution Comments     Bed Transfer Status  Contact Guard Assist  Toilet Transfer Status   Minimal Assist  Assistive Devices  Rails, Wheelchair  Oxygen  None - Room Air  Diet/Therapeutic Dining  Current Diet Order   Procedures    Diet Order Diet: Regular     Pill Administration  whole  Agitated Behavioral Scale     ABS Level of Severity       Fall Risk  Has the patient had a fall this admission?   No  Selam Alonso Fall Risk Scoring  9, LOW RISK  Fall Risk Safety Measures  bed alarm, chair alarm, poor balance, and low vision/ hearing    Vitals  Temperature: 36.9 °C (98.4 °F)  Temp src: Temporal  Pulse: 78  Respiration: 18  Blood Pressure : 138/70  Blood Pressure MAP (Calculated): 93 MM HG  BP Location: Left, Upper Arm  Patient BP Position: Supine     Oxygen  Pulse Oximetry: 97 %  O2 (LPM): 0  O2 Delivery Device: None - Room Air    Bowel and Bladder  Last Bowel Movement  12/12/24  Stool Type  Type 5: Soft blob with clear cut edges (passed easily)  Bowel Device  Bathroom  Continent  Bladder: Continent void   Bowel: Continent movement  Bladder Function  Urine Void (mL):  (Moderate)  Number of Times Voided: 1  Urine Color: Unable To Evaluate  Genitourinary Assessment   Bladder Assessment (WDL):  WDL Except  Man Catheter: Not Applicable  Urine Color: Unable To Evaluate  Bladder Device: Bathroom  Bladder Scan: Post Void  $ Bladder Scan Results (mL): 15    Skin  John Score   19  Sensory Interventions      Moisture Interventions         Pain  Pain Rating Scale  8 - Awful, hard to do  anything  Pain Location  Back  Pain Location Orientation  Lower  Pain Interventions   Medication (see MAR)    ADLs    Bathing   Shower, * * With Assistance from, Staff  Linen Change      Personal Hygiene  Moist Kristen Wipes, Perineal Care  Chlorhexidine Bath      Oral Care  Brushed Teeth  Teeth/Dentures     Shave     Nutrition Percentage Eaten  *  * Meal *  *, Lunch, Between 25-50% Consumed  Environmental Precautions  Bed in Low Position, Personal Belongings, Wastebasket, Call Bell etc. in Easy Reach  Patient Turns/Positioning  Patient turns self independently side to side without assistance, to offload sacral area  Patient Turns Assistance/Tolerance     Bed Positions  Bed Controls On, Bed Locked  Head of Bed Elevated  Self regulated      Psychosocial/Neurologic Assessment  Psychosocial Assessment  Psychosocial (WDL):  Within Defined Limits  Neurologic Assessment  Neuro (WDL): Exceptions to WDL  Level of Consciousness: Alert  Orientation Level: Oriented X4  Cognition: Follows commands  Speech: Clear  Pupil Assesment: No  Motor Function/Sensation Assessment: Sensation  EENT (WDL):  WDL Except    Cardio/Pulmonary Assessment  Edema      Respiratory Breath Sounds  RUL Breath Sounds: Clear  RML Breath Sounds: Clear  RLL Breath Sounds: Clear  PHUONG Breath Sounds: Clear  LLL Breath Sounds: Clear  Cardiac Assessment   Cardiac (WDL):  WDL Except

## 2024-12-13 NOTE — CARE PLAN
Problem: Knowledge Deficit - Standard  Goal: Patient and family/care givers will demonstrate understanding of plan of care, disease process/condition, diagnostic tests and medications  Outcome: Progressing     Problem: Pain - Standard  Goal: Alleviation of pain or a reduction in pain to the patient’s comfort goal  Note: Pt able to participate in therapies and activities this shift.   The patient is Stable - Low risk of patient condition declining or worsening    Shift Goals  Clinical Goals: Safety, sleep  Patient Goals: sleep well

## 2024-12-14 ENCOUNTER — APPOINTMENT (OUTPATIENT)
Dept: SPEECH THERAPY | Facility: REHABILITATION | Age: 64
DRG: 056 | End: 2024-12-14
Attending: PHYSICAL MEDICINE & REHABILITATION
Payer: MEDICARE

## 2024-12-14 ENCOUNTER — APPOINTMENT (OUTPATIENT)
Dept: OCCUPATIONAL THERAPY | Facility: REHABILITATION | Age: 64
DRG: 056 | End: 2024-12-14
Attending: PHYSICAL MEDICINE & REHABILITATION
Payer: MEDICARE

## 2024-12-14 LAB
GLUCOSE BLD STRIP.AUTO-MCNC: 147 MG/DL (ref 65–99)
GLUCOSE BLD STRIP.AUTO-MCNC: 160 MG/DL (ref 65–99)
GLUCOSE BLD STRIP.AUTO-MCNC: 246 MG/DL (ref 65–99)
GLUCOSE BLD STRIP.AUTO-MCNC: 75 MG/DL (ref 65–99)

## 2024-12-14 PROCEDURE — 700102 HCHG RX REV CODE 250 W/ 637 OVERRIDE(OP): Performed by: HOSPITALIST

## 2024-12-14 PROCEDURE — 97130 THER IVNTJ EA ADDL 15 MIN: CPT | Performed by: SPEECH-LANGUAGE PATHOLOGIST

## 2024-12-14 PROCEDURE — 82962 GLUCOSE BLOOD TEST: CPT | Mod: 91

## 2024-12-14 PROCEDURE — A9270 NON-COVERED ITEM OR SERVICE: HCPCS | Performed by: PHYSICAL MEDICINE & REHABILITATION

## 2024-12-14 PROCEDURE — A9270 NON-COVERED ITEM OR SERVICE: HCPCS | Performed by: HOSPITALIST

## 2024-12-14 PROCEDURE — 700102 HCHG RX REV CODE 250 W/ 637 OVERRIDE(OP): Performed by: PHYSICAL MEDICINE & REHABILITATION

## 2024-12-14 PROCEDURE — 99231 SBSQ HOSP IP/OBS SF/LOW 25: CPT | Performed by: HOSPITALIST

## 2024-12-14 PROCEDURE — 97535 SELF CARE MNGMENT TRAINING: CPT

## 2024-12-14 PROCEDURE — 97129 THER IVNTJ 1ST 15 MIN: CPT | Performed by: SPEECH-LANGUAGE PATHOLOGIST

## 2024-12-14 PROCEDURE — 700111 HCHG RX REV CODE 636 W/ 250 OVERRIDE (IP): Performed by: PHYSICAL MEDICINE & REHABILITATION

## 2024-12-14 PROCEDURE — 770010 HCHG ROOM/CARE - REHAB SEMI PRIVAT*

## 2024-12-14 RX ADMIN — DIBASIC SODIUM PHOSPHATE, MONOBASIC POTASSIUM PHOSPHATE AND MONOBASIC SODIUM PHOSPHATE 250 MG: 852; 155; 130 TABLET ORAL at 08:10

## 2024-12-14 RX ADMIN — Medication 324 MG: at 08:10

## 2024-12-14 RX ADMIN — ACETAMINOPHEN 650 MG: 325 TABLET ORAL at 08:18

## 2024-12-14 RX ADMIN — OMEPRAZOLE 20 MG: 20 CAPSULE, DELAYED RELEASE ORAL at 08:09

## 2024-12-14 RX ADMIN — PREDNISONE 60 MG: 20 TABLET ORAL at 08:09

## 2024-12-14 RX ADMIN — THERA TABS 1 TABLET: TAB at 08:10

## 2024-12-14 RX ADMIN — ATOVAQUONE 1500 MG: 750 SUSPENSION ORAL at 21:08

## 2024-12-14 RX ADMIN — MIRTAZAPINE 15 MG: 15 TABLET, ORALLY DISINTEGRATING ORAL at 21:09

## 2024-12-14 RX ADMIN — HYDROCODONE BITARTRATE AND ACETAMINOPHEN 1 TABLET: 5; 325 TABLET ORAL at 21:18

## 2024-12-14 RX ADMIN — FOLIC ACID 1 MG: 1 TABLET ORAL at 08:10

## 2024-12-14 RX ADMIN — SENNOSIDES AND DOCUSATE SODIUM 2 TABLET: 50; 8.6 TABLET ORAL at 21:09

## 2024-12-14 RX ADMIN — INSULIN LISPRO 4 UNITS: 100 INJECTION, SOLUTION INTRAVENOUS; SUBCUTANEOUS at 17:39

## 2024-12-14 RX ADMIN — Medication 100 MG: at 08:10

## 2024-12-14 RX ADMIN — ATOVAQUONE 1500 MG: 750 SUSPENSION ORAL at 08:10

## 2024-12-14 RX ADMIN — DIBASIC SODIUM PHOSPHATE, MONOBASIC POTASSIUM PHOSPHATE AND MONOBASIC SODIUM PHOSPHATE 250 MG: 852; 155; 130 TABLET ORAL at 21:09

## 2024-12-14 RX ADMIN — PYRIDOXINE HCL TAB 50 MG 50 MG: 50 TAB at 08:10

## 2024-12-14 RX ADMIN — TRAZODONE HYDROCHLORIDE 50 MG: 50 TABLET ORAL at 21:19

## 2024-12-14 RX ADMIN — INSULIN LISPRO 2 UNITS: 100 INJECTION, SOLUTION INTRAVENOUS; SUBCUTANEOUS at 20:29

## 2024-12-14 RX ADMIN — POLYETHYLENE GLYCOL 3350 1 PACKET: 17 POWDER, FOR SOLUTION ORAL at 08:09

## 2024-12-14 ASSESSMENT — ACTIVITIES OF DAILY LIVING (ADL)
TOILET_TRANSFER_DESCRIPTION: INCREASED TIME;VERBAL CUEING;SUPERVISION FOR SAFETY
TOILETING_LEVEL_OF_ASSIST_DESCRIPTION: GRAB BAR;SUPERVISION FOR SAFETY
TUB_SHOWER_TRANSFER_DESCRIPTION: GRAB BAR;SET-UP OF EQUIPMENT;SUPERVISION FOR SAFETY
BED_CHAIR_WHEELCHAIR_TRANSFER_DESCRIPTION: INCREASED TIME;SUPERVISION FOR SAFETY

## 2024-12-14 ASSESSMENT — ENCOUNTER SYMPTOMS
PALPITATIONS: 0
VOMITING: 0
DIZZINESS: 0
SHORTNESS OF BREATH: 0
HALLUCINATIONS: 0
BLURRED VISION: 0
FEVER: 0
NAUSEA: 0
HEADACHES: 0

## 2024-12-14 ASSESSMENT — PAIN DESCRIPTION - PAIN TYPE
TYPE: ACUTE PAIN

## 2024-12-14 NOTE — THERAPY
Occupational Therapy  Daily Treatment     Patient Name: Denisse Aebl  Age:  64 y.o., Sex:  female  Medical Record #: [id number]  Today's Date: 12/14/2024     Precautions  Precautions: Fall Risk         Subjective    Pt found seated in w/c and just finished breakfast. She was agreeable to OT and requested to complete shower/ADL routine.      Objective       12/14/24 0831   OT Charge Group   OT Self Care / ADL (Units) 4   OT Total Time Spent   OT Individual Total Time Spent (Mins) 60   Pain 0 - 10 Group   Location Knee   Location Orientation Right;Left   Pain Rating Scale (NPRS) 1   Description Aching   Comfort Goal Comfort with Movement   Therapist Pain Assessment During Activity   Non Verbal Descriptors   Non Verbal Scale  Calm   Cognition    Level of Consciousness Alert   Sleep/Wake Cycle   Sleep & Rest Awake;Out of bed   Active ROM Upper Body   Active ROM Upper Body  WDL   Functional Level of Assist   Grooming Modified Independent;Seated   Grooming Description Seated in wheelchair at sink   Bathing Standby Assist   Bathing Description Grab bar;Adaptive equipment;Increased time;Set-up of equipment;Supervision for safety;Verbal cueing   Upper Body Dressing Modified Independent   Upper Body Dressing Description   (seated)   Lower Body Dressing Standby Assist   Lower Body Dressing Description Grab bar;Increased time;Supervision for safety   Toileting Standby Assist   Toileting Description Grab bar;Supervision for safety   Bed, Chair, Wheelchair Transfer Contact Guard Assist   Bed Chair Wheelchair Transfer Description Increased time;Supervision for safety  (stand step with UE support)   Toilet Transfers Contact Guard Assist   Toilet Transfer Description Increased time;Verbal cueing;Supervision for safety   Tub / Shower Transfers Standby Assist   Tub Shower Transfer Description Grab bar;Set-up of equipment;Supervision for safety   Interdisciplinary Plan of Care Collaboration   Patient Position at End of Therapy  Seated;Chair Alarm On;Call Light within Reach;Tray Table within Reach;Phone within Reach         Assessment    Pt demo good participation in OT focused on self-care/ADL this date. She req'd verbal cues for safety when completing bathing in standing when not necessary. She is motivated to improve independence and has a strong community support upon d/c. She was left happy and with all needs met at the end of the session.     Strengths: Able to follow instructions, Alert and oriented, Willingly participates in therapeutic activities, Pleasant and cooperative, Motivated for self care and independence, Independent prior level of function  Barriers: Decreased endurance, Fatigue, Generalized weakness, Impaired activity tolerance, Impaired balance, Limited mobility    Plan    Higher level balance activities, dual tasking, generalized strengthening/endurance training         DME  OT DME Recommendations  Bathroom Equipment:  (Pt has shower chair)     Passport items to be completed:  Perform bathroom transfers, complete dressing, complete feeding, get ready for the day, prepare a simple meal, participate in household tasks, adapt home for safety needs, demonstrate home exercise program, complete caregiver training        Occupational Therapy Goals (Active)       Problem: OT Long Term Goals       Dates: Start:  12/10/24         Goal: LTG-By discharge, patient will complete basic self care tasks w/ supervision to mod I        Dates: Start:  12/10/24            Goal: LTG-By discharge, patient will perform bathroom transfers w/ mod I w/ LRAD        Dates: Start:  12/10/24            Goal: LTG-By discharge, patient will complete basic home management w/ supervision to mod I        Dates: Start:  12/10/24               Problem: Pt will complete LB dressing @ SBA level        Dates: Start:  12/10/24

## 2024-12-14 NOTE — PROGRESS NOTES
NURSING DAILY NOTE    Name: Denisse Abel   Date of Admission: 12/9/2024   Admitting Diagnosis: No Principal Problem: There is no principal problem currently on the Problem List. Please update the Problem List and refresh.  Attending Physician: HAMIDA LARA M.D.  Allergies: Sulfa drugs    Safety  Patient Assist  Min to Mod  Patient Precautions  Fall Risk  Precaution Comments     Bed Transfer Status  Contact Guard Assist  Toilet Transfer Status   Minimal Assist  Assistive Devices  Gait Belt, Wheelchair, Rails  Oxygen  None - Room Air  Diet/Therapeutic Dining  Current Diet Order   Procedures    Diet Order Diet: Regular     Pill Administration  whole  Agitated Behavioral Scale     ABS Level of Severity       Fall Risk  Has the patient had a fall this admission?   No  Selam Alonso Fall Risk Scoring  9, LOW RISK  Fall Risk Safety Measures  bed alarm, chair alarm, poor balance, and low vision/ hearing    Vitals  Temperature: 36.6 °C (97.9 °F)  Temp src: Oral  Pulse: 82  Respiration: 18  Blood Pressure : 117/61  Blood Pressure MAP (Calculated): 80 MM HG  BP Location: Left, Upper Arm  Patient BP Position: Sitting     Oxygen  Pulse Oximetry: 100 %  O2 (LPM): 0  O2 Delivery Device: None - Room Air    Bowel and Bladder  Last Bowel Movement  12/13/24  Stool Type  Type 5: Soft blob with clear cut edges (passed easily)  Bowel Device  Bathroom  Continent  Bladder: Continent void   Bowel: Continent movement  Bladder Function  Urine Void (mL):  (Moderate)  Number of Times Voided: 1  Urine Color: Yellow  Genitourinary Assessment   Bladder Assessment (WDL):  Within Defined Limits  Man Catheter: Not Applicable  Urine Color: Yellow  Bladder Device: Bathroom, Absorbent Brief (Pull Up)  Bladder Scan: Post Void  $ Bladder Scan Results (mL): 15    Skin  John Score   19  Sensory Interventions      Moisture Interventions         Pain  Pain Rating Scale  7 - Focus  of attention, prevents doing daily activities  Pain Location  Knee  Pain Location Orientation  Right  Pain Interventions   Medication (see MAR)    ADLs    Bathing   Shower, * * With Assistance from, Staff  Linen Change      Personal Hygiene  Hair Brushed, Moist Kristen Wipes, Perineal Care  Chlorhexidine Bath      Oral Care  Brushed Teeth  Teeth/Dentures     Shave     Nutrition Percentage Eaten  *  * Meal *  *, Lunch, Between % Consumed  Environmental Precautions  Treaded Slipper Socks on Patient, Personal Belongings, Wastebasket, Call Bell etc. in Easy Reach, Bed in Low Position  Patient Turns/Positioning  Sitting up in wheelchair  Patient Turns Assistance/Tolerance  Assistance of One  Bed Positions  Bed Controls On, Bed Locked  Head of Bed Elevated  Self regulated      Psychosocial/Neurologic Assessment  Psychosocial Assessment  Psychosocial (WDL):  Within Defined Limits  Neurologic Assessment  Neuro (WDL): Exceptions to WDL  Level of Consciousness: Alert  Orientation Level: Oriented X4  Cognition: Follows commands  Speech: Clear  Pupil Assesment: No  Motor Function/Sensation Assessment: Sensation  EENT (WDL):  WDL Except    Cardio/Pulmonary Assessment  Edema      Respiratory Breath Sounds  RUL Breath Sounds: Clear  RML Breath Sounds: Clear  RLL Breath Sounds: Clear  PHUONG Breath Sounds: Clear  LLL Breath Sounds: Clear  Cardiac Assessment   Cardiac (WDL):  WDL Except (HTN)

## 2024-12-14 NOTE — PROGRESS NOTES
NURSING DAILY NOTE    Name: Denisse Abel   Date of Admission: 12/9/2024   Admitting Diagnosis: No Principal Problem: There is no principal problem currently on the Problem List. Please update the Problem List and refresh.  Attending Physician: HAMIDA LARA M.D.  Allergies: Sulfa drugs    Safety  Patient Assist  Min to Mod  Patient Precautions  Fall Risk  Precaution Comments     Bed Transfer Status  Contact Guard Assist  Toilet Transfer Status   Minimal Assist  Assistive Devices  Rails, Wheelchair  Oxygen  None - Room Air  Diet/Therapeutic Dining  Current Diet Order   Procedures    Diet Order Diet: Regular     Pill Administration  whole  Agitated Behavioral Scale     ABS Level of Severity       Fall Risk  Has the patient had a fall this admission?   No  Selam Alonso Fall Risk Scoring  9, LOW RISK  Fall Risk Safety Measures  bed alarm, chair alarm, poor balance, and low vision/ hearing    Vitals  Temperature: 37.1 °C (98.8 °F)  Temp src: Oral  Pulse: 74  Respiration: 18  Blood Pressure : 116/57  Blood Pressure MAP (Calculated): 77 MM HG  BP Location: Left, Upper Arm  Patient BP Position: Supine     Oxygen  Pulse Oximetry: 96 %  O2 (LPM): 0  O2 Delivery Device: None - Room Air    Bowel and Bladder  Last Bowel Movement  12/13/24  Stool Type  Type 5: Soft blob with clear cut edges (passed easily)  Bowel Device  Bathroom  Continent  Bladder: Continent void   Bowel: Continent movement  Bladder Function  Urine Void (mL):  (Moderate)  Number of Times Voided: 1  Urine Color: Yellow  Genitourinary Assessment   Bladder Assessment (WDL):  WDL Except  Man Catheter: Not Applicable  Urine Color: Yellow  Bladder Device: Bathroom  Bladder Scan: Post Void  $ Bladder Scan Results (mL): 15    Skin  John Score   19  Sensory Interventions      Moisture Interventions         Pain  Pain Rating Scale  0 - No Pain  Pain Location  Back, Knee  Pain Location  Orientation  Right  Pain Interventions   Medication (see MAR)    ADLs    Bathing   Patient Refused Bathing (will do it in the morning per pt)  Linen Change      Personal Hygiene  Hair Brushed, Moist Kristen Wipes, Perineal Care  Chlorhexidine Bath      Oral Care  Brushed Teeth  Teeth/Dentures     Shave     Nutrition Percentage Eaten  Ice Chips  Environmental Precautions  Treaded Slipper Socks on Patient, Personal Belongings, Wastebasket, Call Bell etc. in Easy Reach, Bed in Low Position  Patient Turns/Positioning  Patient turns self independently side to side without assistance, to offload sacral area  Patient Turns Assistance/Tolerance  Standby Assist  Bed Positions  Bed Controls On, Bed Locked  Head of Bed Elevated  Self regulated      Psychosocial/Neurologic Assessment  Psychosocial Assessment  Psychosocial (WDL):  Within Defined Limits  Neurologic Assessment  Neuro (WDL): Exceptions to WDL  Level of Consciousness: Alert  Orientation Level: Oriented X4  Cognition: Follows commands  Speech: Clear  Pupil Assesment: No  Motor Function/Sensation Assessment: Sensation  EENT (WDL):  WDL Except    Cardio/Pulmonary Assessment  Edema      Respiratory Breath Sounds  RUL Breath Sounds: Clear  RML Breath Sounds: Clear  RLL Breath Sounds: Clear  PHUONG Breath Sounds: Clear  LLL Breath Sounds: Clear  Cardiac Assessment   Cardiac (WDL):  WDL Except

## 2024-12-14 NOTE — CARE PLAN
The patient is Watcher - Medium risk of patient condition declining or worsening    Shift Goals  Clinical Goals: sleep well and no falls  Patient Goals: Restful sleep and pain free    Progress made toward(s) clinical / shift goals:    Problem: Knowledge Deficit - Standard  Goal: Patient and family/care givers will demonstrate understanding of plan of care, disease process/condition, diagnostic tests and medications  Outcome: Progressing  Reviewed POC, all questions answered at this time.      Problem: Psychosocial  Goal: Patient's ability to re-evaluate and adapt role responsibilities will improve  Outcome: Progressing  Goal: Patient and family will demonstrate ability to cope with life altering diagnosis and/or procedure  Outcome: Progressing  Assess the intensity of anxiety. Determine the triggers of anxiety. Assess how the patient responds to anxiety. Administer medications to relieve anxiety. Educate pt about anxiety and techniques to alleviate it.     Problem: Communication  Goal: The ability to communicate needs accurately and effectively will improve  Outcome: Progressing  Patient able to verbalize needs.  Will continue to monitor.     Problem: Nutrition  Goal: Patient's nutritional and fluid intake will be adequate or improve  Outcome: Progressing  Assess the patient's eating pattern. Assess the patient's food of choices by taking nutritional with the participation of others. Note the patient's perspective and feeling toward eating and food.      Problem: Mobility  Goal: Patient's capacity to carry out activities will improve  Outcome: Progressing  Patient demonstrates good safety technique this shift.  Asks for assistance when needed and does not attempt self transfer.  Able to verbalize needs.  Will continue to monitor.

## 2024-12-14 NOTE — CARE PLAN
Problem: Knowledge Deficit - Standard  Goal: Patient and family/care givers will demonstrate understanding of plan of care, disease process/condition, diagnostic tests and medications  Outcome: Progressing     Problem: Infection - Standard  Goal: Patient will remain free from infection  Note: Patient remains free from s/s infection; afebrile.  Will continue to monitor.   The patient is Watcher - Medium risk of patient condition declining or worsening    Shift Goals  Clinical Goals: Safety, sleep well  Patient Goals: rest

## 2024-12-14 NOTE — THERAPY
Speech Language Pathology  Daily Treatment     Patient Name: Denisse Abel  Age:  64 y.o., Sex:  female  Medical Record #: [id number]  Today's Date: 12/14/2024     Precautions  Precautions: Fall Risk    Subjective    Pt pleasant and cooperative during ST session.      Objective       12/14/24 1031   Treatment Charges   SLP Cognitive Skill Development First 15 Minutes 1   SLP Cognitive Skill Development Additional 15 Minutes 1   SLP Total Time Spent   SLP Individual Total Time Spent (Mins) 30   Cognition - Detailed   Cognitive-Linguistic (WDL) X   Medication Management  Moderate (3)   Interdisciplinary Plan of Care Collaboration   IDT Collaboration with  Certified Nursing Assistant   Patient Position at End of Therapy Seated;Chair Alarm On;Self Releasing Lap Belt Applied;Phone within Reach;Tray Table within Reach;Call Light within Reach   Collaboration Comments CLOF         Assessment    Functional med sort completed with pt requiring MOD verbal and visual cues. Pt accurately sorted 6/9 medications IND. Pt struggled with multi-tab or two/three times daily meds.     Strengths: Making steady progress towards goals, Motivated for self care and independence, Independent prior level of function, Able to follow instructions, Pleasant and cooperative  Barriers: Impaired functional cognition    Plan    Repeat med sort.    Passport items to be completed:  Express basic needs, understand food/liquid recommendations, consistently follow swallow precautions, manage finances, manage medications, arrive to therapy appointments on time, complete daily memory log entries, solve problems related to safety situations, review education related to hospitalization, complete caregiver training     Speech Therapy Problems (Active)       Problem: Problem Solving STGs       Dates: Start:  12/10/24         Goal: STG-Within one week, patient will complete medication management tasks with spv and 90% accuracy.         Dates: Start:  12/10/24             Goal: STG-Within one week, patient will complete executive functions with min A and 90% accuracy.         Dates: Start:  12/10/24               Problem: Speech/Swallowing LTGs       Dates: Start:  12/10/24         Goal: LTG-By discharge, patient will solve complex problems with modified independence.         Dates: Start:  12/10/24

## 2024-12-14 NOTE — PROGRESS NOTES
[facility] Medicine Daily Progress Note    Date of Service  12/14/2024    Chief Complaint:  Hyperglycemia  Abn TFT's  CKD    Interval History:  Had a recent large BM.  Discussed about resuming her Jardiance when she gets home and will help her BS while on steroids.    Review of Systems  Review of Systems   Constitutional:  Negative for fever.   Eyes:  Negative for blurred vision.   Respiratory:  Negative for shortness of breath.    Cardiovascular:  Negative for palpitations.   Gastrointestinal:  Negative for nausea and vomiting.   Neurological:  Negative for dizziness and headaches.   Psychiatric/Behavioral:  Negative for hallucinations.         Physical Exam  Temp:  [36.4 °C (97.6 °F)-37.1 °C (98.8 °F)] 37.1 °C (98.8 °F)  Pulse:  [74-82] 81  Resp:  [14-18] 14  BP: (115-125)/(54-61) 125/61  SpO2:  [96 %-100 %] 100 %    Physical Exam  Vitals and nursing note reviewed.   Constitutional:       General: She is not in acute distress.  HENT:      Mouth/Throat:      Mouth: Mucous membranes are moist.      Pharynx: Oropharynx is clear.   Eyes:      General: No scleral icterus.  Cardiovascular:      Rate and Rhythm: Normal rate and regular rhythm.      Heart sounds: Murmur heard.   Pulmonary:      Effort: Pulmonary effort is normal.      Breath sounds: No wheezing or rales.   Abdominal:      General: Bowel sounds are normal.      Palpations: Abdomen is soft.   Musculoskeletal:      Cervical back: No rigidity.      Right lower leg: No edema.      Left lower leg: No edema.   Skin:     General: Skin is warm and dry.   Neurological:      Mental Status: She is alert and oriented to person, place, and time.   Psychiatric:         Mood and Affect: Mood normal.         Behavior: Behavior normal.         Fluids    Intake/Output Summary (Last 24 hours) at 12/14/2024 1053  Last data filed at 12/13/2024 2115  Gross per 24 hour   Intake 920 ml   Output --   Net 920 ml        Laboratory                             Imaging    Assessment/Plan  Constipation  Assessment & Plan  Had 2 small and 1 large BM recently  Management per Physiatry    Generalized weakness  Assessment & Plan  S/P empiric IV steroids with improvement  On Prednisone 60 mg daily (thru 1/7)  On Atovaquone for PJP proph given sulfa allergy  Needs Neurology F/U    Anemia  Assessment & Plan  H&H stable with Hb 8.2  Fe: 21, sats 8%  No ferritin ordered -- don't know if its ACD  On Fe supplements  Monitor    CKD (chronic kidney disease)  Assessment & Plan  Bun: 39  Cr: wnl  Cont to monitor    Abnormal thyroid function test  Assessment & Plan  TSH: 0.168 (12/10)  FT4: 0.90  ? Subclinical  Needs repeat TFT's in about 1 week to eval trend and treatment    Hyperglycemia  Assessment & Plan  Hba1c: 5.2 (12/10)  BS labile:   2nd to steroids  Note: no hx of diabetes  Note: pt states she takes Jardiance at home for her heart  Cont to monitor    Hypophosphatemia- (present on admission)  Assessment & Plan  PO4: 1.8 (12/11)  S/P supplements x 5 more days (thru 12/16)  Cont to monitor    Liver cirrhosis (HCC)- (present on admission)  Assessment & Plan  2nd to etoh abuse  S/P recent hepatic encephalopathy at [facility]  Has pancytopenia  NH4: 34 (12/11)  On supplements

## 2024-12-14 NOTE — PROGRESS NOTES
NURSING DAILY NOTE    Name: Denisse Abel   Date of Admission: 12/9/2024   Admitting Diagnosis: No Principal Problem: There is no principal problem currently on the Problem List. Please update the Problem List and refresh.  Attending Physician: HAMIDA LARA M.D.  Allergies: Sulfa drugs    Safety  Patient Assist  Min to Mod  Patient Precautions  Fall Risk  Precaution Comments     Bed Transfer Status  Contact Guard Assist  Toilet Transfer Status   Contact Guard Assist  Assistive Devices  Gait Belt, Wheelchair, Rails  Oxygen  None - Room Air  Diet/Therapeutic Dining  Current Diet Order   Procedures    Diet Order Diet: Regular     Pill Administration  whole  Agitated Behavioral Scale     ABS Level of Severity       Fall Risk  Has the patient had a fall this admission?   No  Selam Alonso Fall Risk Scoring  9, LOW RISK  Fall Risk Safety Measures  bed alarm, chair alarm, poor balance, and low vision/ hearing    Vitals  Temperature: 36.9 °C (98.5 °F)  Temp src: Oral  Pulse: 86  Respiration: 15  Blood Pressure : 132/64  Blood Pressure MAP (Calculated): 87 MM HG  BP Location: Left, Upper Arm  Patient BP Position: Sitting     Oxygen  Pulse Oximetry: 97 %  O2 (LPM): 0  O2 Delivery Device: None - Room Air    Bowel and Bladder  Last Bowel Movement  12/13/24  Stool Type  Not observed  Bowel Device  Bathroom  Continent  Bladder: Continent void   Bowel: Continent movement  Bladder Function  Urine Void (mL):  (Moderate)  Number of Times Voided: 1  Urine Color: Yellow  Genitourinary Assessment   Bladder Assessment (WDL):  WDL Except  Man Catheter: Not Applicable  Urine Color: Yellow  Bladder Device: Bathroom  Bladder Scan: Post Void  $ Bladder Scan Results (mL): 15    Skin  John Score   19  Sensory Interventions      Moisture Interventions         Pain  Pain Rating Scale  1 - Hardly Notice Pain  Pain Location  Knee  Pain Location Orientation  Right,  Left  Pain Interventions   Medication (see MAR)    ADLs    Bathing   Shower, * * With Assistance from, Staff  Linen Change   Partial  Personal Hygiene  Hair Brushed  Chlorhexidine Bath      Oral Care  Brushed Teeth  Teeth/Dentures     Shave     Nutrition Percentage Eaten  *  * Meal *  *, Lunch, Between 50-75% Consumed  Environmental Precautions  Treaded Slipper Socks on Patient, Personal Belongings, Wastebasket, Call Bell etc. in Easy Reach, Bed in Low Position  Patient Turns/Positioning  Sitting up in wheelchair  Patient Turns Assistance/Tolerance  Standby Assist  Bed Positions  Bed Controls On, Bed Locked  Head of Bed Elevated  Self regulated      Psychosocial/Neurologic Assessment  Psychosocial Assessment  Psychosocial (WDL):  Within Defined Limits  Neurologic Assessment  Neuro (WDL): Exceptions to WDL  Level of Consciousness: Alert  Orientation Level: Oriented X4  Cognition: Follows commands  Speech: Clear  Pupil Assesment: No  Motor Function/Sensation Assessment: Sensation  EENT (WDL):  WDL Except    Cardio/Pulmonary Assessment  Edema      Respiratory Breath Sounds  RUL Breath Sounds: Clear  RML Breath Sounds: Clear  RLL Breath Sounds: Clear  PHUONG Breath Sounds: Clear  LLL Breath Sounds: Clear  Cardiac Assessment   Cardiac (WDL):  WDL Except (HTN)

## 2024-12-15 ENCOUNTER — APPOINTMENT (OUTPATIENT)
Dept: SPEECH THERAPY | Facility: REHABILITATION | Age: 64
DRG: 056 | End: 2024-12-15
Attending: PHYSICAL MEDICINE & REHABILITATION
Payer: MEDICARE

## 2024-12-15 LAB
GLUCOSE BLD STRIP.AUTO-MCNC: 169 MG/DL (ref 65–99)
GLUCOSE BLD STRIP.AUTO-MCNC: 173 MG/DL (ref 65–99)
GLUCOSE BLD STRIP.AUTO-MCNC: 56 MG/DL (ref 65–99)

## 2024-12-15 PROCEDURE — 97130 THER IVNTJ EA ADDL 15 MIN: CPT

## 2024-12-15 PROCEDURE — 82962 GLUCOSE BLOOD TEST: CPT | Mod: 91

## 2024-12-15 PROCEDURE — 700111 HCHG RX REV CODE 636 W/ 250 OVERRIDE (IP): Performed by: PHYSICAL MEDICINE & REHABILITATION

## 2024-12-15 PROCEDURE — 700102 HCHG RX REV CODE 250 W/ 637 OVERRIDE(OP): Performed by: HOSPITALIST

## 2024-12-15 PROCEDURE — A9270 NON-COVERED ITEM OR SERVICE: HCPCS | Performed by: PHYSICAL MEDICINE & REHABILITATION

## 2024-12-15 PROCEDURE — 770010 HCHG ROOM/CARE - REHAB SEMI PRIVAT*

## 2024-12-15 PROCEDURE — 700102 HCHG RX REV CODE 250 W/ 637 OVERRIDE(OP): Performed by: PHYSICAL MEDICINE & REHABILITATION

## 2024-12-15 PROCEDURE — A9270 NON-COVERED ITEM OR SERVICE: HCPCS | Performed by: HOSPITALIST

## 2024-12-15 PROCEDURE — 97129 THER IVNTJ 1ST 15 MIN: CPT

## 2024-12-15 PROCEDURE — 99232 SBSQ HOSP IP/OBS MODERATE 35: CPT | Performed by: HOSPITALIST

## 2024-12-15 RX ORDER — DEXTROSE MONOHYDRATE 25 G/50ML
25 INJECTION, SOLUTION INTRAVENOUS
Status: DISCONTINUED | OUTPATIENT
Start: 2024-12-15 | End: 2024-12-15

## 2024-12-15 RX ORDER — DEXTROSE MONOHYDRATE 25 G/50ML
25 INJECTION, SOLUTION INTRAVENOUS
Status: DISCONTINUED | OUTPATIENT
Start: 2024-12-15 | End: 2024-12-17 | Stop reason: HOSPADM

## 2024-12-15 RX ORDER — INSULIN LISPRO 100 [IU]/ML
2-12 INJECTION, SOLUTION INTRAVENOUS; SUBCUTANEOUS
Status: DISCONTINUED | OUTPATIENT
Start: 2024-12-15 | End: 2024-12-15

## 2024-12-15 RX ORDER — INSULIN LISPRO 100 [IU]/ML
2-12 INJECTION, SOLUTION INTRAVENOUS; SUBCUTANEOUS
Status: DISCONTINUED | OUTPATIENT
Start: 2024-12-15 | End: 2024-12-17 | Stop reason: HOSPADM

## 2024-12-15 RX ADMIN — SENNOSIDES AND DOCUSATE SODIUM 1 TABLET: 50; 8.6 TABLET ORAL at 21:44

## 2024-12-15 RX ADMIN — THERA TABS 1 TABLET: TAB at 08:17

## 2024-12-15 RX ADMIN — OMEPRAZOLE 20 MG: 20 CAPSULE, DELAYED RELEASE ORAL at 08:17

## 2024-12-15 RX ADMIN — Medication 324 MG: at 08:17

## 2024-12-15 RX ADMIN — Medication 1 TABLET: at 08:17

## 2024-12-15 RX ADMIN — DIBASIC SODIUM PHOSPHATE, MONOBASIC POTASSIUM PHOSPHATE AND MONOBASIC SODIUM PHOSPHATE 250 MG: 852; 155; 130 TABLET ORAL at 21:44

## 2024-12-15 RX ADMIN — DIBASIC SODIUM PHOSPHATE, MONOBASIC POTASSIUM PHOSPHATE AND MONOBASIC SODIUM PHOSPHATE 250 MG: 852; 155; 130 TABLET ORAL at 08:18

## 2024-12-15 RX ADMIN — TRAZODONE HYDROCHLORIDE 50 MG: 50 TABLET ORAL at 21:46

## 2024-12-15 RX ADMIN — MIRTAZAPINE 15 MG: 15 TABLET, ORALLY DISINTEGRATING ORAL at 21:46

## 2024-12-15 RX ADMIN — PYRIDOXINE HCL TAB 50 MG 50 MG: 50 TAB at 08:28

## 2024-12-15 RX ADMIN — Medication 100 MG: at 08:17

## 2024-12-15 RX ADMIN — ATOVAQUONE 1500 MG: 750 SUSPENSION ORAL at 08:20

## 2024-12-15 RX ADMIN — INSULIN LISPRO 2 UNITS: 100 INJECTION, SOLUTION INTRAVENOUS; SUBCUTANEOUS at 16:55

## 2024-12-15 RX ADMIN — ACETAMINOPHEN 650 MG: 325 TABLET ORAL at 06:27

## 2024-12-15 RX ADMIN — PREDNISONE 60 MG: 20 TABLET ORAL at 08:17

## 2024-12-15 RX ADMIN — INSULIN LISPRO 2 UNITS: 100 INJECTION, SOLUTION INTRAVENOUS; SUBCUTANEOUS at 11:38

## 2024-12-15 RX ADMIN — ATOVAQUONE 1500 MG: 750 SUSPENSION ORAL at 21:44

## 2024-12-15 RX ADMIN — ACETAMINOPHEN 650 MG: 325 TABLET ORAL at 14:40

## 2024-12-15 RX ADMIN — PYRIDOXINE HCL TAB 50 MG 50 MG: 50 TAB at 10:00

## 2024-12-15 RX ADMIN — HYDROCODONE BITARTRATE AND ACETAMINOPHEN 1 TABLET: 5; 325 TABLET ORAL at 21:45

## 2024-12-15 RX ADMIN — FOLIC ACID 1 MG: 1 TABLET ORAL at 08:18

## 2024-12-15 ASSESSMENT — PAIN DESCRIPTION - PAIN TYPE
TYPE: ACUTE PAIN

## 2024-12-15 ASSESSMENT — ENCOUNTER SYMPTOMS
COUGH: 0
DIARRHEA: 0
NERVOUS/ANXIOUS: 0
BLURRED VISION: 0
FEVER: 0
DIZZINESS: 0

## 2024-12-15 ASSESSMENT — FIBROSIS 4 INDEX: FIB4 SCORE: 2.11

## 2024-12-15 NOTE — PROGRESS NOTES
Hospital Medicine Daily Progress Note    Date of Service  12/15/2024    Chief Complaint:  Hyperglycemia  Abn TFT's  CKD    Interval History:  Discussed about her BS dropping lower in the am and have stopped the night coverage.    Review of Systems  Review of Systems   Constitutional:  Negative for fever.   Eyes:  Negative for blurred vision.   Respiratory:  Negative for cough.    Cardiovascular:  Negative for chest pain.   Gastrointestinal:  Negative for diarrhea.   Musculoskeletal:  Negative for joint pain.   Neurological:  Negative for dizziness.   Psychiatric/Behavioral:  The patient is not nervous/anxious.         Physical Exam  Temp:  [36.7 °C (98 °F)-36.9 °C (98.5 °F)] 36.8 °C (98.3 °F)  Pulse:  [63-86] 80  Resp:  [15-17] 16  BP: ()/(60-65) 91/60  SpO2:  [97 %-100 %] 98 %    Physical Exam  Vitals and nursing note reviewed.   Constitutional:       Appearance: She is not diaphoretic.   HENT:      Mouth/Throat:      Pharynx: No oropharyngeal exudate or posterior oropharyngeal erythema.   Eyes:      Extraocular Movements: Extraocular movements intact.   Neck:      Vascular: No carotid bruit.   Cardiovascular:      Rate and Rhythm: Normal rate and regular rhythm.      Heart sounds: Murmur heard.   Pulmonary:      Effort: Pulmonary effort is normal.      Breath sounds: No wheezing or rales.   Abdominal:      General: There is no distension.      Palpations: Abdomen is soft.      Tenderness: There is no abdominal tenderness.   Musculoskeletal:      Right lower leg: No edema.      Left lower leg: No edema.   Skin:     General: Skin is warm and dry.   Neurological:      Mental Status: She is alert and oriented to person, place, and time.   Psychiatric:         Mood and Affect: Mood normal.         Behavior: Behavior normal.         Fluids    Intake/Output Summary (Last 24 hours) at 12/15/2024 1055  Last data filed at 12/15/2024 0900  Gross per 24 hour   Intake 1480 ml   Output --   Net 1480 ml        Laboratory                             Imaging    Assessment/Plan  Constipation  Assessment & Plan  Management per Physiatry    Generalized weakness  Assessment & Plan  S/P empiric IV steroids with improvement  On Prednisone 60 mg daily (thru 1/7)  On Atovaquone for PJP proph given sulfa allergy  Needs Neurology F/U    Anemia  Assessment & Plan  H&H stable with Hb 8.2  Fe: 21, sats 8%  No ferritin ordered -- don't know if its ACD  On Fe supplements  Monitor    CKD (chronic kidney disease)  Assessment & Plan  Bun: 39  Cr: wnl  Cont to monitor    Abnormal thyroid function test  Assessment & Plan  TSH: 0.168 (12/10)  FT4: 0.90  ? Subclinical  Needs repeat TFT's in about 1 week to eval trend and treatment    Hyperglycemia  Assessment & Plan  Hba1c: 5.2 (12/10)  BS labile  BS drop lower in am -- will stop night coverage  2nd to steroids  Note: no hx of diabetes  Note: pt states she takes Jardiance at home for her heart  Cont to monitor    Hypophosphatemia- (present on admission)  Assessment & Plan  PO4: 1.8 (12/11)  S/P supplements x 5 more days (thru 12/16)  Cont to monitor    Liver cirrhosis (HCC)- (present on admission)  Assessment & Plan  2nd to etoh abuse  S/P recent hepatic encephalopathy at [facility] [place]  Has pancytopenia  NH4: 34 (12/11)  On supplements

## 2024-12-15 NOTE — CARE PLAN
Problem: Fall Risk - Rehab  Goal: Patient will remain free from falls  Outcome: Progressing  Note: Selam Alonso Fall risk Assessment Score: 9    Low fall risk interventions   - Call light within reach   - Yellow  socks   - Belongings within reach   - Bed in the lowest position       Problem: Pain - Standard  Goal: Alleviation of pain or a reduction in pain to the patient’s comfort goal  Outcome: Progressing  Note: Assessed for pain and discomfort , medicated with norco 1 tab for right knee pain with relief . Needs anticipated and attended.    The patient is Stable - Low risk of patient condition declining or worsening    Shift Goals  Clinical Goals: Safety, sleep well  Patient Goals: rest    Progress made toward(s) clinical / shift goals:  Pt free from fall and injury.

## 2024-12-15 NOTE — THERAPY
Speech Language Pathology  Daily Treatment     Patient Name: Denisse Abel  Age:  64 y.o., Sex:  female  Medical Record #: [id number]  Today's Date: 12/15/2024     Precautions  Precautions: Fall Risk    Subjective    Patient was willing to participate in ST.      Objective       12/15/24 1232   Treatment Charges   SLP Cognitive Skill Development First 15 Minutes 1   SLP Cognitive Skill Development Additional 15 Minutes 1   SLP Total Time Spent   SLP Individual Total Time Spent (Mins) 30   Interdisciplinary Plan of Care Collaboration   Patient Position at End of Therapy Seated;Chair Alarm On;Call Light within Reach;Tray Table within Reach;Phone within Reach         Assessment    Patient completed functional medication sorting task with 100% accuracy.     Strengths: Making steady progress towards goals, Motivated for self care and independence, Independent prior level of function, Able to follow instructions, Pleasant and cooperative  Barriers: Impaired functional cognition    Plan    Outcomes assessment, d/c planning.       Speech Therapy Problems (Active)       Problem: Problem Solving STGs       Dates: Start:  12/10/24         Goal: STG-Within one week, patient will complete medication management tasks with spv and 90% accuracy.         Dates: Start:  12/10/24            Goal: STG-Within one week, patient will complete executive functions with min A and 90% accuracy.         Dates: Start:  12/10/24               Problem: Speech/Swallowing LTGs       Dates: Start:  12/10/24         Goal: LTG-By discharge, patient will solve complex problems with modified independence.         Dates: Start:  12/10/24

## 2024-12-15 NOTE — PROGRESS NOTES
NURSING DAILY NOTE    Name: Denisse Abel   Date of Admission: 12/9/2024   Admitting Diagnosis: No Principal Problem: There is no principal problem currently on the Problem List. Please update the Problem List and refresh.  Attending Physician: HAMIDA LARA M.D.  Allergies: Sulfa drugs    Safety  Patient Assist  stndby  Patient Precautions  Fall Risk  Precaution Comments     Bed Transfer Status  Contact Guard Assist  Toilet Transfer Status   Contact Guard Assist  Assistive Devices  Gait Belt, Wheelchair, Rails  Oxygen  None - Room Air  Diet/Therapeutic Dining  Current Diet Order   Procedures    Diet Order Diet: Regular     Pill Administration  whole  Agitated Behavioral Scale     ABS Level of Severity       Fall Risk  Has the patient had a fall this admission?   No  Selam Alonso Fall Risk Scoring  9, LOW RISK  Fall Risk Safety Measures  bed alarm, chair alarm, poor balance, and low vision/ hearing    Vitals  Temperature: 36.9 °C (98.5 °F)  Temp src: Oral  Pulse: 86  Respiration: 15  Blood Pressure : 132/64  Blood Pressure MAP (Calculated): 87 MM HG  BP Location: Left, Upper Arm  Patient BP Position: Sitting     Oxygen  Pulse Oximetry: 97 %  O2 (LPM): 0  O2 Delivery Device: None - Room Air    Bowel and Bladder  Last Bowel Movement  12/14/24  Stool Type  Type 5: Soft blob with clear cut edges (passed easily)  Bowel Device  Bathroom  Continent  Bladder: Continent void   Bowel: Continent movement  Bladder Function  Urine Void (mL):  (Moderate)  Number of Times Voided: 1  Urine Color: Yellow  Genitourinary Assessment   Bladder Assessment (WDL):  WDL Except  Man Catheter: Not Applicable  Urine Color: Yellow  Bladder Device: Bathroom  Bladder Scan: Post Void  $ Bladder Scan Results (mL): 2    Skin  John Score   19  Sensory Interventions      Moisture Interventions         Pain  Pain Rating Scale  1 - Hardly Notice Pain  Pain Location  Knee  Pain  Location Orientation  Right  Pain Interventions   Declines    ADLs    Bathing   Shower, * * With Assistance from, Staff  Linen Change   Partial  Personal Hygiene  Hair Brushed  Chlorhexidine Bath      Oral Care  Brushed Teeth  Teeth/Dentures     Shave     Nutrition Percentage Eaten  *  * Meal *  *, Dinner, Between % Consumed  Environmental Precautions  Treaded Slipper Socks on Patient, Personal Belongings, Wastebasket, Call Bell etc. in Easy Reach, Bed in Low Position  Patient Turns/Positioning  Patient turns self independently side to side without assistance, to offload sacral area  Patient Turns Assistance/Tolerance  Assistance of One  Bed Positions  Bed Controls On, Bed Locked  Head of Bed Elevated  Self regulated      Psychosocial/Neurologic Assessment  Psychosocial Assessment  Psychosocial (WDL):  Within Defined Limits  Neurologic Assessment  Neuro (WDL): Exceptions to WDL  Level of Consciousness: Alert  Orientation Level: Oriented X4  Cognition: Follows commands  Speech: Clear  Pupil Assesment: No  Motor Function/Sensation Assessment: Sensation  EENT (WDL):  WDL Except    Cardio/Pulmonary Assessment  Edema      Respiratory Breath Sounds  RUL Breath Sounds: Clear  RML Breath Sounds: Clear  RLL Breath Sounds: Clear  PHUONG Breath Sounds: Clear  LLL Breath Sounds: Clear  Cardiac Assessment   Cardiac (WDL):  WDL Except (HTN)

## 2024-12-15 NOTE — CARE PLAN
The patient is Stable - Low risk of patient condition declining or worsening    Shift Goals  Clinical Goals: Pain control  Patient Goals: rest    Progress made toward(s) clinical / shift goals:    Problem: Pain - Standard  Goal: Alleviation of pain or a reduction in pain to the patient’s comfort goal  Outcome: Progressing       Patient able to verbalize pain level and verbalize an acceptable level of pain.

## 2024-12-16 ENCOUNTER — APPOINTMENT (OUTPATIENT)
Dept: SPEECH THERAPY | Facility: REHABILITATION | Age: 64
DRG: 056 | End: 2024-12-16
Attending: PHYSICAL MEDICINE & REHABILITATION
Payer: MEDICARE

## 2024-12-16 ENCOUNTER — PHARMACY VISIT (OUTPATIENT)
Dept: PHARMACY | Facility: MEDICAL CENTER | Age: 64
End: 2024-12-16

## 2024-12-16 ENCOUNTER — PHARMACY VISIT (OUTPATIENT)
Dept: PHARMACY | Facility: MEDICAL CENTER | Age: 64
End: 2024-12-16
Payer: COMMERCIAL

## 2024-12-16 ENCOUNTER — APPOINTMENT (OUTPATIENT)
Dept: PHYSICAL THERAPY | Facility: REHABILITATION | Age: 64
DRG: 056 | End: 2024-12-16
Attending: PHYSICAL MEDICINE & REHABILITATION
Payer: MEDICARE

## 2024-12-16 ENCOUNTER — APPOINTMENT (OUTPATIENT)
Dept: OCCUPATIONAL THERAPY | Facility: REHABILITATION | Age: 64
DRG: 056 | End: 2024-12-16
Attending: PHYSICAL MEDICINE & REHABILITATION
Payer: MEDICARE

## 2024-12-16 LAB
ANION GAP SERPL CALC-SCNC: 9 MMOL/L (ref 7–16)
ANISOCYTOSIS BLD QL SMEAR: ABNORMAL
BASOPHILS # BLD AUTO: 0 % (ref 0–1.8)
BASOPHILS # BLD: 0 K/UL (ref 0–0.12)
BUN SERPL-MCNC: 26 MG/DL (ref 8–22)
CALCIUM SERPL-MCNC: 8.9 MG/DL (ref 8.5–10.5)
CHLORIDE SERPL-SCNC: 102 MMOL/L (ref 96–112)
CO2 SERPL-SCNC: 25 MMOL/L (ref 20–33)
COMMENT 1642: NORMAL
CREAT SERPL-MCNC: 1.17 MG/DL (ref 0.5–1.4)
EOSINOPHIL # BLD AUTO: 0.01 K/UL (ref 0–0.51)
EOSINOPHIL NFR BLD: 0.2 % (ref 0–6.9)
ERYTHROCYTE [DISTWIDTH] IN BLOOD BY AUTOMATED COUNT: 71.3 FL (ref 35.9–50)
FERRITIN SERPL-MCNC: 198 NG/ML (ref 10–291)
GFR SERPLBLD CREATININE-BSD FMLA CKD-EPI: 51 ML/MIN/1.73 M 2
GLUCOSE BLD STRIP.AUTO-MCNC: 102 MG/DL (ref 65–99)
GLUCOSE BLD STRIP.AUTO-MCNC: 158 MG/DL (ref 65–99)
GLUCOSE BLD STRIP.AUTO-MCNC: 179 MG/DL (ref 65–99)
GLUCOSE BLD STRIP.AUTO-MCNC: 183 MG/DL (ref 65–99)
GLUCOSE BLD STRIP.AUTO-MCNC: 84 MG/DL (ref 65–99)
GLUCOSE SERPL-MCNC: 95 MG/DL (ref 65–99)
HCT VFR BLD AUTO: 24.5 % (ref 37–47)
HGB BLD-MCNC: 7.9 G/DL (ref 12–16)
HYPOCHROMIA BLD QL SMEAR: ABNORMAL
IMM GRANULOCYTES # BLD AUTO: 0.11 K/UL (ref 0–0.11)
IMM GRANULOCYTES NFR BLD AUTO: 2 % (ref 0–0.9)
IRON SATN MFR SERPL: 21 % (ref 15–55)
IRON SERPL-MCNC: 39 UG/DL (ref 40–170)
LYMPHOCYTES # BLD AUTO: 1.18 K/UL (ref 1–4.8)
LYMPHOCYTES NFR BLD: 21.1 % (ref 22–41)
MAGNESIUM SERPL-MCNC: 1.5 MG/DL (ref 1.5–2.5)
MCH RBC QN AUTO: 26.4 PG (ref 27–33)
MCHC RBC AUTO-ENTMCNC: 32.2 G/DL (ref 32.2–35.5)
MCV RBC AUTO: 81.9 FL (ref 81.4–97.8)
MICROCYTES BLD QL SMEAR: ABNORMAL
MONOCYTES # BLD AUTO: 0.32 K/UL (ref 0–0.85)
MONOCYTES NFR BLD AUTO: 5.7 % (ref 0–13.4)
MORPHOLOGY BLD-IMP: NORMAL
NEUTROPHILS # BLD AUTO: 3.97 K/UL (ref 1.82–7.42)
NEUTROPHILS NFR BLD: 71 % (ref 44–72)
NRBC # BLD AUTO: 0 K/UL
NRBC BLD-RTO: 0 /100 WBC (ref 0–0.2)
OVALOCYTES BLD QL SMEAR: NORMAL
PHOSPHATE SERPL-MCNC: 1.7 MG/DL (ref 2.5–4.5)
PLATELET # BLD AUTO: 132 K/UL (ref 164–446)
PLATELET BLD QL SMEAR: NORMAL
PMV BLD AUTO: 10.2 FL (ref 9–12.9)
POIKILOCYTOSIS BLD QL SMEAR: NORMAL
POTASSIUM SERPL-SCNC: 4.2 MMOL/L (ref 3.6–5.5)
RBC # BLD AUTO: 2.99 M/UL (ref 4.2–5.4)
RBC BLD AUTO: PRESENT
SCHISTOCYTES BLD QL SMEAR: NORMAL
SODIUM SERPL-SCNC: 136 MMOL/L (ref 135–145)
TIBC SERPL-MCNC: 188 UG/DL (ref 250–450)
UIBC SERPL-MCNC: 149 UG/DL (ref 110–370)
WBC # BLD AUTO: 5.6 K/UL (ref 4.8–10.8)

## 2024-12-16 PROCEDURE — 97110 THERAPEUTIC EXERCISES: CPT

## 2024-12-16 PROCEDURE — 82728 ASSAY OF FERRITIN: CPT

## 2024-12-16 PROCEDURE — RXMED WILLOW AMBULATORY MEDICATION CHARGE: Performed by: PHYSICAL MEDICINE & REHABILITATION

## 2024-12-16 PROCEDURE — 97535 SELF CARE MNGMENT TRAINING: CPT

## 2024-12-16 PROCEDURE — 83735 ASSAY OF MAGNESIUM: CPT

## 2024-12-16 PROCEDURE — A9270 NON-COVERED ITEM OR SERVICE: HCPCS | Performed by: HOSPITALIST

## 2024-12-16 PROCEDURE — 97116 GAIT TRAINING THERAPY: CPT

## 2024-12-16 PROCEDURE — 82962 GLUCOSE BLOOD TEST: CPT

## 2024-12-16 PROCEDURE — 94760 N-INVAS EAR/PLS OXIMETRY 1: CPT

## 2024-12-16 PROCEDURE — 99232 SBSQ HOSP IP/OBS MODERATE 35: CPT | Performed by: HOSPITALIST

## 2024-12-16 PROCEDURE — 85025 COMPLETE CBC W/AUTO DIFF WBC: CPT

## 2024-12-16 PROCEDURE — 700111 HCHG RX REV CODE 636 W/ 250 OVERRIDE (IP): Performed by: PHYSICAL MEDICINE & REHABILITATION

## 2024-12-16 PROCEDURE — 99232 SBSQ HOSP IP/OBS MODERATE 35: CPT | Performed by: PHYSICAL MEDICINE & REHABILITATION

## 2024-12-16 PROCEDURE — A9270 NON-COVERED ITEM OR SERVICE: HCPCS | Performed by: PHYSICAL MEDICINE & REHABILITATION

## 2024-12-16 PROCEDURE — 700102 HCHG RX REV CODE 250 W/ 637 OVERRIDE(OP): Performed by: HOSPITALIST

## 2024-12-16 PROCEDURE — 36415 COLL VENOUS BLD VENIPUNCTURE: CPT

## 2024-12-16 PROCEDURE — 83540 ASSAY OF IRON: CPT

## 2024-12-16 PROCEDURE — 84100 ASSAY OF PHOSPHORUS: CPT

## 2024-12-16 PROCEDURE — 700102 HCHG RX REV CODE 250 W/ 637 OVERRIDE(OP): Performed by: PHYSICAL MEDICINE & REHABILITATION

## 2024-12-16 PROCEDURE — 80048 BASIC METABOLIC PNL TOTAL CA: CPT

## 2024-12-16 PROCEDURE — 97129 THER IVNTJ 1ST 15 MIN: CPT

## 2024-12-16 PROCEDURE — 97530 THERAPEUTIC ACTIVITIES: CPT

## 2024-12-16 PROCEDURE — 770010 HCHG ROOM/CARE - REHAB SEMI PRIVAT*

## 2024-12-16 PROCEDURE — 83550 IRON BINDING TEST: CPT

## 2024-12-16 PROCEDURE — 97130 THER IVNTJ EA ADDL 15 MIN: CPT

## 2024-12-16 RX ORDER — PREDNISONE 20 MG/1
40 TABLET ORAL DAILY
Status: DISCONTINUED | OUTPATIENT
Start: 2024-12-17 | End: 2024-12-17 | Stop reason: HOSPADM

## 2024-12-16 RX ORDER — FERROUS GLUCONATE 324(38)MG
324 TABLET ORAL
Qty: 30 TABLET | Refills: 2 | Status: SHIPPED | OUTPATIENT
Start: 2024-12-17

## 2024-12-16 RX ORDER — PYRIDOXINE HCL (VITAMIN B6) 50 MG
50 TABLET ORAL DAILY
Qty: 30 TABLET | Refills: 11 | Status: SHIPPED | OUTPATIENT
Start: 2024-12-17

## 2024-12-16 RX ORDER — PREDNISONE 20 MG/1
TABLET ORAL
Qty: 25 TABLET | Refills: 0 | Status: SHIPPED | OUTPATIENT
Start: 2024-12-16 | End: 2025-01-06

## 2024-12-16 RX ORDER — LANOLIN ALCOHOL/MO/W.PET/CERES
400 CREAM (GRAM) TOPICAL 2 TIMES DAILY
Qty: 60 TABLET | Refills: 0 | Status: SHIPPED | OUTPATIENT
Start: 2024-12-16

## 2024-12-16 RX ORDER — LANOLIN ALCOHOL/MO/W.PET/CERES
400 CREAM (GRAM) TOPICAL 2 TIMES DAILY
Status: DISCONTINUED | OUTPATIENT
Start: 2024-12-16 | End: 2024-12-17 | Stop reason: HOSPADM

## 2024-12-16 RX ORDER — MIRTAZAPINE 15 MG/1
15 TABLET, FILM COATED ORAL
Qty: 90 TABLET | Refills: 2 | Status: SHIPPED | OUTPATIENT
Start: 2024-12-16

## 2024-12-16 RX ORDER — ATOVAQUONE 750 MG/5ML
1500 SUSPENSION ORAL DAILY
Qty: 210 ML | Refills: 0 | Status: SHIPPED | OUTPATIENT
Start: 2024-12-16

## 2024-12-16 RX ORDER — HYDROCODONE BITARTRATE AND ACETAMINOPHEN 5; 325 MG/1; MG/1
1-2 TABLET ORAL EVERY 6 HOURS PRN
Qty: 42 TABLET | Refills: 0 | Status: SHIPPED | OUTPATIENT
Start: 2024-12-16 | End: 2024-12-30

## 2024-12-16 RX ORDER — TRAZODONE HYDROCHLORIDE 50 MG/1
50 TABLET, FILM COATED ORAL
Qty: 30 TABLET | Refills: 2 | Status: SHIPPED | OUTPATIENT
Start: 2024-12-16

## 2024-12-16 RX ADMIN — DIBASIC SODIUM PHOSPHATE, MONOBASIC POTASSIUM PHOSPHATE AND MONOBASIC SODIUM PHOSPHATE 250 MG: 852; 155; 130 TABLET ORAL at 08:17

## 2024-12-16 RX ADMIN — ACETAMINOPHEN 650 MG: 325 TABLET ORAL at 15:34

## 2024-12-16 RX ADMIN — PYRIDOXINE HCL TAB 50 MG 50 MG: 50 TAB at 08:17

## 2024-12-16 RX ADMIN — OMEPRAZOLE 20 MG: 20 CAPSULE, DELAYED RELEASE ORAL at 08:18

## 2024-12-16 RX ADMIN — Medication 400 MG: at 11:53

## 2024-12-16 RX ADMIN — HYDROCODONE BITARTRATE AND ACETAMINOPHEN 1 TABLET: 5; 325 TABLET ORAL at 20:27

## 2024-12-16 RX ADMIN — INSULIN LISPRO 2 UNITS: 100 INJECTION, SOLUTION INTRAVENOUS; SUBCUTANEOUS at 11:12

## 2024-12-16 RX ADMIN — Medication 1 TABLET: at 08:24

## 2024-12-16 RX ADMIN — DIBASIC SODIUM PHOSPHATE, MONOBASIC POTASSIUM PHOSPHATE AND MONOBASIC SODIUM PHOSPHATE 250 MG: 852; 155; 130 TABLET ORAL at 20:23

## 2024-12-16 RX ADMIN — ATOVAQUONE 1500 MG: 750 SUSPENSION ORAL at 10:35

## 2024-12-16 RX ADMIN — FOLIC ACID 1 MG: 1 TABLET ORAL at 08:18

## 2024-12-16 RX ADMIN — Medication 324 MG: at 08:17

## 2024-12-16 RX ADMIN — SENNOSIDES AND DOCUSATE SODIUM 1 TABLET: 50; 8.6 TABLET ORAL at 20:24

## 2024-12-16 RX ADMIN — MIRTAZAPINE 15 MG: 15 TABLET, ORALLY DISINTEGRATING ORAL at 20:23

## 2024-12-16 RX ADMIN — Medication 400 MG: at 20:23

## 2024-12-16 RX ADMIN — Medication 100 MG: at 08:18

## 2024-12-16 RX ADMIN — THERA TABS 1 TABLET: TAB at 08:18

## 2024-12-16 RX ADMIN — PREDNISONE 60 MG: 20 TABLET ORAL at 08:17

## 2024-12-16 RX ADMIN — TRAZODONE HYDROCHLORIDE 50 MG: 50 TABLET ORAL at 20:23

## 2024-12-16 RX ADMIN — ATOVAQUONE 1500 MG: 750 SUSPENSION ORAL at 20:27

## 2024-12-16 ASSESSMENT — PAIN DESCRIPTION - PAIN TYPE
TYPE: ACUTE PAIN

## 2024-12-16 ASSESSMENT — ENCOUNTER SYMPTOMS
VOMITING: 0
SHORTNESS OF BREATH: 0
FEVER: 0
CHILLS: 0
NERVOUS/ANXIOUS: 0
ABDOMINAL PAIN: 0
DIARRHEA: 0
NAUSEA: 0

## 2024-12-16 ASSESSMENT — BRIEF INTERVIEW FOR MENTAL STATUS (BIMS)
ASKED TO RECALL BLUE: YES, NO CUE REQUIRED
ASKED TO RECALL SOCK: YES, NO CUE REQUIRED
INITIAL REPETITION OF BED BLUE SOCK - FIRST ATTEMPT: 3
WHAT YEAR IS IT: CORRECT
ASKED TO RECALL BLUE: YES, NO CUE REQUIRED
WHAT DAY OF THE WEEK IS IT: CORRECT
WHAT MONTH IS IT: ACCURATE WITHIN 5 DAYS
ASKED TO RECALL BED: YES, NO CUE REQUIRED
BIMS SUMMARY SCORE: 13
ASKED TO RECALL SOCK: NO, COULD NOT RECALL
WHAT DAY OF THE WEEK IS IT: CORRECT
ASKED TO RECALL BED: YES, NO CUE REQUIRED
COGNITIVE PATTERN ASSESSMENT USED: BIMS
WHAT YEAR IS IT: CORRECT
INITIAL REPETITION OF BED BLUE SOCK - FIRST ATTEMPT: 3
BIMS SUMMARY SCORE: 15
WHAT MONTH IS IT: ACCURATE WITHIN 5 DAYS

## 2024-12-16 ASSESSMENT — ACTIVITIES OF DAILY LIVING (ADL)
TOILETING_LEVEL_OF_ASSIST: ABLE TO COMPLETE TOILETING WITHOUT ASSIST
BED_CHAIR_WHEELCHAIR_TRANSFER_DESCRIPTION: ADAPTIVE EQUIPMENT
TOILET_TRANSFER_LEVEL_OF_ASSIST: ABLE TO COMPLETE TOILET TRANSFER WITHOUT ASSIST
SHOWER_TRANSFER_LEVEL_OF_ASSIST: ABLE TO COMPLETE SHOWER TRANSFER WITHOUT ASSIST

## 2024-12-16 ASSESSMENT — GAIT ASSESSMENTS
DISTANCE (FEET): 100
ASSISTIVE DEVICE: FRONT WHEEL WALKER
DEVIATION: DECREASED BASE OF SUPPORT
DISTANCE (FEET): 400
GAIT LEVEL OF ASSIST: STANDBY ASSIST
DEVIATION: DECREASED BASE OF SUPPORT;SHUFFLED GAIT;DECREASED HEEL STRIKE;DECREASED TOE OFF;BRADYKINETIC
GAIT LEVEL OF ASSIST: MODIFIED INDEPENDENT
ASSISTIVE DEVICE: FRONT WHEEL WALKER

## 2024-12-16 NOTE — CARE PLAN
Problem: OT Long Term Goals  Goal: LTG-By discharge, patient will complete basic self care tasks w/ supervision to mod I   Outcome: Met  Goal: LTG-By discharge, patient will perform bathroom transfers w/ mod I w/ LRAD   Outcome: Met  Goal: LTG-By discharge, patient will complete basic home management w/ supervision to mod I   Outcome: Met

## 2024-12-16 NOTE — THERAPY
"Occupational Therapy   Discharge Summary     Patient Name: Denisse Abel  Age:  64 y.o., Sex:  female  Medical Record #: [id number]  Today's Date: 12/16/2024     Precautions  Precautions: Fall Risk    Subjective    Pt seated in w/c upon arrival, agreeable to participate in OT w/ focus on DC IRF Kelin.      Objective     12/16/24 0701   OT Charge Group   OT Self Care / ADL (Units) 4   OT Total Time Spent   OT Individual Total Time Spent (Mins) 60   Vitals   O2 Delivery Device None - Room Air   Sleep/Wake Cycle   Sleep & Rest Awake   Eating   Assistance Needed Independent   CARE Score - Eating 6   Oral Hygiene   Assistance Needed Independent   CARE Score - Oral Hygiene 6   Toileting Hygiene   Assistance Needed Independent   CARE Score - Toileting Hygiene 6   Shower/Bathe Self   Assistance Needed Independent   CARE Score - Shower/Bathe Self 6   Upper Body Dressing   Assistance Needed Independent   CARE Score - Upper Body Dressing 6   Lower Body Dressing   Assistance Needed Independent   CARE Score - Lower Body Dressing 6   Putting On/Taking Off Footwear   Assistance Needed Independent   CARE Score - Putting On/Taking Off Footwear 6   Toilet Transfer   Assistance Needed Independent   CARE Score - Toilet Transfer 6   Cognitive Pattern Assessment   Cognitive Pattern Assessment Used BIMS   Brief Interview for Mental Status (BIMS)   Repetition of Three Words (First Attempt) 3   Temporal Orientation: Year Correct   Temporal Orientation: Month Accurate within 5 days   Temporal Orientation: Day Correct   Recall: \"Sock\" No, could not recall   Recall: \"Blue\" Yes, no cue required   Recall: \"Bed\" Yes, no cue required   BIMS Summary Score 13   Confusion Assessment Method (CAM)   Is there evidence of an acute change in mental status from the patient's baseline? No   Inattention Behavior not present   Disorganized thinking Behavior not present   Altered level of consciousness Behavior not present   Discharge Summary    Discharge " Location  Home   Patient Discharging with Assist of Friend  (friends to provide intermittent assist)   Level of Supervision Required No Supervision  (friends to provide occasional supervision for shower safety initially following DC)   Recommended Equipment for Discharge Shower Chair;Front-Wheeled Walker   Recommended Services Upon Discharge No Follow-Up Occupational Therapy Recommended   Long Term Goals Met 3   Long Term Goals Not Met 0   Criteria for Termination of Services Maximum Function Achieved for Inpatient Rehabilitation   Discharge Instructions to Patient   Level of Assist Required for Eating Able to Complete Eating without Assist   Level of Assist Required for Grooming Able to Complete Grooming without Assist   Level of Assist Required for Dressing Able to Complete Dressing without Assist   Level of Assist Required for Toileting Able to Complete Toileting without Assist   Level of Assist Required for Toilet Transfer Able to Complete Toilet Transfer without Assist   Level of Assist Required for Bathing Able to Complete Bathing without Assist   Level of Assist Required for Shower Transfer Able to Complete Shower Transfer without Assist   Level of Assist Required for Home Mgmt Able to Complete Home Management without Assist   Level of Assist Required for Meal Prep Able to Complete Meal Preparation without Assist   Driving Please Contact Physician Prior to Driving   Home Exercise Program Refer to Home Exercise Program Handout for Details     Pt completed 1 set x 10 R/LUE of the following therex in standing (w/ blue theraband) - shoulder extension, external rotation, elbow extension. UE HEP handout provided.     Assessment    Pt demo's readiness to DC home tomorrow w/ intermittent support for friends. No OT f/u recommended.   Strengths: Able to follow instructions, Alert and oriented, Willingly participates in therapeutic activities, Pleasant and cooperative, Motivated for self care and independence,  Independent prior level of function  Barriers: Decreased endurance, Fatigue, Generalized weakness, Impaired activity tolerance, Impaired balance, Limited mobility    Plan    DC tomorrow w/ intermittent support from friends. No OT f/u recommended.     Passport items to be completed:  Perform bathroom transfers, complete dressing, complete feeding, get ready for the day, prepare a simple meal, participate in household tasks, adapt home for safety needs, demonstrate home exercise program, complete caregiver training     Occupational Therapy Goals (Active)       Problem: Pt will complete LB dressing @ SBA level        Dates: Start:  12/10/24

## 2024-12-16 NOTE — THERAPY
Physical Therapy   Daily Treatment     Patient Name: Denisse Abel  Age:  64 y.o., Sex:  female  Medical Record #: [id number]  Today's Date: 12/16/2024     Precautions  Precautions: Fall Risk    Subjective    Pt seated in w/c upon arrival, agreeable to session but have questions regarding d/c process.      Objective       12/16/24 1031   PT Charge Group   PT Therapeutic Activities (Units) 2   Supervising Physical Therapist Maria Dolores Trent   PT Total Time Spent   PT Individual Total Time Spent (Mins) 30   Cognition    Level of Consciousness Alert   Gait Functional Level of Assist    Gait Level Of Assist Modified Independent   Assistive Device Front Wheel Walker   Distance (Feet) 100   # of Times Distance was Traveled 2   Deviation Decreased Base Of Support;Shuffled Gait;Decreased Heel Strike;Decreased Toe Off;Bradykinetic   Transfer Functional Level of Assist   Bed, Chair, Wheelchair Transfer Modified Independent   Bed Chair Wheelchair Transfer Description Adaptive equipment  (SPT w/ FWW)   Bed Mobility    Supine to Sit Independent   Sit to Supine Independent   Sit to Stand Modified Independent   Scooting Independent   Interdisciplinary Plan of Care Collaboration   IDT Collaboration with  Pharmacist;Physician;Nursing;Occupational Therapist;Physical Therapist   Patient Position at End of Therapy Seated;Call Light within Reach;Tray Table within Reach;Phone within Reach   Collaboration Comments d/c planning, POC     Education regarding self stretching for BLE glut max, med, HS, lats and seated pelvic tilting as TE.  Ambulation w/o AD w/ SBA/SPV.    Assessment    Pt tolerated session well, is looking forward being d/c tmr and understands CLOF. Spoke to team members for d/c questions, will likely follow up w/ HHPT v.s OP due to the wound at her back.      Strengths: Able to follow instructions, Adequate strength, Alert and oriented, Good insight into deficits/needs, Independent prior level of function, Motivated for self  "care and independence, Pleasant and cooperative, Willingly participates in therapeutic activities  Barriers: Confused, Decreased endurance, Fatigue, Generalized weakness, Home accessibility, Impaired activity tolerance, Impaired sleep pattern, Impaired balance, Pain (R knee pain/OA)    Plan    D/c tomorrow    DME  PT DME Recommendations  Wheelchair:  (no DME anticipated, has 4ww at home, will not need wheelchair)  Cushion:  (n/a)  Assistive Device:  (n/a, has 4ww at home)    Passport items to be completed:      Physical Therapy Problems (Active)       Problem: Balance       Dates: Start:  12/10/24         Goal: STG-Within one week, patient will maintain dynamic standing with </= 1 UE support while performing serial reaching task > 10\" outside of [place] with Daniella or better.        Dates: Start:  12/10/24               Problem: Mobility       Dates: Start:  12/10/24         Goal: STG-Within one week, patient will ambulate up/down a curb c/ LRAD and Daniella or better.        Dates: Start:  12/10/24               Problem: PT-Long Term Goals       Dates: Start:  12/10/24         Goal: LTG-By discharge, patient will ambulate >/= 500' c/ LRAD at [place].        Dates: Start:  12/10/24            Goal: LTG-By discharge, patient will transfer one surface to another c/ [place].        Dates: Start:  12/10/24            Goal: LTG-By discharge, patient will transfer in/out of a car c/ Setup or better.        Dates: Start:  12/10/24            Goal: LTG-By discharge, patient will maintain balance while standing c/ LRAD while performing relevant activities to care for her dog (picking bowl from ground, picking up leash, etc).        Dates: Start:  12/10/24              "

## 2024-12-16 NOTE — PROGRESS NOTES
Received bedside shift report from Stephani HELMS RN regarding patient and assumed care. Patient awake, calm and stable, currently positioned in bed for comfort and safety; call light within reach. Denies pain or discomfort at this time. Will continue to monitor.

## 2024-12-16 NOTE — CARE PLAN
Problem: Knowledge Deficit - Standard  Goal: Patient and family/care givers will demonstrate understanding of plan of care, disease process/condition, diagnostic tests and medications  Outcome: Progressing  Note: Pt agrees with plan of care tonight regarding medications and safety.  Will continue to monitor patient.     Problem: Pain - Standard  Goal: Alleviation of pain or a reduction in pain to the patient’s comfort goal  Outcome: Progressing  Note: C/o pain to back and knee, medicated with Norco, has + relief.  See MAR and doc flow sheet. Will continue to monitor patient.     The patient is Stable - Low risk of patient condition declining or worsening    Shift Goals  Clinical Goals: Safety  Patient Goals: Sleep well    Progress made toward(s) clinical / shift goals:  progressing

## 2024-12-16 NOTE — DISCHARGE INSTRUCTIONS
Occupational Therapy Discharge Instructions for Denisse Abel    12/16/2024    Level of Assist Required for Eating: Able to Complete Eating without Assist  Level of Assist Required for Grooming: Able to Complete Grooming without Assist  Level of Assist Required for Dressing: Able to Complete Dressing without Assist  Level of Assist Required for Toileting: Able to Complete Toileting without Assist  Level of Assist Required for Toilet Transfer: Able to Complete Toilet Transfer without Assist  Level of Assist Required for Bathing: Able to Complete Bathing without Assist  Level of Assist Required for Shower Transfer: Able to Complete Shower Transfer without Assist  Level of Assist Required for Home Mgmt: Able to Complete Home Management without Assist  Level of Assist Required for Meal Prep: Able to Complete Meal Preparation without Assist  Driving: Please Contact Physician Prior to Driving  Home Exercise Program: Refer to Home Exercise Program Handout for Details    Congratulations on graduating from Denisse beal! It has been a pleasure working with you and witnessing you get stronger each day. Best wishes with your recovery!    Lynn Sun, [email or web address]

## 2024-12-16 NOTE — THERAPY
Recreational Therapy  Daily Treatment     Patient Name: Denisse Abel  AGE:  64 y.o., SEX:  female  Medical Record #: [id number]  Today's Date: 12/16/2024       Subjective    Patient was willing to participate in the session.      Objective       12/16/24 1001   Procedural Tracking   Procedural Tracking Cognitive Skills Training   Treatment Time   Total Time Spent (mins) 30   Functional Ability Status - Cognitive   Attention Span Remains on Task   Comprehension Follows Three Step Commands   Judgment Able to Make Independent Decisions   Functional Ability Status - Emotional    Affect Appropriate   Mood Appropriate   Behavior Appropriate;Cooperative   Skilled Intervention    Skilled Intervention Cognitive Leisure   Skilled Intervention Comments Card activity, luminosity, art activity   Interdisciplinary Plan of Care Collaboration   Patient Position at End of Therapy Seated   Strengths & Barriers   Strengths Able to follow instructions;Alert and oriented;Independent prior level of function;Pleasant and cooperative;Willingly participates in therapeutic activities     When asked if she tried to do the sodoku puzzle left with her she said she did not. Today she expressed a desire to do something creative. She was shown the options and she chose to put together a foam ornament. Then she was shown a few cognitive card activities she could do at home and luminosity and at the end said she would likely join a words with friends group that one of her friends is in.     Assessment    Patient was able to identify some activities she can do to keep her mind stimulated and has met all her goals.     Strengths: (P) Able to follow instructions, Alert and oriented, Independent prior level of function, Pleasant and cooperative, Willingly participates in therapeutic activities  Barriers: Generalized weakness    Plan    Patient is expected to discharge tomorrow.

## 2024-12-16 NOTE — PROGRESS NOTES
Hospital Medicine Daily Progress Note    Date of Service  12/16/2024    Chief Complaint:  Hyperglycemia  Abn TFT's  CKD    Interval History:  No complaints.  Doing ok.  Pt to resume home med of Jardiance on discharge home.    Review of Systems  Review of Systems   Constitutional:  Negative for chills and fever.   Respiratory:  Negative for shortness of breath.    Cardiovascular:  Negative for chest pain.   Gastrointestinal:  Negative for abdominal pain, diarrhea, nausea and vomiting.   Psychiatric/Behavioral:  The patient is not nervous/anxious.         Physical Exam  Temp:  [36.2 °C (97.2 °F)-36.8 °C (98.3 °F)] 36.2 °C (97.2 °F)  Pulse:  [74-88] 83  Resp:  [18] 18  BP: (103-142)/(63-72) 142/68  SpO2:  [94 %-98 %] 98 %    Physical Exam  Vitals and nursing note reviewed.   Constitutional:       Appearance: Normal appearance.   HENT:      Head: Atraumatic.   Eyes:      Conjunctiva/sclera: Conjunctivae normal.      Pupils: Pupils are equal, round, and reactive to light.   Cardiovascular:      Rate and Rhythm: Normal rate and regular rhythm.      Heart sounds: Murmur heard.   Pulmonary:      Effort: Pulmonary effort is normal.      Breath sounds: No stridor. No wheezing or rales.   Abdominal:      General: There is no distension.      Palpations: Abdomen is soft.      Tenderness: There is no abdominal tenderness.   Musculoskeletal:      Cervical back: Normal range of motion and neck supple.      Right lower leg: No edema.      Left lower leg: No edema.   Skin:     General: Skin is warm and dry.      Findings: No rash.   Neurological:      Mental Status: She is alert and oriented to person, place, and time.   Psychiatric:         Mood and Affect: Mood normal.         Behavior: Behavior normal.         Fluids    Intake/Output Summary (Last 24 hours) at 12/16/2024 1125  Last data filed at 12/16/2024 1000  Gross per 24 hour   Intake 1020 ml   Output --   Net 1020 ml        Laboratory  Recent Labs     12/16/24  0548   WBC  5.6   RBC 2.99*   HEMOGLOBIN 7.9*   HEMATOCRIT 24.5*   MCV 81.9   MCH 26.4*   MCHC 32.2   RDW 71.3*   PLATELETCT 132*   MPV 10.2       Recent Labs     12/16/24  0548   SODIUM 136   POTASSIUM 4.2   CHLORIDE 102   CO2 25   GLUCOSE 95   BUN 26*   CREATININE 1.17   CALCIUM 8.9                     Imaging    Assessment/Plan  Constipation  Assessment & Plan  Management per Physiatry    Generalized weakness  Assessment & Plan  S/P empiric IV steroids with improvement  On Prednisone 60 mg daily (thru 1/7)  On Atovaquone for PJP proph given sulfa allergy  Needs Neurology F/U    Anemia  Assessment & Plan  Hb: 7.4 --> 8.2 --> 7.9  Fe: 39, sats 21%, ferritin wnl  Cont Fe supplements  Monitor    CKD (chronic kidney disease)  Assessment & Plan  Bun: 39 --> 26  Cr: wnl  Cont to monitor    Abnormal thyroid function test  Assessment & Plan  TSH: 0.168 (12/10)  FT4: 0.90  ? Subclinical  Will check TFT's in am    Hyperglycemia  Assessment & Plan  Hba1c: 5.2 (12/10)  BS labile:   BS drop lower in am -- off night coverage  2nd to steroids  Note: no hx of diabetes  Note: pt states she takes Jardiance at home for her heart --> will resume after discharge  Cont to monitor    Hypophosphatemia- (present on admission)  Assessment & Plan  PO4: 1.8 (12/11) --> 1.7 (12/16)  Will cont supplements for now  Cont to monitor    Liver cirrhosis (HCC)- (present on admission)  Assessment & Plan  2nd to etoh abuse  S/P recent hepatic encephalopathy at [facility] [place]  Has pancytopenia  NH4: 34 (12/11)  On supplements

## 2024-12-16 NOTE — PROGRESS NOTES
NURSING DAILY NOTE    Name: Denisse Abel   Date of Admission: 12/9/2024   Admitting Diagnosis: No Principal Problem: There is no principal problem currently on the Problem List. Please update the Problem List and refresh.  Attending Physician: HAMIDA LARA M.D.  Allergies: Sulfa drugs    Safety  Patient Assist  SBA  Patient Precautions  Fall Risk  Precaution Comments     Bed Transfer Status  Contact Guard Assist  Toilet Transfer Status   Contact Guard Assist  Assistive Devices  Gait Belt, Wheelchair, Rails  Oxygen  None - Room Air  Diet/Therapeutic Dining  Current Diet Order   Procedures    Diet Order Diet: Regular     Pill Administration  whole and one at a time   Agitated Behavioral Scale     ABS Level of Severity       Fall Risk  Has the patient had a fall this admission?   No  Selam Alonso Fall Risk Scoring  9, LOW RISK  Fall Risk Safety Measures  bed alarm and chair alarm    Vitals  Temperature: 36.6 °C (97.9 °F)  Temp src: Oral  Pulse: 88  Respiration: 18  Blood Pressure : 103/72  Blood Pressure MAP (Calculated): 82 MM HG  BP Location: Left, Upper Arm  Patient BP Position: Sitting     Oxygen  Pulse Oximetry: 98 %  O2 (LPM): 0  O2 Delivery Device: None - Room Air    Bowel and Bladder  Last Bowel Movement  12/15/24  Stool Type  Type 3: Like a sausage, but with cracks on its surface  Bowel Device  Bathroom  Continent  Bladder: Continent void   Bowel: Continent movement  Bladder Function  Urine Void (mL):  (Moderate)  Number of Times Voided: 1  Urine Color: Unable To Evaluate  Genitourinary Assessment   Bladder Assessment (WDL):  WDL Except  Man Catheter: Not Applicable  Urine Color: Unable To Evaluate  Bladder Device: Bathroom  Bladder Scan: Post Void  $ Bladder Scan Results (mL): 2    Skin  John Score   19  Sensory Interventions      Moisture Interventions         Pain  Pain Rating Scale  3 - Sometimes distracts me  Pain  Location  Knee  Pain Location Orientation  Right  Pain Interventions   Rest    ADLs    Bathing   Shower, * * With Assistance from, Staff  Linen Change   Partial  Personal Hygiene  Perineal Care, Hair Brushed  Chlorhexidine Bath      Oral Care  Brushed Teeth  Teeth/Dentures     Shave     Nutrition Percentage Eaten  *  * Meal *  *, Lunch, Between 50-75% Consumed  Environmental Precautions  Treaded Slipper Socks on Patient, Bed in Low Position  Patient Turns/Positioning  Sitting up in wheelchair  Patient Turns Assistance/Tolerance  Assistance of One  Bed Positions  Bed Controls On, Bed Locked  Head of Bed Elevated  Self regulated      Psychosocial/Neurologic Assessment  Psychosocial Assessment  Psychosocial (WDL):  Within Defined Limits  Neurologic Assessment  Neuro (WDL): Exceptions to WDL  Level of Consciousness: Alert  Orientation Level: Oriented X4  Cognition: Follows commands  Speech: Clear  Pupil Assesment: No  Motor Function/Sensation Assessment: Sensation  RUE Sensation: Full sensation  LUE Sensation: Full sensation  RLE Sensation: Full sensation  LLE Sensation: Full sensation  EENT (WDL):  WDL Except    Cardio/Pulmonary Assessment  Edema      Respiratory Breath Sounds  RUL Breath Sounds: Clear  RML Breath Sounds: Clear  RLL Breath Sounds: Clear  PHUONG Breath Sounds: Clear  LLL Breath Sounds: Clear  Cardiac Assessment   Cardiac (WDL):  WDL Except (HTN)

## 2024-12-16 NOTE — CARE PLAN
The patient is Stable - Low risk of patient condition declining or worsening    Shift Goals  Clinical Goals: Safety  Patient Goals: Sleep well    Progress made toward(s) clinical / shift goals:    Problem: Fall Risk - Rehab  Goal: Patient will remain free from falls  Outcome: Progressing       Selam Alonso Fall risk Assessment : 9    Low fall risk interventions   - Call light within reach   - Yellow  socks   - Belongings within reach     Pt uses call light consistently and appropriately. Waits for assistance does not attempt self transfer this shift. Able to verbalize needs.

## 2024-12-16 NOTE — THERAPY
Speech Language Pathology  Daily Treatment     Patient Name: Denisse Abel  Age:  64 y.o., Sex:  female  Medical Record #: [id number]  Today's Date: 12/16/2024     Precautions  Precautions: Fall Risk    Subjective    Pt pleasant and cooperative.     Objective       12/16/24 1303   Treatment Charges   SLP Cognitive Skill Development First 15 Minutes 1   SLP Cognitive Skill Development Additional 15 Minutes 1   SLP Total Time Spent   SLP Individual Total Time Spent (Mins) 30         Assessment    BIMS and CAM completed as well as dc planning. Pt with appropriate questions, demonstrating good insight and awareness as well as appropriate problem solving for dc. All questions answered at this time. No further intervention by SLP rec nor warranted.     Strengths: Making steady progress towards goals, Motivated for self care and independence, Independent prior level of function, Able to follow instructions, Pleasant and cooperative  Barriers: Impaired functional cognition    Plan    Pt to dc home tomorrow     Speech Therapy Problems (Active)       Problem: Problem Solving STGs       Dates: Start:  12/10/24         Goal: STG-Within one week, patient will complete medication management tasks with spv and 90% accuracy.         Dates: Start:  12/10/24            Goal: STG-Within one week, patient will complete executive functions with min A and 90% accuracy.         Dates: Start:  12/10/24               Problem: Speech/Swallowing LTGs       Dates: Start:  12/10/24         Goal: LTG-By discharge, patient will solve complex problems with modified independence.         Dates: Start:  12/10/24

## 2024-12-16 NOTE — PROGRESS NOTES
"  Physical Medicine & Rehabilitation Progress Note    Encounter Date: 12/16/2024    Chief Complaint: Decreased mobility    Interval Events (Subjective):  Patient sitting up in room. She reports therapy is going well. Denies pain. Denies NVD.     _____________________________________  Interdisciplinary Team Conference   Most recent IDT on 12/12/2024    Discharge Date/Disposition:  12/17/24  _____________________________________      Objective:  VITAL SIGNS: BP (!) 142/68   Pulse 83   Temp 36.2 °C (97.2 °F) (Oral)   Resp 18   Ht 1.707 m (5' 7.2\")   Wt 55 kg (121 lb 4.1 oz)   SpO2 98%   BMI 18.88 kg/m²   Gen: NAD  Psych: Mood and affect appropriate  CV: RRR, 0 edema  Resp: CTAB, no upper airway sounds  Abd: NTND  Neuro: AOx4, following commands    Laboratory Values:  Recent Results (from the past 72 hours)   POCT glucose device results    Collection Time: 12/13/24 11:54 AM   Result Value Ref Range    POC Glucose, Blood 165 (H) 65 - 99 mg/dL   POCT glucose device results    Collection Time: 12/13/24  4:45 PM   Result Value Ref Range    POC Glucose, Blood 218 (H) 65 - 99 mg/dL   POCT glucose device results    Collection Time: 12/13/24  7:52 PM   Result Value Ref Range    POC Glucose, Blood 206 (H) 65 - 99 mg/dL   POCT glucose device results    Collection Time: 12/14/24  7:53 AM   Result Value Ref Range    POC Glucose, Blood 75 65 - 99 mg/dL   POCT glucose device results    Collection Time: 12/14/24 11:12 AM   Result Value Ref Range    POC Glucose, Blood 147 (H) 65 - 99 mg/dL   POCT glucose device results    Collection Time: 12/14/24  5:02 PM   Result Value Ref Range    POC Glucose, Blood 246 (H) 65 - 99 mg/dL   POCT glucose device results    Collection Time: 12/14/24  8:29 PM   Result Value Ref Range    POC Glucose, Blood 160 (H) 65 - 99 mg/dL   POCT glucose device results    Collection Time: 12/15/24  7:38 AM   Result Value Ref Range    POC Glucose, Blood 56 (L) 65 - 99 mg/dL   POCT glucose device results    " Collection Time: 12/15/24 11:22 AM   Result Value Ref Range    POC Glucose, Blood 173 (H) 65 - 99 mg/dL   POCT glucose device results    Collection Time: 12/15/24  4:54 PM   Result Value Ref Range    POC Glucose, Blood 169 (H) 65 - 99 mg/dL   POCT glucose device results    Collection Time: 12/15/24  8:44 PM   Result Value Ref Range    POC Glucose, Blood 158 (H) 65 - 99 mg/dL   MAGNESIUM    Collection Time: 12/16/24  5:48 AM   Result Value Ref Range    Magnesium 1.5 1.5 - 2.5 mg/dL   CBC WITH DIFFERENTIAL    Collection Time: 12/16/24  5:48 AM   Result Value Ref Range    WBC 5.6 4.8 - 10.8 K/uL    RBC 2.99 (L) 4.20 - 5.40 M/uL    Hemoglobin 7.9 (L) 12.0 - 16.0 g/dL    Hematocrit 24.5 (L) 37.0 - 47.0 %    MCV 81.9 81.4 - 97.8 fL    MCH 26.4 (L) 27.0 - 33.0 pg    MCHC 32.2 32.2 - 35.5 g/dL    RDW 71.3 (H) 35.9 - 50.0 fL    Platelet Count 132 (L) 164 - 446 K/uL    MPV 10.2 9.0 - 12.9 fL    Neutrophils-Polys 71.00 44.00 - 72.00 %    Lymphocytes 21.10 (L) 22.00 - 41.00 %    Monocytes 5.70 0.00 - 13.40 %    Eosinophils 0.20 0.00 - 6.90 %    Basophils 0.00 0.00 - 1.80 %    Immature Granulocytes 2.00 (H) 0.00 - 0.90 %    Nucleated RBC 0.00 0.00 - 0.20 /100 WBC    Neutrophils (Absolute) 3.97 1.82 - 7.42 K/uL    Lymphs (Absolute) 1.18 1.00 - 4.80 K/uL    Monos (Absolute) 0.32 0.00 - 0.85 K/uL    Eos (Absolute) 0.01 0.00 - 0.51 K/uL    Baso (Absolute) 0.00 0.00 - 0.12 K/uL    Immature Granulocytes (abs) 0.11 0.00 - 0.11 K/uL    NRBC (Absolute) 0.00 K/uL    Hypochromia 1+     Anisocytosis 1+     Microcytosis 1+    Basic Metabolic Panel    Collection Time: 12/16/24  5:48 AM   Result Value Ref Range    Sodium 136 135 - 145 mmol/L    Potassium 4.2 3.6 - 5.5 mmol/L    Chloride 102 96 - 112 mmol/L    Co2 25 20 - 33 mmol/L    Glucose 95 65 - 99 mg/dL    Bun 26 (H) 8 - 22 mg/dL    Creatinine 1.17 0.50 - 1.40 mg/dL    Calcium 8.9 8.5 - 10.5 mg/dL    Anion Gap 9.0 7.0 - 16.0   FERRITIN    Collection Time: 12/16/24  5:48 AM   Result Value  Ref Range    Ferritin 198.0 10.0 - 291.0 ng/mL   IRON/TOTAL IRON BIND    Collection Time: 12/16/24  5:48 AM   Result Value Ref Range    Iron 39 (L) 40 - 170 ug/dL    Total Iron Binding 188 (L) 250 - 450 ug/dL    Unsat Iron Binding 149 110 - 370 ug/dL    % Saturation 21 15 - 55 %   ESTIMATED GFR    Collection Time: 12/16/24  5:48 AM   Result Value Ref Range    GFR (CKD-EPI) 51 (A) >60 mL/min/1.73 m 2   PLATELET ESTIMATE    Collection Time: 12/16/24  5:48 AM   Result Value Ref Range    Plt Estimation Decreased    MORPHOLOGY    Collection Time: 12/16/24  5:48 AM   Result Value Ref Range    RBC Morphology Present     Poikilocytosis 2+     Ovalocytes 1+     Schistocytes 1+    PERIPHERAL SMEAR REVIEW    Collection Time: 12/16/24  5:48 AM   Result Value Ref Range    Peripheral Smear Review see below    DIFFERENTIAL COMMENT    Collection Time: 12/16/24  5:48 AM   Result Value Ref Range    Comments-Diff see below    POCT glucose device results    Collection Time: 12/16/24  7:29 AM   Result Value Ref Range    POC Glucose, Blood 84 65 - 99 mg/dL   POCT glucose device results    Collection Time: 12/16/24 11:11 AM   Result Value Ref Range    POC Glucose, Blood 179 (H) 65 - 99 mg/dL       Medications:  Scheduled Medications   Medication Dose Frequency    magnesium oxide  400 mg BID    insulin lispro  2-12 Units TID AC    phosphorus  1 Tablet BID    senna-docusate  2 Tablet Q EVENING    And    polyethylene glycol/lytes  1 Packet DAILY    pyridoxine  50 mg DAILY    multivitamin  1 Tablet DAILY    ferrous gluconate  324 mg QDAY with Breakfast    omeprazole  20 mg DAILY    atovaquone  1,500 mg BID    folic acid  1 mg DAILY    mirtazapine  15 mg QHS    predniSONE  60 mg DAILY    thiamine  100 mg DAILY    vitamin B + C complex  1 Tablet DAILY     PRN medications: insulin lispro **AND** POC blood glucose manual result **AND** NOTIFY MD and PharmD **AND** Administer 20 grams of glucose (approximately 8 ounces of fruit juice) every 15  minutes PRN FSBG less than 70 mg/dL **AND** dextrose bolus, HYDROcodone-acetaminophen, hydrALAZINE, acetaminophen, docusate sodium, magnesium hydroxide, carboxymethylcellulose, benzocaine-menthol, mag hydrox-al hydrox-simeth, ondansetron **OR** ondansetron, traZODone, sodium chloride    Diet:  Current Diet Order   Procedures    Diet Order Diet: Regular       Medical Decision Making and Plan:  Hepatic encephalopathy - Patient with confusion on 11/27 with AMS and failure to thrive, thought to be hepatic encephalopathy vs autoimmune encephalitis. She was started on steroids and has been changed to prednisone 60 mg daily.  -PT and OT for mobility and ADLs. Per guidelines, 15 hours per week between PT, OT and/or SLP.  -Follow-up Neurology. Start Steroid taper 20 mg every two weeks, reduce to 40 mg daily     HTN/sCHF - Patient off medications. Will monitor.      DM2 with hyperglycemia - Patient on SSI on transfer. Blood sugars improved      Anemia - Check AM CBC - 7.4, consult hospitalist. Repeat 8.2 on 12/11. Started on Fe supplement. Continue Fe supplement    Thrombocytopenia - 133 on admission, consult hospitalist. Repeat 133 on 12/11     Hyponatremia - Check AM CMP - 132, consult hospitalist     CKD/Azotemia - Check AM CMP. Avoid nephrotoxic agents. BUN 46 on admission. Repeat 39 on 12/11, given IV fluids. Resolved     Hypophosphatemia - Check AM level - 2.0. Continue K-Phos     Severe protein-calorie malnutrition - Meets Aspen criteria for severe. Dietitian to consult     Depression - Patient on Remeron 15 mg QHS. Improved mood, continue Remeron     Alcohol abuse/cirrhosis - Patient on Thiamine/folic acid. May need to restart Lactulose. Ammonia normal on 12/11  -Constipation, will start Miralax hourly x4 for bowel clean out. Had BMs, continue senna-docusate     Pain - Patient on PRN Tylenol/Oxycodone. Switch to Norco     Skin - Patient at risk for skin breakdown due to debility in areas including sacrum, achilles,  elbows and head in addition to other sites. Nursing to assess skin daily.      GI Ppx - Patient on Prilosec for GERD prophylaxis. Patient on Senna-docusate for constipation prophylaxis.      DVT Ppx - Patient Heparin on transfer. Ambulating > 125 feet, discontinue Heparin  ____________________________________    T. Juliocesar Bender MD/PhD  [facility] - Physical Medicine & Rehabilitation   [facility] - Brain Injury Medicine   ____________________________________

## 2024-12-16 NOTE — DISCHARGE SUMMARY
Physical Medicine & Rehabilitation Discharge Summary    Admission Date: 12/9/2024    Discharge Date: 12/17/2024    Attending Provider: Cristiano Bender MD/PhD    Admission Diagnosis:   Active Hospital Problems    Diagnosis     Constipation     Hyperglycemia     Abnormal thyroid function test     CKD (chronic kidney disease)     Anemia     Generalized weakness     Hypophosphatemia     Liver cirrhosis (HCC)        Discharge Diagnosis:  Active Hospital Problems    Diagnosis     Constipation     Hyperglycemia     Abnormal thyroid function test     CKD (chronic kidney disease)     Anemia     Generalized weakness     Hypophosphatemia     Liver cirrhosis (HCC)        HPI per Admission History & Physical:  Patient is a 64 y.o. female with a past medical history significant for CHF, HTN, HLD, cirrhosis, and CKD admitted to [facility] on 11/27/2024  8:29 AM with AMS.  She reportedly has a history of alcohol abuse and was found disheveled and confused.  Her librium was discontinued.  She was started on IV potassium for hypokalemia and was started on lactulose for liver cirrhosis. She was diagnosed with alcoholic encephalppathy. EEG was negative, she was trialed on Keppra but that has since been discontinued. She has since improved in function after steroids and per hospitalist service should have slow taper off of steroids     Patient was admitted to [facility] on 12/9/2024.     Hospital Course by Problem List:  Hepatic encephalopathy - Patient with confusion on 11/27 with AMS and failure to thrive, thought to be hepatic encephalopathy vs autoimmune encephalitis. She was started on steroids and has been changed to prednisone 60 mg daily.  -PT and OT for mobility and ADLs. Per guidelines, 15 hours per week between PT, OT and/or SLP.  -Follow-up Neurology. Start Steroid weekly, reduce to 40 mg daily -> 20 then 10      HTN/sCHF - Patient off medications. Will monitor.      DM2 with  hyperglycemia - Patient on SSI on transfer. Blood sugars improved      Anemia - Check AM CBC - 7.4, consult hospitalist. Repeat 8.2 on 12/11. Started on Fe supplement. Continue Fe supplement     Thrombocytopenia - 133 on admission, consult hospitalist. Repeat 133 on 12/11     Hyponatremia - Check AM CMP - 132, consult hospitalist     CKD/Azotemia - Check AM CMP. Avoid nephrotoxic agents. BUN 46 on admission. Repeat 39 on 12/11, given IV fluids. Resolved     Hypophosphatemia - Check AM level - 2.0. Continue K-Phos     Severe protein-calorie malnutrition - Meets Aspen criteria for severe. Dietitian to consult     Depression - Patient on Remeron 15 mg QHS. Improved mood, continue Remeron     Alcohol abuse/cirrhosis - Patient on Thiamine/folic acid. May need to restart Lactulose. Ammonia normal on 12/11  -Constipation, will start Miralax hourly x4 for bowel clean out. Had BMs, continue senna-docusate     Pain - Patient on PRN Tylenol/Oxycodone. Switch to Norco     Skin - Patient at risk for skin breakdown due to debility in areas including sacrum, achilles, elbows and head in addition to other sites. Nursing to assess skin daily.      GI Ppx - Patient on Prilosec for GERD prophylaxis. Patient on Senna-docusate for constipation prophylaxis.      DVT Ppx - Patient Heparin on transfer. Ambulating > 125 feet, discontinue Heparin    Functional Status at Discharge  Eating:  Independent  Eating Description:     Grooming:  Modified Independent, Seated  Grooming Description:  Seated in wheelchair at sink  Bathing:  Standby Assist  Bathing Description:  Grab bar, Adaptive equipment, Increased time, Set-up of equipment, Supervision for safety, Verbal cueing  Upper Body Dressing:  Modified Independent  Upper Body Dressing Description:   (seated)  Lower Body Dressing:  Standby Assist  Lower Body Dressing Description:  Grab bar, Increased time, Supervision for safety     Walk:  Standby Assist  Distance Walked:  300  Number of Times  Distance Was Traveled:  2  Assistive Device:  Front Wheel Walker  Gait Deviation:  Decreased Base Of Support, Shuffled Gait, Decreased Heel Strike, Decreased Toe Off, Bradykinetic  Wheelchair:  Supervised  Distance Propelled:  50   Wheelchair Description:  Extra time, Leg rest management  Stairs Contact Guard Assist  Stairs Description Extra time, Hand rails, Supervision for safety     Comprehension:  Modified Independent  Comprehension Description:  Glasses  Expression:  Independent  Expression Description:     Social Interaction:  Independent  Social Interaction Description:     Problem Solving:  Supervision  Problem Solving Description:  Verbal cueing, Therapy schedule, Seat belt, Increased time, Bed/chair alarm  Memory:  Modified Independent  Memory Description:  Verbal cueing, Therapy schedule, Increased time, Bed/chair alarm       ICristiano M.D., personally performed a complete drug regimen review and no potential clinically significant medication issues were identified.   Discharge Medication:     Medication List        ASK your doctor about these medications        Instructions   atovaquone 750 MG/5ML Susp  Commonly known as: Mepron   Doctor's comments: Prophylaxis for prolonged Prednisone  Take 10 mL by mouth every day.  Dose: 1,500 mg     folic acid 1 MG Tabs  Commonly known as: Folvite   Take 1 Tablet by mouth every day.  Dose: 1 mg     heparin 5000 UNIT/ML Soln   Inject 1 mL under the skin every 8 hours.  Dose: 5,000 Units     mirtazapine 15 MG Tbdp  Commonly known as: Remeron   Take 1 Tablet by mouth at bedtime.  Dose: 15 mg     omeprazole 20 MG delayed-release capsule  Commonly known as: PriLOSEC   Take 1 Capsule by mouth every day.  Dose: 20 mg     predniSONE 20 MG Tabs  Commonly known as: Deltasone   Doctor's comments: Extended treatment for demyelination/neurologic disorder  Take 3 Tablets by mouth every day.  Dose: 60 mg     thiamine 100 MG tablet  Commonly known as: Thiamine    Take 1 Tablet by mouth every day.  Dose: 100 mg     vitamin B + C complex Tabs   Take 1 Tablet by mouth every day.  Dose: 1 Tablet              Discharge Diet:  Current Diet Order   Procedures    Diet Order Diet: Regular       Discharge Activity:  As tolerated     Disposition:  Patient to discharge home with family support and community resources.    Equipment:  FWW    Follow-up & Discharge Instructions:  Follow up with your primary care provider (PCP) within 7-10 days of discharge to review your medications and take over your care.     If you develop chest pain, fever, chills, change in neurologic function (weakness, sensation changes, vision changes), or other concerning sxs, seek immediate medical attention or call 911.      Future Appointments   Date Time Provider Department Center   12/16/2024  1:00 PM Shaylee Rowland MS,CCC-SLP STR None   12/16/2024  2:00 PM ARINA HillT [facility] None   1/23/2025  2:15 PM German Xiong M.D. [facility] None       Condition on Discharge:  Good    More than 32 minutes was spent on discharging this patient, including face-to-face time, prescription management, and the dictation of this note.    Cristiano Bender M.D.    Date of Service: 12/17/2024

## 2024-12-16 NOTE — DISCHARGE PLANNING
Case management  Reviewed signed copy of IMM and answered all questions.    Dc date /disposition: 12/17/245 Home with home health.

## 2024-12-16 NOTE — CARE PLAN
Patient was able to identify several things she could do to stimulate her mind at home and was willing to try two activities while she was here.

## 2024-12-16 NOTE — PROGRESS NOTES
NURSING DAILY NOTE    Name: Denisse Abel   Date of Admission: 12/9/2024   Admitting Diagnosis: No Principal Problem: There is no principal problem currently on the Problem List. Please update the Problem List and refresh.  Attending Physician: HAMIDA LARA M.D.  Allergies: Sulfa drugs    Safety  Patient Assist  SBA  Patient Precautions  Fall Risk  Precaution Comments     Bed Transfer Status  Contact Guard Assist  Toilet Transfer Status   Contact Guard Assist  Assistive Devices  Walker - front wheel  Oxygen  None - Room Air  Diet/Therapeutic Dining  Current Diet Order   Procedures    Diet Order Diet: Regular     Pill Administration  whole  Agitated Behavioral Scale     ABS Level of Severity       Fall Risk  Has the patient had a fall this admission?   No  Selam Alonso Fall Risk Scoring  9, LOW RISK  Fall Risk Safety Measures  bed alarm    Vitals  Temperature: 36.8 °C (98.3 °F)  Temp src: Oral  Pulse: 74  Respiration: 18  Blood Pressure : 121/63  Blood Pressure MAP (Calculated): 82 MM HG  BP Location: Left, Upper Arm  Patient BP Position: Supine     Oxygen  Pulse Oximetry: 94 %  O2 (LPM): 0  O2 Delivery Device: None - Room Air    Bowel and Bladder  Last Bowel Movement  12/15/24  Stool Type  Type 3: Like a sausage, but with cracks on its surface  Bowel Device  Bathroom, Other (Comment) (Bowel Meds)  Continent  Bladder: Continent void   Bowel: Continent movement  Bladder Function  Urine Void (mL):  (LARGE)  Number of Times Voided: 1  Urinary Options: Yes  Urine Color: Unable To Evaluate  Genitourinary Assessment   Bladder Assessment (WDL):  Within Defined Limits  Man Catheter: Not Applicable  Urine Color: Unable To Evaluate  Bladder Device: Bathroom  Bladder Scan: Post Void  $ Bladder Scan Results (mL): 2    Skin  John Score   19  Sensory Interventions      Moisture Interventions         Pain  Pain Rating Scale  6 - Hard to ignore, avoid  usual activities  Pain Location  Back, Knee  Pain Location Orientation  Left, Right  Pain Interventions   Rest    ADLs    Bathing   Shower, * * With Assistance from, Staff  Linen Change   Partial  Personal Hygiene  Perineal Care, Hair Brushed  Chlorhexidine Bath      Oral Care  Brushed Teeth  Teeth/Dentures     Shave     Nutrition Percentage Eaten  *  * Meal *  *, Dinner, Between 50-75% Consumed  Environmental Precautions  Treaded Slipper Socks on Patient, Bed in Low Position  Patient Turns/Positioning  Patient turns self independently side to side without assistance, to offload sacral area  Patient Turns Assistance/Tolerance  Standby Assist  Bed Positions  Bed Controls On, Bed Locked  Head of Bed Elevated  Self regulated      Psychosocial/Neurologic Assessment  Psychosocial Assessment  Psychosocial (WDL):  Within Defined Limits  Neurologic Assessment  Neuro (WDL): Exceptions to WDL  Level of Consciousness: Alert  Orientation Level: Oriented X4  Cognition: Follows commands  Speech: Clear  Pupil Assesment: No  Motor Function/Sensation Assessment: Sensation  RUE Sensation: Full sensation  LUE Sensation: Full sensation  RLE Sensation: Full sensation  LLE Sensation: Full sensation  EENT (WDL):  WDL Except    Cardio/Pulmonary Assessment  Edema      Respiratory Breath Sounds  RUL Breath Sounds: Clear  RML Breath Sounds: Clear  RLL Breath Sounds: Clear  PHUONG Breath Sounds: Clear  LLL Breath Sounds: Clear  Cardiac Assessment   Cardiac (WDL):  WDL Except (H/O HTN, CHF.)

## 2024-12-17 VITALS
DIASTOLIC BLOOD PRESSURE: 58 MMHG | SYSTOLIC BLOOD PRESSURE: 112 MMHG | HEIGHT: 67 IN | WEIGHT: 121.25 LBS | BODY MASS INDEX: 19.03 KG/M2 | OXYGEN SATURATION: 96 % | RESPIRATION RATE: 18 BRPM | HEART RATE: 70 BPM | TEMPERATURE: 98.1 F

## 2024-12-17 LAB
GLUCOSE BLD STRIP.AUTO-MCNC: 83 MG/DL (ref 65–99)
T4 FREE SERPL-MCNC: 0.76 NG/DL (ref 0.93–1.7)
TSH SERPL-ACNC: 1.43 UIU/ML (ref 0.35–5.5)

## 2024-12-17 PROCEDURE — A9270 NON-COVERED ITEM OR SERVICE: HCPCS | Performed by: HOSPITALIST

## 2024-12-17 PROCEDURE — 84439 ASSAY OF FREE THYROXINE: CPT

## 2024-12-17 PROCEDURE — A9270 NON-COVERED ITEM OR SERVICE: HCPCS | Performed by: PHYSICAL MEDICINE & REHABILITATION

## 2024-12-17 PROCEDURE — 700102 HCHG RX REV CODE 250 W/ 637 OVERRIDE(OP): Performed by: PHYSICAL MEDICINE & REHABILITATION

## 2024-12-17 PROCEDURE — 84443 ASSAY THYROID STIM HORMONE: CPT

## 2024-12-17 PROCEDURE — 36415 COLL VENOUS BLD VENIPUNCTURE: CPT

## 2024-12-17 PROCEDURE — 700102 HCHG RX REV CODE 250 W/ 637 OVERRIDE(OP): Performed by: HOSPITALIST

## 2024-12-17 PROCEDURE — 700111 HCHG RX REV CODE 636 W/ 250 OVERRIDE (IP): Performed by: PHYSICAL MEDICINE & REHABILITATION

## 2024-12-17 PROCEDURE — 82962 GLUCOSE BLOOD TEST: CPT

## 2024-12-17 PROCEDURE — 99239 HOSP IP/OBS DSCHRG MGMT >30: CPT | Performed by: PHYSICAL MEDICINE & REHABILITATION

## 2024-12-17 RX ADMIN — PREDNISONE 40 MG: 20 TABLET ORAL at 09:07

## 2024-12-17 RX ADMIN — DIBASIC SODIUM PHOSPHATE, MONOBASIC POTASSIUM PHOSPHATE AND MONOBASIC SODIUM PHOSPHATE 250 MG: 852; 155; 130 TABLET ORAL at 09:08

## 2024-12-17 RX ADMIN — FOLIC ACID 1 MG: 1 TABLET ORAL at 09:09

## 2024-12-17 RX ADMIN — Medication 100 MG: at 09:08

## 2024-12-17 RX ADMIN — OMEPRAZOLE 20 MG: 20 CAPSULE, DELAYED RELEASE ORAL at 09:08

## 2024-12-17 RX ADMIN — PYRIDOXINE HCL TAB 50 MG 50 MG: 50 TAB at 09:08

## 2024-12-17 RX ADMIN — Medication 324 MG: at 09:07

## 2024-12-17 RX ADMIN — POLYETHYLENE GLYCOL 3350 1 PACKET: 17 POWDER, FOR SOLUTION ORAL at 09:07

## 2024-12-17 RX ADMIN — Medication 400 MG: at 09:07

## 2024-12-17 RX ADMIN — THERA TABS 1 TABLET: TAB at 09:07

## 2024-12-17 RX ADMIN — ATOVAQUONE 1500 MG: 750 SUSPENSION ORAL at 09:18

## 2024-12-17 NOTE — PROGRESS NOTES
NURSING DAILY NOTE    Name: Denisse Abel   Date of Admission: 12/9/2024   Admitting Diagnosis: No Principal Problem: There is no principal problem currently on the Problem List. Please update the Problem List and refresh.  Attending Physician: HAMIDA LARA M.D.  Allergies: Sulfa drugs    Safety  Patient Assist  SBA  Patient Precautions  Fall Risk  Precaution Comments     Bed Transfer Status  Modified Independent  Toilet Transfer Status   Contact Guard Assist  Assistive Devices  Wheelchair  Oxygen  None - Room Air  Diet/Therapeutic Dining  Current Diet Order   Procedures    Diet Order Diet: Regular     Pill Administration  whole  Agitated Behavioral Scale     ABS Level of Severity       Fall Risk  Has the patient had a fall this admission?   No  Selam Alonso Fall Risk Scoring  9, LOW RISK  Fall Risk Safety Measures  Bed strip alarm    Vitals  Temperature: 36.7 °C (98.1 °F)  Temp src: Oral  Pulse: 70  Respiration: 18  Blood Pressure : 112/58  Blood Pressure MAP (Calculated): 76 MM HG  BP Location: Left, Upper Arm  Patient BP Position: Supine     Oxygen  Pulse Oximetry: 96 %  O2 (LPM): 0  O2 Delivery Device: None - Room Air    Bowel and Bladder  Last Bowel Movement  12/15/24  Stool Type  Type 3: Like a sausage, but with cracks on its surface  Bowel Device  Bathroom, Other (Comment) (Bowel Meds)  Continent  Bladder: Continent void   Bowel: Continent movement  Bladder Function  Urine Void (mL):  (large)  Number of Times Voided: 1  Urinary Options: Yes  Urine Color: Unable To Evaluate  Genitourinary Assessment   Bladder Assessment (WDL):  Within Defined Limits  Man Catheter: Not Applicable  Urine Color: Unable To Evaluate  Bladder Device: Bathroom  Bladder Scan: Post Void  $ Bladder Scan Results (mL): 2    Skin  John Score   19  Sensory Interventions      Moisture Interventions         Pain  Pain Rating Scale  6 - Hard to ignore, avoid usual  activities  Pain Location  Back, Knee  Pain Location Orientation  Right, Left, Lower  Pain Interventions   Rest    ADLs    Bathing   Shower, * * With Assistance from, Staff  Linen Change   Partial  Personal Hygiene  Perineal Care, Hair Brushed  Chlorhexidine Bath      Oral Care  Brushed Teeth  Teeth/Dentures     Shave     Nutrition Percentage Eaten  Between 50-75% Consumed  Environmental Precautions  Treaded Slipper Socks on Patient, Bed in Low Position  Patient Turns/Positioning  Patient turns self independently side to side without assistance, to offload sacral area  Patient Turns Assistance/Tolerance  Standby Assist  Bed Positions  Bed Controls On, Bed Locked  Head of Bed Elevated  Self regulated      Psychosocial/Neurologic Assessment  Psychosocial Assessment  Psychosocial (WDL):  Within Defined Limits  Neurologic Assessment  Neuro (WDL): Exceptions to WDL  Level of Consciousness: Alert  Orientation Level: Oriented X4  Cognition: Follows commands  Speech: Clear  Pupil Assesment: No  Motor Function/Sensation Assessment: Sensation  RUE Sensation: Full sensation  LUE Sensation: Full sensation  RLE Sensation: Full sensation  LLE Sensation: Full sensation  EENT (WDL):  WDL Except    Cardio/Pulmonary Assessment  Edema      Respiratory Breath Sounds  RUL Breath Sounds: Clear  RML Breath Sounds: Clear  RLL Breath Sounds: Clear  PHUONG Breath Sounds: Clear  LLL Breath Sounds: Clear  Cardiac Assessment   Cardiac (WDL):  WDL Except (H/O HTN, CHF.)

## 2024-12-17 NOTE — THERAPY
Physical Therapy   Daily Treatment     Patient Name: Denisse Abel  Age:  64 y.o., Sex:  female  Medical Record #: [id number]  Today's Date: 12/16/2024     Precautions  Precautions: Fall Risk    Subjective    Patient feels prepared and confident with discharge plan     Objective       12/16/24 1401   PT Charge Group   PT Gait Training (Units) 2   PT Therapeutic Exercise (Units) 1   PT Therapeutic Activities (Units) 1   PT Total Time Spent   PT Individual Total Time Spent (Mins) 60   Gait Functional Level of Assist    Gait Level Of Assist Standby Assist   Assistive Device Front Wheel Walker   Distance (Feet) 400   # of Times Distance was Traveled 1   Deviation Decreased Base Of Support   Stairs Functional Level of Assist   Level of Assist with Stairs Standby Assist   # of Stairs Climbed 8   Stairs Description   (corrected sequence to foot-to-foot for safety)   Transfer Functional Level of Assist   Bed, Chair, Wheelchair Transfer Modified Independent   Supine Lower Body Exercise   Supine Lower Body Exercises   (hand-out given to transition to HEP)   Interdisciplinary Plan of Care Collaboration   IDT Collaboration with  Physical Therapist Assistant (PTA);Occupational Therapist;   Collaboration Comments HEP; CM ordered HHPT   Roll Left and Right   Assistance Needed Independent   CARE Score - Roll Left and Right 6   Sit to Lying   Assistance Needed Independent   CARE Score - Sit to Lying 6   Lying to Sitting on Side of Bed   Assistance Needed Independent   CARE Score - Lying to Sitting on Side of Bed 6   Sit to Stand   Assistance Needed Independent   CARE Score - Sit to Stand 6   Chair/Bed-to-Chair Transfer   Assistance Needed Independent   CARE Score - Chair/Bed-to-Chair Transfer 6   Car Transfer   Assistance Needed Adaptive equipment;Independent   CARE Score - Car Transfer 6   Walk 10 Feet   Assistance Needed Adaptive equipment;Independent   CARE Score - Walk 10 Feet 6   Walk 50 Feet with Two Turns    Assistance Needed Adaptive equipment;Independent   CARE Score - Walk 50 Feet with Two Turns 6   Walk 150 Feet   Assistance Needed Adaptive equipment;Independent   CARE Score - Walk 150 Feet 6   Walking 10 Feet on Uneven Surfaces   Assistance Needed Adaptive equipment;Independent   CARE Score - Walking 10 Feet on Uneven Surfaces 6   1 Step (Curb)   Assistance Needed Adaptive equipment;Independent   CARE Score - 1 Step (Curb) 6   4 Steps   Assistance Needed Adaptive equipment;Independent   CARE Score - 4 Steps 6   12 Steps   Assistance Needed Adaptive equipment;Independent   CARE Score - 12 Steps 6   Picking Up Object   Assistance Needed Adaptive equipment;Independent   CARE Score - Picking Up Object 6   Wheel 50 Feet with Two Turns   Reason if not Attempted Activity not applicable   CARE Score - Wheel 50 Feet with Two Turns 9   Type of Wheelchair/Scooter Manual   Wheel 150 Feet   Reason if not Attempted Activity not applicable   CARE Score - Wheel 150 Feet 9   P.T. Discharge Summary   Discharge Location Home   Patient Discharging with Assist of No One, Patient will be Alone   Level of Supervision Required Upon Discharge No Supervision   Recommended Equipment for Discharge Front-Wheeled Walker   Recommeded Services Upon Discharge Home Health Physical Therapy   Long Term Goals Met all   Long Term Goals Not Met none   Criteria for Termination of Services Maximum Function Achieved for Inpatient Rehabilitation   Discharge Instructions to Patient   Level of Assist Required for Ambulation No Assist on Flat Surfaces;No Assist on Curbs;No Assist on Stairs   Distance Patient May Ambulate 400   Device Recommended for Ambulation Front-Wheeled Walker   Level of Assist Required for Transfers Requires No Assist   Device Recommended for Transfers Front-Wheeled Walker   Home Exercise Program Refer to Home Exercise Program Handout for Details       Assessment    Demonstrates transfers and ambulation with FWW with distant SPV, stairs  "with SPV  Verbalizes understanding of HEP, clarified HHPT    Strengths: Able to follow instructions, Adequate strength, Alert and oriented, Good insight into deficits/needs, Independent prior level of function, Motivated for self care and independence, Pleasant and cooperative, Willingly participates in therapeutic activities  Barriers: Confused, Decreased endurance, Fatigue, Generalized weakness, Home accessibility, Impaired activity tolerance, Impaired sleep pattern, Impaired balance, Pain (R knee pain/OA)    Plan    Discharge patient    DME  PT DME Recommendations  Wheelchair:  (no DME anticipated, has 4ww at home, will not need wheelchair)  Cushion:  (n/a)  Assistive Device:  (n/a, has 4ww at home)    Passport items to be completed:  All complete    Physical Therapy Problems (Active)       Problem: Balance       Dates: Start:  12/10/24         Goal: STG-Within one week, patient will maintain dynamic standing with </= 1 UE support while performing serial reaching task > 10\" outside of [place] with Daniella or better.        Dates: Start:  12/10/24               Problem: Mobility       Dates: Start:  12/10/24         Goal: STG-Within one week, patient will ambulate up/down a curb c/ LRAD and Daniella or better.        Dates: Start:  12/10/24               Problem: PT-Long Term Goals       Dates: Start:  12/10/24         Goal: LTG-By discharge, patient will ambulate >/= 500' c/ LRAD at [place].        Dates: Start:  12/10/24            Goal: LTG-By discharge, patient will transfer one surface to another c/ [place].        Dates: Start:  12/10/24            Goal: LTG-By discharge, patient will transfer in/out of a car c/ Setup or better.        Dates: Start:  12/10/24            Goal: LTG-By discharge, patient will maintain balance while standing c/ LRAD while performing relevant activities to care for her dog (picking bowl from ground, picking up leash, etc).        Dates: Start:  12/10/24              "

## 2024-12-17 NOTE — PROGRESS NOTES
Patient discharged to home per order.  Discharge instructions reviewed with patient; they verbalize understanding and signed copies placed in chart.  Patient has all belongings; signed copy of form in chart.  Patient left facility at 1115 via walker to private vehicle accompanied by rehab staff and.  Have enjoyed working with this pleasant patient.

## 2024-12-17 NOTE — PROGRESS NOTES
NURSING DAILY NOTE    Name: Denisse Abel   Date of Admission: 12/9/2024   Admitting Diagnosis: No Principal Problem: There is no principal problem currently on the Problem List. Please update the Problem List and refresh.  Attending Physician: HAMIDA LARA M.D.  Allergies: Sulfa drugs    Safety  Patient Assist  SBA  Patient Precautions  Fall Risk  Precaution Comments     Bed Transfer Status  Modified Independent  Toilet Transfer Status   Contact Guard Assist  Assistive Devices  Gait Belt, Wheelchair, Rails  Oxygen  None - Room Air  Diet/Therapeutic Dining  Current Diet Order   Procedures    Diet Order Diet: Regular     Pill Administration  whole  Agitated Behavioral Scale     ABS Level of Severity       Fall Risk  Has the patient had a fall this admission?   No  Selam Alonso Fall Risk Scoring  9, LOW RISK  Fall Risk Safety Measures  bed alarm and chair alarm    Vitals  Temperature: 36.3 °C (97.4 °F)  Temp src: Oral  Pulse: 88  Respiration: 18  Blood Pressure : 136/66  Blood Pressure MAP (Calculated): 89 MM HG  BP Location: Left, Upper Arm  Patient BP Position: Sitting     Oxygen  Pulse Oximetry: 98 %  O2 (LPM): 0  O2 Delivery Device: None - Room Air    Bowel and Bladder  Last Bowel Movement  12/15/24  Stool Type  Type 3: Like a sausage, but with cracks on its surface  Bowel Device  Bathroom  Continent  Bladder: Continent void   Bowel: Continent movement  Bladder Function  Urine Void (mL):  (LARGE)  Number of Times Voided: 1  Urinary Options: Yes  Urine Color: Unable To Evaluate  Genitourinary Assessment   Bladder Assessment (WDL):  WDL Except  Man Catheter: Not Applicable  Urine Color: Unable To Evaluate  Bladder Device: Bathroom  Bladder Scan: Post Void  $ Bladder Scan Results (mL): 2    Skin  John Score   19  Sensory Interventions      Moisture Interventions         Pain  Pain Rating Scale  0 - No Pain  Pain Location  Back, Knee  Pain  Location Orientation  Left, Right  Pain Interventions   Declines    ADLs    Bathing   Shower, * * With Assistance from, Staff  Linen Change   Partial  Personal Hygiene  Perineal Care, Hair Brushed  Chlorhexidine Bath      Oral Care  Brushed Teeth  Teeth/Dentures     Shave     Nutrition Percentage Eaten  Between 50-75% Consumed  Environmental Precautions  Treaded Slipper Socks on Patient, Bed in Low Position  Patient Turns/Positioning  Sitting up in wheelchair  Patient Turns Assistance/Tolerance  Standby Assist  Bed Positions  Bed Controls On, Bed Locked  Head of Bed Elevated  Self regulated      Psychosocial/Neurologic Assessment  Psychosocial Assessment  Psychosocial (WDL):  Within Defined Limits  Neurologic Assessment  Neuro (WDL): Exceptions to WDL  Level of Consciousness: Alert  Orientation Level: Oriented X4  Cognition: Follows commands  Speech: Clear  Pupil Assesment: No  Motor Function/Sensation Assessment: Sensation  RUE Sensation: Full sensation  LUE Sensation: Full sensation  RLE Sensation: Full sensation  LLE Sensation: Full sensation  EENT (WDL):  WDL Except    Cardio/Pulmonary Assessment  Edema      Respiratory Breath Sounds  RUL Breath Sounds: Clear  RML Breath Sounds: Clear  RLL Breath Sounds: Clear  PHUONG Breath Sounds: Clear  LLL Breath Sounds: Clear  Cardiac Assessment   Cardiac (WDL):  WDL Except (HTN)

## 2024-12-17 NOTE — PROGRESS NOTES
NURSING DAILY NOTE    Name: Denisse Abel   Date of Admission: 12/9/2024   Admitting Diagnosis: No Principal Problem: There is no principal problem currently on the Problem List. Please update the Problem List and refresh.  Attending Physician: HAMIDA LARA M.D.  Allergies: Sulfa drugs    Safety  Patient Assist  sba  Patient Precautions  Fall Risk  Precaution Comments     Bed Transfer Status  Modified Independent  Toilet Transfer Status   Contact Guard Assist  Assistive Devices  Wheelchair  Oxygen  None - Room Air  Diet/Therapeutic Dining  Current Diet Order   Procedures    Diet Order Diet: Regular     Pill Administration  whole  Agitated Behavioral Scale     ABS Level of Severity       Fall Risk  Has the patient had a fall this admission?   No  Selam Alonso Fall Risk Scoring  9, LOW RISK  Fall Risk Safety Measures  bed alarm    Vitals  Temperature: 36.7 °C (98.1 °F)  Temp src: Oral  Pulse: 70  Respiration: 18  Blood Pressure : 112/58  Blood Pressure MAP (Calculated): 76 MM HG  BP Location: Left, Upper Arm  Patient BP Position: Supine     Oxygen  Pulse Oximetry: 96 %  O2 (LPM): 0  O2 Delivery Device: None - Room Air    Bowel and Bladder  Last Bowel Movement  12/15/24  Stool Type  Type 3: Like a sausage, but with cracks on its surface  Bowel Device  Bathroom, Other (Comment) (Bowel Meds)  Continent  Bladder: Continent void   Bowel: Continent movement  Bladder Function  Urine Void (mL):  (large)  Number of Times Voided: 1  Urinary Options: Yes  Urine Color: Unable To Evaluate  Genitourinary Assessment   Bladder Assessment (WDL):  Within Defined Limits  Man Catheter: Not Applicable  Urine Color: Unable To Evaluate  Bladder Device: Bathroom  Bladder Scan: Post Void  $ Bladder Scan Results (mL): 2    Skin  John Score   19  Sensory Interventions      Moisture Interventions         Pain  Pain Rating Scale  0 - No Pain  Pain Location  Back,  Knee  Pain Location Orientation  Right, Left, Lower  Pain Interventions   Repositioned, Rest, Declines    ADLs    Bathing   Shower, * * With Assistance from, Staff  Linen Change   Partial  Personal Hygiene  Perineal Care, Hair Brushed  Chlorhexidine Bath      Oral Care  Brushed Teeth  Teeth/Dentures     Shave     Nutrition Percentage Eaten  Between % Consumed  Environmental Precautions  Treaded Slipper Socks on Patient, Bed in Low Position  Patient Turns/Positioning  Patient turns self independently side to side without assistance, to offload sacral area  Patient Turns Assistance/Tolerance  Standby Assist  Bed Positions  Bed Controls On, Bed Locked  Head of Bed Elevated  Self regulated      Psychosocial/Neurologic Assessment  Psychosocial Assessment  Psychosocial (WDL):  Within Defined Limits  Neurologic Assessment  Neuro (WDL): Exceptions to WDL  Level of Consciousness: Alert  Orientation Level: Oriented X4  Cognition: Follows commands  Speech: Clear  Pupil Assesment: No  Motor Function/Sensation Assessment: Sensation  RUE Sensation: Full sensation  LUE Sensation: Full sensation  RLE Sensation: Full sensation  LLE Sensation: Full sensation  EENT (WDL):  WDL Except    Cardio/Pulmonary Assessment  Edema      Respiratory Breath Sounds  RUL Breath Sounds: Clear  RML Breath Sounds: Clear  RLL Breath Sounds: Clear  PHUONG Breath Sounds: Clear  LLL Breath Sounds: Clear  Cardiac Assessment   Cardiac (WDL):  WDL Except (H/O HTN, CHF.)

## 2024-12-17 NOTE — CARE PLAN
Problem: Knowledge Deficit - Standard  Goal: Patient and family/care givers will demonstrate understanding of plan of care, disease process/condition, diagnostic tests and medications  Outcome: Progressing   Pt education given regarding plan of care with emphasis on adequate hydration, pt shows good understanding, will continue to reinforce education and continue to monitor.         Problem: Fall Risk - Rehab  Goal: Patient will remain free from falls  Outcome: Progressing   Pt education given regarding fall precautions AND safety measures, pt shows good understanding, has not attempted to self transfer this shift, will continue to reinforce education and continue to monitor.

## 2024-12-18 ENCOUNTER — PATIENT OUTREACH (OUTPATIENT)
Dept: MEDICAL GROUP | Facility: MEDICAL CENTER | Age: 64
End: 2024-12-18
Payer: MEDICARE

## 2024-12-18 NOTE — PROGRESS NOTES
Transitional Care Management  TCM Outreach Date and Time: Filed (12/18/2024 10:06 AM)    Discharge Questions  Actual Discharge Date: 12/17/24  Now that you are home, how are you feeling?: Good (States feeling great, happy to be home. Is A&O x4.  Denies issues or concerns.  States she's ravenous and eating well, also focused on fluids/protein drinks.  Denies elimination issues.  Is using walker in home but feels she can walk without if needed.)  Did you receive any new prescriptions?: Yes  Were you able to get them filled?: Yes  Meds to Bed or Pharmacy filled?: Pharmacy (She is ordering her vitamins on line and will begin taking them when they arrive)  Do you have any questions about your current medications or new medications (Review Med Rec)?: No (Completed Med Rec.  She is very aware of her meds and use; she is tapering prednisone as directed.  She will begin the vitamin regime when they come from mail order)  Did you have any durable medical equipment ordered?: No (Pt stated she has a walker at home and did not need DME ordered)  Do you have a follow up appointment scheduled with your PCP?: Yes  Appointment Date: 01/02/25  Any issues or paperwork you wish to discuss with your PCP?: Yes (Please specify) (Have her look at posterior bed sore wound which is healing)  If Home Health was ordered, have they contacted you (Patient): Yes ([facility]:  #[phone])  Did you have enough support after your last discharge?: Yes (Friends)  Does this patient qualify for the CCM program?: Yes (After [facility] services are discontinued)    Transitional Care  Number of attempts made to contact patient: 1  Current or previous attempts completed within two business days of discharge? : Yes  Provided education regarding treatment plan, medications, self-management, ADLs?: Yes (Med Rec; Dietary needs; S/S to return to ER)  Has patient completed an Advanced Directive?: Yes  Is the patient's advanced directive on file?: Yes  Has the Care  Manager's phone number provided?: No  Is there anything else I can help you with?: No (Denies further needs, issues, or conerns.  No questions.)    Discharge Summary  Chief Complaint: 11/27/24 EMS to [facility]:  ALOC; Cnfused; Malnourished  Admitting Diagnosis: 11/27-12/9/24 [facility]:  ETOH abuse; R/O [place] encephalopathy; Partial seizure disorder; Protein malnutrition; HX:  ETOH abuse; HTN; CKD; CHF; HLF; Esophageal varices; Cirrhosis  Discharge Diagnosis: 12/17/24 [facility]:  Liver cirrhosis; Weakness; Anemia; CKD; Depression

## 2024-12-18 NOTE — DOCUMENTATION QUERY
DOCUMENTATION QUERY    PROVIDERS: Please select “Cosign w/ note”to reply to query.    To better represent the severity of illness of your patient, please review the following information and exercise your independent professional judgment in responding to this query.     DM is documented in the History and Physical, Progress Notes, and Discharge Summary. Based upon the clinical findings, risk factors, and treatment, can this diagnosis be further specified?    -DM ruled in  -DM ruled out  -Other, please specify  -Unable to determine      The medical record reflects the following:   Clinical Findings  H&P- Secondary Diagnosis/Medical Co-morbidities Affecting Function  DM2 with hyperglycemia - Patient on SSI on transfer.     PN 12/10-Hyperglycemia  Likely 2/2 steroids  Denies premorbid dx of DM w/ HbA1c 5.2  Increase SSI  Monitor serum glucose trends    PN 12/11- Hyperglycemia  Assessment & Plan  Hba1c: 5.2 (12/10)  BS labile:  Likely 2nd to steroids  Denies hx of diabetes  Note: pt states she takes Jardiance at home for her heart  Cont to monitor    PN 12/11-Continue SSI blood sugars into 200s    Treatment  SSI  Jardiance   Risk Factors  CKD. HTN, [place]   Location within medical record  History and Physical, Progress Notes, and Discharge Summary     Thank you,   Malcom Fink, CCS

## 2024-12-20 ENCOUNTER — TELEPHONE (OUTPATIENT)
Dept: HEALTH INFORMATION MANAGEMENT | Facility: OTHER | Age: 64
End: 2024-12-20
Payer: MEDICARE

## 2024-12-27 ENCOUNTER — HOME CARE VISIT (OUTPATIENT)
Dept: HOME HEALTH SERVICES | Facility: HOME HEALTHCARE | Age: 64
End: 2024-12-27

## 2025-01-02 ENCOUNTER — APPOINTMENT (OUTPATIENT)
Dept: MEDICAL GROUP | Facility: MEDICAL CENTER | Age: 65
End: 2025-01-02
Payer: MEDICARE

## 2025-01-02 VITALS
HEIGHT: 67 IN | BODY MASS INDEX: 21.5 KG/M2 | TEMPERATURE: 96.8 F | OXYGEN SATURATION: 97 % | SYSTOLIC BLOOD PRESSURE: 124 MMHG | DIASTOLIC BLOOD PRESSURE: 82 MMHG | HEART RATE: 92 BPM | WEIGHT: 137 LBS

## 2025-01-02 DIAGNOSIS — I35.0 NONRHEUMATIC AORTIC VALVE STENOSIS: ICD-10-CM

## 2025-01-02 DIAGNOSIS — F10.29 ALCOHOL DEPENDENCE WITH ALCOHOL-INDUCED DISORDER (HCC): ICD-10-CM

## 2025-01-02 DIAGNOSIS — F51.01 PRIMARY INSOMNIA: ICD-10-CM

## 2025-01-02 DIAGNOSIS — K70.30 ALCOHOLIC CIRRHOSIS, UNSPECIFIED WHETHER ASCITES PRESENT (HCC): ICD-10-CM

## 2025-01-02 DIAGNOSIS — N18.4 CKD (CHRONIC KIDNEY DISEASE) STAGE 4, GFR 15-29 ML/MIN (HCC): ICD-10-CM

## 2025-01-02 DIAGNOSIS — I51.89 LEFT VENTRICULAR SYSTOLIC DYSFUNCTION, NYHA CLASS 3: ICD-10-CM

## 2025-01-02 DIAGNOSIS — K31.89 PORTAL HYPERTENSIVE GASTROPATHY (HCC): ICD-10-CM

## 2025-01-02 DIAGNOSIS — K70.31 ALCOHOLIC CIRRHOSIS OF LIVER WITH ASCITES (HCC): ICD-10-CM

## 2025-01-02 DIAGNOSIS — I50.42 CHRONIC COMBINED SYSTOLIC AND DIASTOLIC HEART FAILURE (HCC): ICD-10-CM

## 2025-01-02 DIAGNOSIS — K76.6 PORTAL HYPERTENSIVE GASTROPATHY (HCC): ICD-10-CM

## 2025-01-02 DIAGNOSIS — I85.10 SECONDARY ESOPHAGEAL VARICES WITHOUT BLEEDING (HCC): ICD-10-CM

## 2025-01-02 DIAGNOSIS — I50.20 ACC/AHA STAGE C SYSTOLIC HEART FAILURE (HCC): ICD-10-CM

## 2025-01-02 PROCEDURE — 99214 OFFICE O/P EST MOD 30 MIN: CPT | Performed by: PHYSICIAN ASSISTANT

## 2025-01-02 PROCEDURE — 3074F SYST BP LT 130 MM HG: CPT | Performed by: PHYSICIAN ASSISTANT

## 2025-01-02 PROCEDURE — 3079F DIAST BP 80-89 MM HG: CPT | Performed by: PHYSICIAN ASSISTANT

## 2025-01-02 PROCEDURE — G2211 COMPLEX E/M VISIT ADD ON: HCPCS | Performed by: PHYSICIAN ASSISTANT

## 2025-01-02 RX ORDER — SPIRONOLACTONE 50 MG/1
TABLET, FILM COATED ORAL
COMMUNITY
Start: 2024-05-01 | End: 2025-01-02

## 2025-01-02 ASSESSMENT — FIBROSIS 4 INDEX: FIB4 SCORE: 2.121320343559642573

## 2025-01-02 NOTE — PROGRESS NOTES
Subjective:     History of Present Illness  The patient presents for evaluation of ascites, constipation, and iron deficiency.    She reports a significant improvement in her condition compared to a few weeks prior. She was previously under the care of Dr. Liang, a gastroenterologist, who prescribed diuretics to manage her fluid retention. Despite this treatment, she experienced no relief and developed a protruding umbilicus. She was subsequently admitted to [facility] due to severe illness and pain, necessitating an ambulance transport from her residence. A neurology consultation was conducted during her hospital stay, with several physicians suggesting potential toxicity. She also reported constipation at that time. Her blood pressure readings were consistently low, averaging around 85/50. She has been abstaining from alcohol for approximately 1.5 years. She is currently not on any medications, except for vitamins, and is gradually discontinuing prednisone. She reports feeling better and having a flatter abdomen. She has not consulted with Dr. Benton. She underwent one paracentesis procedure and did not require a tap for fluid removal during her hospital stay. She reports frequent urination during her hospital stay. She had a lumbar puncture. She had a UTI and was given IV ceftriaxone for 3 days. She has an undiagnosed neurological disorder. She has a scheduled appointment with neurology at the end of January 2025. She has an upcoming appointment with her cardiologist at the end of January 2025. She expresses a desire to avoid resuming cardiac medications if possible.    She is currently taking magnesium for constipation, which was prescribed during her hospital stay due to severe constipation.    She received an iron transfusion approximately 2 months ago and is currently on iron supplements.    Supplemental Information  She is currently taking trazodone for insomnia, which was prescribed during her hospital  stay. She is also on vitamin B6, magnesium, lactulose, ferrous gluconate, omeprazole, mirtazapine, thiamine, atovaquone, phosphorus, and folic acid. She is not taking spironolactone. She reports improved hair and skin condition. She was previously on atovaquone for oral thrush, which has since resolved.    SOCIAL HISTORY  She has been abstaining from alcohol for approximately 1.5 years.    MEDICATIONS  Current: Vitamin B6, magnesium, lactulose, ferrous gluconate, omeprazole, mirtazapine, thiamine, atovaquone, phosphorus, folic acid  Discontinued: Spironolactone      Current medicines (including changes today)  Current Outpatient Medications   Medication Sig Dispense Refill    ferrous gluconate (FERGON) 324 (38 Fe) MG Tab Take 1 Tablet by mouth every morning with breakfast. 30 Tablet 2    mirtazapine (REMERON) 15 MG Tab Take 1 Tablet by mouth at bedtime. 90 Tablet 2    magnesium oxide 400 (240 Mg) MG Tab Take 1 Tablet by mouth 2 times a day. 60 Tablet 0    omeprazole (PRILOSEC) 20 MG delayed-release capsule Take 1 Capsule by mouth every day. 30 Capsule 2    pyridoxine (VITAMIN B-6) 50 MG Tab Take 1 Tablet by mouth every day. 30 Tablet 11    traZODone (DESYREL) 50 MG Tab Take 1 Tablet by mouth at bedtime as needed for Sleep. 30 Tablet 2    phosphorus (K-PHOS-NEUTRAL) 250 MG tablet Take 1 Tablet by mouth 2 times a day. 60 Tablet 0    folic acid (FOLVITE) 1 MG Tab Take 1 Tablet by mouth every day.      thiamine (THIAMINE) 100 MG tablet Take 1 Tablet by mouth every day.       No current facility-administered medications for this visit.     She  has a past medical history of Acute combined systolic and diastolic heart failure (HCC) (01/22/2024), Anemia due to chronic kidney disease, Heart valve disease, HLD (hyperlipidemia), HTN (hypertension), Hypertension, Hypotension, Liver cirrhosis (HCC), Obesity, and Renal disorder.    ROS   No chest pain, no shortness of breath, no abdominal pain  Positive ROS as per HPI.  All  "other systems reviewed and are negative.     Objective:     /82   Pulse 92   Temp 36 °C (96.8 °F) (Temporal)   Ht 1.702 m (5' 7\")   Wt 62.1 kg (137 lb)   SpO2 97%  Body mass index is 21.46 kg/m².   Physical Exam  A murmur is present in the heart.  Constitutional: Alert, no distress.  Skin: Warm, dry, good turgor, no rashes in visible areas.  Eye: Equal, round and reactive, conjunctiva clear, lids normal.  ENMT: Lips without lesions, good dentition, oropharynx clear.  Neck: Trachea midline, no masses, no thyromegaly. No cervical or supraclavicular lymphadenopathy  Respiratory: Unlabored respiratory effort, lungs clear to auscultation, no wheezes, no ronchi.  Cardiovascular: Normal S1, S2, no murmur, no edema.  Abdomen: Soft, non-tender, no masses, no hepatosplenomegaly.  Psych: Alert and oriented x3, normal affect and mood.      Results  Laboratory Studies  Sodium levels were low. Iron levels were low, but ferritin was good.        Assessment and Plan:   The following treatment plan was discussed    Assessment & Plan  1. Ascites in known setting of alcoholic cirrhosis of the liver and stage IIIb chronic kidney disease  Her condition has shown significant improvement since discontinuing all previous medications. She reports normal bowel movements and stable blood pressure. She is advised to maintain her current regimen of vitamins and mirtazapine.  Discuss  the possibility of need for standing order for paracentesis. She will discuss this with her gastroenterologist, Dr. Liang,  She is encourage to schedule more frequent appointments if necessary. A referral to Dr. Walker, an internist, will be made. If she experiences a recurrence of severe symptoms, she should seek immediate medical attention at the emergency room.    2. Constipation.  She reports normal bowel movements since her hospitalization. She is currently taking magnesium, which may help with bowel movements. She is advised to continue her " current regimen and monitor her bowel habits.    3. Iron Deficiency.  She had an iron transfusion approximately 6-7 weeks ago. A CBC and CMP will be ordered to assess her current iron levels. Based on the results, an order for an iron infusion may be placed. She is currently taking ferrous gluconate and should continue this regimen.      4.  Left ventricular systolic dysfunction: Patient follows regularly with cardiology.  For many appointments she was very hypotensive and the hospital took her off of her blood pressure medications.  She seems to be doing better.  I discussed that she should talk to her cardiologist but at this time I will not restart those meds      PROCEDURE  The patient underwent a paracentesis procedure in the past.    () Today's E/M visit is associated with medical care services that serve as the continuing focal point for all needed health care services and/or with medical care services that are part of ongoing care related to a patient's single, serious condition, or a complex condition: This includes furnishing services to patients on an ongoing basis that result in care that is personalized to the patient. The services result in a comprehensive, longitudinal, and continuous relationship with the patient and involve delivery of team-based care that is accessible, coordinated with other practitioners and providers, and integrated with the broader health care landscape.       ORDERS:  1. Left ventricular systolic dysfunction, NYHA class 3      2. Stage 3b chronic kidney disease      3. Nonrheumatic aortic valve stenosis      4. Alcoholic cirrhosis of liver with ascites (HCC)      5. Primary insomnia      6. Alcohol dependence with alcohol-induced disorder (HCC)      7. Chronic combined systolic and diastolic heart failure (HCC)      8. ACC/AHA stage C systolic heart failure (HCC)      9. CKD (chronic kidney disease) stage 4, GFR 15-29 ml/min (HCC)      10. Secondary esophageal varices  without bleeding (HCC)      11. Portal hypertensive gastropathy (HCC)      12. Alcoholic cirrhosis, unspecified whether ascites present (HCC)    - CBC WITH DIFFERENTIAL; Future  - Comp Metabolic Panel; Future          Follow Up: 4 weeks    Please note that this dictation was created using voice recognition software. I have made every reasonable attempt to correct obvious errors, but I expect that there are errors of grammar and possibly content that I did not discover before finalizing the note.      Attestation      Verbal consent was acquired by the patient to use [email or web address] ambient listening note generation during this visit Yes

## 2025-01-08 ENCOUNTER — HOSPITAL ENCOUNTER (OUTPATIENT)
Dept: LAB | Facility: MEDICAL CENTER | Age: 65
End: 2025-01-08
Attending: PHYSICIAN ASSISTANT
Payer: MEDICARE

## 2025-01-08 DIAGNOSIS — K70.30 ALCOHOLIC CIRRHOSIS, UNSPECIFIED WHETHER ASCITES PRESENT (HCC): ICD-10-CM

## 2025-01-08 LAB
ANISOCYTOSIS BLD QL SMEAR: ABNORMAL
BASOPHILS # BLD AUTO: 0.5 % (ref 0–1.8)
BASOPHILS # BLD: 0.04 K/UL (ref 0–0.12)
BURR CELLS BLD QL SMEAR: NORMAL
COMMENT 1642: NORMAL
EOSINOPHIL # BLD AUTO: 0.1 K/UL (ref 0–0.51)
EOSINOPHIL NFR BLD: 1.3 % (ref 0–6.9)
ERYTHROCYTE [DISTWIDTH] IN BLOOD BY AUTOMATED COUNT: 67.2 FL (ref 35.9–50)
HCT VFR BLD AUTO: 29 % (ref 37–47)
HGB BLD-MCNC: 9 G/DL (ref 12–16)
IMM GRANULOCYTES # BLD AUTO: 0.06 K/UL (ref 0–0.11)
IMM GRANULOCYTES NFR BLD AUTO: 0.8 % (ref 0–0.9)
LYMPHOCYTES # BLD AUTO: 1.12 K/UL (ref 1–4.8)
LYMPHOCYTES NFR BLD: 14.9 % (ref 22–41)
MCH RBC QN AUTO: 27.7 PG (ref 27–33)
MCHC RBC AUTO-ENTMCNC: 31 G/DL (ref 32.2–35.5)
MCV RBC AUTO: 89.2 FL (ref 81.4–97.8)
MICROCYTES BLD QL SMEAR: ABNORMAL
MONOCYTES # BLD AUTO: 0.68 K/UL (ref 0–0.85)
MONOCYTES NFR BLD AUTO: 9 % (ref 0–13.4)
MORPHOLOGY BLD-IMP: NORMAL
NEUTROPHILS # BLD AUTO: 5.52 K/UL (ref 1.82–7.42)
NEUTROPHILS NFR BLD: 73.5 % (ref 44–72)
NRBC # BLD AUTO: 0 K/UL
NRBC BLD-RTO: 0 /100 WBC (ref 0–0.2)
OVALOCYTES BLD QL SMEAR: NORMAL
PLATELET # BLD AUTO: 145 K/UL (ref 164–446)
PLATELET BLD QL SMEAR: NORMAL
PMV BLD AUTO: 11 FL (ref 9–12.9)
POIKILOCYTOSIS BLD QL SMEAR: NORMAL
RBC # BLD AUTO: 3.25 M/UL (ref 4.2–5.4)
RBC BLD AUTO: PRESENT
SCHISTOCYTES BLD QL SMEAR: NORMAL
WBC # BLD AUTO: 7.5 K/UL (ref 4.8–10.8)

## 2025-01-08 PROCEDURE — 85025 COMPLETE CBC W/AUTO DIFF WBC: CPT

## 2025-01-08 PROCEDURE — 80053 COMPREHEN METABOLIC PANEL: CPT

## 2025-01-08 PROCEDURE — 36415 COLL VENOUS BLD VENIPUNCTURE: CPT

## 2025-01-09 LAB
ALBUMIN SERPL BCP-MCNC: 3.8 G/DL (ref 3.2–4.9)
ALBUMIN/GLOB SERPL: 1.3 G/DL
ALP SERPL-CCNC: 136 U/L (ref 30–99)
ALT SERPL-CCNC: 22 U/L (ref 2–50)
ANION GAP SERPL CALC-SCNC: 11 MMOL/L (ref 7–16)
AST SERPL-CCNC: 24 U/L (ref 12–45)
BILIRUB SERPL-MCNC: 0.8 MG/DL (ref 0.1–1.5)
BUN SERPL-MCNC: 19 MG/DL (ref 8–22)
CALCIUM ALBUM COR SERPL-MCNC: 10.3 MG/DL (ref 8.5–10.5)
CALCIUM SERPL-MCNC: 10.1 MG/DL (ref 8.5–10.5)
CHLORIDE SERPL-SCNC: 101 MMOL/L (ref 96–112)
CO2 SERPL-SCNC: 23 MMOL/L (ref 20–33)
CREAT SERPL-MCNC: 1.06 MG/DL (ref 0.5–1.4)
FASTING STATUS PATIENT QL REPORTED: NORMAL
GFR SERPLBLD CREATININE-BSD FMLA CKD-EPI: 58 ML/MIN/1.73 M 2
GLOBULIN SER CALC-MCNC: 3 G/DL (ref 1.9–3.5)
GLUCOSE SERPL-MCNC: 104 MG/DL (ref 65–99)
POTASSIUM SERPL-SCNC: 4 MMOL/L (ref 3.6–5.5)
PROT SERPL-MCNC: 6.8 G/DL (ref 6–8.2)
SODIUM SERPL-SCNC: 135 MMOL/L (ref 135–145)

## 2025-01-23 ENCOUNTER — OFFICE VISIT (OUTPATIENT)
Dept: CARDIOLOGY | Facility: MEDICAL CENTER | Age: 65
End: 2025-01-23
Attending: INTERNAL MEDICINE
Payer: MEDICARE

## 2025-01-23 VITALS
SYSTOLIC BLOOD PRESSURE: 124 MMHG | DIASTOLIC BLOOD PRESSURE: 60 MMHG | WEIGHT: 130.8 LBS | HEART RATE: 81 BPM | OXYGEN SATURATION: 98 % | BODY MASS INDEX: 20.53 KG/M2 | HEIGHT: 67 IN | RESPIRATION RATE: 16 BRPM

## 2025-01-23 DIAGNOSIS — N18.4 CKD (CHRONIC KIDNEY DISEASE) STAGE 4, GFR 15-29 ML/MIN (HCC): ICD-10-CM

## 2025-01-23 DIAGNOSIS — I35.0 MODERATE AORTIC STENOSIS: ICD-10-CM

## 2025-01-23 DIAGNOSIS — I51.89 LEFT VENTRICULAR SYSTOLIC DYSFUNCTION, NYHA CLASS 3: ICD-10-CM

## 2025-01-23 DIAGNOSIS — K74.60 HEPATIC CIRRHOSIS, UNSPECIFIED HEPATIC CIRRHOSIS TYPE, UNSPECIFIED WHETHER ASCITES PRESENT (HCC): ICD-10-CM

## 2025-01-23 DIAGNOSIS — I50.20 ACC/AHA STAGE C SYSTOLIC HEART FAILURE (HCC): ICD-10-CM

## 2025-01-23 DIAGNOSIS — F10.20 ETOHISM (HCC): ICD-10-CM

## 2025-01-23 DIAGNOSIS — Z79.899 HIGH RISK MEDICATION USE: ICD-10-CM

## 2025-01-23 DIAGNOSIS — R06.09 DYSPNEA ON EXERTION: ICD-10-CM

## 2025-01-23 PROCEDURE — 99212 OFFICE O/P EST SF 10 MIN: CPT | Performed by: INTERNAL MEDICINE

## 2025-01-23 PROCEDURE — 99214 OFFICE O/P EST MOD 30 MIN: CPT | Performed by: INTERNAL MEDICINE

## 2025-01-23 PROCEDURE — 3074F SYST BP LT 130 MM HG: CPT | Performed by: INTERNAL MEDICINE

## 2025-01-23 PROCEDURE — G2211 COMPLEX E/M VISIT ADD ON: HCPCS | Performed by: INTERNAL MEDICINE

## 2025-01-23 PROCEDURE — 3078F DIAST BP <80 MM HG: CPT | Performed by: INTERNAL MEDICINE

## 2025-01-23 RX ORDER — SPIRONOLACTONE 25 MG/1
25 TABLET ORAL DAILY
COMMUNITY
Start: 2025-01-10

## 2025-01-23 ASSESSMENT — ENCOUNTER SYMPTOMS
DEPRESSION: 0
SHORTNESS OF BREATH: 1
FALLS: 0
BLURRED VISION: 0
HEADACHES: 0
ABDOMINAL PAIN: 0
PALPITATIONS: 0
MYALGIAS: 0
DIAPHORESIS: 0
MEMORY LOSS: 0
SENSORY CHANGE: 0
FEVER: 0
BRUISES/BLEEDS EASILY: 0
DIZZINESS: 0
COUGH: 0
DOUBLE VISION: 0

## 2025-01-23 ASSESSMENT — FIBROSIS 4 INDEX: FIB4 SCORE: 2.33

## 2025-01-23 NOTE — PROGRESS NOTES
Chief Complaint   Patient presents with    Follow-Up     Dx: ACC/AHA stage C systolic heart failure (HCC) normaliztion of LVEF        Hypertension     Subjective     Denisse Abel is a 64 y.o. female who presents today for cardiac care and evaluation in the heart failure clinic because of recent hospitalization due to heart failure exacerbation. The diagnosis of HF was made in 01/2024. It was presumed to be non-ischemic in nature. The left ventricular systolic function was documented to be at 40%. Patient was diuresed in the hospital and discharged home with guidelines directed medical therapy. She does have hx of ETOH.    05/2024 LVEF of 40 % with global hypokinesis. No significant valvular disease. I have independently interpreted and reviewed echocardiogram's actual images.     08/2024 LVEF of 60% with global hypokinesis. Moderate to severe aortic stenosis disease. I have independently interpreted and reviewed echocardiogram's actual images.     09/2024 Moderate aortic valve stenosis. Aortic valve area traced from planimetry is 1.5 cm2. Trace aortic insufficiency. I have independently interpreted and reviewed echocardiogram's actual images.     11/2024 Was in hospital due to GI issues, obstruction. Her medications were stopped. Only on Spironolactone now.    I have personally interpreted EKG today with patient, there is no evidence of acute coronary syndrome, no evidence of prior infarct, normal VT and QT interval, no significant conduction disease. Sinus rhythm.    I have independently interpreted and reviewed blood tests results with patient in clinic which shows LDL level of 65, triglycerides level of 74, GFR of 58, NT pro BNP of 3055 up from 310, K of 4.    Cirrhosis seen on CT.    Patient is feeling better these days. Does get winded upon walking up inclines or for distance. No symptoms at rest or with daily living activities.      Past Medical History:   Diagnosis Date    Acute combined systolic and  diastolic heart failure (HCC) 01/22/2024    Anemia due to chronic kidney disease     Heart valve disease     Nonrheumatic aortic valve stenosis    HLD (hyperlipidemia)     HTN (hypertension)     Hypertension     Hypotension     Liver cirrhosis (HCC)     Obesity     Renal disorder     CKD (chronic kidney disease) stage 4, GFR 15-29 ml/min (HCC)     Past Surgical History:   Procedure Laterality Date    AR LAP,DIAGNOSTIC ABDOMEN  2/23/2024    Procedure: LAPAROSCOPY, KIM'S PATCH;  Surgeon: Neel Amezcua M.D.;  Location: SURGERY [facility];  Service: General    TRIGGER FINGER RELEASE Right 3/31/2017    Procedure: TRIGGER FINGER RELEASE;  Surgeon: Jimmy Atkins M.D.;  Location: SURGERY [facility];  Service:     GANGLION EXCISION Right 3/31/2017    Procedure: GANGLION EXCISION - HAND CYST;  Surgeon: Jimmy Atkins M.D.;  Location: SURGERY [facility];  Service:     PELVISCOPY  6/5/08    Performed by NATALIYA ARCEO at SURGERY SAME DAY [facility] ORS    OVARIAN CYSTECTOMY  6/5/08    Performed by NATALIYA ARCEO at SURGERY SAME DAY [facility] ORS    CYSTOSCOPY  6/5/08    Performed by NATALIYA ARCEO at SURGERY SAME DAY [facility] OR[facility]    HYSTERECTOMY LAPAROSCOPY  2003    KNEE ARTHROSCOPY       Family History   Problem Relation Age of Onset    Diabetes Mother     Heart Disease Mother     Stroke Mother     Diabetes Father      Social History     Socioeconomic History    Marital status: Single     Spouse name: Not on file    Number of children: Not on file    Years of education: Not on file    Highest education level: Master's degree (e.g., MA, MS, Monica, MEd, MSW, SUMAYA)   Occupational History    Not on file   Tobacco Use    Smoking status: Never    Smokeless tobacco: Never   Vaping Use    Vaping status: Never Used   Substance and Sexual Activity    Alcohol use: Not Currently     Alcohol/week: 3.6 oz     Comment: denies    Drug use: Not Currently     Types: Marijuana     Comment: denies    Sexual activity:  Not Currently   Other Topics Concern    Not on file   Social History Narrative    Not on file     Social Drivers of Health     Financial Resource Strain: Low Risk  (1/11/2024)    Overall Financial Resource Strain (CARDIA)     Difficulty of Paying Living Expenses: Not hard at all   Food Insecurity: No Food Insecurity (12/9/2024)    Hunger Vital Sign     Worried About Running Out of Food in the Last Year: Never true     Ran Out of Food in the Last Year: Never true   Transportation Needs: No Transportation Needs (12/17/2024)    PRAPARE - Transportation     Lack of Transportation (Medical): No     Lack of Transportation (Non-Medical): No   Physical Activity: Sufficiently Active (1/11/2024)    Exercise Vital Sign     Days of Exercise per Week: 5 days     Minutes of Exercise per Session: 30 min   Stress: Stress Concern Present (1/11/2024)    [employer] [place] [employer] - Occupational Stress Questionnaire     Feeling of Stress : Very much   Social Connections: Socially Isolated (1/11/2024)    Social Connection and Isolation Panel [NHANES]     Frequency of Communication with Friends and Family: More than three times a week     Frequency of Social Gatherings with Friends and Family: Once a week     Attends Yazidism Services: Never     Active Member of Clubs or Organizations: No     Attends Club or Organization Meetings: Never     Marital Status:    Intimate Partner Violence: Not At Risk (12/9/2024)    Humiliation, Afraid, Rape, and Kick questionnaire     Fear of Current or Ex-Partner: No     Emotionally Abused: No     Physically Abused: No     Sexually Abused: No   Housing Stability: Low Risk  (12/9/2024)    Housing Stability Vital Sign     Unable to Pay for Housing in the Last Year: No     Number of Times Moved in the Last Year: 0     Homeless in the Last Year: No     Allergies   Allergen Reactions    Sulfa Drugs Rash     Pt reports that it was a childhood allergie received body rash   Per [facility]  "(11/27/2024)     Outpatient Encounter Medications as of 1/23/2025   Medication Sig Dispense Refill    spironolactone (ALDACTONE) 25 MG Tab Take 25 mg by mouth every day.      magnesium oxide 400 (240 Mg) MG Tab Take 1 Tablet by mouth 2 times a day. 60 Tablet 0    omeprazole (PRILOSEC) 20 MG delayed-release capsule Take 1 Capsule by mouth every day. 30 Capsule 2    pyridoxine (VITAMIN B-6) 50 MG Tab Take 1 Tablet by mouth every day. 30 Tablet 11    traZODone (DESYREL) 50 MG Tab Take 1 Tablet by mouth at bedtime as needed for Sleep. 30 Tablet 2    phosphorus (K-PHOS-NEUTRAL) 250 MG tablet Take 1 Tablet by mouth 2 times a day. 60 Tablet 0    folic acid (FOLVITE) 1 MG Tab Take 1 Tablet by mouth every day.      thiamine (THIAMINE) 100 MG tablet Take 1 Tablet by mouth every day.      ferrous gluconate (FERGON) 324 (38 Fe) MG Tab Take 1 Tablet by mouth every morning with breakfast. 30 Tablet 2    mirtazapine (REMERON) 15 MG Tab Take 1 Tablet by mouth at bedtime. 90 Tablet 2     No facility-administered encounter medications on file as of 1/23/2025.     Review of Systems   Constitutional:  Negative for diaphoresis and fever.   HENT:  Negative for nosebleeds.    Eyes:  Negative for blurred vision and double vision.   Respiratory:  Positive for shortness of breath. Negative for cough.    Cardiovascular:  Negative for chest pain, palpitations and leg swelling.   Gastrointestinal:  Negative for abdominal pain.   Genitourinary:  Negative for dysuria and frequency.   Musculoskeletal:  Negative for falls and myalgias.   Skin:  Negative for rash.   Neurological:  Negative for dizziness, sensory change and headaches.   Endo/Heme/Allergies:  Does not bruise/bleed easily.   Psychiatric/Behavioral:  Negative for depression and memory loss.               Objective     /60 (BP Location: Left arm, Patient Position: Sitting, BP Cuff Size: Adult)   Pulse 81   Resp 16   Ht 1.702 m (5' 7\")   Wt 59.3 kg (130 lb 12.8 oz)   SpO2 98%  "  BMI 20.49 kg/m²     Physical Exam  Vitals and nursing note reviewed.   Constitutional:       General: She is not in acute distress.     Appearance: She is not diaphoretic.   HENT:      Head: Normocephalic and atraumatic.      Right Ear: External ear normal.      Left Ear: External ear normal.      Nose: No congestion or rhinorrhea.   Eyes:      General:         Right eye: No discharge.         Left eye: No discharge.   Neck:      Thyroid: No thyromegaly.      Vascular: No JVD.   Cardiovascular:      Rate and Rhythm: Normal rate and regular rhythm.      Pulses: Normal pulses.      Heart sounds: Murmur heard.   Pulmonary:      Effort: No respiratory distress.   Abdominal:      General: There is distension.      Tenderness: There is no abdominal tenderness.   Musculoskeletal:         General: No swelling or tenderness.      Right lower leg: No edema.      Left lower leg: No edema.   Skin:     General: Skin is warm and dry.   Neurological:      Mental Status: She is alert and oriented to person, place, and time.      Cranial Nerves: No cranial nerve deficit.   Psychiatric:         Behavior: Behavior normal.                Assessment & Plan     1. ACC/AHA stage C systolic heart failure (HCC) normaliztion of LVEF  EC-ECHOCARDIOGRAM COMPLETE W/O CONT      2. Left ventricular systolic dysfunction, NYHA class 3  EC-ECHOCARDIOGRAM COMPLETE W/O CONT      3. ETOHism (HCC)        4. Hepatic cirrhosis, unspecified hepatic cirrhosis type, unspecified whether ascites present (HCC)        5. Moderate aortic stenosis  EC-ECHOCARDIOGRAM COMPLETE W/O CONT      6. CKD (chronic kidney disease) stage 4, GFR 15-29 ml/min (HCC)        7. Dyspnea on exertion        8. High risk medication use            Medical Decision Making: Today's Assessment/Status/Plan:   Dilated Cardiomyopathy:  Chronically illed condition which requires ongoing close monitoring and treatment to improve survival rate along with decreasing risk of clinical  decompensation sudden cardiac death and hospitalization.    Today, based on physical examination findings, patient is euvolemic. No JVD, lungs are clear to auscultation, no pitting edema in bilateral lower extremities, no ascites.     Dry weight is 130 lbs.      Patient prefers to keep same regimen for now. NO more addition of meds.    No indication for ICD.    No more ETOH advised.    No intervention for moderate AS at this time. Continue to clinically monitor.  I will order transthoracic echocardiogram to assess for structural abnormalities.    Of note, during the care of this patient, I spent a significant amount of time explaining the nature of the disease process, reviewing all possible imaging studies, blood test results to patient.  The overall care of this patient require a higher level of care than usual due to the multiple comorbidities along with ongoing issues of congestive heart failure that put this patient at risk for sudden cardiac death, increased mortality, and hospitalization.  This patient also requires close monitoring with at least monthly blood test to monitor renal function and electrolytes due to the ongoing dynamic changes of medical therapy titration protocol to ensure optimal benefits for the overall care of this patient.     This visit encounter signifies the visit complexity inherent to evaluation and management associated with medical care services that serve as the continuing focal point for all needed health care services and/or with medical care services that are part of ongoing care related to this patient's single, serious condition, complex cardiac condition.      German Xiong M.D.

## 2025-01-30 DIAGNOSIS — K21.9 GASTROESOPHAGEAL REFLUX DISEASE WITHOUT ESOPHAGITIS: ICD-10-CM

## 2025-01-31 ENCOUNTER — OFFICE VISIT (OUTPATIENT)
Dept: NEUROLOGY | Facility: MEDICAL CENTER | Age: 65
End: 2025-01-31
Attending: STUDENT IN AN ORGANIZED HEALTH CARE EDUCATION/TRAINING PROGRAM
Payer: MEDICARE

## 2025-01-31 VITALS
OXYGEN SATURATION: 96 % | DIASTOLIC BLOOD PRESSURE: 54 MMHG | HEIGHT: 66 IN | WEIGHT: 131.39 LBS | BODY MASS INDEX: 21.12 KG/M2 | RESPIRATION RATE: 10 BRPM | HEART RATE: 76 BPM | SYSTOLIC BLOOD PRESSURE: 130 MMHG

## 2025-01-31 DIAGNOSIS — G93.40 ACUTE ENCEPHALOPATHY: ICD-10-CM

## 2025-01-31 PROCEDURE — 99212 OFFICE O/P EST SF 10 MIN: CPT | Performed by: STUDENT IN AN ORGANIZED HEALTH CARE EDUCATION/TRAINING PROGRAM

## 2025-01-31 RX ORDER — HYDROCODONE BITARTRATE AND ACETAMINOPHEN 5; 325 MG/1; MG/1
1 TABLET ORAL 2 TIMES DAILY PRN
COMMUNITY
Start: 2025-01-02

## 2025-01-31 ASSESSMENT — FIBROSIS 4 INDEX: FIB4 SCORE: 2.33

## 2025-01-31 NOTE — PROGRESS NOTES
[facility] Neurology Epilepsy [facility]    Patient name: Denisse Abel  YOB: 1960  MRN: [id number]  Referring provider: Delfino Alfaro M.D.  [facility] Room  [place] [address]   Date of visit: 1/31/2025     CC: Seizure      HPI:    Denisse Abel is a 64 y.o. woman with a history of alcohol-related cirrhosis, aortic stenosis,  who is referred for an initial neurologic consultation for seizures.     She is accompanied by her friend Lyssa who supplements the history.     The day before Thanksgiving, Lyssa came over to check in on the patient. She was in bed with intractable vomiting x 5 days with L lower abdominal pain which was later attributed to SBO related to hernia. She seemed tired, but otherwise her usual self.     A family member called Lyssa the next day because she seemed confused when they went over for Thanksgiving. The patient did not realize it was Thanksgiving or that family was coming over. She had tremors, posturing, inability to communicate, and generalized weakness on exam during hospital stay. She did not improve during the hospitalization and working diagnosis was for hepatic encephalopathy/UTI +/- encephalitis. She was started on steroids and spontaneously improved.     I reviewed the notes from her hospitalization and by GI.  Essentially, she had an umbilical hernia that became bulging and was very painful, possibly incarcerated, and was the etiology of her intractable vomiting.  She was admitted and the hernia subsequently reduced spontaneously, but during that time her mental status was altered.  She underwent an MRI and an LP which were both unremarkable.  Empiric steroids were started.  She was not treating for hepatic encephalopathy, though her ammonia level was elevated.     She has been sober for a year and has a history of alcohol related cirrhosis and prior ascites.      Mood: She denies a history of suicidal thoughts, has been doing better since she was  discharged from the hospital        1/12/2024     1:40 PM 5/16/2023    12:40 PM 7/20/2021     1:30 PM 11/12/2019     1:00 PM   PHQ-9 Screening   Little interest or pleasure in doing things 2 - more than half the days 0 - not at all 0 - not at all 0 - not at all   Feeling down, depressed, or hopeless 2 - more than half the days 0 - not at all 0 - not at all 0 - not at all   Trouble falling or staying asleep, or sleeping too much 2 - more than half the days      Feeling tired or having little energy 3 - nearly every day      Poor appetite or overeating 3 - nearly every day      Feeling bad about yourself - or that you are a failure or have let yourself or your family down 2 - more than half the days      Trouble concentrating on things, such as reading the newspaper or watching television 2 - more than half the days      Moving or speaking so slowly that other people could have noticed. Or the opposite - being so fidgety or restless that you have been moving around a lot more than usual 1 - several days      Thoughts that you would be better off dead, or of hurting yourself in some way 0 - not at all      PHQ-2 Total Score 4 0 0 0   PHQ-9 Total Score 17            Risk factors for epileptic seizures:  Normal birth history, no complications, born at term.   No history of significant head trauma.   No history of stroke or meningitis.  No family history of epilepsy.   No febrile seizures.     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     Past Medical History:   Past Medical History:   Diagnosis Date    Acute combined systolic and diastolic heart failure (HCC) 01/22/2024    Anemia due to chronic kidney disease     Heart valve disease     Nonrheumatic aortic valve stenosis    HLD (hyperlipidemia)     HTN (hypertension)     Hypertension     Hypotension     Liver cirrhosis  (HCC)     Obesity     Renal disorder     CKD (chronic kidney disease) stage 4, GFR 15-29 ml/min (HCC)       Past Surgical History:   Past Surgical History:   Procedure Laterality Date    FL LAP,DIAGNOSTIC ABDOMEN  2/23/2024    Procedure: LAPAROSCOPY, KIM'S PATCH;  Surgeon: Neel Amezcua M.D.;  Location: SURGERY [facility];  Service: General    TRIGGER FINGER RELEASE Right 3/31/2017    Procedure: TRIGGER FINGER RELEASE;  Surgeon: Jimmy Atkins M.D.;  Location: SURGERY [facility];  Service:     GANGLION EXCISION Right 3/31/2017    Procedure: GANGLION EXCISION - HAND CYST;  Surgeon: Jimmy Atkins M.D.;  Location: SURGERY [facility];  Service:     PELVISCOPY  6/5/08    Performed by NATALIYA ARCEO at SURGERY SAME DAY [facility] ORS    OVARIAN CYSTECTOMY  6/5/08    Performed by NATALIYA ARCEO at SURGERY SAME DAY [facility] ORS    CYSTOSCOPY  6/5/08    Performed by NATALIYA ARCEO at SURGERY SAME DAY [facility] ORS    HYSTERECTOMY LAPAROSCOPY  2003    KNEE ARTHROSCOPY         Social History:   Social History     Socioeconomic History    Marital status: Single     Spouse name: Not on file    Number of children: Not on file    Years of education: Not on file    Highest education level: Master's degree (e.g., MA, MS, Monica, MEd, MSW, SUMAYA)   Occupational History    Not on file   Tobacco Use    Smoking status: Never    Smokeless tobacco: Never   Vaping Use    Vaping status: Never Used   Substance and Sexual Activity    Alcohol use: Not Currently     Alcohol/week: 3.6 oz     Comment: denies    Drug use: Not Currently     Types: Marijuana     Comment: denies    Sexual activity: Not Currently   Other Topics Concern    Not on file   Social History Narrative    Not on file     Social Drivers of Health     Financial Resource Strain: Low Risk  (1/11/2024)    Overall Financial Resource Strain (CARDIA)     Difficulty of Paying Living Expenses: Not hard at all   Food Insecurity: No Food Insecurity (12/9/2024)     Hunger Vital Sign     Worried About Running Out of Food in the Last Year: Never true     Ran Out of Food in the Last Year: Never true   Transportation Needs: No Transportation Needs (12/17/2024)    PRAPARE - Transportation     Lack of Transportation (Medical): No     Lack of Transportation (Non-Medical): No   Physical Activity: Sufficiently Active (1/11/2024)    Exercise Vital Sign     Days of Exercise per Week: 5 days     Minutes of Exercise per Session: 30 min   Stress: Stress Concern Present (1/11/2024)    [place]ian [place] [employer] - Occupational Stress Questionnaire     Feeling of Stress : Very much   Social Connections: Socially Isolated (1/11/2024)    Social Connection and Isolation Panel [NHANES]     Frequency of Communication with Friends and Family: More than three times a week     Frequency of Social Gatherings with Friends and Family: Once a week     Attends Latter day Services: Never     Active Member of Clubs or Organizations: No     Attends Club or Organization Meetings: Never     Marital Status:    Intimate Partner Violence: Not At Risk (12/9/2024)    Humiliation, Afraid, Rape, and Kick questionnaire     Fear of Current or Ex-Partner: No     Emotionally Abused: No     Physically Abused: No     Sexually Abused: No   Housing Stability: Low Risk  (12/9/2024)    Housing Stability Vital Sign     Unable to Pay for Housing in the Last Year: No     Number of Times Moved in the Last Year: 0     Homeless in the Last Year: No       Family Hx:   Family History   Problem Relation Age of Onset    Diabetes Mother     Heart Disease Mother     Stroke Mother     Diabetes Father        Current Medications:   Current Outpatient Medications:     HYDROcodone-acetaminophen (NORCO) 5-325 MG Tab per tablet, Take 1 Tablet by mouth 2 times a day as needed., Disp: , Rfl:     omeprazole (PRILOSEC) 20 MG delayed-release capsule, Take 1 Capsule by mouth every day., Disp: 30 Capsule, Rfl: 3    spironolactone  (ALDACTONE) 25 MG Tab, Take 25 mg by mouth every day., Disp: , Rfl:     magnesium oxide 400 (240 Mg) MG Tab, Take 1 Tablet by mouth 2 times a day., Disp: 60 Tablet, Rfl: 0    pyridoxine (VITAMIN B-6) 50 MG Tab, Take 1 Tablet by mouth every day., Disp: 30 Tablet, Rfl: 11    traZODone (DESYREL) 50 MG Tab, Take 1 Tablet by mouth at bedtime as needed for Sleep., Disp: 30 Tablet, Rfl: 2    phosphorus (K-PHOS-NEUTRAL) 250 MG tablet, Take 1 Tablet by mouth 2 times a day., Disp: 60 Tablet, Rfl: 0    folic acid (FOLVITE) 1 MG Tab, Take 1 Tablet by mouth every day., Disp: , Rfl:     thiamine (THIAMINE) 100 MG tablet, Take 1 Tablet by mouth every day., Disp: , Rfl:     ferrous gluconate (FERGON) 324 (38 Fe) MG Tab, Take 1 Tablet by mouth every morning with breakfast., Disp: 30 Tablet, Rfl: 2    mirtazapine (REMERON) 15 MG Tab, Take 1 Tablet by mouth at bedtime., Disp: 90 Tablet, Rfl: 2    Allergies:   Allergies   Allergen Reactions    Sulfa Drugs Rash     Pt reports that it was a childhood allergie received body rash   Per [facility] pharmacy (11/27/2024)         Physical Exam:   Ambulatory Vitals  Vitals:    01/31/25 1252   BP: 130/54   Pulse: 76   Resp: (!) 10   SpO2: 96%       Constitutional: Well-developed, well-nourished, good hygiene. Appears stated age.  Respiratory: normal respiratory effort  Skin: Warm, dry, intact. No rashes observed.  Neurologic:   Mental Status: Awake, alert, oriented x 3.   Speech: Fluent with normal prosody.   Memory: Able to recall recent and remote events accurately.    Concentration: Attentive. Able to focus on history and follow multi-step commands.   Fund of Knowledge: Appropriate.   Cranial Nerves:    CN II: PERRL, visual fields full    CN III, IV, VI: EOMI without nystagmus    CN V: Facial sensation intact and symmetric in all 3 trigeminal distributions    CN VII: No facial asymmetry    CN VIII: Hearing intact to finger rub     CN IX and X: Palate elevates symmetrically, gag reflex not  tested    CN XI: Symmetric shoulder shrug     CN XII: Tongue midline   Motor: 5/5 in upper and lower extremities bilaterally with the exception of RUE not tested due to pain/wrist brace. No pronator drift   Sensory: Intact and equal to light touch diffusely    Coordination: No evidence of past-pointing on finger to nose testing, no dysdiadochokinesia. Heel to shin intact.    DTR's: 2+ throughout  with the exception of RUE due to wrist splint-NT   Gait: ambulates steadily without assistive device. Romberg negative. Able to perform tandem gait.    Movements: No resting tremors or abnormal movements observed.   Musculoskeletal:    Strength: as above   Tone: Normal bulk and tone   Joints: No swelling    Studies:      Labs reviewed:      Imaging:   MRI Brain wo contrast 11/30/2024:  IMPRESSION:     1.  No evidence of acute territorial infarct, intracranial hemorrhage or mass lesion.  2.  Mild diffuse cerebral and cerebellar substance loss.  3.  Mild microangiopathic ischemic change versus demyelination or gliosis.    EEG Results:     Routine EEG 12/3/2024:  INTERPRETATION:  This was, within the above noted limitations, an abnormal video EEG recording in the awake, drowsy, and sleep state(s):  Mild, diffuse, slowing of the background, with some discontinuity, suggestive of diffuse and/or multifocal cerebral dysfunction.  This finding might be seen in a myriad of encephalopathies and in itself is a nonspecific finding, but in this particular situation is at least partially due to toxic-metabolic etiology, supported by the presence of GRDA (GRDA in itself is a non-specific finding).   The presence of rare, left temporal, poorly formed, sharps, elicited by photic stimulation, might point toward increased propensity toward focal seizures arising from that region.   No seizures.      I personally reviewed the EEG in its entirety.      Assessment/Plan:   Denisse Abel is a 64 y.o. female who is being seen in consultation  for potential seizures.    In review of the previous notes, labs, MRI brain, and EEG I do not believe she has a history of epileptic seizures contributing to her presentation.  The more likely explanation is hepatic encephalopathy.  It may have been coincidence that her mental status improved with steroids, as she does note that her hernia spontaneously reduced sometime during her hospitalization.  Her lumbar puncture and CSF studies were bland, mildly elevated protein however this was nonspecific.  She did have an elevated ammonia level at 66 during the hospitalization.    I personally reviewed her EEG results.  I doubt there is any epileptiform focus.  I offered additional EEG testing to clarify the subtle possibly abnormal finding, though patient would prefer to defer this testing.    Agree with GI about treating for potential hepatic encephalopathy rather than empiric steroids would be ideal.  If there is any persistent altered mental status would recommend neurology consultation and continuous EEG monitoring.      The patient will follow-up with neurology as needed.    Leonor Padron M.D.   Diplomate, Neurology with Special Qualification in Epilepsy, American Board of Psychiatry and Neurology   of Clinical Neurology, [employer]  Level III Epilepsy Center, Department of Neurology at [facility]         During today's encounter we discussed available treatment options and their individual side effect profiles. Total encounter time caring for patient today 51 minutes.

## 2025-02-10 DIAGNOSIS — K21.9 GASTROESOPHAGEAL REFLUX DISEASE WITHOUT ESOPHAGITIS: ICD-10-CM

## 2025-02-10 RX ORDER — OMEPRAZOLE 40 MG/1
40 CAPSULE, DELAYED RELEASE ORAL DAILY
Qty: 90 CAPSULE | Refills: 3 | Status: SHIPPED | OUTPATIENT
Start: 2025-02-10

## 2025-02-13 NOTE — Clinical Note
[facility]  [address] [place] [address]    [id number]    Denisse Abel  [address] [place] [address]    February 13, 2025    Member Name: Denisse Abel   Member Number: [id number]   Reference Number: [id number]   Approved Services: Echos and EKG   Approved Service Dates: 02/13/2025 - 06/13/2025   Requesting Provider: German To   Requested Provider: [facility]     Dear Denisse Abel:    The following medical service(s) requested by German To have been approved:    Number of Services: 1  Description: Echos and EKG  Echocardiogram Complete(no contrast)  Heart muscle strain imaging     The services should be provided by [facility] no later than 06/13/2025. Please contact the provider listed below with any questions.     Provider Information:  [facility]  [phone]    Your plan benefit may require a deductible, co-payment or coinsurance for these services. This authorization does not guarantee [facility] will pay the claim for services that you receive. Payment by [facility] for these services is subject to the terms of your Evidence of Coverage, your eligibility at the time of service, and confirmation of benefit coverage.    For any questions or additional information, please contact Customer Service:    [employer] Plus Toll Free: [phone]  TTY users dial: 711   Call Center Hours:  Oct 1 - Mar 31, Mon - Fri 7 AM to 8 PM PST  Oct 1 - Mar 31, Sat - Sun 8 AM to 8 PM PST  Apr 1 - Sep 30, Mon - Fri 7 AM to 8 PM PST   Office Hours: Mon - Fri 8 AM to 5 PM PST   E-mail: [email or web address]   Website:  [email or web address]      This information is available for free in other languages. Please contact Customer Service at the phone number above for more information. [facility] complies with applicable Federal civil rights laws and does  not discriminate on the basis of race, color, national origin, age, disability or sex.    Sincerely,     Healthcare Utilization Management Department     Cc: [facility]   German To    Multi-Language Insert  Multi- Services  English: We have free  services to answer any questions you may have about our health or drug plan.  To get an , just call us at [phone].  Someone who speaks English/Language can help you.  This is a free service.  English: Tenemos servicios de intérprete sin costo alguno  para responder cualquier pregunta que pueda tener sobre nuestro plan de amada o medicamentos. Para hablar con un intérprete, por favor llame al [phone]. Alguien que hable español le podrá ayudar. Roz es un servicio gratuito.  Chinese Mandarin: ?????????????????????????????? ???????????????? [phone]????????????????? ?????????  Chinese Cantonese: ?????????????????????????????? ????????????? [phone]???????????????????? ????????  Tagalog:  Maytangela kaming libreng serbisyo sa pagsasaling-tatiana mesaumang a marie squiresngpedrito sa blaineg rickong awildagkavon o panggamot.  Doreen duran tagasaling-hamilton simpsoni sa [phone]. Walker duran Tagalog.  Paulo ay libreng serbisyo.  Bulgarian:  Nous proposons annemarie services gratuits d'interprétation pour répondre à toutes cristofer questions relatives à notre régime de santé ou d'assurance-médicaments. Pour accéder au service d'interprétation, il vous suffit de nous appeler au [phone]. Un interlocuteur [facility] pourra vous aider. Ce service est gratuit.  Serbian:  Tiago copeland có d?ch v? thông d?ch mi?n phí ð? tr? l?i các câu h?i v? chýõng s?c kh?e và chýõng trìn thu?c men. N?u quí v? c?n thông d?ch viên geraldine g?i [phone] s? có nhân viên nói ti?ng Vi?t giúp ð? quí v?. Ðây là d?ch v? mi?n phí .  Vincentian:   Unser kosProgress West Hospitalser Dolmetscherservice beantwortet Ihren Fragen zu unserem Gesundheits- und Arzneimittelplan. Unsere Dolmetscher erreichen Sie [phone]. Man vinicius Carroll deepti auf [employer]. Dieser Service ist alfonsoCedar County Memorial Hospital.  Pashto:  ??? ?? ?? ?? ?? ??? ?? ??? ?? ???? ?? ?? ???? ???? ????. ?? ???? ????? ?? [phone] ??? ??? ????.  ???? ?? ???? ?? ?? ????. ? ???? ??? ?????.   South Korean: Åñëè ó âàñ âîçíèêíóò âîïðîñû îòíîñèòåëüíî ñòðàõîâîãî èëè ìåäèêàìåíòíîãî ïëàíà, âû ìîæåòå âîñïîëüçîâàòüñÿ íàøèìè áåñïëàòíûìè óñëóãàìè ïåðåâîä÷èêîâ. ×òîáû âîñïîëüçîâàòüñÿ óñëóãàìè ïåðåâîä÷èêà, ïîçâîíèòå íàì ïî òåëåôîíó [phone]. Âàì îêàæåò ïîìîùü ñîòðóäíèê, êîòîðûé ãîâîðèò ïî-póññêè. Äàííàÿ óñëóãà áåñïëàòíàÿ.  Estonian: ÅääÇ äÞÏã ÎÏãÇÊ ÇáãÊÑÌã ÇáÝæÑí ÇáãÌÇäíÉ ááÅÌÇÈÉ Úä Ãí ÃÓÆáÉ ÊÊÚáÞ ÈÇáÕÍÉ Ãæ ÌÏæá ÇáÃÏæíÉ áÏíäÇ. ááÍÕæá Úáì ãÊÑÌã ÝæÑí¡ áíÓ Úáíß Óæì ÇáÇÊÕÇá ÈäÇ Úáì [phone] . ÓíÞæã ÔÎÕ ãÇ íÊÍÏË ÇáÚÑÈíÉ ÈãÓÇÚÏÊß. åÐå ÎÏãÉ ãÌÇäíÉ.  Jeovany: ????? ????????? ?? ??? ?? ????? ?? ???? ??? ???? ???? ?? ?????? ?? ???? ???? ?? ??? ????? ??? ????? ???????? ?????? ?????? ???. ?? ???????? ??????? ???? ?? ???, ?? ???? [phone] ?? ??? ????. ??? ??????? ?? ?????? ????? ?? ???? ??? ?? ???? ??. ?? ?? ????? ???? ??.   Icelandic:  È disponibile un servizio di interpretariato gratuito per rispondere a eventuali domande sul nostro piano sanitario e farmaceutico. Per un interprete, contattare il latonya [phone]. Un nostro incaricato kell parla Italianovi fornirà l'assistenza necessaria. È un servizio gratuito.  Portugués:  Dispomos de serviços de interpretação gratuitos para responder a qualquer questão que tenha acerca do nosso plano de saúde ou de medicação. Para obter um intérprete, contacte-nos através do número [phone]. Irá encontrar alguém que fale o idioma  Português para o ajudar. Roz serviço é gratuito.  Sierra Leonean Creole:  Nou genyen sèvis entèprèt gratis morales reponn tout kesyon ou ta genyen konsènan plan medikal oswa  dwòg nou an.  Jacqueline jwenn yon entèprèt, jis rele nou nan [phone]. Yimi Fanyòl kapab roverto w.  Sa a se yon sèvis ki gratis.  Polish:  Umo¿liwiamy bezp³atne skorzystanie z us³ug t³umacza ustnego, który pomo¿e w uzyskaniu odpowiedzi na temat planu zdrowotnego lub dawkowania leków. Sandra skorzystaæ z pomocy t³umacza znaj¹cego bety hernandez¿y zadzwoniæ pod numer [phone]. Ta us³uga jest bezp³atna.  Latvian: ????? ??????? ????????????????????? ??????????????????????????????????[phone] ???????????????? ? ????????????????? ?????

## 2025-02-22 SDOH — HEALTH STABILITY: PHYSICAL HEALTH: ON AVERAGE, HOW MANY MINUTES DO YOU ENGAGE IN EXERCISE AT THIS LEVEL?: 40 MIN

## 2025-02-22 SDOH — ECONOMIC STABILITY: FOOD INSECURITY: WITHIN THE PAST 12 MONTHS, YOU WORRIED THAT YOUR FOOD WOULD RUN OUT BEFORE YOU GOT MONEY TO BUY MORE.: NEVER TRUE

## 2025-02-22 SDOH — HEALTH STABILITY: PHYSICAL HEALTH: ON AVERAGE, HOW MANY DAYS PER WEEK DO YOU ENGAGE IN MODERATE TO STRENUOUS EXERCISE (LIKE A BRISK WALK)?: 7 DAYS

## 2025-02-22 SDOH — ECONOMIC STABILITY: FOOD INSECURITY: WITHIN THE PAST 12 MONTHS, THE FOOD YOU BOUGHT JUST DIDN'T LAST AND YOU DIDN'T HAVE MONEY TO GET MORE.: NEVER TRUE

## 2025-02-22 SDOH — ECONOMIC STABILITY: INCOME INSECURITY: IN THE LAST 12 MONTHS, WAS THERE A TIME WHEN YOU WERE NOT ABLE TO PAY THE MORTGAGE OR RENT ON TIME?: NO

## 2025-02-22 SDOH — ECONOMIC STABILITY: INCOME INSECURITY: HOW HARD IS IT FOR YOU TO PAY FOR THE VERY BASICS LIKE FOOD, HOUSING, MEDICAL CARE, AND HEATING?: NOT HARD AT ALL

## 2025-02-22 SDOH — HEALTH STABILITY: MENTAL HEALTH
STRESS IS WHEN SOMEONE FEELS TENSE, NERVOUS, ANXIOUS, OR CAN'T SLEEP AT NIGHT BECAUSE THEIR MIND IS TROUBLED. HOW STRESSED ARE YOU?: NOT AT ALL

## 2025-02-22 ASSESSMENT — SOCIAL DETERMINANTS OF HEALTH (SDOH)
HOW MANY DRINKS CONTAINING ALCOHOL DO YOU HAVE ON A TYPICAL DAY WHEN YOU ARE DRINKING: PATIENT DOES NOT DRINK
DO YOU BELONG TO ANY CLUBS OR ORGANIZATIONS SUCH AS CHURCH GROUPS UNIONS, FRATERNAL OR ATHLETIC GROUPS, OR SCHOOL GROUPS?: NO
WITHIN THE PAST 12 MONTHS, YOU WORRIED THAT YOUR FOOD WOULD RUN OUT BEFORE YOU GOT THE MONEY TO BUY MORE: NEVER TRUE
IN THE PAST 12 MONTHS, HAS THE ELECTRIC, GAS, OIL, OR WATER COMPANY THREATENED TO SHUT OFF SERVICE IN YOUR HOME?: NO
HOW OFTEN DO YOU HAVE A DRINK CONTAINING ALCOHOL: NEVER
IN A TYPICAL WEEK, HOW MANY TIMES DO YOU TALK ON THE PHONE WITH FAMILY, FRIENDS, OR NEIGHBORS?: MORE THAN THREE TIMES A WEEK
HOW OFTEN DO YOU ATTENT MEETINGS OF THE CLUB OR ORGANIZATION YOU BELONG TO?: NEVER
DO YOU BELONG TO ANY CLUBS OR ORGANIZATIONS SUCH AS CHURCH GROUPS UNIONS, FRATERNAL OR ATHLETIC GROUPS, OR SCHOOL GROUPS?: NO
HOW OFTEN DO YOU GET TOGETHER WITH FRIENDS OR RELATIVES?: TWICE A WEEK
HOW OFTEN DO YOU HAVE SIX OR MORE DRINKS ON ONE OCCASION: NEVER
HOW HARD IS IT FOR YOU TO PAY FOR THE VERY BASICS LIKE FOOD, HOUSING, MEDICAL CARE, AND HEATING?: NOT HARD AT ALL
HOW OFTEN DO YOU ATTEND CHURCH OR RELIGIOUS SERVICES?: NEVER
IN A TYPICAL WEEK, HOW MANY TIMES DO YOU TALK ON THE PHONE WITH FAMILY, FRIENDS, OR NEIGHBORS?: MORE THAN THREE TIMES A WEEK
HOW OFTEN DO YOU ATTENT MEETINGS OF THE CLUB OR ORGANIZATION YOU BELONG TO?: NEVER
HOW OFTEN DO YOU ATTEND CHURCH OR RELIGIOUS SERVICES?: NEVER
HOW OFTEN DO YOU GET TOGETHER WITH FRIENDS OR RELATIVES?: TWICE A WEEK

## 2025-02-22 ASSESSMENT — LIFESTYLE VARIABLES
SKIP TO QUESTIONS 9-10: 1
HOW MANY STANDARD DRINKS CONTAINING ALCOHOL DO YOU HAVE ON A TYPICAL DAY: PATIENT DOES NOT DRINK
AUDIT-C TOTAL SCORE: 0
HOW OFTEN DO YOU HAVE SIX OR MORE DRINKS ON ONE OCCASION: NEVER
HOW OFTEN DO YOU HAVE A DRINK CONTAINING ALCOHOL: NEVER

## 2025-02-24 ENCOUNTER — PATIENT MESSAGE (OUTPATIENT)
Dept: CARDIOLOGY | Facility: MEDICAL CENTER | Age: 65
End: 2025-02-24

## 2025-02-24 ENCOUNTER — RESULTS FOLLOW-UP (OUTPATIENT)
Dept: CARDIOLOGY | Facility: MEDICAL CENTER | Age: 65
End: 2025-02-24

## 2025-02-24 ENCOUNTER — ANCILLARY PROCEDURE (OUTPATIENT)
Dept: CARDIOLOGY | Facility: MEDICAL CENTER | Age: 65
End: 2025-02-24
Attending: INTERNAL MEDICINE
Payer: MEDICARE

## 2025-02-24 DIAGNOSIS — I50.20 ACC/AHA STAGE C SYSTOLIC HEART FAILURE (HCC): ICD-10-CM

## 2025-02-24 DIAGNOSIS — I35.0 MODERATE AORTIC STENOSIS: ICD-10-CM

## 2025-02-24 DIAGNOSIS — I51.89 LEFT VENTRICULAR SYSTOLIC DYSFUNCTION, NYHA CLASS 3: ICD-10-CM

## 2025-02-24 LAB
LV EJECT FRACT  99904: 67
LV EJECT FRACT MOD 2C 99903: 65.41
LV EJECT FRACT MOD 4C 99902: 67.7
LV EJECT FRACT MOD BP 99901: 66.94

## 2025-02-24 PROCEDURE — 93306 TTE W/DOPPLER COMPLETE: CPT

## 2025-02-24 PROCEDURE — 93306 TTE W/DOPPLER COMPLETE: CPT | Mod: 26 | Performed by: INTERNAL MEDICINE

## 2025-02-25 ENCOUNTER — OFFICE VISIT (OUTPATIENT)
Dept: MEDICAL GROUP | Facility: MEDICAL CENTER | Age: 65
End: 2025-02-25
Payer: MEDICARE

## 2025-02-25 VITALS
WEIGHT: 129 LBS | TEMPERATURE: 98.2 F | DIASTOLIC BLOOD PRESSURE: 58 MMHG | HEART RATE: 78 BPM | HEIGHT: 66 IN | BODY MASS INDEX: 20.73 KG/M2 | OXYGEN SATURATION: 99 % | SYSTOLIC BLOOD PRESSURE: 128 MMHG

## 2025-02-25 DIAGNOSIS — I35.0 SEVERE AORTIC STENOSIS: ICD-10-CM

## 2025-02-25 DIAGNOSIS — M54.17 LUMBOSACRAL RADICULOPATHY: ICD-10-CM

## 2025-02-25 DIAGNOSIS — M47.816 LUMBAR SPONDYLOSIS: ICD-10-CM

## 2025-02-25 DIAGNOSIS — D64.9 ANEMIA, UNSPECIFIED TYPE: ICD-10-CM

## 2025-02-25 DIAGNOSIS — I85.10 SECONDARY ESOPHAGEAL VARICES WITHOUT BLEEDING (HCC): ICD-10-CM

## 2025-02-25 DIAGNOSIS — Z00.00 ENCOUNTER FOR PREVENTIVE CARE: ICD-10-CM

## 2025-02-25 PROBLEM — R01.1 HEART MURMUR: Status: RESOLVED | Noted: 2024-01-12 | Resolved: 2025-02-25

## 2025-02-25 PROCEDURE — 3078F DIAST BP <80 MM HG: CPT | Performed by: STUDENT IN AN ORGANIZED HEALTH CARE EDUCATION/TRAINING PROGRAM

## 2025-02-25 PROCEDURE — 3074F SYST BP LT 130 MM HG: CPT | Performed by: STUDENT IN AN ORGANIZED HEALTH CARE EDUCATION/TRAINING PROGRAM

## 2025-02-25 PROCEDURE — 99214 OFFICE O/P EST MOD 30 MIN: CPT | Performed by: STUDENT IN AN ORGANIZED HEALTH CARE EDUCATION/TRAINING PROGRAM

## 2025-02-25 ASSESSMENT — FIBROSIS 4 INDEX: FIB4 SCORE: 2.33

## 2025-02-25 ASSESSMENT — ENCOUNTER SYMPTOMS
COUGH: 0
PALPITATIONS: 0
CHILLS: 0
HEMOPTYSIS: 0
FEVER: 0

## 2025-02-25 NOTE — LETTER
On license of [facility]  Paty Blankenship M.D.  [address]  [place] [address]  Fax: [phone]   Authorization for Release/Disclosure of   Protected Health Information   Name: MARLINE ROBLES : 1960 SSN: [id number]   Address: [address] [place] [address] [place] [address] Phone:    There are no phone numbers on file.   I authorize the entity listed below to release/disclose the PHI below to:   On license of [facility]/Paty Blankenship M.D. and Paty Blankenship M.D.   Provider or Entity Name:  Dr. Alicia Quick   Address   City, [facility], [facility]   Phone:      Fax:     Reason for request: continuity of care   Information to be released:    [  ] LAST COLONOSCOPY,  including any PATH REPORT and follow-up  [  ] LAST FIT/COLOGUARD RESULT [  ] LAST DEXA  [  ] LAST MAMMOGRAM  [  ] LAST PAP  [  ] LAST LABS [  ] RETINA EXAM REPORT  [  ] IMMUNIZATION RECORDS  [  ] Release all info      [  ] Check here and initial the line next to each item to release ALL health information INCLUDING  _____ Care and treatment for drug and / or alcohol abuse  _____ HIV testing, infection status, or AIDS  _____ Genetic Testing    DATES OF SERVICE OR TIME PERIOD TO BE DISCLOSED: _____________  I understand and acknowledge that:  * This Authorization may be revoked at any time by you in writing, except if your health information has already been used or disclosed.  * Your health information that will be used or disclosed as a result of you signing this authorization could be re-disclosed by the recipient. If this occurs, your re-disclosed health information may no longer be protected by State or Federal laws.  * You may refuse to sign this Authorization. Your refusal will not affect your ability to obtain treatment.  * This Authorization becomes effective upon signing and will  on (date) __________.      If no date is indicated, this Authorization will  one (1) year from the signature date.    Name: Marline Robles  Signature: Date:    2/25/2025     PLEASE FAX REQUESTED RECORDS BACK TO: [phone]

## 2025-02-25 NOTE — RESULT ENCOUNTER NOTE
Dear Ling,    Can you please let Denisse Abel know that result is not entirely normal and I will see patient sooner than scheduled to discuss?    Thank you,  [place].

## 2025-02-25 NOTE — PATIENT COMMUNICATION
[facility] Schedulers: Please call patient per TT to leonie her in for sooner appointment. Thanks!   Offered and provided

## 2025-02-25 NOTE — LETTER
[employer] [facility]  Paty Blankenship M.D.  [address]  [place] [address]  Fax: [phone]   Authorization for Release/Disclosure of   Protected Health Information   Name: MARLINE ABEL : 1960 SSN: [id number]   Address: [address] [place] [address] [place] [address] Phone:    There are no phone numbers on file.   I authorize the entity listed below to release/disclose the PHI below to:   [facility]/Paty Blankenship M.D. and Paty Blankenship M.D.   Provider or Entity Name:  [facility]   Address   City, State, Zip   Phone:      Fax:     Reason for request: continuity of care   Information to be released:    [  ] LAST COLONOSCOPY,  including any PATH REPORT and follow-up  [  ] LAST FIT/COLOGUARD RESULT [  ] LAST DEXA  [  ] LAST MAMMOGRAM  [  ] LAST PAP  [  ] LAST LABS [  ] RETINA EXAM REPORT  [  ] IMMUNIZATION RECORDS  [  ] Release all info      [  ] Check here and initial the line next to each item to release ALL health information INCLUDING  _____ Care and treatment for drug and / or alcohol abuse  _____ HIV testing, infection status, or AIDS  _____ Genetic Testing    DATES OF SERVICE OR TIME PERIOD TO BE DISCLOSED: _____________  I understand and acknowledge that:  * This Authorization may be revoked at any time by you in writing, except if your health information has already been used or disclosed.  * Your health information that will be used or disclosed as a result of you signing this authorization could be re-disclosed by the recipient. If this occurs, your re-disclosed health information may no longer be protected by State or Federal laws.  * You may refuse to sign this Authorization. Your refusal will not affect your ability to obtain treatment.  * This Authorization becomes effective upon signing and will  on (date) __________.      If no date is indicated, this Authorization will  one (1) year from the signature date.    Name: Marline Abel  Signature: Date:    2/25/2025     PLEASE FAX REQUESTED RECORDS BACK TO: [phone]

## 2025-02-25 NOTE — PROGRESS NOTES
Verbal consent was acquired by the patient to use [employer] ambient listening note generation during this visit     Subjective:     HPI:   History of Present Illness  The patient presents for evaluation of severe aortic stenosis, anemia, esophageal varices, lumbar back pain, and health maintenance.  She had an echocardiogram yesterday, which revealed progression to severe aortic stenosis since her last echocardiogram in 08/2024. Her cardiologist, Dr. [place], has requested an urgent follow-up.  She is currently on spironolactone 25 mg daily for fluid accumulation.  She is off all her diuretics except spironolactone 25 mg. Her blood pressure has been well-controlled. She was previously on multiple diuretics, which resulted in low blood pressures of 80/40. She is also taking pyridoxine, thiamine, magnesium, folic acid, and B complex vitamins, which have improved her hair and skin. She was supposed to have back procedure for lumbar radiculopathy with [facility] neurosurgery when she ended up in the hospital on Thanksgiving. She had a back injury about 30 years ago, and they are trying to prevent her from having major surgery of putting a cage in her back. This was a minimally invasive procedure to try to alleviate the pain because it got so bad that she could not get out of bed. She is on hydrocodone, which does help the pain. She finds out she needs a knee replacement, but she is not going down that road right now. There is a noninvasive gel that she might look at.     She ceased alcohol consumption 16 months ago and has no desire to resume. She worked in organ donation for 18 years, which she found distressing, and used alcohol to cope. She believes her body has since recovered. She underwent an upper endoscopy last year, which revealed minimal issues in her upper GI tract. She does not feel the need for antacids but takes omeprazole daily, which she finds effective. She reports no regurgitation or hematemesis.    She  "received an iron transfusion this year for iron deficiency diagnosed last year.  Reports that she was advised to do iron infusion per her gastroenterologist.  She reports no obvious bleeding. She has one ovary and her cervix. She had a partial hysterectomy and had her uterus removed.     She has been seeing [facility] Neurosurgery for her lower back pain for years. She has been approved for an Intracept procedure, which involves zapping the nerves around the injury to alleviate pain. She has not yet undergone the procedure, which was supposed to be her first. She saw her neurosurgeon before Thanksgiving. She is unsure if the procedure will be performed due to her current heart condition. She will see the neurosurgeon at the beginning of March.     SOCIAL HISTORY  She stopped drinking alcohol 16 months ago.      IMMUNIZATIONS  She received the influenza vaccine.        Objective:     Exam:  /58 (BP Location: Left arm, Patient Position: Sitting, BP Cuff Size: Adult)   Pulse 78   Temp 36.8 °C (98.2 °F) (Temporal)   Ht 1.676 m (5' 6\")   Wt 58.5 kg (129 lb)   SpO2 99%   BMI 20.82 kg/m²  Body mass index is 20.82 kg/m².    Review of Systems   Constitutional:  Negative for chills and fever.   Respiratory:  Negative for cough and hemoptysis.    Cardiovascular:  Negative for chest pain and palpitations.       Physical Exam  Constitutional:       Appearance: Normal appearance.   Cardiovascular:      Rate and Rhythm: Normal rate and regular rhythm.      Pulses: Normal pulses.      Heart sounds: No murmur (systolic) heard.  Pulmonary:      Effort: Pulmonary effort is normal. No respiratory distress.      Breath sounds: Normal breath sounds. No wheezing.   Abdominal:      General: There is no distension.      Palpations: Abdomen is soft.      Tenderness: There is no abdominal tenderness. There is no guarding.   Musculoskeletal:      Right lower leg: No edema.      Left lower leg: No edema.   Neurological:      Mental " Status: She is alert and oriented to person, place, and time.           Lab studies     Latest Reference Range & Units 01/08/25 11:04   WBC 4.8 - 10.8 K/uL 7.5   RBC 4.20 - 5.40 M/uL 3.25 (L)   Hemoglobin 12.0 - 16.0 g/dL 9.0 (L)   Hematocrit 37.0 - 47.0 % 29.0 (L)   MCV 81.4 - 97.8 fL 89.2   MCH 27.0 - 33.0 pg 27.7   MCHC 32.2 - 35.5 g/dL 31.0 (L)   RDW 35.9 - 50.0 fL 67.2 (H)   Platelet Count 164 - 446 K/uL 145 (L)   MPV 9.0 - 12.9 fL 11.0   (L): Data is abnormally low  (H): Data is abnormally high    Assessment & Plan:     Assessment & Plan      Problem List Items Addressed This Visit       Lumbar spondylosis    Lumbosacral radiculopathy    She is under the care of a neurosurgeon for her lumbar back pain and is currently taking Norco (hydrocodone) for pain management. She is scheduled to see her neurosurgeon in early March 2025 to discuss the possibility of an intracept procedure. A record request will be sent to [facility] to obtain her medical records.         Esophageal varices without bleeding (HCC)    Stable, no bleeding symptoms.  A record request will be sent to [facility] to obtain her last endoscopy report.         Anemia    - Improving.  Obtain repeat iron panel in 3 months.  Received iron infusion last year.         Relevant Orders    IRON/TOTAL IRON BIND    FERRITIN    Severe aortic stenosis    -chronic, worsening  -no signs of acute decompensation   -Follow up with cardiology          Other Visit Diagnoses         Encounter for preventive care        Relevant Orders    CBC WITH DIFFERENTIAL    Lipid Profile    VITAMIN D,25 HYDROXY (DEFICIENCY)    Comp Metabolic Panel                Return in about 2 months (around 4/25/2025) for Labfollowup.    Please note that this dictation was created using voice recognition software. I have made every reasonable attempt to correct obvious errors, but I expect that there are errors of grammar and possibly content that I did not discover before  finalizing the note.

## 2025-02-26 NOTE — ASSESSMENT & PLAN NOTE
Stable, no bleeding symptoms.  A record request will be sent to [facility] to obtain her last endoscopy report.

## 2025-02-26 NOTE — ASSESSMENT & PLAN NOTE
She is under the care of a neurosurgeon for her lumbar back pain and is currently taking Norco (hydrocodone) for pain management. She is scheduled to see her neurosurgeon in early March 2025 to discuss the possibility of an intracept procedure. A record request will be sent to [facility] to obtain her medical records.

## 2025-03-03 ENCOUNTER — HOSPITAL ENCOUNTER (OUTPATIENT)
Dept: LAB | Facility: MEDICAL CENTER | Age: 65
End: 2025-03-03
Attending: INTERNAL MEDICINE
Payer: MEDICARE

## 2025-03-03 ENCOUNTER — HOSPITAL ENCOUNTER (OUTPATIENT)
Dept: LAB | Facility: MEDICAL CENTER | Age: 65
End: 2025-03-03
Attending: STUDENT IN AN ORGANIZED HEALTH CARE EDUCATION/TRAINING PROGRAM
Payer: MEDICARE

## 2025-03-03 DIAGNOSIS — Z00.00 ENCOUNTER FOR PREVENTIVE CARE: ICD-10-CM

## 2025-03-03 DIAGNOSIS — D64.9 ANEMIA, UNSPECIFIED TYPE: ICD-10-CM

## 2025-03-03 LAB
25(OH)D3 SERPL-MCNC: 31 NG/ML (ref 30–100)
ALBUMIN SERPL BCP-MCNC: 3.9 G/DL (ref 3.2–4.9)
ALBUMIN/GLOB SERPL: 1.2 G/DL
ALP SERPL-CCNC: 114 U/L (ref 30–99)
ALT SERPL-CCNC: 9 U/L (ref 2–50)
ANION GAP SERPL CALC-SCNC: 11 MMOL/L (ref 7–16)
AST SERPL-CCNC: 21 U/L (ref 12–45)
BASOPHILS # BLD AUTO: 0.5 % (ref 0–1.8)
BASOPHILS # BLD AUTO: 0.8 % (ref 0–1.8)
BASOPHILS # BLD: 0.03 K/UL (ref 0–0.12)
BASOPHILS # BLD: 0.05 K/UL (ref 0–0.12)
BILIRUB SERPL-MCNC: 0.4 MG/DL (ref 0.1–1.5)
BUN SERPL-MCNC: 25 MG/DL (ref 8–22)
CALCIUM ALBUM COR SERPL-MCNC: 10.8 MG/DL (ref 8.5–10.5)
CALCIUM SERPL-MCNC: 10.7 MG/DL (ref 8.5–10.5)
CHLORIDE SERPL-SCNC: 102 MMOL/L (ref 96–112)
CHOLEST SERPL-MCNC: 153 MG/DL (ref 100–199)
CO2 SERPL-SCNC: 22 MMOL/L (ref 20–33)
CREAT SERPL-MCNC: 1.08 MG/DL (ref 0.5–1.4)
EOSINOPHIL # BLD AUTO: 0.2 K/UL (ref 0–0.51)
EOSINOPHIL # BLD AUTO: 0.23 K/UL (ref 0–0.51)
EOSINOPHIL NFR BLD: 3.1 % (ref 0–6.9)
EOSINOPHIL NFR BLD: 3.6 % (ref 0–6.9)
ERYTHROCYTE [DISTWIDTH] IN BLOOD BY AUTOMATED COUNT: 45.7 FL (ref 35.9–50)
ERYTHROCYTE [DISTWIDTH] IN BLOOD BY AUTOMATED COUNT: 46.5 FL (ref 35.9–50)
FERRITIN SERPL-MCNC: 47.4 NG/ML (ref 10–291)
GFR SERPLBLD CREATININE-BSD FMLA CKD-EPI: 56 ML/MIN/1.73 M 2
GLOBULIN SER CALC-MCNC: 3.2 G/DL (ref 1.9–3.5)
GLUCOSE SERPL-MCNC: 94 MG/DL (ref 65–99)
HCT VFR BLD AUTO: 27.4 % (ref 37–47)
HCT VFR BLD AUTO: 27.6 % (ref 37–47)
HDLC SERPL-MCNC: 63 MG/DL
HGB BLD-MCNC: 8.4 G/DL (ref 12–16)
HGB BLD-MCNC: 8.5 G/DL (ref 12–16)
IMM GRANULOCYTES # BLD AUTO: 0.02 K/UL (ref 0–0.11)
IMM GRANULOCYTES # BLD AUTO: 0.02 K/UL (ref 0–0.11)
IMM GRANULOCYTES NFR BLD AUTO: 0.3 % (ref 0–0.9)
IMM GRANULOCYTES NFR BLD AUTO: 0.3 % (ref 0–0.9)
INR PPP: 1.26 (ref 0.87–1.13)
IRON SATN MFR SERPL: 16 % (ref 15–55)
IRON SERPL-MCNC: 45 UG/DL (ref 40–170)
LDLC SERPL CALC-MCNC: 76 MG/DL
LYMPHOCYTES # BLD AUTO: 1.43 K/UL (ref 1–4.8)
LYMPHOCYTES # BLD AUTO: 1.56 K/UL (ref 1–4.8)
LYMPHOCYTES NFR BLD: 22.5 % (ref 22–41)
LYMPHOCYTES NFR BLD: 24.3 % (ref 22–41)
MCH RBC QN AUTO: 24.8 PG (ref 27–33)
MCH RBC QN AUTO: 24.9 PG (ref 27–33)
MCHC RBC AUTO-ENTMCNC: 30.7 G/DL (ref 32.2–35.5)
MCHC RBC AUTO-ENTMCNC: 30.8 G/DL (ref 32.2–35.5)
MCV RBC AUTO: 80.8 FL (ref 81.4–97.8)
MCV RBC AUTO: 80.9 FL (ref 81.4–97.8)
MONOCYTES # BLD AUTO: 0.39 K/UL (ref 0–0.85)
MONOCYTES # BLD AUTO: 0.41 K/UL (ref 0–0.85)
MONOCYTES NFR BLD AUTO: 6.1 % (ref 0–13.4)
MONOCYTES NFR BLD AUTO: 6.4 % (ref 0–13.4)
NEUTROPHILS # BLD AUTO: 4.18 K/UL (ref 1.82–7.42)
NEUTROPHILS # BLD AUTO: 4.25 K/UL (ref 1.82–7.42)
NEUTROPHILS NFR BLD: 65.1 % (ref 44–72)
NEUTROPHILS NFR BLD: 67 % (ref 44–72)
NRBC # BLD AUTO: 0 K/UL
NRBC # BLD AUTO: 0 K/UL
NRBC BLD-RTO: 0 /100 WBC (ref 0–0.2)
NRBC BLD-RTO: 0 /100 WBC (ref 0–0.2)
PLATELET # BLD AUTO: 175 K/UL (ref 164–446)
PLATELET # BLD AUTO: 179 K/UL (ref 164–446)
PMV BLD AUTO: 10.6 FL (ref 9–12.9)
PMV BLD AUTO: 11.3 FL (ref 9–12.9)
POTASSIUM SERPL-SCNC: 4.6 MMOL/L (ref 3.6–5.5)
PROT SERPL-MCNC: 7.1 G/DL (ref 6–8.2)
PROTHROMBIN TIME: 15.8 SEC (ref 12–14.6)
RBC # BLD AUTO: 3.39 M/UL (ref 4.2–5.4)
RBC # BLD AUTO: 3.41 M/UL (ref 4.2–5.4)
SODIUM SERPL-SCNC: 135 MMOL/L (ref 135–145)
TIBC SERPL-MCNC: 290 UG/DL (ref 250–450)
TRIGL SERPL-MCNC: 68 MG/DL (ref 0–149)
UIBC SERPL-MCNC: 245 UG/DL (ref 110–370)
WBC # BLD AUTO: 6.4 K/UL (ref 4.8–10.8)
WBC # BLD AUTO: 6.4 K/UL (ref 4.8–10.8)

## 2025-03-03 PROCEDURE — 83540 ASSAY OF IRON: CPT

## 2025-03-03 PROCEDURE — 83550 IRON BINDING TEST: CPT

## 2025-03-03 PROCEDURE — 82306 VITAMIN D 25 HYDROXY: CPT

## 2025-03-03 PROCEDURE — 82105 ALPHA-FETOPROTEIN SERUM: CPT

## 2025-03-03 PROCEDURE — 85610 PROTHROMBIN TIME: CPT

## 2025-03-03 PROCEDURE — 36415 COLL VENOUS BLD VENIPUNCTURE: CPT

## 2025-03-03 PROCEDURE — 85025 COMPLETE CBC W/AUTO DIFF WBC: CPT

## 2025-03-03 PROCEDURE — 82728 ASSAY OF FERRITIN: CPT

## 2025-03-03 PROCEDURE — 80053 COMPREHEN METABOLIC PANEL: CPT

## 2025-03-03 PROCEDURE — 80061 LIPID PANEL: CPT

## 2025-03-03 PROCEDURE — 85025 COMPLETE CBC W/AUTO DIFF WBC: CPT | Mod: 91

## 2025-03-04 ENCOUNTER — RESULTS FOLLOW-UP (OUTPATIENT)
Dept: MEDICAL GROUP | Facility: MEDICAL CENTER | Age: 65
End: 2025-03-04

## 2025-03-05 LAB — AFP-TM SERPL-MCNC: 2 NG/ML (ref 0–9)

## 2025-03-11 ENCOUNTER — OFFICE VISIT (OUTPATIENT)
Dept: CARDIOLOGY | Facility: MEDICAL CENTER | Age: 65
End: 2025-03-11
Attending: INTERNAL MEDICINE
Payer: MEDICARE

## 2025-03-11 VITALS
SYSTOLIC BLOOD PRESSURE: 128 MMHG | HEIGHT: 66 IN | RESPIRATION RATE: 16 BRPM | OXYGEN SATURATION: 99 % | DIASTOLIC BLOOD PRESSURE: 64 MMHG | WEIGHT: 133 LBS | HEART RATE: 89 BPM | BODY MASS INDEX: 21.38 KG/M2

## 2025-03-11 DIAGNOSIS — N18.31 STAGE 3A CHRONIC KIDNEY DISEASE: ICD-10-CM

## 2025-03-11 DIAGNOSIS — Z79.899 HIGH RISK MEDICATION USE: ICD-10-CM

## 2025-03-11 DIAGNOSIS — R06.09 DYSPNEA ON EXERTION: ICD-10-CM

## 2025-03-11 DIAGNOSIS — I50.20 ACC/AHA STAGE C SYSTOLIC HEART FAILURE (HCC): ICD-10-CM

## 2025-03-11 DIAGNOSIS — F10.20 ETOHISM (HCC): ICD-10-CM

## 2025-03-11 DIAGNOSIS — K74.60 HEPATIC CIRRHOSIS, UNSPECIFIED HEPATIC CIRRHOSIS TYPE, UNSPECIFIED WHETHER ASCITES PRESENT (HCC): ICD-10-CM

## 2025-03-11 DIAGNOSIS — N18.4 CKD (CHRONIC KIDNEY DISEASE) STAGE 4, GFR 15-29 ML/MIN (HCC): ICD-10-CM

## 2025-03-11 DIAGNOSIS — I51.89 LEFT VENTRICULAR SYSTOLIC DYSFUNCTION, NYHA CLASS 3: ICD-10-CM

## 2025-03-11 DIAGNOSIS — I35.0 SEVERE AORTIC STENOSIS: ICD-10-CM

## 2025-03-11 DIAGNOSIS — D64.9 ANEMIA, UNSPECIFIED TYPE: ICD-10-CM

## 2025-03-11 PROCEDURE — 99212 OFFICE O/P EST SF 10 MIN: CPT | Performed by: INTERNAL MEDICINE

## 2025-03-11 PROCEDURE — 99215 OFFICE O/P EST HI 40 MIN: CPT | Performed by: INTERNAL MEDICINE

## 2025-03-11 PROCEDURE — 3074F SYST BP LT 130 MM HG: CPT | Performed by: INTERNAL MEDICINE

## 2025-03-11 PROCEDURE — G2211 COMPLEX E/M VISIT ADD ON: HCPCS | Performed by: INTERNAL MEDICINE

## 2025-03-11 PROCEDURE — 3078F DIAST BP <80 MM HG: CPT | Performed by: INTERNAL MEDICINE

## 2025-03-11 RX ORDER — SPIRONOLACTONE 25 MG/1
25 TABLET ORAL DAILY
Qty: 90 TABLET | Refills: 4 | Status: SHIPPED | OUTPATIENT
Start: 2025-03-11

## 2025-03-11 ASSESSMENT — ENCOUNTER SYMPTOMS
SENSORY CHANGE: 0
DOUBLE VISION: 0
MYALGIAS: 0
PALPITATIONS: 0
DIAPHORESIS: 0
FEVER: 0
BLURRED VISION: 0
MEMORY LOSS: 0
FALLS: 0
BRUISES/BLEEDS EASILY: 0
COUGH: 0
DIZZINESS: 0
DEPRESSION: 0
ABDOMINAL PAIN: 0
HEADACHES: 0
SHORTNESS OF BREATH: 1

## 2025-03-11 ASSESSMENT — FIBROSIS 4 INDEX: FIB4 SCORE: 2.64

## 2025-03-11 NOTE — PROGRESS NOTES
Chief Complaint   Patient presents with    Follow-Up     F/V Dx ACC/AHA stage C systolic heart failure (HCC) normaliztion of LVEF     Subjective     Denisse Abel is a 64 y.o. female who presents today for cardiac care and evaluation in the heart failure clinic because of recent hospitalization due to heart failure exacerbation. The diagnosis of HF was made in 01/2024. It was presumed to be non-ischemic in nature. The left ventricular systolic function was documented to be at 40%. Patient was diuresed in the hospital and discharged home with guidelines directed medical therapy. She does have hx of ETOH.    05/2024 LVEF of 40 % with global hypokinesis. No significant valvular disease. I have independently interpreted and reviewed echocardiogram's actual images.     08/2024 LVEF of 60% with global hypokinesis. Moderate to severe aortic stenosis disease. I have independently interpreted and reviewed echocardiogram's actual images.     09/2024 Moderate aortic valve stenosis. Aortic valve area traced from planimetry is 1.5 cm2. Trace aortic insufficiency. I have independently interpreted and reviewed echocardiogram's actual images.     11/2024 Was in hospital due to GI issues, obstruction. Her medications were stopped. Only on Spironolactone now.    02/2025 LVEF of 67 % with severe AS seen now. I have independently interpreted and reviewed echocardiogram's actual images.     I have personally interpreted EKG today with patient, there is no evidence of acute coronary syndrome, no evidence of prior infarct, normal OK and QT interval, no significant conduction disease. Sinus rhythm.    I have independently interpreted and reviewed blood tests results with patient in clinic which shows LDL level of 76, triglycerides level of 68, GFR of 58, NT pro BNP of 3055 up from 310, K of 4.    Cirrhosis seen on CT.    Patient still gets winded with daily living activities and exertion. No symptoms at rest.      Past Medical  History:   Diagnosis Date    Acute combined systolic and diastolic heart failure (HCC) 01/22/2024    Anemia due to chronic kidney disease     Heart valve disease     Nonrheumatic aortic valve stenosis    HLD (hyperlipidemia)     HTN (hypertension)     Hypertension     Hypotension     Liver cirrhosis (HCC)     Obesity     Renal disorder     CKD (chronic kidney disease) stage 4, GFR 15-29 ml/min (HCC)     Past Surgical History:   Procedure Laterality Date    MS LAP,DIAGNOSTIC ABDOMEN  2/23/2024    Procedure: LAPAROSCOPY, KIM'S PATCH;  Surgeon: Neel Amezcua M.D.;  Location: SURGERY [facility];  Service: General    TRIGGER FINGER RELEASE Right 3/31/2017    Procedure: TRIGGER FINGER RELEASE;  Surgeon: Jimmy Atkins M.D.;  Location: SURGERY [facility];  Service:     GANGLION EXCISION Right 3/31/2017    Procedure: GANGLION EXCISION - HAND CYST;  Surgeon: Jimmy Atkins M.D.;  Location: SURGERY [facility];  Service:     PELVISCOPY  6/5/08    Performed by NATALIYA ARCEO at SURGERY SAME DAY [facility]S    OVARIAN CYSTECTOMY  6/5/08    Performed by NATALIYA ARCEO at SURGERY SAME DAY [facility] ORS    CYSTOSCOPY  6/5/08    Performed by NATALIYA ARCEO at SURGERY SAME DAY [facility]    HYSTERECTOMY LAPAROSCOPY  2003    KNEE ARTHROSCOPY       Family History   Problem Relation Age of Onset    Diabetes Mother     Heart Disease Mother     Stroke Mother     Diabetes Father      Social History     Socioeconomic History    Marital status: Single     Spouse name: Not on file    Number of children: Not on file    Years of education: Not on file    Highest education level: Master's degree (e.g., MA, MS, Monica, MEd, MSW, SUMAYA)   Occupational History    Not on file   Tobacco Use    Smoking status: Never    Smokeless tobacco: Never   Vaping Use    Vaping status: Never Used   Substance and Sexual Activity    Alcohol use: Not Currently     Alcohol/week: 3.6 oz     Comment: denies    Drug use: Never     Comment:  denies    Sexual activity: Not Currently   Other Topics Concern    Not on file   Social History Narrative    Not on file     Social Drivers of Health     Financial Resource Strain: Low Risk  (2/22/2025)    Overall Financial Resource Strain (CARDIA)     Difficulty of Paying Living Expenses: Not hard at all   Food Insecurity: No Food Insecurity (2/22/2025)    Hunger Vital Sign     Worried About Running Out of Food in the Last Year: Never true     Ran Out of Food in the Last Year: Never true   Transportation Needs: No Transportation Needs (2/22/2025)    PRAPARE - Transportation     Lack of Transportation (Medical): No     Lack of Transportation (Non-Medical): No   Physical Activity: Sufficiently Active (2/22/2025)    Exercise Vital Sign     Days of Exercise per Week: 7 days     Minutes of Exercise per Session: 40 min   Stress: No Stress Concern Present (2/22/2025)    [place] [employer] - Occupational Stress Questionnaire     Feeling of Stress : Not at all   Social Connections: Socially Isolated (2/22/2025)    Social Connection and Isolation Panel [NHANES]     Frequency of Communication with Friends and Family: More than three times a week     Frequency of Social Gatherings with Friends and Family: Twice a week     Attends Muslim Services: Never     Active Member of Clubs or Organizations: No     Attends Club or Organization Meetings: Never     Marital Status:    Intimate Partner Violence: Not At Risk (12/9/2024)    Humiliation, Afraid, Rape, and Kick questionnaire     Fear of Current or Ex-Partner: No     Emotionally Abused: No     Physically Abused: No     Sexually Abused: No   Housing Stability: Low Risk  (2/22/2025)    Housing Stability Vital Sign     Unable to Pay for Housing in the Last Year: No     Number of Times Moved in the Last Year: 0     Homeless in the Last Year: No     Allergies   Allergen Reactions    Sulfa Drugs Rash     Pt reports that it was a childhood allergie  received body rash   Per [facility] pharmacy (11/27/2024)     Outpatient Encounter Medications as of 3/11/2025   Medication Sig Dispense Refill    spironolactone (ALDACTONE) 25 MG Tab Take 1 Tablet by mouth every day. 90 Tablet 4    omeprazole (PRILOSEC) 40 MG delayed-release capsule Take 1 Capsule by mouth every day. 90 Capsule 3    magnesium oxide 400 (240 Mg) MG Tab Take 1 Tablet by mouth 2 times a day. 60 Tablet 0    pyridoxine (VITAMIN B-6) 50 MG Tab Take 1 Tablet by mouth every day. 30 Tablet 11    traZODone (DESYREL) 50 MG Tab Take 1 Tablet by mouth at bedtime as needed for Sleep. 30 Tablet 2    phosphorus (K-PHOS-NEUTRAL) 250 MG tablet Take 1 Tablet by mouth 2 times a day. 60 Tablet 0    folic acid (FOLVITE) 1 MG Tab Take 1 Tablet by mouth every day.      thiamine (THIAMINE) 100 MG tablet Take 1 Tablet by mouth every day.      HYDROcodone-acetaminophen (NORCO) 5-325 MG Tab per tablet Take 1 Tablet by mouth 2 times a day as needed.      [DISCONTINUED] spironolactone (ALDACTONE) 25 MG Tab Take 25 mg by mouth every day.       No facility-administered encounter medications on file as of 3/11/2025.     Review of Systems   Constitutional:  Negative for diaphoresis and fever.   HENT:  Negative for nosebleeds.    Eyes:  Negative for blurred vision and double vision.   Respiratory:  Positive for shortness of breath. Negative for cough.    Cardiovascular:  Negative for chest pain, palpitations and leg swelling.   Gastrointestinal:  Negative for abdominal pain.   Genitourinary:  Negative for dysuria and frequency.   Musculoskeletal:  Negative for falls and myalgias.   Skin:  Negative for rash.   Neurological:  Negative for dizziness, sensory change and headaches.   Endo/Heme/Allergies:  Does not bruise/bleed easily.   Psychiatric/Behavioral:  Negative for depression and memory loss.               Objective     /64 (BP Location: Left arm, Patient Position: Sitting, BP Cuff Size: Adult)   Pulse 89   Resp 16   Ht 1.676  "m (5' 6\")   Wt 60.3 kg (133 lb)   SpO2 99%   BMI 21.47 kg/m²     Physical Exam  Vitals and nursing note reviewed.   Constitutional:       General: She is not in acute distress.     Appearance: She is not diaphoretic.   HENT:      Head: Normocephalic and atraumatic.      Right Ear: External ear normal.      Left Ear: External ear normal.      Nose: No congestion or rhinorrhea.   Eyes:      General:         Right eye: No discharge.         Left eye: No discharge.   Neck:      Thyroid: No thyromegaly.      Vascular: No JVD.   Cardiovascular:      Rate and Rhythm: Normal rate and regular rhythm.      Pulses: Normal pulses.      Heart sounds: Murmur heard.   Pulmonary:      Effort: No respiratory distress.   Abdominal:      General: There is distension.      Tenderness: There is no abdominal tenderness.   Musculoskeletal:         General: No swelling or tenderness.      Right lower leg: No edema.      Left lower leg: No edema.   Skin:     General: Skin is warm and dry.   Neurological:      Mental Status: She is alert and oriented to person, place, and time.      Cranial Nerves: No cranial nerve deficit.   Psychiatric:         Behavior: Behavior normal.                Assessment & Plan     1. ACC/AHA stage C systolic heart failure (HCC) normaliztion of LVEF  spironolactone (ALDACTONE) 25 MG Tab      2. Left ventricular systolic dysfunction, NYHA class 3  spironolactone (ALDACTONE) 25 MG Tab      3. Severe aortic stenosis  Referral to Cardiothoracic Surgery      4. Dyspnea on exertion  Referral to Cardiothoracic Surgery    spironolactone (ALDACTONE) 25 MG Tab      5. Hepatic cirrhosis, unspecified hepatic cirrhosis type, unspecified whether ascites present (HCC)        6. CKD (chronic kidney disease) stage 4, GFR 15-29 ml/min (HCC)        7. ETOHism (HCC)        8. Anemia, unspecified type        9. Stage 3a chronic kidney disease        10. High risk medication use              Medical Decision Making: Today's " Assessment/Status/Plan:   Dilated Cardiomyopathy:  Chronically illed condition which requires ongoing close monitoring and treatment to improve survival rate along with decreasing risk of clinical decompensation sudden cardiac death and hospitalization.    Today, based on physical examination findings, patient is euvolemic. No JVD, lungs are clear to auscultation, no pitting edema in bilateral lower extremities, no ascites.     Dry weight is 133 lbs.      Patient prefers to keep same regimen for now. NO more addition of meds.    No indication for ICD.    No more ETOH advised.    At this time, patient's aortic stenosis has progressed to severe.  We will refer her to structural heart team for further evaluation of intervention.    Of note, during the care of this patient, I spent a significant amount of time explaining the nature of the disease process, reviewing all possible imaging studies, blood test results to patient.  The overall care of this patient require a higher level of care than usual due to the multiple comorbidities along with ongoing issues of congestive heart failure that put this patient at risk for sudden cardiac death, increased mortality, and hospitalization.  This patient also requires close monitoring with at least monthly blood test to monitor renal function and electrolytes due to the ongoing dynamic changes of medical therapy titration protocol to ensure optimal benefits for the overall care of this patient.     This visit encounter signifies the visit complexity inherent to evaluation and management associated with medical care services that serve as the continuing focal point for all needed health care services and/or with medical care services that are part of ongoing care related to this patient's single, serious condition, complex cardiac condition.      German Xiong M.D.

## 2025-03-14 ENCOUNTER — TELEPHONE (OUTPATIENT)
Dept: CARDIOLOGY | Facility: MEDICAL CENTER | Age: 65
End: 2025-03-14
Payer: MEDICARE

## 2025-03-14 DIAGNOSIS — I35.0 SEVERE AORTIC STENOSIS: ICD-10-CM

## 2025-03-14 DIAGNOSIS — I35.0 NONRHEUMATIC AORTIC VALVE STENOSIS: ICD-10-CM

## 2025-03-14 DIAGNOSIS — Z01.810 PRE-PROCEDURAL CARDIOVASCULAR EXAMINATION: ICD-10-CM

## 2025-03-14 NOTE — TELEPHONE ENCOUNTER
Referral from: Obdulia MARIN for Severe AS    Patient called on 3/14/2024.    Discussed with patient consultation appointments, testing needed, and plan of care.    Patient given dates and times of testing and consultations.    All questions answered.    Phone number given to patient for [facility] for any further questions or concerns.

## 2025-03-14 NOTE — TELEPHONE ENCOUNTER
Pre TAVR angio spot was held on 3-31-25 at 9:30 with . Instruction sheet was emailed to Maryellen VEGA.

## 2025-03-14 NOTE — TELEPHONE ENCOUNTER
----- Message from Nurse Maryellen DELGADILLO R.N. sent at 3/14/2025  1:03 PM PDT -----  Regarding: Pre TAVR Cath  Hey! Please hold pre TAVR cath 3/31-4/1. Thanks!

## 2025-03-17 NOTE — Clinical Note
[facility]  [address] [id number]    [id number]    Denisse Short Ilyaia  [address] [place] [address]    March 17, 2025    Member Name: Denisse Abel   Member Number: [id number]   Reference Number: [id number]   Approved Services: MRI/CAT Scan   Approved Service Dates: 03/14/2025 - 05/15/2025   Requesting Provider: Dayan Lynne   Requested Provider: [facility]     Dear Denisse Abel:    The following medical service(s) requested by Dayan Lynne have been approved:    Procedure Code Procedure Code Name Requested Quantity Approved Quantity Status   18515 (CPT®) ID CT ANGIO, CHEST (NON-CORON), COMBO, INCL * 1 1 Authorized   00145 (CPT®) ID CT ANGIO ABD&PLVIS CNTRST MTRL W/WO CNTRST IMGES 1 1 Authorized       Approved Quantity means the number of visits approved for medication treatments and/or medical services.    The services should be provided by [facility] no later than 05/15/2025. Please contact the provider listed below with any questions.     Provider Information:  [facility]  [phone]    Your plan benefit may require a deductible, co-payment or coinsurance for these services. This authorization does not guarantee [facility] will pay the claim for services that you receive. Payment by [facility] for these services is subject to the terms of your Evidence of Coverage, your eligibility at the time of service, and confirmation of benefit coverage.    For any questions or additional information, please contact Customer Service:    [facility] Toll Free: [phone]  TTY users dial: [phone]   Call Center Hours:  Oct 1 - Mar 31, Mon - Fri 7 AM to 8 PM PST  Oct 1 - Mar 31, Sat - Sun 8 AM to 8 PM PST  Apr 1 - Sep 30, Mon - Fri 7 AM to 8 PM PST   Office Hours: Mon - Fri 8 AM to 5 PM PST   E-mail: [email or web address]   Website:  [email or web address]      This  information is available for free in other languages. Please contact Customer Service at the phone number above for more information. [facility] complies with applicable Federal civil rights laws and does not discriminate on the basis of race, color, national origin, age, disability or sex.    Sincerely,     Healthcare Utilization Management Department     Cc: [facility]   Dayan Lynne    Multi-Language Insert  Multi- Services  English: We have free  services to answer any questions you may have about our health or drug plan.  To get an , just call us at [phone].  Someone who speaks English/Language can help you.  This is a free service.  Sinhala: Tenemos servicios de intérprete sin costo alguno  para responder cualquier pregunta que pueda tener sobre nuestro plan de amada o medicamentos. Para hablar con un intérprete, por favor llame al [phone]. Alguien que hable español le podrá ayudar. Roz es un servicio gratuito.  Chinese Mandarin: ?????????????????????????????? ???????????????? [phone]????????????????? ?????????  Chinese Cantonese: ?????????????????????????????? ????????????? [phone]???????????????????? ????????  Tagalog:  Mendoza land serbisyo sa chasesasalana wellsg a marie urban hinggil sa wang kaisergkavon o panggamot.  hamilton Benitez  [phone]. Walker Larose.  Paulo pina.  Sinhala:  Nous proposons annemarie services gratuits d'interprétation pour répondre à toutes cristofer questions relatives à notre régime de santé ou d'assurance-médicaments. Pour accéder au service d'interprétation, il vous suffit de nous appeler au [phone]. Un interlocuteur parlant Français pourra vous aider. Ce service est gratuit.  Stateless:  Tiago copeland có d?ch v? aaliyah d?ch mi?n phí  ð? tr? l?i các câu h?i v? chýõng s?c kh?e và chýõng trình thu?c men. N?u quí v? c?n thông d?ch viên geraldine g?i [phone] s? có nhân viên nói ti?ng Vi?t giúp ð? quí v?. Ðây là d?ch v? mi?n phí .  Guinean:  Unser kostenser Dolmetscherservice beantwortet Ihren Fragen zu unserem Gesundheits- und Arzneimittelplan. Unsere Dolmetscher erreichen Sie [phone]. Man wird Ihnen deepti [facility] [place]. Dieser Service ist [facility].  Wolof:  ??? ?? ?? ?? ?? ??? ?? ??? ?? ???? ?? ?? ???? ???? ????. ?? ???? ????? ?? [phone] ??? ??? ????.  ???? ?? ???? ?? ?? ????. ? ???? ??? ?????.   Singaporean: Iramè ó âàñ âîçíèêíóò âîïðîñû îòíîñèòåëüíî ñòðàõîâîãî èëè ìåäèêàìåíòíîãî ïëàíà, âû ìîæåòå âîñïîëüçîâàòüñÿ íàøèìè áåñïëàòíûìè óñëóãàìè ïåðåâîä÷èêîâ. ×òîáû âîñïîëüçîâàòüñÿ óñëóãàìè ïåðåâîä÷èêà, ïîçâîíèòå íàì ïî òåëåôîíó [phone]. Âàì îêàæåò ïîìîùü ñîòðóäíèê, êîòîðûé ãîâîðèò ïî-póññêè. Äàííàÿ óñëóãà áåñïëàòíàÿ.  Kyrgyz: ÅääÇ äÞÏã ÎÏãÇÊ ÇáãÊÑÌã ÇáÝæÑí ÇáãÌÇäíÉ ááÅÌÇÈÉ Úä Ãí ÃÓÆáÉ ÊÊÚáÞ ÈÇáÕÍÉ Ãæ ÌÏæá ÇáÃÏæíÉ áÏíäÇ. ááÍÕæá Úáì ãÊÑÌã ÝæÑí¡ áíÓ Úáíß Óæì ÇáÇÊÕÇá ÈäÇ Úáì [phone] . ÓíÞæã ÔÎÕ ãÇ íÊÍÏË ÇáÚÑÈíÉ ÈãÓÇÚÏÊß. åÐå ÎÏãÉ ãÌÇäíÉ.  Jeovany: ????? ????????? ?? ??? ?? ????? ?? ???? ??? ???? ???? ?? ?????? ?? ???? ???? ?? ??? ????? ??? ????? ???????? ?????? ?????? ???. ?? ???????? ??????? ???? ?? ???, ?? ???? [phone] ?? ??? ????. ??? ??????? ?? ?????? ????? ?? ???? ??? ?? ???? ??. ?? ?? ????? ???? ??.   Japanese:  È disponibile un servizio di interpretariato gratuito per rispondere a eventuali domande sul nostro piano sanitario e farmaceutico. Per un interprete, contattare il latonya [phone]. Un nostro incaricato kell parla Italianovi fornirà l'assistenza necessaria. È un servizio gratuito.  Portugués:  Dispomos de serviços de interpretação gratuitos para responder a qualquer questão que tenha acerca do nosso plano de saúde ou de medicação. Para obter um intérprete, contacte-nos através do número  [phone]. Irá encontrar alguém que fale o idioma  Português para o ajudar. Roz serviço é gratuito.  Georgian Creole:  Nou genyen sèvis entèprèt gratis morales reponn tout kesyon ou ta genyen konsènan plan medikal oswa dwòg nou an.  Morales jwenn yon entèprèt, jis rele nou nan [phone]. Yon moun ki pale Kreyòl kapab roverto w.  Sa a se yon sèvis ki gratis.  Polish:  Umo¿liwiamy bezp³atne skorzystanie z us³ug t³umacza ustnego, który pomo¿e w uzyskaniu odpowiedzi na temat planu zdrowotnego lub dawkowania leków. Sandra skorzystaæ z pomocy t³umacza znaj¹cego bety hernandez¿y zadzwoniæ pod numer [phone]. Ta us³uga jest bezp³atna.  Frisian: ????? ??????? ????????????????????? ??????????????????????????????????[phone] ???????????????? ? ????????????????? ?????

## 2025-03-17 NOTE — PROGRESS NOTES
REFERRING PHYSICIAN: German Xiong MD.     CONSULTING PHYSICIAN: Cory Auguste DO     CHIEF COMPLAINT: Fatigue    HISTORY OF PRESENT ILLNESS: The patient is a 64 y.o. female with past medical history of hypertension, chronic kidney disease stage IV, MDD, liver cirrhosis, anemia, alcohol abuse with esophageal varices, portal hypertension, and CHF who presents to clinic. She complains of dyspnea on exertion and fatigue. She denies chest pain, orthopnea, PND, dizziness, and syncope. She states she has been sober from alcohol for 18 months. Echocardiogram reveals aortic stenosis.     PAST MEDICAL HISTORY:   Active Ambulatory Problems     Diagnosis Date Noted    HTN (hypertension), benign 11/05/2009    Ganglion of right hand 03/31/2017    Lumbar spondylosis 03/15/2023    Lumbosacral radiculopathy 06/07/2016    Liver cirrhosis (HCC) 01/22/2024    Anemia due to chronic kidney disease 02/02/2024    Anxiety and depression 02/02/2024    Alcohol dependence with alcohol-induced disorder (HCC) 07/25/2024    Esophageal varices without bleeding (HCC) 07/25/2024    Portal hypertensive gastropathy (HCC) 07/25/2024    Gastroesophageal reflux disease without esophagitis 07/30/2024    Diastolic heart failure due to valvular disease (HCC) 09/17/2024    CKD stage 3a, GFR 45-59 ml/min 11/14/2024    Elevated CSF protein 12/05/2024    Abnormal thyroid function test 12/10/2024    Generalized weakness 12/10/2024    Primary insomnia 01/02/2025    Severe aortic stenosis 02/25/2025     Resolved Ambulatory Problems     Diagnosis Date Noted    Dyslipidemia 11/12/2010    Obesity 11/12/2019    Prediabetes 01/30/2020    Transaminitis 01/30/2020    Alkaline phosphatase elevation 01/30/2020    Acute stress reaction 07/20/2021    Stage 3b chronic kidney disease 01/12/2024    Low back pain 06/07/2016    Lumbar radiculopathy 10/10/2022    Heart murmur 01/12/2024    Recurrent major depressive disorder (HCC) 01/12/2024    Chronic combined  systolic and diastolic heart failure (HCC) 01/22/2024    Hyponatremia 01/22/2024    Hypokalemia 01/22/2024    Troponin I above reference range 01/22/2024    Elevated brain natriuretic peptide (BNP) level 01/22/2024    Severe anemia requiring blood transfusions anemia 01/22/2024    Hospital discharge follow-up 02/02/2024    Perforated duodenal ulcer (HCC) 02/23/2024    Sepsis (HCC) 02/23/2024    Acute on chronic kidney failure (HCC) 02/23/2024    Metabolic acidosis 02/23/2024    Duodenal ulcer perforation (HCC) 02/23/2024    Severe protein-calorie malnutrition (HCC) 02/25/2024    Liver disease 07/30/2024    Other ascites 07/30/2024    Hypotension 09/17/2024    Nonrheumatic moderate aortic valve stenosis 09/17/2024    Alcoholic encephalopathy (HCC) 11/27/2024    Partial seizure disorder (HCC) 12/03/2024    Cystitis 12/03/2024    Dysphagia 12/04/2024    Extreme restricting type anorexia nervosa     Hypophosphatemia 12/08/2024    Hypomagnesemia 12/08/2024    Acute encephalopathy 12/09/2024    Hyperglycemia 12/10/2024    Thrombocytopenia (HCC) 12/10/2024    CKD (chronic kidney disease) 12/10/2024    Anemia 12/10/2024    Constipation 12/12/2024    Left ventricular systolic dysfunction, NYHA class 3 01/02/2025     Past Medical History:   Diagnosis Date    Acute combined systolic and diastolic heart failure (HCC) 01/22/2024    Heart valve disease     HLD (hyperlipidemia)     HTN (hypertension)     Hypertension     Renal disorder        PAST SURGICAL HISTORY:   Past Surgical History:   Procedure Laterality Date    IA LAP,DIAGNOSTIC ABDOMEN  2/23/2024    Procedure: LAPAROSCOPY, KIM'S PATCH;  Surgeon: Neel Amezcua M.D.;  Location: SURGERY [facility];  Service: General    TRIGGER FINGER RELEASE Right 3/31/2017    Procedure: TRIGGER FINGER RELEASE;  Surgeon: Jimmy Atkins M.D.;  Location: [facility];  Service:     GANGLION EXCISION Right 3/31/2017    Procedure: GANGLION EXCISION - HAND CYST;  Surgeon:  Jimmy Atkins M.D.;  Location: SURGERY [facility];  Service:     PELVISCOPY  6/5/08    Performed by NATALIYA ARCEO at SURGERY SAME DAY [facility]    OVARIAN CYSTECTOMY  6/5/08    Performed by NATALIYA ARCEO at SURGERY SAME DAY [facility]    CYSTOSCOPY  6/5/08    Performed by NATALIYA ARCEO at SURGERY SAME DAY [facility]    HYSTERECTOMY LAPAROSCOPY  2003    KNEE ARTHROSCOPY          ALLERGIES:   Allergies   Allergen Reactions    Sulfa Drugs Rash     Pt reports that it was a childhood allergie received body rash   Per [facility] pharmacy (11/27/2024)        CURRENT MEDICATIONS:   Current Outpatient Medications:     spironolactone (ALDACTONE) 25 MG Tab, Take 1 Tablet by mouth every day., Disp: 90 Tablet, Rfl: 4    omeprazole (PRILOSEC) 40 MG delayed-release capsule, Take 1 Capsule by mouth every day., Disp: 90 Capsule, Rfl: 3    HYDROcodone-acetaminophen (NORCO) 5-325 MG Tab per tablet, Take 1 Tablet by mouth 2 times a day as needed., Disp: , Rfl:     magnesium oxide 400 (240 Mg) MG Tab, Take 1 Tablet by mouth 2 times a day., Disp: 60 Tablet, Rfl: 0    pyridoxine (VITAMIN B-6) 50 MG Tab, Take 1 Tablet by mouth every day., Disp: 30 Tablet, Rfl: 11    traZODone (DESYREL) 50 MG Tab, Take 1 Tablet by mouth at bedtime as needed for Sleep., Disp: 30 Tablet, Rfl: 2    phosphorus (K-PHOS-NEUTRAL) 250 MG tablet, Take 1 Tablet by mouth 2 times a day., Disp: 60 Tablet, Rfl: 0    folic acid (FOLVITE) 1 MG Tab, Take 1 Tablet by mouth every day., Disp: , Rfl:     thiamine (THIAMINE) 100 MG tablet, Take 1 Tablet by mouth every day., Disp: , Rfl:     FAMILY HISTORY:   Family History   Problem Relation Age of Onset    Diabetes Mother     Heart Disease Mother     Stroke Mother     Diabetes Father         SOCIAL HISTORY:   Social History     Socioeconomic History    Marital status: Single     Spouse name: Not on file    Number of children: Not on file    Years of education: Not on file    Highest education level:  Master's degree (e.g., MA, MS, Monica, MEd, MSW, SUMAYA)   Occupational History    Not on file   Tobacco Use    Smoking status: Never    Smokeless tobacco: Never   Vaping Use    Vaping status: Never Used   Substance and Sexual Activity    Alcohol use: Not Currently     Alcohol/week: 3.6 oz     Comment: denies    Drug use: Never     Comment: denies    Sexual activity: Not Currently   Other Topics Concern    Not on file   Social History Narrative    Not on file     Social Drivers of Health     Financial Resource Strain: Low Risk  (2/22/2025)    Overall Financial Resource Strain (CARDIA)     Difficulty of Paying Living Expenses: Not hard at all   Food Insecurity: No Food Insecurity (2/22/2025)    Hunger Vital Sign     Worried About Running Out of Food in the Last Year: Never true     Ran Out of Food in the Last Year: Never true   Transportation Needs: No Transportation Needs (2/22/2025)    PRAPARE - Transportation     Lack of Transportation (Medical): No     Lack of Transportation (Non-Medical): No   Physical Activity: Sufficiently Active (2/22/2025)    Exercise Vital Sign     Days of Exercise per Week: 7 days     Minutes of Exercise per Session: 40 min   Stress: No Stress Concern Present (2/22/2025)    [employer] [place] [employer]ational [employer] - Occupational Stress Questionnaire     Feeling of Stress : Not at all   Social Connections: Socially Isolated (2/22/2025)    Social Connection and Isolation Panel [NHANES]     Frequency of Communication with Friends and Family: More than three times a week     Frequency of Social Gatherings with Friends and Family: Twice a week     Attends [employer] Services: Never     Active Member of Clubs or Organizations: No     Attends Club or Organization Meetings: Never     Marital Status:    Intimate Partner Violence: Not At Risk (12/9/2024)    Humiliation, Afraid, Rape, and Kick questionnaire     Fear of Current or Ex-Partner: No     Emotionally Abused: No     Physically Abused: No  "    Sexually Abused: No   Housing Stability: Low Risk  (2/22/2025)    Housing Stability Vital Sign     Unable to Pay for Housing in the Last Year: No     Number of Times Moved in the Last Year: 0     Homeless in the Last Year: No       REVIEW OF SYSTEMS:  Review of Systems   Constitutional:  Positive for malaise/fatigue. Negative for chills, diaphoresis, fever and weight loss.   HENT:  Negative for congestion, ear pain, hearing loss, nosebleeds, sore throat and tinnitus.    Eyes:  Negative for blurred vision, double vision, pain and discharge.   Respiratory:  Positive for shortness of breath. Negative for cough, hemoptysis, sputum production, wheezing and stridor.    Cardiovascular:  Negative for chest pain, palpitations, orthopnea and leg swelling.   Gastrointestinal:  Negative for abdominal pain, constipation, heartburn, melena, nausea and vomiting.   Genitourinary:  Negative for dysuria, flank pain and hematuria.   Musculoskeletal:  Negative for joint pain, myalgias and neck pain.   Skin:  Negative for itching and rash.   Neurological:  Negative for dizziness, speech change, focal weakness, seizures, weakness and headaches.   Endo/Heme/Allergies:  Negative for environmental allergies and polydipsia. Does not bruise/bleed easily.   Psychiatric/Behavioral:  Negative for depression, hallucinations, substance abuse and suicidal ideas.        PHYSICAL EXAMINATION:    /50 (BP Location: Left arm, Patient Position: Sitting, BP Cuff Size: Adult)   Pulse 92   Temp 37.4 °C (99.3 °F) (Temporal)   Ht 1.676 m (5' 6\")   Wt 62.6 kg (138 lb)   SpO2 98%   BMI 22.27 kg/m²    Physical Exam  Constitutional:       General: She is not in acute distress.  HENT:      Head: Normocephalic and atraumatic.      Nose: Nose normal.   Eyes:      Conjunctiva/sclera: Conjunctivae normal.      Pupils: Pupils are equal, round, and reactive to light.   Neck:      Vascular: No JVD.      Trachea: No tracheal deviation.   Cardiovascular:    "   Rate and Rhythm: Normal rate and regular rhythm.      Heart sounds: Murmur heard.   Pulmonary:      Effort: Pulmonary effort is normal. No respiratory distress.      Breath sounds: Normal breath sounds. No stridor.   Abdominal:      General: Bowel sounds are normal. There is no distension.      Palpations: Abdomen is soft.      Tenderness: There is no abdominal tenderness.   Musculoskeletal:         General: No tenderness. Normal range of motion.      Cervical back: Normal range of motion and neck supple.   Skin:     General: Skin is warm and dry.   Neurological:      Mental Status: She is alert and oriented to person, place, and time.      Coordination: Coordination normal.      Gait: Gait is intact.   Psychiatric:         Mood and Affect: Mood and affect normal.         Cognition and Memory: Memory normal.         LABS REVIEWED:  Lab Results   Component Value Date/Time    SODIUM 135 03/03/2025 01:38 PM    POTASSIUM 4.6 03/03/2025 01:38 PM    CHLORIDE 102 03/03/2025 01:38 PM    CO2 22 03/03/2025 01:38 PM    GLUCOSE 94 03/03/2025 01:38 PM    BUN 25 (H) 03/03/2025 01:38 PM    CREATININE 1.08 03/03/2025 01:38 PM    CREATININE 0.8 04/16/2009 08:09 AM      Lab Results   Component Value Date/Time    PROTHROMBTM 15.8 (H) 03/03/2025 01:38 PM    INR 1.26 (H) 03/03/2025 01:38 PM      Lab Results   Component Value Date/Time    WBC 6.4 03/03/2025 01:38 PM    WBC 6.4 03/03/2025 01:38 PM    RBC 3.41 (L) 03/03/2025 01:38 PM    RBC 3.39 (L) 03/03/2025 01:38 PM    HEMOGLOBIN 8.5 (L) 03/03/2025 01:38 PM    HEMOGLOBIN 8.4 (L) 03/03/2025 01:38 PM    HEMATOCRIT 27.6 (L) 03/03/2025 01:38 PM    HEMATOCRIT 27.4 (L) 03/03/2025 01:38 PM    MCV 80.9 (L) 03/03/2025 01:38 PM    MCV 80.8 (L) 03/03/2025 01:38 PM    MCH 24.9 (L) 03/03/2025 01:38 PM    MCH 24.8 (L) 03/03/2025 01:38 PM    MCHC 30.8 (L) 03/03/2025 01:38 PM    MCHC 30.7 (L) 03/03/2025 01:38 PM    MPV 11.3 03/03/2025 01:38 PM    MPV 10.6 03/03/2025 01:38 PM    NEUTSPOLYS 65.10  03/03/2025 01:38 PM    NEUTSPOLYS 67.00 03/03/2025 01:38 PM    LYMPHOCYTES 24.30 03/03/2025 01:38 PM    LYMPHOCYTES 22.50 03/03/2025 01:38 PM    MONOCYTES 6.40 03/03/2025 01:38 PM    MONOCYTES 6.10 03/03/2025 01:38 PM    EOSINOPHILS 3.10 03/03/2025 01:38 PM    EOSINOPHILS 3.60 03/03/2025 01:38 PM    BASOPHILS 0.80 03/03/2025 01:38 PM    BASOPHILS 0.50 03/03/2025 01:38 PM    HYPOCHROMIA 1+ 12/16/2024 05:48 AM    ANISOCYTOSIS 1+ 01/08/2025 11:04 AM        IMAGING REVIEWED AND INTERPRETED:    ECHOCARDIOGRAM 2/24/25 [facility]:  CONCLUSIONS  Compared to the prior study on 8/19/24, aortic stenosis is now severe.   Normal left ventricular systolic function.   Severe aortic valve stenosis.    CARDIAC CATHETERIZATION   Scheduled 3/31/25 [facility] at 0930    CT SCAN CHEST -TAVR  Scheduled 3/27/25 [facility] 0900      IMPRESSION:  Severe aortic stenosis, CKD stage 3A, Liver cirrhosis      PLAN:  I recommend proceeding with TAVR.   The procedure, its risks, benefits, potential complications and alternative treatments were discussed with the patient in detail including the risks should she decide not to undergo my recommended treatment. All of her questions were answered to her satisfaction and she is willing to proceed with the operation. The risks include death, stroke, infection: to include a rare bacterial infection related to the use of the heart/lung machine, shira-operative myocardial infarction, dysrhythmias, diaphragmatic paralysis, chest wall paresthesia, tracheostomy, kidney or other organ failure, possible return to the operating room for bleeding, bleeding requiring transfusion with its attendant risks including AIDS or hepatitis, dehiscence of surgical incisions, respiratory complications including the need for prolonged ventilator support, Protamine or other drug reaction, peripheral neuropathy, loss of limb, and miscount of surgical items. The operative mortality risk is approximately 2%. The STS mortality risk score is 1.3% and the  morbidity and mortality risk score is 6.6%. The scores were discussed with patient.    Thank you for this very challenging consultation and participation in the patient’s care.  I will keep you apprised of all future developments.        Sincerely,       Cory Auguste DO.

## 2025-03-26 ENCOUNTER — OFFICE VISIT (OUTPATIENT)
Dept: CARDIOLOGY | Facility: MEDICAL CENTER | Age: 65
End: 2025-03-26
Attending: INTERNAL MEDICINE
Payer: MEDICARE

## 2025-03-26 ENCOUNTER — DOCUMENTATION (OUTPATIENT)
Dept: CARDIOLOGY | Facility: MEDICAL CENTER | Age: 65
End: 2025-03-26

## 2025-03-26 ENCOUNTER — TELEPHONE (OUTPATIENT)
Dept: CARDIOLOGY | Facility: MEDICAL CENTER | Age: 65
End: 2025-03-26

## 2025-03-26 ENCOUNTER — APPOINTMENT (OUTPATIENT)
Dept: ADMISSIONS | Facility: MEDICAL CENTER | Age: 65
End: 2025-03-26
Attending: INTERNAL MEDICINE
Payer: MEDICARE

## 2025-03-26 ENCOUNTER — OFFICE VISIT (OUTPATIENT)
Facility: MEDICAL CENTER | Age: 65
End: 2025-03-26
Payer: MEDICARE

## 2025-03-26 VITALS
DIASTOLIC BLOOD PRESSURE: 68 MMHG | OXYGEN SATURATION: 98 % | WEIGHT: 138 LBS | SYSTOLIC BLOOD PRESSURE: 132 MMHG | HEART RATE: 79 BPM | BODY MASS INDEX: 22.18 KG/M2 | HEIGHT: 66 IN

## 2025-03-26 VITALS
OXYGEN SATURATION: 98 % | HEIGHT: 66 IN | BODY MASS INDEX: 22.18 KG/M2 | SYSTOLIC BLOOD PRESSURE: 124 MMHG | DIASTOLIC BLOOD PRESSURE: 50 MMHG | TEMPERATURE: 99.3 F | HEART RATE: 92 BPM | WEIGHT: 138 LBS

## 2025-03-26 DIAGNOSIS — N18.31 CKD STAGE 3A, GFR 45-59 ML/MIN: ICD-10-CM

## 2025-03-26 DIAGNOSIS — K76.6 PORTAL HYPERTENSIVE GASTROPATHY (HCC): ICD-10-CM

## 2025-03-26 DIAGNOSIS — I85.10 SECONDARY ESOPHAGEAL VARICES WITHOUT BLEEDING (HCC): ICD-10-CM

## 2025-03-26 DIAGNOSIS — I10 HTN (HYPERTENSION), BENIGN: ICD-10-CM

## 2025-03-26 DIAGNOSIS — I35.0 SEVERE AORTIC STENOSIS: ICD-10-CM

## 2025-03-26 DIAGNOSIS — Z00.6 EXAMINATION OF PARTICIPANT IN CLINICAL TRIAL: ICD-10-CM

## 2025-03-26 DIAGNOSIS — I35.0 NONRHEUMATIC AORTIC VALVE STENOSIS: ICD-10-CM

## 2025-03-26 DIAGNOSIS — K70.30 ALCOHOLIC CIRRHOSIS, UNSPECIFIED WHETHER ASCITES PRESENT (HCC): ICD-10-CM

## 2025-03-26 DIAGNOSIS — K31.89 PORTAL HYPERTENSIVE GASTROPATHY (HCC): ICD-10-CM

## 2025-03-26 PROBLEM — E83.42 HYPOMAGNESEMIA: Status: RESOLVED | Noted: 2024-12-08 | Resolved: 2025-03-26

## 2025-03-26 PROBLEM — D64.9 ANEMIA: Status: RESOLVED | Noted: 2024-12-10 | Resolved: 2025-03-26

## 2025-03-26 PROBLEM — R18.8 OTHER ASCITES: Status: RESOLVED | Noted: 2024-07-30 | Resolved: 2025-03-26

## 2025-03-26 PROBLEM — R73.9 HYPERGLYCEMIA: Status: RESOLVED | Noted: 2024-12-10 | Resolved: 2025-03-26

## 2025-03-26 PROBLEM — G31.2 ALCOHOLIC ENCEPHALOPATHY (HCC): Status: RESOLVED | Noted: 2024-11-27 | Resolved: 2025-03-26

## 2025-03-26 PROBLEM — I50.30 DIASTOLIC HEART FAILURE DUE TO VALVULAR DISEASE (HCC): Status: ACTIVE | Noted: 2024-09-17

## 2025-03-26 PROBLEM — F33.9 RECURRENT MAJOR DEPRESSIVE DISORDER (HCC): Status: RESOLVED | Noted: 2024-01-12 | Resolved: 2025-03-26

## 2025-03-26 PROBLEM — E87.6 HYPOKALEMIA: Status: RESOLVED | Noted: 2024-01-22 | Resolved: 2025-03-26

## 2025-03-26 PROBLEM — I38 DIASTOLIC HEART FAILURE DUE TO VALVULAR DISEASE (HCC): Status: ACTIVE | Noted: 2024-09-17

## 2025-03-26 PROBLEM — I95.9 HYPOTENSION: Status: RESOLVED | Noted: 2024-09-17 | Resolved: 2025-03-26

## 2025-03-26 PROBLEM — F43.0 ACUTE STRESS REACTION: Status: RESOLVED | Noted: 2021-07-20 | Resolved: 2025-03-26

## 2025-03-26 PROBLEM — N18.9 CKD (CHRONIC KIDNEY DISEASE): Status: RESOLVED | Noted: 2024-12-10 | Resolved: 2025-03-26

## 2025-03-26 PROBLEM — N18.32 STAGE 3B CHRONIC KIDNEY DISEASE: Status: RESOLVED | Noted: 2024-01-12 | Resolved: 2025-03-26

## 2025-03-26 PROBLEM — K76.9 LIVER DISEASE: Status: RESOLVED | Noted: 2024-07-30 | Resolved: 2025-03-26

## 2025-03-26 PROBLEM — E87.1 HYPONATREMIA: Status: RESOLVED | Noted: 2024-01-22 | Resolved: 2025-03-26

## 2025-03-26 PROBLEM — R74.01 TRANSAMINITIS: Status: RESOLVED | Noted: 2020-01-30 | Resolved: 2025-03-26

## 2025-03-26 PROBLEM — N30.90 CYSTITIS: Status: RESOLVED | Noted: 2024-12-03 | Resolved: 2025-03-26

## 2025-03-26 PROBLEM — M54.16 LUMBAR RADICULOPATHY: Status: RESOLVED | Noted: 2022-10-10 | Resolved: 2025-03-26

## 2025-03-26 PROBLEM — R74.8 ALKALINE PHOSPHATASE ELEVATION: Status: RESOLVED | Noted: 2020-01-30 | Resolved: 2025-03-26

## 2025-03-26 PROBLEM — E43 SEVERE PROTEIN-CALORIE MALNUTRITION (HCC): Status: RESOLVED | Noted: 2024-02-25 | Resolved: 2025-03-26

## 2025-03-26 PROBLEM — I51.89 LEFT VENTRICULAR SYSTOLIC DYSFUNCTION, NYHA CLASS 3: Status: RESOLVED | Noted: 2025-01-02 | Resolved: 2025-03-26

## 2025-03-26 PROBLEM — Z09 HOSPITAL DISCHARGE FOLLOW-UP: Status: RESOLVED | Noted: 2024-02-02 | Resolved: 2025-03-26

## 2025-03-26 PROBLEM — K59.00 CONSTIPATION: Status: RESOLVED | Noted: 2024-12-12 | Resolved: 2025-03-26

## 2025-03-26 PROBLEM — R79.89 TROPONIN I ABOVE REFERENCE RANGE: Status: RESOLVED | Noted: 2024-01-22 | Resolved: 2025-03-26

## 2025-03-26 PROBLEM — R79.89 ELEVATED BRAIN NATRIURETIC PEPTIDE (BNP) LEVEL: Status: RESOLVED | Noted: 2024-01-22 | Resolved: 2025-03-26

## 2025-03-26 PROBLEM — I50.42 CHRONIC COMBINED SYSTOLIC AND DIASTOLIC HEART FAILURE (HCC): Status: RESOLVED | Noted: 2024-01-22 | Resolved: 2025-03-26

## 2025-03-26 PROBLEM — D69.6 THROMBOCYTOPENIA (HCC): Status: RESOLVED | Noted: 2024-12-10 | Resolved: 2025-03-26

## 2025-03-26 PROBLEM — G93.40 ACUTE ENCEPHALOPATHY: Status: RESOLVED | Noted: 2024-12-09 | Resolved: 2025-03-26

## 2025-03-26 PROBLEM — E83.39 HYPOPHOSPHATEMIA: Status: RESOLVED | Noted: 2024-12-08 | Resolved: 2025-03-26

## 2025-03-26 PROCEDURE — 99205 OFFICE O/P NEW HI 60 MIN: CPT | Performed by: INTERNAL MEDICINE

## 2025-03-26 PROCEDURE — 99213 OFFICE O/P EST LOW 20 MIN: CPT | Performed by: INTERNAL MEDICINE

## 2025-03-26 PROCEDURE — G2211 COMPLEX E/M VISIT ADD ON: HCPCS | Performed by: INTERNAL MEDICINE

## 2025-03-26 ASSESSMENT — ENCOUNTER SYMPTOMS
EYE PAIN: 0
CHILLS: 0
BRUISES/BLEEDS EASILY: 0
NECK PAIN: 0
DIAPHORESIS: 0
HALLUCINATIONS: 0
FEVER: 0
COUGH: 0
DOUBLE VISION: 0
SHORTNESS OF BREATH: 1
HEMOPTYSIS: 0
HEARTBURN: 0
CONSTIPATION: 0
HEADACHES: 0
POLYDIPSIA: 0
EYE DISCHARGE: 0
STRIDOR: 0
PALPITATIONS: 0
FOCAL WEAKNESS: 0
ORTHOPNEA: 0
SPEECH CHANGE: 0
NAUSEA: 0
MYALGIAS: 0
WEIGHT LOSS: 0
DIZZINESS: 0
VOMITING: 0
ABDOMINAL PAIN: 0
SORE THROAT: 0
SPUTUM PRODUCTION: 0
WEAKNESS: 0
WHEEZING: 0
DEPRESSION: 0
SEIZURES: 0
BLURRED VISION: 0
FLANK PAIN: 0

## 2025-03-26 ASSESSMENT — LIFESTYLE VARIABLES: SUBSTANCE_ABUSE: 0

## 2025-03-26 ASSESSMENT — FIBROSIS 4 INDEX
FIB4 SCORE: 2.64
FIB4 SCORE: 2.64

## 2025-03-26 ASSESSMENT — PATIENT HEALTH QUESTIONNAIRE - PHQ9: CLINICAL INTERPRETATION OF PHQ2 SCORE: 0

## 2025-03-26 NOTE — TELEPHONE ENCOUNTER
Patient is scheduled on 3-31-25 for a Pre TAVR angio with Dr. Little. Patient was told to hold Spironolactone AM day of. Patient to check in at 7a:30 for a 9:30 procedure. H&P was done on 3-26-25 by . Pre admit to call patient.

## 2025-03-26 NOTE — PROGRESS NOTES
INTERVENTIONAL CARDIOLOGY STRUCTURAL HEART CONSULTATION    PCP: Paty Blankenship M.D.  REFERRING: [place] To MD    1. Severe aortic stenosis    2. CKD stage 3a, GFR 45-59 ml/min    3. HTN (hypertension), benign    4. Alcoholic cirrhosis, unspecified whether ascites present (HCC)    5. Secondary esophageal varices without bleeding (HCC)    6. Portal hypertensive gastropathy (HCC)        Denisse Abel has D1, class II severe aortic stenosis.  With her underlying liver disease I do not suspect she will be a suitable candidate for open heart surgery -though determination to be made by CT surgery. Accordingly I recommend proceeding with transcatheter aortic valve replacement evaluation.  She will undergo CT planning as well as coronary angiogram.    Given the need for multiple other surgeries I would prefer to avoid PCI if appropriate.    We discussed the risks, benefits and alternatives to TAVR including but not limited to a 10% risk of pacemaker, 5% risk of bleeding/access site complication, 2% risk of stroke, risk of contrast nephropathy and 2% risk of mortality.  I also discussed that cirrhosis increases risks of the operation.  The patient is in agreement with proceeding.      Follow up: 6 weeks    History: Denisse Abel is a 64 y.o. female with history of resolved alcoholism, now compensated cirrhosis (emerson-jensen a) with prior ascites, grade 2 esophageal varices presenting for assessment of aortic stenosis.  LVEF is normal, recovered from a anahi of 40% in January 2024.    She notes shortness of breath and increased fatigue over the past year.  An echocardiogram has shown progressive aortic stenosis now with a mean gradient of 42 mmHg, V-max 4.2 m/s and aortic valve area 0.6 cm².  The LVEF is normal.  There is no other significant valvular disease.     She is also presently dealing with multiple other health issues.  January 2024 she was hospitalized with perforated duodenal ulcer treated with a Man  "patch.  She has been found to have cirrhosis and ascites and last September had a paracentesis performed to remove 2 L of fluid.  Late November she was hospitalized with encephalopathy, mildly elevated ammonia level shortly after having been started on Librium.  A firm diagnosis was never established though the condition seemed to resolve with holding CNS depressants and starting steroids.    She reports being sober for the last 16 months.  She walks her dog each day.  Generally feels well.  She has been using spironolactone and no longer is having difficulty with ascites.  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      ROS:   10 point review systems is otherwise negative except as per the HPI    PE:  /68 (BP Location: Left arm, Patient Position: Sitting, BP Cuff Size: Adult)   Pulse 79   Ht 1.676 m (5' 6\")   Wt 62.6 kg (138 lb)   SpO2 98%   BMI 22.27 kg/m²   GEN: NAD  CARDIAC: regular late peaking systolic ejection murmur diminished and delayed carotid upstrokes  RESP: Clear to auscultation bilaterally  ABD: Soft, non-tender, non-distended  EXT: No edema  NEURO: No focal deficit    Today's encounter addressed an illness with threat to life/bodily function symptomatic severe aortic stenosis  () Today's E/M visit is associated with medical care services that serve as the continuing focal point for all needed health care services and/or with medical care services that  are part of ongoing care related to a patient's single, serious condition, or a complex condition: This includes  furnishing services to patients on an ongoing basis that result in care that is personalized  to the patient. The services result in a comprehensive, longitudinal, and continuous  relationship with the patient and involve delivery of team-based care that is accessible, coordinated with other practitioners and providers, and integrated with the broader health  care landscape.     Past Medical " History:   Diagnosis Date    Acute combined systolic and diastolic heart failure (HCC) 01/22/2024    Anemia due to chronic kidney disease     Heart valve disease     Nonrheumatic aortic valve stenosis    HLD (hyperlipidemia)     HTN (hypertension)     Hypertension     Hypotension     Liver cirrhosis (HCC)     Obesity     Renal disorder     CKD (chronic kidney disease) stage 4, GFR 15-29 ml/min (HCC)     Past Surgical History:   Procedure Laterality Date    SC LAP,DIAGNOSTIC ABDOMEN  2/23/2024    Procedure: LAPAROSCOPY, KIM'S PATCH;  Surgeon: Neel Amezcua M.D.;  Location: SURGERY [facility];  Service: General    TRIGGER FINGER RELEASE Right 3/31/2017    Procedure: TRIGGER FINGER RELEASE;  Surgeon: Jimmy Atkins M.D.;  Location: SURGERY [facility];  Service:     GANGLION EXCISION Right 3/31/2017    Procedure: GANGLION EXCISION - HAND CYST;  Surgeon: Jimmy Atkins M.D.;  Location: SURGERY [facility];  Service:     PELVISCOPY  6/5/08    Performed by NATALIYA ARCEO at SURGERY SAME DAY [facility]    OVARIAN CYSTECTOMY  6/5/08    Performed by NATALIYA ARCEO at SURGERY SAME DAY [facility]    CYSTOSCOPY  6/5/08    Performed by NATALIYA ARCEO at SURGERY SAME DAY [facility]    HYSTERECTOMY LAPAROSCOPY  2003    KNEE ARTHROSCOPY       Allergies   Allergen Reactions    Sulfa Drugs Rash     Pt reports that it was a childhood allergie received body rash   Per [facility] pharmacy (11/27/2024)     Outpatient Encounter Medications as of 3/26/2025   Medication Sig Dispense Refill    spironolactone (ALDACTONE) 25 MG Tab Take 1 Tablet by mouth every day. 90 Tablet 4    omeprazole (PRILOSEC) 40 MG delayed-release capsule Take 1 Capsule by mouth every day. 90 Capsule 3    HYDROcodone-acetaminophen (NORCO) 5-325 MG Tab per tablet Take 1 Tablet by mouth 2 times a day as needed.      magnesium oxide 400 (240 Mg) MG Tab Take 1 Tablet by mouth 2 times a day. 60 Tablet 0    pyridoxine (VITAMIN B-6) 50 MG Tab Take  1 Tablet by mouth every day. 30 Tablet 11    traZODone (DESYREL) 50 MG Tab Take 1 Tablet by mouth at bedtime as needed for Sleep. 30 Tablet 2    phosphorus (K-PHOS-NEUTRAL) 250 MG tablet Take 1 Tablet by mouth 2 times a day. 60 Tablet 0    folic acid (FOLVITE) 1 MG Tab Take 1 Tablet by mouth every day.      thiamine (THIAMINE) 100 MG tablet Take 1 Tablet by mouth every day.       No facility-administered encounter medications on file as of 3/26/2025.     Social History     Socioeconomic History    Marital status: Single     Spouse name: Not on file    Number of children: Not on file    Years of education: Not on file    Highest education level: Master's degree (e.g., MA, MS, Monica, MEd, MSW, SUMAYA)   Occupational History    Not on file   Tobacco Use    Smoking status: Never    Smokeless tobacco: Never   Vaping Use    Vaping status: Never Used   Substance and Sexual Activity    Alcohol use: Not Currently     Alcohol/week: 3.6 oz     Comment: denies    Drug use: Never     Comment: denies    Sexual activity: Not Currently   Other Topics Concern    Not on file   Social History Narrative    Not on file     Social Drivers of Health     Financial Resource Strain: Low Risk  (2/22/2025)    Overall Financial Resource Strain (CARDIA)     Difficulty of Paying Living Expenses: Not hard at all   Food Insecurity: No Food Insecurity (2/22/2025)    Hunger Vital Sign     Worried About Running Out of Food in the Last Year: Never true     Ran Out of Food in the Last Year: Never true   Transportation Needs: No Transportation Needs (2/22/2025)    PRAPARE - Transportation     Lack of Transportation (Medical): No     Lack of Transportation (Non-Medical): No   Physical Activity: Sufficiently Active (2/22/2025)    Exercise Vital Sign     Days of Exercise per Week: 7 days     Minutes of Exercise per Session: 40 min   Stress: No Stress Concern Present (2/22/2025)    [employer] [place] [employer] - Occupational Stress Questionnaire      Feeling of Stress : Not at all   Social Connections: Socially Isolated (2/22/2025)    Social Connection and Isolation Panel [NHANES]     Frequency of Communication with Friends and Family: More than three times a week     Frequency of Social Gatherings with Friends and Family: Twice a week     Attends Anabaptism Services: Never     Active Member of Clubs or Organizations: No     Attends Club or Organization Meetings: Never     Marital Status:    Intimate Partner Violence: Not At Risk (12/9/2024)    Humiliation, Afraid, Rape, and Kick questionnaire     Fear of Current or Ex-Partner: No     Emotionally Abused: No     Physically Abused: No     Sexually Abused: No   Housing Stability: Low Risk  (2/22/2025)    Housing Stability Vital Sign     Unable to Pay for Housing in the Last Year: No     Number of Times Moved in the Last Year: 0     Homeless in the Last Year: No     Family History   Problem Relation Age of Onset    Diabetes Mother     Heart Disease Mother     Stroke Mother     Diabetes Father          Studies  Lab Results   Component Value Date/Time    CHOLSTRLTOT 153 03/03/2025 01:38 PM    LDL 76 03/03/2025 01:38 PM    HDL 63 03/03/2025 01:38 PM    TRIGLYCERIDE 68 03/03/2025 01:38 PM       Lab Results   Component Value Date/Time    SODIUM 135 03/03/2025 01:38 PM    POTASSIUM 4.6 03/03/2025 01:38 PM    CHLORIDE 102 03/03/2025 01:38 PM    CO2 22 03/03/2025 01:38 PM    GLUCOSE 94 03/03/2025 01:38 PM    BUN 25 (H) 03/03/2025 01:38 PM    CREATININE 1.08 03/03/2025 01:38 PM    CREATININE 0.8 04/16/2009 08:09 AM      Lab Results   Component Value Date/Time    PROTHROMBTM 15.8 (H) 03/03/2025 01:38 PM    INR 1.26 (H) 03/03/2025 01:38 PM      Lab Results   Component Value Date/Time    WBC 6.4 03/03/2025 01:38 PM    WBC 6.4 03/03/2025 01:38 PM    RBC 3.41 (L) 03/03/2025 01:38 PM    RBC 3.39 (L) 03/03/2025 01:38 PM    HEMOGLOBIN 8.5 (L) 03/03/2025 01:38 PM    HEMOGLOBIN 8.4 (L) 03/03/2025 01:38 PM    HEMATOCRIT 27.6 (L)  03/03/2025 01:38 PM    HEMATOCRIT 27.4 (L) 03/03/2025 01:38 PM    MCV 80.9 (L) 03/03/2025 01:38 PM    MCV 80.8 (L) 03/03/2025 01:38 PM    MCH 24.9 (L) 03/03/2025 01:38 PM    MCH 24.8 (L) 03/03/2025 01:38 PM    MCHC 30.8 (L) 03/03/2025 01:38 PM    MCHC 30.7 (L) 03/03/2025 01:38 PM    MPV 11.3 03/03/2025 01:38 PM    MPV 10.6 03/03/2025 01:38 PM    NEUTSPOLYS 65.10 03/03/2025 01:38 PM    NEUTSPOLYS 67.00 03/03/2025 01:38 PM    LYMPHOCYTES 24.30 03/03/2025 01:38 PM    LYMPHOCYTES 22.50 03/03/2025 01:38 PM    MONOCYTES 6.40 03/03/2025 01:38 PM    MONOCYTES 6.10 03/03/2025 01:38 PM    EOSINOPHILS 3.10 03/03/2025 01:38 PM    EOSINOPHILS 3.60 03/03/2025 01:38 PM    BASOPHILS 0.80 03/03/2025 01:38 PM    BASOPHILS 0.50 03/03/2025 01:38 PM    HYPOCHROMIA 1+ 12/16/2024 05:48 AM    ANISOCYTOSIS 1+ 01/08/2025 11:04 AM

## 2025-03-26 NOTE — PROGRESS NOTES
"Valve Program Consultation: 3/26/2025 for tentative TAVR 4/7/2025    Mental Status Assessment:  Appearance: normal  Behavior: normal  Mood/Affect: normal  Thought process/content: normal  Cognition: normal  Functional ability: normal  Dental Concerns: natural teeth; patient denies s/s of active oral infection/irritation, but patient states she is having a crown replaced next Wednesday 4/2.  Further mental assessment needed: no    Post-op Plan of Care:  Support systems: patient alone at consult today; cousin Nica, friend Lyssa  Patient understands discharge plan: yes  DME: none  Home health warranted prior to procedure: none  Social concerns for discharge: patient denies drug/tobacco hx/use/abuse; patient admits to using ETOH as a coping mechanism, but states she stopped drinking 18 months ago  PCP alerted of social concerns: no  POLST: none  Advance directive: DPOA-HC on file from 8/26/2024  Post-op goal: \"live longer; improve fatigue\"  Medication instructions: Hold vitamins/supplements x7 days, hold Spironolactone the morning of the procedure    Concerns prior to procedure: GFR 56, CKD 3a, esophageal varices, portal HTN, encephalopathy, anemia    Met with patient during New TAVR consult.     All patient's questions and concerns were addressed during this visit. They understood pre-operative and post-operative plan of care.    Reviewed patient TAVR education packet explaining that information is provided regarding preparation for TAVR the night prior, what to expect during the hospital stay, average LOS, and what to look out for post TAVR discharge. Explained that patient will require SBE prophylactic antibiotic prior to any dental treatment post TAVR. Advised patient not to receive any new vaccinations within 1 week pre- and 1 week post-procedure.     Explained that patient should not eat or drink anything past midnight the day of the procedure. Encouraged patient to wear something clean and comfortable, easy to " get on/off to check in Monday morning, time TBD. Patient may have friends and family in the pre-operational area until patient is taken to the operating room, at which time family and friends will be asked to wait on floor 1 [facility] surgical waiting area. On completion of TAVR, heart team will update family. Once update is given, there is some time before family/friends may visit patient in their assigned room. Length of stay on average is one night, however patient may stay longer depending on specific needs at that time. Patient given printed instructions sheet with all above stated instructions. Patient states understanding of all material and education presented today and has no further questions at this time. Encouraged patient again, to contact me with any questions or concerns during this work-up process. Patient states understanding.

## 2025-03-26 NOTE — PROGRESS NOTES
Valve Program Functional Assessment:     KCCQ12   1a) Showering/bathin  1b) Walking 1 block on ground: 5  1c) Hurrying or joggin  2) Swellin  3) Fatigue: 5  4) Shortness of breath: 6  5) Sleep sitting up: 5  6) Limited enjoyment of life: 4  7) Spend the rest of your life with HF: 2  8a) Hobbies, recreational activities:4  8b) Working or doing household chores:4  8c) Visiting family or friends: 4    5 meter walk test  1) __4.24____ s/5m  2) __5.13____ s/5m  3) __4.69____ s/5m  AVG:_4.6______     Strength   1) _14_____ kg  2) _16_____ kg  3) _16_____ kg  AVG:__15.3____    ROSALES ADLs  Patient independently preforms...   - Bathing: Yes   - Dressing: Yes   - Toileting: Yes   - Transferring: Yes   - Continence: Yes   - Feeding: Yes   Total Score: _6_/6    Living Situation  Patient lives: alone    Mobility Aids   Patient uses: none      FRAILTY SCORE: _1_/ 4

## 2025-03-27 ENCOUNTER — PRE-ADMISSION TESTING (OUTPATIENT)
Dept: ADMISSIONS | Facility: MEDICAL CENTER | Age: 65
End: 2025-03-27
Attending: INTERNAL MEDICINE
Payer: MEDICARE

## 2025-03-27 ENCOUNTER — HOSPITAL ENCOUNTER (OUTPATIENT)
Dept: RADIOLOGY | Facility: MEDICAL CENTER | Age: 65
End: 2025-03-27
Attending: NURSE PRACTITIONER
Payer: MEDICARE

## 2025-03-27 DIAGNOSIS — Z01.810 PRE-PROCEDURAL CARDIOVASCULAR EXAMINATION: ICD-10-CM

## 2025-03-27 DIAGNOSIS — I35.0 SEVERE AORTIC STENOSIS: ICD-10-CM

## 2025-03-27 DIAGNOSIS — I35.0 NONRHEUMATIC AORTIC VALVE STENOSIS: ICD-10-CM

## 2025-03-27 PROCEDURE — 700117 HCHG RX CONTRAST REV CODE 255: Performed by: NURSE PRACTITIONER

## 2025-03-27 PROCEDURE — 76497 UNLISTED CT PROCEDURE: CPT

## 2025-03-27 RX ADMIN — IOHEXOL 100 ML: 350 INJECTION, SOLUTION INTRAVENOUS at 08:59

## 2025-03-27 NOTE — OR NURSING
Pre-Admit RN appointment completed with pt, by this RN, over the phone, for 3/31/25 and 4/7/25. Discussed pre-procedure instructions for each procedure, unless they have been otherwise instructed by their surgeon. Discussed post-op expectations for pain, and approximate duration of time in PACU etc. Instructed pt to follow any medication instructions given by cardiology. Pt verbalized understanding of all instructions. Pt denies any questions or concerns.

## 2025-03-28 ENCOUNTER — PRE-ADMISSION TESTING (OUTPATIENT)
Dept: ADMISSIONS | Facility: MEDICAL CENTER | Age: 65
End: 2025-03-28
Attending: INTERNAL MEDICINE
Payer: MEDICARE

## 2025-03-28 ENCOUNTER — RESULTS FOLLOW-UP (OUTPATIENT)
Dept: CARDIOLOGY | Facility: MEDICAL CENTER | Age: 65
End: 2025-03-28

## 2025-03-28 ENCOUNTER — DOCUMENTATION (OUTPATIENT)
Dept: CARDIOLOGY | Facility: MEDICAL CENTER | Age: 65
End: 2025-03-28

## 2025-03-28 DIAGNOSIS — Z01.812 PRE-OPERATIVE LABORATORY EXAMINATION: ICD-10-CM

## 2025-03-28 DIAGNOSIS — Z01.810 PRE-OPERATIVE CARDIOVASCULAR EXAMINATION: ICD-10-CM

## 2025-03-28 DIAGNOSIS — R16.1 SPLENOMEGALY: ICD-10-CM

## 2025-03-28 PROBLEM — K70.31 ASCITES DUE TO ALCOHOLIC CIRRHOSIS (HCC): Status: ACTIVE | Noted: 2024-07-30

## 2025-03-28 LAB
ABO GROUP BLD: NORMAL
ALBUMIN SERPL BCP-MCNC: 4.1 G/DL (ref 3.2–4.9)
ALBUMIN/GLOB SERPL: 1.2 G/DL
ALP SERPL-CCNC: 133 U/L (ref 30–99)
ALT SERPL-CCNC: 9 U/L (ref 2–50)
ANION GAP SERPL CALC-SCNC: 11 MMOL/L (ref 7–16)
APTT PPP: 32.9 SEC (ref 24.7–36)
AST SERPL-CCNC: 28 U/L (ref 12–45)
BASOPHILS # BLD AUTO: 0.7 % (ref 0–1.8)
BASOPHILS # BLD: 0.04 K/UL (ref 0–0.12)
BILIRUB SERPL-MCNC: 0.5 MG/DL (ref 0.1–1.5)
BLD GP AB SCN SERPL QL: NORMAL
BUN SERPL-MCNC: 30 MG/DL (ref 8–22)
CALCIUM ALBUM COR SERPL-MCNC: 10.4 MG/DL (ref 8.5–10.5)
CALCIUM SERPL-MCNC: 10.5 MG/DL (ref 8.5–10.5)
CHLORIDE SERPL-SCNC: 103 MMOL/L (ref 96–112)
CO2 SERPL-SCNC: 22 MMOL/L (ref 20–33)
CREAT SERPL-MCNC: 1.11 MG/DL (ref 0.5–1.4)
EKG IMPRESSION: NORMAL
EOSINOPHIL # BLD AUTO: 0.29 K/UL (ref 0–0.51)
EOSINOPHIL NFR BLD: 5 % (ref 0–6.9)
ERYTHROCYTE [DISTWIDTH] IN BLOOD BY AUTOMATED COUNT: 48 FL (ref 35.9–50)
GFR SERPLBLD CREATININE-BSD FMLA CKD-EPI: 55 ML/MIN/1.73 M 2
GLOBULIN SER CALC-MCNC: 3.4 G/DL (ref 1.9–3.5)
GLUCOSE SERPL-MCNC: 110 MG/DL (ref 65–99)
HCT VFR BLD AUTO: 27.7 % (ref 37–47)
HGB BLD-MCNC: 8.7 G/DL (ref 12–16)
IMM GRANULOCYTES # BLD AUTO: 0.02 K/UL (ref 0–0.11)
IMM GRANULOCYTES NFR BLD AUTO: 0.3 % (ref 0–0.9)
INR PPP: 1.24 (ref 0.87–1.13)
LYMPHOCYTES # BLD AUTO: 1.33 K/UL (ref 1–4.8)
LYMPHOCYTES NFR BLD: 23.1 % (ref 22–41)
MCH RBC QN AUTO: 24.9 PG (ref 27–33)
MCHC RBC AUTO-ENTMCNC: 31.4 G/DL (ref 32.2–35.5)
MCV RBC AUTO: 79.1 FL (ref 81.4–97.8)
MONOCYTES # BLD AUTO: 0.38 K/UL (ref 0–0.85)
MONOCYTES NFR BLD AUTO: 6.6 % (ref 0–13.4)
NEUTROPHILS # BLD AUTO: 3.71 K/UL (ref 1.82–7.42)
NEUTROPHILS NFR BLD: 64.3 % (ref 44–72)
NRBC # BLD AUTO: 0 K/UL
NRBC BLD-RTO: 0 /100 WBC (ref 0–0.2)
NT-PROBNP SERPL IA-MCNC: 599 PG/ML (ref 0–125)
PLATELET # BLD AUTO: 183 K/UL (ref 164–446)
PMV BLD AUTO: 11 FL (ref 9–12.9)
POTASSIUM SERPL-SCNC: 4.8 MMOL/L (ref 3.6–5.5)
PROT SERPL-MCNC: 7.5 G/DL (ref 6–8.2)
PROTHROMBIN TIME: 15.6 SEC (ref 12–14.6)
RBC # BLD AUTO: 3.5 M/UL (ref 4.2–5.4)
RH BLD: NORMAL
SODIUM SERPL-SCNC: 136 MMOL/L (ref 135–145)
WBC # BLD AUTO: 5.8 K/UL (ref 4.8–10.8)

## 2025-03-28 PROCEDURE — 85610 PROTHROMBIN TIME: CPT

## 2025-03-28 PROCEDURE — 83880 ASSAY OF NATRIURETIC PEPTIDE: CPT

## 2025-03-28 PROCEDURE — 86850 RBC ANTIBODY SCREEN: CPT

## 2025-03-28 PROCEDURE — 86901 BLOOD TYPING SEROLOGIC RH(D): CPT

## 2025-03-28 PROCEDURE — 80053 COMPREHEN METABOLIC PANEL: CPT

## 2025-03-28 PROCEDURE — 93010 ELECTROCARDIOGRAM REPORT: CPT | Performed by: INTERNAL MEDICINE

## 2025-03-28 PROCEDURE — 36415 COLL VENOUS BLD VENIPUNCTURE: CPT

## 2025-03-28 PROCEDURE — 85025 COMPLETE CBC W/AUTO DIFF WBC: CPT

## 2025-03-28 PROCEDURE — 85730 THROMBOPLASTIN TIME PARTIAL: CPT

## 2025-03-28 PROCEDURE — 86900 BLOOD TYPING SEROLOGIC ABO: CPT

## 2025-03-28 PROCEDURE — 93005 ELECTROCARDIOGRAM TRACING: CPT | Mod: TC

## 2025-03-28 NOTE — PROGRESS NOTES
Curry TAVR review: Consider a 23mm S3UR from a right femoral approach  - Calcified STJ  - Moderate leaflet calcium  - Calcified arch  - Moderate calcium in the distal aorta continuing into the iliacs bilaterally  - L12, Ca4

## 2025-03-31 ENCOUNTER — APPOINTMENT (OUTPATIENT)
Dept: CARDIOLOGY | Facility: MEDICAL CENTER | Age: 65
End: 2025-03-31
Attending: INTERNAL MEDICINE
Payer: MEDICARE

## 2025-03-31 ENCOUNTER — HOSPITAL ENCOUNTER (OUTPATIENT)
Facility: MEDICAL CENTER | Age: 65
End: 2025-03-31
Attending: INTERNAL MEDICINE | Admitting: INTERNAL MEDICINE
Payer: MEDICARE

## 2025-03-31 VITALS
RESPIRATION RATE: 14 BRPM | DIASTOLIC BLOOD PRESSURE: 70 MMHG | WEIGHT: 137.35 LBS | HEIGHT: 66 IN | OXYGEN SATURATION: 96 % | TEMPERATURE: 96.9 F | BODY MASS INDEX: 22.07 KG/M2 | SYSTOLIC BLOOD PRESSURE: 125 MMHG | HEART RATE: 75 BPM

## 2025-03-31 DIAGNOSIS — Z01.810 PRE-PROCEDURAL CARDIOVASCULAR EXAMINATION: ICD-10-CM

## 2025-03-31 DIAGNOSIS — I35.0 NONRHEUMATIC AORTIC VALVE STENOSIS: ICD-10-CM

## 2025-03-31 DIAGNOSIS — I35.0 SEVERE AORTIC STENOSIS: ICD-10-CM

## 2025-03-31 PROCEDURE — 700101 HCHG RX REV CODE 250

## 2025-03-31 PROCEDURE — 93567 NJX CAR CTH SPRVLV AORTGRPHY: CPT | Performed by: INTERNAL MEDICINE

## 2025-03-31 PROCEDURE — 160035 HCHG PACU - 1ST 60 MINS PHASE I

## 2025-03-31 PROCEDURE — 93454 CORONARY ARTERY ANGIO S&I: CPT | Mod: 26 | Performed by: INTERNAL MEDICINE

## 2025-03-31 PROCEDURE — 99152 MOD SED SAME PHYS/QHP 5/>YRS: CPT | Performed by: INTERNAL MEDICINE

## 2025-03-31 PROCEDURE — 700117 HCHG RX CONTRAST REV CODE 255: Performed by: INTERNAL MEDICINE

## 2025-03-31 PROCEDURE — 160002 HCHG RECOVERY MINUTES (STAT)

## 2025-03-31 PROCEDURE — 700111 HCHG RX REV CODE 636 W/ 250 OVERRIDE (IP): Mod: JZ

## 2025-03-31 PROCEDURE — 700102 HCHG RX REV CODE 250 W/ 637 OVERRIDE(OP)

## 2025-03-31 PROCEDURE — 99152 MOD SED SAME PHYS/QHP 5/>YRS: CPT

## 2025-03-31 PROCEDURE — A9270 NON-COVERED ITEM OR SERVICE: HCPCS

## 2025-03-31 PROCEDURE — 99153 MOD SED SAME PHYS/QHP EA: CPT | Performed by: INTERNAL MEDICINE

## 2025-03-31 PROCEDURE — 160036 HCHG PACU - EA ADDL 30 MINS PHASE I

## 2025-03-31 PROCEDURE — 75630 X-RAY AORTA LEG ARTERIES: CPT | Mod: 26,59 | Performed by: INTERNAL MEDICINE

## 2025-03-31 RX ORDER — SODIUM CHLORIDE 9 MG/ML
INJECTION, SOLUTION INTRAVENOUS CONTINUOUS
Status: DISCONTINUED | OUTPATIENT
Start: 2025-03-31 | End: 2025-03-31 | Stop reason: HOSPADM

## 2025-03-31 RX ORDER — ASPIRIN 81 MG/1
TABLET, CHEWABLE ORAL
Status: COMPLETED
Start: 2025-03-31 | End: 2025-03-31

## 2025-03-31 RX ORDER — MIDAZOLAM HYDROCHLORIDE 1 MG/ML
INJECTION INTRAMUSCULAR; INTRAVENOUS
Status: COMPLETED
Start: 2025-03-31 | End: 2025-03-31

## 2025-03-31 RX ORDER — HEPARIN SODIUM 200 [USP'U]/100ML
INJECTION, SOLUTION INTRAVENOUS
Status: COMPLETED
Start: 2025-03-31 | End: 2025-03-31

## 2025-03-31 RX ORDER — VERAPAMIL HYDROCHLORIDE 2.5 MG/ML
INJECTION INTRAVENOUS
Status: COMPLETED
Start: 2025-03-31 | End: 2025-03-31

## 2025-03-31 RX ORDER — HEPARIN SODIUM 1000 [USP'U]/ML
INJECTION, SOLUTION INTRAVENOUS; SUBCUTANEOUS
Status: COMPLETED
Start: 2025-03-31 | End: 2025-03-31

## 2025-03-31 RX ORDER — LIDOCAINE HYDROCHLORIDE 20 MG/ML
INJECTION, SOLUTION INFILTRATION; PERINEURAL
Status: COMPLETED
Start: 2025-03-31 | End: 2025-03-31

## 2025-03-31 RX ADMIN — ASPIRIN 81 MG: 81 TABLET, CHEWABLE ORAL at 09:12

## 2025-03-31 RX ADMIN — FENTANYL CITRATE 75 MCG: 50 INJECTION, SOLUTION INTRAMUSCULAR; INTRAVENOUS at 09:29

## 2025-03-31 RX ADMIN — HEPARIN SODIUM 2000 UNITS: 200 INJECTION, SOLUTION INTRAVENOUS at 09:13

## 2025-03-31 RX ADMIN — LIDOCAINE HYDROCHLORIDE: 20 INJECTION, SOLUTION INFILTRATION; PERINEURAL at 09:13

## 2025-03-31 RX ADMIN — MIDAZOLAM HYDROCHLORIDE 1 MG: 1 INJECTION, SOLUTION INTRAMUSCULAR; INTRAVENOUS at 09:29

## 2025-03-31 RX ADMIN — MIDAZOLAM HYDROCHLORIDE 2 MG: 1 INJECTION, SOLUTION INTRAMUSCULAR; INTRAVENOUS at 09:22

## 2025-03-31 RX ADMIN — HEPARIN SODIUM 3000 UNITS: 1000 INJECTION, SOLUTION INTRAVENOUS; SUBCUTANEOUS at 09:21

## 2025-03-31 RX ADMIN — VERAPAMIL HYDROCHLORIDE 2.5 MG: 2.5 INJECTION, SOLUTION INTRAVENOUS at 09:21

## 2025-03-31 RX ADMIN — IOHEXOL 80 ML: 350 INJECTION, SOLUTION INTRAVENOUS at 09:30

## 2025-03-31 RX ADMIN — NITROGLYCERIN 10 ML: 20 INJECTION INTRAVENOUS at 09:22

## 2025-03-31 ASSESSMENT — FIBROSIS 4 INDEX: FIB4 SCORE: 3.37

## 2025-03-31 NOTE — OR NURSING
0943 Patient arrived to PACU from cath lab.  Report from RN.  Patient is awake.  TR band to right wrist is CDI, area of swelling to hand, soft. Site marked and patient states area of swelling is baseline , patient denies numbness, pain or tingling to right hand.  Educated on wrist precautions.  Patient updated on plan of care.    0954 friend called and updated on patient status.    0955 Belongings returned to patient.    1006 APRN updated about swelling area to right hand per APRN hold pressure to access to site and will be bedside.    1015 APRN at bedside to assess right hand swelling/lump, no concern at this time, site appeared improved with TR band removed, no bleeding to access site. Per APRN re apply TR band with 10 ml.     1045 3 ml air removed from TR band, no bleeding to site.     1100 3 ml air removed from TR band, no bleeding to site.     1115 3 ml air removed from TR band, no bleeding to site. TR band removed dressing applied with gauze and tegaderm    1125 Patient up to edge of bed, denies discomfort.  Access site CDI soft.  All lines and monitors discontinued. Reviewed discharge paperwork with pt and friend. Discussed diet, activity, medications, follow up care and worsening symptoms. No questions at this time.

## 2025-03-31 NOTE — PROCEDURES
Cardiac Catheterization report    3/31/2025  9:44 AM    Referring MD: Erma Hirsch and Molina      Indication/Preoperative diagnosis:  Severe aortic stenosis  Pre-TAVR    Postoperative diagnosis:  Trivial nonobstructive epicardial coronary artery disease  Normal caliber thoracic aorta  Mild nonobstructive extraluminal distal aortobifemoral atherosclerosis    Recommendations:  Guideline directed medical therapy   Cardiovascular Risk factor modification  Proceed with TAVR      Procedures performed:  Coronary arteriograms  Aortic root arteriogram   Abdominal aortogram with bilateral iliofemoral runoff  Supervision moderate sedation      FINDINGS:  I.  HEMODYNAMICS   Ao: 104/56 mmHg     II. CORONARY ANGIOGRAPHY:  Left main coronary artery: Large bifurcating no CAD  Left anterior descending artery: Large caliber terminating at the apex trivial nonobstructive CAD  Left circumflex coronary artery: Moderate caliber no CAD  Right coronary artery: Moderate caliber trivial nonobstructive CAD    III.  Thoracic aortogram: Normal caliber thoracic aorta    IV.  Abdominal aortogram with iliofemoral runoff: Normal caliber abdominal and bilateral iliofemoral systems.      Procedure details:  The risks and benefits of cardiac catheterization and possible intervention were explained to the patient including death, heart attack, stroke, and emergency surgery.  The patient verbalized understanding and wished to proceed.  The patient was brought to the cardiac catheterization laboratory in the fasting state and prepped and draped in the usual sterile fashion.  The right wrist was locally anesthetized with lidocaine and the right radial artery was cannulated with 5/6-Monegasque equipment and standard radial cocktail was given.  Coronary angiography was performed using standard  diagnostic catheters in the usual fashion.  This was subsequently changed for a 6 [place] angled pigtail catheter by the aortic valve.  Contrast administered for  supravalvular thoracic aortography.  The catheter was then repositioned into the distal abdominal aorta.  A separate injection for abdominal aortography and bilateral iliofemoral runoff with DSA was then completed.  All catheters and guidewires were removed.  A TR-Band was placed without difficulty to achieve patent hemostasis.  Patient tolerated procedure well left the Cath Lab in stable condition.    Complications:  None apparent    Sedation time:  Moderate sedation directly monitored by me during the case while supervising the administration of the sedation medication by an independent trained RN to assist in the monitoring of the patient's level of consciousness and physiological status. I, the supervising physician was present the entire time from beginning of medication administration until the end of the procedure from 9:09 AM until 9:25 AM. For detailed administration records please see the moderate sedation documentation in the media tab.    Following the procedure I discussed the results with the patient and the patients designated contact/family member.    Mckay Little MD, FACC, [facility]

## 2025-03-31 NOTE — DISCHARGE INSTRUCTIONS
What to Expect Post Sedation    Rest and take it easy for the first 24 hours.  A responsible adult is recommended to remain with you during that time.  It is normal to feel sleepy.  We encourage you to not do anything that requires balance, judgment or coordination.    FOR 24 HOURS DO NOT:  Drive, operate machinery or run household appliances.  Drink beer or alcoholic beverages.  Make important decisions or sign legal documents.    To avoid nausea, slowly advance diet as tolerated, avoiding spicy or greasy foods for the first day.  Add more substantial food to your diet according to your provider's instructions.  INCREASE FLUIDS AND FIBER TO AVOID CONSTIPATION.    MILD FLU-LIKE SYMPTOMS ARE NORMAL.  YOU MAY EXPERIENCE GENERALIZED MUSCLE ACHES, THROAT IRRITATION, HEADACHE AND/OR SOME NAUSEA.  If any questions arise, call your provider.  If your provider is not available, please feel free to call the [facility] at [phone].    MEDICATIONS: Resume taking daily medication.  Take prescribed pain medication with food.  If no medication is prescribed, you may take non-aspirin pain medication if needed.  PAIN MEDICATION CAN BE VERY CONSTIPATING.  Take a stool softener or laxative such as senokot, pericolace, or milk of magnesia if needed.    Diet    Resume your normal diet as tolerated.  A diet low in cholesterol, fat, and sodium is recommended for good health.     Discharge Instructions:  POST ANGIOGRAM  General Care Instructions  Maintain a bandage over the incision site for 24 hours.  It's normal to find a small bruise or dime-sized lump at the insertion site. This should disappear within a few weeks.  Do not apply lotions or powders to the site.  Do not immerse the catheter insertion site in water (bathtub/swimming) for five days. It is ok to shower 24 hours after the procedure.  You may resume your normal diet immediately; on the day of your procedure, drink 6-10 glasses of water to help flush the contrast  liquid out of your system.  If the doctor inserted the catheter in your arm:  For 3 days, DO NOT lift anything heavier than 10 pounds (approximately a gallon of milk). DO NOT do any heavy pushing, pulling, or twisting.    Medications  If your current medications need to be changed, you will be provided with an updated list of your medications prior to discharge.  If you take warfarin (Coumadin), resume taking your usual dose the evening after the procedure.  DO NOT STOP taking prescribed blood thinning (anti-platelet) medications unless instructed by your cardiologist.  These medications include:  Aspirin, Clopidogrel (Plavix), Ticagrelor (Brilinta), or Prasugrel (Effient)   If you take one of the following anticoagulants, RESUME 24 HOURS after your procedure:  Apixiban (Eliquis), Rivaroxaban (Xarelto), Dabigatran (Pradaxa), Edoxaban (Savaysa)  If you take metformin (Glucophage), RESUME 48 HOURS after your procedure.    When to call your healthcare provider  Call your cardiologist right away at [phone] if you have any of the following:   Problems/Concerns taking any of your prescribed heart medicines.   The insertion site has increasing pain, swelling, redness, bleeding, or drainage.   Your arm or leg below where the insertion site changes color, is cool, or is numb.   You have chest pain or shortness of breath that does not go away with rest.   Your pulse feels irregular -- very slow (less than 60 beats/minute) or very fast (over 100 beats/minute).   You have dizziness, fainting, or you are very tired.   You are coughing up blood or yellow or green mucus.   You have chills or a fever over 101°F (38.3°C).    If there is bleeding at the catheter insertion site, apply pressure for 10 minutes.  If bleeding persists, call 911, and continue to hold pressure until advanced medical support arrives.        Exercising Safely After Percutaneous Coronary Intervention (PCI)  After percutaneous coronary intervention (PCI),  which involves angioplasty and often stenting, it's important to focus on your heart health. Exercise can help strengthen your heart. It can also help you feel good and improve your overall health. Talk with your health care provider or cardiac rehab team member about good options for you.  Start slowly. Work up to more vigorous exercise as you get stronger. Aim for at least 150 minutes of exercise each week.  Include aerobic activities. These make the heart beat faster. They work the heart and lungs, and improve the body's ability to use oxygen. Good choices include walking, swimming, and biking .  Always follow your doctor's recommendation for exercise.   You have been referred to cardiac rehabilitation, which is important for your recovery.  You may contact [facility] at [phone] to learn more and schedule a visit.        Lifestyle Management After Percutaneous Coronary Intervention (PCI)  Percutaneous coronary intervention (PCI)  involves angioplasty and often stenting. This procedure can open arteries and relieve symptoms. But, it doesn't cure coronary artery disease. New blockages can still form. You need to take steps to prevent this by managing risk factors. Doing so will help make your heart and arteries healthier. Your doctor may prescribe cardiac rehabilitation to help with this lifelong process.  Understanding risk factors  Some risk factors for coronary artery disease can be controlled. These include smoking, high blood pressure, cholesterol, diabetes, and obesity. They can be managed with medication, diet, and exercise. Support and counseling can also play a role. The effort will pay off! Managing risk factors can help you be more active, feel better, and reduce the risk of heart attack.    If you smoke, quit!  If your doctor has been urging you to quit smoking, it's for good reasons. Smoking damages your heart, blood vessels, and lungs. The good news is that quitting can  halt or even reverse the damage of smoking. To quit now:  Get medical help. Ask your doctor for advice on stop-smoking programs. Also ask about medications or nicotine replacement therapy products that may help you quit smoking.  Get support. Join a support group. Ask for help from your family and friends.  Don't give up. It often takes several tries to succeed in quitting smoking.  Avoid secondhand smoke. Ask family and friends not to smoke around you.    DRESSING: Keep dressing and incision dry and intact for 24 hours, may remove dressing and shower after 11 am  on 4/1, do not need to replace.  Do not submerge site in water for 7 days.     BOWEL FUNCTION:  If you are having problems, use what you normally would or call your provider for suggestions. It also helps to stay regular by including fiber in your diet (for example: bran or fruits and vegetables) and drink plenty of liquids (water, juice, etc.).

## 2025-04-02 ENCOUNTER — DOCUMENTATION (OUTPATIENT)
Dept: CARDIOLOGY | Facility: MEDICAL CENTER | Age: 65
End: 2025-04-02
Payer: MEDICARE

## 2025-04-02 ENCOUNTER — TELEPHONE (OUTPATIENT)
Dept: CARDIOLOGY | Facility: MEDICAL CENTER | Age: 65
End: 2025-04-02
Payer: MEDICARE

## 2025-04-02 NOTE — PROGRESS NOTES
Thank you for the opportunity to participate in your patient's care.     Your patient is scheduled for transcatheter aortic valve replacement (TAVR) on 4/7/2025    Sincerely,    [facility]    HARRIET Zuñiga, RN, Structural Heart Program Nurse Coordinator ([phone])  HARRIET Grissom, RN, Structural Heart Program Nurse Coordinator ([phone])

## 2025-04-02 NOTE — TELEPHONE ENCOUNTER
Valve Conference Plan of Care: 4/2/2025 for TAVR 4/7/2025           TAVR Candidate: Yes  Access: right femoral  Valve Size: 23mm  General Anesthesia or MAC: GA with SEFERINO  Unit: Telemetry  Incidental findings to be discussed with PCP and sent to Dr. Paty Blankenship: Cirrhotic liver with portal hypertension; heterogeneous liver with lobulated contour, splenomegaly, and moderate ascites, and fluid-containing periumbilical hernia.    Clearance needed prior to procedure: No    Check in time of 0900 4/7/2025.     Pre-op appointment completed: Yes    NPO after midnight. Hold vitamins/supplements x7 days, hold Spironolactone the morning of the procedure.

## 2025-04-03 DIAGNOSIS — I35.0 NONRHEUMATIC AORTIC VALVE STENOSIS: ICD-10-CM

## 2025-04-07 ENCOUNTER — APPOINTMENT (OUTPATIENT)
Dept: CARDIOLOGY | Facility: MEDICAL CENTER | Age: 65
DRG: 266 | End: 2025-04-07
Attending: ANESTHESIOLOGY
Payer: MEDICARE

## 2025-04-07 ENCOUNTER — ANESTHESIA EVENT (OUTPATIENT)
Dept: SURGERY | Facility: MEDICAL CENTER | Age: 65
DRG: 266 | End: 2025-04-07
Payer: MEDICARE

## 2025-04-07 ENCOUNTER — ANESTHESIA (OUTPATIENT)
Dept: SURGERY | Facility: MEDICAL CENTER | Age: 65
DRG: 266 | End: 2025-04-07
Payer: MEDICARE

## 2025-04-07 ENCOUNTER — HOSPITAL ENCOUNTER (INPATIENT)
Facility: MEDICAL CENTER | Age: 65
LOS: 1 days | DRG: 266 | End: 2025-04-08
Attending: INTERNAL MEDICINE | Admitting: INTERNAL MEDICINE
Payer: MEDICARE

## 2025-04-07 ENCOUNTER — APPOINTMENT (OUTPATIENT)
Dept: RADIOLOGY | Facility: MEDICAL CENTER | Age: 65
DRG: 266 | End: 2025-04-07
Attending: NURSE PRACTITIONER
Payer: MEDICARE

## 2025-04-07 DIAGNOSIS — D63.1 ANEMIA DUE TO STAGE 3A CHRONIC KIDNEY DISEASE: ICD-10-CM

## 2025-04-07 DIAGNOSIS — N18.31 ANEMIA DUE TO STAGE 3A CHRONIC KIDNEY DISEASE: ICD-10-CM

## 2025-04-07 DIAGNOSIS — Z95.2 S/P TAVR (TRANSCATHETER AORTIC VALVE REPLACEMENT): ICD-10-CM

## 2025-04-07 PROBLEM — I35.0 SEVERE AORTIC STENOSIS: Status: RESOLVED | Noted: 2025-02-25 | Resolved: 2025-04-07

## 2025-04-07 PROBLEM — I50.31 ACUTE DIASTOLIC HEART FAILURE DUE TO VALVULAR DISEASE (HCC): Status: ACTIVE | Noted: 2025-04-07

## 2025-04-07 PROBLEM — R53.1 GENERALIZED WEAKNESS: Status: RESOLVED | Noted: 2024-12-10 | Resolved: 2025-04-07

## 2025-04-07 PROBLEM — R94.30 ELEVATED LEFT VENTRICULAR END-DIASTOLIC PRESSURE (LVEDP): Status: ACTIVE | Noted: 2025-04-07

## 2025-04-07 PROBLEM — I25.10 CORONARY ARTERY DISEASE INVOLVING NATIVE CORONARY ARTERY OF NATIVE HEART WITHOUT ANGINA PECTORIS: Status: ACTIVE | Noted: 2025-04-07

## 2025-04-07 PROBLEM — I50.31 ACUTE DIASTOLIC HEART FAILURE (HCC): Status: ACTIVE | Noted: 2024-09-17

## 2025-04-07 PROBLEM — I50.31 ACUTE DIASTOLIC HEART FAILURE (HCC): Status: RESOLVED | Noted: 2024-09-17 | Resolved: 2025-04-07

## 2025-04-07 PROBLEM — I38 ACUTE DIASTOLIC HEART FAILURE DUE TO VALVULAR DISEASE (HCC): Status: ACTIVE | Noted: 2025-04-07

## 2025-04-07 LAB
ALBUMIN SERPL BCP-MCNC: 3.4 G/DL (ref 3.2–4.9)
ALBUMIN/GLOB SERPL: 1.2 G/DL
ALP SERPL-CCNC: 109 U/L (ref 30–99)
ALT SERPL-CCNC: 9 U/L (ref 2–50)
ANION GAP SERPL CALC-SCNC: 11 MMOL/L (ref 7–16)
AST SERPL-CCNC: 21 U/L (ref 12–45)
BILIRUB SERPL-MCNC: 0.3 MG/DL (ref 0.1–1.5)
BUN SERPL-MCNC: 28 MG/DL (ref 8–22)
CALCIUM ALBUM COR SERPL-MCNC: 10 MG/DL (ref 8.5–10.5)
CALCIUM SERPL-MCNC: 9.5 MG/DL (ref 8.5–10.5)
CHLORIDE SERPL-SCNC: 107 MMOL/L (ref 96–112)
CO2 SERPL-SCNC: 20 MMOL/L (ref 20–33)
CREAT SERPL-MCNC: 1.06 MG/DL (ref 0.5–1.4)
EKG IMPRESSION: NORMAL
ERYTHROCYTE [DISTWIDTH] IN BLOOD BY AUTOMATED COUNT: 46.7 FL (ref 35.9–50)
ETHANOL BLD-MCNC: <10.1 MG/DL
GFR SERPLBLD CREATININE-BSD FMLA CKD-EPI: 58 ML/MIN/1.73 M 2
GLOBULIN SER CALC-MCNC: 2.8 G/DL (ref 1.9–3.5)
GLUCOSE SERPL-MCNC: 99 MG/DL (ref 65–99)
HCT VFR BLD AUTO: 21.5 % (ref 37–47)
HGB BLD-MCNC: 6.9 G/DL (ref 12–16)
HGB BLD-MCNC: 7.9 G/DL (ref 12–16)
LV EJECT FRACT  99904: 70
MCH RBC QN AUTO: 24.8 PG (ref 27–33)
MCHC RBC AUTO-ENTMCNC: 32.1 G/DL (ref 32.2–35.5)
MCV RBC AUTO: 77.3 FL (ref 81.4–97.8)
NT-PROBNP SERPL IA-MCNC: 407 PG/ML (ref 0–125)
PLATELET # BLD AUTO: 146 K/UL (ref 164–446)
PMV BLD AUTO: 10.6 FL (ref 9–12.9)
POTASSIUM SERPL-SCNC: 3.9 MMOL/L (ref 3.6–5.5)
PROT SERPL-MCNC: 6.2 G/DL (ref 6–8.2)
RBC # BLD AUTO: 2.78 M/UL (ref 4.2–5.4)
SODIUM SERPL-SCNC: 138 MMOL/L (ref 135–145)
WBC # BLD AUTO: 5.6 K/UL (ref 4.8–10.8)

## 2025-04-07 PROCEDURE — 700102 HCHG RX REV CODE 250 W/ 637 OVERRIDE(OP): Performed by: NURSE PRACTITIONER

## 2025-04-07 PROCEDURE — 93010 ELECTROCARDIOGRAM REPORT: CPT | Performed by: INTERNAL MEDICINE

## 2025-04-07 PROCEDURE — 700101 HCHG RX REV CODE 250: Performed by: ANESTHESIOLOGY

## 2025-04-07 PROCEDURE — 700111 HCHG RX REV CODE 636 W/ 250 OVERRIDE (IP): Mod: JZ | Performed by: NURSE PRACTITIONER

## 2025-04-07 PROCEDURE — 700111 HCHG RX REV CODE 636 W/ 250 OVERRIDE (IP): Performed by: INTERNAL MEDICINE

## 2025-04-07 PROCEDURE — 160015 HCHG STAT PREOP MINUTES: Performed by: INTERNAL MEDICINE

## 2025-04-07 PROCEDURE — 85347 COAGULATION TIME ACTIVATED: CPT

## 2025-04-07 PROCEDURE — 85018 HEMOGLOBIN: CPT

## 2025-04-07 PROCEDURE — 502240 HCHG MISC OR SUPPLY RC 0272: Performed by: INTERNAL MEDICINE

## 2025-04-07 PROCEDURE — 160031 HCHG SURGERY MINUTES - 1ST 30 MINS LEVEL 5: Performed by: INTERNAL MEDICINE

## 2025-04-07 PROCEDURE — 160035 HCHG PACU - 1ST 60 MINS PHASE I: Performed by: INTERNAL MEDICINE

## 2025-04-07 PROCEDURE — 160048 HCHG OR STATISTICAL LEVEL 1-5: Performed by: INTERNAL MEDICINE

## 2025-04-07 PROCEDURE — 33361 REPLACE AORTIC VALVE PERQ: CPT | Mod: 62,Q0 | Performed by: THORACIC SURGERY (CARDIOTHORACIC VASCULAR SURGERY)

## 2025-04-07 PROCEDURE — 160009 HCHG ANES TIME/MIN: Performed by: INTERNAL MEDICINE

## 2025-04-07 PROCEDURE — 700105 HCHG RX REV CODE 258: Performed by: ANESTHESIOLOGY

## 2025-04-07 PROCEDURE — 83880 ASSAY OF NATRIURETIC PEPTIDE: CPT

## 2025-04-07 PROCEDURE — 700111 HCHG RX REV CODE 636 W/ 250 OVERRIDE (IP): Performed by: ANESTHESIOLOGY

## 2025-04-07 PROCEDURE — 700101 HCHG RX REV CODE 250: Performed by: INTERNAL MEDICINE

## 2025-04-07 PROCEDURE — A9270 NON-COVERED ITEM OR SERVICE: HCPCS | Performed by: ANESTHESIOLOGY

## 2025-04-07 PROCEDURE — 85027 COMPLETE CBC AUTOMATED: CPT

## 2025-04-07 PROCEDURE — 80053 COMPREHEN METABOLIC PANEL: CPT

## 2025-04-07 PROCEDURE — 503001 HCHG PERFUSION: Performed by: INTERNAL MEDICINE

## 2025-04-07 PROCEDURE — 02RF38Z REPLACEMENT OF AORTIC VALVE WITH ZOOPLASTIC TISSUE, PERCUTANEOUS APPROACH: ICD-10-PCS | Performed by: INTERNAL MEDICINE

## 2025-04-07 PROCEDURE — C1769 GUIDE WIRE: HCPCS | Performed by: INTERNAL MEDICINE

## 2025-04-07 PROCEDURE — 700105 HCHG RX REV CODE 258: Performed by: INTERNAL MEDICINE

## 2025-04-07 PROCEDURE — C1887 CATHETER, GUIDING: HCPCS | Performed by: INTERNAL MEDICINE

## 2025-04-07 PROCEDURE — 160042 HCHG SURGERY MINUTES - EA ADDL 1 MIN LEVEL 5: Performed by: INTERNAL MEDICINE

## 2025-04-07 PROCEDURE — 700105 HCHG RX REV CODE 258: Performed by: NURSE PRACTITIONER

## 2025-04-07 PROCEDURE — 36415 COLL VENOUS BLD VENIPUNCTURE: CPT

## 2025-04-07 PROCEDURE — A9270 NON-COVERED ITEM OR SERVICE: HCPCS | Performed by: NURSE PRACTITIONER

## 2025-04-07 PROCEDURE — 700111 HCHG RX REV CODE 636 W/ 250 OVERRIDE (IP): Mod: JZ | Performed by: ANESTHESIOLOGY

## 2025-04-07 PROCEDURE — 160002 HCHG RECOVERY MINUTES (STAT): Performed by: INTERNAL MEDICINE

## 2025-04-07 PROCEDURE — 82077 ASSAY SPEC XCP UR&BREATH IA: CPT

## 2025-04-07 PROCEDURE — 93005 ELECTROCARDIOGRAM TRACING: CPT | Mod: TC | Performed by: NURSE PRACTITIONER

## 2025-04-07 PROCEDURE — B24BZZ4 ULTRASONOGRAPHY OF HEART WITH AORTA, TRANSESOPHAGEAL: ICD-10-PCS | Performed by: ANESTHESIOLOGY

## 2025-04-07 PROCEDURE — 110372 HCHG SHELL REV 278: Performed by: INTERNAL MEDICINE

## 2025-04-07 PROCEDURE — C1751 CATH, INF, PER/CENT/MIDLINE: HCPCS | Performed by: INTERNAL MEDICINE

## 2025-04-07 PROCEDURE — 770020 HCHG ROOM/CARE - TELE (206)

## 2025-04-07 PROCEDURE — 700117 HCHG RX CONTRAST REV CODE 255: Performed by: INTERNAL MEDICINE

## 2025-04-07 PROCEDURE — C1760 CLOSURE DEV, VASC: HCPCS | Performed by: INTERNAL MEDICINE

## 2025-04-07 PROCEDURE — 71045 X-RAY EXAM CHEST 1 VIEW: CPT

## 2025-04-07 PROCEDURE — C1894 INTRO/SHEATH, NON-LASER: HCPCS | Performed by: INTERNAL MEDICINE

## 2025-04-07 PROCEDURE — 700102 HCHG RX REV CODE 250 W/ 637 OVERRIDE(OP): Performed by: ANESTHESIOLOGY

## 2025-04-07 PROCEDURE — 33361 REPLACE AORTIC VALVE PERQ: CPT | Mod: 62,Q0 | Performed by: INTERNAL MEDICINE

## 2025-04-07 PROCEDURE — 93355 ECHO TRANSESOPHAGEAL (TEE): CPT

## 2025-04-07 PROCEDURE — C1883 ADAPT/EXT, PACING/NEURO LEAD: HCPCS | Performed by: INTERNAL MEDICINE

## 2025-04-07 DEVICE — IMPLANTABLE DEVICE: Type: IMPLANTABLE DEVICE | Status: FUNCTIONAL

## 2025-04-07 RX ORDER — HYDROCODONE BITARTRATE AND ACETAMINOPHEN 5; 325 MG/1; MG/1
1 TABLET ORAL 2 TIMES DAILY PRN
Refills: 0 | Status: DISCONTINUED | OUTPATIENT
Start: 2025-04-07 | End: 2025-04-08 | Stop reason: HOSPADM

## 2025-04-07 RX ORDER — LIDOCAINE HYDROCHLORIDE 20 MG/ML
INJECTION, SOLUTION EPIDURAL; INFILTRATION; INTRACAUDAL; PERINEURAL PRN
Status: DISCONTINUED | OUTPATIENT
Start: 2025-04-07 | End: 2025-04-07 | Stop reason: SURG

## 2025-04-07 RX ORDER — SPIRONOLACTONE 25 MG/1
25 TABLET ORAL DAILY
Status: DISCONTINUED | OUTPATIENT
Start: 2025-04-07 | End: 2025-04-08 | Stop reason: HOSPADM

## 2025-04-07 RX ORDER — DOCUSATE SODIUM 100 MG/1
100 CAPSULE, LIQUID FILLED ORAL 2 TIMES DAILY
Status: DISCONTINUED | OUTPATIENT
Start: 2025-04-07 | End: 2025-04-08 | Stop reason: HOSPADM

## 2025-04-07 RX ORDER — ESMOLOL HYDROCHLORIDE 10 MG/ML
INJECTION INTRAVENOUS PRN
Status: DISCONTINUED | OUTPATIENT
Start: 2025-04-07 | End: 2025-04-07 | Stop reason: SURG

## 2025-04-07 RX ORDER — ALBUTEROL SULFATE 5 MG/ML
2.5 SOLUTION RESPIRATORY (INHALATION)
Status: DISCONTINUED | OUTPATIENT
Start: 2025-04-07 | End: 2025-04-07 | Stop reason: HOSPADM

## 2025-04-07 RX ORDER — ASPIRIN 81 MG/1
81 TABLET ORAL DAILY
Status: CANCELLED | OUTPATIENT
Start: 2025-04-07

## 2025-04-07 RX ORDER — AMOXICILLIN 250 MG
1 CAPSULE ORAL NIGHTLY
Status: DISCONTINUED | OUTPATIENT
Start: 2025-04-07 | End: 2025-04-08 | Stop reason: HOSPADM

## 2025-04-07 RX ORDER — HYDROMORPHONE HYDROCHLORIDE 1 MG/ML
0.2 INJECTION, SOLUTION INTRAMUSCULAR; INTRAVENOUS; SUBCUTANEOUS
Status: DISCONTINUED | OUTPATIENT
Start: 2025-04-07 | End: 2025-04-07 | Stop reason: HOSPADM

## 2025-04-07 RX ORDER — DIPHENHYDRAMINE HYDROCHLORIDE 50 MG/ML
12.5 INJECTION, SOLUTION INTRAMUSCULAR; INTRAVENOUS
Status: DISCONTINUED | OUTPATIENT
Start: 2025-04-07 | End: 2025-04-07 | Stop reason: HOSPADM

## 2025-04-07 RX ORDER — BUPIVACAINE HYDROCHLORIDE 2.5 MG/ML
INJECTION, SOLUTION EPIDURAL; INFILTRATION; INTRACAUDAL; PERINEURAL
Status: DISCONTINUED | OUTPATIENT
Start: 2025-04-07 | End: 2025-04-07 | Stop reason: HOSPADM

## 2025-04-07 RX ORDER — FOLIC ACID 1 MG/1
1 TABLET ORAL DAILY
Status: DISCONTINUED | OUTPATIENT
Start: 2025-04-07 | End: 2025-04-08 | Stop reason: HOSPADM

## 2025-04-07 RX ORDER — HALOPERIDOL 5 MG/ML
1 INJECTION INTRAMUSCULAR
Status: DISCONTINUED | OUTPATIENT
Start: 2025-04-07 | End: 2025-04-07 | Stop reason: HOSPADM

## 2025-04-07 RX ORDER — ONDANSETRON 2 MG/ML
4 INJECTION INTRAMUSCULAR; INTRAVENOUS
Status: DISCONTINUED | OUTPATIENT
Start: 2025-04-07 | End: 2025-04-07 | Stop reason: HOSPADM

## 2025-04-07 RX ORDER — ONDANSETRON 2 MG/ML
INJECTION INTRAMUSCULAR; INTRAVENOUS PRN
Status: DISCONTINUED | OUTPATIENT
Start: 2025-04-07 | End: 2025-04-07 | Stop reason: SURG

## 2025-04-07 RX ORDER — HYDROMORPHONE HYDROCHLORIDE 1 MG/ML
0.1 INJECTION, SOLUTION INTRAMUSCULAR; INTRAVENOUS; SUBCUTANEOUS
Status: DISCONTINUED | OUTPATIENT
Start: 2025-04-07 | End: 2025-04-07 | Stop reason: HOSPADM

## 2025-04-07 RX ORDER — LIDOCAINE HYDROCHLORIDE 20 MG/ML
INJECTION, SOLUTION INFILTRATION; PERINEURAL
Status: DISCONTINUED | OUTPATIENT
Start: 2025-04-07 | End: 2025-04-07 | Stop reason: HOSPADM

## 2025-04-07 RX ORDER — ACETAMINOPHEN 325 MG/1
650 TABLET ORAL EVERY 6 HOURS PRN
Status: DISCONTINUED | OUTPATIENT
Start: 2025-04-07 | End: 2025-04-08 | Stop reason: HOSPADM

## 2025-04-07 RX ORDER — OXYCODONE HCL 5 MG/5 ML
10 SOLUTION, ORAL ORAL
Status: COMPLETED | OUTPATIENT
Start: 2025-04-07 | End: 2025-04-07

## 2025-04-07 RX ORDER — CEFAZOLIN SODIUM 1 G/3ML
INJECTION, POWDER, FOR SOLUTION INTRAMUSCULAR; INTRAVENOUS PRN
Status: DISCONTINUED | OUTPATIENT
Start: 2025-04-07 | End: 2025-04-07 | Stop reason: SURG

## 2025-04-07 RX ORDER — VERAPAMIL HYDROCHLORIDE 2.5 MG/ML
INJECTION INTRAVENOUS
Status: DISCONTINUED | OUTPATIENT
Start: 2025-04-07 | End: 2025-04-07 | Stop reason: HOSPADM

## 2025-04-07 RX ORDER — EPHEDRINE SULFATE 50 MG/ML
5 INJECTION, SOLUTION INTRAVENOUS
Status: DISCONTINUED | OUTPATIENT
Start: 2025-04-07 | End: 2025-04-07 | Stop reason: HOSPADM

## 2025-04-07 RX ORDER — GAUZE BANDAGE 2" X 2"
100 BANDAGE TOPICAL DAILY
Status: DISCONTINUED | OUTPATIENT
Start: 2025-04-07 | End: 2025-04-08 | Stop reason: HOSPADM

## 2025-04-07 RX ORDER — SODIUM CHLORIDE, SODIUM LACTATE, POTASSIUM CHLORIDE, CALCIUM CHLORIDE 600; 310; 30; 20 MG/100ML; MG/100ML; MG/100ML; MG/100ML
INJECTION, SOLUTION INTRAVENOUS CONTINUOUS
Status: DISCONTINUED | OUTPATIENT
Start: 2025-04-07 | End: 2025-04-07 | Stop reason: HOSPADM

## 2025-04-07 RX ORDER — LIDOCAINE HYDROCHLORIDE 40 MG/ML
SOLUTION TOPICAL PRN
Status: DISCONTINUED | OUTPATIENT
Start: 2025-04-07 | End: 2025-04-07 | Stop reason: SURG

## 2025-04-07 RX ORDER — SODIUM CHLORIDE 9 MG/ML
INJECTION, SOLUTION INTRAVENOUS CONTINUOUS
Status: ACTIVE | OUTPATIENT
Start: 2025-04-07 | End: 2025-04-07

## 2025-04-07 RX ORDER — HYDRALAZINE HYDROCHLORIDE 20 MG/ML
10 INJECTION INTRAMUSCULAR; INTRAVENOUS
Status: DISCONTINUED | OUTPATIENT
Start: 2025-04-07 | End: 2025-04-08 | Stop reason: HOSPADM

## 2025-04-07 RX ORDER — SODIUM CHLORIDE, SODIUM LACTATE, POTASSIUM CHLORIDE, CALCIUM CHLORIDE 600; 310; 30; 20 MG/100ML; MG/100ML; MG/100ML; MG/100ML
INJECTION, SOLUTION INTRAVENOUS
Status: DISCONTINUED | OUTPATIENT
Start: 2025-04-07 | End: 2025-04-07

## 2025-04-07 RX ORDER — HYDRALAZINE HYDROCHLORIDE 20 MG/ML
5 INJECTION INTRAMUSCULAR; INTRAVENOUS
Status: DISCONTINUED | OUTPATIENT
Start: 2025-04-07 | End: 2025-04-07 | Stop reason: HOSPADM

## 2025-04-07 RX ORDER — FUROSEMIDE 10 MG/ML
40 INJECTION INTRAMUSCULAR; INTRAVENOUS ONCE
Status: COMPLETED | OUTPATIENT
Start: 2025-04-07 | End: 2025-04-07

## 2025-04-07 RX ORDER — HYDROMORPHONE HYDROCHLORIDE 1 MG/ML
0.4 INJECTION, SOLUTION INTRAMUSCULAR; INTRAVENOUS; SUBCUTANEOUS
Status: DISCONTINUED | OUTPATIENT
Start: 2025-04-07 | End: 2025-04-07 | Stop reason: HOSPADM

## 2025-04-07 RX ORDER — ONDANSETRON 2 MG/ML
4 INJECTION INTRAMUSCULAR; INTRAVENOUS EVERY 4 HOURS PRN
Status: DISCONTINUED | OUTPATIENT
Start: 2025-04-07 | End: 2025-04-08 | Stop reason: HOSPADM

## 2025-04-07 RX ORDER — SODIUM CHLORIDE, SODIUM LACTATE, POTASSIUM CHLORIDE, CALCIUM CHLORIDE 600; 310; 30; 20 MG/100ML; MG/100ML; MG/100ML; MG/100ML
INJECTION, SOLUTION INTRAVENOUS CONTINUOUS
Status: ACTIVE | OUTPATIENT
Start: 2025-04-07 | End: 2025-04-07

## 2025-04-07 RX ORDER — LANOLIN ALCOHOL/MO/W.PET/CERES
400 CREAM (GRAM) TOPICAL 2 TIMES DAILY
Status: DISCONTINUED | OUTPATIENT
Start: 2025-04-07 | End: 2025-04-08 | Stop reason: HOSPADM

## 2025-04-07 RX ORDER — ROCURONIUM BROMIDE 10 MG/ML
INJECTION, SOLUTION INTRAVENOUS PRN
Status: DISCONTINUED | OUTPATIENT
Start: 2025-04-07 | End: 2025-04-07 | Stop reason: SURG

## 2025-04-07 RX ORDER — HEPARIN SODIUM 1000 [USP'U]/ML
INJECTION, SOLUTION INTRAVENOUS; SUBCUTANEOUS PRN
Status: DISCONTINUED | OUTPATIENT
Start: 2025-04-07 | End: 2025-04-07 | Stop reason: SURG

## 2025-04-07 RX ORDER — DIPHENHYDRAMINE HCL 25 MG
25 TABLET ORAL NIGHTLY PRN
Status: DISCONTINUED | OUTPATIENT
Start: 2025-04-07 | End: 2025-04-08 | Stop reason: HOSPADM

## 2025-04-07 RX ORDER — IODIXANOL 270 MG/ML
INJECTION, SOLUTION INTRAVASCULAR
Status: DISCONTINUED | OUTPATIENT
Start: 2025-04-07 | End: 2025-04-07 | Stop reason: HOSPADM

## 2025-04-07 RX ORDER — AMOXICILLIN 250 MG
1 CAPSULE ORAL
Status: DISCONTINUED | OUTPATIENT
Start: 2025-04-07 | End: 2025-04-08 | Stop reason: HOSPADM

## 2025-04-07 RX ORDER — OMEPRAZOLE 20 MG/1
40 CAPSULE, DELAYED RELEASE ORAL DAILY
Status: DISCONTINUED | OUTPATIENT
Start: 2025-04-07 | End: 2025-04-08 | Stop reason: HOSPADM

## 2025-04-07 RX ORDER — SODIUM PHOSPHATE,MONO-DIBASIC 19G-7G/118
1 ENEMA (ML) RECTAL
Status: DISCONTINUED | OUTPATIENT
Start: 2025-04-07 | End: 2025-04-08 | Stop reason: HOSPADM

## 2025-04-07 RX ORDER — BISACODYL 10 MG
10 SUPPOSITORY, RECTAL RECTAL
Status: DISCONTINUED | OUTPATIENT
Start: 2025-04-07 | End: 2025-04-08 | Stop reason: HOSPADM

## 2025-04-07 RX ORDER — DIPHENHYDRAMINE HYDROCHLORIDE 50 MG/ML
25 INJECTION, SOLUTION INTRAMUSCULAR; INTRAVENOUS EVERY 6 HOURS PRN
Status: DISCONTINUED | OUTPATIENT
Start: 2025-04-07 | End: 2025-04-08 | Stop reason: HOSPADM

## 2025-04-07 RX ORDER — FLUTICASONE PROPIONATE 50 MCG
2 SPRAY, SUSPENSION (ML) NASAL EVERY MORNING
COMMUNITY

## 2025-04-07 RX ORDER — DEXAMETHASONE SODIUM PHOSPHATE 4 MG/ML
INJECTION, SOLUTION INTRA-ARTICULAR; INTRALESIONAL; INTRAMUSCULAR; INTRAVENOUS; SOFT TISSUE PRN
Status: DISCONTINUED | OUTPATIENT
Start: 2025-04-07 | End: 2025-04-07 | Stop reason: SURG

## 2025-04-07 RX ORDER — PROTAMINE SULFATE 10 MG/ML
INJECTION, SOLUTION INTRAVENOUS PRN
Status: DISCONTINUED | OUTPATIENT
Start: 2025-04-07 | End: 2025-04-07 | Stop reason: SURG

## 2025-04-07 RX ORDER — MEPERIDINE HYDROCHLORIDE 25 MG/ML
6.25 INJECTION INTRAMUSCULAR; INTRAVENOUS; SUBCUTANEOUS
Status: DISCONTINUED | OUTPATIENT
Start: 2025-04-07 | End: 2025-04-07 | Stop reason: HOSPADM

## 2025-04-07 RX ORDER — PYRIDOXINE HCL (VITAMIN B6) 50 MG
50 TABLET ORAL DAILY
Status: DISCONTINUED | OUTPATIENT
Start: 2025-04-07 | End: 2025-04-08 | Stop reason: HOSPADM

## 2025-04-07 RX ORDER — SODIUM CHLORIDE, SODIUM LACTATE, POTASSIUM CHLORIDE, CALCIUM CHLORIDE 600; 310; 30; 20 MG/100ML; MG/100ML; MG/100ML; MG/100ML
INJECTION, SOLUTION INTRAVENOUS
Status: DISCONTINUED | OUTPATIENT
Start: 2025-04-07 | End: 2025-04-07 | Stop reason: SURG

## 2025-04-07 RX ORDER — MIDAZOLAM HYDROCHLORIDE 1 MG/ML
1 INJECTION INTRAMUSCULAR; INTRAVENOUS
Status: DISCONTINUED | OUTPATIENT
Start: 2025-04-07 | End: 2025-04-07 | Stop reason: HOSPADM

## 2025-04-07 RX ORDER — POLYETHYLENE GLYCOL 3350 17 G/17G
1 POWDER, FOR SOLUTION ORAL 2 TIMES DAILY PRN
Status: DISCONTINUED | OUTPATIENT
Start: 2025-04-07 | End: 2025-04-08 | Stop reason: HOSPADM

## 2025-04-07 RX ORDER — OXYCODONE HCL 5 MG/5 ML
5 SOLUTION, ORAL ORAL
Status: COMPLETED | OUTPATIENT
Start: 2025-04-07 | End: 2025-04-07

## 2025-04-07 RX ADMIN — HYDRALAZINE HYDROCHLORIDE 5 MG: 20 INJECTION, SOLUTION INTRAMUSCULAR; INTRAVENOUS at 13:00

## 2025-04-07 RX ADMIN — OMEPRAZOLE 40 MG: 20 CAPSULE, DELAYED RELEASE ORAL at 14:29

## 2025-04-07 RX ADMIN — FENTANYL CITRATE 25 MCG: 50 INJECTION, SOLUTION INTRAMUSCULAR; INTRAVENOUS at 13:28

## 2025-04-07 RX ADMIN — SUGAMMADEX 200 MG: 100 INJECTION, SOLUTION INTRAVENOUS at 11:42

## 2025-04-07 RX ADMIN — HEPARIN SODIUM 8000 UNITS: 1000 INJECTION, SOLUTION INTRAVENOUS; SUBCUTANEOUS at 11:14

## 2025-04-07 RX ADMIN — PROPOFOL 200 MG: 10 INJECTION, EMULSION INTRAVENOUS at 10:48

## 2025-04-07 RX ADMIN — HYDROCODONE BITARTRATE AND ACETAMINOPHEN 1 TABLET: 5; 325 TABLET ORAL at 17:48

## 2025-04-07 RX ADMIN — LIDOCAINE HYDROCHLORIDE 60 MG: 20 INJECTION, SOLUTION EPIDURAL; INFILTRATION; INTRACAUDAL; PERINEURAL at 10:48

## 2025-04-07 RX ADMIN — PROPOFOL 100 MG: 10 INJECTION, EMULSION INTRAVENOUS at 11:25

## 2025-04-07 RX ADMIN — Medication 400 MG: at 17:48

## 2025-04-07 RX ADMIN — LIDOCAINE HYDROCHLORIDE 4 ML: 40 SOLUTION TOPICAL at 10:48

## 2025-04-07 RX ADMIN — ONDANSETRON 4 MG: 2 INJECTION INTRAMUSCULAR; INTRAVENOUS at 10:48

## 2025-04-07 RX ADMIN — SPIRONOLACTONE 25 MG: 25 TABLET ORAL at 14:29

## 2025-04-07 RX ADMIN — OXYCODONE HYDROCHLORIDE 5 MG: 5 SOLUTION ORAL at 12:14

## 2025-04-07 RX ADMIN — HEPARIN SODIUM 2000 UNITS: 1000 INJECTION, SOLUTION INTRAVENOUS; SUBCUTANEOUS at 11:25

## 2025-04-07 RX ADMIN — FENTANYL CITRATE 25 MCG: 50 INJECTION, SOLUTION INTRAMUSCULAR; INTRAVENOUS at 12:15

## 2025-04-07 RX ADMIN — SODIUM CHLORIDE, POTASSIUM CHLORIDE, SODIUM LACTATE AND CALCIUM CHLORIDE: 600; 310; 30; 20 INJECTION, SOLUTION INTRAVENOUS at 10:42

## 2025-04-07 RX ADMIN — Medication 100 MG: at 14:29

## 2025-04-07 RX ADMIN — PROPOFOL 100 MG: 10 INJECTION, EMULSION INTRAVENOUS at 11:11

## 2025-04-07 RX ADMIN — FUROSEMIDE 40 MG: 10 INJECTION, SOLUTION INTRAVENOUS at 14:29

## 2025-04-07 RX ADMIN — SENNOSIDES AND DOCUSATE SODIUM 1 TABLET: 50; 8.6 TABLET ORAL at 21:13

## 2025-04-07 RX ADMIN — PYRIDOXINE HCL TAB 50 MG 50 MG: 50 TAB at 17:48

## 2025-04-07 RX ADMIN — FOLIC ACID 1 MG: 1 TABLET ORAL at 14:29

## 2025-04-07 RX ADMIN — ROCURONIUM BROMIDE 50 MG: 10 INJECTION INTRAVENOUS at 10:48

## 2025-04-07 RX ADMIN — DOCUSATE SODIUM 100 MG: 100 CAPSULE, LIQUID FILLED ORAL at 17:48

## 2025-04-07 RX ADMIN — DEXAMETHASONE SODIUM PHOSPHATE 8 MG: 4 INJECTION INTRA-ARTICULAR; INTRALESIONAL; INTRAMUSCULAR; INTRAVENOUS; SOFT TISSUE at 10:48

## 2025-04-07 RX ADMIN — SODIUM CHLORIDE: 9 INJECTION, SOLUTION INTRAVENOUS at 14:35

## 2025-04-07 RX ADMIN — ESMOLOL HYDROCHLORIDE 50 MG: 100 INJECTION, SOLUTION INTRAVENOUS at 11:30

## 2025-04-07 RX ADMIN — PROTAMINE SULFATE 50 MG: 10 INJECTION, SOLUTION INTRAVENOUS at 11:35

## 2025-04-07 RX ADMIN — CEFAZOLIN 2 G: 1 INJECTION, POWDER, FOR SOLUTION INTRAMUSCULAR; INTRAVENOUS at 10:48

## 2025-04-07 RX ADMIN — DIBASIC SODIUM PHOSPHATE, MONOBASIC POTASSIUM PHOSPHATE AND MONOBASIC SODIUM PHOSPHATE 250 MG: 852; 155; 130 TABLET ORAL at 17:48

## 2025-04-07 ASSESSMENT — COGNITIVE AND FUNCTIONAL STATUS - GENERAL
DAILY ACTIVITIY SCORE: 24
SUGGESTED CMS G CODE MODIFIER MOBILITY: CH
SUGGESTED CMS G CODE MODIFIER DAILY ACTIVITY: CH
MOBILITY SCORE: 24

## 2025-04-07 ASSESSMENT — PAIN DESCRIPTION - PAIN TYPE
TYPE: ACUTE PAIN
TYPE: SURGICAL PAIN
TYPE: ACUTE PAIN
TYPE: ACUTE PAIN

## 2025-04-07 ASSESSMENT — LIFESTYLE VARIABLES
TOTAL SCORE: 0
EVER FELT BAD OR GUILTY ABOUT YOUR DRINKING: NO
ON A TYPICAL DAY WHEN YOU DRINK ALCOHOL HOW MANY DRINKS DO YOU HAVE: 0
EVER HAD A DRINK FIRST THING IN THE MORNING TO STEADY YOUR NERVES TO GET RID OF A HANGOVER: NO
CONSUMPTION TOTAL: NEGATIVE
ALCOHOL_USE: NO
TOTAL SCORE: 0
TOTAL SCORE: 0
HOW MANY TIMES IN THE PAST YEAR HAVE YOU HAD 5 OR MORE DRINKS IN A DAY: 0
HAVE YOU EVER FELT YOU SHOULD CUT DOWN ON YOUR DRINKING: NO
HAVE PEOPLE ANNOYED YOU BY CRITICIZING YOUR DRINKING: NO
AVERAGE NUMBER OF DAYS PER WEEK YOU HAVE A DRINK CONTAINING ALCOHOL: 0

## 2025-04-07 ASSESSMENT — SOCIAL DETERMINANTS OF HEALTH (SDOH)
IN THE PAST 12 MONTHS, HAS THE ELECTRIC, GAS, OIL, OR WATER COMPANY THREATENED TO SHUT OFF SERVICE IN YOUR HOME?: NO
WITHIN THE LAST YEAR, HAVE TO BEEN RAPED OR FORCED TO HAVE ANY KIND OF SEXUAL ACTIVITY BY YOUR PARTNER OR EX-PARTNER?: NO
WITHIN THE LAST YEAR, HAVE YOU BEEN KICKED, HIT, SLAPPED, OR OTHERWISE PHYSICALLY HURT BY YOUR PARTNER OR EX-PARTNER?: NO
WITHIN THE LAST YEAR, HAVE YOU BEEN HUMILIATED OR EMOTIONALLY ABUSED IN OTHER WAYS BY YOUR PARTNER OR EX-PARTNER?: NO
WITHIN THE LAST YEAR, HAVE YOU BEEN AFRAID OF YOUR PARTNER OR EX-PARTNER?: NO
WITHIN THE PAST 12 MONTHS, YOU WORRIED THAT YOUR FOOD WOULD RUN OUT BEFORE YOU GOT THE MONEY TO BUY MORE: NEVER TRUE
WITHIN THE PAST 12 MONTHS, THE FOOD YOU BOUGHT JUST DIDN'T LAST AND YOU DIDN'T HAVE MONEY TO GET MORE: NEVER TRUE

## 2025-04-07 ASSESSMENT — PATIENT HEALTH QUESTIONNAIRE - PHQ9
SUM OF ALL RESPONSES TO PHQ9 QUESTIONS 1 AND 2: 0
1. LITTLE INTEREST OR PLEASURE IN DOING THINGS: NOT AT ALL
2. FEELING DOWN, DEPRESSED, IRRITABLE, OR HOPELESS: NOT AT ALL

## 2025-04-07 ASSESSMENT — PAIN SCALES - GENERAL: PAIN_LEVEL: 0

## 2025-04-07 ASSESSMENT — FIBROSIS 4 INDEX: FIB4 SCORE: 3.37

## 2025-04-07 NOTE — ANESTHESIA PROCEDURE NOTES
Airway    Date/Time: 4/7/2025 10:48 AM    Performed by: Ean Pavon M.D.  Authorized by: Ean Pavon M.D.    Location:  OR  Urgency:  Elective  Difficult Airway: No    Indications for Airway Management:  Anesthesia      Spontaneous Ventilation: absent    Sedation Level:  Deep  Preoxygenated: Yes    Patient Position:  Sniffing  Mask Difficulty Assessment:  0 - not attempted  Final Airway Type:  Endotracheal airway  Final Endotracheal Airway:  ETT  Cuffed: Yes    Technique Used for Successful ETT Placement:  Direct laryngoscopy  Devices/Methods Used in Placement:  Intubating stylet    Insertion Site:  Oral  Blade Type:  Delgado  Laryngoscope Blade/Videolaryngoscope Blade Size:  2  ETT Size (mm):  6.5  Measured from:  Teeth  ETT to Teeth (cm):  22  Placement Verified by: auscultation and capnometry    Cormack-Lehane Classification:  Grade IIa - partial view of glottis  Number of Attempts at Approach:  1

## 2025-04-07 NOTE — OP REPORT
"TRANSCATHETER AORTIC VALVE REPLACEMENT REPORT    Referring Provider: Phoenix Xiong M.D.    INTERVENTIONAL CARDIOLOGIST: Geovany Hirsch MD  CARDIAC SURGEON: Adriana Sun MD  ANESTHESIOLOGIST: Ean Pavon MD    ASSISTANT: None    IMPRESSIONS:  1. Successful TAVR with implantation of a 23 Curry Harris S3 Ultra Resilia deployed at nominal volume +1 cc via the transfemoral approach  2. Acute decompensated diastolic heart failure with LVEDP of 39 mmHg    Recommendations:  4 hour bedrest    Pre-procedure diagnosis: TAVR recommended by heart valve team. Stage D1 (symptomatic severe AS)  Post-procedure diagnosis: Same    Procedure performed  Pigtail placement/ascending aortography  Transvenous pacemaker  23 Curry S3 Ultra Resilia  Perclose closure    Procedure Description  1. Access:   A) Valve Sheath: Right femoral, 14F Curry eSheath. Fluoroscopic guidance was utilized for femoral access Dynamic ultrasound was utilized to gain access.  B) Temporary pacemaker: 6 Kazakh Left femoral vein. Fluoroscopic guidance was utilized for femoral access Dynamic ultrasound was utilized to gain access.  C) Pigtail catheter: 5/6 Kazakh right radial artery Micropuncture technique was utilized following local anesthesia with lidocaine.  A radial cocktail containing verapamil and saline was administered in the radial artery sheath    2. Procedure Description:  A TVP and pigtail catheter were placed and appropriate pacer function and implantation angle confirmed. The preclose was deployed followed by dilation of the tract with an 8F sheath. A standard 0.035\" J wire was used to deliver an catheter to the ascending aorta and then exchange for a 0.035\" Lunderquist wire. Over this wire the Curry E sheath advanced into the descending aorta. An AL1 was placed in the ascending aorta and the valve crossed with a 0.035\" strait wire. The catheter was advanced into the LV apex and wire exchanged for a 0.035\" Amplatz Super Stiff wire.   Next a " 23 mm Curry Harris S3 Ultra Resilia was deployed under rapid pacing with nominal volume  and 6 darby. Echocardiogram showed unacceptable paravalvular leak and thus the valve was re-crossed with the deployment balloon and an additional 1 cc were added followed by repeat valvuloplasty during rapid pacing. Repeat inflation was at 5.5 darby. There was trace PVL by SEFERINO. Successful valve implantation confirmed by SEFERINO. A pigtail catheter was used to perform left heart catheterization and LVEDP measured at 39 mmHg.  Protamine was administered and the Curry sheath was removed from the body after reinsertion of the dilator. The perclose sutures were tightened hemostasis was achieved. The pigtail catheter was removed. The TVP was removed.    3. Hemostasis:   A) TAVR Sheath: Perclose by Preclose technique  B) TVP: Manual  C) Pigtail access: TR band    Technical Factors  1. Complications: None  2. Estimated Blood Loss: 50 cc  3. Specimens: None  4. Contrast Volume: 45 ml  5. Sedation: General Anesthesia  6. Echo: SEFERINO

## 2025-04-07 NOTE — ANESTHESIA PROCEDURE NOTES
Arterial Line    Performed by: Ean Pavon M.D.  Authorized by: Ean Pavon M.D.    Start Time:  4/7/2025 10:50 AM  Localization: ultrasound guidance and surface landmarks    Patient Location:  OR  Indication: continuous blood pressure monitoring        Catheter Size:  20 G  Seldinger Technique?: Yes    Laterality:  Left  Site:  Radial artery  Line Secured:  Antimicrobial disc, tape and transparent dressing  Events: patient tolerated procedure well with no complications

## 2025-04-07 NOTE — CARE PLAN
The patient is Watcher - Medium risk of patient condition declining or worsening    Shift Goals  Clinical Goals: hemodynamic stability  Patient Goals: comfort, go home    Progress made toward(s) clinical / shift goals:    Problem: Pain - Standard  Goal: Alleviation of pain or a reduction in pain to the patient’s comfort goal  Outcome: Progressing     Problem: Knowledge Deficit - Standard  Goal: Patient and family/care givers will demonstrate understanding of plan of care, disease process/condition, diagnostic tests and medications  Outcome: Progressing       Patient is not progressing towards the following goals:

## 2025-04-07 NOTE — OP REPORT
Full Operative Report  Date of Service: 04/07/25    PreOp Diagnosis: severe symptomatic aortic stenosis, chronic diastolic CHF, HTN, CKD stage 3a, cirrhosis of liver with ascites and portal hypertension, alcohol abuse, splenomegaly, anemia due to CKD, GERD, esophageal varices      PostOp Diagnosis: same, acute on chronic CHF as evidenced by elevated LVEDP      Procedure(s):  TRANSCATHETER AORTIC VALVE REPLACEMENT - Wound Class: Clean  ECHOCARDIOGRAM, TRANSESOPHAGEAL, INTRAOPERATIVE - Wound Class: None    Surgeon(s):  Geovany Hirsch M.D.  Adriana Sun M.D.    Anesthesiologist/Type of Anesthesia:  Anesthesiologist: Ean Pavon M.D.  Perfusionist: Rivera Lima/General    Surgical Staff:  Circulator: Dangelo Orozco R.N.; Nuzhat Mir R.N.  Scrub Person: Domenica Fontana  Radiology Technologist: Grisell de La Rosa Marquez    Specimens removed if any:  * No specimens in log *    Estimated Blood Loss: minimal    Findings: no significant paravalvular leak, elevated LVEDP 39mm Hg    Complications: none    Details:  The patient was brought to the operating room placed on the operating room table in the supine position.  After successful induction of general anesthesia endotracheal intubation, the patient was prepped and draped in the usual sterile fashion.  A left femoral venous access was obtained and a temporary transvenous pacemaker was placed in the right ventricle.  Additionally, ultrasound-guided right radial arterial and right femoral arterial access was obtained.  A pigtail catheter was placed in the right coronary sinus.  The deployment angle was determined.  A 1 cm incision was made the right groin and 2 Perclose sutures were deployed.  A 14 Indonesian eSheath was then placed in the right common femoral artery and its tip was positioned in the abdominal aorta.  The aortic valve was crossed with a wire. Pre-valve balloon dilatation was not performed. A deployment catheter with a 23 mm S3 Curry  pericardial valve at its tip was positioned across the aortic valve annulus.  The implantation balloon was inflated to a peak pressure of 6 darby at nominal volume injected.  The balloon was deflated and the deployment catheter was pulled back.  Intraoperative transesophageal echocardiography showed some paravalvular leak.  Additional volume was added to the balloon and post implant dilatation was performed to a peak pressure of 5.5 darby with volume of nominal + 1 cc. Now transesophageal echocardiography showed no significant paravalvular leak.  Wires and catheters were removed.   The invasive LVEDP was 39 mmHg.  The remainder of the operation will be dictated by Dr. Hirsch.  There were no apparent complications.  The patient tolerated the procedure well and left the operating room in stable condition.    4/7/2025 11:45 AM Adriana Sun M.D.

## 2025-04-07 NOTE — OR NURSING
1140: Pt arrived from OR, handoff received from anesthesiologist and RN. Patent asleep, breathing even and unlabored. Vitals stable. Bilateral groin sites c/d/I, soft. TR band to right wrist, clean/dry. Left art line  in place.     1145: Patient awake, oriented x4, moving all extremities. Denies presence of pain, as well as numbness and tingling.     1200: Labs drawn and sent.     1203: EKG at beside.     1215: Patient medicated for pain.     1319: Report give to T8 Zeke PARR. Patient resting, states pain is at tolerable level.     1330: Belongings retrieved form locker. Groin sites remain c/d/I, and soft. R Bilateral wrists with dressing c/d/I.     1338: Patient transported to 807/2, in stable condition. Patient's sister and friend updated on status and room assignment.

## 2025-04-07 NOTE — ANESTHESIA POSTPROCEDURE EVALUATION
Patient: Denisse Abel    Procedure Summary       Date: 04/07/25 Room / Location: [place] [address] / SURGERY [facility]    Anesthesia Start: 1042 Anesthesia Stop: 1152    Procedures:       TRANSCATHETER AORTIC VALVE REPLACEMENT (Bilateral: Groin)      ECHOCARDIOGRAM, TRANSESOPHAGEAL, INTRAOPERATIVE (Mouth) Diagnosis: (SEVERE AORTIC STENOSIS)    Surgeons: Geovany Hirsch M.D. Responsible Provider: Ean Pavon M.D.    Anesthesia Type: general ASA Status: 4            Final Anesthesia Type: general  Last vitals  BP   Blood Pressure : (!) 146/65    Temp   36.2 °C (97.2 °F)    Pulse   82   Resp   18    SpO2   96 %      Anesthesia Post Evaluation    Patient location during evaluation: PACU  Patient participation: complete - patient participated  Level of consciousness: awake  Pain score: 0    Airway patency: patent  Anesthetic complications: no  Cardiovascular status: adequate  Respiratory status: acceptable and face mask  Hydration status: stable    PONV: controlled          No notable events documented.     Nurse Pain Score: 0 (NPRS)

## 2025-04-07 NOTE — ANESTHESIA PROCEDURE NOTES
SEFERINO    Date/Time: 4/7/2025 10:56 AM    Performed by: Ean Pavon M.D.  Authorized by: Ean Pavon M.D.    Start Time:4/7/2025 10:56 AM  Preanesthetic Checklist: patient identified, IV checked, site marked, risks and benefits discussed, surgical consent, monitors and equipment checked, pre-op evaluation and timeout performed    Indication for SEFERINO: diagnostic   Patient Location: OR  Intubated: Yes  Bite Block: Yes  Heart Visualized: Yes  Insertion: atraumatic    **See FULL SEFERINO report in patient's chart via CV Synapse**

## 2025-04-07 NOTE — ANESTHESIA PREPROCEDURE EVALUATION
Case: [id number] Date/Time: 04/07/25 1045    Procedures:       TRANSCATHETER AORTIC VALVE REPLACEMENT WITH POTENTIAL TRANSESOPHAGEAL ECHOCARDIOGRAM      ECHOCARDIOGRAM, TRANSESOPHAGEAL, INTRAOPERATIVE    Pre-op diagnosis: SEVERE AORTIC STENOSIS    Location: [facility] / SURGERY [facility]    Surgeons: Geovany Hirsch M.D.            Relevant Problems   CARDIAC   (positive) Esophageal varices without bleeding (HCC)   (positive) HTN (hypertension), benign   (positive) Severe aortic stenosis      GI   (positive) Gastroesophageal reflux disease without esophagitis         (positive) CKD stage 3a, GFR 45-59 ml/min   (positive) Liver cirrhosis (HCC)      Other   (positive) Abnormal thyroid function test   (positive) Alcohol dependence with alcohol-induced disorder (HCC)   (positive) Anemia due to chronic kidney disease   (positive) Anxiety and depression   (positive) Ascites due to alcoholic cirrhosis (HCC)   (positive) Diastolic heart failure due to valvular disease (HCC)   (positive) Portal hypertensive gastropathy (HCC)   (positive) Splenomegaly       Physical Exam    Airway   Mallampati: II  TM distance: >3 FB  Neck ROM: full       Cardiovascular   Rhythm: regular  Rate: normal  (+) murmur, peripheral edema     Dental - normal exam           Pulmonary    Abdominal   (-) obese     Neurological - normal exam                   Anesthesia Plan    ASA 4 (severe AS)       Plan - general       Airway plan will be ETT  SEFERINO Planned        Induction: intravenous      Pertinent diagnostic labs and testing reviewed    Informed Consent:    Anesthetic plan and risks discussed with patient.    Use of blood products discussed with: patient whom consented to blood products.       Risk for SEFERINO given esophageal varices discussed with patient and she agrees to proceed.

## 2025-04-07 NOTE — PROGRESS NOTES
4 Eyes Skin Assessment Completed by SILVIA Alanis and SILVIA Kovacs.    Head WDL  Ears WDL  Nose WDL  Mouth WDL  Neck WDL  Breast/Chest WDL  Shoulder Blades WDL  Spine WDL  (R) Arm/Elbow/Hand WDL  (L) Arm/Elbow/Hand WDL  Abdomen WDL  Groin Redness groin site R/L  Scrotum/Coccyx/Buttocks WDL  (R) Leg WDL  (L) Leg WDL  (R) Heel/Foot/Toe WDL  (L) Heel/Foot/Toe WDL          Devices In Places Tele Box and Blood Pressure Cuff      Interventions In Place N/A    Possible Skin Injury No    Pictures Uploaded Into Epic N/A  Wound Consult Placed N/A  RN Wound Prevention Protocol Ordered No

## 2025-04-07 NOTE — ANESTHESIA TIME REPORT
Anesthesia Start and Stop Event Times       Date Time Event    4/7/2025 1042 Anesthesia Start     1107 Ready for Procedure     1152 Anesthesia Stop          Responsible Staff  04/07/25      Name Role Begin End    Ean Pavon M.D. Anesth 1042 1152    Rivera Lima Perfusionist 1042           Overtime Reason:  no overtime (within assigned shift)    Comments:

## 2025-04-07 NOTE — PROGRESS NOTES
Medication history reviewed with PT at bedside    [facility] is complete per PT reporting    Allergies reviewed.     Patient denies any outpatient antibiotics in the last 30 days.     Patient is not taking anticoagulants.    Dispense history is available.    Preferred pharmacy for this visit - [facility] on [address] ([phone])

## 2025-04-07 NOTE — PROGRESS NOTES
Pt arrived to unit via Hospital bed at 1345. Pt oriented to room, unit, and plan of care. Tele-monitor placed and monitor room notified. All questions answered at this time. Call light within reach; fall precautions in place.

## 2025-04-08 ENCOUNTER — APPOINTMENT (OUTPATIENT)
Dept: RADIOLOGY | Facility: MEDICAL CENTER | Age: 65
DRG: 266 | End: 2025-04-08
Attending: NURSE PRACTITIONER
Payer: MEDICARE

## 2025-04-08 VITALS
DIASTOLIC BLOOD PRESSURE: 64 MMHG | RESPIRATION RATE: 18 BRPM | OXYGEN SATURATION: 95 % | TEMPERATURE: 97.9 F | SYSTOLIC BLOOD PRESSURE: 148 MMHG | HEART RATE: 104 BPM | HEIGHT: 66 IN | BODY MASS INDEX: 22.25 KG/M2 | WEIGHT: 138.45 LBS

## 2025-04-08 PROBLEM — N18.32 STAGE 3B CHRONIC KIDNEY DISEASE: Status: ACTIVE | Noted: 2024-11-14

## 2025-04-08 PROBLEM — D63.8 ANEMIA IN OTHER CHRONIC DISEASES CLASSIFIED ELSEWHERE: Status: ACTIVE | Noted: 2024-02-02

## 2025-04-08 LAB
ACT BLD: 245 SEC (ref 74–137)
ALBUMIN SERPL BCP-MCNC: 3.5 G/DL (ref 3.2–4.9)
ALBUMIN/GLOB SERPL: 1.2 G/DL
ALP SERPL-CCNC: 119 U/L (ref 30–99)
ALT SERPL-CCNC: 11 U/L (ref 2–50)
ANION GAP SERPL CALC-SCNC: 12 MMOL/L (ref 7–16)
AST SERPL-CCNC: 27 U/L (ref 12–45)
BILIRUB SERPL-MCNC: 0.3 MG/DL (ref 0.1–1.5)
BUN SERPL-MCNC: 33 MG/DL (ref 8–22)
CALCIUM ALBUM COR SERPL-MCNC: 9.4 MG/DL (ref 8.5–10.5)
CALCIUM SERPL-MCNC: 9 MG/DL (ref 8.5–10.5)
CHLORIDE SERPL-SCNC: 100 MMOL/L (ref 96–112)
CO2 SERPL-SCNC: 21 MMOL/L (ref 20–33)
CREAT SERPL-MCNC: 1.48 MG/DL (ref 0.5–1.4)
EKG IMPRESSION: NORMAL
ERYTHROCYTE [DISTWIDTH] IN BLOOD BY AUTOMATED COUNT: 45.6 FL (ref 35.9–50)
GFR SERPLBLD CREATININE-BSD FMLA CKD-EPI: 39 ML/MIN/1.73 M 2
GLOBULIN SER CALC-MCNC: 2.9 G/DL (ref 1.9–3.5)
GLUCOSE SERPL-MCNC: 127 MG/DL (ref 65–99)
HCT VFR BLD AUTO: 20.7 % (ref 37–47)
HGB BLD-MCNC: 6.6 G/DL (ref 12–16)
HGB BLD-MCNC: 6.7 G/DL (ref 12–16)
HOLDING TUBE BB 8507: NORMAL
MCH RBC QN AUTO: 24.6 PG (ref 27–33)
MCHC RBC AUTO-ENTMCNC: 32.4 G/DL (ref 32.2–35.5)
MCV RBC AUTO: 76.1 FL (ref 81.4–97.8)
NT-PROBNP SERPL IA-MCNC: 591 PG/ML (ref 0–125)
PLATELET # BLD AUTO: 153 K/UL (ref 164–446)
PMV BLD AUTO: 11.9 FL (ref 9–12.9)
POTASSIUM SERPL-SCNC: 3.9 MMOL/L (ref 3.6–5.5)
PROT SERPL-MCNC: 6.4 G/DL (ref 6–8.2)
RBC # BLD AUTO: 2.72 M/UL (ref 4.2–5.4)
SODIUM SERPL-SCNC: 133 MMOL/L (ref 135–145)
WBC # BLD AUTO: 6 K/UL (ref 4.8–10.8)

## 2025-04-08 PROCEDURE — 93010 ELECTROCARDIOGRAM REPORT: CPT | Performed by: INTERNAL MEDICINE

## 2025-04-08 PROCEDURE — 80053 COMPREHEN METABOLIC PANEL: CPT

## 2025-04-08 PROCEDURE — 71045 X-RAY EXAM CHEST 1 VIEW: CPT

## 2025-04-08 PROCEDURE — 83880 ASSAY OF NATRIURETIC PEPTIDE: CPT

## 2025-04-08 PROCEDURE — 99238 HOSP IP/OBS DSCHRG MGMT 30/<: CPT | Mod: FS | Performed by: INTERNAL MEDICINE

## 2025-04-08 PROCEDURE — 700102 HCHG RX REV CODE 250 W/ 637 OVERRIDE(OP): Performed by: NURSE PRACTITIONER

## 2025-04-08 PROCEDURE — A9270 NON-COVERED ITEM OR SERVICE: HCPCS | Performed by: NURSE PRACTITIONER

## 2025-04-08 PROCEDURE — 85027 COMPLETE CBC AUTOMATED: CPT

## 2025-04-08 PROCEDURE — 93005 ELECTROCARDIOGRAM TRACING: CPT | Mod: TC | Performed by: NURSE PRACTITIONER

## 2025-04-08 PROCEDURE — 85018 HEMOGLOBIN: CPT

## 2025-04-08 PROCEDURE — 97535 SELF CARE MNGMENT TRAINING: CPT

## 2025-04-08 PROCEDURE — 36415 COLL VENOUS BLD VENIPUNCTURE: CPT

## 2025-04-08 PROCEDURE — 97161 PT EVAL LOW COMPLEX 20 MIN: CPT

## 2025-04-08 RX ORDER — PNV NO.95/FERROUS FUM/FOLIC AC 28MG-0.8MG
325 TABLET ORAL 2 TIMES DAILY
Qty: 200 TABLET | Refills: 3 | Status: SHIPPED | OUTPATIENT
Start: 2025-04-08

## 2025-04-08 RX ADMIN — PYRIDOXINE HCL TAB 50 MG 50 MG: 50 TAB at 04:37

## 2025-04-08 RX ADMIN — FOLIC ACID 1 MG: 1 TABLET ORAL at 04:33

## 2025-04-08 RX ADMIN — HYDROCODONE BITARTRATE AND ACETAMINOPHEN 1 TABLET: 5; 325 TABLET ORAL at 00:20

## 2025-04-08 RX ADMIN — OMEPRAZOLE 40 MG: 20 CAPSULE, DELAYED RELEASE ORAL at 04:33

## 2025-04-08 RX ADMIN — Medication 100 MG: at 04:33

## 2025-04-08 RX ADMIN — Medication 400 MG: at 04:33

## 2025-04-08 RX ADMIN — DIBASIC SODIUM PHOSPHATE, MONOBASIC POTASSIUM PHOSPHATE AND MONOBASIC SODIUM PHOSPHATE 250 MG: 852; 155; 130 TABLET ORAL at 04:33

## 2025-04-08 RX ADMIN — SPIRONOLACTONE 25 MG: 25 TABLET ORAL at 04:33

## 2025-04-08 ASSESSMENT — COGNITIVE AND FUNCTIONAL STATUS - GENERAL
MOBILITY SCORE: 24
SUGGESTED CMS G CODE MODIFIER MOBILITY: CH

## 2025-04-08 ASSESSMENT — GAIT ASSESSMENTS
GAIT LEVEL OF ASSIST: SUPERVISED
DISTANCE (FEET): 600

## 2025-04-08 ASSESSMENT — PAIN DESCRIPTION - PAIN TYPE
TYPE: ACUTE PAIN
TYPE: ACUTE PAIN

## 2025-04-08 ASSESSMENT — FIBROSIS 4 INDEX: FIB4 SCORE: 3.51

## 2025-04-08 NOTE — PROGRESS NOTES
Bedside report received from off going RN/tech: Ruba, assumed care of patient.     Fall Risk Score: NO RISK  Fall risk interventions in place: Up Self  Bed type: Regular (John Score less than 17 interventions in place)  Patient on cardiac monitor: Yes  IVF/IV medications: Not Applicable   Oxygen: Room Air  Bedside sitter: Not Applicable   Isolation: Not applicable

## 2025-04-08 NOTE — DISCHARGE PLANNING
Post Acute Navigator Team    Patient screened based off of following information:    EOL Index high risk score 48  High LACE + score 57  6 click Mobility score 24   6 click ADL score 24   Readmission: No     Code Status: Full Code  Advanced Directive present in EMR: Yes  POLST present in EMR: No    Per chart review, patient is not a candidate for home-based post acute programs at this time.     Please reach out to me directly should care team feel patient will benefit from a discharge goals of care conversation regarding outpatient/home palliative care or hospice.

## 2025-04-08 NOTE — THERAPY
Physical Therapy   Initial Evaluation     Patient Name: Denisse Abel  Age:  64 y.o., Sex:  female  Medical Record #: [id number]  Today's Date: 4/8/2025          Assessment  Patient is 64 y.o. female with a diagnosis of s/p TAVR.  Additional factors influencing patient status / progress Pt is seen today for phase I CR education. Pt is attentive to education, handout reviewed. Pt shows good tolerance for ambulation, no SOB, BP seated before ambulation: 123/54, BP seated after ambulation 148/64. Resting HR 78, HR during ambulation 104. Pt lives alone, but friends will help with driving for next week. See details below. .      Plan         DC Equipment Recommendations: None  Discharge Recommendations: Other - (pt to discuss phase II CR at follow up)  Patient will not be actively followed for physical therapy services at this time, however may be seen if requested by physician for 1 more visit within 30 days to address any discharge or equipment needs.             Objective       04/08/25 0831   Initial Contact Note    Initial Contact Note Order Received and Verified, Evaluation Only - Patient Does Not Require Further Acute Physical Therapy at this Time.  However, May Benefit from Post Acute Therapy for Higher Level Functional Deficits.   Vitals   Pulse (!) 104  (walking)   Blood Pressure  (!) 148/64  (sitting after walking)   Pulse Oximetry 95 %   Vitals Comments BP sitting before walking 123/54   Pain 0 - 10 Group   Therapist Pain Assessment During Activity;Nurse Notified;0   Prior Living Situation   Prior Services None   Housing / Facility 1 Story House   Steps Into Home 1   Steps In Home 0   Equipment Owned None   Lives with - Patient's Self Care Capacity Alone and Able to Care For Self   Comments friends helping with driving for first week.   Prior Level of Functional Mobility   Bed Mobility Independent   Transfer Status Independent   Ambulation Independent   Ambulation Distance community   Assistive Devices  Used None   Stairs Independent   Cognition    Cognition / Consciousness WDL   Level of Consciousness Alert   Active ROM Lower Body    Active ROM Lower Body  WDL   Strength Lower Body   Lower Body Strength  WDL   Comments functionally observed   Balance Assessment   Sitting Balance (Static) Good   Sitting Balance (Dynamic) Good   Standing Balance (Static) Good   Standing Balance (Dynamic) Good   Weight Shift Sitting Good   Weight Shift Standing Good   Comments no AD   Bed Mobility    Supine to Sit Supervised   Scooting Supervised   Gait Analysis   Gait Level Of Assist Supervised   Assistive Device None   Distance (Feet) 600   # of Times Distance was Traveled 1   Functional Mobility   Sit to Stand Supervised   Bed, Chair, Wheelchair Transfer Supervised   6 Clicks Assessment - How much HELP from from another person do you currently need... (If the patient hasn't done an activity recently, how much help from another person do you think he/she would need if he/she tried?)   Turning from your back to your side while in a flat bed without using bedrails? 4   Moving from lying on your back to sitting on the side of a flat bed without using bedrails? 4   Moving to and from a bed to a chair (including a wheelchair)? 4   Standing up from a chair using your arms (e.g., wheelchair, or bedside chair)? 4   Walking in hospital room? 4   Climbing 3-5 steps with a railing? 4   6 clicks Mobility Score 24   Activity Tolerance   Standing 8+   Education Group   Education Provided Cardiac Precautions   Cardiac Precautions Patient Response Patient;Acceptance;Handout;Explanation;Verbal Demonstration;Action Demonstration   Additional Comments TAVR handout given and reviewed   Anticipated Discharge Equipment and Recommendations   DC Equipment Recommendations None   Discharge Recommendations Other -  (pt to discuss phase II CR at follow up)   Interdisciplinary Plan of Care Collaboration   IDT Collaboration with  Nursing   Patient Position at  End of Therapy Edge of Bed   Collaboration Comments nsg updated   Session Information   Date / Session Number  4/8-1x only

## 2025-04-08 NOTE — CARE PLAN
The patient is Watcher - Medium risk of patient condition declining or worsening    Shift Goals  Clinical Goals: Moitor vitals, monitor sites, q4 neuros  Patient Goals: Comfort, sleep  Family Goals: MORGAN    Progress made toward(s) clinical / shift goals:      Problem: Pain - Standard  Goal: Alleviation of pain or a reduction in pain to the patient’s comfort goal  Outcome: Progressing  Note: Pt reports pain well controlled with current therapy. Additional non-pharmacologic interventions implemented. Education on pain reduction goals, pain rating scale, and potential side effects of pharmacologic interventions. Demonstrates use of appropriate diversional activities and/or relaxation techniques.     Problem: Knowledge Deficit - Standard  Goal: Patient and family/care givers will demonstrate understanding of plan of care, disease process/condition, diagnostic tests and medications  Outcome: Progressing

## 2025-04-08 NOTE — PROGRESS NOTES
Monitor Summary  Rhythm: Normal Sinus Rhythm  Rate: 80s  Ectopy: None Noted  .19 / .07 / .40    12 hr Chart check.

## 2025-04-08 NOTE — PROGRESS NOTES
Pts hemoglobin dropped from 7.6 to 6.7. Pt is experiencing no signs of bleeding, sites are soft. Pt is asymptomatic, vitals remain stable. On call cardiologist Arcelia notified. Continuing plan of care. Recheck hemoglobin in the morning, q 6 h/h already ordered.

## 2025-04-08 NOTE — DISCHARGE PLANNING
HTH/SCP TCN chart review completed.  Due to low LACE and high 6 clicks score (24).  Noted patient ambulated 500 feet with no AD or assist and is currently on RA. Patient has [facility] PCP, with f/u scheduled for 4/28.  No TCN needs anticipated at this time. Should post acute discharge needs arise warranting TCN involvement, please contact TCN team via Voalte. Thank you.

## 2025-04-08 NOTE — DISCHARGE SUMMARY
PRIMARY DISCHARGE DIAGNOSIS: Status post transcatheter aortic valve replacement.    DISCHARGE DIAGNOSIS:  S/P TAVR    PROCEDURES/TESTIN. Successful transcatheter aortic valve replacement (TAVR) with #23 +1 ml Curry Harris 3 ultra Resilia valve, transfemoral approach under general anesthesia on 25  2. Intraoperative transesophageal echocardiogram showing   SEFERINO for TAVR.  Hyperdynamic LV systolic function, LVEF 70%.  Normal RV   size and systolic function.  Mild mitral stenosis with mean gradient 5   mmHg  at HR 90bpm.  Moderate to severe AS.  Ascites noted.    Successful placment of bioprosthetic AV.  Valve appears to function   normally, well seated, trivial perivalvular leak.  Mean gradient 7   mmHg.  3. CXR on 25 showing   1.  No acute cardiopulmonary disease.  2.  Cardiomegaly  4. EKG on 25 showing SR rate of 82 bpm  5. Labs   Lab Results   Component Value Date/Time    SODIUM 133 (L) 2025 12:08 AM    POTASSIUM 3.9 2025 12:08 AM    CHLORIDE 100 2025 12:08 AM    CO2 21 2025 12:08 AM    GLUCOSE 127 (H) 2025 12:08 AM    BUN 33 (H) 2025 12:08 AM    CREATININE 1.48 (H) 2025 12:08 AM    CREATININE 0.8 2009 08:09 AM       Lab Results   Component Value Date/Time    PROTHROMBTM 15.6 (H) 2025 12:45 PM    INR 1.24 (H) 2025 12:45 PM       Lab Results   Component Value Date/Time    WBC 6.0 2025 12:08 AM    RBC 2.72 (L) 2025 12:08 AM    HEMOGLOBIN 6.6 (L) 2025 06:02 AM    HEMATOCRIT 20.7 (L) 2025 12:08 AM    MCV 76.1 (L) 2025 12:08 AM    MCH 24.6 (L) 2025 12:08 AM    MCHC 32.4 2025 12:08 AM    MPV 11.9 2025 12:08 AM    NEUTSPOLYS 64.30 2025 12:45 PM    LYMPHOCYTES 23.10 2025 12:45 PM    MONOCYTES 6.60 2025 12:45 PM    EOSINOPHILS 5.00 2025 12:45 PM    BASOPHILS 0.70 2025 12:45 PM    HYPOCHROMIA 1+ 2024 05:48 AM    ANISOCYTOSIS 1+ 2025 11:04 AM       HOSPITAL  COURSE: The patient is a pleasant 64 year old female with severe symptomatic aortic stenosis, acute on chronic diastolic heart failure due to valvular heart disease, HLD with non-obstructive CAD, alcoholic cirrhosis with splenomegaly/portal HTN/esophageal varices/chronic anemia/ascites, CKD stage 3b, HTN, and prior ETOH abuse. Due to the patient's symptoms, the patient underwent successful TAVR described as above. Post-procedure, the patient did well. They did require  one dose of IV diuresis during their stay and will continue on their home dose of oral diuretics post-operatively. Acute heart failure exacerbation as evidenced by: signs and symptoms of weakness/fatigue; Echocardiogram showing severe valvular stenosis; corroborated by elevated BNP of 591 and intra-operatively LVEDP of 39 mmHg. They were able to ambulate without difficulty. No further events were noted during their stay other than persistent asymptomatic anemia, which remained stable upon trend of hemoglobin. She will repeat a CBC in one week, call for any symptoms of concern or signs of bleeding. We will add supplemental iron tablets to her home medication regimen and stressed importance of urgent GI and PCP follow up for anemia concerns. They are now off oxygen and are to be discharged to home with her friend.    DISCHARGE MEDICATIONS:      Medication List        START taking these medications        Instructions   Iron 325 (65 Fe) MG Tabs   Take 325 mg by mouth 2 times a day.  Dose: 325 mg            CONTINUE taking these medications        Instructions   fluticasone 50 MCG/ACT nasal spray  Commonly known as: Flonase   Administer 2 Sprays into affected nostril(S) every morning.  Dose: 2 [place]     FOLIC ACID PO   Take 1 Tablet by mouth every day. OTC  Dose: 1 Tablet     HYDROcodone-acetaminophen 5-325 MG Tabs per tablet  Commonly known as: Norco   Take 1 Tablet by mouth 2 times a day as needed.  Dose: 1 Tablet     magnesium oxide 400 (240 Mg) MG  Tabs   Take 1 Tablet by mouth 2 times a day.  Dose: 400 mg     omeprazole 40 MG delayed-release capsule  Commonly known as: PriLOSEC   Take 1 Capsule by mouth every day.  Dose: 40 mg     phosphorus 250 MG tablet  Commonly known as: K-Phos-Neutral   Take 1 Tablet by mouth 2 times a day.  Dose: 1 Tablet     pyridoxine 50 MG Tabs  Commonly known as: Vitamin B-6   Take 1 Tablet by mouth every day.  Dose: 50 mg     spironolactone 25 MG Tabs  Commonly known as: Aldactone   Take 1 Tablet by mouth every day.  Dose: 25 mg     thiamine 100 MG tablet  Commonly known as: Thiamine   Take 1 Tablet by mouth every day.  Dose: 100 mg            DISCHARGE INSTRUCTIONS: They are given discharge instructions on potential post-operative complications and symptoms to watch out for. Their groin sites were checked and were clean, dry, and intact. Patient or family to notify us for any complications noted on the discharge instructions. They will follow up with myself, Dayan Lynne DNP, TOMAS, on Tuesday in our cardiology office. They will get a repeat CBC before their follow up appointment. They will then follow up with a repeat echocardiogram in one month for post TAVR assessment.      Future Appointments   Date Time Provider Department Center   4/17/2025  7:15 AM ROBBY Suarez None   4/28/2025  1:00 PM Paty Blankenship M.D. [facility]   5/7/2025 12:15 PM [facility] EXAM 10 [facility]   5/15/2025  8:15 AM ROBBY Suarez None   7/24/2025  1:15 PM ROBBY Helms None   4/7/2026  1:15 PM [facility] EXAM 9 [facility]     Discharge time spent with patient was 22 minutes.

## 2025-04-08 NOTE — CARE PLAN
Pt is a watcher:    Pt progressing:  Problem: Pain - Standard  Goal: Alleviation of pain or a reduction in pain to the patient’s comfort goal  4/8/2025 0150 by Ruba Infante R.N.  Outcome: Progressing  Note: Pt reports pain well controlled with current therapy. Additional non-pharmacologic interventions implemented. Education on pain reduction goals, pain rating scale, and potential side effects of pharmacologic interventions. Demonstrates use of appropriate diversional activities and/or relaxation techniques.  4/8/2025 0056 by Ruba Infante R.N.  Outcome: Progressing  Note: Pt reports pain well controlled with current therapy. Additional non-pharmacologic interventions implemented. Education on pain reduction goals, pain rating scale, and potential side effects of pharmacologic interventions. Demonstrates use of appropriate diversional activities and/or relaxation techniques.     Problem: Knowledge Deficit - Standard  Goal: Patient and family/care givers will demonstrate understanding of plan of care, disease process/condition, diagnostic tests and medications  4/8/2025 0150 by Ruba Infante R.N.  Outcome: Progressing  4/8/2025 0056 by Ruba Infante R.N.  Outcome: Progressing     Problem: Day of surgery post CABG/Heart valve replacement  Goal: Stabilization in immediate post op period  Outcome: Progressing  Intervention: VS q 15 min x 4 hours, then q 1 hour. Include temperature immediately upon arrival. Check CO/CI q 2-4 hours and PRN  Note: Post op vitals complete  Intervention: A-Fib and DVT prophylaxis per MD order or contraindications documented (refer to DVT/VTE problem on Care Plan)  Note: DVT prophylaxis ordered per MD

## 2025-04-09 PROBLEM — I50.20 ACC/AHA STAGE C SYSTOLIC HEART FAILURE (HCC): Status: ACTIVE | Noted: 2025-04-09

## 2025-04-10 ENCOUNTER — TELEPHONE (OUTPATIENT)
Dept: HEALTH INFORMATION MANAGEMENT | Facility: OTHER | Age: 65
End: 2025-04-10
Payer: MEDICARE

## 2025-04-14 ENCOUNTER — HOSPITAL ENCOUNTER (OUTPATIENT)
Dept: LAB | Facility: MEDICAL CENTER | Age: 65
End: 2025-04-14
Attending: NURSE PRACTITIONER
Payer: MEDICARE

## 2025-04-14 DIAGNOSIS — D63.1 ANEMIA DUE TO STAGE 3A CHRONIC KIDNEY DISEASE: ICD-10-CM

## 2025-04-14 DIAGNOSIS — N18.31 ANEMIA DUE TO STAGE 3A CHRONIC KIDNEY DISEASE: ICD-10-CM

## 2025-04-14 LAB
ERYTHROCYTE [DISTWIDTH] IN BLOOD BY AUTOMATED COUNT: 45.1 FL (ref 35.9–50)
HCT VFR BLD AUTO: 23.1 % (ref 37–47)
HGB BLD-MCNC: 7.1 G/DL (ref 12–16)
MCH RBC QN AUTO: 23.7 PG (ref 27–33)
MCHC RBC AUTO-ENTMCNC: 30.7 G/DL (ref 32.2–35.5)
MCV RBC AUTO: 77 FL (ref 81.4–97.8)
PLATELET # BLD AUTO: 216 K/UL (ref 164–446)
PMV BLD AUTO: 11.4 FL (ref 9–12.9)
RBC # BLD AUTO: 3 M/UL (ref 4.2–5.4)
WBC # BLD AUTO: 6.9 K/UL (ref 4.8–10.8)

## 2025-04-14 PROCEDURE — 85027 COMPLETE CBC AUTOMATED: CPT

## 2025-04-14 PROCEDURE — 36415 COLL VENOUS BLD VENIPUNCTURE: CPT

## 2025-04-16 ENCOUNTER — OFFICE VISIT (OUTPATIENT)
Dept: CARDIOLOGY | Facility: MEDICAL CENTER | Age: 65
End: 2025-04-16
Attending: NURSE PRACTITIONER
Payer: MEDICARE

## 2025-04-16 ENCOUNTER — TELEPHONE (OUTPATIENT)
Dept: CARDIOLOGY | Facility: MEDICAL CENTER | Age: 65
End: 2025-04-16

## 2025-04-16 VITALS
BODY MASS INDEX: 22.48 KG/M2 | HEART RATE: 90 BPM | OXYGEN SATURATION: 98 % | HEIGHT: 66 IN | DIASTOLIC BLOOD PRESSURE: 64 MMHG | WEIGHT: 139.9 LBS | SYSTOLIC BLOOD PRESSURE: 118 MMHG | RESPIRATION RATE: 16 BRPM

## 2025-04-16 DIAGNOSIS — K70.31 ASCITES DUE TO ALCOHOLIC CIRRHOSIS (HCC): ICD-10-CM

## 2025-04-16 DIAGNOSIS — Z95.2 S/P TAVR (TRANSCATHETER AORTIC VALVE REPLACEMENT): ICD-10-CM

## 2025-04-16 DIAGNOSIS — I38 ACUTE DIASTOLIC HEART FAILURE DUE TO VALVULAR DISEASE (HCC): ICD-10-CM

## 2025-04-16 DIAGNOSIS — D63.8 ANEMIA IN OTHER CHRONIC DISEASES CLASSIFIED ELSEWHERE: ICD-10-CM

## 2025-04-16 DIAGNOSIS — I10 HTN (HYPERTENSION), BENIGN: ICD-10-CM

## 2025-04-16 DIAGNOSIS — I50.31 ACUTE DIASTOLIC HEART FAILURE DUE TO VALVULAR DISEASE (HCC): ICD-10-CM

## 2025-04-16 DIAGNOSIS — N18.32 STAGE 3B CHRONIC KIDNEY DISEASE: ICD-10-CM

## 2025-04-16 DIAGNOSIS — R94.30 ELEVATED LEFT VENTRICULAR END-DIASTOLIC PRESSURE (LVEDP): ICD-10-CM

## 2025-04-16 DIAGNOSIS — I25.10 CORONARY ARTERY DISEASE INVOLVING NATIVE CORONARY ARTERY OF NATIVE HEART WITHOUT ANGINA PECTORIS: ICD-10-CM

## 2025-04-16 DIAGNOSIS — R16.1 SPLENOMEGALY: ICD-10-CM

## 2025-04-16 PROBLEM — I50.20 ACC/AHA STAGE C SYSTOLIC HEART FAILURE (HCC): Status: RESOLVED | Noted: 2025-04-09 | Resolved: 2025-04-16

## 2025-04-16 PROBLEM — K74.60 LIVER CIRRHOSIS (HCC): Status: RESOLVED | Noted: 2024-01-22 | Resolved: 2025-04-16

## 2025-04-16 LAB — EKG IMPRESSION: NORMAL

## 2025-04-16 PROCEDURE — 93010 ELECTROCARDIOGRAM REPORT: CPT | Performed by: INTERNAL MEDICINE

## 2025-04-16 PROCEDURE — 99214 OFFICE O/P EST MOD 30 MIN: CPT | Performed by: NURSE PRACTITIONER

## 2025-04-16 PROCEDURE — 93005 ELECTROCARDIOGRAM TRACING: CPT | Mod: TC | Performed by: NURSE PRACTITIONER

## 2025-04-16 PROCEDURE — 99212 OFFICE O/P EST SF 10 MIN: CPT | Performed by: NURSE PRACTITIONER

## 2025-04-16 ASSESSMENT — ENCOUNTER SYMPTOMS
SHORTNESS OF BREATH: 0
DIZZINESS: 0
MYALGIAS: 0
PALPITATIONS: 0
CLAUDICATION: 0
FEVER: 0
PND: 0
ABDOMINAL PAIN: 0
COUGH: 0
ORTHOPNEA: 0

## 2025-04-16 ASSESSMENT — FIBROSIS 4 INDEX: FIB4 SCORE: 2.49

## 2025-04-16 NOTE — LETTER
PROCEDURE/SURGERY CLEARANCE FORM      Encounter Date: 4/16/2025    Patient: Denisse Abel  YOB: 1960    CARDIOLOGIST:  GAYATHRI Suarez.    Procedure/surgery:  UMBILICAL HERNIA REPAIR                                            Additional comments: OK to proceed from cardiac standpoint 1 month post TAVR.  She has low hemoglobin and this needs to be worked up with PCP and GI prior to clearance from then. SC     Dear Surgeon or Proceduralist,      Thank you for your request for cardiac stratification of our mutual patient Denisse Abel 1960. We have reviewed their [facility] records; and to the best of our understanding this patient has not had stenting, ablation, watchman, cardiothoracic surgery or hospitalization for cardiovascular reasons in the past 6 months.  Denisse Abel has been seen within the past 15 months and is considered to have non-modifiable cardiac risk for this low-risk procedure/surgery. They may proceed from a cardiovascular standpoint and may hold their antiplatelet/anticoagulation as briefly as possible. Please have patient resume this medication when hemodynamically stable to do so.     Aspirin or Prasugrel   - hold 7 days prior to procedure/surgery, resume when hemodynamically stable      Clopidrogrel or Ticagrelor  - hold 7 days for all neurological procedures, hold 5 days prior to all other procedure/surgery,  resume when hemodynamically stable     Warfarin - hold 7 days for all neurological procedures, hold 5 days prior to all other procedure/surgery and coordinate with [facility] ([phone]) INR testing and dose management.      Pradaxa/Xarelto/Eliquis/Savesya - hold 1 day prior to procedure for low bleeding risk procedure, 2 days for high bleeding risk procedure, or consider holding 3 days or longer for patients with reduced kidney function (CrCl <30mL/min) or spinal/cranial surgeries/procedures.      If they have a  mechanical heart valve, please coordinate with [facility] ([phone]) the proper management of their anticoagulant in the periprocedural or perioperative period.      Some patients have higher risk for cardiovascular complications or holding medication. If our patient has had prior complications of holding antiplatelet or anticoagulants in the past and we have seen them after these events, we have addressed these concerns with the patient. They are at an unknown degree of increased risk for recurrent complication.  You may hold anticoagulation/antiplatelets for the procedure or surgery if the benefits of the procedure or surgery outweigh this nonmodifiable risk.      If Denisse Abel 1960 has new symptoms of heart failure decompensation, unstable arrythmia, or angina please reach out and we will assess the patient.      If you have other patient-specific concerns, please feel free to reach out to the patient's cardiologist directly at [phone].     Thank you,   [facility]      Electronically signed        MD Signature   JEANINE Suarez.P.VIC.

## 2025-04-16 NOTE — PROGRESS NOTES
Chief Complaint   Patient presents with    Follow-Up     1 week post TAVR     Subjective     Denisse Abel is a 64 y.o. female who presents today for 1 week S/P TAVR.    Hx of severe symptomatic aortic stenosis now S/P TAVR, acute on chronic diastolic heart failure due to valvular heart disease, HLD with non-obstructive CAD, alcoholic cirrhosis with splenomegaly/portal HTN/esophageal varices/chronic anemia/ascites, CKD stage 3b, HTN, and prior ETOH abuse .    She presents today alone. She is doing great despite her persistently low hemoglobin. She has a follow up with her PCP in 1 week to discuss this and work with GI for liver cirrhosis.    She has no chest pain, shortness of breath, edema, dizziness/lightheadedness, or palpitations.    Past Medical History:   Diagnosis Date    Acute combined systolic and diastolic heart failure (HCC) 01/22/2024    Anemia due to chronic kidney disease     Cataract 2024    Fixed both eyes 2024    Heart murmur 1965    Heart valve disease 2024    Nonrheumatic aortic valve stenosis    HLD (hyperlipidemia)     HTN (hypertension)     Hypertension 2000    No longer have HTN    Hypotension     Liver cirrhosis (HCC)     Obesity     Renal disorder     CKD (chronic kidney disease) stage 4, GFR 15-29 ml/min (HCC)     Past Surgical History:   Procedure Laterality Date    TRANSCATHETER AORTIC VALVE REPLACEMENT Bilateral 4/7/2025    Procedure: TRANSCATHETER AORTIC VALVE REPLACEMENT;  Surgeon: Geovany Hirsch M.D.;  Location: SURGERY [facility];  Service: Cardiac    ECHOCARDIOGRAM, TRANSESOPHAGEAL, INTRAOPERATIVE N/A 4/7/2025    Procedure: ECHOCARDIOGRAM, TRANSESOPHAGEAL, INTRAOPERATIVE;  Surgeon: Geovany Hirsch M.D.;  Location: SURGERY [facility];  Service: Cardiac    PA LAP,DIAGNOSTIC ABDOMEN  2/23/2024    Procedure: LAPAROSCOPY, KIM'S PATCH;  Surgeon: Neel Amezcua M.D.;  Location: SURGERY [facility];  Service: General    TRIGGER FINGER RELEASE Right 3/31/2017    Procedure:  TRIGGER FINGER RELEASE;  Surgeon: Jimmy Atkins M.D.;  Location: SURGERY [facility];  Service:     GANGLION EXCISION Right 3/31/2017    Procedure: GANGLION EXCISION - HAND CYST;  Surgeon: Jimmy Atkins M.D.;  Location: SURGERY [facility];  Service:     PELVISCOPY  6/5/08    Performed by NATALIYA ARCEO at SURGERY SAME DAY [facility]    OVARIAN CYSTECTOMY  6/5/08    Performed by NATALIYA ARCEO at SURGERY SAME DAY [facility]    CYSTOSCOPY  6/5/08    Performed by NATALIYA ARCEO at SURGERY SAME DAY [facility]    HYSTERECTOMY LAPAROSCOPY  2003    KNEE ARTHROSCOPY       Family History   Problem Relation Age of Onset    Diabetes Mother     Heart Disease Mother     Stroke Mother     Diabetes Father      Social History     Socioeconomic History    Marital status: Single     Spouse name: Not on file    Number of children: Not on file    Years of education: Not on file    Highest education level: Master's degree (e.g., MA, MS, Monica, MEd, MSW, SUMAYA)   Occupational History    Not on file   Tobacco Use    Smoking status: Never    Smokeless tobacco: Never   Vaping Use    Vaping status: Never Used   Substance and Sexual Activity    Alcohol use: Not Currently     Alcohol/week: 3.6 oz     Comment: denies    Drug use: Never     Comment: denies    Sexual activity: Not Currently   Other Topics Concern    Not on file   Social History Narrative    Not on file     Social Drivers of Health     Financial Resource Strain: Low Risk  (2/22/2025)    Overall Financial Resource Strain (CARDIA)     Difficulty of Paying Living Expenses: Not hard at all   Food Insecurity: No Food Insecurity (4/7/2025)    Hunger Vital Sign     Worried About Running Out of Food in the Last Year: Never true     Ran Out of Food in the Last Year: Never true   Transportation Needs: No Transportation Needs (4/7/2025)    PRAPARE - Transportation     Lack of Transportation (Medical): No     Lack of Transportation (Non-Medical): No   Physical  Activity: Sufficiently Active (2/22/2025)    Exercise Vital Sign     Days of Exercise per Week: 7 days     Minutes of Exercise per Session: 40 min   Stress: No Stress Concern Present (2/22/2025)    [employer] [place] [employer] - Occupational Stress Questionnaire     Feeling of Stress : Not at all   Social Connections: Socially Isolated (2/22/2025)    Social Connection and Isolation Panel [NHANES]     Frequency of Communication with Friends and Family: More than three times a week     Frequency of Social Gatherings with Friends and Family: Twice a week     Attends Rastafari Services: Never     Active Member of Clubs or Organizations: No     Attends Club or Organization Meetings: Never     Marital Status:    Intimate Partner Violence: Not At Risk (4/7/2025)    Humiliation, Afraid, Rape, and Kick questionnaire     Fear of Current or Ex-Partner: No     Emotionally Abused: No     Physically Abused: No     Sexually Abused: No   Housing Stability: Low Risk  (4/7/2025)    Housing Stability Vital Sign     Unable to Pay for Housing in the Last Year: No     Number of Times Moved in the Last Year: 0     Homeless in the Last Year: No     Allergies   Allergen Reactions    Sulfa Drugs Rash     Pt reports that it was a childhood allergie received body rash   Per [facility] pharmacy (11/27/2024)     Outpatient Encounter Medications as of 4/16/2025   Medication Sig Dispense Refill    Ferrous Sulfate (IRON) 325 (65 Fe) MG Tab Take 325 mg by mouth 2 times a day. 200 Tablet 3    FOLIC ACID PO Take 1 Tablet by mouth every day. OTC      fluticasone (FLONASE) 50 MCG/ACT nasal spray Administer 2 Sprays into affected nostril(S) every morning.      spironolactone (ALDACTONE) 25 MG Tab Take 1 Tablet by mouth every day. 90 Tablet 4    omeprazole (PRILOSEC) 40 MG delayed-release capsule Take 1 Capsule by mouth every day. 90 Capsule 3    HYDROcodone-acetaminophen (NORCO) 5-325 MG Tab per tablet Take 1 Tablet by mouth 2 times a day as  "needed.      magnesium oxide 400 (240 Mg) MG Tab Take 1 Tablet by mouth 2 times a day. 60 Tablet 0    pyridoxine (VITAMIN B-6) 50 MG Tab Take 1 Tablet by mouth every day. 30 Tablet 11    phosphorus (K-PHOS-NEUTRAL) 250 MG tablet Take 1 Tablet by mouth 2 times a day. 60 Tablet 0    thiamine (THIAMINE) 100 MG tablet Take 1 Tablet by mouth every day.       No facility-administered encounter medications on file as of 4/16/2025.     Review of Systems   Constitutional:  Negative for fever and malaise/fatigue.   Respiratory:  Negative for cough and shortness of breath.    Cardiovascular:  Negative for chest pain, palpitations, orthopnea, claudication, leg swelling and PND.   Gastrointestinal:  Negative for abdominal pain.   Musculoskeletal:  Negative for myalgias.   Neurological:  Negative for dizziness.              Objective     /64 (BP Location: Left arm, Patient Position: Sitting, BP Cuff Size: Adult)   Pulse 90   Resp 16   Ht 1.676 m (5' 6\")   Wt 63.5 kg (139 lb 14.4 oz)   SpO2 98%   BMI 22.58 kg/m²     Physical Exam  Vitals and nursing note reviewed.   Constitutional:       Appearance: Normal appearance. She is well-developed and normal weight.   HENT:      Head: Normocephalic and atraumatic.   Neck:      Vascular: No JVD.   Cardiovascular:      Rate and Rhythm: Normal rate and regular rhythm.      Pulses: Normal pulses.      Heart sounds: Normal heart sounds.   Pulmonary:      Effort: Pulmonary effort is normal.      Breath sounds: Normal breath sounds.   Musculoskeletal:         General: Normal range of motion.   Skin:     General: Skin is warm and dry.      Capillary Refill: Capillary refill takes less than 2 seconds.   Neurological:      General: No focal deficit present.      Mental Status: She is alert and oriented to person, place, and time. Mental status is at baseline.   Psychiatric:         Mood and Affect: Mood normal.         Behavior: Behavior normal.         Thought Content: Thought content " normal.         Judgment: Judgment normal.                Assessment & Plan     1. S/P TAVR (transcatheter aortic valve replacement)  EKG      2. Acute diastolic heart failure due to valvular disease (HCC)        3. Anemia in other chronic diseases classified elsewhere        4. Coronary artery disease involving native coronary artery of native heart without angina pectoris        5. Elevated left ventricular end-diastolic pressure (LVEDP)        6. HTN (hypertension), benign        7. Stage 3b chronic kidney disease        8. Splenomegaly        9. Ascites due to alcoholic cirrhosis (HCC)          Medical Decision Making: Today's Assessment/Status/Plan:      1. S/P TAVR, NYHA I  -doing well post-op  -cont asa lifelong  -groins CDI with mild bruising and small nodule   -SBE prophylaxis understands lifelong  -echo 1 month with structural heart follow up  -reviewed hospital imaging, labs, and EKG  -cardiac rehab referral placed  -EKG shows SR    2. HLD with non-obstructive CAD  -no angina or THOMAS  -no aspirin with persistent anemia, consider in future  -no statin with liver disease  -LDL goal <70, near goal    3. Prior ETOH abuse leading to alcoholic cirrhosis/mild ascites/splenomegaly  -follow up with GI and PCP regarding persistently low hemoglobin  -repeat hemoglobin improving, still taking iron tablets    4. HTN with CKD stage 3b  -follow labs  -cont aldactone 25 mg QD  -BP goal <130/80    Patient is to follow up with Dayan MARIN in 1 month with echo; PCP soon.

## 2025-04-17 ENCOUNTER — APPOINTMENT (OUTPATIENT)
Dept: CARDIOLOGY | Facility: MEDICAL CENTER | Age: 65
End: 2025-04-17
Attending: NURSE PRACTITIONER
Payer: MEDICARE

## 2025-04-17 NOTE — TELEPHONE ENCOUNTER
Clearance letter has been created and faxed w/ SC recommendations:      GAYATHRI Suarez.  You; Ling Case R.N.1 hour ago (2:01 PM)     OK to proceed from cardiac standpoint 1 month post TAVR.    She has low hemoglobin and this needs to be worked up with PCP and GI prior to clearance from then. SC

## 2025-04-17 NOTE — TELEPHONE ENCOUNTER
Last OV: 04.16.2025  Proposed Surgery: UMBILICAL HERNIA REPAIR  Surgery Date: TBD  Requesting Office Name: [facility]   Fax Number: [phone]  Preference of Location (default is surgery center unless specified by Cardiologist or [facility])  Prior Clearance Addressed: No    Is this a general clearance? YES   Anticoags/Antiplatelets: Other none  Anticoags/Antiplatelet managed by Cardiology?    Outstanding Cardiac Imaging : Yes  Echo.   Clearance to provider to review  Ablation, Cardioversion, Stent, Cardiac Devices, Catheterization, Watchman: Yes  Within the last 6 months. Forward to provider to review.  TAVR/Valve, Mitral Clip, Watchman (including open heart),: Completed within the last 6 months- Forward to provider to review    Recent Cardiac Hospitalization: No            When: N/A  History (cardiac history):   Past Medical History:   Diagnosis Date    Acute combined systolic and diastolic heart failure (HCC) 01/22/2024    Anemia due to chronic kidney disease     Cataract 2024    Fixed both eyes 2024    Heart murmur 1965    Heart valve disease 2024    Nonrheumatic aortic valve stenosis    HLD (hyperlipidemia)     HTN (hypertension)     Hypertension 2000    No longer have HTN    Hypotension     Liver cirrhosis (HCC)     Obesity     Renal disorder     CKD (chronic kidney disease) stage 4, GFR 15-29 ml/min (HCC)           Is this a dental clearance? NO  Ablation, Cardioversion, Watchman, Stents, Cath, Devices within the last 3 months? No   If yes- Send dental wait letter, do not forward to provider for review.     TAVR / Valve, Mitral clip within the last 6 months? No  If yes- Send dental wait letter, do not forward to provider for review.     If completing a general clearance, continue per protocol.           Surgical Clearance Letter Sent: No Provider to advise.   **Scan clearance request letter into [facility].**

## 2025-04-18 ENCOUNTER — DOCUMENTATION (OUTPATIENT)
Dept: CARDIOLOGY | Facility: MEDICAL CENTER | Age: 65
End: 2025-04-18
Payer: MEDICARE

## 2025-04-18 NOTE — Clinical Note
REFERRAL APPROVAL NOTICE         Sent on April 18, 2025                   Denisse Moorerejiia  [address] [place] [address]                   Dear Ms. Abel,    After a careful review of the medical information and benefit coverage, [facility] has processed your referral. See below for additional details.    If applicable, you must be actively enrolled with your insurance for coverage of the authorized service. If you have any questions regarding your coverage, please contact your insurance directly.    REFERRAL INFORMATION   Referral #:  [id number]  Referred-To Department    Referred-By Provider:  Cardiac Intensive Care    ROBBY Suarez   Intensive Card Rehab      [address]  [address]  [place] [address]  [phone] [address]  [address]  [place] [address] [phone]    Referral Start Date:  04/07/2025  Referral End Date:   04/07/2026             SCHEDULING  If you do not already have an appointment, please call [phone] to make an appointment.     MORE INFORMATION  If you do not already have a [employer]inc account, sign up at: [email or web address]  You can access your medical information, make appointments, see lab results, billing information, and more.  If you have questions regarding this referral, please contact  the [facility] Referrals department at:             [phone]. Monday - Friday 8:00AM - 5:00PM.     Sincerely,    [facility]

## 2025-04-18 NOTE — PROGRESS NOTES
On behalf of [facility] Structural Heart Program, we would like to thank you for allowing us to participate in the care of your patient.     She underwent a successful transcatheter aortic valve replacement (TAVR) on 4/7/2025.     Your patient is scheduled to follow up with our Structural Heart Program at one month and one year post procedure.     Again, thank you for allowing us to participate in the care of your patient. If you have any questions, please do not hesitate to contact our Structural Heart team.     Sincerely,    [facility] Structural Heart Team    Trice Parish, LUZN, RN, Structural Heart Program Nurse Coordinator ([phone])  HARRIET Grissom, RN, Structural Heart Program Nurse Coordinator ([phone])

## 2025-04-28 ENCOUNTER — OFFICE VISIT (OUTPATIENT)
Dept: MEDICAL GROUP | Facility: MEDICAL CENTER | Age: 65
End: 2025-04-28
Payer: MEDICARE

## 2025-04-28 VITALS
DIASTOLIC BLOOD PRESSURE: 60 MMHG | HEIGHT: 66 IN | SYSTOLIC BLOOD PRESSURE: 142 MMHG | BODY MASS INDEX: 22.34 KG/M2 | TEMPERATURE: 98.4 F | OXYGEN SATURATION: 96 % | WEIGHT: 139 LBS | HEART RATE: 87 BPM

## 2025-04-28 DIAGNOSIS — N18.32 STAGE 3B CHRONIC KIDNEY DISEASE: ICD-10-CM

## 2025-04-28 DIAGNOSIS — D63.8 ANEMIA IN OTHER CHRONIC DISEASES CLASSIFIED ELSEWHERE: ICD-10-CM

## 2025-04-28 DIAGNOSIS — I10 HTN (HYPERTENSION), BENIGN: ICD-10-CM

## 2025-04-28 ASSESSMENT — ENCOUNTER SYMPTOMS
PALPITATIONS: 0
HEMOPTYSIS: 0
DIARRHEA: 0
CHILLS: 0
FEVER: 0
ABDOMINAL PAIN: 0
VOMITING: 0
COUGH: 0

## 2025-04-28 ASSESSMENT — FIBROSIS 4 INDEX: FIB4 SCORE: 2.49

## 2025-04-28 NOTE — ASSESSMENT & PLAN NOTE
Patient reports that her blood pressure at home is stable.  Recommend to keep a log of blood pressure and follow-up if it continues to be elevated.  Goal less than 130/80 mmHg.  She is currently on spironolactone 25 mg daily.  Patient would like to hold off on additional medication today.

## 2025-04-28 NOTE — Clinical Note
[facility]  [address] [place] [address]    [id number]    Denisse Abel  [address] [place] [address]    April 28, 2025    Member Name: Denisse Abel   Member Number: [id number]   Reference Number: [id number]   Approved Services: Echos and EKG   Approved Service Dates: 04/03/2025 - 07/02/2025   Requesting Provider: Geovany Hirsch   Requested Provider: [facility]     Dear Denisse Abel:    The following medical service(s) requested by Geovany Hirsch have been approved:    Procedure Code Procedure Code Name Requested Quantity Approved Quantity Status   96346 (CPT®) AL ECHO HEART XTHORACIC,COMPLETE W DOPPLER 1 1 Authorized       Approved Quantity means the number of visits approved for medication treatments and/or medical services.    The services should be provided by [facility] no later than 07/02/2025. Please contact the provider listed below with any questions.     Provider Information:  [facility]  [phone]    Your plan benefit may require a deductible, co-payment or coinsurance for these services. This authorization does not guarantee [facility] will pay the claim for services that you receive. Payment by [facility] for these services is subject to the terms of your Evidence of Coverage, your eligibility at the time of service, and confirmation of benefit coverage.    For any questions or additional information, please contact Customer Service:    [facility] Toll Free: [phone]  [employer] users dial: [phone]   Call Center Hours:  Oct 1 - Mar 31, Mon - Fri 7 AM to 8 PM PST  Oct 1 - Mar 31, Sat - Sun 8 AM to 8 PM PST  Apr 1 - Sep 30, Mon - Fri 7 AM to 8 PM PST   Office Hours: Mon - Fri 8 AM to 5 PM PST   E-mail: [email or web address]   Website:  [email or web address]      This information is available for free in other languages. Please contact Customer Service at  the phone number above for more information. [facility] complies with applicable Federal civil rights laws and does not discriminate on the basis of race, color, national origin, age, disability or sex.    Sincerely,     Healthcare Utilization Management Department     Cc: [facility]   Geovany Hirsch    Multi-Language Insert  Multi- Services  English: We have free  services to answer any questions you may have about our health or drug plan.  To get an , just call us at [phone].  Someone who speaks English/Language can help you.  This is a free service.  Greenlandic: Tenemos servicios de intérprete sin costo alguno  para responder cualquier pregunta que pueda tener sobre nuestro plan de amada o medicamentos. Para hablar con un intérprete, por favor llame al [phone]. Alguien que hable español le podrá ayudar. Roz es un servicio gratuito.  Chinese Mandarin: ?????????????????????????????? ???????????????? [phone]????????????????? ?????????  Chinese Cantonese: ?????????????????????????????? ????????????? [phone]???????????????????? ????????  Tagalog:  [place]za land serbisyo sa chasesasalana yooa marie urban hinggil sa wang apodaca o panggamot.  hamilton Benitez sa [phone]. Walekr Floresalog.  Paulo pina.  Irish:  Nous proposons annemarie services gratuits d'interprétation pour répondre à toutes cristofer questions relatives à notre régime de santé ou d'assurance-médicaments. Pour accéder au service d'interprétation, il vous suffit de nous appeler au [phone]. Un interlocuteur parlant Français pourra vous aider. Ce service est gratuit.  Ukrainian:  Tiago copeland có d?ch v? thông d?ch mi?n phí ð? tr? l?i các câu h?i v? chýõng s?c kh?e và chýõng trình thu?c men. N?u quí v? c?n aaliyah  d?ch viên geraldine g?i [phone] s? có nhân viên nói ti?ng Vi?t giúp ð? quí v?. Ðây là d?ch v? mi?n phí .  Frisian:  Unser kostenser Dolmetscherservice beantwortet Ihren Fragen zu unserem Gesundheits- und Arzneimittelplan. Unsere Dolmetscher erreichen Sie [phone]. Man wird Ihnen deepti auf [facility]. Dieser Service ist alfonsoSSM DePaul Health Center.  Yoruba:  ??? ?? ?? ?? ?? ??? ?? ??? ?? ???? ?? ?? ???? ???? ????. ?? ???? ????? ?? [phone] ??? ??? ????.  ???? ?? ???? ?? ?? ????. ? ???? ??? ?????.   Equatorial Guinean: Åñëè ó âàñ âîçíèêíóò âîïðîñû îòíîñèòåëüíî ñòðàõîâîãî èëè ìåäèêàìåíòíîãî ïëàíà, âû ìîæåòå âîñïîëüçîâàòüñÿ íàøèìè áåñïëàòíûìè óñëóãàìè ïåðåâîä÷èêîâ. ×òîáû âîñïîëüçîâàòüñÿ óñëóãàìè ïåðåâîä÷èêà, ïîçâîíèòå íàì ïî òåëåôîíó [phone]. Âàì îêàæåò ïîìîùü ñîòðóäíèê, êîòîðûé ãîâîðèò ïî-póññêè. Äàííàÿ óñëóãà áåñïëàòíàÿ.  Spanish: ÅääÇ äÞÏã ÎÏãÇÊ ÇáãÊÑÌã ÇáÝæÑí ÇáãÌÇäíÉ ááÅÌÇÈÉ Úä Ãí ÃÓÆáÉ ÊÊÚáÞ ÈÇáÕÍÉ Ãæ ÌÏæá ÇáÃÏæíÉ áÏíäÇ. ááÍÕæá Úáì ãÊÑÌã ÝæÑí¡ áíÓ Úáíß Óæì ÇáÇÊÕÇá ÈäÇ Úáì [phone] . ÓíÞæã ÔÎÕ ãÇ íÊÍÏË ÇáÚÑÈíÉ ÈãÓÇÚÏÊß. åÐå ÎÏãÉ ãÌÇäíÉ.  Jeovany: ????? ????????? ?? ??? ?? ????? ?? ???? ??? ???? ???? ?? ?????? ?? ???? ???? ?? ??? ????? ??? ????? ???????? ?????? ?????? ???. ?? ???????? ??????? ???? ?? ???, ?? ???? [phone] ?? ??? ????. ??? ??????? ?? ?????? ????? ?? ???? ??? ?? ???? ??. ?? ?? ????? ???? ??.   Wolof:  È disponibile un servizio di interpretariato gratuito per rispondere a eventuali domande sul nostro piano sanitario e farmaceutico. Per un interprete, contattare il latonya [phone]. Un nostro incaricato kell parla Italianovi fornirà l'assistenza necessaria. È un servizio gratuito.  Portugués:  Dispomos de serviços de interpretação gratuitos para responder a qualquer questão que tenha acerca do nosso plano de saúde ou de medicação. Para obter um intérprete, contacte-nos através do número [phone]. Irá encontrar alguém que fale o idioma  Português para o ajudar. Roz serviço é  gratuito.  Greek Creole:  Nou genyen sèvis entèprèt gratis morales reponn tout kesyon ou ta genyen konsènan plan medikal oswa dwòg nou an.  Morales jwenn yon entèprèt, jis rele nou nan [phone]. Yon moun ki pale Kreyòl kapab roverto w.  Sa a se yon sèvis ki gratis.  Polish:  Umo¿liwiamy bezp³atne skorzystanie z us³ug t³umacza ustnego, który pomo¿e w uzyskaniu odpowiedzi na temat planu zdrowotnego lub dawkowania lillyw. Sandra skorzystaæ z pomocy t³umacza znaj¹cego bety hernandez¿y zadzwoniæ pod numer [phone]. Ta us³uga jest bezp³atna.  Burundian: ????? ??????? ????????????????????? ??????????????????????????????????[phone] ???????????????? ? ????????????????? ?????

## 2025-04-28 NOTE — PROGRESS NOTES
Denisse Abel is a 64 y.o. old female  who is here for follow up    Chief Complaint   Patient presents with    Follow-Up        HPI    Patient is here for follow-up after her TAVR procedure.  Patient is doing well.  She follows cardiology and has a follow-up echocardiogram in 1 month. Blood pressure repeat check in office 138 /60 mm hg.      Review of Systems   Constitutional:  Negative for chills and fever.   Respiratory:  Negative for cough and hemoptysis.    Cardiovascular:  Negative for chest pain and palpitations.   Gastrointestinal:  Negative for abdominal pain, diarrhea and vomiting.        She  has a past medical history of Acute combined systolic and diastolic heart failure (HCC) (01/22/2024), Anemia due to chronic kidney disease, Cataract (2024), Heart murmur (1965), Heart valve disease (2024), HLD (hyperlipidemia), HTN (hypertension), Hypertension (2000), Hypotension, Liver cirrhosis (HCC), Obesity, and Renal disorder.  She  has a past surgical history that includes pelviscopy (6/5/08); ovarian cystectomy (6/5/08); cystoscopy (6/5/08); hysterectomy laparoscopy (2003); trigger finger release (Right, 3/31/2017); ganglion excision (Right, 3/31/2017); knee arthroscopy; pr lap,diagnostic abdomen (2/23/2024); transcatheter aortic valve replacement (Bilateral, 4/7/2025); and echocardiogram, transesophageal, intraoperative (N/A, 4/7/2025).  Family History   Problem Relation Age of Onset    Diabetes Mother     Heart Disease Mother     Stroke Mother     Diabetes Father      Social History     Tobacco Use    Smoking status: Never    Smokeless tobacco: Never   Vaping Use    Vaping status: Never Used   Substance Use Topics    Alcohol use: Not Currently     Alcohol/week: 3.6 oz     Comment: denies    Drug use: Never     Comment: denies     Patient Active Problem List    Diagnosis Date Noted    S/P TAVR (transcatheter aortic valve replacement) 04/07/2025    Coronary artery disease involving native coronary artery  of native heart without angina pectoris 04/07/2025    Splenomegaly 03/28/2025    Primary insomnia 01/02/2025    Abnormal thyroid function test 12/10/2024    Elevated CSF protein 12/05/2024    Stage 3b chronic kidney disease 11/14/2024    Gastroesophageal reflux disease without esophagitis 07/30/2024    Ascites due to alcoholic cirrhosis (HCC) 07/30/2024    Alcohol dependence with alcohol-induced disorder (HCC) 07/25/2024    Esophageal varices without bleeding (HCC) 07/25/2024    Portal hypertensive gastropathy (HCC) 07/25/2024    Anemia in other chronic diseases classified elsewhere 02/02/2024    Anxiety and depression 02/02/2024    Lumbar spondylosis 03/15/2023    Ganglion of right hand 03/31/2017    Lumbosacral radiculopathy 06/07/2016    HTN (hypertension), benign 11/05/2009     Current Outpatient Medications   Medication Sig Dispense Refill    Ferrous Sulfate (IRON) 325 (65 Fe) MG Tab Take 325 mg by mouth 2 times a day. 200 Tablet 3    FOLIC ACID PO Take 1 Tablet by mouth every day. OTC      fluticasone (FLONASE) 50 MCG/ACT nasal spray Administer 2 Sprays into affected nostril(S) every morning.      spironolactone (ALDACTONE) 25 MG Tab Take 1 Tablet by mouth every day. 90 Tablet 4    omeprazole (PRILOSEC) 40 MG delayed-release capsule Take 1 Capsule by mouth every day. 90 Capsule 3    HYDROcodone-acetaminophen (NORCO) 5-325 MG Tab per tablet Take 1 Tablet by mouth 2 times a day as needed.      magnesium oxide 400 (240 Mg) MG Tab Take 1 Tablet by mouth 2 times a day. 60 Tablet 0    pyridoxine (VITAMIN B-6) 50 MG Tab Take 1 Tablet by mouth every day. 30 Tablet 11    phosphorus (K-PHOS-NEUTRAL) 250 MG tablet Take 1 Tablet by mouth 2 times a day. 60 Tablet 0    thiamine (THIAMINE) 100 MG tablet Take 1 Tablet by mouth every day.       No current facility-administered medications for this visit.    (including changes today)  Allergies: Sulfa drugs    BP (!) 142/60   Pulse 87   Temp 36.9 °C (98.4 °F) (Temporal)   Ht  "1.676 m (5' 6\")   Wt 63 kg (139 lb)   SpO2 96%      Physical Exam  Constitutional:       Appearance: Normal appearance.   Cardiovascular:      Rate and Rhythm: Normal rate and regular rhythm.      Pulses: Normal pulses.      Heart sounds: Normal heart sounds. No murmur heard.  Pulmonary:      Effort: Pulmonary effort is normal. No respiratory distress.      Breath sounds: Normal breath sounds.   Neurological:      General: No focal deficit present.      Mental Status: She is alert and oriented to person, place, and time.           Latest Reference Range & Units 04/14/25 11:08   WBC 4.8 - 10.8 K/uL 6.9   RBC 4.20 - 5.40 M/uL 3.00 (L)   Hemoglobin 12.0 - 16.0 g/dL 7.1 (L)   Hematocrit 37.0 - 47.0 % 23.1 (L)   MCV 81.4 - 97.8 fL 77.0 (L)   MCH 27.0 - 33.0 pg 23.7 (L)   MCHC 32.2 - 35.5 g/dL 30.7 (L)   RDW 35.9 - 50.0 fL 45.1   Platelet Count 164 - 446 K/uL 216   MPV 9.0 - 12.9 fL 11.4   (L): Data is abnormally low    Assessment and plan:    Problem List Items Addressed This Visit       HTN (hypertension), benign    Patient reports that her blood pressure at home is stable.  Recommend to keep a log of blood pressure and follow-up if it continues to be elevated.  Goal less than 130/80 mmHg.  She is currently on spironolactone 25 mg daily.  Patient would like to hold off on additional medication today.         Anemia in other chronic diseases classified elsewhere    - This is chronic.  Obtain repeat CBC.  No obvious symptoms of bleeding         Relevant Orders    CBC WITH DIFFERENTIAL    Stage 3b chronic kidney disease    - Chronic  - Monitor kidney function  - Avoid nephrotoxins         Relevant Orders    Comp Metabolic Panel          Return in about 3 months (around 7/28/2025) for Labfollowup.        Please note that this dictation was created using voice recognition software. I have made every reasonable attempt to correct obvious errors, but I expect that there are errors of grammar and possibly content that I did " not discover before finalizing the note.

## 2025-04-29 ENCOUNTER — PATIENT MESSAGE (OUTPATIENT)
Dept: MEDICAL GROUP | Facility: MEDICAL CENTER | Age: 65
End: 2025-04-29
Payer: MEDICARE

## 2025-04-29 DIAGNOSIS — D63.8 ANEMIA IN OTHER CHRONIC DISEASES CLASSIFIED ELSEWHERE: ICD-10-CM

## 2025-05-01 ENCOUNTER — HOSPITAL ENCOUNTER (OUTPATIENT)
Dept: LAB | Facility: MEDICAL CENTER | Age: 65
End: 2025-05-01
Attending: STUDENT IN AN ORGANIZED HEALTH CARE EDUCATION/TRAINING PROGRAM
Payer: MEDICARE

## 2025-05-01 DIAGNOSIS — D63.8 ANEMIA IN OTHER CHRONIC DISEASES CLASSIFIED ELSEWHERE: ICD-10-CM

## 2025-05-01 LAB
BASOPHILS # BLD AUTO: 0.5 % (ref 0–1.8)
BASOPHILS # BLD: 0.03 K/UL (ref 0–0.12)
EOSINOPHIL # BLD AUTO: 0.19 K/UL (ref 0–0.51)
EOSINOPHIL NFR BLD: 3 % (ref 0–6.9)
ERYTHROCYTE [DISTWIDTH] IN BLOOD BY AUTOMATED COUNT: 43.8 FL (ref 35.9–50)
HCT VFR BLD AUTO: 25.7 % (ref 37–47)
HGB BLD-MCNC: 7.8 G/DL (ref 12–16)
IMM GRANULOCYTES # BLD AUTO: 0.03 K/UL (ref 0–0.11)
IMM GRANULOCYTES NFR BLD AUTO: 0.5 % (ref 0–0.9)
LYMPHOCYTES # BLD AUTO: 1.25 K/UL (ref 1–4.8)
LYMPHOCYTES NFR BLD: 19.7 % (ref 22–41)
MCH RBC QN AUTO: 23.2 PG (ref 27–33)
MCHC RBC AUTO-ENTMCNC: 30.4 G/DL (ref 32.2–35.5)
MCV RBC AUTO: 76.5 FL (ref 81.4–97.8)
MONOCYTES # BLD AUTO: 0.39 K/UL (ref 0–0.85)
MONOCYTES NFR BLD AUTO: 6.1 % (ref 0–13.4)
NEUTROPHILS # BLD AUTO: 4.46 K/UL (ref 1.82–7.42)
NEUTROPHILS NFR BLD: 70.2 % (ref 44–72)
NRBC # BLD AUTO: 0 K/UL
NRBC BLD-RTO: 0 /100 WBC (ref 0–0.2)
PLATELET # BLD AUTO: 190 K/UL (ref 164–446)
PMV BLD AUTO: 11.5 FL (ref 9–12.9)
RBC # BLD AUTO: 3.36 M/UL (ref 4.2–5.4)
WBC # BLD AUTO: 6.4 K/UL (ref 4.8–10.8)

## 2025-05-01 PROCEDURE — 36415 COLL VENOUS BLD VENIPUNCTURE: CPT

## 2025-05-01 PROCEDURE — 85025 COMPLETE CBC W/AUTO DIFF WBC: CPT

## 2025-05-07 ENCOUNTER — HOSPITAL ENCOUNTER (OUTPATIENT)
Dept: CARDIOLOGY | Facility: MEDICAL CENTER | Age: 65
End: 2025-05-07
Attending: INTERNAL MEDICINE
Payer: MEDICARE

## 2025-05-07 ENCOUNTER — RESULTS FOLLOW-UP (OUTPATIENT)
Dept: CARDIOLOGY | Facility: MEDICAL CENTER | Age: 65
End: 2025-05-07

## 2025-05-07 DIAGNOSIS — I35.0 NONRHEUMATIC AORTIC VALVE STENOSIS: ICD-10-CM

## 2025-05-07 LAB
LV EJECT FRACT  99904: 63
LV EJECT FRACT MOD 2C 99903: 59.24
LV EJECT FRACT MOD 4C 99902: 66.19
LV EJECT FRACT MOD BP 99901: 63.31

## 2025-05-07 PROCEDURE — 93306 TTE W/DOPPLER COMPLETE: CPT

## 2025-05-07 PROCEDURE — 93306 TTE W/DOPPLER COMPLETE: CPT | Mod: 26 | Performed by: INTERNAL MEDICINE

## 2025-05-08 DIAGNOSIS — R06.09 DYSPNEA ON EXERTION: ICD-10-CM

## 2025-05-08 DIAGNOSIS — I50.20 ACC/AHA STAGE C SYSTOLIC HEART FAILURE (HCC): ICD-10-CM

## 2025-05-08 DIAGNOSIS — I51.89 LEFT VENTRICULAR SYSTOLIC DYSFUNCTION, NYHA CLASS 3: ICD-10-CM

## 2025-05-08 RX ORDER — SODIUM CHLORIDE 9 MG/ML
500 INJECTION, SOLUTION INTRAVENOUS ONCE
Status: CANCELLED | OUTPATIENT
Start: 2025-05-09 | End: 2025-05-09

## 2025-05-08 RX ORDER — 0.9 % SODIUM CHLORIDE 0.9 %
10 VIAL (ML) INJECTION PRN
Status: CANCELLED | OUTPATIENT
Start: 2025-05-09

## 2025-05-08 RX ORDER — DIPHENHYDRAMINE HYDROCHLORIDE 50 MG/ML
25 INJECTION, SOLUTION INTRAMUSCULAR; INTRAVENOUS PRN
Status: CANCELLED | OUTPATIENT
Start: 2025-05-09

## 2025-05-08 RX ORDER — ATROPINE SULFATE 1 MG/ML
0.5 INJECTION, SOLUTION INTRAVENOUS PRN
Status: CANCELLED | OUTPATIENT
Start: 2025-05-09

## 2025-05-08 RX ORDER — ACETAMINOPHEN 325 MG/1
650 TABLET ORAL PRN
Status: CANCELLED | OUTPATIENT
Start: 2025-05-09

## 2025-05-08 RX ORDER — SODIUM CHLORIDE 9 MG/ML
INJECTION, SOLUTION INTRAVENOUS CONTINUOUS
Status: CANCELLED | OUTPATIENT
Start: 2025-05-09

## 2025-05-08 RX ORDER — HYDROCORTISONE SODIUM SUCCINATE 100 MG/2ML
100 INJECTION INTRAMUSCULAR; INTRAVENOUS ONCE
Status: CANCELLED | OUTPATIENT
Start: 2025-05-09 | End: 2025-05-09

## 2025-05-08 RX ORDER — 0.9 % SODIUM CHLORIDE 0.9 %
3 VIAL (ML) INJECTION PRN
Status: CANCELLED | OUTPATIENT
Start: 2025-05-09

## 2025-05-08 RX ORDER — FUROSEMIDE 10 MG/ML
10 INJECTION INTRAMUSCULAR; INTRAVENOUS ONCE
Status: CANCELLED | OUTPATIENT
Start: 2025-05-09 | End: 2025-05-09

## 2025-05-08 RX ORDER — ACETAMINOPHEN 325 MG/1
650 TABLET ORAL ONCE
Status: CANCELLED | OUTPATIENT
Start: 2025-05-09

## 2025-05-08 RX ORDER — 0.9 % SODIUM CHLORIDE 0.9 %
VIAL (ML) INJECTION PRN
Status: CANCELLED | OUTPATIENT
Start: 2025-05-09

## 2025-05-08 RX ORDER — HYDROCORTISONE SODIUM SUCCINATE 100 MG/2ML
100 INJECTION INTRAMUSCULAR; INTRAVENOUS PRN
Status: CANCELLED | OUTPATIENT
Start: 2025-05-09

## 2025-05-08 RX ORDER — DIPHENHYDRAMINE HCL 25 MG
25 TABLET ORAL ONCE
Status: CANCELLED | OUTPATIENT
Start: 2025-05-09 | End: 2025-05-09

## 2025-05-08 NOTE — TELEPHONE ENCOUNTER
Is the patient due for a refill? No    Was the patient seen the last 15 months? Yes    Date of last office visit: 04/16/2025    Does the patient have an upcoming appointment?  Yes   If yes, When? 05/15/2025    Provider to refill:SC    Does the patient have [facility] Plus and need 100-day supply? (This applies to ALL medications) Yes, quantity updated to 100 days

## 2025-05-09 RX ORDER — SPIRONOLACTONE 50 MG/1
50 TABLET, FILM COATED ORAL DAILY
Qty: 90 TABLET | Refills: 3 | OUTPATIENT
Start: 2025-05-09

## 2025-05-12 ENCOUNTER — PATIENT MESSAGE (OUTPATIENT)
Dept: MEDICAL GROUP | Facility: MEDICAL CENTER | Age: 65
End: 2025-05-12
Payer: MEDICARE

## 2025-05-13 RX ORDER — 0.9 % SODIUM CHLORIDE 0.9 %
3 VIAL (ML) INJECTION PRN
Status: CANCELLED | OUTPATIENT
Start: 2025-05-14

## 2025-05-13 RX ORDER — DIPHENHYDRAMINE HYDROCHLORIDE 50 MG/ML
25 INJECTION, SOLUTION INTRAMUSCULAR; INTRAVENOUS PRN
Status: CANCELLED | OUTPATIENT
Start: 2025-05-14

## 2025-05-13 RX ORDER — 0.9 % SODIUM CHLORIDE 0.9 %
VIAL (ML) INJECTION PRN
Status: CANCELLED | OUTPATIENT
Start: 2025-05-14

## 2025-05-13 RX ORDER — 0.9 % SODIUM CHLORIDE 0.9 %
10 VIAL (ML) INJECTION PRN
Status: CANCELLED | OUTPATIENT
Start: 2025-05-14

## 2025-05-13 RX ORDER — HYDROCORTISONE SODIUM SUCCINATE 100 MG/2ML
100 INJECTION INTRAMUSCULAR; INTRAVENOUS PRN
Status: CANCELLED | OUTPATIENT
Start: 2025-05-14

## 2025-05-13 RX ORDER — ACETAMINOPHEN 325 MG/1
650 TABLET ORAL ONCE
Status: CANCELLED | OUTPATIENT
Start: 2025-05-14

## 2025-05-13 RX ORDER — FUROSEMIDE 10 MG/ML
10 INJECTION INTRAMUSCULAR; INTRAVENOUS ONCE
Status: CANCELLED | OUTPATIENT
Start: 2025-05-14 | End: 2025-05-14

## 2025-05-13 RX ORDER — DIPHENHYDRAMINE HCL 25 MG
25 TABLET ORAL ONCE
Status: CANCELLED | OUTPATIENT
Start: 2025-05-14 | End: 2025-05-14

## 2025-05-13 RX ORDER — ACETAMINOPHEN 325 MG/1
650 TABLET ORAL PRN
Status: CANCELLED | OUTPATIENT
Start: 2025-05-14

## 2025-05-13 RX ORDER — HYDROCORTISONE SODIUM SUCCINATE 100 MG/2ML
100 INJECTION INTRAMUSCULAR; INTRAVENOUS ONCE
Status: CANCELLED | OUTPATIENT
Start: 2025-05-14 | End: 2025-05-14

## 2025-05-13 RX ORDER — SODIUM CHLORIDE 9 MG/ML
INJECTION, SOLUTION INTRAVENOUS CONTINUOUS
Status: CANCELLED | OUTPATIENT
Start: 2025-05-14

## 2025-05-15 ENCOUNTER — DOCUMENTATION (OUTPATIENT)
Dept: CARDIOLOGY | Facility: MEDICAL CENTER | Age: 65
End: 2025-05-15

## 2025-05-15 ENCOUNTER — OFFICE VISIT (OUTPATIENT)
Dept: CARDIOLOGY | Facility: MEDICAL CENTER | Age: 65
End: 2025-05-15
Attending: NURSE PRACTITIONER
Payer: MEDICARE

## 2025-05-15 VITALS
HEART RATE: 89 BPM | SYSTOLIC BLOOD PRESSURE: 130 MMHG | DIASTOLIC BLOOD PRESSURE: 66 MMHG | HEIGHT: 66 IN | BODY MASS INDEX: 23.14 KG/M2 | RESPIRATION RATE: 16 BRPM | WEIGHT: 144 LBS | OXYGEN SATURATION: 97 %

## 2025-05-15 DIAGNOSIS — K70.31 ASCITES DUE TO ALCOHOLIC CIRRHOSIS (HCC): Primary | ICD-10-CM

## 2025-05-15 DIAGNOSIS — I25.10 CORONARY ARTERY DISEASE INVOLVING NATIVE CORONARY ARTERY OF NATIVE HEART WITHOUT ANGINA PECTORIS: ICD-10-CM

## 2025-05-15 DIAGNOSIS — I10 HTN (HYPERTENSION), BENIGN: ICD-10-CM

## 2025-05-15 DIAGNOSIS — R16.1 SPLENOMEGALY: ICD-10-CM

## 2025-05-15 DIAGNOSIS — D63.8 ANEMIA IN OTHER CHRONIC DISEASES CLASSIFIED ELSEWHERE: ICD-10-CM

## 2025-05-15 DIAGNOSIS — N18.32 STAGE 3B CHRONIC KIDNEY DISEASE: ICD-10-CM

## 2025-05-15 DIAGNOSIS — Z95.2 S/P TAVR (TRANSCATHETER AORTIC VALVE REPLACEMENT): ICD-10-CM

## 2025-05-15 PROCEDURE — 99214 OFFICE O/P EST MOD 30 MIN: CPT | Performed by: NURSE PRACTITIONER

## 2025-05-15 PROCEDURE — 3078F DIAST BP <80 MM HG: CPT | Performed by: NURSE PRACTITIONER

## 2025-05-15 PROCEDURE — 3075F SYST BP GE 130 - 139MM HG: CPT | Performed by: NURSE PRACTITIONER

## 2025-05-15 ASSESSMENT — ENCOUNTER SYMPTOMS
COUGH: 0
SHORTNESS OF BREATH: 0
PND: 0
PALPITATIONS: 0
FEVER: 0
CLAUDICATION: 0
ABDOMINAL PAIN: 0
MYALGIAS: 0
DIZZINESS: 0
ORTHOPNEA: 0

## 2025-05-15 ASSESSMENT — FIBROSIS 4 INDEX: FIB4 SCORE: 2.83

## 2025-05-15 NOTE — PROGRESS NOTES
Valve Program Functional Assessment:     [id number]   1a) Showering/bathin  1b) Walking 1 block on ground: 5  1c) Hurrying or joggin  2) Swellin  3) Fatigue: 7  4) Shortness of breath: 7  5) Sleep sitting up: 5  6) Limited enjoyment of life: 5  7) Spend the rest of your life with HF: 5  8a) Hobbies, recreational activities:5  8b) Working or doing household chores:5  8c) Visiting family or friends: 5

## 2025-05-15 NOTE — PATIENT INSTRUCTIONS
Re-start baby aspirin 81 mg once daily.    Cancel  To appointment.    Follow up in 1 year with echo.    Continue GI/nephrology/PCP work up for anemia.

## 2025-05-15 NOTE — PROGRESS NOTES
Chief Complaint   Patient presents with    Aortic Stenosis     Subjective     Denisse Abel is a 64 y.o. female who presents today for 1 month S/P TAVR.    Hx of severe symptomatic aortic stenosis now S/P TAVR, acute on chronic diastolic heart failure due to valvular heart disease, HLD with non-obstructive CAD, alcoholic cirrhosis with splenomegaly/portal HTN/esophageal varices/chronic anemia/ascites, CKD stage 3b, HTN, and prior ETOH abuse .    She presents today alone. She is doing great despite her persistently low hemoglobin. She needs referral to nephrology. Pending blood and iron transfusions soon per GI. Pending GI scope in July.    She has no chest pain, shortness of breath, edema, dizziness/lightheadedness, or palpitations.    Past Medical History:   Diagnosis Date    Acute combined systolic and diastolic heart failure (HCC) 01/22/2024    Anemia due to chronic kidney disease     Cataract 2024    Fixed both eyes 2024    Heart murmur 1965    Heart valve disease 2024    Nonrheumatic aortic valve stenosis    HLD (hyperlipidemia)     HTN (hypertension)     Hypertension 2000    No longer have HTN    Hypotension     Liver cirrhosis (HCC)     Obesity     Renal disorder     CKD (chronic kidney disease) stage 4, GFR 15-29 ml/min (HCC)     Past Surgical History:   Procedure Laterality Date    TRANSCATHETER AORTIC VALVE REPLACEMENT Bilateral 4/7/2025    Procedure: TRANSCATHETER AORTIC VALVE REPLACEMENT;  Surgeon: Geovany Hirsch M.D.;  Location: SURGERY [facility];  Service: Cardiac    ECHOCARDIOGRAM, TRANSESOPHAGEAL, INTRAOPERATIVE N/A 4/7/2025    Procedure: ECHOCARDIOGRAM, TRANSESOPHAGEAL, INTRAOPERATIVE;  Surgeon: Geovany Hirsch M.D.;  Location: SURGERY [facility];  Service: Cardiac    CA LAP,DIAGNOSTIC ABDOMEN  2/23/2024    Procedure: LAPAROSCOPY, KIM'S PATCH;  Surgeon: Neel Amezcua M.D.;  [facility];  Service: General    TRIGGER FINGER RELEASE Right 3/31/2017    Procedure: TRIGGER  FINGER RELEASE;  Surgeon: Jimmy Atkins M.D.;  Location: SURGERY [facility];  Service:     GANGLION EXCISION Right 3/31/2017    Procedure: GANGLION EXCISION - HAND CYST;  Surgeon: Jimmy Atkins M.D.;  Location: SURGERY [facility];  Service:     PELVISCOPY  6/5/08    Performed by NATALIYA ARCEO at SURGERY SAME DAY [facility]    OVARIAN CYSTECTOMY  6/5/08    Performed by NATALIYA ARCEO at SURGERY SAME DAY [facility]    CYSTOSCOPY  6/5/08    Performed by NATALIYA ARCEO at SURGERY SAME DAY [facility]    HYSTERECTOMY LAPAROSCOPY  2003    KNEE ARTHROSCOPY       Family History   Problem Relation Age of Onset    Diabetes Mother     Heart Disease Mother     Stroke Mother     Diabetes Father      Social History     Socioeconomic History    Marital status: Single     Spouse name: Not on file    Number of children: Not on file    Years of education: Not on file    Highest education level: Master's degree (e.g., MA, MS, Monica, MEd, MSW, SUMAYA)   Occupational History    Not on file   Tobacco Use    Smoking status: Never    Smokeless tobacco: Never   Vaping Use    Vaping status: Never Used   Substance and Sexual Activity    Alcohol use: Not Currently     Alcohol/week: 3.6 oz     Comment: denies    Drug use: Never     Comment: denies    Sexual activity: Not Currently   Other Topics Concern    Not on file   Social History Narrative    Not on file     Social Drivers of Health     Financial Resource Strain: Low Risk  (2/22/2025)    Overall Financial Resource Strain (CARDIA)     Difficulty of Paying Living Expenses: Not hard at all   Food Insecurity: No Food Insecurity (4/7/2025)    Hunger Vital Sign     Worried About Running Out of Food in the Last Year: Never true     Ran Out of Food in the Last Year: Never true   Transportation Needs: No Transportation Needs (4/7/2025)    PRAPARE - Transportation     Lack of Transportation (Medical): No     Lack of Transportation (Non-Medical): No   Physical Activity:  Sufficiently Active (2/22/2025)    Exercise Vital Sign     Days of Exercise per Week: 7 days     Minutes of Exercise per Session: 40 min   Stress: No Stress Concern Present (2/22/2025)    [employer] [place] [employer] - Occupational Stress Questionnaire     Feeling of Stress : Not at all   Social Connections: Socially Isolated (2/22/2025)    Social Connection and Isolation Panel [NHANES]     Frequency of Communication with Friends and Family: More than three times a week     Frequency of Social Gatherings with Friends and Family: Twice a week     Attends Scientologist Services: Never     Active Member of Clubs or Organizations: No     Attends Club or Organization Meetings: Never     Marital Status:    Intimate Partner Violence: Not At Risk (4/7/2025)    Humiliation, Afraid, Rape, and Kick questionnaire     Fear of Current or Ex-Partner: No     Emotionally Abused: No     Physically Abused: No     Sexually Abused: No   Housing Stability: Low Risk  (4/7/2025)    Housing Stability Vital Sign     Unable to Pay for Housing in the Last Year: No     Number of Times Moved in the Last Year: 0     Homeless in the Last Year: No     Allergies   Allergen Reactions    Sulfa Drugs Rash     Pt reports that it was a childhood allergie received body rash   Per [facility] pharmacy (11/27/2024)     Outpatient Encounter Medications as of 5/15/2025   Medication Sig Dispense Refill    Ferrous Sulfate (IRON) 325 (65 Fe) MG Tab Take 325 mg by mouth 2 times a day. 200 Tablet 3    FOLIC ACID PO Take 1 Tablet by mouth every day. OTC      fluticasone (FLONASE) 50 MCG/ACT nasal spray Administer 2 Sprays into affected nostril(S) every morning.      spironolactone (ALDACTONE) 25 MG Tab Take 1 Tablet by mouth every day. 90 Tablet 4    omeprazole (PRILOSEC) 40 MG delayed-release capsule Take 1 Capsule by mouth every day. 90 Capsule 3    HYDROcodone-acetaminophen (NORCO) 5-325 MG Tab per tablet Take 1 Tablet by mouth 2 times a day as needed.       magnesium oxide 400 (240 Mg) MG Tab Take 1 Tablet by mouth 2 times a day. 60 Tablet 0    pyridoxine (VITAMIN B-6) 50 MG Tab Take 1 Tablet by mouth every day. 30 Tablet 11    phosphorus (K-PHOS-NEUTRAL) 250 MG tablet Take 1 Tablet by mouth 2 times a day. 60 Tablet 0    thiamine (THIAMINE) 100 MG tablet Take 1 Tablet by mouth every day.       No facility-administered encounter medications on file as of 5/15/2025.     Review of Systems   Constitutional:  Negative for fever and malaise/fatigue.   Respiratory:  Negative for cough and shortness of breath.    Cardiovascular:  Negative for chest pain, palpitations, orthopnea, claudication, leg swelling and PND.   Gastrointestinal:  Negative for abdominal pain.   Musculoskeletal:  Negative for myalgias.   Neurological:  Negative for dizziness.              Objective     There were no vitals taken for this visit.    Physical Exam  Vitals and nursing note reviewed.   Constitutional:       Appearance: Normal appearance. She is well-developed and normal weight.   HENT:      Head: Normocephalic and atraumatic.   Neck:      Vascular: No JVD.   Cardiovascular:      Rate and Rhythm: Normal rate and regular rhythm.      Pulses: Normal pulses.      Heart sounds: Normal heart sounds.   Pulmonary:      Effort: Pulmonary effort is normal.      Breath sounds: Normal breath sounds.   Musculoskeletal:         General: Normal range of motion.   Skin:     General: Skin is warm and dry.      Capillary Refill: Capillary refill takes less than 2 seconds.   Neurological:      General: No focal deficit present.      Mental Status: She is alert and oriented to person, place, and time. Mental status is at baseline.   Psychiatric:         Mood and Affect: Mood normal.         Behavior: Behavior normal.         Thought Content: Thought content normal.         Judgment: Judgment normal.                Assessment & Plan     1. Ascites due to alcoholic cirrhosis (HCC)        2. S/P TAVR (transcatheter  aortic valve replacement)        3. Splenomegaly        4. Anemia in other chronic diseases classified elsewhere        5. Coronary artery disease involving native coronary artery of native heart without angina pectoris        6. HTN (hypertension), benign        7. Stage 3b chronic kidney disease          Medical Decision Making: Today's Assessment/Status/Plan:      1. S/P TAVR, NYHA I  -doing well post-op  -no aspirin until Hgb stable, consider in future  -SBE prophylaxis understands lifelong  -echo 1 month reviewed; echo ordered for 1 year with structural heart follow up  -reviewed hospital imaging, labs, and EKG    2. HLD with non-obstructive CAD  -no angina or THOMAS  -no aspirin with persistent anemia, consider in future  -no statin with liver disease  -LDL goal <70, near goal    3. Prior ETOH abuse leading to alcoholic cirrhosis/mild ascites/splenomegaly  -follow up with GI and PCP regarding persistently low hemoglobin  -pending iron/blood transfusions, refer to nephro  -pending upper GI scope soon    4. HTN with CKD stage 3b  -follow labs  -cont aldactone 25 mg QD  -BP goal <130/80    Patient is to follow up with Dayan MARIN in 1 year with echo; recommend PCP, hematology, nephrology, and GI soon.

## 2025-05-16 ENCOUNTER — OUTPATIENT INFUSION SERVICES (OUTPATIENT)
Dept: ONCOLOGY | Facility: MEDICAL CENTER | Age: 65
End: 2025-05-16
Attending: STUDENT IN AN ORGANIZED HEALTH CARE EDUCATION/TRAINING PROGRAM
Payer: MEDICARE

## 2025-05-16 VITALS
DIASTOLIC BLOOD PRESSURE: 69 MMHG | SYSTOLIC BLOOD PRESSURE: 153 MMHG | BODY MASS INDEX: 24.39 KG/M2 | OXYGEN SATURATION: 98 % | HEART RATE: 86 BPM | HEIGHT: 65 IN | WEIGHT: 146.39 LBS | TEMPERATURE: 97.8 F | RESPIRATION RATE: 18 BRPM

## 2025-05-16 DIAGNOSIS — D63.8 ANEMIA IN OTHER CHRONIC DISEASES CLASSIFIED ELSEWHERE: Primary | ICD-10-CM

## 2025-05-16 LAB
ABO GROUP BLD: NORMAL
BASOPHILS # BLD AUTO: 0.6 % (ref 0–1.8)
BASOPHILS # BLD: 0.03 K/UL (ref 0–0.12)
BLD GP AB SCN SERPL QL: NORMAL
EOSINOPHIL # BLD AUTO: 0.2 K/UL (ref 0–0.51)
EOSINOPHIL NFR BLD: 3.8 % (ref 0–6.9)
ERYTHROCYTE [DISTWIDTH] IN BLOOD BY AUTOMATED COUNT: 48.6 FL (ref 35.9–50)
HCT VFR BLD AUTO: 26.3 % (ref 37–47)
HGB BLD-MCNC: 8.2 G/DL (ref 12–16)
IMM GRANULOCYTES # BLD AUTO: 0.02 K/UL (ref 0–0.11)
IMM GRANULOCYTES NFR BLD AUTO: 0.4 % (ref 0–0.9)
LYMPHOCYTES # BLD AUTO: 1.19 K/UL (ref 1–4.8)
LYMPHOCYTES NFR BLD: 22.8 % (ref 22–41)
MCH RBC QN AUTO: 23.7 PG (ref 27–33)
MCHC RBC AUTO-ENTMCNC: 31.2 G/DL (ref 32.2–35.5)
MCV RBC AUTO: 76 FL (ref 81.4–97.8)
MONOCYTES # BLD AUTO: 0.27 K/UL (ref 0–0.85)
MONOCYTES NFR BLD AUTO: 5.2 % (ref 0–13.4)
NEUTROPHILS # BLD AUTO: 3.51 K/UL (ref 1.82–7.42)
NEUTROPHILS NFR BLD: 67.2 % (ref 44–72)
NRBC # BLD AUTO: 0 K/UL
NRBC BLD-RTO: 0 /100 WBC (ref 0–0.2)
OUTPT INFUS CBC COMMENT OICOM: ABNORMAL
PLATELET # BLD AUTO: 122 K/UL (ref 164–446)
PMV BLD AUTO: 9 FL (ref 9–12.9)
RBC # BLD AUTO: 3.46 M/UL (ref 4.2–5.4)
RH BLD: NORMAL
WBC # BLD AUTO: 5.2 K/UL (ref 4.8–10.8)

## 2025-05-16 PROCEDURE — 86850 RBC ANTIBODY SCREEN: CPT

## 2025-05-16 PROCEDURE — 86900 BLOOD TYPING SEROLOGIC ABO: CPT

## 2025-05-16 PROCEDURE — 86901 BLOOD TYPING SEROLOGIC RH(D): CPT

## 2025-05-16 PROCEDURE — 36415 COLL VENOUS BLD VENIPUNCTURE: CPT

## 2025-05-16 PROCEDURE — 85025 COMPLETE CBC W/AUTO DIFF WBC: CPT

## 2025-05-16 RX ORDER — SODIUM CHLORIDE 9 MG/ML
INJECTION, SOLUTION INTRAVENOUS CONTINUOUS
Status: CANCELLED | OUTPATIENT
Start: 2025-05-16

## 2025-05-16 RX ORDER — HYDROCORTISONE SODIUM SUCCINATE 100 MG/2ML
100 INJECTION INTRAMUSCULAR; INTRAVENOUS ONCE
Status: CANCELLED | OUTPATIENT
Start: 2025-05-16 | End: 2025-05-16

## 2025-05-16 RX ORDER — 0.9 % SODIUM CHLORIDE 0.9 %
3 VIAL (ML) INJECTION PRN
Status: CANCELLED | OUTPATIENT
Start: 2025-05-16

## 2025-05-16 RX ORDER — 0.9 % SODIUM CHLORIDE 0.9 %
10 VIAL (ML) INJECTION PRN
Status: CANCELLED | OUTPATIENT
Start: 2025-05-16

## 2025-05-16 RX ORDER — DIPHENHYDRAMINE HCL 25 MG
25 TABLET ORAL ONCE
Status: DISCONTINUED | OUTPATIENT
Start: 2025-05-16 | End: 2025-05-16

## 2025-05-16 RX ORDER — ACETAMINOPHEN 325 MG/1
650 TABLET ORAL ONCE
Status: DISCONTINUED | OUTPATIENT
Start: 2025-05-16 | End: 2025-05-16

## 2025-05-16 RX ORDER — FUROSEMIDE 10 MG/ML
10 INJECTION INTRAMUSCULAR; INTRAVENOUS ONCE
Status: CANCELLED | OUTPATIENT
Start: 2025-05-16 | End: 2025-05-16

## 2025-05-16 RX ORDER — HYDROCORTISONE SODIUM SUCCINATE 100 MG/2ML
100 INJECTION INTRAMUSCULAR; INTRAVENOUS PRN
Status: CANCELLED | OUTPATIENT
Start: 2025-05-16

## 2025-05-16 RX ORDER — HYDROCORTISONE SODIUM SUCCINATE 100 MG/2ML
100 INJECTION INTRAMUSCULAR; INTRAVENOUS ONCE
Status: DISCONTINUED | OUTPATIENT
Start: 2025-05-16 | End: 2025-05-16

## 2025-05-16 RX ORDER — ACETAMINOPHEN 325 MG/1
650 TABLET ORAL PRN
Status: CANCELLED | OUTPATIENT
Start: 2025-05-16

## 2025-05-16 RX ORDER — 0.9 % SODIUM CHLORIDE 0.9 %
VIAL (ML) INJECTION PRN
Status: CANCELLED | OUTPATIENT
Start: 2025-05-16

## 2025-05-16 RX ORDER — ACETAMINOPHEN 325 MG/1
650 TABLET ORAL ONCE
Status: CANCELLED | OUTPATIENT
Start: 2025-05-16

## 2025-05-16 RX ORDER — DIPHENHYDRAMINE HYDROCHLORIDE 50 MG/ML
25 INJECTION, SOLUTION INTRAMUSCULAR; INTRAVENOUS PRN
Status: CANCELLED | OUTPATIENT
Start: 2025-05-16

## 2025-05-16 RX ORDER — DIPHENHYDRAMINE HCL 25 MG
25 TABLET ORAL ONCE
Status: CANCELLED | OUTPATIENT
Start: 2025-05-16 | End: 2025-05-16

## 2025-05-16 ASSESSMENT — FIBROSIS 4 INDEX: FIB4 SCORE: 2.83

## 2025-05-20 ENCOUNTER — OFFICE VISIT (OUTPATIENT)
Dept: MEDICAL GROUP | Facility: MEDICAL CENTER | Age: 65
End: 2025-05-20
Payer: MEDICARE

## 2025-05-20 ENCOUNTER — TELEPHONE (OUTPATIENT)
Dept: CARDIOLOGY | Facility: MEDICAL CENTER | Age: 65
End: 2025-05-20

## 2025-05-20 VITALS
TEMPERATURE: 98.9 F | OXYGEN SATURATION: 100 % | WEIGHT: 143.3 LBS | HEART RATE: 81 BPM | BODY MASS INDEX: 23.88 KG/M2 | DIASTOLIC BLOOD PRESSURE: 62 MMHG | HEIGHT: 65 IN | SYSTOLIC BLOOD PRESSURE: 136 MMHG

## 2025-05-20 DIAGNOSIS — Z95.2 S/P TAVR (TRANSCATHETER AORTIC VALVE REPLACEMENT): ICD-10-CM

## 2025-05-20 DIAGNOSIS — N18.32 STAGE 3B CHRONIC KIDNEY DISEASE: Primary | ICD-10-CM

## 2025-05-20 DIAGNOSIS — D63.8 ANEMIA IN OTHER CHRONIC DISEASES CLASSIFIED ELSEWHERE: ICD-10-CM

## 2025-05-20 DIAGNOSIS — Z01.818 PREOP EXAMINATION: Primary | ICD-10-CM

## 2025-05-20 DIAGNOSIS — N18.32 STAGE 3B CHRONIC KIDNEY DISEASE: ICD-10-CM

## 2025-05-20 DIAGNOSIS — K70.31 ASCITES DUE TO ALCOHOLIC CIRRHOSIS (HCC): ICD-10-CM

## 2025-05-20 PROCEDURE — 99214 OFFICE O/P EST MOD 30 MIN: CPT | Performed by: STUDENT IN AN ORGANIZED HEALTH CARE EDUCATION/TRAINING PROGRAM

## 2025-05-20 PROCEDURE — 3075F SYST BP GE 130 - 139MM HG: CPT | Performed by: STUDENT IN AN ORGANIZED HEALTH CARE EDUCATION/TRAINING PROGRAM

## 2025-05-20 PROCEDURE — 3078F DIAST BP <80 MM HG: CPT | Performed by: STUDENT IN AN ORGANIZED HEALTH CARE EDUCATION/TRAINING PROGRAM

## 2025-05-20 RX ORDER — FUROSEMIDE 10 MG/ML
10 INJECTION INTRAMUSCULAR; INTRAVENOUS ONCE
Status: CANCELLED | OUTPATIENT
Start: 2025-05-21 | End: 2025-05-21

## 2025-05-20 RX ORDER — DIPHENHYDRAMINE HCL 25 MG
25 TABLET ORAL ONCE
Status: CANCELLED | OUTPATIENT
Start: 2025-05-21 | End: 2025-05-21

## 2025-05-20 RX ORDER — ASCORBIC ACID 500 MG
500 TABLET ORAL DAILY
COMMUNITY

## 2025-05-20 RX ORDER — ACETAMINOPHEN 325 MG/1
650 TABLET ORAL PRN
Status: CANCELLED | OUTPATIENT
Start: 2025-05-21

## 2025-05-20 RX ORDER — 0.9 % SODIUM CHLORIDE 0.9 %
3 VIAL (ML) INJECTION PRN
Status: CANCELLED | OUTPATIENT
Start: 2025-05-21

## 2025-05-20 RX ORDER — 0.9 % SODIUM CHLORIDE 0.9 %
VIAL (ML) INJECTION PRN
Status: CANCELLED | OUTPATIENT
Start: 2025-05-21

## 2025-05-20 RX ORDER — DIPHENHYDRAMINE HYDROCHLORIDE 50 MG/ML
25 INJECTION, SOLUTION INTRAMUSCULAR; INTRAVENOUS PRN
Status: CANCELLED | OUTPATIENT
Start: 2025-05-21

## 2025-05-20 RX ORDER — 0.9 % SODIUM CHLORIDE 0.9 %
10 VIAL (ML) INJECTION PRN
Status: CANCELLED | OUTPATIENT
Start: 2025-05-21

## 2025-05-20 RX ORDER — HYDROCORTISONE SODIUM SUCCINATE 100 MG/2ML
100 INJECTION INTRAMUSCULAR; INTRAVENOUS ONCE
Status: CANCELLED | OUTPATIENT
Start: 2025-05-21 | End: 2025-05-21

## 2025-05-20 RX ORDER — SODIUM CHLORIDE 9 MG/ML
INJECTION, SOLUTION INTRAVENOUS CONTINUOUS
Status: CANCELLED | OUTPATIENT
Start: 2025-05-21

## 2025-05-20 RX ORDER — HYDROCORTISONE SODIUM SUCCINATE 100 MG/2ML
100 INJECTION INTRAMUSCULAR; INTRAVENOUS PRN
Status: CANCELLED | OUTPATIENT
Start: 2025-05-21

## 2025-05-20 RX ORDER — ACETAMINOPHEN 325 MG/1
650 TABLET ORAL ONCE
Status: CANCELLED | OUTPATIENT
Start: 2025-05-21

## 2025-05-20 ASSESSMENT — FIBROSIS 4 INDEX: FIB4 SCORE: 4.4

## 2025-05-20 NOTE — TELEPHONE ENCOUNTER
ROBBY Suarez; Cici GIORDANO R.N.  Please order UA with microalbumin and Cr ratio. SC     ----- Message -----  From: Ivone RICHARDSON  Sent: 5/20/2025  10:47 AM PDT  To: ROBBY Suarez  Subject: Triage Rejected                                  IB to provider: Triage rejected as follows:  Yamila ALONSO, Med Ass't 5/19/2025  3:30 PM PDT  Triage rejected please have provider order the updated  following.  (We need Updated April 2025 Labs)   Urinalysis  Microalbumin  creatinine ratio

## 2025-05-20 NOTE — PROGRESS NOTES
"REASON FOR VISIT: Pre-Op Consultation  Consultation Requested by: [facility]  Procedure date and type: To be decided  Inherent cardiac risk of procedure: Intermediate      History of condition for which surgery is planned: Umbilical hernia        Past medical history:  has a past medical history of Acute combined systolic and diastolic heart failure (HCC) (01/22/2024), Anemia due to chronic kidney disease, Cataract (2024), Heart murmur (1965), Heart valve disease (2024), HLD (hyperlipidemia), HTN (hypertension), Hypertension (2000), Hypotension, Liver cirrhosis (HCC), Obesity, and Renal disorder.. Negative for: CAD, SBE, CVA, TIA, DVT, PE, bleeding requiring transfusion, intubation.    Surgical and anesthetic history:  has a past surgical history that includes pelviscopy (6/5/08); ovarian cystectomy (6/5/08); cystoscopy (6/5/08); hysterectomy laparoscopy (2003); trigger finger release (Right, 3/31/2017); ganglion excision (Right, 3/31/2017); knee arthroscopy; pr lap,diagnostic abdomen (2/23/2024); transcatheter aortic valve replacement (Bilateral, 4/7/2025); and echocardiogram, transesophageal, intraoperative (N/A, 4/7/2025). Prior surgery without complication, bleeding, reaction to anesthetic, prolonged recovery    Habits: Social History[1]    Allergies: Sulfa drugs No known allergy to Anesthetic, or Latex.       Current medicines: Current Medications[2]    Anticoagulant: None    Herbals: None              Duke Activity Status Index: 8.2  METS: Moderate functional capacity    ASA Class: 3    ROS: negative for: CP, SOB, THOMAS, Orthopnea, wheezing, leg edema, polydipsia, polyuria, fevers, chills, sweats, cough, cold, congestion, abd pain, reflux, black or bloody stools      PHYSICAL EXAMINATION:  VITAL SIGNS: /62 (BP Location: Left arm, Patient Position: Sitting, BP Cuff Size: Adult)   Pulse 81   Temp 37.2 °C (98.9 °F) (Temporal)   Ht 1.651 m (5' 5\")   Wt 65 kg (143 lb 4.8 oz)   SpO2 100%  Body " mass index is 23.85 kg/m².  HEENT: EOMI, PERRL. Oropharynx pink, moist. Normal airway. Neck supple, no cervical lymphadenopathy.  LUNGS: CTAB good excursion.   HEART: RRR no murmur.  ABDOMEN: soft, nondistended, nontender normal BS. No HSM.  LOWER EXTREMITIES: warm and well perfused with no edema.      Labs: See attached/per surgeon    Risk of complications using American College of Surgeons Surgical Risk Calculator: Above average risk    IMPRESSION:  The primary encounter diagnosis was Preop examination. Diagnoses of Anemia in other chronic diseases classified elsewhere, Stage 3b chronic kidney disease, S/P TAVR (transcatheter aortic valve replacement), and Ascites due to alcoholic cirrhosis (HCC) were also pertinent to this visit.  Planned surgery:Umbilical hernia repair   High risk medical conditions: negative for Cardiac, Pulmonary, Bleeding, Poor healing, Thrombosis, Debility        Problem List Items Addressed This Visit       Anemia in other chronic diseases classified elsewhere    Anemia of chronic disease secondary to CKD, portal hypertensive gastropathy and severe AS (s/p TVR) with Hb 8.2.   -Plan to transfuse RBC to keep Hb 9-10.  - Pending GI clearance and upper endoscopy for esophageal varices.         Ascites due to alcoholic cirrhosis (HCC)    - Stable.          Stage 3b chronic kidney disease    Follow-up with nephrology         S/P TAVR (transcatheter aortic valve replacement)    Refer to cardiology clearance          Preop examination - Primary    Chronic medical conditions: See above  Avoid drugs that potentiate bleeding as advised by surgeon  Discontinue all herbal supplements 2 weeks prior to surgery.  Patient is at moderate risk for surgery by Clinton index              Clinton Index for assessing perioperative cardiovascular risk [Circulation 1999;100:1047]:   (one point each for * high-risk surgery [ intrathoracic, suprainguinal vascular, intraperitoneal ],* Hx ischemic heart dz, * Hx CHF, *Hx TIA or  CVA, *IDDM, *Serum Cr >2.0)   Interpretation of risk: (Complication = MI, Pulm edema, v-fib or cardiac arrest, complete heart block)  Very Low: 0 points = 0.4 % complication. Low: 1 point = 0.9 %, Moderate: 2 points = 6.6 %, High: 3+ points = 11%.         [1]   Social History  Tobacco Use    Smoking status: Never    Smokeless tobacco: Never   Vaping Use    Vaping status: Never Used   Substance Use Topics    Alcohol use: Not Currently     Alcohol/week: 3.6 oz     Comment: denies    Drug use: Never     Comment: denies   [2]   Current Outpatient Medications   Medication Sig Dispense Refill    ascorbic acid (VITAMIN C) 500 MG tablet Take 500 mg by mouth every day.      Ferrous Sulfate (IRON) 325 (65 Fe) MG Tab Take 325 mg by mouth 2 times a day. 200 Tablet 3    FOLIC ACID PO Take 1 Tablet by mouth every day. OTC      fluticasone (FLONASE) 50 MCG/ACT nasal spray Administer 2 Sprays into affected nostril(S) every morning.      spironolactone (ALDACTONE) 25 MG Tab Take 1 Tablet by mouth every day. 90 Tablet 4    omeprazole (PRILOSEC) 40 MG delayed-release capsule Take 1 Capsule by mouth every day. 90 Capsule 3    HYDROcodone-acetaminophen (NORCO) 5-325 MG Tab per tablet Take 1 Tablet by mouth 2 times a day as needed.      magnesium oxide 400 (240 Mg) MG Tab Take 1 Tablet by mouth 2 times a day. 60 Tablet 0    pyridoxine (VITAMIN B-6) 50 MG Tab Take 1 Tablet by mouth every day. 30 Tablet 11    phosphorus (K-PHOS-NEUTRAL) 250 MG tablet Take 1 Tablet by mouth 2 times a day. 60 Tablet 0    thiamine (THIAMINE) 100 MG tablet Take 1 Tablet by mouth every day.       No current facility-administered medications for this visit.

## 2025-05-21 ENCOUNTER — HOSPITAL ENCOUNTER (OUTPATIENT)
Dept: LAB | Facility: MEDICAL CENTER | Age: 65
End: 2025-05-21
Attending: NURSE PRACTITIONER
Payer: MEDICARE

## 2025-05-21 DIAGNOSIS — D63.8 ANEMIA IN OTHER CHRONIC DISEASES CLASSIFIED ELSEWHERE: ICD-10-CM

## 2025-05-21 DIAGNOSIS — N18.32 STAGE 3B CHRONIC KIDNEY DISEASE: ICD-10-CM

## 2025-05-21 LAB
APPEARANCE UR: CLEAR
BACTERIA #/AREA URNS HPF: NORMAL /HPF
BILIRUB UR QL STRIP.AUTO: NEGATIVE
CASTS URNS QL MICRO: NORMAL /LPF (ref 0–2)
COLOR UR: ABNORMAL
EPITHELIAL CELLS 1715: NORMAL /HPF (ref 0–5)
GLUCOSE UR STRIP.AUTO-MCNC: NEGATIVE MG/DL
KETONES UR STRIP.AUTO-MCNC: ABNORMAL MG/DL
LEUKOCYTE ESTERASE UR QL STRIP.AUTO: NEGATIVE
MICRO URNS: ABNORMAL
NITRITE UR QL STRIP.AUTO: NEGATIVE
PH UR STRIP.AUTO: 5.5 [PH] (ref 5–8)
PROT UR QL STRIP: 30 MG/DL
RBC # URNS HPF: NORMAL /HPF (ref 0–2)
RBC UR QL AUTO: NEGATIVE
SP GR UR STRIP.AUTO: 1.03
UROBILINOGEN UR STRIP.AUTO-MCNC: 1 EU/DL
WBC #/AREA URNS HPF: NORMAL /HPF

## 2025-05-21 PROCEDURE — 82043 UR ALBUMIN QUANTITATIVE: CPT

## 2025-05-21 PROCEDURE — 81001 URINALYSIS AUTO W/SCOPE: CPT

## 2025-05-21 PROCEDURE — 82570 ASSAY OF URINE CREATININE: CPT

## 2025-05-22 ENCOUNTER — PATIENT MESSAGE (OUTPATIENT)
Dept: CARDIOLOGY | Facility: MEDICAL CENTER | Age: 65
End: 2025-05-22
Payer: MEDICARE

## 2025-05-22 ENCOUNTER — RESULTS FOLLOW-UP (OUTPATIENT)
Dept: CARDIOLOGY | Facility: MEDICAL CENTER | Age: 65
End: 2025-05-22
Payer: MEDICARE

## 2025-05-22 DIAGNOSIS — D50.9 IRON DEFICIENCY ANEMIA, UNSPECIFIED IRON DEFICIENCY ANEMIA TYPE: Primary | ICD-10-CM

## 2025-05-22 LAB
CREAT UR-MCNC: 215 MG/DL
MICROALBUMIN UR-MCNC: 3.7 MG/DL
MICROALBUMIN/CREAT UR: 17 MG/G (ref 0–30)

## 2025-05-22 RX ORDER — ALBUTEROL SULFATE 5 MG/ML
2.5 SOLUTION RESPIRATORY (INHALATION) PRN
Status: CANCELLED | OUTPATIENT
Start: 2025-05-29

## 2025-05-22 RX ORDER — METHYLPREDNISOLONE SODIUM SUCCINATE 125 MG/2ML
125 INJECTION, POWDER, LYOPHILIZED, FOR SOLUTION INTRAMUSCULAR; INTRAVENOUS PRN
Status: CANCELLED | OUTPATIENT
Start: 2025-05-29

## 2025-05-22 RX ORDER — MEPERIDINE HYDROCHLORIDE 25 MG/ML
25 INJECTION INTRAMUSCULAR; INTRAVENOUS; SUBCUTANEOUS PRN
Status: CANCELLED | OUTPATIENT
Start: 2025-05-29

## 2025-05-22 RX ORDER — SODIUM CHLORIDE 9 MG/ML
INJECTION, SOLUTION INTRAVENOUS CONTINUOUS
Status: CANCELLED | OUTPATIENT
Start: 2025-05-29

## 2025-05-22 RX ORDER — ONDANSETRON 2 MG/ML
8 INJECTION INTRAMUSCULAR; INTRAVENOUS PRN
Status: CANCELLED | OUTPATIENT
Start: 2025-05-29

## 2025-05-22 RX ORDER — LORAZEPAM 0.5 MG/1
0.5 TABLET ORAL PRN
Status: CANCELLED | OUTPATIENT
Start: 2025-05-29

## 2025-05-22 RX ORDER — EPINEPHRINE 1 MG/ML(1)
0.5 AMPUL (ML) INJECTION PRN
Status: CANCELLED | OUTPATIENT
Start: 2025-05-29

## 2025-05-22 RX ORDER — ONDANSETRON 8 MG/1
8 TABLET, ORALLY DISINTEGRATING ORAL PRN
Status: CANCELLED | OUTPATIENT
Start: 2025-05-29

## 2025-05-22 RX ORDER — SODIUM CHLORIDE 9 MG/ML
1000 INJECTION, SOLUTION INTRAVENOUS PRN
Status: CANCELLED | OUTPATIENT
Start: 2025-05-29

## 2025-05-22 RX ORDER — ACETAMINOPHEN 325 MG/1
650 TABLET ORAL PRN
Status: CANCELLED | OUTPATIENT
Start: 2025-05-29

## 2025-05-22 RX ORDER — DIPHENHYDRAMINE HYDROCHLORIDE 50 MG/ML
25 INJECTION, SOLUTION INTRAMUSCULAR; INTRAVENOUS PRN
Status: CANCELLED | OUTPATIENT
Start: 2025-05-29

## 2025-05-22 NOTE — TELEPHONE ENCOUNTER
Arabella message sent to patient with SC recommendations.     ----- Message from Nurse Practitioner ROBBY Nicholas sent at 5/22/2025 10:55 AM PDT -----  For nephrology apt only. SC  ----- Message -----  From: Int, Lab  Sent: 5/21/2025   8:22 PM PDT  To: ROBBY Suaerz

## 2025-05-27 ENCOUNTER — OUTPATIENT INFUSION SERVICES (OUTPATIENT)
Dept: ONCOLOGY | Facility: MEDICAL CENTER | Age: 65
End: 2025-05-27
Attending: STUDENT IN AN ORGANIZED HEALTH CARE EDUCATION/TRAINING PROGRAM
Payer: MEDICARE

## 2025-05-27 VITALS
BODY MASS INDEX: 24.24 KG/M2 | WEIGHT: 145.5 LBS | OXYGEN SATURATION: 95 % | SYSTOLIC BLOOD PRESSURE: 139 MMHG | HEIGHT: 65 IN | HEART RATE: 89 BPM | RESPIRATION RATE: 18 BRPM | TEMPERATURE: 98.5 F | DIASTOLIC BLOOD PRESSURE: 67 MMHG

## 2025-05-27 DIAGNOSIS — D63.8 ANEMIA IN OTHER CHRONIC DISEASES CLASSIFIED ELSEWHERE: ICD-10-CM

## 2025-05-27 DIAGNOSIS — D50.9 IRON DEFICIENCY ANEMIA, UNSPECIFIED IRON DEFICIENCY ANEMIA TYPE: Primary | ICD-10-CM

## 2025-05-27 LAB
ABO GROUP BLD: NORMAL
BARCODED ABORH UBTYP: 5100
BARCODED PRD CODE UBPRD: NORMAL
BARCODED UNIT NUM UBUNT: NORMAL
BASOPHILS # BLD AUTO: 0.6 % (ref 0–1.8)
BASOPHILS # BLD: 0.03 K/UL (ref 0–0.12)
BLD GP AB SCN SERPL QL: NORMAL
COMPONENT R 8504R: NORMAL
EOSINOPHIL # BLD AUTO: 0.15 K/UL (ref 0–0.51)
EOSINOPHIL NFR BLD: 3 % (ref 0–6.9)
ERYTHROCYTE [DISTWIDTH] IN BLOOD BY AUTOMATED COUNT: 48.6 FL (ref 35.9–50)
FERRITIN SERPL-MCNC: 25.9 NG/ML (ref 10–291)
HCT VFR BLD AUTO: 27.1 % (ref 37–47)
HGB BLD-MCNC: 8.3 G/DL (ref 12–16)
IMM GRANULOCYTES # BLD AUTO: 0.03 K/UL (ref 0–0.11)
IMM GRANULOCYTES NFR BLD AUTO: 0.6 % (ref 0–0.9)
IRON SATN MFR SERPL: 39 % (ref 15–55)
IRON SERPL-MCNC: 128 UG/DL (ref 40–170)
LYMPHOCYTES # BLD AUTO: 1.01 K/UL (ref 1–4.8)
LYMPHOCYTES NFR BLD: 20.4 % (ref 22–41)
MCH RBC QN AUTO: 23.2 PG (ref 27–33)
MCHC RBC AUTO-ENTMCNC: 30.6 G/DL (ref 32.2–35.5)
MCV RBC AUTO: 75.7 FL (ref 81.4–97.8)
MONOCYTES # BLD AUTO: 0.24 K/UL (ref 0–0.85)
MONOCYTES NFR BLD AUTO: 4.8 % (ref 0–13.4)
NEUTROPHILS # BLD AUTO: 3.5 K/UL (ref 1.82–7.42)
NEUTROPHILS NFR BLD: 70.6 % (ref 44–72)
NRBC # BLD AUTO: 0 K/UL
NRBC BLD-RTO: 0 /100 WBC (ref 0–0.2)
OUTPT INFUS CBC COMMENT OICOM: ABNORMAL
PLATELET # BLD AUTO: 181 K/UL (ref 164–446)
PMV BLD AUTO: 12.1 FL (ref 9–12.9)
PRODUCT TYPE UPROD: NORMAL
RBC # BLD AUTO: 3.58 M/UL (ref 4.2–5.4)
RH BLD: NORMAL
TIBC SERPL-MCNC: 327 UG/DL (ref 250–450)
UIBC SERPL-MCNC: 199 UG/DL (ref 110–370)
UNIT STATUS USTAT: NORMAL
WBC # BLD AUTO: 5 K/UL (ref 4.8–10.8)

## 2025-05-27 PROCEDURE — 306780 HCHG STAT FOR TRANSFUSION PER CASE

## 2025-05-27 PROCEDURE — 86923 COMPATIBILITY TEST ELECTRIC: CPT

## 2025-05-27 PROCEDURE — 700111 HCHG RX REV CODE 636 W/ 250 OVERRIDE (IP): Mod: JZ | Performed by: STUDENT IN AN ORGANIZED HEALTH CARE EDUCATION/TRAINING PROGRAM

## 2025-05-27 PROCEDURE — 83540 ASSAY OF IRON: CPT

## 2025-05-27 PROCEDURE — 36430 TRANSFUSION BLD/BLD COMPNT: CPT

## 2025-05-27 PROCEDURE — 86850 RBC ANTIBODY SCREEN: CPT

## 2025-05-27 PROCEDURE — 700105 HCHG RX REV CODE 258: Performed by: STUDENT IN AN ORGANIZED HEALTH CARE EDUCATION/TRAINING PROGRAM

## 2025-05-27 PROCEDURE — 86900 BLOOD TYPING SEROLOGIC ABO: CPT

## 2025-05-27 PROCEDURE — 82728 ASSAY OF FERRITIN: CPT

## 2025-05-27 PROCEDURE — 86901 BLOOD TYPING SEROLOGIC RH(D): CPT

## 2025-05-27 PROCEDURE — 700102 HCHG RX REV CODE 250 W/ 637 OVERRIDE(OP): Performed by: STUDENT IN AN ORGANIZED HEALTH CARE EDUCATION/TRAINING PROGRAM

## 2025-05-27 PROCEDURE — 96374 THER/PROPH/DIAG INJ IV PUSH: CPT

## 2025-05-27 PROCEDURE — P9016 RBC LEUKOCYTES REDUCED: HCPCS

## 2025-05-27 PROCEDURE — 83550 IRON BINDING TEST: CPT

## 2025-05-27 PROCEDURE — 85025 COMPLETE CBC W/AUTO DIFF WBC: CPT

## 2025-05-27 PROCEDURE — A9270 NON-COVERED ITEM OR SERVICE: HCPCS | Performed by: STUDENT IN AN ORGANIZED HEALTH CARE EDUCATION/TRAINING PROGRAM

## 2025-05-27 RX ORDER — SODIUM CHLORIDE 9 MG/ML
INJECTION, SOLUTION INTRAVENOUS CONTINUOUS
OUTPATIENT
Start: 2025-05-27

## 2025-05-27 RX ORDER — METHYLPREDNISOLONE SODIUM SUCCINATE 125 MG/2ML
125 INJECTION, POWDER, LYOPHILIZED, FOR SOLUTION INTRAMUSCULAR; INTRAVENOUS PRN
OUTPATIENT
Start: 2025-05-27

## 2025-05-27 RX ORDER — SODIUM CHLORIDE 9 MG/ML
1000 INJECTION, SOLUTION INTRAVENOUS PRN
OUTPATIENT
Start: 2025-05-27

## 2025-05-27 RX ORDER — DIPHENHYDRAMINE HCL 25 MG
25 TABLET ORAL ONCE
Status: COMPLETED | OUTPATIENT
Start: 2025-05-27 | End: 2025-05-27

## 2025-05-27 RX ORDER — DIPHENHYDRAMINE HCL 25 MG
25 TABLET ORAL ONCE
Status: CANCELLED | OUTPATIENT
Start: 2025-05-27 | End: 2025-05-27

## 2025-05-27 RX ORDER — MEPERIDINE HYDROCHLORIDE 25 MG/ML
25 INJECTION INTRAMUSCULAR; INTRAVENOUS; SUBCUTANEOUS PRN
OUTPATIENT
Start: 2025-05-27

## 2025-05-27 RX ORDER — ONDANSETRON 8 MG/1
8 TABLET, ORALLY DISINTEGRATING ORAL PRN
OUTPATIENT
Start: 2025-05-27

## 2025-05-27 RX ORDER — SODIUM CHLORIDE 9 MG/ML
INJECTION, SOLUTION INTRAVENOUS CONTINUOUS
Status: DISCONTINUED | OUTPATIENT
Start: 2025-05-27 | End: 2025-05-27

## 2025-05-27 RX ORDER — 0.9 % SODIUM CHLORIDE 0.9 %
VIAL (ML) INJECTION PRN
Status: CANCELLED | OUTPATIENT
Start: 2025-05-27

## 2025-05-27 RX ORDER — ACETAMINOPHEN 325 MG/1
650 TABLET ORAL ONCE
Status: CANCELLED | OUTPATIENT
Start: 2025-05-27

## 2025-05-27 RX ORDER — ALBUTEROL SULFATE 5 MG/ML
2.5 SOLUTION RESPIRATORY (INHALATION) PRN
OUTPATIENT
Start: 2025-05-27

## 2025-05-27 RX ORDER — SODIUM CHLORIDE 9 MG/ML
INJECTION, SOLUTION INTRAVENOUS CONTINUOUS
Status: CANCELLED | OUTPATIENT
Start: 2025-05-27

## 2025-05-27 RX ORDER — 0.9 % SODIUM CHLORIDE 0.9 %
3 VIAL (ML) INJECTION PRN
Status: CANCELLED | OUTPATIENT
Start: 2025-05-27

## 2025-05-27 RX ORDER — ACETAMINOPHEN 325 MG/1
650 TABLET ORAL PRN
Status: CANCELLED | OUTPATIENT
Start: 2025-05-27

## 2025-05-27 RX ORDER — DIPHENHYDRAMINE HYDROCHLORIDE 50 MG/ML
25 INJECTION, SOLUTION INTRAMUSCULAR; INTRAVENOUS PRN
OUTPATIENT
Start: 2025-05-27

## 2025-05-27 RX ORDER — ACETAMINOPHEN 325 MG/1
650 TABLET ORAL ONCE
Status: COMPLETED | OUTPATIENT
Start: 2025-05-27 | End: 2025-05-27

## 2025-05-27 RX ORDER — 0.9 % SODIUM CHLORIDE 0.9 %
10 VIAL (ML) INJECTION PRN
Status: CANCELLED | OUTPATIENT
Start: 2025-05-27

## 2025-05-27 RX ORDER — FUROSEMIDE 10 MG/ML
10 INJECTION INTRAMUSCULAR; INTRAVENOUS ONCE
Status: CANCELLED | OUTPATIENT
Start: 2025-05-27 | End: 2025-05-27

## 2025-05-27 RX ORDER — ONDANSETRON 2 MG/ML
8 INJECTION INTRAMUSCULAR; INTRAVENOUS PRN
OUTPATIENT
Start: 2025-05-27

## 2025-05-27 RX ORDER — HYDROCORTISONE SODIUM SUCCINATE 100 MG/2ML
100 INJECTION INTRAMUSCULAR; INTRAVENOUS PRN
Status: CANCELLED | OUTPATIENT
Start: 2025-05-27

## 2025-05-27 RX ORDER — DIPHENHYDRAMINE HYDROCHLORIDE 50 MG/ML
25 INJECTION, SOLUTION INTRAMUSCULAR; INTRAVENOUS PRN
Status: CANCELLED | OUTPATIENT
Start: 2025-05-27

## 2025-05-27 RX ORDER — EPINEPHRINE 1 MG/ML(1)
0.5 AMPUL (ML) INJECTION PRN
OUTPATIENT
Start: 2025-05-27

## 2025-05-27 RX ORDER — ACETAMINOPHEN 325 MG/1
650 TABLET ORAL PRN
OUTPATIENT
Start: 2025-05-27

## 2025-05-27 RX ORDER — HYDROCORTISONE SODIUM SUCCINATE 100 MG/2ML
100 INJECTION INTRAMUSCULAR; INTRAVENOUS ONCE
Status: CANCELLED | OUTPATIENT
Start: 2025-05-27 | End: 2025-05-27

## 2025-05-27 RX ORDER — LORAZEPAM 1 MG/1
0.5 TABLET ORAL PRN
OUTPATIENT
Start: 2025-05-27

## 2025-05-27 RX ORDER — HYDROCORTISONE SODIUM SUCCINATE 100 MG/2ML
100 INJECTION INTRAMUSCULAR; INTRAVENOUS ONCE
Status: COMPLETED | OUTPATIENT
Start: 2025-05-27 | End: 2025-05-27

## 2025-05-27 RX ADMIN — HYDROCORTISONE SODIUM SUCCINATE 100 MG: 100 INJECTION, POWDER, FOR SOLUTION INTRAMUSCULAR; INTRAVENOUS at 10:35

## 2025-05-27 RX ADMIN — SODIUM CHLORIDE: 9 INJECTION, SOLUTION INTRAVENOUS at 10:30

## 2025-05-27 RX ADMIN — DIPHENHYDRAMINE HYDROCHLORIDE 25 MG: 25 TABLET ORAL at 10:33

## 2025-05-27 RX ADMIN — ACETAMINOPHEN 650 MG: 325 TABLET ORAL at 12:54

## 2025-05-27 ASSESSMENT — PAIN DESCRIPTION - PAIN TYPE
TYPE: ACUTE PAIN;CHRONIC PAIN

## 2025-05-27 ASSESSMENT — FIBROSIS 4 INDEX: FIB4 SCORE: 4.4

## 2025-05-27 NOTE — PROGRESS NOTES
Pt presented to Infusion for blood products; Pt denied fever, signs or symptoms of infection or acute illness today. POC discussed and pt verbalized understanding. LAC PIV established and pre-treatment labs collected per order. Labs reviewed; pt meets parameters for 1U PRBC's. Blood consent and d/c instructions signed. Premedications administered per MAR; 1 unit of PRBC's transfused per [facility] policy. No signs or symptoms of reaction or complications noted. LAC PIV flushed and removed, tip intact. Gauze and Coban applied to site. Follow-up care and future appointments discussed with pt, and pt verbalized understanding. PharmD reviewed all iron labs and determined ok to schedule for iron infusion at pt's preferred date/time, advised pt of future appointment scheduled for June 08, 2025 at 2:30pm, pt verbalized understanding and agreement..     Pt discharged home to self care in no apparent distress at this time.

## 2025-05-29 PROBLEM — Z01.818 PREOP EXAMINATION: Status: ACTIVE | Noted: 2025-05-29

## 2025-05-29 NOTE — ASSESSMENT & PLAN NOTE
Anemia of chronic disease secondary to CKD, portal hypertensive gastropathy and severe AS (s/p TVR) with Hb 8.2.   -Plan to transfuse RBC to keep Hb 9-10.  - Pending GI clearance and upper endoscopy for esophageal varices.

## 2025-05-29 NOTE — ASSESSMENT & PLAN NOTE
Chronic medical conditions: See above  Avoid drugs that potentiate bleeding as advised by surgeon  Discontinue all herbal supplements 2 weeks prior to surgery.  Patient is at moderate risk for surgery by Clinton index

## 2025-06-08 ENCOUNTER — OUTPATIENT INFUSION SERVICES (OUTPATIENT)
Dept: ONCOLOGY | Facility: MEDICAL CENTER | Age: 65
End: 2025-06-08
Attending: INTERNAL MEDICINE
Payer: MEDICARE

## 2025-06-08 VITALS
TEMPERATURE: 98.3 F | RESPIRATION RATE: 18 BRPM | BODY MASS INDEX: 24.35 KG/M2 | DIASTOLIC BLOOD PRESSURE: 77 MMHG | HEART RATE: 90 BPM | WEIGHT: 146.16 LBS | OXYGEN SATURATION: 90 % | SYSTOLIC BLOOD PRESSURE: 168 MMHG | HEIGHT: 65 IN

## 2025-06-08 DIAGNOSIS — D50.9 IRON DEFICIENCY ANEMIA, UNSPECIFIED IRON DEFICIENCY ANEMIA TYPE: Primary | ICD-10-CM

## 2025-06-08 PROCEDURE — 700105 HCHG RX REV CODE 258: Performed by: INTERNAL MEDICINE

## 2025-06-08 PROCEDURE — 700111 HCHG RX REV CODE 636 W/ 250 OVERRIDE (IP): Mod: JZ,TB | Performed by: INTERNAL MEDICINE

## 2025-06-08 PROCEDURE — 96365 THER/PROPH/DIAG IV INF INIT: CPT

## 2025-06-08 RX ORDER — MEPERIDINE HYDROCHLORIDE 25 MG/ML
25 INJECTION INTRAMUSCULAR; INTRAVENOUS; SUBCUTANEOUS PRN
Status: CANCELLED | OUTPATIENT
Start: 2025-06-08

## 2025-06-08 RX ORDER — METHYLPREDNISOLONE SODIUM SUCCINATE 125 MG/2ML
125 INJECTION, POWDER, LYOPHILIZED, FOR SOLUTION INTRAMUSCULAR; INTRAVENOUS PRN
Status: CANCELLED | OUTPATIENT
Start: 2025-06-08

## 2025-06-08 RX ORDER — ACETAMINOPHEN 325 MG/1
650 TABLET ORAL PRN
Status: CANCELLED | OUTPATIENT
Start: 2025-06-08

## 2025-06-08 RX ORDER — ONDANSETRON 8 MG/1
8 TABLET, ORALLY DISINTEGRATING ORAL PRN
Status: CANCELLED | OUTPATIENT
Start: 2025-06-08

## 2025-06-08 RX ORDER — ONDANSETRON 2 MG/ML
8 INJECTION INTRAMUSCULAR; INTRAVENOUS PRN
Status: CANCELLED | OUTPATIENT
Start: 2025-06-08

## 2025-06-08 RX ORDER — DIPHENHYDRAMINE HYDROCHLORIDE 50 MG/ML
25 INJECTION, SOLUTION INTRAMUSCULAR; INTRAVENOUS PRN
Status: CANCELLED | OUTPATIENT
Start: 2025-06-08

## 2025-06-08 RX ORDER — SODIUM CHLORIDE 9 MG/ML
INJECTION, SOLUTION INTRAVENOUS CONTINUOUS
Status: CANCELLED | OUTPATIENT
Start: 2025-06-08

## 2025-06-08 RX ORDER — LORAZEPAM 1 MG/1
0.5 TABLET ORAL PRN
Status: CANCELLED | OUTPATIENT
Start: 2025-06-08

## 2025-06-08 RX ORDER — SODIUM CHLORIDE 9 MG/ML
INJECTION, SOLUTION INTRAVENOUS CONTINUOUS
Status: DISCONTINUED | OUTPATIENT
Start: 2025-06-08 | End: 2025-06-08

## 2025-06-08 RX ORDER — SODIUM CHLORIDE 9 MG/ML
1000 INJECTION, SOLUTION INTRAVENOUS PRN
Status: CANCELLED | OUTPATIENT
Start: 2025-06-08

## 2025-06-08 RX ORDER — ALBUTEROL SULFATE 5 MG/ML
2.5 SOLUTION RESPIRATORY (INHALATION) PRN
Status: CANCELLED | OUTPATIENT
Start: 2025-06-08

## 2025-06-08 RX ORDER — EPINEPHRINE 1 MG/ML(1)
0.5 AMPUL (ML) INJECTION PRN
Status: CANCELLED | OUTPATIENT
Start: 2025-06-08

## 2025-06-08 RX ADMIN — SODIUM CHLORIDE 1000 MG: 9 INJECTION, SOLUTION INTRAVENOUS at 15:12

## 2025-06-08 RX ADMIN — SODIUM CHLORIDE: 9 INJECTION, SOLUTION INTRAVENOUS at 15:11

## 2025-06-08 ASSESSMENT — PAIN DESCRIPTION - PAIN TYPE: TYPE: CHRONIC PAIN

## 2025-06-08 ASSESSMENT — FIBROSIS 4 INDEX: FIB4 SCORE: 2.97

## 2025-06-08 NOTE — PROGRESS NOTES
Denisse presented [facility] for iron dextran. Oriented to the unit and plan of care discussed, including time of treatment, pt agreeable. PIV started to right FA, brisk blood return observed and flushed easily. Iron labs were drawn 05/27/2025 and reviewed. Iron Dextran started at 75 ml/hr per orders for 5 mins, paused for 10 mins with no s/sx of adverse reaction. Remainder of infusion run over remainder of approx 1 hour with no s/sx of adverse reaction. PIV flushed and removed, gauze and Coban dressing placed. No further appts needed at this time. Pt discharged to home in good condition.

## 2025-06-24 ENCOUNTER — APPOINTMENT (OUTPATIENT)
Dept: ADMISSIONS | Facility: MEDICAL CENTER | Age: 65
End: 2025-06-24
Attending: SURGERY
Payer: MEDICARE

## 2025-06-25 ENCOUNTER — TELEPHONE (OUTPATIENT)
Dept: HEALTH INFORMATION MANAGEMENT | Facility: OTHER | Age: 65
End: 2025-06-25
Payer: MEDICARE

## 2025-06-27 ENCOUNTER — PRE-ADMISSION TESTING (OUTPATIENT)
Dept: ADMISSIONS | Facility: MEDICAL CENTER | Age: 65
End: 2025-06-27
Attending: SURGERY
Payer: MEDICARE

## 2025-06-27 DIAGNOSIS — Z01.812 PRE-OPERATIVE LABORATORY EXAMINATION: Primary | ICD-10-CM

## 2025-06-27 NOTE — PREADMIT AVS NOTE
Current Medications   Medication Instructions    ascorbic acid (VITAMIN C) 500 MG tablet Stop 7 days before surgery    Ferrous Sulfate (IRON) 325 (65 Fe) MG Tab Follow instructions from surgeon or specialist.    FOLIC ACID PO Stop 7 days before surgery    fluticasone (FLONASE) 50 MCG/ACT nasal spray Continue taking medication as prescribed, including morning of procedure     spironolactone (ALDACTONE) 25 MG Tab Hold medication day of procedure    omeprazole (PRILOSEC) 40 MG delayed-release capsule Continue taking medication as prescribed, including morning of procedure     HYDROcodone-acetaminophen (NORCO) 5-325 MG Tab per tablet As needed medication, may take if needed, including morning of procedure     magnesium oxide 400 (240 Mg) MG Tab Stop 7 days before surgery    pyridoxine (VITAMIN B-6) 50 MG Tab Stop 7 days before surgery    phosphorus (K-PHOS-NEUTRAL) 250 MG tablet Stop 7 days before surgery    thiamine (THIAMINE) 100 MG tablet Stop 7 days before surgery

## 2025-06-30 ENCOUNTER — HOSPITAL ENCOUNTER (OUTPATIENT)
Dept: LAB | Facility: MEDICAL CENTER | Age: 65
End: 2025-06-30
Attending: ORTHOPAEDIC SURGERY
Payer: MEDICARE

## 2025-06-30 DIAGNOSIS — M19.041 PRIMARY OSTEOARTHRITIS OF RIGHT HAND: ICD-10-CM

## 2025-06-30 LAB
25(OH)D3 SERPL-MCNC: 31 NG/ML (ref 30–100)
BASOPHILS # BLD AUTO: 0.5 % (ref 0–1.8)
BASOPHILS # BLD: 0.03 K/UL (ref 0–0.12)
C3 SERPL-MCNC: 162 MG/DL (ref 87–200)
C4 SERPL-MCNC: 19.5 MG/DL (ref 19–52)
CRP SERPL HS-MCNC: 1.31 MG/DL (ref 0–0.75)
EOSINOPHIL # BLD AUTO: 0.16 K/UL (ref 0–0.51)
EOSINOPHIL NFR BLD: 2.5 % (ref 0–6.9)
ERYTHROCYTE [DISTWIDTH] IN BLOOD BY AUTOMATED COUNT: 60 FL (ref 35.9–50)
ERYTHROCYTE [SEDIMENTATION RATE] IN BLOOD BY WESTERGREN METHOD: 46 MM/HOUR (ref 0–25)
HCT VFR BLD AUTO: 32.7 % (ref 37–47)
HGB BLD-MCNC: 10.3 G/DL (ref 12–16)
IMM GRANULOCYTES # BLD AUTO: 0.03 K/UL (ref 0–0.11)
IMM GRANULOCYTES NFR BLD AUTO: 0.5 % (ref 0–0.9)
LYMPHOCYTES # BLD AUTO: 1.14 K/UL (ref 1–4.8)
LYMPHOCYTES NFR BLD: 18 % (ref 22–41)
MCH RBC QN AUTO: 24.6 PG (ref 27–33)
MCHC RBC AUTO-ENTMCNC: 31.5 G/DL (ref 32.2–35.5)
MCV RBC AUTO: 78.2 FL (ref 81.4–97.8)
MONOCYTES # BLD AUTO: 0.35 K/UL (ref 0–0.85)
MONOCYTES NFR BLD AUTO: 5.5 % (ref 0–13.4)
NEUTROPHILS # BLD AUTO: 4.61 K/UL (ref 1.82–7.42)
NEUTROPHILS NFR BLD: 73 % (ref 44–72)
NRBC # BLD AUTO: 0 K/UL
NRBC BLD-RTO: 0 /100 WBC (ref 0–0.2)
PLATELET # BLD AUTO: 148 K/UL (ref 164–446)
RBC # BLD AUTO: 4.18 M/UL (ref 4.2–5.4)
RHEUMATOID FACT SER IA-ACNC: <10 IU/ML (ref 0–14)
URATE SERPL-MCNC: 7.3 MG/DL (ref 1.9–8.2)
WBC # BLD AUTO: 6.3 K/UL (ref 4.8–10.8)

## 2025-06-30 PROCEDURE — 83520 IMMUNOASSAY QUANT NOS NONAB: CPT

## 2025-06-30 PROCEDURE — 86038 ANTINUCLEAR ANTIBODIES: CPT

## 2025-06-30 PROCEDURE — 86140 C-REACTIVE PROTEIN: CPT

## 2025-06-30 PROCEDURE — 86235 NUCLEAR ANTIGEN ANTIBODY: CPT

## 2025-06-30 PROCEDURE — 85025 COMPLETE CBC W/AUTO DIFF WBC: CPT

## 2025-06-30 PROCEDURE — 86431 RHEUMATOID FACTOR QUANT: CPT

## 2025-06-30 PROCEDURE — 36415 COLL VENOUS BLD VENIPUNCTURE: CPT

## 2025-06-30 PROCEDURE — 85652 RBC SED RATE AUTOMATED: CPT

## 2025-06-30 PROCEDURE — 82306 VITAMIN D 25 HYDROXY: CPT

## 2025-06-30 PROCEDURE — 86812 HLA TYPING A B OR C: CPT

## 2025-06-30 PROCEDURE — 84550 ASSAY OF BLOOD/URIC ACID: CPT

## 2025-06-30 PROCEDURE — 86225 DNA ANTIBODY NATIVE: CPT

## 2025-06-30 PROCEDURE — 86160 COMPLEMENT ANTIGEN: CPT | Mod: 91

## 2025-06-30 PROCEDURE — 86162 COMPLEMENT TOTAL (CH50): CPT

## 2025-07-02 ENCOUNTER — PRE-ADMISSION TESTING (OUTPATIENT)
Dept: ADMISSIONS | Facility: MEDICAL CENTER | Age: 65
End: 2025-07-02
Attending: SURGERY
Payer: MEDICARE

## 2025-07-02 DIAGNOSIS — Z01.812 PRE-OPERATIVE LABORATORY EXAMINATION: Primary | ICD-10-CM

## 2025-07-02 LAB
ALBUMIN SERPL BCP-MCNC: 4.2 G/DL (ref 3.2–4.9)
ALBUMIN/GLOB SERPL: 1.2 G/DL
ALP SERPL-CCNC: 115 U/L (ref 30–99)
ALT SERPL-CCNC: 15 U/L (ref 2–50)
ANION GAP SERPL CALC-SCNC: 14 MMOL/L (ref 7–16)
AST SERPL-CCNC: 27 U/L (ref 12–45)
BILIRUB SERPL-MCNC: 0.5 MG/DL (ref 0.1–1.5)
BUN SERPL-MCNC: 26 MG/DL (ref 8–22)
CALCIUM ALBUM COR SERPL-MCNC: 10.2 MG/DL (ref 8.5–10.5)
CALCIUM SERPL-MCNC: 10.4 MG/DL (ref 8.5–10.5)
CH50 SERPL-ACNC: 77.2 U/ML (ref 38.7–89.9)
CHLORIDE SERPL-SCNC: 103 MMOL/L (ref 96–112)
CO2 SERPL-SCNC: 20 MMOL/L (ref 20–33)
CREAT SERPL-MCNC: 1.01 MG/DL (ref 0.5–1.4)
DSDNA AB TITR SER CLIF: NORMAL {TITER}
ERYTHROCYTE [DISTWIDTH] IN BLOOD BY AUTOMATED COUNT: 59.4 FL (ref 35.9–50)
GFR SERPLBLD CREATININE-BSD FMLA CKD-EPI: 61 ML/MIN/1.73 M 2
GLOBULIN SER CALC-MCNC: 3.6 G/DL (ref 1.9–3.5)
GLUCOSE SERPL-MCNC: 105 MG/DL (ref 65–99)
HCT VFR BLD AUTO: 31 % (ref 37–47)
HGB BLD-MCNC: 10.1 G/DL (ref 12–16)
MCH RBC QN AUTO: 25 PG (ref 27–33)
MCHC RBC AUTO-ENTMCNC: 32.6 G/DL (ref 32.2–35.5)
MCV RBC AUTO: 76.7 FL (ref 81.4–97.8)
NUCLEAR IGG SER QL IA: NORMAL
PLATELET # BLD AUTO: 134 K/UL (ref 164–446)
POTASSIUM SERPL-SCNC: 4.1 MMOL/L (ref 3.6–5.5)
PROT SERPL-MCNC: 7.8 G/DL (ref 6–8.2)
RBC # BLD AUTO: 4.04 M/UL (ref 4.2–5.4)
SODIUM SERPL-SCNC: 137 MMOL/L (ref 135–145)
TSH RECEP AB SER-ACNC: <1.1 IU/L
WBC # BLD AUTO: 6 K/UL (ref 4.8–10.8)

## 2025-07-02 PROCEDURE — 85027 COMPLETE CBC AUTOMATED: CPT

## 2025-07-02 PROCEDURE — 80053 COMPREHEN METABOLIC PANEL: CPT

## 2025-07-02 PROCEDURE — 36415 COLL VENOUS BLD VENIPUNCTURE: CPT

## 2025-07-03 LAB
ENA SM IGG SER-ACNC: 1 AU/ML (ref 0–40)
HLA-B27 QL FC: NEGATIVE

## 2025-07-10 ENCOUNTER — ANESTHESIA EVENT (OUTPATIENT)
Dept: SURGERY | Facility: MEDICAL CENTER | Age: 65
End: 2025-07-10
Payer: MEDICARE

## 2025-07-10 ENCOUNTER — ANESTHESIA (OUTPATIENT)
Dept: SURGERY | Facility: MEDICAL CENTER | Age: 65
End: 2025-07-10
Payer: MEDICARE

## 2025-07-10 ENCOUNTER — PHARMACY VISIT (OUTPATIENT)
Dept: PHARMACY | Facility: MEDICAL CENTER | Age: 65
End: 2025-07-10
Payer: COMMERCIAL

## 2025-07-10 ENCOUNTER — HOSPITAL ENCOUNTER (OUTPATIENT)
Facility: MEDICAL CENTER | Age: 65
End: 2025-07-10
Attending: SURGERY | Admitting: SURGERY
Payer: MEDICARE

## 2025-07-10 VITALS
HEIGHT: 66 IN | OXYGEN SATURATION: 93 % | DIASTOLIC BLOOD PRESSURE: 60 MMHG | WEIGHT: 147.49 LBS | HEART RATE: 66 BPM | RESPIRATION RATE: 16 BRPM | TEMPERATURE: 96.8 F | BODY MASS INDEX: 23.7 KG/M2 | SYSTOLIC BLOOD PRESSURE: 137 MMHG

## 2025-07-10 DIAGNOSIS — G89.18 POSTOPERATIVE PAIN: Primary | ICD-10-CM

## 2025-07-10 PROCEDURE — 502714 HCHG ROBOTIC SURGERY SERVICES: Performed by: SURGERY

## 2025-07-10 PROCEDURE — 160192 HCHG ANESTHESIA COMPLEX: Performed by: SURGERY

## 2025-07-10 PROCEDURE — 160046 HCHG PACU - 1ST 60 MINS PHASE II: Performed by: SURGERY

## 2025-07-10 PROCEDURE — RXMED WILLOW AMBULATORY MEDICATION CHARGE: Performed by: SURGERY

## 2025-07-10 PROCEDURE — 700111 HCHG RX REV CODE 636 W/ 250 OVERRIDE (IP): Mod: JZ | Performed by: STUDENT IN AN ORGANIZED HEALTH CARE EDUCATION/TRAINING PROGRAM

## 2025-07-10 PROCEDURE — 160031 HCHG SURGERY MINUTES - 1ST 30 MINS LEVEL 5: Performed by: SURGERY

## 2025-07-10 PROCEDURE — 700111 HCHG RX REV CODE 636 W/ 250 OVERRIDE (IP): Performed by: STUDENT IN AN ORGANIZED HEALTH CARE EDUCATION/TRAINING PROGRAM

## 2025-07-10 PROCEDURE — 160002 HCHG RECOVERY MINUTES (STAT): Performed by: SURGERY

## 2025-07-10 PROCEDURE — 700101 HCHG RX REV CODE 250: Performed by: STUDENT IN AN ORGANIZED HEALTH CARE EDUCATION/TRAINING PROGRAM

## 2025-07-10 PROCEDURE — 700105 HCHG RX REV CODE 258: Performed by: SURGERY

## 2025-07-10 PROCEDURE — A9270 NON-COVERED ITEM OR SERVICE: HCPCS | Performed by: STUDENT IN AN ORGANIZED HEALTH CARE EDUCATION/TRAINING PROGRAM

## 2025-07-10 PROCEDURE — 160048 HCHG OR STATISTICAL LEVEL 1-5: Performed by: SURGERY

## 2025-07-10 PROCEDURE — 160015 HCHG STAT PREOP MINUTES: Performed by: SURGERY

## 2025-07-10 PROCEDURE — 160193 HCHG PACU STANDARD - 1ST 60 MINS: Performed by: SURGERY

## 2025-07-10 PROCEDURE — 160025 RECOVERY II MINUTES (STATS): Performed by: SURGERY

## 2025-07-10 PROCEDURE — 700101 HCHG RX REV CODE 250: Performed by: SURGERY

## 2025-07-10 PROCEDURE — 160042 HCHG SURGERY MINUTES - EA ADDL 1 MIN LEVEL 5: Performed by: SURGERY

## 2025-07-10 PROCEDURE — 700102 HCHG RX REV CODE 250 W/ 637 OVERRIDE(OP): Performed by: STUDENT IN AN ORGANIZED HEALTH CARE EDUCATION/TRAINING PROGRAM

## 2025-07-10 PROCEDURE — C1781 MESH (IMPLANTABLE): HCPCS | Performed by: SURGERY

## 2025-07-10 DEVICE — MESH VENTRALIGHT ST 4.5IN 1EA/CA: Type: IMPLANTABLE DEVICE | Site: ABDOMEN | Status: FUNCTIONAL

## 2025-07-10 RX ORDER — HYDRALAZINE HYDROCHLORIDE 20 MG/ML
5 INJECTION INTRAMUSCULAR; INTRAVENOUS
Status: DISCONTINUED | OUTPATIENT
Start: 2025-07-10 | End: 2025-07-10 | Stop reason: HOSPADM

## 2025-07-10 RX ORDER — CEFAZOLIN SODIUM 1 G/3ML
INJECTION, POWDER, FOR SOLUTION INTRAMUSCULAR; INTRAVENOUS PRN
Status: DISCONTINUED | OUTPATIENT
Start: 2025-07-10 | End: 2025-07-10 | Stop reason: SURG

## 2025-07-10 RX ORDER — HALOPERIDOL 5 MG/ML
1 INJECTION INTRAMUSCULAR
Status: DISCONTINUED | OUTPATIENT
Start: 2025-07-10 | End: 2025-07-10 | Stop reason: HOSPADM

## 2025-07-10 RX ORDER — OXYCODONE HCL 5 MG/5 ML
10 SOLUTION, ORAL ORAL
Status: COMPLETED | OUTPATIENT
Start: 2025-07-10 | End: 2025-07-10

## 2025-07-10 RX ORDER — ACETAMINOPHEN 325 MG/1
650 TABLET ORAL EVERY 6 HOURS
Qty: 60 TABLET | Refills: 0 | Status: SHIPPED | OUTPATIENT
Start: 2025-07-10 | End: 2025-07-28

## 2025-07-10 RX ORDER — EPHEDRINE SULFATE 50 MG/ML
INJECTION, SOLUTION INTRAVENOUS PRN
Status: DISCONTINUED | OUTPATIENT
Start: 2025-07-10 | End: 2025-07-10 | Stop reason: SURG

## 2025-07-10 RX ORDER — HYDROMORPHONE HYDROCHLORIDE 1 MG/ML
0.2 INJECTION, SOLUTION INTRAMUSCULAR; INTRAVENOUS; SUBCUTANEOUS
Status: DISCONTINUED | OUTPATIENT
Start: 2025-07-10 | End: 2025-07-10 | Stop reason: HOSPADM

## 2025-07-10 RX ORDER — IBUPROFEN 600 MG/1
600 TABLET, FILM COATED ORAL EVERY 6 HOURS
Qty: 45 TABLET | Refills: 0 | Status: SHIPPED | OUTPATIENT
Start: 2025-07-10 | End: 2025-07-28

## 2025-07-10 RX ORDER — MAGNESIUM SULFATE HEPTAHYDRATE 40 MG/ML
INJECTION, SOLUTION INTRAVENOUS PRN
Status: DISCONTINUED | OUTPATIENT
Start: 2025-07-10 | End: 2025-07-10 | Stop reason: SURG

## 2025-07-10 RX ORDER — OXYCODONE HCL 5 MG/5 ML
5 SOLUTION, ORAL ORAL
Status: COMPLETED | OUTPATIENT
Start: 2025-07-10 | End: 2025-07-10

## 2025-07-10 RX ORDER — ONDANSETRON 2 MG/ML
INJECTION INTRAMUSCULAR; INTRAVENOUS PRN
Status: DISCONTINUED | OUTPATIENT
Start: 2025-07-10 | End: 2025-07-10 | Stop reason: SURG

## 2025-07-10 RX ORDER — BUPIVACAINE HYDROCHLORIDE AND EPINEPHRINE 5; 5 MG/ML; UG/ML
INJECTION, SOLUTION EPIDURAL; INTRACAUDAL; PERINEURAL
Status: DISCONTINUED | OUTPATIENT
Start: 2025-07-10 | End: 2025-07-10 | Stop reason: HOSPADM

## 2025-07-10 RX ORDER — ALBUTEROL SULFATE 5 MG/ML
2.5 SOLUTION RESPIRATORY (INHALATION)
Status: DISCONTINUED | OUTPATIENT
Start: 2025-07-10 | End: 2025-07-10 | Stop reason: HOSPADM

## 2025-07-10 RX ORDER — LABETALOL HYDROCHLORIDE 5 MG/ML
5 INJECTION, SOLUTION INTRAVENOUS
Status: DISCONTINUED | OUTPATIENT
Start: 2025-07-10 | End: 2025-07-10 | Stop reason: HOSPADM

## 2025-07-10 RX ORDER — POLYETHYLENE GLYCOL 3350 17 G/17G
17 POWDER, FOR SOLUTION ORAL DAILY
Qty: 20 EACH | Refills: 0 | Status: SHIPPED | OUTPATIENT
Start: 2025-07-10 | End: 2025-07-28

## 2025-07-10 RX ORDER — METOPROLOL TARTRATE 1 MG/ML
1 INJECTION, SOLUTION INTRAVENOUS
Status: DISCONTINUED | OUTPATIENT
Start: 2025-07-10 | End: 2025-07-10 | Stop reason: HOSPADM

## 2025-07-10 RX ORDER — HYDROMORPHONE HYDROCHLORIDE 1 MG/ML
0.4 INJECTION, SOLUTION INTRAMUSCULAR; INTRAVENOUS; SUBCUTANEOUS
Status: DISCONTINUED | OUTPATIENT
Start: 2025-07-10 | End: 2025-07-10 | Stop reason: HOSPADM

## 2025-07-10 RX ORDER — DIPHENHYDRAMINE HYDROCHLORIDE 50 MG/ML
12.5 INJECTION, SOLUTION INTRAMUSCULAR; INTRAVENOUS
Status: DISCONTINUED | OUTPATIENT
Start: 2025-07-10 | End: 2025-07-10 | Stop reason: HOSPADM

## 2025-07-10 RX ORDER — ONDANSETRON 2 MG/ML
4 INJECTION INTRAMUSCULAR; INTRAVENOUS
Status: DISCONTINUED | OUTPATIENT
Start: 2025-07-10 | End: 2025-07-10 | Stop reason: HOSPADM

## 2025-07-10 RX ORDER — HYDROMORPHONE HYDROCHLORIDE 1 MG/ML
0.1 INJECTION, SOLUTION INTRAMUSCULAR; INTRAVENOUS; SUBCUTANEOUS
Status: DISCONTINUED | OUTPATIENT
Start: 2025-07-10 | End: 2025-07-10 | Stop reason: HOSPADM

## 2025-07-10 RX ORDER — DEXAMETHASONE SODIUM PHOSPHATE 4 MG/ML
INJECTION, SOLUTION INTRA-ARTICULAR; INTRALESIONAL; INTRAMUSCULAR; INTRAVENOUS; SOFT TISSUE PRN
Status: DISCONTINUED | OUTPATIENT
Start: 2025-07-10 | End: 2025-07-10 | Stop reason: SURG

## 2025-07-10 RX ORDER — MIDAZOLAM HYDROCHLORIDE 1 MG/ML
INJECTION INTRAMUSCULAR; INTRAVENOUS PRN
Status: DISCONTINUED | OUTPATIENT
Start: 2025-07-10 | End: 2025-07-10 | Stop reason: SURG

## 2025-07-10 RX ORDER — SODIUM CHLORIDE, SODIUM LACTATE, POTASSIUM CHLORIDE, CALCIUM CHLORIDE 600; 310; 30; 20 MG/100ML; MG/100ML; MG/100ML; MG/100ML
INJECTION, SOLUTION INTRAVENOUS CONTINUOUS
Status: DISCONTINUED | OUTPATIENT
Start: 2025-07-10 | End: 2025-07-10 | Stop reason: HOSPADM

## 2025-07-10 RX ORDER — LABETALOL HYDROCHLORIDE 5 MG/ML
INJECTION, SOLUTION INTRAVENOUS PRN
Status: DISCONTINUED | OUTPATIENT
Start: 2025-07-10 | End: 2025-07-10 | Stop reason: SURG

## 2025-07-10 RX ORDER — LIDOCAINE HYDROCHLORIDE 20 MG/ML
INJECTION, SOLUTION EPIDURAL; INFILTRATION; INTRACAUDAL; PERINEURAL PRN
Status: DISCONTINUED | OUTPATIENT
Start: 2025-07-10 | End: 2025-07-10 | Stop reason: SURG

## 2025-07-10 RX ORDER — EPHEDRINE SULFATE 50 MG/ML
5 INJECTION, SOLUTION INTRAVENOUS
Status: DISCONTINUED | OUTPATIENT
Start: 2025-07-10 | End: 2025-07-10 | Stop reason: HOSPADM

## 2025-07-10 RX ORDER — OXYCODONE HYDROCHLORIDE 5 MG/1
5 TABLET ORAL EVERY 4 HOURS PRN
Qty: 12 TABLET | Refills: 0 | Status: SHIPPED | OUTPATIENT
Start: 2025-07-10 | End: 2025-07-13

## 2025-07-10 RX ADMIN — ROCURONIUM BROMIDE 50 MG: 10 INJECTION INTRAVENOUS at 09:24

## 2025-07-10 RX ADMIN — FENTANYL CITRATE 50 MCG: 50 INJECTION, SOLUTION INTRAMUSCULAR; INTRAVENOUS at 09:20

## 2025-07-10 RX ADMIN — MAGNESIUM SULFATE HEPTAHYDRATE 2 G: 4 INJECTION, SOLUTION INTRAVENOUS at 09:31

## 2025-07-10 RX ADMIN — SODIUM CHLORIDE, POTASSIUM CHLORIDE, SODIUM LACTATE AND CALCIUM CHLORIDE: 600; 310; 30; 20 INJECTION, SOLUTION INTRAVENOUS at 09:18

## 2025-07-10 RX ADMIN — FENTANYL CITRATE 50 MCG: 50 INJECTION, SOLUTION INTRAMUSCULAR; INTRAVENOUS at 10:11

## 2025-07-10 RX ADMIN — LIDOCAINE HYDROCHLORIDE 80 MG: 20 INJECTION, SOLUTION EPIDURAL; INFILTRATION; INTRACAUDAL; PERINEURAL at 09:24

## 2025-07-10 RX ADMIN — DEXAMETHASONE SODIUM PHOSPHATE 8 MG: 4 INJECTION INTRA-ARTICULAR; INTRALESIONAL; INTRAMUSCULAR; INTRAVENOUS; SOFT TISSUE at 09:24

## 2025-07-10 RX ADMIN — FENTANYL CITRATE 50 MCG: 50 INJECTION, SOLUTION INTRAMUSCULAR; INTRAVENOUS at 10:36

## 2025-07-10 RX ADMIN — FENTANYL CITRATE 50 MCG: 50 INJECTION, SOLUTION INTRAMUSCULAR; INTRAVENOUS at 10:49

## 2025-07-10 RX ADMIN — OXYCODONE HYDROCHLORIDE 10 MG: 5 SOLUTION ORAL at 10:22

## 2025-07-10 RX ADMIN — CEFAZOLIN 2 G: 1 INJECTION, POWDER, FOR SOLUTION INTRAMUSCULAR; INTRAVENOUS at 09:24

## 2025-07-10 RX ADMIN — MIDAZOLAM HYDROCHLORIDE 2 MG: 1 INJECTION, SOLUTION INTRAMUSCULAR; INTRAVENOUS at 09:20

## 2025-07-10 RX ADMIN — ONDANSETRON 4 MG: 2 INJECTION INTRAMUSCULAR; INTRAVENOUS at 09:24

## 2025-07-10 RX ADMIN — SUGAMMADEX 200 MG: 100 INJECTION, SOLUTION INTRAVENOUS at 10:11

## 2025-07-10 RX ADMIN — PROPOFOL 100 MG: 10 INJECTION, EMULSION INTRAVENOUS at 09:24

## 2025-07-10 RX ADMIN — LABETALOL HYDROCHLORIDE 5 MG: 5 INJECTION, SOLUTION INTRAVENOUS at 09:31

## 2025-07-10 RX ADMIN — EPHEDRINE SULFATE 10 MG: 50 INJECTION, SOLUTION INTRAVENOUS at 10:05

## 2025-07-10 ASSESSMENT — PAIN DESCRIPTION - PAIN TYPE
TYPE: SURGICAL PAIN

## 2025-07-10 ASSESSMENT — FIBROSIS 4 INDEX: FIB4 SCORE: 3.49

## 2025-07-10 ASSESSMENT — PAIN SCALES - GENERAL: PAIN_LEVEL: 4

## 2025-07-10 NOTE — OR NURSING
Pt arrived to PACU II at 1130. Pt aa/ox4, VSS. Discharge criteria met. Pain is 4/10 which patient states is tolerable. Dressing is clean, dry, and intact. Pt changed into clothing with assistance. Discharge instructions given; pt and family verbalized understanding and questions answered.  IV removed, tip intact. Will follow-up with Dr. Amezcua in 1-2 weeks. Prescriptions were sent to patient's pharmacy and picked up by PACU CNA and given to patient in Phase II D/C. Pt discharged home and escorted via w/c with CNA in stable condition.

## 2025-07-10 NOTE — PROGRESS NOTES
Pharmacy Medication Reconciliation      ~Medication reconciliation updated and complete per patient   ~Allergies have been verified and updated   ~No outpatient Antimicrobials in the last 30 days  ~Is dispense history available in EPIC: yes  ~Patient home pharmacy :  [facility] [phone]      ~Anticoagulants (rivaroxaban, apixaban, edoxaban, dabigatran, warfarin, enoxaparin) taken in the last 14 days? NO

## 2025-07-10 NOTE — OP REPORT
Operative Report    Date: 7/10/2025    Surgeon: Neel Amezcua M.D.     Assistant: Juliana Sewell PA-C    Pre-operative Diagnosis: Umbilical Hernia measuring 3 cm in total length    Post-operative Diagnosis: Same    Procedure: Robotically assisted intraperitoneal onlay mesh repair    ASA Classification: III.    Indications: This is a 65 y.o. female who presented with symptoms of umbilical hernia here for repair.    The indications for a surgical assistant in this surgery were indicated due to complexity of the procedure. Their role included aiding in incision, retraction, holding devices including camera for laparoscopic procedure, and closure of the wound.      Findings: Primary closure obtained with plication of diastasis as possible    Wound Classification: Class I, I, Clean..    Procedure in detail: The patient was seen and examined in the preoperative holding area.  The risks benefits and alternatives of the procedure were discussed with the patient who wished to proceed with the procedure as described.  The patient was transferred to the operating room placed in supine position and all pressure points were properly padded.  General endotracheal anesthesia was induced and preoperative antibiotics were given per SCIP protocol.  Patient's abdomen was prepped with ChloraPrep and draped in the normal sterile fashion.  A timeout was performed confirming correct patient, correct procedure, and that all necessary equipment was in the room.      We began the procedure by performing a left upper quadrant Optiview access.  We sharply incised the skin after infusing local anesthetic and used the 5 mm Optiview port to access the abdomen.  Pneumoperitoneum was then achieved and maintained to 15mm mercury carbon dioxide throughout the entirety of the case.  Adhesions were lysed as appropriate to ensure that we could place the remainder of our ports under direct visualization and under direct visualization a left  "lower quadrant and a left mid abdominal low port were placed.  We then looked back at the Optiview access port and changed it to a robotic port.     On initial examination, before dissection of the hernia, the hernia was approximately 3cm.    Continue to lyse adhesions until the entire abdominal cavity is free from the hernia and the hernia sac as well as the midline of the incisions.  We then proceeded to plicate the diastatic linea alba is appropriate to close the hernia defect with an 0 strata fix suture.  We then selected an appropriate size mesh to overlap the hernia At least 4 cm on all sides.  We then secured the mesh to the abdominal wall using several running 2-0 strata fix sutures.    4.5\" Ventralite ST mesh was used.    All ports were removed and all incisions are closed with 4-0 Monocryl in a circular fashion.  Dermabond was then placed over the wounds.    The patient was awakened from general anesthetic, and was taken to the recovery room in stable condition.    Sponge and needle counts were correct at the end of the case.     Specimen: none    EBL: 5mL    Dispo: stable, extubated, to PACU    Neel Amezcua M.D.  [facility]  [phone]       "

## 2025-07-10 NOTE — ANESTHESIA POSTPROCEDURE EVALUATION
Patient: Denisse Abel    Procedure Summary       Date: 07/10/25 Room / Location: [place] [address] / SURGERY [facility]    Anesthesia Start: 0918 Anesthesia Stop: 1020    Procedure: ROBOTIC UMBILICAL HERNIA REPAIR (Abdomen) Diagnosis: (Umbilical Hernia measuring 3 cm in total length   per Dr Amezcua)    Surgeons: Neel Amezcua M.D. Responsible Provider: Karthik Barakat M.D.    Anesthesia Type: general ASA Status: 3            Final Anesthesia Type: general  Last vitals  BP   Blood Pressure : 137/60    Temp   36 °C (96.8 °F)    Pulse   66   Resp   16    SpO2   93 %      Anesthesia Post Evaluation    Patient location during evaluation: PACU  Patient participation: complete - patient participated  Level of consciousness: awake and alert  Pain score: 4    Airway patency: patent  Anesthetic complications: no  Cardiovascular status: hemodynamically stable  Respiratory status: acceptable  Hydration status: euvolemic    PONV: none          No notable events documented.     Nurse Pain Score: 4 (NPRS)

## 2025-07-10 NOTE — DISCHARGE INSTRUCTIONS
HOME CARE INSTRUCTIONS    ACTIVITY: Rest and take it easy for the first 24 hours.  A responsible adult is recommended to remain with you during that time.  It is normal to feel sleepy.  We encourage you to not do anything that requires balance, judgment or coordination.    FOR 24 HOURS DO NOT:  Drive, operate machinery or run household appliances.  Drink beer or alcoholic beverages.  Make important decisions or sign legal documents.    SPECIAL INSTRUCTIONS:   Hernia Repair Discharge Instructions:    ACTIVITIES: Upon discharge from the hospital, the day of surgery it is requested that you do no significant physical activity and limit mental activities, as you have had sedation. The day after surgery, you may resume activities of daily living, but for four weeks, it is recommended that you do no strenuous activities or heavy lifting (greater than 15 pounds).     DRIVING: You may drive whenever you are off pain medications and are able to perform the activities needed to drive, i.e. turning, bending, twisting, etc.     WOUND: It is not unusual for patients to experience swelling and even bruising at the hernia repair site.      ICE: please use ice on the wound to decrease the swelling for the first 24 hours and then discontinue.     BATHING: The dressing can be removed two days after surgery and the wound can then be wetted in a shower as normal, but avoid submersion in water (tub bath) for at least 2 weeks.    PAIN MEDICATION: You will be given a prescription for pain medication at discharge. Please take these as directed. It is important to remember not to take medications on an empty stomach as this may cause nausea.     BOWEL FUNCTION: After hernia repair, it is not uncommon for patients to experience constipation. This is due to decreasing activity levels as well as pain medications. You may wish to use a stool softener beginning immediately after surgery, and you may or may not need to use a laxative (Milk of  Magnesia, Ex-lax; Senokot, etc.) as well.            CALL IF YOU HAVE: (1) Fevers to more than 1010 F, (2) Unusual chest or leg pain,  (3) Drainage or fluid from incision that may be foul smelling, increased  tenderness or soreness at the wound or the wound edges are no longer  together,redness or swelling at the incision site. Please do not hesitate to call  with any other questions.     APPOINTMENT: Contact our office at [phone] for a follow-up appointment in 2 weeks following your procedure.     If you have any additional questions, please do not hesitate to call the office.     Office address:   [address], [address] [facility] [place] [address]    DIET: To avoid nausea, slowly advance diet as tolerated, avoiding spicy or greasy foods for the first day.  Add more substantial food to your diet according to your physician's instructions. INCREASE FLUIDS AND FIBER TO AVOID CONSTIPATION.    MEDICATIONS: Resume taking daily medication.  Take prescribed pain medication with food.  If no medication is prescribed, you may take non-aspirin pain medication if needed.  PAIN MEDICATION CAN BE VERY CONSTIPATING.  Take a stool softener or laxative such as senokot, pericolace, or milk of magnesia if needed.    Prescription given for Oxycodone, Miralax, Tylenol, Motrin.  Last pain medication given at 10:20am (oxycodone)    A follow-up appointment should be arranged with your doctor in 2 weeks call to schedule.    You should CALL YOUR PHYSICIAN if you develop:  Fever greater than 101 degrees F.  Pain not relieved by medication, or persistent nausea or vomiting.  Excessive bleeding (blood soaking through dressing) or unexpected drainage from the wound.  Extreme redness or swelling around the incision site, drainage of pus or foul smelling drainage.  Inability to urinate or empty your bladder within 8 hours.  Problems with breathing or chest pain.    You should call 911 if you develop problems with breathing or chest pain.  If you are  unable to contact your doctor or surgical center, you should go to the nearest emergency room or urgent care center.  Physician's telephone #:    [phone]       MILD FLU-LIKE SYMPTOMS ARE NORMAL.  YOU MAY EXPERIENCE GENERALIZED MUSCLE ACHES, THROAT IRRITATION, HEADACHE AND/OR SOME NAUSEA.    If any questions arise, call your doctor.  If your doctor is not available, please feel free to call the Surgical Center at [phone].  The Center is open Monday through Friday from 7AM to 7PM.      A registered nurse may call you a few days after your surgery to see how you are doing after your procedure.    You may also receive a survey in the mail within the next two weeks and we ask that you take a few moments to complete the survey and return it to us.  Our goal is to provide you with very good care and we value your comments.     Depression / Suicide Risk    As you are discharged from this [facility] facility, it is important to learn how to keep safe from harming yourself.    Recognize the warning signs:  Abrupt changes in personality, positive or negative- including increase in energy   Giving away possessions  Change in eating patterns- significant weight changes-  positive or negative  Change in sleeping patterns- unable to sleep or sleeping all the time   Unwillingness or inability to communicate  Depression  Unusual sadness, discouragement and loneliness  Talk of wanting to die  Neglect of personal appearance   Rebelliousness- reckless behavior  Withdrawal from people/activities they love  Confusion- inability to concentrate     If you or a loved one observes any of these behaviors or has concerns about self-harm, here's what you can do:  Talk about it- your feelings and reasons for harming yourself  Remove any means that you might use to hurt yourself (examples: pills, rope, extension cords, firearm)  Get professional help from the community (Mental Health, Substance Abuse, psychological counseling)  Do  not be alone:Call your Safe Contact- someone whom you trust who will be there for you.  Call your local CRISIS HOTLINE 934-8618 or 150-509-7615  Call your local Children's Mobile Crisis Response Team [place] (624) 994-9131 or www.[employer]  Call the toll free National Suicide Prevention Hotlines   National Suicide Prevention Lifeline 929-501-SMMB (6938)  [facility] Line Network 800-SQZUQIZ (139-9612)    I acknowledge receipt and understanding of these Home Care instructions.

## 2025-07-10 NOTE — ANESTHESIA PROCEDURE NOTES
Airway    Date/Time: 7/10/2025 9:27 AM    Performed by: Karthik Barakat M.D.  Authorized by: Karthik Barakat M.D.    Location:  OR  Urgency:  Elective  Indications for Airway Management:  Anesthesia      Spontaneous Ventilation: absent    Sedation Level:  Deep  Preoxygenated: Yes    Patient Position:  Sniffing  Final Airway Type:  Endotracheal airway  Final Endotracheal Airway:  ETT  Cuffed: Yes    Technique Used for Successful ETT Placement:  Direct laryngoscopy    Insertion Site:  Oral  Blade Type:  Carey  Laryngoscope Blade/Videolaryngoscope Blade Size:  3  ETT Size (mm):  6.5  Measured from:  Teeth  ETT to Teeth (cm):  19  Placement Verified by: auscultation and capnometry    Cormack-Lehane Classification:  Grade IIb - view of arytenoids or posterior of glottis only  Number of Attempts at Approach:  1  Ventilation Between Attempts:  None  Number of Other Approaches Attempted:  0

## 2025-07-10 NOTE — ANESTHESIA TIME REPORT
Anesthesia Start and Stop Event Times       Date Time Event    7/10/2025 0910 Ready for Procedure     0918 Anesthesia Start     1020 Anesthesia Stop          Responsible Staff  07/10/25      Name Role Begin End    Karthik Barakat M.D. Anesth 0918 1020          Overtime Reason:  no overtime (within assigned shift)    Comments:

## 2025-07-10 NOTE — ANESTHESIA PREPROCEDURE EVALUATION
Case: [id number] Date/Time: 07/10/25 0840    Procedure: ROBOTIC UMBILICAL HERNIA REPAIR    Pre-op diagnosis: UMBILICAL HERNIA    Location: [place] OR 15 / SURGERY [facility]    Surgeons: Neel Amezcua M.D.            Relevant Problems   CARDIAC   (positive) Coronary artery disease involving native coronary artery of native heart without angina pectoris   (positive) Esophageal varices without bleeding (HCC)   (positive) HTN (hypertension), benign      GI   (positive) Gastroesophageal reflux disease without esophagitis         (positive) Stage 3b chronic kidney disease      Other   (positive) Splenomegaly       Physical Exam    Airway   Mallampati: II  TM distance: >3 FB  Neck ROM: full       Cardiovascular - normal exam  Rhythm: regular  Rate: normal    (-) murmur     Dental - normal exam           Pulmonary - normal examBreath sounds clear to auscultation     Abdominal    Neurological - normal exam                   Anesthesia Plan    ASA 3   ASA physical status 3 criteria: CAD (>3 months)    Plan - general       Airway plan will be ETT          Induction: intravenous    Postoperative Plan: Postoperative administration of opioids is intended.    Pertinent diagnostic labs and testing reviewed    Informed Consent:    Anesthetic plan and risks discussed with patient.    Use of blood products discussed with: patient whom consented to blood products.

## 2025-07-28 ENCOUNTER — OFFICE VISIT (OUTPATIENT)
Dept: MEDICAL GROUP | Facility: MEDICAL CENTER | Age: 65
End: 2025-07-28
Payer: MEDICARE

## 2025-07-28 VITALS
HEIGHT: 65 IN | OXYGEN SATURATION: 98 % | BODY MASS INDEX: 23.88 KG/M2 | HEART RATE: 86 BPM | TEMPERATURE: 98.3 F | SYSTOLIC BLOOD PRESSURE: 138 MMHG | DIASTOLIC BLOOD PRESSURE: 66 MMHG | WEIGHT: 143.3 LBS

## 2025-07-28 DIAGNOSIS — D63.8 ANEMIA IN OTHER CHRONIC DISEASES CLASSIFIED ELSEWHERE: ICD-10-CM

## 2025-07-28 DIAGNOSIS — G89.18 POSTOPERATIVE PAIN: ICD-10-CM

## 2025-07-28 DIAGNOSIS — D64.9 ANEMIA, UNSPECIFIED TYPE: ICD-10-CM

## 2025-07-28 DIAGNOSIS — Z23 ENCOUNTER FOR IMMUNIZATION: Primary | ICD-10-CM

## 2025-07-28 DIAGNOSIS — Z12.31 ENCOUNTER FOR SCREENING MAMMOGRAM FOR BREAST CANCER: ICD-10-CM

## 2025-07-28 PROCEDURE — 99213 OFFICE O/P EST LOW 20 MIN: CPT | Mod: 25 | Performed by: STUDENT IN AN ORGANIZED HEALTH CARE EDUCATION/TRAINING PROGRAM

## 2025-07-28 PROCEDURE — 90715 TDAP VACCINE 7 YRS/> IM: CPT | Performed by: STUDENT IN AN ORGANIZED HEALTH CARE EDUCATION/TRAINING PROGRAM

## 2025-07-28 PROCEDURE — 3075F SYST BP GE 130 - 139MM HG: CPT | Performed by: STUDENT IN AN ORGANIZED HEALTH CARE EDUCATION/TRAINING PROGRAM

## 2025-07-28 PROCEDURE — 90471 IMMUNIZATION ADMIN: CPT | Performed by: STUDENT IN AN ORGANIZED HEALTH CARE EDUCATION/TRAINING PROGRAM

## 2025-07-28 PROCEDURE — 3078F DIAST BP <80 MM HG: CPT | Performed by: STUDENT IN AN ORGANIZED HEALTH CARE EDUCATION/TRAINING PROGRAM

## 2025-07-28 RX ORDER — TRAMADOL HYDROCHLORIDE 50 MG/1
50 TABLET ORAL EVERY 4 HOURS PRN
Qty: 30 TABLET | Refills: 0 | Status: CANCELLED | OUTPATIENT
Start: 2025-07-28

## 2025-07-28 ASSESSMENT — ENCOUNTER SYMPTOMS
CHILLS: 0
FEVER: 0
HEMOPTYSIS: 0
PALPITATIONS: 0
ABDOMINAL PAIN: 0
COUGH: 0

## 2025-07-28 ASSESSMENT — FIBROSIS 4 INDEX: FIB4 SCORE: 3.49

## 2025-07-28 NOTE — PROGRESS NOTES
Verbal consent was acquired by the patient to use [employer] ambient listening note generation during this visit     Subjective:     HPI:   History of Present Illness  The patient presents for a postoperative follow-up, medication management, and health maintenance.    She underwent surgery on 07/10/2025, which was successful. However, she experienced an adverse reaction to oxycodone, which she started taking postoperatively. The reaction manifested as itching and a sunburn-like rash, which resolved after discontinuing the medication. She continues to experience itching but reports no pain. She is currently taking hydrocodone for back pain. Her surgeon, whom she saw last Friday, reported that her condition is satisfactory. She was prescribed Tylenol and ibuprofen for pain management but did not take them as frequently as recommended due to her use of opioids. She discontinued all medications four days post-surgery. She found relief from the itching with Benadryl. She had a follow-up with her surgeon after the surgery.     She is due for a mammogram. She is currently taking iron tablets, vitamin C, spironolactone 25 mg, omeprazole 40 mg, and other supplements. She reports feeling better than she has in a long time. She stopped drinking alcohol 21 months ago and feels great. [place] to spend time with her family.     She also consulted a hand surgeon due to an inability to lift her hand. Tests for lupus and rheumatoid arthritis were negative. An MRI is scheduled for 08/2025. The surgeon suggested draining the area but suspects an underlying issue. She plans to consult the surgeon again for further treatment. She reports no injury to the hand and is not experiencing any pain.    Alcohol: She stopped drinking alcohol 21 months ago.    PAST SURGICAL HISTORY:  - Emergent operation for a perforated ulcer in 02/2024        Objective:     Exam:  /66 (BP Location: Left arm,  "Patient Position: Sitting, BP Cuff Size: Adult)   Pulse 86   Temp 36.8 °C (98.3 °F) (Temporal)   Ht 1.638 m (5' 4.5\")   Wt 65 kg (143 lb 4.8 oz)   SpO2 98%   BMI 24.22 kg/m²  Body mass index is 24.22 kg/m².    Review of Systems   Constitutional:  Negative for chills and fever.   Respiratory:  Negative for cough and hemoptysis.    Cardiovascular:  Negative for chest pain and palpitations.   Gastrointestinal:  Negative for abdominal pain.       Physical Exam  Constitutional:       Appearance: Normal appearance.   Cardiovascular:      Rate and Rhythm: Normal rate and regular rhythm.      Pulses: Normal pulses.      Heart sounds: Normal heart sounds. No murmur heard.  Pulmonary:      Effort: Pulmonary effort is normal. No respiratory distress.      Breath sounds: Normal breath sounds. No wheezing.   Neurological:      General: No focal deficit present.      Mental Status: She is alert and oriented to person, place, and time.             Assessment & Plan      Problem List Items Addressed This Visit       Anemia in other chronic diseases classified elsewhere    - Improving.  Follow-up CBC in 3 months         Postoperative pain    - Patient had allergic reaction to oxycodone.  Symptoms have improved after discontinuing oxycodone.  Currently she is on Norco which controls her pain.         RESOLVED: Anemia     Other Visit Diagnoses         Encounter for immunization    -  Primary    Relevant Orders    Tdap Vaccine =>6YO IM (Completed)      Encounter for screening mammogram for breast cancer        Relevant Orders    MA-SCREENING MAMMO BILAT W/TOMOSYNTHESIS W/CAD                Return in about 3 months (around 10/28/2025) for Medicationrefill.    Please note that this dictation was created using voice recognition software. I have made every reasonable attempt to correct obvious errors, but I expect that there are errors of grammar and possibly content that I did not discover before finalizing the note.        "

## 2025-07-28 NOTE — ASSESSMENT & PLAN NOTE
- Patient had allergic reaction to oxycodone.  Symptoms have improved after discontinuing oxycodone.  Currently she is on Norco which controls her pain.

## 2025-08-11 ENCOUNTER — APPOINTMENT (OUTPATIENT)
Facility: MEDICAL CENTER | Age: 65
End: 2025-08-11
Attending: STUDENT IN AN ORGANIZED HEALTH CARE EDUCATION/TRAINING PROGRAM
Payer: MEDICARE

## 2025-08-11 ASSESSMENT — FIBROSIS 4 INDEX: FIB4 SCORE: 3.49

## 2025-08-20 ENCOUNTER — HOSPITAL ENCOUNTER (OUTPATIENT)
Dept: LAB | Facility: MEDICAL CENTER | Age: 65
End: 2025-08-20
Attending: INTERNAL MEDICINE
Payer: MEDICARE

## 2025-08-20 LAB
25(OH)D3 SERPL-MCNC: 32 NG/ML (ref 30–100)
ALBUMIN SERPL BCP-MCNC: 4.3 G/DL (ref 3.2–4.9)
ANION GAP SERPL CALC-SCNC: 12 MMOL/L (ref 7–16)
APPEARANCE UR: CLEAR
BACTERIA #/AREA URNS HPF: ABNORMAL /HPF
BASOPHILS # BLD AUTO: 0.6 % (ref 0–1.8)
BASOPHILS # BLD: 0.03 K/UL (ref 0–0.12)
BILIRUB UR QL STRIP.AUTO: NEGATIVE
BUN SERPL-MCNC: 33 MG/DL (ref 8–22)
CALCIUM ALBUM COR SERPL-MCNC: 10.5 MG/DL (ref 8.5–10.5)
CALCIUM SERPL-MCNC: 10.7 MG/DL (ref 8.5–10.5)
CASTS URNS QL MICRO: ABNORMAL /LPF (ref 0–2)
CHLORIDE SERPL-SCNC: 108 MMOL/L (ref 96–112)
CO2 SERPL-SCNC: 20 MMOL/L (ref 20–33)
COLOR UR: ABNORMAL
CREAT SERPL-MCNC: 1.26 MG/DL (ref 0.5–1.4)
CREAT UR-MCNC: 240 MG/DL
EOSINOPHIL # BLD AUTO: 0.32 K/UL (ref 0–0.51)
EOSINOPHIL NFR BLD: 6.6 % (ref 0–6.9)
EPITHELIAL CELLS 1715: ABNORMAL /HPF (ref 0–5)
ERYTHROCYTE [DISTWIDTH] IN BLOOD BY AUTOMATED COUNT: 53.2 FL (ref 35.9–50)
FERRITIN SERPL-MCNC: 456 NG/ML (ref 10–291)
GFR SERPLBLD CREATININE-BSD FMLA CKD-EPI: 47 ML/MIN/1.73 M 2
GLUCOSE SERPL-MCNC: 114 MG/DL (ref 65–99)
GLUCOSE UR STRIP.AUTO-MCNC: NEGATIVE MG/DL
HCT VFR BLD AUTO: 33.5 % (ref 37–47)
HGB BLD-MCNC: 11 G/DL (ref 12–16)
IMM GRANULOCYTES # BLD AUTO: 0.02 K/UL (ref 0–0.11)
IMM GRANULOCYTES NFR BLD AUTO: 0.4 % (ref 0–0.9)
IRON SATN MFR SERPL: 22 % (ref 15–55)
IRON SERPL-MCNC: 52 UG/DL (ref 40–170)
KETONES UR STRIP.AUTO-MCNC: ABNORMAL MG/DL
LEUKOCYTE ESTERASE UR QL STRIP.AUTO: ABNORMAL
LYMPHOCYTES # BLD AUTO: 1.24 K/UL (ref 1–4.8)
LYMPHOCYTES NFR BLD: 25.6 % (ref 22–41)
MCH RBC QN AUTO: 26.8 PG (ref 27–33)
MCHC RBC AUTO-ENTMCNC: 32.8 G/DL (ref 32.2–35.5)
MCV RBC AUTO: 81.5 FL (ref 81.4–97.8)
MICRO URNS: ABNORMAL
MONOCYTES # BLD AUTO: 0.27 K/UL (ref 0–0.85)
MONOCYTES NFR BLD AUTO: 5.6 % (ref 0–13.4)
NEUTROPHILS # BLD AUTO: 2.96 K/UL (ref 1.82–7.42)
NEUTROPHILS NFR BLD: 61.2 % (ref 44–72)
NITRITE UR QL STRIP.AUTO: NEGATIVE
NRBC # BLD AUTO: 0 K/UL
NRBC BLD-RTO: 0 /100 WBC (ref 0–0.2)
PH UR STRIP.AUTO: 5.5 [PH] (ref 5–8)
PHOSPHATE SERPL-MCNC: 3.6 MG/DL (ref 2.5–4.5)
PLATELET # BLD AUTO: 116 K/UL (ref 164–446)
PMV BLD AUTO: 11.3 FL (ref 9–12.9)
POTASSIUM SERPL-SCNC: 4.4 MMOL/L (ref 3.6–5.5)
PROT UR QL STRIP: NEGATIVE MG/DL
PROT UR-MCNC: 27.1 MG/DL (ref 0–15)
PROT/CREAT UR: 113 MG/G (ref 10–107)
PTH-INTACT SERPL-MCNC: 25.9 PG/ML (ref 14–72)
RBC # BLD AUTO: 4.11 M/UL (ref 4.2–5.4)
RBC # URNS HPF: ABNORMAL /HPF (ref 0–2)
RBC UR QL AUTO: NEGATIVE
SODIUM SERPL-SCNC: 140 MMOL/L (ref 135–145)
SP GR UR STRIP.AUTO: 1.03
TIBC SERPL-MCNC: 235 UG/DL (ref 250–450)
UIBC SERPL-MCNC: 183 UG/DL (ref 110–370)
UROBILINOGEN UR STRIP.AUTO-MCNC: 1 EU/DL
WBC # BLD AUTO: 4.8 K/UL (ref 4.8–10.8)
WBC #/AREA URNS HPF: ABNORMAL /HPF

## 2025-08-20 PROCEDURE — 85025 COMPLETE CBC W/AUTO DIFF WBC: CPT

## 2025-08-20 PROCEDURE — 81001 URINALYSIS AUTO W/SCOPE: CPT

## 2025-08-20 PROCEDURE — 80069 RENAL FUNCTION PANEL: CPT

## 2025-08-20 PROCEDURE — 82306 VITAMIN D 25 HYDROXY: CPT

## 2025-08-20 PROCEDURE — 84156 ASSAY OF PROTEIN URINE: CPT

## 2025-08-20 PROCEDURE — 82728 ASSAY OF FERRITIN: CPT

## 2025-08-20 PROCEDURE — 83540 ASSAY OF IRON: CPT

## 2025-08-20 PROCEDURE — 83970 ASSAY OF PARATHORMONE: CPT

## 2025-08-20 PROCEDURE — 82570 ASSAY OF URINE CREATININE: CPT

## 2025-08-20 PROCEDURE — 36415 COLL VENOUS BLD VENIPUNCTURE: CPT

## 2025-08-20 PROCEDURE — 83550 IRON BINDING TEST: CPT

## (undated) DEVICE — SHEARS MONOPOLAR CURVED DA VINCI 10X'S REUSABLE

## (undated) DEVICE — Device

## (undated) DEVICE — SET EXTENSION WITH 2 PORTS (48EA/CA) ***PART #2C8610 IS A SUBSTITUTE*****

## (undated) DEVICE — GLOVE SZ 7.5 BIOGEL PI MICRO - PF LF (50PR/BX)

## (undated) DEVICE — ELECTRODE 850 FOAM ADHESIVE - HYDROGEL RADIOTRNSPRNT (50/PK)

## (undated) DEVICE — DEVICE INFLATION NOVAFLEX ATRION LOCK SYRINGE 29MM 38ML (1EA)

## (undated) DEVICE — TRAY SRGPRP PVP IOD WT PRP - (20/CA)

## (undated) DEVICE — KIT RETROFIT PROBE COVERS (24EA/BX)

## (undated) DEVICE — GLOVE BIOGEL INDICATOR SZ 8 SURGICAL PF LTX - (50/BX 4BX/CA)

## (undated) DEVICE — GLOVE BIOGEL INDICATOR SZ 7.5 SURGICAL PF LTX - (50PR/BX 4BX/CA)

## (undated) DEVICE — PACK UPPER EXTREMITY SM OR - (3/CA)

## (undated) DEVICE — CHLORAPREP 26 ML APPLICATOR - ORANGE TINT(25/CA)

## (undated) DEVICE — SET TUBING PNEUMOCLEAR HIGH FLOW SMOKE EVACUATION (10EA/BX)

## (undated) DEVICE — ELECTRODE DUAL RETURN W/ CORD - (50/PK)

## (undated) DEVICE — SHEATH RO 6F 25CM (10EA/BX)

## (undated) DEVICE — SCISSORS 5MM CVD (6EA/BX)

## (undated) DEVICE — SUTURE 0 ETHIBOND CT-1 30 IN (36PK/BX)

## (undated) DEVICE — TUBING CLEARLINK DUO-VENT - C-FLO (48EA/CA)

## (undated) DEVICE — BIPOLAR FORCE DA VINCI 12X'S REISABLE

## (undated) DEVICE — SENSOR SPO2 NEO LNCS ADHESIVE (20/BX) SEE USER NOTES

## (undated) DEVICE — CANISTER SUCTION 3000ML MECHANICAL FILTER AUTO SHUTOFF MEDI-VAC NONSTERILE LF DISP (40EA/CA)

## (undated) DEVICE — LACTATED RINGERS INJ 1000 ML - (14EA/CA 60CA/PF)

## (undated) DEVICE — COVER TIP ENDOWRIST HOT SHEAR - (10EA/BX) DA VINCI

## (undated) DEVICE — BLANKET UNDERBODY FULL ACCES - (5/CA)

## (undated) DEVICE — GLOVE SIZE 6.5 SURGEON ACCELERATOR FREE GREEN (50PR/BX)

## (undated) DEVICE — CRIMPER CATHETER EDWARDS DISPOSABLE (1EA)

## (undated) DEVICE — SENSOR OXIMETER ADULT SPO2 RD SET (20EA/BX)

## (undated) DEVICE — DRAPE STRLE REG TOWEL 18X24 - (10/BX 4BX/CA)"

## (undated) DEVICE — TUBE CONNECTING SUCTION - CLEAR PLASTIC STERILE 72 IN (50EA/CA)

## (undated) DEVICE — GLOVE BIOGEL PI INDICATOR SZ 6.5 SURGICAL PF LF - (50/BX 4BX/CA)

## (undated) DEVICE — COVER FOOT UNIVERSAL DISP. - (25EA/CA)

## (undated) DEVICE — COVER LIGHT HANDLE ALC PLUS DISP (18EA/BX)

## (undated) DEVICE — NEEDLE DRIVER MEGA SUTURECUT DA VINCI 15X'S REUSABLE

## (undated) DEVICE — PROTECTOR ULNA NERVE - (36PR/CA)

## (undated) DEVICE — SUTURE GENERAL

## (undated) DEVICE — GLOVE BIOGEL PI INDICATOR SZ 8.0 SURGICAL PF LF -(50/BX 4BX/CA)

## (undated) DEVICE — GOWN SURGEONS LARGE - (32/CA)

## (undated) DEVICE — SET LEADWIRE 5 LEAD BEDSIDE DISPOSABLE ECG (1SET OF 5/EA)

## (undated) DEVICE — CANISTER SUCTION RIGID RED 1500CC (40EA/CA)

## (undated) DEVICE — SUTURE 4-0 MONOCRYL PLUS PS-2 - 27 INCH (36/BX)

## (undated) DEVICE — BANDAGE STER SOF-FORM 4X75IN - (12EA/BX 8BX/CA)

## (undated) DEVICE — GLOVE SZ 7 BIOGEL PI MICRO - PF LF (50PR/BX 4BX/CA)

## (undated) DEVICE — PACK LOWER EXTREMITY - (2/CA)

## (undated) DEVICE — KIT ANESTHESIA W/CIRCUIT & 3/LT BAG W/FILTER (20EA/CA)

## (undated) DEVICE — BLADE SURGICAL #15 - (50/BX 3BX/CA)

## (undated) DEVICE — PAD PREP 24 X 48 CUFFED - (100/CA)

## (undated) DEVICE — SET SUCTION/IRRIGATION WITH DISPOSABLE TIP (6/CA )PART #0250-070-520 IS A SUB

## (undated) DEVICE — DRAPE LARGE 3 QUARTER - (20/CA)

## (undated) DEVICE — ELECTRODE RADIOLUCNT SOLID GEL DEFIB PADS (12EA/CA)

## (undated) DEVICE — STOPCOCK IV 400 PSI 3W ROT (50EA/BX)

## (undated) DEVICE — SUTURE DEVICE CLOSURE REPAIR SYSTEM PERCLOSE PROSTYLE (10EA/PK)

## (undated) DEVICE — GUIDEWIRE STARTER STRAIGHT FIXED CORE .035 150CM 4 STRAIGHT PTFE/HEPARIN COATED (10/BX)

## (undated) DEVICE — SUTURE 2-0 SILK SH (36PK/BX)

## (undated) DEVICE — OBTURATOR BLADELESS STANDARD 8MM (6EA/BX)

## (undated) DEVICE — DRAPE ABDOMINAL STERILE LAPAROSCOPIC 102IN X 121IN 77IN (12EA/CA)

## (undated) DEVICE — IV TUBING HI-FLO RATE W/CLAMP (50/CA)

## (undated) DEVICE — CANNULA W/SEAL 5X100 Z-THRE - ADED KII (12/BX)

## (undated) DEVICE — TOWELS CLOTH SURGICAL - (4/PK 20PK/CA)

## (undated) DEVICE — SYR ANGIO CNRST INJ HI-PRS 3W 65 - (10EA/CA)"

## (undated) DEVICE — RESERVOIR SUCTION 100 CC - SILICONE (20EA/CA)

## (undated) DEVICE — SUCTION INSTRUMENT YANKAUER BULBOUS TIP W/O VENT (50EA/CA)

## (undated) DEVICE — SEAL UNIVERSAL 5MM-12MM (10EA/BX)

## (undated) DEVICE — TROCAR 5X100 NON BLADED Z-TH - READ KII (6/BX)

## (undated) DEVICE — DRAPE ARM BOX OF 20

## (undated) DEVICE — CABLE TEMPORARY PACING

## (undated) DEVICE — SODIUM CHL IRRIGATION 0.9% 1000ML (12EA/CA)

## (undated) DEVICE — DEVICE CLOSURE ABSORBABLE BLUE SYNETURE V-LOC 1 GS-22 L12 IN (12EA/CT)

## (undated) DEVICE — SLEEVE VASO DVT COMPRESSION CALF MED - (10PR/CA)

## (undated) DEVICE — BLADE SURGICAL #11 - (50/BX)

## (undated) DEVICE — GLOVE, LITE (PAIR)

## (undated) DEVICE — DRAPE COLUMN BOX OF 20

## (undated) DEVICE — SHEATH DESTINATION WITH DILATOR 6FR 45CM

## (undated) DEVICE — DRAPE CLEAR W/ELASTIC BAND RAD CARM 40 X40" (20EA/CA)"

## (undated) DEVICE — STAPLER SKIN DISP - (6/BX 10BX/CA) VISISTAT

## (undated) DEVICE — WIRE GUIDE LUNDQST.035X180 - TSMG-35-180-4-LES ORDER BY BOX (5EA/BX)

## (undated) DEVICE — DEVICE INFLATION ATRION NOVALFEX TRANSFEMORAL SYSTEM (1EA)

## (undated) DEVICE — SUTURE 3-0 ETHILON FS-1 - (36/BX) 30 INCH

## (undated) DEVICE — SYSTEM DELIVERY COMMANDER TAVR KIT 23MM COMPONENT (1EA)

## (undated) DEVICE — GLOVE BIOGEL PI INDICATOR SZ 7.0 SURGICAL PF LF - (50/BX 4BX/CA)

## (undated) DEVICE — PACK TAVR (3EA/CA)

## (undated) DEVICE — KIT ROOM DECONTAMINATION

## (undated) DEVICE — MASK ANESTHESIA ADULT  - (100/CA)

## (undated) DEVICE — INTRODUCER CATHETER DILATOR PROTRUDING 8FR 2.5CM (10EA/BX)

## (undated) DEVICE — GLOVE BIOGEL SZ 6.5 SURGICAL PF LTX (50PR/BX 4BX/CA)

## (undated) DEVICE — GLOVE BIOGEL INDICATOR SZ 7SURGICAL PF LTX - (50/BX 4BX/CA)

## (undated) DEVICE — SYSTEM CLEARIFY VISUALIZATION (10EA/PK)

## (undated) DEVICE — GLOVE BIOGEL SZ 7 SURGICAL PF LTX - (50PR/BX 4BX/CA)

## (undated) DEVICE — TOWEL STOP TIMEOUT SAFETY FLAG (40EA/CA)

## (undated) DEVICE — GOWN WARMING STANDARD FLEX - (30/CA)

## (undated) DEVICE — BAG, SPONGE COUNT 50600

## (undated) DEVICE — GOWN SURGEONS X-LARGE - DISP. (30/CA)

## (undated) DEVICE — PACK DAVINCI GENERAL (3EA/CA)

## (undated) DEVICE — LIGASURE LAPAROSCOPIC 5MM - (6EA/CA)

## (undated) DEVICE — SPONGE GAUZESTER 4 X 4 4PLY - (128PK/CA)

## (undated) DEVICE — DERMABOND ADVANCED - (12EA/BX)

## (undated) DEVICE — MASK, LARYNGEAL AIRWAY #4

## (undated) DEVICE — GOWN SURGICAL XX-LARGE - (28EA/CA) SIRUS NON REINFORCED

## (undated) DEVICE — GLIDESHEATH SLENDER NITINOL KIT .021 GW 6FR 10CM SINGLE WALL

## (undated) DEVICE — SUTURE 2-0 20CM STRATAFIX SPIRAL SH NEEDLE (12/BX)

## (undated) DEVICE — CATHETER 6FR AL1 100CM (5/BX)

## (undated) DEVICE — GLOVE SZ 7.5 LF PROTEXIS (50PR/BX)

## (undated) DEVICE — TOURNIQUET, STERILE 18 (RED)

## (undated) DEVICE — WATER IRRIGATION STERILE 1000ML (12EA/CA)

## (undated) DEVICE — CATHETER PIGTAIL 6FR 145 (5EA/BX)

## (undated) DEVICE — HEAD HOLDER JUNIOR/ADULT

## (undated) DEVICE — BOVIE NEEDLE TIP INSULATD NON-SAFETY 2CM (50/PK)

## (undated) DEVICE — BANDAGE ELASTIC 4 HONEYCOMB - 4"X5YD LF (20/CA)"

## (undated) DEVICE — HUMID-VENT HEAT AND MOISTURE EXCHANGE- (50/BX)

## (undated) DEVICE — DECANTER FLD BLS - (50/CA)

## (undated) DEVICE — TROCAR Z THREAD11MM OPTICAL - NON BLADED(6/BX)

## (undated) DEVICE — DRAPE MAYO STAND - (30/CA)

## (undated) DEVICE — INTRODUCER SHEATH 6FR 2.5CM - DILATOR PROTRUDING (10/BX)

## (undated) DEVICE — COVER LIGHT HANDLE FLEXIBLE - SOFT (2EA/PK 80PK/CA)

## (undated) DEVICE — ARM BAND RADIAL TR BAND (5EA/BX)

## (undated) DEVICE — WIRE GUIDE AES .035 260CM WITH 3MM J TIP"

## (undated) DEVICE — GLOVE BIOGEL SZ 8 SURGICAL PF LTX - (50PR/BX 4BX/CA)